# Patient Record
Sex: MALE | Race: WHITE | NOT HISPANIC OR LATINO | URBAN - METROPOLITAN AREA
[De-identification: names, ages, dates, MRNs, and addresses within clinical notes are randomized per-mention and may not be internally consistent; named-entity substitution may affect disease eponyms.]

---

## 2017-05-19 ENCOUNTER — OUTPATIENT (OUTPATIENT)
Dept: OUTPATIENT SERVICES | Facility: HOSPITAL | Age: 82
LOS: 1 days | End: 2017-05-19
Payer: MEDICARE

## 2017-05-19 VITALS
SYSTOLIC BLOOD PRESSURE: 141 MMHG | WEIGHT: 153 LBS | HEIGHT: 68 IN | TEMPERATURE: 99 F | HEART RATE: 74 BPM | RESPIRATION RATE: 16 BRPM | DIASTOLIC BLOOD PRESSURE: 63 MMHG

## 2017-05-19 DIAGNOSIS — Z01.818 ENCOUNTER FOR OTHER PREPROCEDURAL EXAMINATION: ICD-10-CM

## 2017-05-19 DIAGNOSIS — Z87.2 PERSONAL HISTORY OF DISEASES OF THE SKIN AND SUBCUTANEOUS TISSUE: Chronic | ICD-10-CM

## 2017-05-19 DIAGNOSIS — S83.231A COMPLEX TEAR OF MEDIAL MENISCUS, CURRENT INJURY, RIGHT KNEE, INITIAL ENCOUNTER: ICD-10-CM

## 2017-05-19 DIAGNOSIS — S83.271A COMPLEX TEAR OF LATERAL MENISCUS, CURRENT INJURY, RIGHT KNEE, INITIAL ENCOUNTER: ICD-10-CM

## 2017-05-19 LAB
ALBUMIN SERPL ELPH-MCNC: 4 G/DL — SIGNIFICANT CHANGE UP (ref 3.3–5)
ALP SERPL-CCNC: 118 U/L — SIGNIFICANT CHANGE UP (ref 40–120)
ALT FLD-CCNC: 26 U/L — SIGNIFICANT CHANGE UP (ref 12–78)
ANION GAP SERPL CALC-SCNC: 7 MMOL/L — SIGNIFICANT CHANGE UP (ref 5–17)
AST SERPL-CCNC: 20 U/L — SIGNIFICANT CHANGE UP (ref 15–37)
BILIRUB SERPL-MCNC: 0.8 MG/DL — SIGNIFICANT CHANGE UP (ref 0.2–1.2)
BUN SERPL-MCNC: 29 MG/DL — HIGH (ref 7–23)
CALCIUM SERPL-MCNC: 8.5 MG/DL — SIGNIFICANT CHANGE UP (ref 8.5–10.1)
CHLORIDE SERPL-SCNC: 104 MMOL/L — SIGNIFICANT CHANGE UP (ref 96–108)
CO2 SERPL-SCNC: 29 MMOL/L — SIGNIFICANT CHANGE UP (ref 22–31)
CREAT SERPL-MCNC: 1.6 MG/DL — HIGH (ref 0.5–1.3)
GLUCOSE SERPL-MCNC: 108 MG/DL — HIGH (ref 70–99)
HCT VFR BLD CALC: 44.2 % — SIGNIFICANT CHANGE UP (ref 39–50)
HGB BLD-MCNC: 14.1 G/DL — SIGNIFICANT CHANGE UP (ref 13–17)
MCHC RBC-ENTMCNC: 31.3 PG — SIGNIFICANT CHANGE UP (ref 27–34)
MCHC RBC-ENTMCNC: 32 GM/DL — SIGNIFICANT CHANGE UP (ref 32–36)
MCV RBC AUTO: 97.9 FL — SIGNIFICANT CHANGE UP (ref 80–100)
PLATELET # BLD AUTO: 230 K/UL — SIGNIFICANT CHANGE UP (ref 150–400)
POTASSIUM SERPL-MCNC: 4 MMOL/L — SIGNIFICANT CHANGE UP (ref 3.5–5.3)
POTASSIUM SERPL-SCNC: 4 MMOL/L — SIGNIFICANT CHANGE UP (ref 3.5–5.3)
PROT SERPL-MCNC: 8.1 G/DL — SIGNIFICANT CHANGE UP (ref 6–8.3)
RBC # BLD: 4.52 M/UL — SIGNIFICANT CHANGE UP (ref 4.2–5.8)
RBC # FLD: 12 % — SIGNIFICANT CHANGE UP (ref 10.3–14.5)
SODIUM SERPL-SCNC: 140 MMOL/L — SIGNIFICANT CHANGE UP (ref 135–145)
WBC # BLD: 9.8 K/UL — SIGNIFICANT CHANGE UP (ref 3.8–10.5)
WBC # FLD AUTO: 9.8 K/UL — SIGNIFICANT CHANGE UP (ref 3.8–10.5)

## 2017-05-19 PROCEDURE — 93005 ELECTROCARDIOGRAM TRACING: CPT

## 2017-05-19 PROCEDURE — 93010 ELECTROCARDIOGRAM REPORT: CPT | Mod: NC

## 2017-05-19 PROCEDURE — 85027 COMPLETE CBC AUTOMATED: CPT

## 2017-05-19 PROCEDURE — 80053 COMPREHEN METABOLIC PANEL: CPT

## 2017-05-19 PROCEDURE — G0463: CPT

## 2017-05-19 RX ORDER — FINASTERIDE 5 MG/1
1 TABLET, FILM COATED ORAL
Qty: 0 | Refills: 0 | COMMUNITY

## 2017-05-19 NOTE — H&P PST ADULT - NSANTHOSAYNRD_GEN_A_CORE
No. VIÁN screening performed.  STOP BANG Legend: 0-2 = LOW Risk; 3-4 = INTERMEDIATE Risk; 5-8 = HIGH Risk

## 2017-05-19 NOTE — H&P PST ADULT - ASSESSMENT
83 yo male with a meniscus tear of the right knee scheduled for a right knee arthroscopy - menisectomy - chondroplasty on  5/25 with Dr. Sr

## 2017-05-19 NOTE — H&P PST ADULT - PMH
Benign prostatic hyperplasia, presence of lower urinary tract symptoms unspecified, unspecified morphology    Cerebrovascular accident (CVA), unspecified mechanism  2015  Complex tear of lateral meniscus of right knee as current injury, initial encounter    Complex tear of medial meniscus of right knee as current injury, initial encounter    Essential hypertension    Hyperlipidemia, unspecified hyperlipidemia type    Skin cancer

## 2017-05-19 NOTE — H&P PST ADULT - HISTORY OF PRESENT ILLNESS
83 yo male with HTN, BPH s/p CVA (2015) , presents to PST scheduled for a right knee arthroscopy - menisectomy - chondroplasty on  5/25 with Dr. Sr. C/O right knee pain for the past 2-3 weeks, that has increased in severity. Denies recent and fall.  Rates his pain a 4/10. Denies use of pain meds.

## 2017-05-24 RX ORDER — SODIUM CHLORIDE 9 MG/ML
1000 INJECTION INTRAMUSCULAR; INTRAVENOUS; SUBCUTANEOUS
Qty: 0 | Refills: 0 | Status: DISCONTINUED | OUTPATIENT
Start: 2017-05-25 | End: 2017-06-09

## 2017-05-25 ENCOUNTER — TRANSCRIPTION ENCOUNTER (OUTPATIENT)
Age: 82
End: 2017-05-25

## 2017-05-25 ENCOUNTER — RESULT REVIEW (OUTPATIENT)
Age: 82
End: 2017-05-25

## 2017-05-25 ENCOUNTER — OUTPATIENT (OUTPATIENT)
Dept: OUTPATIENT SERVICES | Facility: HOSPITAL | Age: 82
LOS: 1 days | Discharge: HOME | End: 2017-05-25
Payer: MEDICARE

## 2017-05-25 VITALS
OXYGEN SATURATION: 97 % | RESPIRATION RATE: 12 BRPM | HEART RATE: 71 BPM | DIASTOLIC BLOOD PRESSURE: 68 MMHG | SYSTOLIC BLOOD PRESSURE: 128 MMHG

## 2017-05-25 VITALS
OXYGEN SATURATION: 95 % | DIASTOLIC BLOOD PRESSURE: 83 MMHG | HEIGHT: 68 IN | HEART RATE: 83 BPM | SYSTOLIC BLOOD PRESSURE: 174 MMHG | WEIGHT: 153 LBS | RESPIRATION RATE: 11 BRPM | TEMPERATURE: 98 F

## 2017-05-25 DIAGNOSIS — Z87.2 PERSONAL HISTORY OF DISEASES OF THE SKIN AND SUBCUTANEOUS TISSUE: Chronic | ICD-10-CM

## 2017-05-25 DIAGNOSIS — S83.231A COMPLEX TEAR OF MEDIAL MENISCUS, CURRENT INJURY, RIGHT KNEE, INITIAL ENCOUNTER: ICD-10-CM

## 2017-05-25 DIAGNOSIS — S83.271A COMPLEX TEAR OF LATERAL MENISCUS, CURRENT INJURY, RIGHT KNEE, INITIAL ENCOUNTER: ICD-10-CM

## 2017-05-25 DIAGNOSIS — Z01.818 ENCOUNTER FOR OTHER PREPROCEDURAL EXAMINATION: ICD-10-CM

## 2017-05-25 PROCEDURE — 88304 TISSUE EXAM BY PATHOLOGIST: CPT | Mod: 26

## 2017-05-25 PROCEDURE — 88304 TISSUE EXAM BY PATHOLOGIST: CPT

## 2017-05-25 PROCEDURE — 29880 ARTHRS KNE SRG MNISECTMY M&L: CPT | Mod: RT

## 2017-05-25 RX ORDER — CEFAZOLIN SODIUM 1 G
2000 VIAL (EA) INJECTION ONCE
Qty: 0 | Refills: 0 | Status: COMPLETED | OUTPATIENT
Start: 2017-05-25 | End: 2017-05-25

## 2017-05-25 RX ORDER — HYDROMORPHONE HYDROCHLORIDE 2 MG/ML
0.4 INJECTION INTRAMUSCULAR; INTRAVENOUS; SUBCUTANEOUS
Qty: 0 | Refills: 0 | Status: DISCONTINUED | OUTPATIENT
Start: 2017-05-25 | End: 2017-05-25

## 2017-05-25 RX ORDER — ONDANSETRON 8 MG/1
4 TABLET, FILM COATED ORAL ONCE
Qty: 0 | Refills: 0 | Status: DISCONTINUED | OUTPATIENT
Start: 2017-05-25 | End: 2017-05-25

## 2017-05-25 RX ORDER — SODIUM CHLORIDE 9 MG/ML
1000 INJECTION, SOLUTION INTRAVENOUS
Qty: 0 | Refills: 0 | Status: DISCONTINUED | OUTPATIENT
Start: 2017-05-25 | End: 2017-05-25

## 2017-05-25 RX ADMIN — SODIUM CHLORIDE 50 MILLILITER(S): 9 INJECTION INTRAMUSCULAR; INTRAVENOUS; SUBCUTANEOUS at 15:28

## 2017-05-25 RX ADMIN — SODIUM CHLORIDE 50 MILLILITER(S): 9 INJECTION INTRAMUSCULAR; INTRAVENOUS; SUBCUTANEOUS at 13:30

## 2017-05-25 NOTE — ASU DISCHARGE PLAN (ADULT/PEDIATRIC). - MEDICATION SUMMARY - MEDICATIONS TO TAKE
I will START or STAY ON the medications listed below when I get home from the hospital:    finasteride 5 mg oral tablet  -- 1 tab(s) by mouth once a day (at bedtime)  -- Indication: For per pmd    Percocet 5/325 325 mg-5 mg oral tablet  -- 1 tab(s) by mouth every 6 hours, As Needed -for moderate pain MDD:6  -- Caution federal law prohibits the transfer of this drug to any person other  than the person for whom it was prescribed.  May cause drowsiness.  Alcohol may intensify this effect.  Use care when operating dangerous machinery.  This prescription cannot be refilled.  This product contains acetaminophen.  Do not use  with any other product containing acetaminophen to prevent possible liver damage.  Using more of this medication than prescribed may cause serious breathing problems.    -- Indication: For prn pain    tamsulosin 0.4 mg oral capsule  -- 1 cap(s) by mouth once a day (at bedtime)  -- Indication: For per pmd    atorvastatin 40 mg oral tablet  -- 1 tab(s) by mouth once a day (at bedtime)  -- Indication: For per pmd    Norvasc 10 mg oral tablet  -- 1 tab(s) by mouth once a day  -- Indication: For per pmd    furosemide 20 mg oral tablet  -- 1 tab(s) by mouth once a day  -- Indication: For per pmd    docusate sodium 100 mg oral capsule  -- 1 cap(s) by mouth 3 times a day  -- Indication: For per pmd    Protonix 40 mg oral delayed release tablet  -- 1 tab(s) by mouth once a day  -- Indication: For per pmd

## 2017-05-25 NOTE — BRIEF OPERATIVE NOTE - PROCEDURE
Right knee arthroscopy with meniscectomy  05/25/2017  partial medial and lateral menisectomy, chondroplasty  Active  JESIKA

## 2017-05-25 NOTE — ASU DISCHARGE PLAN (ADULT/PEDIATRIC). - SPECIAL INSTRUCTIONS
follow up on 5/27/17. call office for appt. keep dressing on until office visit. keep dressing clean dry and intact

## 2017-05-29 DIAGNOSIS — M65.861 OTHER SYNOVITIS AND TENOSYNOVITIS, RIGHT LOWER LEG: ICD-10-CM

## 2017-05-29 DIAGNOSIS — M23.221 DERANGEMENT OF POSTERIOR HORN OF MEDIAL MENISCUS DUE TO OLD TEAR OR INJURY, RIGHT KNEE: ICD-10-CM

## 2017-05-29 DIAGNOSIS — Z86.73 PERSONAL HISTORY OF TRANSIENT ISCHEMIC ATTACK (TIA), AND CEREBRAL INFARCTION WITHOUT RESIDUAL DEFICITS: ICD-10-CM

## 2017-05-29 DIAGNOSIS — E78.00 PURE HYPERCHOLESTEROLEMIA, UNSPECIFIED: ICD-10-CM

## 2017-05-29 DIAGNOSIS — N18.9 CHRONIC KIDNEY DISEASE, UNSPECIFIED: ICD-10-CM

## 2017-05-29 DIAGNOSIS — M17.0 BILATERAL PRIMARY OSTEOARTHRITIS OF KNEE: ICD-10-CM

## 2017-05-29 DIAGNOSIS — N40.0 BENIGN PROSTATIC HYPERPLASIA WITHOUT LOWER URINARY TRACT SYMPTOMS: ICD-10-CM

## 2017-05-29 DIAGNOSIS — E78.5 HYPERLIPIDEMIA, UNSPECIFIED: ICD-10-CM

## 2017-05-29 DIAGNOSIS — I12.9 HYPERTENSIVE CHRONIC KIDNEY DISEASE WITH STAGE 1 THROUGH STAGE 4 CHRONIC KIDNEY DISEASE, OR UNSPECIFIED CHRONIC KIDNEY DISEASE: ICD-10-CM

## 2017-05-29 DIAGNOSIS — M23.251 DERANGEMENT OF POSTERIOR HORN OF LATERAL MENISCUS DUE TO OLD TEAR OR INJURY, RIGHT KNEE: ICD-10-CM

## 2017-05-30 LAB — SURGICAL PATHOLOGY FINAL REPORT - CH: SIGNIFICANT CHANGE UP

## 2018-07-16 PROBLEM — I63.9 CEREBRAL INFARCTION, UNSPECIFIED: Chronic | Status: ACTIVE | Noted: 2017-05-19

## 2020-08-06 ENCOUNTER — RECORD ABSTRACTING (OUTPATIENT)
Age: 85
End: 2020-08-06

## 2020-08-06 DIAGNOSIS — Z86.73 PERSONAL HISTORY OF TRANSIENT ISCHEMIC ATTACK (TIA), AND CEREBRAL INFARCTION W/OUT RESIDUAL DEFICITS: ICD-10-CM

## 2022-08-08 ENCOUNTER — NON-APPOINTMENT (OUTPATIENT)
Age: 87
End: 2022-08-08

## 2022-08-09 PROBLEM — S83.231A COMPLEX TEAR OF MEDIAL MENISCUS, CURRENT INJURY, RIGHT KNEE, INITIAL ENCOUNTER: Chronic | Status: ACTIVE | Noted: 2017-05-19

## 2022-08-09 PROBLEM — E78.5 HYPERLIPIDEMIA, UNSPECIFIED: Chronic | Status: ACTIVE | Noted: 2017-05-19

## 2022-08-09 PROBLEM — I10 ESSENTIAL (PRIMARY) HYPERTENSION: Chronic | Status: ACTIVE | Noted: 2017-05-19

## 2022-08-09 PROBLEM — S83.271A COMPLEX TEAR OF LATERAL MENISCUS, CURRENT INJURY, RIGHT KNEE, INITIAL ENCOUNTER: Chronic | Status: ACTIVE | Noted: 2017-05-19

## 2022-08-09 PROBLEM — N40.0 BENIGN PROSTATIC HYPERPLASIA WITHOUT LOWER URINARY TRACT SYMPTOMS: Chronic | Status: ACTIVE | Noted: 2017-05-19

## 2022-08-11 ENCOUNTER — INPATIENT (INPATIENT)
Facility: HOSPITAL | Age: 87
LOS: 17 days | Discharge: ANOTHER HOSPITAL | DRG: 871 | End: 2022-08-29
Attending: HOSPITALIST | Admitting: HOSPITALIST
Payer: MEDICARE

## 2022-08-11 ENCOUNTER — APPOINTMENT (OUTPATIENT)
Dept: INTERNAL MEDICINE | Facility: CLINIC | Age: 87
End: 2022-08-11

## 2022-08-11 VITALS
HEART RATE: 115 BPM | WEIGHT: 159.39 LBS | OXYGEN SATURATION: 93 % | RESPIRATION RATE: 21 BRPM | HEIGHT: 68 IN | TEMPERATURE: 98 F | DIASTOLIC BLOOD PRESSURE: 78 MMHG | SYSTOLIC BLOOD PRESSURE: 139 MMHG

## 2022-08-11 DIAGNOSIS — Z87.2 PERSONAL HISTORY OF DISEASES OF THE SKIN AND SUBCUTANEOUS TISSUE: Chronic | ICD-10-CM

## 2022-08-11 DIAGNOSIS — I25.10 ATHEROSCLEROTIC HEART DISEASE OF NATIVE CORONARY ARTERY WITHOUT ANGINA PECTORIS: ICD-10-CM

## 2022-08-11 DIAGNOSIS — J18.9 PNEUMONIA, UNSPECIFIED ORGANISM: ICD-10-CM

## 2022-08-11 LAB
ALBUMIN SERPL ELPH-MCNC: 2.8 G/DL — LOW (ref 3.3–5)
ALP SERPL-CCNC: 290 U/L — HIGH (ref 30–120)
ALT FLD-CCNC: 100 U/L DA — HIGH (ref 10–60)
ANION GAP SERPL CALC-SCNC: 11 MMOL/L — SIGNIFICANT CHANGE UP (ref 5–17)
AST SERPL-CCNC: 93 U/L — HIGH (ref 10–40)
BASOPHILS # BLD AUTO: 0 K/UL — SIGNIFICANT CHANGE UP (ref 0–0.2)
BASOPHILS NFR BLD AUTO: 0 % — SIGNIFICANT CHANGE UP (ref 0–2)
BILIRUB SERPL-MCNC: 1.3 MG/DL — HIGH (ref 0.2–1.2)
BUN SERPL-MCNC: 40 MG/DL — HIGH (ref 7–23)
CALCIUM SERPL-MCNC: 8.5 MG/DL — SIGNIFICANT CHANGE UP (ref 8.4–10.5)
CHLORIDE SERPL-SCNC: 101 MMOL/L — SIGNIFICANT CHANGE UP (ref 96–108)
CO2 SERPL-SCNC: 26 MMOL/L — SIGNIFICANT CHANGE UP (ref 22–31)
CREAT SERPL-MCNC: 1.88 MG/DL — HIGH (ref 0.5–1.3)
EGFR: 34 ML/MIN/1.73M2 — LOW
EOSINOPHIL # BLD AUTO: 0.37 K/UL — SIGNIFICANT CHANGE UP (ref 0–0.5)
EOSINOPHIL NFR BLD AUTO: 2 % — SIGNIFICANT CHANGE UP (ref 0–6)
FLUAV AG NPH QL: SIGNIFICANT CHANGE UP
FLUBV AG NPH QL: SIGNIFICANT CHANGE UP
GLUCOSE SERPL-MCNC: 189 MG/DL — HIGH (ref 70–99)
HCT VFR BLD CALC: 40.3 % — SIGNIFICANT CHANGE UP (ref 39–50)
HGB BLD-MCNC: 13.1 G/DL — SIGNIFICANT CHANGE UP (ref 13–17)
LACTATE SERPL-SCNC: 1.4 MMOL/L — SIGNIFICANT CHANGE UP (ref 0.7–2)
LYMPHOCYTES # BLD AUTO: 1.12 K/UL — SIGNIFICANT CHANGE UP (ref 1–3.3)
LYMPHOCYTES # BLD AUTO: 6 % — LOW (ref 13–44)
MANUAL SMEAR VERIFICATION: SIGNIFICANT CHANGE UP
MCHC RBC-ENTMCNC: 31.4 PG — SIGNIFICANT CHANGE UP (ref 27–34)
MCHC RBC-ENTMCNC: 32.5 GM/DL — SIGNIFICANT CHANGE UP (ref 32–36)
MCV RBC AUTO: 96.6 FL — SIGNIFICANT CHANGE UP (ref 80–100)
MONOCYTES # BLD AUTO: 2.06 K/UL — HIGH (ref 0–0.9)
MONOCYTES NFR BLD AUTO: 11 % — SIGNIFICANT CHANGE UP (ref 2–14)
NEUTROPHILS # BLD AUTO: 15.17 K/UL — HIGH (ref 1.8–7.4)
NEUTROPHILS NFR BLD AUTO: 80 % — HIGH (ref 43–77)
NEUTS BAND # BLD: 1 % — SIGNIFICANT CHANGE UP (ref 0–8)
NRBC # BLD: 0 — SIGNIFICANT CHANGE UP
PLAT MORPH BLD: NORMAL — SIGNIFICANT CHANGE UP
PLATELET # BLD AUTO: 357 K/UL — SIGNIFICANT CHANGE UP (ref 150–400)
POTASSIUM SERPL-MCNC: 4 MMOL/L — SIGNIFICANT CHANGE UP (ref 3.5–5.3)
POTASSIUM SERPL-SCNC: 4 MMOL/L — SIGNIFICANT CHANGE UP (ref 3.5–5.3)
PROT SERPL-MCNC: 8.2 G/DL — SIGNIFICANT CHANGE UP (ref 6–8.3)
RBC # BLD: 4.17 M/UL — LOW (ref 4.2–5.8)
RBC # FLD: 13.5 % — SIGNIFICANT CHANGE UP (ref 10.3–14.5)
RBC BLD AUTO: NORMAL — SIGNIFICANT CHANGE UP
RSV RNA NPH QL NAA+NON-PROBE: SIGNIFICANT CHANGE UP
SARS-COV-2 RNA SPEC QL NAA+PROBE: SIGNIFICANT CHANGE UP
SODIUM SERPL-SCNC: 138 MMOL/L — SIGNIFICANT CHANGE UP (ref 135–145)
WBC # BLD: 18.73 K/UL — HIGH (ref 3.8–10.5)
WBC # FLD AUTO: 18.73 K/UL — HIGH (ref 3.8–10.5)

## 2022-08-11 PROCEDURE — 99285 EMERGENCY DEPT VISIT HI MDM: CPT | Mod: CS

## 2022-08-11 PROCEDURE — 99223 1ST HOSP IP/OBS HIGH 75: CPT | Mod: AI

## 2022-08-11 PROCEDURE — 71045 X-RAY EXAM CHEST 1 VIEW: CPT | Mod: 26

## 2022-08-11 PROCEDURE — 93010 ELECTROCARDIOGRAM REPORT: CPT

## 2022-08-11 PROCEDURE — 74176 CT ABD & PELVIS W/O CONTRAST: CPT | Mod: 26

## 2022-08-11 PROCEDURE — 71250 CT THORAX DX C-: CPT | Mod: 26

## 2022-08-11 RX ORDER — AMLODIPINE BESYLATE 2.5 MG/1
10 TABLET ORAL DAILY
Refills: 0 | Status: DISCONTINUED | OUTPATIENT
Start: 2022-08-11 | End: 2022-08-15

## 2022-08-11 RX ORDER — HEPARIN SODIUM 5000 [USP'U]/ML
6000 INJECTION INTRAVENOUS; SUBCUTANEOUS ONCE
Refills: 0 | Status: COMPLETED | OUTPATIENT
Start: 2022-08-11 | End: 2022-08-12

## 2022-08-11 RX ORDER — HEPARIN SODIUM 5000 [USP'U]/ML
INJECTION INTRAVENOUS; SUBCUTANEOUS
Qty: 25000 | Refills: 0 | Status: DISCONTINUED | OUTPATIENT
Start: 2022-08-11 | End: 2022-08-16

## 2022-08-11 RX ORDER — AZITHROMYCIN 500 MG/1
500 TABLET, FILM COATED ORAL EVERY 24 HOURS
Refills: 0 | Status: DISCONTINUED | OUTPATIENT
Start: 2022-08-12 | End: 2022-08-15

## 2022-08-11 RX ORDER — HEPARIN SODIUM 5000 [USP'U]/ML
6000 INJECTION INTRAVENOUS; SUBCUTANEOUS EVERY 6 HOURS
Refills: 0 | Status: DISCONTINUED | OUTPATIENT
Start: 2022-08-11 | End: 2022-08-16

## 2022-08-11 RX ORDER — FINASTERIDE 5 MG/1
5 TABLET, FILM COATED ORAL DAILY
Refills: 0 | Status: DISCONTINUED | OUTPATIENT
Start: 2022-08-12 | End: 2022-08-29

## 2022-08-11 RX ORDER — TAMSULOSIN HYDROCHLORIDE 0.4 MG/1
0.4 CAPSULE ORAL AT BEDTIME
Refills: 0 | Status: DISCONTINUED | OUTPATIENT
Start: 2022-08-11 | End: 2022-08-29

## 2022-08-11 RX ORDER — SODIUM CHLORIDE 9 MG/ML
1000 INJECTION INTRAMUSCULAR; INTRAVENOUS; SUBCUTANEOUS ONCE
Refills: 0 | Status: COMPLETED | OUTPATIENT
Start: 2022-08-11 | End: 2022-08-11

## 2022-08-11 RX ORDER — SODIUM CHLORIDE 9 MG/ML
1000 INJECTION, SOLUTION INTRAVENOUS
Refills: 0 | Status: DISCONTINUED | OUTPATIENT
Start: 2022-08-11 | End: 2022-08-12

## 2022-08-11 RX ORDER — ACETAMINOPHEN 500 MG
650 TABLET ORAL EVERY 6 HOURS
Refills: 0 | Status: DISCONTINUED | OUTPATIENT
Start: 2022-08-11 | End: 2022-08-29

## 2022-08-11 RX ORDER — AZITHROMYCIN 500 MG/1
500 TABLET, FILM COATED ORAL ONCE
Refills: 0 | Status: COMPLETED | OUTPATIENT
Start: 2022-08-11 | End: 2022-08-11

## 2022-08-11 RX ORDER — ONDANSETRON 8 MG/1
4 TABLET, FILM COATED ORAL EVERY 8 HOURS
Refills: 0 | Status: DISCONTINUED | OUTPATIENT
Start: 2022-08-11 | End: 2022-08-29

## 2022-08-11 RX ORDER — PANTOPRAZOLE SODIUM 20 MG/1
40 TABLET, DELAYED RELEASE ORAL
Refills: 0 | Status: DISCONTINUED | OUTPATIENT
Start: 2022-08-11 | End: 2022-08-29

## 2022-08-11 RX ORDER — CEFTRIAXONE 500 MG/1
1000 INJECTION, POWDER, FOR SOLUTION INTRAMUSCULAR; INTRAVENOUS EVERY 24 HOURS
Refills: 0 | Status: DISCONTINUED | OUTPATIENT
Start: 2022-08-12 | End: 2022-08-15

## 2022-08-11 RX ORDER — LANOLIN ALCOHOL/MO/W.PET/CERES
3 CREAM (GRAM) TOPICAL AT BEDTIME
Refills: 0 | Status: DISCONTINUED | OUTPATIENT
Start: 2022-08-11 | End: 2022-08-17

## 2022-08-11 RX ORDER — HEPARIN SODIUM 5000 [USP'U]/ML
3000 INJECTION INTRAVENOUS; SUBCUTANEOUS EVERY 6 HOURS
Refills: 0 | Status: DISCONTINUED | OUTPATIENT
Start: 2022-08-11 | End: 2022-08-16

## 2022-08-11 RX ORDER — ATORVASTATIN CALCIUM 80 MG/1
40 TABLET, FILM COATED ORAL AT BEDTIME
Refills: 0 | Status: DISCONTINUED | OUTPATIENT
Start: 2022-08-11 | End: 2022-08-29

## 2022-08-11 RX ORDER — CEFTRIAXONE 500 MG/1
1000 INJECTION, POWDER, FOR SOLUTION INTRAMUSCULAR; INTRAVENOUS ONCE
Refills: 0 | Status: COMPLETED | OUTPATIENT
Start: 2022-08-11 | End: 2022-08-11

## 2022-08-11 RX ADMIN — CEFTRIAXONE 100 MILLIGRAM(S): 500 INJECTION, POWDER, FOR SOLUTION INTRAMUSCULAR; INTRAVENOUS at 21:00

## 2022-08-11 RX ADMIN — SODIUM CHLORIDE 1000 MILLILITER(S): 9 INJECTION INTRAMUSCULAR; INTRAVENOUS; SUBCUTANEOUS at 21:35

## 2022-08-11 NOTE — H&P ADULT - ASSESSMENT
87M with HTN, HLD, hx of CVA (no residual deficits), BPH, hx of skin CA who presents with cough and fevers.    Found to have multifocal pneumonia.  Also found have new onset afib.      Multifocal PNA   - admitted tot   - aside from PNA patient has blood tinged sputum, possible early cavitation, and fevers ->    87M with HTN, HLD, hx of CVA (no residual deficits), BPH, hx of skin CA who presents with cough and fevers.    Found to have multifocal pneumonia.  Also found have new onset afib.      Multifocal PNA   - admitted to medicine  - cont with ceftriaxone and azithromycin  - check strep PNA Ag  - check legionella Ag  - ID consult  - pulm consult  - f/u cultures    R/O TB   - doubt TB but patient has blood tinged sputum, possible early cavitation on CT, and fevers -> will have to r/o  - TB isolation  - check quantiferon and AFB smear  - ID will be consulted    New afib - currently in 110s  - [id number] score of at least 5 -> will anticoagulate with heparin drip for now (can be stopped if hemotpysis worsens)  - cardiology consult  - rate control if needed  - check TTE  - will eventually need to transition to DOAC    Elevated LFTs  - likely from current illness  - f/u final CT A/P read  - can consider GI consult if worsens    CINDI 2/2 prerenal azotemia - elev BUN/Cr >20  - likely 2/2 poor PO intake  - hydrate and repeat BMP in AM  - bladder scan, and if retaining, may need pittman  - hold diuretic  - avoid nephrotoxic meds    HTN/HLD  - cont with norvasc  - hold lasix  - cont with atorvastatin    BPH  - cont with tamsulosin and finasteride    Preventive measures  - will be on heparin drip for afib     87M with HTN, HLD, hx of CVA (no residual deficits), BPH, hx of skin CA who presents with cough and fevers.    Found to have multifocal pneumonia.  Also found have new onset afib.        Sepsis 2/2 multifocal PNA - meets sepsis criteria with leukocytosis + tachycardia + source of infection  - admitted to medicine  - cont with ceftriaxone and azithromycin  - check strep PNA Ag  - check legionella Ag  - ID consult  - pulm consult  - f/u cultures    R/O TB   - doubt TB but patient has blood tinged sputum, possible early cavitation on CT, and fevers -> will have to r/o  - TB isolation  - check quantiferon and AFB smear  - ID will be consulted    New afib - currently in 110s  - [id number] score of at least 5 -> will anticoagulate with heparin drip for now (can be stopped if hemotpysis worsens)  - cardiology consult  - rate control if needed  - check TTE  - will eventually need to transition to DOAC    Elevated LFTs  - likely from current illness  - f/u final CT A/P read  - can consider GI consult if worsens    CINDI 2/2 prerenal azotemia - elev BUN/Cr >20  - likely 2/2 poor PO intake  - hydrate and repeat BMP in AM  - bladder scan, and if retaining, may need pittman  - hold diuretic  - avoid nephrotoxic meds    HTN/HLD  - cont with norvasc  - hold lasix  - cont with atorvastatin    BPH  - cont with tamsulosin and finasteride    Preventive measures  - will be on heparin drip for afib

## 2022-08-11 NOTE — ED ADULT NURSE NOTE - CHPI ED NUR QUALITY2
alteration in breathing pattern/non-productive cough alteration in breathing pattern/productive cough/yellow mucous

## 2022-08-11 NOTE — ED PROVIDER NOTE - CLINICAL SUMMARY MEDICAL DECISION MAKING FREE TEXT BOX
86 y/o male with cough generalized weakness for five days. no fever, neg at home covid test. will rule out PNA, check labs, CXR, reassess

## 2022-08-11 NOTE — ED PROVIDER NOTE - NSICDXPASTMEDICALHX_GEN_ALL_CORE_FT
PAST MEDICAL HISTORY:  Benign prostatic hyperplasia, presence of lower urinary tract symptoms unspecified, unspecified morphology     Cerebrovascular accident (CVA), unspecified mechanism 2015    Complex tear of lateral meniscus of right knee as current injury, initial encounter     Complex tear of medial meniscus of right knee as current injury, initial encounter     Essential hypertension     Hyperlipidemia, unspecified hyperlipidemia type     Skin cancer

## 2022-08-11 NOTE — ED ADULT NURSE NOTE - INTERVENTIONS DEFINITIONS
Mina to call system/Call bell, personal items and telephone within reach/Instruct patient to call for assistance

## 2022-08-11 NOTE — H&P ADULT - NSICDXPASTMEDICALHX_GEN_ALL_CORE_FT
PAST MEDICAL HISTORY:  Benign prostatic hyperplasia, presence of lower urinary tract symptoms unspecified, unspecified morphology     Cerebrovascular accident (CVA), unspecified mechanism 2015    Complex tear of lateral meniscus of right knee as current injury, initial encounter     Complex tear of medial meniscus of right knee as current injury, initial encounter     Essential hypertension     Hyperlipidemia, unspecified hyperlipidemia type     Skin cancer s/p Mohs

## 2022-08-11 NOTE — ED ADULT NURSE NOTE - OBJECTIVE STATEMENT
Pt presents to the ED with reports of cough for approximately 1 week. Pt was seen by his PMD and started oral antibiotics. Pt then went to urgent care because the cough continued and was sent home to continue PO meds. Pt now states he has blood in his sputum, is unable to sleep at night, and feels pain in his left upper back. Pt with mild dyspnea walking from wheelchair to the stretcher.

## 2022-08-11 NOTE — H&P ADULT - CONVERSATION DETAILS
Spoke to family and patient regarding diagnosis and prognosis of multifocal PNA and treatment plans.  Also spoke to them regarding advance directives if any emergency should arise such as acute respiratory failure or cardiac arrest.  Both patient and daughter confirm that he is to remain FULL CODE.

## 2022-08-11 NOTE — ED PROVIDER NOTE - OBJECTIVE STATEMENT
88 y/o male HTN, HLD, CVA present to the ed c/o cough x 5-6 days. pt states he is constantly coughing with associated phlegm and blood clots. pt denies fever, sick contacts, sore throat. pt states he usually coughs when he is lying down. pt states he is on baby aspirin. pt states he is having trouble breathing. pt states he had a negative at home covid test. pt states he saw his pcp 3 days ago but did not get an XR done.

## 2022-08-11 NOTE — H&P ADULT - NSICDXFAMILYHX_GEN_ALL_CORE_FT
FAMILY HISTORY:  Father  Still living? Unknown  Family history of ischemic heart disease and other diseases of the circulatory system, Age at diagnosis: Age Unknown

## 2022-08-11 NOTE — H&P ADULT - NSHPREVIEWOFSYSTEMS_GEN_ALL_CORE
REVIEW OF SYSTEMS:  CONSTITUTIONAL:   +fever, +fatigue, no weight loss  EYES:    no eye pain or visual disturbances or discharge  ENMT:     no difficulty hearing or tinnitus or vertigo, no sinus or throat pain  NECK:    no pain or stiffness  RESPIRATORY:    +cough, +hemoptysis, +SOB/DONG  CARDIOVASCULAR:    no chest pain or palpitations or dizziness or leg swelling  GASTROINTESTINAL:    +nausea, no vomiting, no abdominal or epigastric pain, no diarrhea or constipation. no melena or hematochezia.  GENITOURINARY:    no dysuria or frequency or hematuria or incontinence  NEUROLOGICAL:    no headaches or memory loss or loss of strength or numbness or tremors  SKIN:    no itching or burning or rashes or lesions   LYMPH NODES:    no enlarged glands  ENDOCRINE:    no heat or cold intolerance, no hair loss, no polydipsia or polyuria  MUSCULOSKELETAL:    no joint pain or swelling, no muscle or back or extremity pain  PSYCHIATRIC:    no depression or anxiety or mood swings or difficulty sleeping  HEME/LYMPH:    no easy bruising or bleeding gums  ALLERGY AND IMMUNOLOGIC:    no hives or eczema

## 2022-08-11 NOTE — H&P ADULT - NSHPPHYSICALEXAM_GEN_ALL_CORE
PHYSICAL EXAM:  Vital Signs Last 24 Hrs  T(C): 36.9 (11 Aug 2022 19:44), Max: 36.9 (11 Aug 2022 19:44)  T(F): 98.5 (11 Aug 2022 19:44), Max: 98.5 (11 Aug 2022 19:44)  HR: 117 (11 Aug 2022 21:30) (115 - 118)  BP: 160/74 (11 Aug 2022 21:30) (139/78 - 160/74)  BP(mean): --  RR: 20 (11 Aug 2022 21:30) (20 - 22)  SpO2: 91% (11 Aug 2022 21:30) (91% - 93%)    Parameters below as of 11 Aug 2022 21:30  Patient On (Oxygen Delivery Method): room air        GENERAL:     well-appearing but coughing male in NAD  HEAD:     atraumatic, normocephalic  NECK:     supple  RESPIRATORY:     some dry crackles throughout, no gross wheezing or rales  CARDIOVASCULAR:     irregular irregular   GASTROINTESTINAL:     soft, nontender, nondistended, bowel sounds present  EXTREMITIES:     minimal BLE edema, no clubbing or cyanosis  MUSCULOSKELETAL:     no joint pain or swelling or deformities  NERVOUS SYSTEM:     motor strength intact with 5/5 B/L upper and lower extremities, no gross sensory deficits  SKIN:     no rashes or lesions  PSYCH:     appropriate, alert and orientated x3, good concentration

## 2022-08-11 NOTE — H&P ADULT - NSHPLABSRESULTS_GEN_ALL_CORE
LABS:                        13.1   18.73<H> )-----------( 357      ( 11 Aug 2022 20:30 )             40.3     138    |  101    |  40<H>  ----------------------------<  189<H>    11 Aug 2022 20:30  4.0     |  26     |  1.88<H>    Ca 8.5           11 Aug 2022 20:30    TPro  8.2    /  Alb  2.8<L>  /  TBili  1.3<H>  /  DBili  x      /  AST  93<H>  /  ALT  100<H>  /  AlkPhos  290<H>  11 Aug 2022 20:30    Troponin trend:    Lactate, Blood: 1.4 mmol/L (08-11-22 @ 21:07)    EKG:  afib with , RBBB, possible STD in V2-V6  Radiology:  < from: CT Abdomen and Pelvis No Cont (08.11.22 @ 21:36) >    ******PRELIMINARY REPORT******      ******PRELIMINARY REPORT******           INTERPRETATION:  Patchy groundglass and more dense airspace opacities in   both lungs, suspicious for multifocal pneumonia.  Several small lucencies   with areas of lung opacity, for example 3 series 3, image seventy-one and   the left upper lobe, may represent early cavitation versus areas of focal   bronchiectasis.    Small left pleural effusion.    No evidence of bowel obstruction, active inflammatory process or   intra-abdominal source for infection.    The gallbladder is contracted.  No evidence of cholelithiasis.  A direct   right inguinal hernia contains nonobstructed bowel loop.  There is   colonic diverticulosis without evidence for acute diverticulitis.        ******PRELIMINARY REPORT******      < end of copied text >

## 2022-08-11 NOTE — H&P ADULT - HISTORY OF PRESENT ILLNESS
87M with HTN, HLD, hx of CVA (no residual deficits), BPH, hx of skin CA who presents with cough and fevers.  Symptoms started about 5-6 days ago.  Started with left sided upper back pain.  Then started to have a cough associated with green phlegm and possible blood.  Associated with SOB and DONG.  No weight changes or night sweats.  Had a fever over the weekend (4-5 days ago) as high as 102F.  Some nausea but no vomiting with loss of appetite.  No chest pain.  No diarrhea.  No abdominal pain.  No recent travel or sick contacts.  Went to see his PMD 3 days ago and was given a z-kellie (last day tomorrow) and tesslon perles.  Reported had negative COVID tests at home.  Patient decided to come here for further evaluation.  In the ED, patient's triage vitals were /78    RR  21, 93% on RA (thought dropped down to 88%) and T 98.5F.  Labs showed WBC 18 with a left shift, CINDI with BUN/Cr 40/1.88, elevated LFTs, normal lactate, negative COVID, neg influenza, neg RSV     87M with HTN, HLD, hx of CVA (no residual deficits), BPH, hx of skin CA who presents with cough and fevers.  Symptoms started about 5-6 days ago.  Started with left sided upper back pain.  Then started to have a cough associated with green phlegm and possible blood.  Associated with SOB and DONG.  No weight changes or night sweats.  Had a fever over the weekend (4-5 days ago) as high as 102F.  Some nausea but no vomiting with loss of appetite.  No chest pain.  No diarrhea.  No abdominal pain.  No recent travel or sick contacts.  Went to see his PMD 3 days ago and was given a z-kellie (last day tomorrow) and tesslon perles.  Reported had negative COVID tests at home.  Patient decided to come here for further evaluation.  In the ED, patient's triage vitals were /78    RR  21, 93% on RA (thought dropped down to 88%) and T 98.5F.  Labs showed WBC 18 with a left shift, CINDI with BUN/Cr 40/1.88, elevated LFTs, normal lactate, negative COVID (last booster in 12/2021 with Pfizer vaccine), neg influenza, neg RSV.  CT chest showed "patchy groundglass and more dense opacities in both lungs, suspicious for multifocal PNA...several small lucencies with areas of lung opacity...may represent early cavitation versus areas of focal bronchiectasis."  Patient started on azithromycin and ceftriaxone empirically for multifocal PNA.  Also of note, patient was found to be in new afib on EKG.  Patient denies any afib history but said when he was younger he did feel some irregular heartbeats.

## 2022-08-11 NOTE — ED PROVIDER NOTE - PHYSICAL EXAMINATION
Gen: Alert, NAD  Head/eyes: NC/AT, PERRL  ENT: airway patent  Neck: supple  Pulm/lung: Bilateral clear BS, no wheeze, no respiratory distress  CV/heart: RRR  GI/Abd: soft, NT/ND, +BS, no guarding/rebound tenderness  Musculoskeletal: no edema/erythema/cyanosis  Skin: no rash  Neuro: AAOx3, grossly intact

## 2022-08-12 LAB
ALBUMIN SERPL ELPH-MCNC: 2.2 G/DL — LOW (ref 3.3–5)
ALP SERPL-CCNC: 251 U/L — HIGH (ref 30–120)
ALT FLD-CCNC: 82 U/L DA — HIGH (ref 10–60)
ANION GAP SERPL CALC-SCNC: 9 MMOL/L — SIGNIFICANT CHANGE UP (ref 5–17)
APTT BLD: 31.8 SEC — SIGNIFICANT CHANGE UP (ref 27.5–35.5)
APTT BLD: 51.2 SEC — HIGH (ref 27.5–35.5)
APTT BLD: 55 SEC — HIGH (ref 27.5–35.5)
APTT BLD: 97.7 SEC — HIGH (ref 27.5–35.5)
AST SERPL-CCNC: 71 U/L — HIGH (ref 10–40)
BASOPHILS # BLD AUTO: 0.1 K/UL — SIGNIFICANT CHANGE UP (ref 0–0.2)
BASOPHILS NFR BLD AUTO: 0.5 % — SIGNIFICANT CHANGE UP (ref 0–2)
BILIRUB SERPL-MCNC: 0.8 MG/DL — SIGNIFICANT CHANGE UP (ref 0.2–1.2)
BUN SERPL-MCNC: 32 MG/DL — HIGH (ref 7–23)
CALCIUM SERPL-MCNC: 7.7 MG/DL — LOW (ref 8.4–10.5)
CHLORIDE SERPL-SCNC: 105 MMOL/L — SIGNIFICANT CHANGE UP (ref 96–108)
CO2 SERPL-SCNC: 25 MMOL/L — SIGNIFICANT CHANGE UP (ref 22–31)
CREAT SERPL-MCNC: 1.5 MG/DL — HIGH (ref 0.5–1.3)
EGFR: 45 ML/MIN/1.73M2 — LOW
EOSINOPHIL # BLD AUTO: 0.36 K/UL — SIGNIFICANT CHANGE UP (ref 0–0.5)
EOSINOPHIL NFR BLD AUTO: 1.9 % — SIGNIFICANT CHANGE UP (ref 0–6)
GLUCOSE SERPL-MCNC: 113 MG/DL — HIGH (ref 70–99)
HCT VFR BLD CALC: 35.6 % — LOW (ref 39–50)
HGB BLD-MCNC: 11.8 G/DL — LOW (ref 13–17)
IMM GRANULOCYTES NFR BLD AUTO: 3.4 % — HIGH (ref 0–1.5)
INR BLD: 1.28 RATIO — HIGH (ref 0.88–1.16)
LYMPHOCYTES # BLD AUTO: 1.61 K/UL — SIGNIFICANT CHANGE UP (ref 1–3.3)
LYMPHOCYTES # BLD AUTO: 8.6 % — LOW (ref 13–44)
MAGNESIUM SERPL-MCNC: 1.8 MG/DL — SIGNIFICANT CHANGE UP (ref 1.6–2.6)
MCHC RBC-ENTMCNC: 31.6 PG — SIGNIFICANT CHANGE UP (ref 27–34)
MCHC RBC-ENTMCNC: 33.1 GM/DL — SIGNIFICANT CHANGE UP (ref 32–36)
MCV RBC AUTO: 95.2 FL — SIGNIFICANT CHANGE UP (ref 80–100)
MONOCYTES # BLD AUTO: 2.13 K/UL — HIGH (ref 0–0.9)
MONOCYTES NFR BLD AUTO: 11.4 % — SIGNIFICANT CHANGE UP (ref 2–14)
NEUTROPHILS # BLD AUTO: 13.81 K/UL — HIGH (ref 1.8–7.4)
NEUTROPHILS NFR BLD AUTO: 74.2 % — SIGNIFICANT CHANGE UP (ref 43–77)
NRBC # BLD: 0 /100 WBCS — SIGNIFICANT CHANGE UP (ref 0–0)
PHOSPHATE SERPL-MCNC: 4.3 MG/DL — SIGNIFICANT CHANGE UP (ref 2.5–4.5)
PLATELET # BLD AUTO: 305 K/UL — SIGNIFICANT CHANGE UP (ref 150–400)
POTASSIUM SERPL-MCNC: 4 MMOL/L — SIGNIFICANT CHANGE UP (ref 3.5–5.3)
POTASSIUM SERPL-SCNC: 4 MMOL/L — SIGNIFICANT CHANGE UP (ref 3.5–5.3)
PROT SERPL-MCNC: 6.8 G/DL — SIGNIFICANT CHANGE UP (ref 6–8.3)
PROTHROM AB SERPL-ACNC: 15.1 SEC — HIGH (ref 10.5–13.4)
RBC # BLD: 3.74 M/UL — LOW (ref 4.2–5.8)
RBC # FLD: 13.5 % — SIGNIFICANT CHANGE UP (ref 10.3–14.5)
S PNEUM AG UR QL: NEGATIVE — SIGNIFICANT CHANGE UP
SODIUM SERPL-SCNC: 139 MMOL/L — SIGNIFICANT CHANGE UP (ref 135–145)
WBC # BLD: 18.64 K/UL — HIGH (ref 3.8–10.5)
WBC # FLD AUTO: 18.64 K/UL — HIGH (ref 3.8–10.5)

## 2022-08-12 PROCEDURE — 99233 SBSQ HOSP IP/OBS HIGH 50: CPT

## 2022-08-12 RX ORDER — IPRATROPIUM/ALBUTEROL SULFATE 18-103MCG
3 AEROSOL WITH ADAPTER (GRAM) INHALATION EVERY 6 HOURS
Refills: 0 | Status: DISCONTINUED | OUTPATIENT
Start: 2022-08-12 | End: 2022-08-19

## 2022-08-12 RX ORDER — IPRATROPIUM/ALBUTEROL SULFATE 18-103MCG
3 AEROSOL WITH ADAPTER (GRAM) INHALATION ONCE
Refills: 0 | Status: COMPLETED | OUTPATIENT
Start: 2022-08-12 | End: 2022-08-12

## 2022-08-12 RX ORDER — METOPROLOL TARTRATE 50 MG
25 TABLET ORAL
Refills: 0 | Status: DISCONTINUED | OUTPATIENT
Start: 2022-08-12 | End: 2022-08-14

## 2022-08-12 RX ADMIN — HEPARIN SODIUM 1700 UNIT(S)/HR: 5000 INJECTION INTRAVENOUS; SUBCUTANEOUS at 22:12

## 2022-08-12 RX ADMIN — HEPARIN SODIUM 3000 UNIT(S): 5000 INJECTION INTRAVENOUS; SUBCUTANEOUS at 07:38

## 2022-08-12 RX ADMIN — PANTOPRAZOLE SODIUM 40 MILLIGRAM(S): 20 TABLET, DELAYED RELEASE ORAL at 06:42

## 2022-08-12 RX ADMIN — HEPARIN SODIUM 1700 UNIT(S)/HR: 5000 INJECTION INTRAVENOUS; SUBCUTANEOUS at 15:22

## 2022-08-12 RX ADMIN — HEPARIN SODIUM 1700 UNIT(S)/HR: 5000 INJECTION INTRAVENOUS; SUBCUTANEOUS at 19:30

## 2022-08-12 RX ADMIN — SODIUM CHLORIDE 100 MILLILITER(S): 9 INJECTION, SOLUTION INTRAVENOUS at 00:54

## 2022-08-12 RX ADMIN — FINASTERIDE 5 MILLIGRAM(S): 5 TABLET, FILM COATED ORAL at 12:37

## 2022-08-12 RX ADMIN — ATORVASTATIN CALCIUM 40 MILLIGRAM(S): 80 TABLET, FILM COATED ORAL at 20:57

## 2022-08-12 RX ADMIN — HEPARIN SODIUM 3000 UNIT(S): 5000 INJECTION INTRAVENOUS; SUBCUTANEOUS at 15:27

## 2022-08-12 RX ADMIN — HEPARIN SODIUM 1500 UNIT(S)/HR: 5000 INJECTION INTRAVENOUS; SUBCUTANEOUS at 07:30

## 2022-08-12 RX ADMIN — HEPARIN SODIUM 1500 UNIT(S)/HR: 5000 INJECTION INTRAVENOUS; SUBCUTANEOUS at 07:37

## 2022-08-12 RX ADMIN — TAMSULOSIN HYDROCHLORIDE 0.4 MILLIGRAM(S): 0.4 CAPSULE ORAL at 20:58

## 2022-08-12 RX ADMIN — Medication 25 MILLIGRAM(S): at 17:54

## 2022-08-12 RX ADMIN — CEFTRIAXONE 100 MILLIGRAM(S): 500 INJECTION, POWDER, FOR SOLUTION INTRAMUSCULAR; INTRAVENOUS at 20:58

## 2022-08-12 RX ADMIN — HEPARIN SODIUM 6000 UNIT(S): 5000 INJECTION INTRAVENOUS; SUBCUTANEOUS at 00:12

## 2022-08-12 RX ADMIN — HEPARIN SODIUM 1700 UNIT(S)/HR: 5000 INJECTION INTRAVENOUS; SUBCUTANEOUS at 15:31

## 2022-08-12 RX ADMIN — AMLODIPINE BESYLATE 10 MILLIGRAM(S): 2.5 TABLET ORAL at 06:42

## 2022-08-12 RX ADMIN — Medication 3 MILLILITER(S): at 19:21

## 2022-08-12 RX ADMIN — AZITHROMYCIN 255 MILLIGRAM(S): 500 TABLET, FILM COATED ORAL at 22:12

## 2022-08-12 RX ADMIN — Medication 25 MILLIGRAM(S): at 09:38

## 2022-08-12 RX ADMIN — HEPARIN SODIUM 1300 UNIT(S)/HR: 5000 INJECTION INTRAVENOUS; SUBCUTANEOUS at 05:20

## 2022-08-12 RX ADMIN — AZITHROMYCIN 255 MILLIGRAM(S): 500 TABLET, FILM COATED ORAL at 00:11

## 2022-08-12 RX ADMIN — HEPARIN SODIUM 1300 UNIT(S)/HR: 5000 INJECTION INTRAVENOUS; SUBCUTANEOUS at 00:42

## 2022-08-12 RX ADMIN — SODIUM CHLORIDE 100 MILLILITER(S): 9 INJECTION, SOLUTION INTRAVENOUS at 12:37

## 2022-08-12 NOTE — PROVIDER CONTACT NOTE (OTHER) - ASSESSMENT
Patient alert oriented,
Patient alert oriented , occasional SOB noted V/S in the flow sheet .
Patient alert oriented. V/S stable .

## 2022-08-12 NOTE — PROGRESS NOTE ADULT - SUBJECTIVE AND OBJECTIVE BOX
Patient is a 87y old  Male who presents with a chief complaint of cough -> PNA (12 Aug 2022 07:42)      INTERVAL HPI/OVERNIGHT EVENTS:    overnight events noted    MEDICATIONS  (STANDING):  amLODIPine   Tablet 10 milliGRAM(s) Oral daily  atorvastatin 40 milliGRAM(s) Oral at bedtime  azithromycin  IVPB 500 milliGRAM(s) IV Intermittent every 24 hours  cefTRIAXone   IVPB 1000 milliGRAM(s) IV Intermittent every 24 hours  finasteride 5 milliGRAM(s) Oral daily  heparin  Infusion.  Unit(s)/Hr (13 mL/Hr) IV Continuous <Continuous>  lactated ringers. 1000 milliLiter(s) (100 mL/Hr) IV Continuous <Continuous>  metoprolol tartrate 25 milliGRAM(s) Oral two times a day  pantoprazole    Tablet 40 milliGRAM(s) Oral before breakfast  tamsulosin 0.4 milliGRAM(s) Oral at bedtime    MEDICATIONS  (PRN):  acetaminophen     Tablet .. 650 milliGRAM(s) Oral every 6 hours PRN Temp greater or equal to 38C (100.4F), Mild Pain (1 - 3)  aluminum hydroxide/magnesium hydroxide/simethicone Suspension 30 milliLiter(s) Oral every 4 hours PRN Dyspepsia  heparin   Injectable 6000 Unit(s) IV Push every 6 hours PRN For aPTT less than 40  heparin   Injectable 3000 Unit(s) IV Push every 6 hours PRN For aPTT between 40 - 57  melatonin 3 milliGRAM(s) Oral at bedtime PRN Insomnia  ondansetron Injectable 4 milliGRAM(s) IV Push every 8 hours PRN Nausea and/or Vomiting      Allergies    No Known Allergies    Intolerances        REVIEW OF SYSTEMS:  as above    Vital Signs Last 24 Hrs  T(C): 36.8 (12 Aug 2022 10:02), Max: 37.4 (11 Aug 2022 23:32)  T(F): 98.3 (12 Aug 2022 10:02), Max: 99.3 (11 Aug 2022 23:32)  HR: 78 (12 Aug 2022 10:02) (78 - 118)  BP: 150/58 (12 Aug 2022 10:02) (139/78 - 160/74)  BP(mean): --  RR: 19 (12 Aug 2022 10:02) (18 - 22)  SpO2: 92% (12 Aug 2022 10:02) (91% - 96%)    Parameters below as of 12 Aug 2022 05:15  Patient On (Oxygen Delivery Method): nasal cannula        PHYSICAL EXAM:  GENERAL:     well-appearing but coughing male in NAD  HEAD:     atraumatic, normocephalic  NECK:     supple  RESPIRATORY:     some dry crackles throughout, no gross wheezing or rales  CARDIOVASCULAR:     irregular irregular   GASTROINTESTINAL:     soft, nontender, nondistended, bowel sounds present  EXTREMITIES:     minimal BLE edema, no clubbing or cyanosis  MUSCULOSKELETAL:     no joint pain or swelling or deformities  NERVOUS SYSTEM:     motor strength intact with 5/5 B/L upper and lower extremities, no gross sensory deficits  SKIN:     no rashes or lesions  PSYCH:     appropriate, alert and orientated x3, good     LABS:                        11.8   18.64 )-----------( 305      ( 12 Aug 2022 06:00 )             35.6     12 Aug 2022 06:00    139    |  105    |  32     ----------------------------<  113    4.0     |  25     |  1.50     Ca    7.7        12 Aug 2022 06:00  Phos  4.3       12 Aug 2022 06:00  Mg     1.8       12 Aug 2022 06:00    TPro  6.8    /  Alb  2.2    /  TBili  0.8    /  DBili  x      /  AST  71     /  ALT  82     /  AlkPhos  251    12 Aug 2022 06:00    PT/INR - ( 12 Aug 2022 01:43 )   PT: 15.1 sec;   INR: 1.28 ratio         PTT - ( 12 Aug 2022 06:00 )  PTT:55.0 sec    CAPILLARY BLOOD GLUCOSE          RADIOLOGY & ADDITIONAL TESTS:

## 2022-08-12 NOTE — PATIENT PROFILE ADULT - NSTRANSFERBELONGINGSDISPO_GEN_A_NUR
given to family
How Severe Is Your Acne?: moderate
Is This A New Presentation, Or A Follow-Up?: Acne
What Type Of Note Output Would You Prefer (Optional)?: Standard Output

## 2022-08-12 NOTE — PROGRESS NOTE ADULT - ASSESSMENT
87M with HTN, HLD, hx of CVA (no residual deficits), BPH, hx of skin CA who presents with cough and fevers.    Found to have multifocal pneumonia.  Also found have new onset afib.        Sepsis 2/2 multifocal PNA - meets sepsis criteria with leukocytosis + tachycardia + source of infection  - admitted to medicine  - cont with ceftriaxone and azithromycin  - check strep PNA Ag  - check legionella Ag  - ID consult, recs pending  - pulm consult, recs appreciated.    - f/u cultures    R/O TB   - doubt TB but patient has blood tinged sputum, possible early cavitation on CT, and fevers -> will have to r/o  - TB isolation  - check quantiferon and AFB smear  - ID will be consulted, recs pending  Sputum pending    New afib - currently in 110s  - [id number] score of at least 5 -> will anticoagulate with heparin drip for now (can be stopped if hemotpysis worsens)  - cardiology consult, recs appreciated  - rate control if needed  - check TTE  - will eventually need to transition to DOAC    Elevated LFTs  - likely from current illness  - f/u final CT A/P read  - can consider GI consult if worsens    CINID 2/2 prerenal azotemia - elev BUN/Cr >20  - likely 2/2 poor PO intake  - hydrate and repeat BMP in AM  - bladder scan, and if retaining, may need pittman  - hold diuretic  - avoid nephrotoxic meds    HTN/HLD  - cont with norvasc  - hold lasix  - cont with atorvastatin    BPH  - cont with tamsulosin and finasteride    Preventive measures  - will be on heparin drip for afib

## 2022-08-12 NOTE — PROVIDER CONTACT NOTE (OTHER) - BACKGROUND
Patient admitted  for pneumonia
Patient admitted for pneumonia
Admitted for pneumonia , now onset Afib

## 2022-08-13 LAB
ALBUMIN SERPL ELPH-MCNC: 2.2 G/DL — LOW (ref 3.3–5)
ALP SERPL-CCNC: 250 U/L — HIGH (ref 30–120)
ALT FLD-CCNC: 73 U/L DA — HIGH (ref 10–60)
ANION GAP SERPL CALC-SCNC: 9 MMOL/L — SIGNIFICANT CHANGE UP (ref 5–17)
APTT BLD: 112.9 SEC — HIGH (ref 27.5–35.5)
APTT BLD: 80 SEC — HIGH (ref 27.5–35.5)
APTT BLD: 86.5 SEC — HIGH (ref 27.5–35.5)
AST SERPL-CCNC: 53 U/L — HIGH (ref 10–40)
BASE EXCESS BLDA CALC-SCNC: 3.3 MMOL/L — HIGH (ref -2–3)
BILIRUB SERPL-MCNC: 0.9 MG/DL — SIGNIFICANT CHANGE UP (ref 0.2–1.2)
BUN SERPL-MCNC: 32 MG/DL — HIGH (ref 7–23)
CALCIUM SERPL-MCNC: 8 MG/DL — LOW (ref 8.4–10.5)
CHLORIDE SERPL-SCNC: 102 MMOL/L — SIGNIFICANT CHANGE UP (ref 96–108)
CO2 SERPL-SCNC: 27 MMOL/L — SIGNIFICANT CHANGE UP (ref 22–31)
CREAT SERPL-MCNC: 1.5 MG/DL — HIGH (ref 0.5–1.3)
CRP SERPL-MCNC: 275 MG/L — HIGH
EGFR: 45 ML/MIN/1.73M2 — LOW
GLUCOSE SERPL-MCNC: 116 MG/DL — HIGH (ref 70–99)
HCO3 BLDA-SCNC: 26 MMOL/L — SIGNIFICANT CHANGE UP (ref 21–28)
HCT VFR BLD CALC: 39.2 % — SIGNIFICANT CHANGE UP (ref 39–50)
HGB BLD-MCNC: 13 G/DL — SIGNIFICANT CHANGE UP (ref 13–17)
HOROWITZ INDEX BLDA+IHG-RTO: 100 — SIGNIFICANT CHANGE UP
LDH SERPL L TO P-CCNC: 311 U/L — HIGH (ref 50–242)
MCHC RBC-ENTMCNC: 32 PG — SIGNIFICANT CHANGE UP (ref 27–34)
MCHC RBC-ENTMCNC: 33.2 GM/DL — SIGNIFICANT CHANGE UP (ref 32–36)
MCV RBC AUTO: 96.6 FL — SIGNIFICANT CHANGE UP (ref 80–100)
NIGHT BLUE STAIN TISS: SIGNIFICANT CHANGE UP
NIGHT BLUE STAIN TISS: SIGNIFICANT CHANGE UP
NRBC # BLD: 0 /100 WBCS — SIGNIFICANT CHANGE UP (ref 0–0)
PCO2 BLDA: 34 MMHG — LOW (ref 35–48)
PH BLDA: 7.5 — HIGH (ref 7.35–7.45)
PLATELET # BLD AUTO: 345 K/UL — SIGNIFICANT CHANGE UP (ref 150–400)
PO2 BLDA: 66 MMHG — LOW (ref 83–108)
POTASSIUM SERPL-MCNC: 4.2 MMOL/L — SIGNIFICANT CHANGE UP (ref 3.5–5.3)
POTASSIUM SERPL-SCNC: 4.2 MMOL/L — SIGNIFICANT CHANGE UP (ref 3.5–5.3)
PROT SERPL-MCNC: 7.1 G/DL — SIGNIFICANT CHANGE UP (ref 6–8.3)
RBC # BLD: 4.06 M/UL — LOW (ref 4.2–5.8)
RBC # FLD: 13.8 % — SIGNIFICANT CHANGE UP (ref 10.3–14.5)
SAO2 % BLDA: 93.6 % — LOW (ref 94–98)
SODIUM SERPL-SCNC: 138 MMOL/L — SIGNIFICANT CHANGE UP (ref 135–145)
SPECIMEN SOURCE: SIGNIFICANT CHANGE UP
SPECIMEN SOURCE: SIGNIFICANT CHANGE UP
WBC # BLD: 24.85 K/UL — HIGH (ref 3.8–10.5)
WBC # FLD AUTO: 24.85 K/UL — HIGH (ref 3.8–10.5)

## 2022-08-13 PROCEDURE — 99233 SBSQ HOSP IP/OBS HIGH 50: CPT

## 2022-08-13 PROCEDURE — 99291 CRITICAL CARE FIRST HOUR: CPT

## 2022-08-13 RX ORDER — FUROSEMIDE 40 MG
20 TABLET ORAL ONCE
Refills: 0 | Status: COMPLETED | OUTPATIENT
Start: 2022-08-13 | End: 2022-08-13

## 2022-08-13 RX ORDER — DILTIAZEM HCL 120 MG
10 CAPSULE, EXT RELEASE 24 HR ORAL ONCE
Refills: 0 | Status: COMPLETED | OUTPATIENT
Start: 2022-08-13 | End: 2022-08-13

## 2022-08-13 RX ADMIN — ATORVASTATIN CALCIUM 40 MILLIGRAM(S): 80 TABLET, FILM COATED ORAL at 22:39

## 2022-08-13 RX ADMIN — HEPARIN SODIUM 1500 UNIT(S)/HR: 5000 INJECTION INTRAVENOUS; SUBCUTANEOUS at 04:55

## 2022-08-13 RX ADMIN — Medication 25 MILLIGRAM(S): at 17:43

## 2022-08-13 RX ADMIN — TAMSULOSIN HYDROCHLORIDE 0.4 MILLIGRAM(S): 0.4 CAPSULE ORAL at 22:40

## 2022-08-13 RX ADMIN — HEPARIN SODIUM 1500 UNIT(S)/HR: 5000 INJECTION INTRAVENOUS; SUBCUTANEOUS at 19:53

## 2022-08-13 RX ADMIN — FINASTERIDE 5 MILLIGRAM(S): 5 TABLET, FILM COATED ORAL at 14:48

## 2022-08-13 RX ADMIN — Medication 20 MILLIGRAM(S): at 03:49

## 2022-08-13 RX ADMIN — CEFTRIAXONE 100 MILLIGRAM(S): 500 INJECTION, POWDER, FOR SOLUTION INTRAMUSCULAR; INTRAVENOUS at 19:55

## 2022-08-13 RX ADMIN — AMLODIPINE BESYLATE 10 MILLIGRAM(S): 2.5 TABLET ORAL at 06:07

## 2022-08-13 RX ADMIN — PANTOPRAZOLE SODIUM 40 MILLIGRAM(S): 20 TABLET, DELAYED RELEASE ORAL at 06:07

## 2022-08-13 RX ADMIN — Medication 10 MILLIGRAM(S): at 16:36

## 2022-08-13 RX ADMIN — HEPARIN SODIUM 1500 UNIT(S)/HR: 5000 INJECTION INTRAVENOUS; SUBCUTANEOUS at 13:05

## 2022-08-13 RX ADMIN — AZITHROMYCIN 255 MILLIGRAM(S): 500 TABLET, FILM COATED ORAL at 22:38

## 2022-08-13 RX ADMIN — Medication 25 MILLIGRAM(S): at 06:07

## 2022-08-13 NOTE — PROGRESS NOTE ADULT - SUBJECTIVE AND OBJECTIVE BOX
Date/Time Patient Seen:  		  Referring MD:   Data Reviewed	       Patient is a 87y old  Male who presents with a chief complaint of cough -> PNA (12 Aug 2022 13:26)      Subjective/HPI     PAST MEDICAL & SURGICAL HISTORY:  No pertinent past medical history    Skin cancer  s/p Mohs    Essential hypertension    Cerebrovascular accident (CVA), unspecified mechanism  2015    Hyperlipidemia, unspecified hyperlipidemia type    Complex tear of lateral meniscus of right knee as current injury, initial encounter    Complex tear of medial meniscus of right knee as current injury, initial encounter    Benign prostatic hyperplasia, presence of lower urinary tract symptoms unspecified, unspecified morphology    H/O pilonidal cyst          Medication list         MEDICATIONS  (STANDING):  amLODIPine   Tablet 10 milliGRAM(s) Oral daily  atorvastatin 40 milliGRAM(s) Oral at bedtime  azithromycin  IVPB 500 milliGRAM(s) IV Intermittent every 24 hours  cefTRIAXone   IVPB 1000 milliGRAM(s) IV Intermittent every 24 hours  finasteride 5 milliGRAM(s) Oral daily  heparin  Infusion.  Unit(s)/Hr (13 mL/Hr) IV Continuous <Continuous>  metoprolol tartrate 25 milliGRAM(s) Oral two times a day  pantoprazole    Tablet 40 milliGRAM(s) Oral before breakfast  tamsulosin 0.4 milliGRAM(s) Oral at bedtime    MEDICATIONS  (PRN):  acetaminophen     Tablet .. 650 milliGRAM(s) Oral every 6 hours PRN Temp greater or equal to 38C (100.4F), Mild Pain (1 - 3)  albuterol/ipratropium for Nebulization 3 milliLiter(s) Nebulizer every 6 hours PRN Shortness of Breath and/or Wheezing  aluminum hydroxide/magnesium hydroxide/simethicone Suspension 30 milliLiter(s) Oral every 4 hours PRN Dyspepsia  heparin   Injectable 6000 Unit(s) IV Push every 6 hours PRN For aPTT less than 40  heparin   Injectable 3000 Unit(s) IV Push every 6 hours PRN For aPTT between 40 - 57  melatonin 3 milliGRAM(s) Oral at bedtime PRN Insomnia  ondansetron Injectable 4 milliGRAM(s) IV Push every 8 hours PRN Nausea and/or Vomiting         Vitals log        ICU Vital Signs Last 24 Hrs  T(C): 35.2 (13 Aug 2022 06:15), Max: 37.6 (13 Aug 2022 03:05)  T(F): 95.4 (13 Aug 2022 06:15), Max: 99.6 (13 Aug 2022 03:05)  HR: 115 (13 Aug 2022 06:15) (71 - 115)  BP: 122/77 (13 Aug 2022 06:15) (114/64 - 158/71)  BP(mean): --  ABP: --  ABP(mean): --  RR: 40 (13 Aug 2022 06:15) (16 - 40)  SpO2: 92% (13 Aug 2022 06:15) (87% - 92%)    O2 Parameters below as of 13 Aug 2022 06:15  Patient On (Oxygen Delivery Method): mask, nonrebreather  O2 Flow (L/min): 15               Input and Output:  I&O's Detail      Lab Data                        13.0   24.85 )-----------( 345      ( 13 Aug 2022 06:22 )             39.2     08-13    138  |  102  |  32<H>  ----------------------------<  116<H>  4.2   |  27  |  1.50<H>    Ca    8.0<L>      13 Aug 2022 06:22  Phos  4.3     08-12  Mg     1.8     08-12    TPro  7.1  /  Alb  2.2<L>  /  TBili  0.9  /  DBili  x   /  AST  53<H>  /  ALT  73<H>  /  AlkPhos  250<H>  08-13    ABG - ( 13 Aug 2022 06:50 )  pH, Arterial: 7.50  pH, Blood: x     /  pCO2: 34    /  pO2: 66    / HCO3: 26    / Base Excess: 3.3   /  SaO2: 93.6                    Review of Systems	      Objective     Physical Examination    heart s1s2  lung dec BS  tachypnea      Pertinent Lab findings & Imaging      Farrell:  NO   Adequate UO     I&O's Detail           Discussed with:     Cultures:	        Radiology

## 2022-08-13 NOTE — PROGRESS NOTE ADULT - SUBJECTIVE AND OBJECTIVE BOX
Patient is a 87y old  Male who presents with a chief complaint of cough -> PNA (13 Aug 2022 12:13)      BRIEF HOSPITAL COURSE: 87M with HTN, HLD, hx of CVA (no residual deficits), BPH, hx of skin CA who presents with cough and fevers.  Symptoms started about 5-6 days ago.  Started with left sided upper back pain.  Then started to have a cough associated with green phlegm and possible blood.  Associated with SOB and DONG.  No weight changes or night sweats.  Had a fever over the weekend (4-5 days ago) as high as 102F.  Some nausea but no vomiting with loss of appetite.  No chest pain.  No diarrhea.  No abdominal pain.  No recent travel or sick contacts.  Went to see his PMD 3 days ago and was given a z-kellie (last day tomorrow) and tesslon perles.  Reported had negative COVID tests at home.  Patient decided to come here for further evaluation.  In the ED, patient's triage vitals were /78    RR  21, 93% on RA (thought dropped down to 88%) and T 98.5F.  Labs showed WBC 18 with a left shift, CINDI with BUN/Cr 40/1.88, elevated LFTs, normal lactate, negative COVID (last booster in 12/2021 with Pfizer vaccine), neg influenza, neg RSV.  CT chest showed "patchy groundglass and more dense opacities in both lungs, suspicious for multifocal PNA...several small lucencies with areas of lung opacity...may represent early cavitation versus areas of focal bronchiectasis."  Patient started on azithromycin and ceftriaxone empirically for multifocal PNA.  Also of note, patient was found to be in new afib on EKG.  Patient denies any afib history but said when he was younger he did feel some irregular heartbeats    Events last 24 hours: ***    PAST MEDICAL & SURGICAL HISTORY:  Skin cancer  s/p Mohs      Essential hypertension      Cerebrovascular accident (CVA), unspecified mechanism  2015      Hyperlipidemia, unspecified hyperlipidemia type      Complex tear of lateral meniscus of right knee as current injury, initial encounter      Complex tear of medial meniscus of right knee as current injury, initial encounter      Benign prostatic hyperplasia, presence of lower urinary tract symptoms unspecified, unspecified morphology      H/O pilonidal cyst          Review of Systems:  CONSTITUTIONAL: No fever, weight loss, or fatigue  EYES: No eye pain, visual disturbances, or discharge  ENMT:  No difficulty hearing, tinnitus, vertigo; No sinus or throat pain  NECK: No pain or stiffness  BREASTS: No pain, masses, or nipple discharge  RESPIRATORY: No cough, wheezing, chills or hemoptysis; No shortness of breath  CARDIOVASCULAR: No chest pain, palpitations, dizziness, or leg swelling  GASTROINTESTINAL: No abdominal or epigastric pain. No nausea, vomiting, or hematemesis; No diarrhea or constipation. No melena or hematochezia.  GENITOURINARY: No dysuria, frequency, hematuria, or incontinence  NEUROLOGICAL: No headaches, memory loss, loss of strength, numbness, or tremors  SKIN: No itching, burning, rashes, or lesions   LYMPH NODES: No enlarged glands  ENDOCRINE: No heat or cold intolerance; No hair loss  MUSCULOSKELETAL: No joint pain or swelling; No muscle, back, or extremity pain  PSYCHIATRIC: No depression, anxiety, mood swings, or difficulty sleeping  HEME/LYMPH: No easy bruising, or bleeding gums  ALLERGY AND IMMUNOLOGIC: No hives or eczema    Medications:  azithromycin  IVPB 500 milliGRAM(s) IV Intermittent every 24 hours  cefTRIAXone   IVPB 1000 milliGRAM(s) IV Intermittent every 24 hours    amLODIPine   Tablet 10 milliGRAM(s) Oral daily  metoprolol tartrate 25 milliGRAM(s) Oral two times a day  tamsulosin 0.4 milliGRAM(s) Oral at bedtime    albuterol/ipratropium for Nebulization 3 milliLiter(s) Nebulizer every 6 hours PRN    acetaminophen     Tablet .. 650 milliGRAM(s) Oral every 6 hours PRN  melatonin 3 milliGRAM(s) Oral at bedtime PRN  ondansetron Injectable 4 milliGRAM(s) IV Push every 8 hours PRN      heparin   Injectable 6000 Unit(s) IV Push every 6 hours PRN  heparin   Injectable 3000 Unit(s) IV Push every 6 hours PRN  heparin  Infusion.  Unit(s)/Hr IV Continuous <Continuous>    aluminum hydroxide/magnesium hydroxide/simethicone Suspension 30 milliLiter(s) Oral every 4 hours PRN  pantoprazole    Tablet 40 milliGRAM(s) Oral before breakfast      atorvastatin 40 milliGRAM(s) Oral at bedtime  finasteride 5 milliGRAM(s) Oral daily                  ICU Vital Signs Last 24 Hrs  T(C): 37.4 (13 Aug 2022 18:30), Max: 37.6 (13 Aug 2022 03:05)  T(F): 99.3 (13 Aug 2022 18:30), Max: 99.6 (13 Aug 2022 03:05)  HR: 92 (13 Aug 2022 20:00) (82 - 140)  BP: 118/65 (13 Aug 2022 20:00) (97/56 - 142/78)  BP(mean): 78 (13 Aug 2022 20:00) (70 - 90)  ABP: --  ABP(mean): --  RR: 37 (13 Aug 2022 20:00) (18 - 40)  SpO2: 97% (13 Aug 2022 20:00) (90% - 98%)    O2 Parameters below as of 13 Aug 2022 19:25  Patient On (Oxygen Delivery Method): nasal cannula, high flow            ABG - ( 13 Aug 2022 06:50 )  pH, Arterial: 7.50  pH, Blood: x     /  pCO2: 34    /  pO2: 66    / HCO3: 26    / Base Excess: 3.3   /  SaO2: 93.6                I&O's Detail    13 Aug 2022 07:01  -  13 Aug 2022 22:47  --------------------------------------------------------  IN:  Total IN: 0 mL    OUT:    Voided (mL): 1500 mL  Total OUT: 1500 mL    Total NET: -1500 mL          LABS:                        13.0   24.85 )-----------( 345      ( 13 Aug 2022 06:22 )             39.2     08-13    138  |  102  |  32<H>  ----------------------------<  116<H>  4.2   |  27  |  1.50<H>    Ca    8.0<L>      13 Aug 2022 06:22  Phos  4.3     08-12  Mg     1.8     08-12    TPro  7.1  /  Alb  2.2<L>  /  TBili  0.9  /  DBili  x   /  AST  53<H>  /  ALT  73<H>  /  AlkPhos  250<H>  08-13          CAPILLARY BLOOD GLUCOSE        PT/INR - ( 12 Aug 2022 01:43 )   PT: 15.1 sec;   INR: 1.28 ratio         PTT - ( 13 Aug 2022 19:08 )  PTT:80.0 sec    CULTURES:  Culture Results:   No growth to date. (08-11 @ 21:07)  Culture Results:   No growth to date. (08-11 @ 21:07)      Physical Examination:    General:  lying in bed, ill appearing     HEENT: Pupils equal, reactive to light. Symmetric. No scleral icterus or injection.    PULM: Crackles to auscultation B/L. No wheezes, rales, or rhonchi appreciated No significant sputum production or increased respiratory effort.    NECK: Supple, no lymphadenopathy, trachea midline.    CVS: Irregular rate and rhythm, no murmurs appreciated, +s1/s2.    ABD: Soft, nondistended, nontender, normoactive bowel sounds.    EXT: BLLE 1+ edema, nontender.    SKIN: Warm and well perfused, no rashes noted.    NEURO: Alert, oriented, interactive, nonfocal.    RADIOLOGY: < from: CT Chest No Cont (08.11.22 @ 21:37) >  CC: [id number] EXAM:  CT CHEST                        ACC: [id number] EXAM:  CT ABDOMEN AND PELVIS                          PROCEDURE DATE:  08/11/2022          INTERPRETATION:  CLINICAL INFORMATION: Cough, fever. Sepsis.  Elevated LFT.    COMPARISON:None.    CONTRAST/COMPLICATIONS:  IV Contrast: NONE  Oral Contrast: NONE  Complications: None reported at time of study completion    PROCEDURE:  CT of the Chest, Abdomen and Pelvis was performed.  Sagittal and coronal reformats were performed.    FINDINGS:    CHEST:    LUNGS AND LARGE AIRWAYS: PLEURA: There are patchy bilateral groundglass   and airspace consolidations, which are most pronounced in the left upper   and lingular lobes, right upper and right middle lobes.  This is associated with bronchiectasis, particularly in the bilateral   upper lobes.  In addition, there are scattered irregular, poorly marginated nodular   opacities throughout the bilateral lower lung zones. Some of these   contain possible small areas of cavitation.  There is a small left-sided pleural effusion and trace right-sided   pleural effusion.    The central airways remain patent.    VESSELS: Atherosclerotic changes thoracic aorta and coronary artery   calcifications.    HEART: Heart size is normal. No pericardial effusion.    MEDIASTINUM AND DEO:  Pretracheal, precarinal and subcarinal mediastinal lymph nodes measuring   up to 1 cm short axis.    CHEST WALL AND LOWER NECK: Within normal limits.    ABDOMEN AND PELVIS:    Evaluation of the solid organ parenchyma is limited without intravenous   contrast.    LIVER: Within normal limits.  BILE DUCTS: Normal caliber.  GALLBLADDER: Contracted.  SPLEEN: Within normal limits.  PANCREAS: Within normal limits.  ADRENALS: Within normal limits.  KIDNEYS/URETERS:  Exophytic cyst medial upper pole left kidney.  No hydronephrosis.    BLADDER: Within normal limits.  REPRODUCTIVE ORGANS:  The prostate is not enlarged.    BOWEL:  Colonic diverticulosis, without CT evidence of diverticulitis.  No bowel obstruction.  The appendix is not well visualized on this exam.  PERITONEUM: No ascites.    VESSELS: Atherosclerotic changes.  RETROPERITONEUM/LYMPH NODES: No lymphadenopathy.    ABDOMINAL WALL:  Right inguinal hernia containing fat and nonobstructed small bowel.  Small bilateral inguinal lymph nodes.    BONES:  Degenerative changes spine.  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    IMPRESSION:    Bilateral groundglass glass and airspace consolidations, some associated   with bronchiectasis particularly in the upper lobes, findings likely   secondary to infection.  Possible small areas of cavitation.  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    No acute intra-abdominal pathology.    Other findings as discussed above.    This study was preliminary reported by the ED radiologist on 8/11/2022.    --- End of Report ---            KARLY LAGOS MD; Attending Radiologist  This document has been electronically signed. Aug 12 2022  9:13AM    < end of copied text >     Patient is a 87y old  Male who presents with a chief complaint of cough -> PNA (13 Aug 2022 12:13)      BRIEF HOSPITAL COURSE: 87M with HTN, HLD, hx of CVA (no residual deficits), BPH, hx of skin CA who presents with cough and fevers.  Symptoms started about 5-6 days ago.  Started with left sided upper back pain.  Then started to have a cough associated with green phlegm and possible blood.  Associated with SOB and DONG.  No weight changes or night sweats.  Had a fever over the weekend (4-5 days ago) as high as 102F.  Some nausea but no vomiting with loss of appetite.  No chest pain.  No diarrhea.  No abdominal pain.  No recent travel or sick contacts.  Went to see his PMD 3 days ago and was given a z-kellie (last day tomorrow) and tesslon perles.  Reported had negative COVID tests at home.  Patient decided to come here for further evaluation.  In the ED, patient's triage vitals were /78    RR  21, 93% on RA (thought dropped down to 88%) and T 98.5F.  Labs showed WBC 18 with a left shift, CINDI with BUN/Cr 40/1.88, elevated LFTs, normal lactate, negative COVID (last booster in 12/2021 with Pfizer vaccine), neg influenza, neg RSV.  CT chest showed "patchy groundglass and more dense opacities in both lungs, suspicious for multifocal PNA...several small lucencies with areas of lung opacity...may represent early cavitation versus areas of focal bronchiectasis."  Patient started on azithromycin and ceftriaxone empirically for multifocal PNA.  Also of note, patient was found to be in new afib on EKG.  Patient denies any afib history but said when he was younger he did feel some irregular heartbeats      PAST MEDICAL & SURGICAL HISTORY:  Skin cancer  s/p Mohs      Essential hypertension      Cerebrovascular accident (CVA), unspecified mechanism  2015      Hyperlipidemia, unspecified hyperlipidemia type      Complex tear of lateral meniscus of right knee as current injury, initial encounter      Complex tear of medial meniscus of right knee as current injury, initial encounter      Benign prostatic hyperplasia, presence of lower urinary tract symptoms unspecified, unspecified morphology      H/O pilonidal cyst          Review of Systems:  CONSTITUTIONAL: No fever, weight loss, or fatigue  EYES: No eye pain, visual disturbances, or discharge  ENMT:  No difficulty hearing, tinnitus, vertigo; No sinus or throat pain  NECK: No pain or stiffness  BREASTS: No pain, masses, or nipple discharge  RESPIRATORY: No cough, wheezing, chills or hemoptysis; No shortness of breath  CARDIOVASCULAR: No chest pain, palpitations, dizziness, or leg swelling  GASTROINTESTINAL: No abdominal or epigastric pain. No nausea, vomiting, or hematemesis; No diarrhea or constipation. No melena or hematochezia.  GENITOURINARY: No dysuria, frequency, hematuria, or incontinence  NEUROLOGICAL: No headaches, memory loss, loss of strength, numbness, or tremors  SKIN: No itching, burning, rashes, or lesions   LYMPH NODES: No enlarged glands  ENDOCRINE: No heat or cold intolerance; No hair loss  MUSCULOSKELETAL: No joint pain or swelling; No muscle, back, or extremity pain  PSYCHIATRIC: No depression, anxiety, mood swings, or difficulty sleeping  HEME/LYMPH: No easy bruising, or bleeding gums  ALLERGY AND IMMUNOLOGIC: No hives or eczema    Medications:  azithromycin  IVPB 500 milliGRAM(s) IV Intermittent every 24 hours  cefTRIAXone   IVPB 1000 milliGRAM(s) IV Intermittent every 24 hours    amLODIPine   Tablet 10 milliGRAM(s) Oral daily  metoprolol tartrate 25 milliGRAM(s) Oral two times a day  tamsulosin 0.4 milliGRAM(s) Oral at bedtime    albuterol/ipratropium for Nebulization 3 milliLiter(s) Nebulizer every 6 hours PRN    acetaminophen     Tablet .. 650 milliGRAM(s) Oral every 6 hours PRN  melatonin 3 milliGRAM(s) Oral at bedtime PRN  ondansetron Injectable 4 milliGRAM(s) IV Push every 8 hours PRN      heparin   Injectable 6000 Unit(s) IV Push every 6 hours PRN  heparin   Injectable 3000 Unit(s) IV Push every 6 hours PRN  heparin  Infusion.  Unit(s)/Hr IV Continuous <Continuous>    aluminum hydroxide/magnesium hydroxide/simethicone Suspension 30 milliLiter(s) Oral every 4 hours PRN  pantoprazole    Tablet 40 milliGRAM(s) Oral before breakfast      atorvastatin 40 milliGRAM(s) Oral at bedtime  finasteride 5 milliGRAM(s) Oral daily                  ICU Vital Signs Last 24 Hrs  T(C): 37.4 (13 Aug 2022 18:30), Max: 37.6 (13 Aug 2022 03:05)  T(F): 99.3 (13 Aug 2022 18:30), Max: 99.6 (13 Aug 2022 03:05)  HR: 92 (13 Aug 2022 20:00) (82 - 140)  BP: 118/65 (13 Aug 2022 20:00) (97/56 - 142/78)  BP(mean): 78 (13 Aug 2022 20:00) (70 - 90)  ABP: --  ABP(mean): --  RR: 37 (13 Aug 2022 20:00) (18 - 40)  SpO2: 97% (13 Aug 2022 20:00) (90% - 98%)    O2 Parameters below as of 13 Aug 2022 19:25  Patient On (Oxygen Delivery Method): nasal cannula, high flow            ABG - ( 13 Aug 2022 06:50 )  pH, Arterial: 7.50  pH, Blood: x     /  pCO2: 34    /  pO2: 66    / HCO3: 26    / Base Excess: 3.3   /  SaO2: 93.6                I&O's Detail    13 Aug 2022 07:01  -  13 Aug 2022 22:47  --------------------------------------------------------  IN:  Total IN: 0 mL    OUT:    Voided (mL): 1500 mL  Total OUT: 1500 mL    Total NET: -1500 mL          LABS:                        13.0   24.85 )-----------( 345      ( 13 Aug 2022 06:22 )             39.2     08-13    138  |  102  |  32<H>  ----------------------------<  116<H>  4.2   |  27  |  1.50<H>    Ca    8.0<L>      13 Aug 2022 06:22  Phos  4.3     08-12  Mg     1.8     08-12    TPro  7.1  /  Alb  2.2<L>  /  TBili  0.9  /  DBili  x   /  AST  53<H>  /  ALT  73<H>  /  AlkPhos  250<H>  08-13          CAPILLARY BLOOD GLUCOSE        PT/INR - ( 12 Aug 2022 01:43 )   PT: 15.1 sec;   INR: 1.28 ratio         PTT - ( 13 Aug 2022 19:08 )  PTT:80.0 sec    CULTURES:  Culture Results:   No growth to date. (08-11 @ 21:07)  Culture Results:   No growth to date. (08-11 @ 21:07)      Physical Examination:    General:  lying in bed, ill appearing     HEENT: Pupils equal, reactive to light. Symmetric. No scleral icterus or injection.    PULM: Crackles to auscultation B/L. No wheezes, rales, or rhonchi appreciated No significant sputum production or increased respiratory effort.    NECK: Supple, no lymphadenopathy, trachea midline.    CVS: Irregular rate and rhythm, no murmurs appreciated, +s1/s2.    ABD: Soft, nondistended, nontender, normoactive bowel sounds.    EXT: BLLE 1+ edema, nontender.    SKIN: Warm and well perfused, no rashes noted.    NEURO: Alert, oriented, interactive, nonfocal.    RADIOLOGY: < from: CT Chest No Cont (08.11.22 @ 21:37) >  CC: [id number] EXAM:  CT CHEST                        ACC: [id number] EXAM:  CT ABDOMEN AND PELVIS                          PROCEDURE DATE:  08/11/2022          INTERPRETATION:  CLINICAL INFORMATION: Cough, fever. Sepsis.  Elevated LFT.    COMPARISON:None.    CONTRAST/COMPLICATIONS:  IV Contrast: NONE  Oral Contrast: NONE  Complications: None reported at time of study completion    PROCEDURE:  CT of the Chest, Abdomen and Pelvis was performed.  Sagittal and coronal reformats were performed.    FINDINGS:    CHEST:    LUNGS AND LARGE AIRWAYS: PLEURA: There are patchy bilateral groundglass   and airspace consolidations, which are most pronounced in the left upper   and lingular lobes, right upper and right middle lobes.  This is associated with bronchiectasis, particularly in the bilateral   upper lobes.  In addition, there are scattered irregular, poorly marginated nodular   opacities throughout the bilateral lower lung zones. Some of these   contain possible small areas of cavitation.  There is a small left-sided pleural effusion and trace right-sided   pleural effusion.    The central airways remain patent.    VESSELS: Atherosclerotic changes thoracic aorta and coronary artery   calcifications.    HEART: Heart size is normal. No pericardial effusion.    MEDIASTINUM AND DEO:  Pretracheal, precarinal and subcarinal mediastinal lymph nodes measuring   up to 1 cm short axis.    CHEST WALL AND LOWER NECK: Within normal limits.    ABDOMEN AND PELVIS:    Evaluation of the solid organ parenchyma is limited without intravenous   contrast.    LIVER: Within normal limits.  BILE DUCTS: Normal caliber.  GALLBLADDER: Contracted.  SPLEEN: Within normal limits.  PANCREAS: Within normal limits.  ADRENALS: Within normal limits.  KIDNEYS/URETERS:  Exophytic cyst medial upper pole left kidney.  No hydronephrosis.    BLADDER: Within normal limits.  REPRODUCTIVE ORGANS:  The prostate is not enlarged.    BOWEL:  Colonic diverticulosis, without CT evidence of diverticulitis.  No bowel obstruction.  The appendix is not well visualized on this exam.  PERITONEUM: No ascites.    VESSELS: Atherosclerotic changes.  RETROPERITONEUM/LYMPH NODES: No lymphadenopathy.    ABDOMINAL WALL:  Right inguinal hernia containing fat and nonobstructed small bowel.  Small bilateral inguinal lymph nodes.    BONES:  Degenerative changes spine.  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    IMPRESSION:    Bilateral groundglass glass and airspace consolidations, some associated   with bronchiectasis particularly in the upper lobes, findings likely   secondary to infection.  Possible small areas of cavitation.  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    No acute intra-abdominal pathology.    Other findings as discussed above.    This study was preliminary reported by the ED radiologist on 8/11/2022.    --- End of Report ---            KARLY LAGOS MD; Attending Radiologist  This document has been electronically signed. Aug 12 2022  9:13AM    < end of copied text >

## 2022-08-13 NOTE — PROGRESS NOTE ADULT - ASSESSMENT
87M with HTN, HLD, hx of CVA (no residual deficits), BPH, hx of skin CA who presents with cough and fevers.    Found to have multifocal pneumonia.  Also found have new onset afib.        Sepsis 2/2 multifocal PNA - meets sepsis criteria with leukocytosis + tachycardia + source of infection  - admitted to medicine upgraded to [facility]PCU 2/2 acute hypoxia respiratory failure requiring HFNC  - cont with ceftriaxone and azithromycin  - check strep PNA Ag  - check legionella Ag  - ID consult, recs recs appreciated  - pulm consult, recs appreciated.    - f/u cultures    Acute hypoxia respiratory failure 2/2 likely PNA.   pulmonary recs appreciated  now on HFNC, continue to monitor   full code, intubate PRN    R/O TB   - doubt TB but patient has blood tinged sputum, possible early cavitation on CT, and fevers -> will have to r/o  - TB isolation  - check quantiferon and AFB smear  - ID will be consulted, recs appreciated  Sputum pending    New afib - currently in 110s  - [id number] score of at least 5 -> will anticoagulate with heparin drip for now (can be stopped if hemotpysis worsens)  - cardiology consult, recs appreciated  - rate control if needed  - check TTE  - will eventually need to transition to DOAC    Elevated LFTs  - likely from current illness  - f/u final CT A/P read  - can consider GI consult if worsens    CINDI 2/2 prerenal azotemia - elev BUN/Cr >20  - likely 2/2 poor PO intake  - hydrate and repeat BMP in AM  - bladder scan, and if retaining, may need pittman  - hold diuretic  - avoid nephrotoxic meds    HTN/HLD  - cont with norvasc  - hold lasix  - cont with atorvastatin    BPH  - cont with tamsulosin and finasteride    Preventive measures  - will be on heparin drip for afib

## 2022-08-13 NOTE — PROGRESS NOTE ADULT - ASSESSMENT
87M with HTN, HLD, hx of CVA (no residual deficits), BPH, hx of skin CA who presents with cough and fevers admitted for    1. AHRF  2. Multifocal PNA  3. Sepsis  4. NOAF  5. CINDI- Pre renal azotemia    Neuro:  - Avoid Neuro Deliriogenic / sedative medications    CV:  - Heparin gtt  - Continue current antihypertensive regimen  - Statin      Pulm:  - Actively titrating HFNC currently 40 lpm FIO2 100% to maintain SPO2 > 94%, pt high risk for decompensation requiring intubation  - r/o TB, AFB negative, QuantiFeron pending, TB ISO in SITU                  GI:  - Cont Protonix 40mg Daily,   - Diet as tolerated    Renal:  - Even to net negative fluid balance as tolerated by hemodynamics and renal fx.    - Cr 1.5, Continue to monitor Bun/Cr and UOP  - Replacing electrolytes as needed with Goal K> 4, PO> 3, Mg> 2               - Strict I&O's  - Avoid Nephro toxic medication  - Renally dose meds    Heme:  - Heparin gtt    ID:  - WBC 24.85,  remains afebrile with no antipyretics administered   - Continue ABX Ceftriaxone/azithromycin, f/u Cx  - Microbiology and Radiology reviewed   - trend CBC with diff, CMP  and fever curve    Endo:  - ISS for aggressive glycemic control to limit FS glucose to < 180mg/dl.    Critical Care Time (EXCLUSIVE of any non bundled procedures) :  35 minutes were spent assessing the patient's presenting problems of acute illness that pose a high probability of life threatening  deterioration or end organ damage / dysfunction.  Medical decision making includes initiation / continuation of plan or care review data/ lab work radiographic study, direct patient care bedside ,  discussions with  consultants regarding care,  evaluation and interpretation of vital signs, any necessary ventilator management,   NIV or BIPAP changes  or initiation,    discussions with multidisciplinary team,  am or pm rounds, discussions of goals of care with patient and family all non-inclusive of procedures.

## 2022-08-13 NOTE — PROGRESS NOTE ADULT - SUBJECTIVE AND OBJECTIVE BOX
Patient is a 87y Male with a known history of :    HPI:  87M with HTN, HLD, hx of CVA (no residual deficits), BPH, hx of skin CA who presents with cough and fevers.  Symptoms started about 5-6 days ago.  Started with left sided upper back pain.  Then started to have a cough associated with green phlegm and possible blood.  Associated with SOB and DONG.  No weight changes or night sweats.  Had a fever over the weekend (4-5 days ago) as high as 102F.  Some nausea but no vomiting with loss of appetite.  No chest pain.  No diarrhea.  No abdominal pain.  No recent travel or sick contacts.  Went to see his PMD 3 days ago and was given a z-kellie (last day tomorrow) and tesslon perles.  Reported had negative COVID tests at home.  Patient decided to come here for further evaluation.  In the ED, patient's triage vitals were /78    RR  21, 93% on RA (thought dropped down to 88%) and T 98.5F.  Labs showed WBC 18 with a left shift, CINDI with BUN/Cr 40/1.88, elevated LFTs, normal lactate, negative COVID (last booster in 12/2021 with Pfizer vaccine), neg influenza, neg RSV.  CT chest showed "patchy groundglass and more dense opacities in both lungs, suspicious for multifocal PNA...several small lucencies with areas of lung opacity...may represent early cavitation versus areas of focal bronchiectasis."  Patient started on azithromycin and ceftriaxone empirically for multifocal PNA.  Also of note, patient was found to be in new afib on EKG.  Patient denies any afib history but said when he was younger he did feel some irregular heartbeats.   (11 Aug 2022 22:21)      REVIEW OF SYSTEMS:    CONSTITUTIONAL: No fever, weight loss, or fatigue  EYES: No eye pain, visual disturbances, or discharge  ENMT:  No difficulty hearing, tinnitus, vertigo; No sinus or throat pain  NECK: No pain or stiffness  BREASTS: No pain, masses, or nipple discharge  RESPIRATORY: No cough, wheezing, chills or hemoptysis; No shortness of breath  CARDIOVASCULAR: No chest pain, palpitations, dizziness, or leg swelling  GASTROINTESTINAL: No abdominal or epigastric pain. No nausea, vomiting, or hematemesis; No diarrhea or constipation. No melena or hematochezia.  GENITOURINARY: No dysuria, frequency, hematuria, or incontinence  NEUROLOGICAL: No headaches, memory loss, loss of strength, numbness, or tremors  SKIN: No itching, burning, rashes, or lesions   LYMPH NODES: No enlarged glands  ENDOCRINE: No heat or cold intolerance; No hair loss  MUSCULOSKELETAL: No joint pain or swelling; No muscle, back, or extremity pain  PSYCHIATRIC: No depression, anxiety, mood swings, or difficulty sleeping  HEME/LYMPH: No easy bruising, or bleeding gums  ALLERGY AND IMMUNOLOGIC: No hives or eczema    MEDICATIONS  (STANDING):  amLODIPine   Tablet 10 milliGRAM(s) Oral daily  atorvastatin 40 milliGRAM(s) Oral at bedtime  azithromycin  IVPB 500 milliGRAM(s) IV Intermittent every 24 hours  cefTRIAXone   IVPB 1000 milliGRAM(s) IV Intermittent every 24 hours  finasteride 5 milliGRAM(s) Oral daily  heparin  Infusion.  Unit(s)/Hr (13 mL/Hr) IV Continuous <Continuous>  metoprolol tartrate 25 milliGRAM(s) Oral two times a day  pantoprazole    Tablet 40 milliGRAM(s) Oral before breakfast  tamsulosin 0.4 milliGRAM(s) Oral at bedtime    MEDICATIONS  (PRN):  acetaminophen     Tablet .. 650 milliGRAM(s) Oral every 6 hours PRN Temp greater or equal to 38C (100.4F), Mild Pain (1 - 3)  albuterol/ipratropium for Nebulization 3 milliLiter(s) Nebulizer every 6 hours PRN Shortness of Breath and/or Wheezing  aluminum hydroxide/magnesium hydroxide/simethicone Suspension 30 milliLiter(s) Oral every 4 hours PRN Dyspepsia  heparin   Injectable 6000 Unit(s) IV Push every 6 hours PRN For aPTT less than 40  heparin   Injectable 3000 Unit(s) IV Push every 6 hours PRN For aPTT between 40 - 57  melatonin 3 milliGRAM(s) Oral at bedtime PRN Insomnia  ondansetron Injectable 4 milliGRAM(s) IV Push every 8 hours PRN Nausea and/or Vomiting      ALLERGIES: No Known Allergies      FAMILY HISTORY:  Family history of ischemic heart disease and other diseases of the circulatory system (Father)        Social history:  Alochol:   Smoking:   Drug Use:   Marital Status:     PHYSICAL EXAMINATION:  -----------------------------  T(C): 37.4 (08-13-22 @ 07:30), Max: 37.6 (08-13-22 @ 03:05)  HR: 86 (08-13-22 @ 09:00) (71 - 115)  BP: 105/61 (08-13-22 @ 09:00) (105/61 - 158/71)  RR: 35 (08-13-22 @ 09:00) (16 - 40)  SpO2: 98% (08-13-22 @ 09:00) (87% - 98%)  Wt(kg): --      LABS:   --------  08-13    138  |  102  |  32<H>  ----------------------------<  116<H>  4.2   |  27  |  1.50<H>    Ca    8.0<L>      13 Aug 2022 06:22  Phos  4.3     08-12  Mg     1.8     08-12    TPro  7.1  /  Alb  2.2<L>  /  TBili  0.9  /  DBili  x   /  AST  53<H>  /  ALT  73<H>  /  AlkPhos  250<H>  08-13                         13.0   24.85 )-----------( 345      ( 13 Aug 2022 06:22 )             39.2     PT/INR - ( 12 Aug 2022 01:43 )   PT: 15.1 sec;   INR: 1.28 ratio         PTT - ( 13 Aug 2022 04:00 )  PTT:112.9 sec              Radiology:    < from: US Transthoracic Echocardiogram w/Doppler Complete (08.12.22 @ 08:08) >    ACC: [id number] EXAM:  US TTE W DOPPLER COMPLETE                          PROCEDURE DATE:  08/12/2022          INTERPRETATION:  Ordering Physician: LEONARDA WHITTINGTON [id number]    Indication: Atrial fibrillation    Technician: LJ    Study Quality: Good  A complete echocardiographic study was performed utilizing standard   protocol including spectral and color Doppler in all echocardiographic   windows.    Height: 172 cm  Weight: 72 kg  BSA: 1.85 m2  Blood Pressure: 151/67 mmHg    MEASUREMENTS  IVS: 0.9 cm  PWT: 0.9 cm  LA: 3.1 cm  AO: 3.3 cm  LVIDd: 5.3 cm  LVIDs: 3.9 cm    LVEF: 50-55%  RVSP: 50 mmHg  RA Pressure: 15 mmHg    FINDINGS  Left Ventricle: The left ventricle is normal in size, wall thickness. The   systolic function is low normal and varies beat to beat. Estimated EF is   50-55%.  Right Ventricle: The right ventricle is normal in size and function.  Left Atrium: The left atrium is dilated.  Right Atrium: The right atrium is dilated.  Mitral Valve: Mitral annular calcification. There is mild mitral valve   prolapse of the anterior leaflet. Mild mitral regurgitation.  Aortic Valve: The aortic valve is structurally normal. No aortic   regurgitation.  Tricuspid Valve: The tricuspid valve is structurally normal. Mild   tricuspid regurgitation. Estimated pulmonary artery systolic pressure is   50 mmHg.  Pulmonic Valve: The pulmonic valve is not well visualized. Trace pulmonic   regurgitation.  Diastolic Function: Normal diastolic function.  Pericardium/Pleura: No pericardial effusion visualized.  Aorta: The aortic root is normal in size.    IMPRESSION:  1. Low normal left ventricular systolic function.  2. Bi-atrial enlargement.  3. Mild pulmonary hypertension.    --- End of Report ---            EDU CARBALLO MD; Attending Cardiologist  This document has been electronically signed. Aug 12 2022  9:11AM    < end of copied text >

## 2022-08-13 NOTE — PROGRESS NOTE ADULT - ASSESSMENT
The patient is an 87 year old male with a history of HTN, HL, BPH, CVA who presents with cough and hemoptysis in the setting of PNA and r/o TB.    8/13/22  Seen at [facility]  Sitting up, awake and alert  WBC-24.85  BUN/Creat-32/1.50  Monitor-A-Fib about 110/minute    Plan:  - Telemetry with AF predominantly in the 80-90s, at times up to 120s  - Continue metoprolol tartrate 25 mg bid  - For now continue heparin drip given hemoptysis. When hemoptysis improves, transition to apixaban 2.5 mg bid (assuming Cr remains 1.5 or higher).  - Continue amlodipine 10 mg daily  - See above Echocardiogram report  - On IV antibiotics  - AFB to r/o TB  - Being followed by ID, Pulmonary

## 2022-08-13 NOTE — PROGRESS NOTE ADULT - ASSESSMENT
87M with HTN, HLD, hx of CVA (no residual deficits), BPH, hx of skin CA who presents with cough and fevers    resp distress  HF this am   RRT reviewed  transfer to SPCU  on emp ABX  ID - Cardio follow up  I and O  serial labs  monitor VS and Sat  may need NIPPV if resp distress cont despite HF NC  prognosis guarded  pt is on isolation for MTB  GOC discussion

## 2022-08-13 NOTE — CHART NOTE - NSCHARTNOTEFT_GEN_A_CORE
Patient is a 87y old  Male who presents with a chief complaint of cough -> PNA (12 Aug 2022 13:26)      Rapid response was called on [id number] patient for  tachypnea in the 40's, dropping SPO2.    Patient was seen and examined at the bedside by the rapid response team.    PAST MEDICAL & SURGICAL HISTORY:  Skin cancer  s/p Mohs      Essential hypertension      Cerebrovascular accident (CVA), unspecified mechanism  2015      Hyperlipidemia, unspecified hyperlipidemia type      Complex tear of lateral meniscus of right knee as current injury, initial encounter      Complex tear of medial meniscus of right knee as current injury, initial encounter      Benign prostatic hyperplasia, presence of lower urinary tract symptoms unspecified, unspecified morphology      H/O pilonidal cyst          MEDICATIONS  (STANDING):  amLODIPine   Tablet 10 milliGRAM(s) Oral daily  atorvastatin 40 milliGRAM(s) Oral at bedtime  azithromycin  IVPB 500 milliGRAM(s) IV Intermittent every 24 hours  cefTRIAXone   IVPB 1000 milliGRAM(s) IV Intermittent every 24 hours  finasteride 5 milliGRAM(s) Oral daily  heparin  Infusion.  Unit(s)/Hr (13 mL/Hr) IV Continuous <Continuous>  metoprolol tartrate 25 milliGRAM(s) Oral two times a day  pantoprazole    Tablet 40 milliGRAM(s) Oral before breakfast  tamsulosin 0.4 milliGRAM(s) Oral at bedtime    MEDICATIONS  (PRN):  acetaminophen     Tablet .. 650 milliGRAM(s) Oral every 6 hours PRN Temp greater or equal to 38C (100.4F), Mild Pain (1 - 3)  albuterol/ipratropium for Nebulization 3 milliLiter(s) Nebulizer every 6 hours PRN Shortness of Breath and/or Wheezing  aluminum hydroxide/magnesium hydroxide/simethicone Suspension 30 milliLiter(s) Oral every 4 hours PRN Dyspepsia  heparin   Injectable 6000 Unit(s) IV Push every 6 hours PRN For aPTT less than 40  heparin   Injectable 3000 Unit(s) IV Push every 6 hours PRN For aPTT between 40 - 57  melatonin 3 milliGRAM(s) Oral at bedtime PRN Insomnia  ondansetron Injectable 4 milliGRAM(s) IV Push every 8 hours PRN Nausea and/or Vomiting      Allergies    No Known Allergies    Intolerances        Vital Signs Last 24 Hrs  T(C): 35.2 (13 Aug 2022 06:15), Max: 37.6 (13 Aug 2022 03:05)  T(F): 95.4 (13 Aug 2022 06:15), Max: 99.6 (13 Aug 2022 03:05)  HR: 115 (13 Aug 2022 06:15) (71 - 115)  BP: 122/77 (13 Aug 2022 06:15) (114/64 - 158/71)  BP(mean): --  RR: 40 (13 Aug 2022 06:15) (16 - 40)  SpO2: 92% (13 Aug 2022 06:15) (87% - 92%)    Parameters below as of 13 Aug 2022 06:15  Patient On (Oxygen Delivery Method): mask, nonrebreather  O2 Flow (L/min): 15    PHYSICAL EXAM:  GENERAL: NAD, well-groomed, well-developed, tachypneic.  HEAD:  Atraumatic, Norm cephalic.  EYES: PERRLA, conjunctiva clear, pale.  ENMT: no nasal discharge, MMM.   NECK: Supple, No JVD.  NERVOUS SYSTEM:  Alert & oriented X3, neurologically intact grossly.  CHEST/LUNG: decreased air entry B/L, (+) scattered coarse rales, no rhonchi, or wheezing.  HEART: Normal S1 & S2, no murmurs, or extra sounds.  ABDOMEN: Soft, non-tender, non-distended; bowel sounds present, no palpable masses or organomegaly.  EXTREMITIES:  No clubbing, cyanosis, or edema.  VASCULAR: 2+ radial, brachial pulses B/L, no carotid bruits.  SKIN: No rashes or lesions.  PSYCH: normal affect & behavior.        LABS:                        13.0   24.85 )-----------( 345      ( 13 Aug 2022 06:22 )             39.2       Ca    7.7        12 Aug 2022 06:00      PT/INR - ( 12 Aug 2022 01:43 )   PT: 15.1 sec;   INR: 1.28 ratio         PTT - ( 13 Aug 2022 04:00 )  PTT:112.9 sec        Imaging Personally Reviewed:  [x ] YES  [ ] NO  Consultant(s) Notes Reviewed:  [x ] YES  [ ] NO  Care Discussed with Consultants/Other Providers [x ] YES  [ ] NO    Patient admitted with PNA, ruling out TB, on airborne precautions, is AAO X3, SPO2 currently in low 90's on NRB, RR 40/min, and hypothermic 95.4 F, get stat lactate, stat CBC & BMP, stat ABG, discussed with patient, he is OK with intubation if needed, start him on HF NC, stand by ET intubation, upgrade to SPCU, discussed with pulmonary on the case Dr. Almonte.  CC 30 min. Patient is a 87y old  Male who presents with a chief complaint of cough -> PNA (12 Aug 2022 13:26)      Rapid response was called on [id number] patient for  tachypnea in the 40's, dropping SPO2.    Patient was seen and examined at the bedside by the rapid response team.    PAST MEDICAL & SURGICAL HISTORY:  Skin cancer  s/p Mohs      Essential hypertension      Cerebrovascular accident (CVA), unspecified mechanism  2015      Hyperlipidemia, unspecified hyperlipidemia type      Complex tear of lateral meniscus of right knee as current injury, initial encounter      Complex tear of medial meniscus of right knee as current injury, initial encounter      Benign prostatic hyperplasia, presence of lower urinary tract symptoms unspecified, unspecified morphology      H/O pilonidal cyst          MEDICATIONS  (STANDING):  amLODIPine   Tablet 10 milliGRAM(s) Oral daily  atorvastatin 40 milliGRAM(s) Oral at bedtime  azithromycin  IVPB 500 milliGRAM(s) IV Intermittent every 24 hours  cefTRIAXone   IVPB 1000 milliGRAM(s) IV Intermittent every 24 hours  finasteride 5 milliGRAM(s) Oral daily  heparin  Infusion.  Unit(s)/Hr (13 mL/Hr) IV Continuous <Continuous>  metoprolol tartrate 25 milliGRAM(s) Oral two times a day  pantoprazole    Tablet 40 milliGRAM(s) Oral before breakfast  tamsulosin 0.4 milliGRAM(s) Oral at bedtime    MEDICATIONS  (PRN):  acetaminophen     Tablet .. 650 milliGRAM(s) Oral every 6 hours PRN Temp greater or equal to 38C (100.4F), Mild Pain (1 - 3)  albuterol/ipratropium for Nebulization 3 milliLiter(s) Nebulizer every 6 hours PRN Shortness of Breath and/or Wheezing  aluminum hydroxide/magnesium hydroxide/simethicone Suspension 30 milliLiter(s) Oral every 4 hours PRN Dyspepsia  heparin   Injectable 6000 Unit(s) IV Push every 6 hours PRN For aPTT less than 40  heparin   Injectable 3000 Unit(s) IV Push every 6 hours PRN For aPTT between 40 - 57  melatonin 3 milliGRAM(s) Oral at bedtime PRN Insomnia  ondansetron Injectable 4 milliGRAM(s) IV Push every 8 hours PRN Nausea and/or Vomiting      Allergies    No Known Allergies    Intolerances        Vital Signs Last 24 Hrs  T(C): 35.2 (13 Aug 2022 06:15), Max: 37.6 (13 Aug 2022 03:05)  T(F): 95.4 (13 Aug 2022 06:15), Max: 99.6 (13 Aug 2022 03:05)  HR: 115 (13 Aug 2022 06:15) (71 - 115)  BP: 122/77 (13 Aug 2022 06:15) (114/64 - 158/71)  BP(mean): --  RR: 40 (13 Aug 2022 06:15) (16 - 40)  SpO2: 92% (13 Aug 2022 06:15) (87% - 92%)    Parameters below as of 13 Aug 2022 06:15  Patient On (Oxygen Delivery Method): mask, nonrebreather  O2 Flow (L/min): 15    PHYSICAL EXAM:  GENERAL: NAD, well-groomed, well-developed, tachypneic.  HEAD:  Atraumatic, Norm cephalic.  EYES: PERRLA, conjunctiva clear, pale.  ENMT: no nasal discharge, MMM.   NECK: Supple, No JVD.  NERVOUS SYSTEM:  Alert & oriented X3, neurologically intact grossly.  CHEST/LUNG: decreased air entry B/L, (+) scattered coarse rales, no rhonchi, or wheezing.  HEART: Normal S1 & S2, no murmurs, or extra sounds.  ABDOMEN: Soft, non-tender, non-distended; bowel sounds present, no palpable masses or organomegaly.  EXTREMITIES:  No clubbing, cyanosis, or edema.  VASCULAR: 2+ radial, brachial pulses B/L, no carotid bruits.  SKIN: No rashes or lesions.  PSYCH: normal affect & behavior.        LABS:                        13.0   24.85 )-----------( 345      ( 13 Aug 2022 06:22 )             39.2       Ca    7.7        12 Aug 2022 06:00      PT/INR - ( 12 Aug 2022 01:43 )   PT: 15.1 sec;   INR: 1.28 ratio         PTT - ( 13 Aug 2022 04:00 )  PTT:112.9 sec        Imaging Personally Reviewed:  [x ] YES  [ ] NO  Consultant(s) Notes Reviewed:  [x ] YES  [ ] NO  Care Discussed with Consultants/Other Providers [x ] YES  [ ] NO    Patient admitted with sepsis 2ry to multifocal PNA, ruling out TB, on airborne precautions, new onset A. Fib on Heparin drip, CINDI, and abnormal LFTs, is AAO X3, SPO2 currently in low 90's on NRB, RR 40/min, and hypothermic 95.4 F, get stat lactate, stat CBC & BMP, stat ABG, discussed with patient, he is OK with intubation if needed, start him on HF NC, stand by ET intubation, upgrade to SPCU, discussed with pulmonary on the case Dr. Almonte.  CC 30 min. Patient is a 87y old  Male who presents with a chief complaint of cough -> PNA (12 Aug 2022 13:26)      Rapid response was called on [id number] patient for  tachypnea in the 40's, dropping SPO2.  Patient was seen and examined at the bedside by the rapid response team.  PAST MEDICAL & SURGICAL HISTORY:  Skin cancer  s/p Mohs  Essential hypertension  Cerebrovascular accident (CVA), unspecified mechanism  2015      Hyperlipidemia, unspecified hyperlipidemia type  Complex tear of lateral meniscus of right knee as current injury, initial encounter  Complex tear of medial meniscus of right knee as current injury, initial encounter  Benign prostatic hyperplasia, presence of lower urinary tract symptoms unspecified, unspecified morphology  H/O pilonidal cyst      MEDICATIONS  (STANDING):  amLODIPine   Tablet 10 milliGRAM(s) Oral daily  atorvastatin 40 milliGRAM(s) Oral at bedtime  azithromycin  IVPB 500 milliGRAM(s) IV Intermittent every 24 hours  cefTRIAXone   IVPB 1000 milliGRAM(s) IV Intermittent every 24 hours  finasteride 5 milliGRAM(s) Oral daily  heparin  Infusion.  Unit(s)/Hr (13 mL/Hr) IV Continuous <Continuous>  metoprolol tartrate 25 milliGRAM(s) Oral two times a day  pantoprazole    Tablet 40 milliGRAM(s) Oral before breakfast  tamsulosin 0.4 milliGRAM(s) Oral at bedtime    MEDICATIONS  (PRN):  acetaminophen     Tablet .. 650 milliGRAM(s) Oral every 6 hours PRN Temp greater or equal to 38C (100.4F), Mild Pain (1 - 3)  albuterol/ipratropium for Nebulization 3 milliLiter(s) Nebulizer every 6 hours PRN Shortness of Breath and/or Wheezing  aluminum hydroxide/magnesium hydroxide/simethicone Suspension 30 milliLiter(s) Oral every 4 hours PRN Dyspepsia  heparin   Injectable 6000 Unit(s) IV Push every 6 hours PRN For aPTT less than 40  heparin   Injectable 3000 Unit(s) IV Push every 6 hours PRN For aPTT between 40 - 57  melatonin 3 milliGRAM(s) Oral at bedtime PRN Insomnia  ondansetron Injectable 4 milliGRAM(s) IV Push every 8 hours PRN Nausea and/or Vomiting      Allergies  No Known Allergies      Vital Signs Last 24 Hrs  T(C): 35.2 (13 Aug 2022 06:15), Max: 37.6 (13 Aug 2022 03:05)  T(F): 95.4 (13 Aug 2022 06:15), Max: 99.6 (13 Aug 2022 03:05)  HR: 115 (13 Aug 2022 06:15) (71 - 115)  BP: 122/77 (13 Aug 2022 06:15) (114/64 - 158/71)  BP(mean): --  RR: 40 (13 Aug 2022 06:15) (16 - 40)  SpO2: 92% (13 Aug 2022 06:15) (87% - 92%)    Parameters below as of 13 Aug 2022 06:15  Patient On (Oxygen Delivery Method): mask, nonrebreather  O2 Flow (L/min): 15    PHYSICAL EXAM:  GENERAL: NAD, well-groomed, well-developed, tachypneic.  HEAD:  Atraumatic, Norm cephalic.  EYES: PERRLA, conjunctiva clear, pale.  ENMT: no nasal discharge, MMM.   NECK: Supple, No JVD.  NERVOUS SYSTEM:  Alert & oriented X3, neurologically intact grossly.  CHEST/LUNG: decreased air entry B/L, (+) scattered coarse rales, no rhonchi, or wheezing.  HEART: Normal S1 & S2, no murmurs, or extra sounds.  ABDOMEN: Soft, non-tender, non-distended; bowel sounds present, no palpable masses or organomegaly.  EXTREMITIES:  No clubbing, cyanosis, or edema.  VASCULAR: 2+ radial, brachial pulses B/L, no carotid bruits.  SKIN: No rashes or lesions.  PSYCH: normal affect & behavior.        LABS:                        13.0   24.85 )-----------( 345      ( 13 Aug 2022 06:22 )             39.2       Ca    7.7        12 Aug 2022 06:00      PT/INR - ( 12 Aug 2022 01:43 )   PT: 15.1 sec;   INR: 1.28 ratio    PTT - ( 13 Aug 2022 04:00 )  PTT:112.9 sec        Imaging Personally Reviewed:  [x ] YES  [ ] NO  Consultant(s) Notes Reviewed:  [x ] YES  [ ] NO  Care Discussed with Consultants/Other Providers [x ] YES  [ ] NO    Patient admitted with sepsis 2ry to multifocal PNA, ruling out TB, on airborne precautions, new onset A. Fib on Heparin drip, CINDI, and abnormal LFTs, is AAO X3, SPO2 currently in low 90's on NRB, RR 40/min, and hypothermic 95.4 F, get stat lactate, stat CBC & BMP, stat ABG, discussed with patient, he is OK with intubation if needed, start him on HF NC, stand by ET intubation, upgrade to SPCU, discussed with pulmonary on the case Dr. Almonte.  CC 30 min.

## 2022-08-14 LAB
ALBUMIN SERPL ELPH-MCNC: 1.7 G/DL — LOW (ref 3.3–5)
ALP SERPL-CCNC: 198 U/L — HIGH (ref 30–120)
ALT FLD-CCNC: 58 U/L DA — SIGNIFICANT CHANGE UP (ref 10–60)
ANION GAP SERPL CALC-SCNC: 7 MMOL/L — SIGNIFICANT CHANGE UP (ref 5–17)
APTT BLD: 79.3 SEC — HIGH (ref 27.5–35.5)
AST SERPL-CCNC: 51 U/L — HIGH (ref 10–40)
BILIRUB SERPL-MCNC: 0.6 MG/DL — SIGNIFICANT CHANGE UP (ref 0.2–1.2)
BUN SERPL-MCNC: 35 MG/DL — HIGH (ref 7–23)
CALCIUM SERPL-MCNC: 7 MG/DL — LOW (ref 8.4–10.5)
CHLORIDE SERPL-SCNC: 102 MMOL/L — SIGNIFICANT CHANGE UP (ref 96–108)
CO2 SERPL-SCNC: 29 MMOL/L — SIGNIFICANT CHANGE UP (ref 22–31)
CREAT SERPL-MCNC: 1.42 MG/DL — HIGH (ref 0.5–1.3)
EGFR: 48 ML/MIN/1.73M2 — LOW
GLUCOSE SERPL-MCNC: 109 MG/DL — HIGH (ref 70–99)
HCT VFR BLD CALC: 34.7 % — LOW (ref 39–50)
HGB BLD-MCNC: 11.4 G/DL — LOW (ref 13–17)
MAGNESIUM SERPL-MCNC: 1.9 MG/DL — SIGNIFICANT CHANGE UP (ref 1.6–2.6)
MCHC RBC-ENTMCNC: 31.3 PG — SIGNIFICANT CHANGE UP (ref 27–34)
MCHC RBC-ENTMCNC: 32.9 GM/DL — SIGNIFICANT CHANGE UP (ref 32–36)
MCV RBC AUTO: 95.3 FL — SIGNIFICANT CHANGE UP (ref 80–100)
NIGHT BLUE STAIN TISS: SIGNIFICANT CHANGE UP
NRBC # BLD: 0 /100 WBCS — SIGNIFICANT CHANGE UP (ref 0–0)
NT-PROBNP SERPL-SCNC: 7209 PG/ML — HIGH (ref 0–450)
PLATELET # BLD AUTO: 328 K/UL — SIGNIFICANT CHANGE UP (ref 150–400)
POTASSIUM SERPL-MCNC: 4 MMOL/L — SIGNIFICANT CHANGE UP (ref 3.5–5.3)
POTASSIUM SERPL-SCNC: 4 MMOL/L — SIGNIFICANT CHANGE UP (ref 3.5–5.3)
PROT SERPL-MCNC: 5.3 G/DL — LOW (ref 6–8.3)
RBC # BLD: 3.64 M/UL — LOW (ref 4.2–5.8)
RBC # FLD: 13.8 % — SIGNIFICANT CHANGE UP (ref 10.3–14.5)
SODIUM SERPL-SCNC: 138 MMOL/L — SIGNIFICANT CHANGE UP (ref 135–145)
SPECIMEN SOURCE: SIGNIFICANT CHANGE UP
WBC # BLD: 23.04 K/UL — HIGH (ref 3.8–10.5)
WBC # FLD AUTO: 23.04 K/UL — HIGH (ref 3.8–10.5)

## 2022-08-14 PROCEDURE — 99233 SBSQ HOSP IP/OBS HIGH 50: CPT

## 2022-08-14 PROCEDURE — 93010 ELECTROCARDIOGRAM REPORT: CPT

## 2022-08-14 RX ORDER — SENNA PLUS 8.6 MG/1
2 TABLET ORAL AT BEDTIME
Refills: 0 | Status: DISCONTINUED | OUTPATIENT
Start: 2022-08-14 | End: 2022-08-29

## 2022-08-14 RX ORDER — MAGNESIUM HYDROXIDE 400 MG/1
30 TABLET, CHEWABLE ORAL DAILY
Refills: 0 | Status: DISCONTINUED | OUTPATIENT
Start: 2022-08-14 | End: 2022-08-29

## 2022-08-14 RX ORDER — METOPROLOL TARTRATE 50 MG
5 TABLET ORAL ONCE
Refills: 0 | Status: COMPLETED | OUTPATIENT
Start: 2022-08-14 | End: 2022-08-14

## 2022-08-14 RX ORDER — POLYETHYLENE GLYCOL 3350 17 G/17G
17 POWDER, FOR SOLUTION ORAL DAILY
Refills: 0 | Status: DISCONTINUED | OUTPATIENT
Start: 2022-08-14 | End: 2022-08-29

## 2022-08-14 RX ORDER — METOPROLOL TARTRATE 50 MG
50 TABLET ORAL
Refills: 0 | Status: DISCONTINUED | OUTPATIENT
Start: 2022-08-14 | End: 2022-08-15

## 2022-08-14 RX ORDER — ACETAMINOPHEN 500 MG
1000 TABLET ORAL ONCE
Refills: 0 | Status: COMPLETED | OUTPATIENT
Start: 2022-08-14 | End: 2022-08-14

## 2022-08-14 RX ADMIN — FINASTERIDE 5 MILLIGRAM(S): 5 TABLET, FILM COATED ORAL at 13:12

## 2022-08-14 RX ADMIN — Medication 5 MILLIGRAM(S): at 20:07

## 2022-08-14 RX ADMIN — Medication 25 MILLIGRAM(S): at 05:15

## 2022-08-14 RX ADMIN — HEPARIN SODIUM 1500 UNIT(S)/HR: 5000 INJECTION INTRAVENOUS; SUBCUTANEOUS at 13:13

## 2022-08-14 RX ADMIN — SENNA PLUS 2 TABLET(S): 8.6 TABLET ORAL at 22:15

## 2022-08-14 RX ADMIN — Medication 1000 MILLIGRAM(S): at 20:47

## 2022-08-14 RX ADMIN — PANTOPRAZOLE SODIUM 40 MILLIGRAM(S): 20 TABLET, DELAYED RELEASE ORAL at 05:15

## 2022-08-14 RX ADMIN — HEPARIN SODIUM 1500 UNIT(S)/HR: 5000 INJECTION INTRAVENOUS; SUBCUTANEOUS at 08:31

## 2022-08-14 RX ADMIN — TAMSULOSIN HYDROCHLORIDE 0.4 MILLIGRAM(S): 0.4 CAPSULE ORAL at 22:16

## 2022-08-14 RX ADMIN — AZITHROMYCIN 255 MILLIGRAM(S): 500 TABLET, FILM COATED ORAL at 23:33

## 2022-08-14 RX ADMIN — Medication 400 MILLIGRAM(S): at 20:17

## 2022-08-14 RX ADMIN — ATORVASTATIN CALCIUM 40 MILLIGRAM(S): 80 TABLET, FILM COATED ORAL at 22:15

## 2022-08-14 RX ADMIN — CEFTRIAXONE 100 MILLIGRAM(S): 500 INJECTION, POWDER, FOR SOLUTION INTRAMUSCULAR; INTRAVENOUS at 19:37

## 2022-08-14 RX ADMIN — AMLODIPINE BESYLATE 10 MILLIGRAM(S): 2.5 TABLET ORAL at 05:15

## 2022-08-14 RX ADMIN — Medication 50 MILLIGRAM(S): at 17:26

## 2022-08-14 RX ADMIN — Medication 650 MILLIGRAM(S): at 20:04

## 2022-08-14 NOTE — PROGRESS NOTE ADULT - SUBJECTIVE AND OBJECTIVE BOX
Patient is a 87y old  Male who presents with a chief complaint of cough -> PNA (14 Aug 2022 19:25)      BRIEF HOSPITAL COURSE: 87M with HTN, HLD, hx of CVA (no residual deficits), BPH, hx of skin CA who presents with cough and fevers.  Symptoms started about 5-6 days ago.  Started with left sided upper back pain.  Then started to have a cough associated with green phlegm and possible blood.  Associated with SOB and DONG.  No weight changes or night sweats.  Had a fever over the weekend (4-5 days ago) as high as 102F.  Some nausea but no vomiting with loss of appetite.  No chest pain.  No diarrhea.  No abdominal pain.  No recent travel or sick contacts.  Went to see his PMD 3 days ago and was given a z-kellie (last day tomorrow) and tesslon perles.  Reported had negative COVID tests at home.  Patient decided to come here for further evaluation.  In the ED, patient's triage vitals were /78    RR  21, 93% on RA (thought dropped down to 88%) and T 98.5F.  Labs showed WBC 18 with a left shift, CINDI with BUN/Cr 40/1.88, elevated LFTs, normal lactate, negative COVID (last booster in 12/2021 with Pfizer vaccine), neg influenza, neg RSV.  CT chest showed "patchy groundglass and more dense opacities in both lungs, suspicious for multifocal PNA...several small lucencies with areas of lung opacity...may represent early cavitation versus areas of focal bronchiectasis."  Patient started on azithromycin and ceftriaxone empirically for multifocal PNA.  Also of note, patient was found to be in new afib on EKG.  Patient denies any afib history but said when he was younger he did feel some irregular heartbeats    Events last 24 hours: Pt went into TEENA 140's, 5 mg lopressor given, EKG showing no acute changes. HFNC down titrated to 30lpm 60% Fio2    PAST MEDICAL & SURGICAL HISTORY:  Skin cancer  s/p Mohs      Essential hypertension      Cerebrovascular accident (CVA), unspecified mechanism  2015      Hyperlipidemia, unspecified hyperlipidemia type      Complex tear of lateral meniscus of right knee as current injury, initial encounter      Complex tear of medial meniscus of right knee as current injury, initial encounter      Benign prostatic hyperplasia, presence of lower urinary tract symptoms unspecified, unspecified morphology      H/O pilonidal cyst          Review of Systems:  CONSTITUTIONAL: No fever, weight loss, or fatigue  EYES: No eye pain, visual disturbances, or discharge  ENMT:  No difficulty hearing, tinnitus, vertigo; No sinus or throat pain  NECK: No pain or stiffness  BREASTS: No pain, masses, or nipple discharge  RESPIRATORY: No cough, wheezing, chills or hemoptysis; No shortness of breath  CARDIOVASCULAR: No chest pain, palpitations, dizziness, or leg swelling  GASTROINTESTINAL: No abdominal or epigastric pain. No nausea, vomiting, or hematemesis; No diarrhea or constipation. No melena or hematochezia.  GENITOURINARY: No dysuria, frequency, hematuria, or incontinence  NEUROLOGICAL: No headaches, memory loss, loss of strength, numbness, or tremors  SKIN: No itching, burning, rashes, or lesions   LYMPH NODES: No enlarged glands  ENDOCRINE: No heat or cold intolerance; No hair loss  MUSCULOSKELETAL: No joint pain or swelling; No muscle, back, or extremity pain  PSYCHIATRIC: No depression, anxiety, mood swings, or difficulty sleeping  HEME/LYMPH: No easy bruising, or bleeding gums  ALLERGY AND IMMUNOLOGIC: No hives or eczema    Medications:  azithromycin  IVPB 500 milliGRAM(s) IV Intermittent every 24 hours  cefTRIAXone   IVPB 1000 milliGRAM(s) IV Intermittent every 24 hours    amLODIPine   Tablet 10 milliGRAM(s) Oral daily  metoprolol tartrate 50 milliGRAM(s) Oral two times a day  tamsulosin 0.4 milliGRAM(s) Oral at bedtime    albuterol/ipratropium for Nebulization 3 milliLiter(s) Nebulizer every 6 hours PRN    acetaminophen     Tablet .. 650 milliGRAM(s) Oral every 6 hours PRN  melatonin 3 milliGRAM(s) Oral at bedtime PRN  ondansetron Injectable 4 milliGRAM(s) IV Push every 8 hours PRN      heparin   Injectable 6000 Unit(s) IV Push every 6 hours PRN  heparin   Injectable 3000 Unit(s) IV Push every 6 hours PRN  heparin  Infusion.  Unit(s)/Hr IV Continuous <Continuous>    aluminum hydroxide/magnesium hydroxide/simethicone Suspension 30 milliLiter(s) Oral every 4 hours PRN  magnesium hydroxide Suspension 30 milliLiter(s) Oral daily PRN  pantoprazole    Tablet 40 milliGRAM(s) Oral before breakfast  polyethylene glycol 3350 17 Gram(s) Oral daily PRN  senna 2 Tablet(s) Oral at bedtime      atorvastatin 40 milliGRAM(s) Oral at bedtime  finasteride 5 milliGRAM(s) Oral daily                  ICU Vital Signs Last 24 Hrs  T(C): 36.7 (14 Aug 2022 19:57), Max: 37.2 (14 Aug 2022 00:00)  T(F): 98.1 (14 Aug 2022 19:57), Max: 99 (14 Aug 2022 00:00)  HR: 104 (14 Aug 2022 19:40) (75 - 109)  BP: 135/65 (14 Aug 2022 18:00) (107/66 - 146/71)  BP(mean): 82 (14 Aug 2022 18:00) (67 - 90)  ABP: --  ABP(mean): --  RR: 30 (14 Aug 2022 18:00) (24 - 42)  SpO2: 93% (14 Aug 2022 19:40) (92% - 99%)    O2 Parameters below as of 14 Aug 2022 19:40  Patient On (Oxygen Delivery Method): nasal cannula, high flow            ABG - ( 13 Aug 2022 06:50 )  pH, Arterial: 7.50  pH, Blood: x     /  pCO2: 34    /  pO2: 66    / HCO3: 26    / Base Excess: 3.3   /  SaO2: 93.6                I&O's Detail    13 Aug 2022 07:01  -  14 Aug 2022 07:00  --------------------------------------------------------  IN:    Heparin Infusion: 135 mL  Total IN: 135 mL    OUT:    Voided (mL): 1500 mL  Total OUT: 1500 mL    Total NET: -1365 mL      14 Aug 2022 07:01  -  14 Aug 2022 20:51  --------------------------------------------------------  IN:    Heparin Infusion: 195 mL    IV PiggyBack: 50 mL  Total IN: 245 mL    OUT:  Total OUT: 0 mL    Total NET: 245 mL          LABS:                        11.4   23.04 )-----------( 328      ( 14 Aug 2022 06:53 )             34.7     08-14    138  |  102  |  35<H>  ----------------------------<  109<H>  4.0   |  29  |  1.42<H>    Ca    7.0<L>      14 Aug 2022 06:53  Mg     1.9     08-14    TPro  5.3<L>  /  Alb  1.7<L>  /  TBili  0.6  /  DBili  x   /  AST  51<H>  /  ALT  58  /  AlkPhos  198<H>  08-14          CAPILLARY BLOOD GLUCOSE        PTT - ( 14 Aug 2022 06:53 )  PTT:79.3 sec    CULTURES:  Culture Results:   No growth to date. (08-11 @ 21:07)  Culture Results:   No growth to date. (08-11 @ 21:07)      Physical Examination:    General:  lying in bed, ill appearing     HEENT: Pupils equal, reactive to light. Symmetric. No scleral icterus or injection.    PULM: Crackles to auscultation B/L. No wheezes, rales, or rhonchi appreciated No significant sputum production or increased respiratory effort.    NECK: Supple, no lymphadenopathy, trachea midline.    CVS: Irregular rate and rhythm, no murmurs appreciated, +s1/s2.    ABD: Soft, nondistended, nontender, normoactive bowel sounds.    EXT: BLLE 1+ edema, nontender.    SKIN: Warm and well perfused, no rashes noted.    NEURO: Alert, oriented, interactive, nonfocal.      RADIOLOGY: < from: CT Chest No Cont (08.11.22 @ 21:37) >  ACC: [id number] EXAM:  CT CHEST                        ACC: [id number] EXAM:  CT ABDOMEN AND PELVIS                          PROCEDURE DATE:  08/11/2022          INTERPRETATION:  CLINICAL INFORMATION: Cough, fever. Sepsis.  Elevated LFT.    COMPARISON:None.    CONTRAST/COMPLICATIONS:  IV Contrast: NONE  Oral Contrast: NONE  Complications: None reported at time of study completion    PROCEDURE:  CT of the Chest, Abdomen and Pelvis was performed.  Sagittal and coronal reformats were performed.    FINDINGS:    CHEST:    LUNGS AND LARGE AIRWAYS: PLEURA: There are patchy bilateral groundglass   and airspace consolidations, which are most pronounced in the left upper   and lingular lobes, right upper and right middle lobes.  This is associated with bronchiectasis, particularly in the bilateral   upper lobes.  In addition, there are scattered irregular, poorly marginated nodular   opacities throughout the bilateral lower lung zones. Some of these   contain possible small areas of cavitation.  There is a small left-sided pleural effusion and trace right-sided   pleural effusion.    The central airways remain patent.    VESSELS: Atherosclerotic changes thoracic aorta and coronary artery   calcifications.    HEART: Heart size is normal. No pericardial effusion.    MEDIASTINUM AND DEO:  Pretracheal, precarinal and subcarinal mediastinal lymph nodes measuring   up to 1 cm short axis.    CHEST WALL AND LOWER NECK: Within normal limits.    ABDOMEN AND PELVIS:    Evaluation of the solid organ parenchyma is limited without intravenous   contrast.    LIVER: Within normal limits.  BILE DUCTS: Normal caliber.  GALLBLADDER: Contracted.  SPLEEN: Within normal limits.  PANCREAS: Within normal limits.  ADRENALS: Within normal limits.  KIDNEYS/URETERS:  Exophytic cyst medial upper pole left kidney.  No hydronephrosis.    BLADDER: Within normal limits.  REPRODUCTIVE ORGANS:  The prostate is not enlarged.    BOWEL:  Colonic diverticulosis, without CT evidence of diverticulitis.  No bowel obstruction.  The appendix is not well visualized on this exam.  PERITONEUM: No ascites.    VESSELS: Atherosclerotic changes.  RETROPERITONEUM/LYMPH NODES: No lymphadenopathy.    ABDOMINAL WALL:  Right inguinal hernia containing fat and nonobstructed small bowel.  Small bilateral inguinal lymph nodes.    BONES:  Degenerative changes spine.  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    IMPRESSION:    Bilateral groundglass glass and airspace consolidations, some associated   with bronchiectasis particularly in the upper lobes, findings likely   secondary to infection.  Possible small areas of cavitation.  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    No acute intra-abdominal pathology.    Other findings as discussed above.    This study was preliminary reported by the ED radiologist on 8/11/2022.    --- End of Report ---            KARLY LAGOS MD; Attending Radiologist    < end of copied text >

## 2022-08-14 NOTE — PROGRESS NOTE ADULT - ASSESSMENT
87M with HTN, HLD, hx of CVA (no residual deficits), BPH, hx of skin CA who presents with cough and fevers.    Found to have multifocal pneumonia.  Also found have new onset afib.        Sepsis 2/2 multifocal PNA - meets sepsis criteria with leukocytosis + tachycardia + source of infection  - admitted to medicine upgraded to SPCU 2/2 acute hypoxia respiratory failure requiring HFNC  - cont with ceftriaxone and azithromycin  - check strep PNA Ag  - check legionella Ag  - ID consult, recs recs appreciated  - pulm consult, recs appreciated.    - f/u cultures    Acute hypoxia respiratory failure 2/2 likely PNA.   pulmonary recs appreciated  now on HFNC, continue to monitor   full code, intubate PRN  continue HFNC per pulmonary    R/O TB   - doubt TB but patient has blood tinged sputum, possible early cavitation on CT, and fevers -> will have to r/o  - TB isolation  - check quantiferon and AFB smear  - ID will be consulted, recs appreciated  Sputum negative times 3    New afib - currently in 110s  - [id number] score of at least 5 -> will anticoagulate with heparin drip for now (can be stopped if hemotpysis worsens)  - cardiology consult, recs appreciated  - rate control if needed  - TTE shows 1. Low normal left ventricular systolic function.  2. Bi-atrial enlargement.  3. Mild pulmonary hypertension  - will eventually need to transition to DOAC    Elevated LFTs  - likely from current illness  - f/u final CT A/P read  - can consider GI consult if worsens    CINDI 2/2 prerenal azotemia - elev BUN/Cr >20  - likely 2/2 poor PO intake  - hydrate and repeat BMP in AM  - bladder scan, and if retaining, may need pittman  - hold diuretic  - avoid nephrotoxic meds    HTN/HLD  - cont with norvasc  - hold lasix  - cont with atorvastatin    BPH  - cont with tamsulosin and finasteride    Preventive measures  - will be on heparin drip for afib

## 2022-08-14 NOTE — PROGRESS NOTE ADULT - ASSESSMENT
87M with HTN, HLD, hx of CVA (no residual deficits), BPH, hx of skin CA who presents with cough and fevers.    Found to have multifocal pneumonia.  Also found have new onset afib.        Sepsis 2/2 Multifocal PNA  Hemoptysis   R/o TB  AHRF on NC  - pt p/w leukocytosis, SOB  - CT w/ small cavitations, b/l GGO and airspace consolidations  Plan:   - c/w ceftriaxone  - c/w azithromycin  - f/u cx  - f/u uPNA Ag  - trend temps/WBC  - extremely low suspicion for TB; will r/o w/ AFB smear x3  - can send quantiferon, but not helpful in differentiating between active/latent TB; can have false results during illness    Elevated LFTs  - likely 2/2 sepsis  - trend LFTs    CINDI 2/2 prerenal azotemia  - likely 2/2 poor PO intake/sepsis  - trend renal fxn  - avoid nephrotoxic agents  - renally dose medications    8/14- AFB smears negative x 3, can discontinue isolation  pt reports clinically feeling better though O2 requirements have increased  c/w ceftriaxone/ azithromycin for now  f/u urine legionella  trend wbc/ temps    Ebenezer Rivas M.D.  [facility], Division of Infectious Diseases  [phone]  After 5pm on weekdays and all day on weekends - please call [phone]

## 2022-08-14 NOTE — PROGRESS NOTE ADULT - SUBJECTIVE AND OBJECTIVE BOX
Patient is a 87y old  Male who presents with a chief complaint of cough -> PNA (14 Aug 2022 07:24)      INTERVAL HPI/OVERNIGHT EVENTS:    no overnight events    MEDICATIONS  (STANDING):  amLODIPine   Tablet 10 milliGRAM(s) Oral daily  atorvastatin 40 milliGRAM(s) Oral at bedtime  azithromycin  IVPB 500 milliGRAM(s) IV Intermittent every 24 hours  cefTRIAXone   IVPB 1000 milliGRAM(s) IV Intermittent every 24 hours  finasteride 5 milliGRAM(s) Oral daily  heparin  Infusion.  Unit(s)/Hr (13 mL/Hr) IV Continuous <Continuous>  metoprolol tartrate 50 milliGRAM(s) Oral two times a day  pantoprazole    Tablet 40 milliGRAM(s) Oral before breakfast  tamsulosin 0.4 milliGRAM(s) Oral at bedtime    MEDICATIONS  (PRN):  acetaminophen     Tablet .. 650 milliGRAM(s) Oral every 6 hours PRN Temp greater or equal to 38C (100.4F), Mild Pain (1 - 3)  albuterol/ipratropium for Nebulization 3 milliLiter(s) Nebulizer every 6 hours PRN Shortness of Breath and/or Wheezing  aluminum hydroxide/magnesium hydroxide/simethicone Suspension 30 milliLiter(s) Oral every 4 hours PRN Dyspepsia  heparin   Injectable 6000 Unit(s) IV Push every 6 hours PRN For aPTT less than 40  heparin   Injectable 3000 Unit(s) IV Push every 6 hours PRN For aPTT between 40 - 57  melatonin 3 milliGRAM(s) Oral at bedtime PRN Insomnia  ondansetron Injectable 4 milliGRAM(s) IV Push every 8 hours PRN Nausea and/or Vomiting      Allergies    No Known Allergies    Intolerances        REVIEW OF SYSTEMS:  as above    Vital Signs Last 24 Hrs  T(C): 36.2 (14 Aug 2022 13:48), Max: 37.4 (13 Aug 2022 18:30)  T(F): 97.1 (14 Aug 2022 13:48), Max: 99.3 (13 Aug 2022 18:30)  HR: 87 (14 Aug 2022 10:00) (75 - 140)  BP: 113/63 (14 Aug 2022 10:00) (108/52 - 122/81)  BP(mean): 80 (14 Aug 2022 10:00) (67 - 90)  RR: 37 (14 Aug 2022 10:00) (24 - 42)  SpO2: 97% (14 Aug 2022 10:00) (93% - 98%)    Parameters below as of 14 Aug 2022 14:54  Patient On (Oxygen Delivery Method): nasal cannula, high flow        PHYSICAL EXAM:  GENERAL:     NAD  HEAD:  on HFNC  NECK:     supple  RESPIRATORY:     some dry crackles throughout, no gross wheezing or rales  CARDIOVASCULAR:     irregular irregular   GASTROINTESTINAL:     soft, nontender, nondistended, bowel sounds present  EXTREMITIES:     minimal BLE edema, no clubbing or cyanosis  MUSCULOSKELETAL:     no joint pain or swelling or deformities  NERVOUS SYSTEM:     motor strength intact with 5/5 B/L upper and lower extremities, no gross sensory deficits  SKIN:     no rashes or lesions  PSYCH:     appropriate, alert and orientated x3, good    LABS:                        11.4   23.04 )-----------( 328      ( 14 Aug 2022 06:53 )             34.7     14 Aug 2022 06:53    138    |  102    |  35     ----------------------------<  109    4.0     |  29     |  1.42     Ca    7.0        14 Aug 2022 06:53  Mg     1.9       14 Aug 2022 06:53    TPro  5.3    /  Alb  1.7    /  TBili  0.6    /  DBili  x      /  AST  51     /  ALT  58     /  AlkPhos  198    14 Aug 2022 06:53    PTT - ( 14 Aug 2022 06:53 )  PTT:79.3 sec    CAPILLARY BLOOD GLUCOSE          RADIOLOGY & ADDITIONAL TESTS:

## 2022-08-14 NOTE — PROVIDER CONTACT NOTE (CHANGE IN STATUS NOTIFICATION) - ASSESSMENT
pt axox4, denied chest discomforts, noted tachypnic, pt is on high-flow nasal, 20liter/70%  /73, temp 98.1, heparin drip 1500 units/hr in progress  c/o headache

## 2022-08-14 NOTE — PROGRESS NOTE ADULT - SUBJECTIVE AND OBJECTIVE BOX
Chief Complaint:     Interval Events:    Review of Systems:  General: No fevers, chills, weight gain  Skin: No rashes, color changes  Cardiovascular: No chest pain, orthopnea  Respiratory: No shortness of breath, cough  Gastrointestinal: No nausea, abdominal pain  Genitourinary: No incontinence, pain with urination  Musculoskeletal: No pain, swelling, decreased range of motion  Neurological: No headache, weakness  Psychiatric: No depression, anxiety  Endocrine: No weight gain, increased thirst  All other systems are comprehensively negative.    Physical Exam:  Vitals:        Vital Signs Last 24 Hrs  T(C): 36.4 (14 Aug 2022 08:46), Max: 37.4 (13 Aug 2022 18:30)  T(F): 97.6 (14 Aug 2022 08:46), Max: 99.3 (13 Aug 2022 18:30)  HR: 84 (14 Aug 2022 06:23) (75 - 140)  BP: 122/81 (14 Aug 2022 06:00) (97/56 - 122/81)  BP(mean): 87 (14 Aug 2022 06:00) (67 - 90)  RR: 24 (14 Aug 2022 06:00) (20 - 42)  SpO2: 95% (14 Aug 2022 07:55) (93% - 98%)    Parameters below as of 14 Aug 2022 07:55  Patient On (Oxygen Delivery Method): nasal cannula, high flow      General: NAD  HEENT: MMM  Neck: No JVD, no carotid bruit  Lungs: CTAB  CV: RRR, nl S1/S2, no M/R/G  Abdomen: S/NT/ND, +BS  Extremities: No LE edema, no cyanosis  Neuro: AAOx3, non-focal  Skin: No rash    Labs:                        11.4   23.04 )-----------( 328      ( 14 Aug 2022 06:53 )             34.7     08-14    138  |  102  |  35<H>  ----------------------------<  109<H>  4.0   |  29  |  1.42<H>    Ca    7.0<L>      14 Aug 2022 06:53  Mg     1.9     08-14    TPro  5.3<L>  /  Alb  1.7<L>  /  TBili  0.6  /  DBili  x   /  AST  51<H>  /  ALT  58  /  AlkPhos  198<H>  08-14        PTT - ( 14 Aug 2022 06:53 )  PTT:79.3 sec    Telemetry:

## 2022-08-14 NOTE — PROGRESS NOTE ADULT - ASSESSMENT
The patient is an 87 year old male with a history of HTN, HL, BPH, CVA who presents with cough and hemoptysis in the setting of PNA and r/o TB.    Plan:  - Telemetry with AF predominantly in the 80-90s, at times up to 120s  - Increase metoprolol tartrate to 50 mg bid  - For now continue heparin drip given hemoptysis. When ok from a pulm perspective, transition to apixaban 2.5 mg bid.  - Continue amlodipine 10 mg daily  - Echo with low normal LV systolic function, mild pulm HTN  - IV antibiotics  - AFB smear to r/o TB

## 2022-08-14 NOTE — PROGRESS NOTE ADULT - SUBJECTIVE AND OBJECTIVE BOX
[facility], Division of Infectious Diseases  EROS Perez Y. Patel, S. Shah, G. Casimir  [phone]  ([phone] - weekdays after 5pm and weekends)    Name: DENNIS BRADFORD  Age/Gender: 87y Male  MRN: [id number]    Interval History:  Notes reviewed.   No concerning overnight events.  Afebrile.   daughter at bedside  pt states he has more energy over the last few days    Allergies: No Known Allergies      Objective:  Vitals:   T(F): 97.8 (08-14-22 @ 16:36), Max: 99 (08-14-22 @ 00:00)  HR: 97 (08-14-22 @ 18:00) (75 - 109)  BP: 135/65 (08-14-22 @ 18:00) (107/66 - 146/71)  RR: 30 (08-14-22 @ 18:00) (24 - 42)  SpO2: 93% (08-14-22 @ 18:22) (92% - 99%)  Physical Examination:  General: no acute distress  HEENT: anicteric, HFNC, R maxillary sinus tenderness  Cardio: S1, S2, normal rate  Resp: clear to auscultation, decrease breath sounds at lung bases  Abd: soft, ND, NT  Ext: trace edema  Skin: warm, dry    Laboratory Studies:  CBC:                       11.4   23.04 )-----------( 328      ( 14 Aug 2022 06:53 )             34.7     WBC Trend:  23.04 08-14-22 @ 06:53  24.85 08-13-22 @ 06:22  18.64 08-12-22 @ 06:00  18.73 08-11-22 @ 20:30    CMP: 08-14    138  |  102  |  35<H>  ----------------------------<  109<H>  4.0   |  29  |  1.42<H>    Ca    7.0<L>      14 Aug 2022 06:53  Mg     1.9     08-14    TPro  5.3<L>  /  Alb  1.7<L>  /  TBili  0.6  /  DBili  x   /  AST  51<H>  /  ALT  58  /  AlkPhos  198<H>  08-14      LIVER FUNCTIONS - ( 14 Aug 2022 06:53 )  Alb: 1.7 g/dL / Pro: 5.3 g/dL / ALK PHOS: 198 U/L / ALT: 58 U/L DA / AST: 51 U/L / GGT: x               Microbiology: reviewed     Culture - Acid Fast - Sputum w/Smear (collected 08-13-22 @ 12:12)  Source: .Sputum Sputum    Culture - Acid Fast - Sputum w/Smear (collected 08-12-22 @ 09:02)  Source: .Sputum Sputum    Culture - Acid Fast - Sputum w/Smear (collected 08-12-22 @ 01:04)  Source: .Sputum Sputum    Culture - Blood (collected 08-11-22 @ 21:07)  Source: .Blood Blood  Preliminary Report (08-13-22 @ 14:01):    No growth to date.    Culture - Blood (collected 08-11-22 @ 21:07)  Source: .Blood Blood  Preliminary Report (08-13-22 @ 14:01):    No growth to date.      Radiology: reviewed     Medications:  acetaminophen     Tablet .. 650 milliGRAM(s) Oral every 6 hours PRN  albuterol/ipratropium for Nebulization 3 milliLiter(s) Nebulizer every 6 hours PRN  aluminum hydroxide/magnesium hydroxide/simethicone Suspension 30 milliLiter(s) Oral every 4 hours PRN  amLODIPine   Tablet 10 milliGRAM(s) Oral daily  atorvastatin 40 milliGRAM(s) Oral at bedtime  azithromycin  IVPB 500 milliGRAM(s) IV Intermittent every 24 hours  cefTRIAXone   IVPB 1000 milliGRAM(s) IV Intermittent every 24 hours  finasteride 5 milliGRAM(s) Oral daily  heparin   Injectable 6000 Unit(s) IV Push every 6 hours PRN  heparin   Injectable 3000 Unit(s) IV Push every 6 hours PRN  heparin  Infusion.  Unit(s)/Hr IV Continuous <Continuous>  melatonin 3 milliGRAM(s) Oral at bedtime PRN  metoprolol tartrate 50 milliGRAM(s) Oral two times a day  ondansetron Injectable 4 milliGRAM(s) IV Push every 8 hours PRN  pantoprazole    Tablet 40 milliGRAM(s) Oral before breakfast  tamsulosin 0.4 milliGRAM(s) Oral at bedtime    Antimicrobials:  azithromycin  IVPB 500 milliGRAM(s) IV Intermittent every 24 hours  cefTRIAXone   IVPB 1000 milliGRAM(s) IV Intermittent every 24 hours

## 2022-08-14 NOTE — PROGRESS NOTE ADULT - ASSESSMENT
87M with HTN, HLD, hx of CVA (no residual deficits), BPH, hx of skin CA who presents with cough and fevers admitted for    1. AHRF  2. Multifocal PNA  3. Sepsis  4. NOAF  5. CINDI- Pre renal azotemia  6. AFIB RVR    Neuro:  - Avoid Neuro Deliriogenic / sedative medications  - Ofirmev given for headache    CV:  - Heparin gtt plan to transition to apixaban once cleared by pulm  - Continue current antihypertensive regimen, metoprolol increased to 50mg BID  - Statin  - Pt with episode of TEENA in the 140's, 5 mg lopressor IVP given, repeat EKG with no acute changes      Pulm:  - Actively titrating HFNC, down titrated to 30 lpm FIO2 60%, maintain SPO2 > 94%, pt remains high risk for decompensation requiring intubation  - Pt off isolation, 3x Cx negative, cleared by ID                  GI:  - Cont Protonix 40mg Daily,   - Diet as tolerated    Renal:  - Even to net negative fluid balance as tolerated by hemodynamics and renal fx.    - Cr 1.46, Continue to monitor Bun/Cr and UOP  - Replacing electrolytes as needed with Goal K> 4, PO> 3, Mg> 2               - Strict I&O's  - Avoid Nephro toxic medication  - Renally dose meds    Heme:  - Heparin gtt till transition to Apixaban once cleared by pulm    ID:  - WBC 23.04, continue ABX Ceftriaxone/azithromycin, f/u Cx  - Microbiology and Radiology reviewed   - trend CBC with diff, CMP  and fever curve    Endo:  - ISS for aggressive glycemic control to limit FS glucose to < 180mg/dl.    Critical Care Time (EXCLUSIVE of any non bundled procedures) :  38 minutes were spent assessing the patient's presenting problems of acute illness that pose a high probability of life threatening  deterioration or end organ damage / dysfunction.  Medical decision making includes initiation / continuation of plan or care review data/ lab work radiographic study, direct patient care bedside ,  discussions with  consultants regarding care,  evaluation and interpretation of vital signs, any necessary ventilator management,   NIV or BIPAP changes  or initiation,    discussions with multidisciplinary team,  am or pm rounds, discussions of goals of care with patient and family all non-inclusive of procedures.   87M with HTN, HLD, hx of CVA (no residual deficits), BPH, hx of skin CA who presents with cough and fevers admitted for    1. AHRF  2. Multifocal PNA  3. Sepsis  4. NOAF  5. CINDI- Pre renal azotemia  6. AFIB RVR    Neuro:  - Avoid Neuro Deliriogenic / sedative medications  - Ofirmev given for headache    CV:  - Heparin gtt plan to transition to apixaban once cleared by pulm  - Continue current antihypertensive regimen, metoprolol increased to 50mg BID  - Statin  - Pt with episode of TEENA in the 140's, 5 mg lopressor IVP given, repeat EKG with no acute changes      Pulm:  - Actively titrating HFNC, down titrated to 30 lpm FIO2 60%, maintain SPO2 > 94%, pt remains high risk for decompensation requiring intubation  - Pt off isolation, 3x Cx negative, cleared by ID                  GI:  - Cont Protonix 40mg Daily,   - Diet as tolerated  - Pt stating has not had BM in a few days, senna, Miralax and MOM ordered    Renal:  - Even to net negative fluid balance as tolerated by hemodynamics and renal fx.    - Cr 1.46, Continue to monitor Bun/Cr and UOP  - Replacing electrolytes as needed with Goal K> 4, PO> 3, Mg> 2               - Strict I&O's  - Avoid Nephro toxic medication  - Renally dose meds    Heme:  - Heparin gtt till transition to Apixaban once cleared by pulm    ID:  - WBC 23.04, continue ABX Ceftriaxone/azithromycin, f/u Cx  - Microbiology and Radiology reviewed   - trend CBC with diff, CMP  and fever curve    Endo:  - ISS for aggressive glycemic control to limit FS glucose to < 180mg/dl.    Critical Care Time (EXCLUSIVE of any non bundled procedures) :  38 minutes were spent assessing the patient's presenting problems of acute illness that pose a high probability of life threatening  deterioration or end organ damage / dysfunction.  Medical decision making includes initiation / continuation of plan or care review data/ lab work radiographic study, direct patient care bedside ,  discussions with  consultants regarding care,  evaluation and interpretation of vital signs, any necessary ventilator management,   NIV or BIPAP changes  or initiation,    discussions with multidisciplinary team,  am or pm rounds, discussions of goals of care with patient and family all non-inclusive of procedures.

## 2022-08-14 NOTE — PROGRESS NOTE ADULT - ASSESSMENT
87M with HTN, HLD, hx of CVA (no residual deficits), BPH, hx of skin CA who presents with cough and fevers    remains on HF  ID follow up -   doubt TB  vs noted  isolation precs in progress      RRT reviewed  on emp ABX  ID - Cardio follow up  I and O  serial labs  monitor VS and Sat  may need NIPPV if resp distress cont despite HF NC  prognosis guarded  pt is on isolation for MTB  GOC discussion

## 2022-08-14 NOTE — PROGRESS NOTE ADULT - SUBJECTIVE AND OBJECTIVE BOX
Date/Time Patient Seen:  		  Referring MD:   Data Reviewed	       Patient is a 87y old  Male who presents with a chief complaint of cough -> PNA (13 Aug 2022 22:46)      Subjective/HPI     PAST MEDICAL & SURGICAL HISTORY:  No pertinent past medical history    Skin cancer  s/p Mohs    Essential hypertension    Cerebrovascular accident (CVA), unspecified mechanism  2015    Hyperlipidemia, unspecified hyperlipidemia type    Complex tear of lateral meniscus of right knee as current injury, initial encounter    Complex tear of medial meniscus of right knee as current injury, initial encounter    Benign prostatic hyperplasia, presence of lower urinary tract symptoms unspecified, unspecified morphology    H/O pilonidal cyst          Medication list         MEDICATIONS  (STANDING):  amLODIPine   Tablet 10 milliGRAM(s) Oral daily  atorvastatin 40 milliGRAM(s) Oral at bedtime  azithromycin  IVPB 500 milliGRAM(s) IV Intermittent every 24 hours  cefTRIAXone   IVPB 1000 milliGRAM(s) IV Intermittent every 24 hours  finasteride 5 milliGRAM(s) Oral daily  heparin  Infusion.  Unit(s)/Hr (13 mL/Hr) IV Continuous <Continuous>  metoprolol tartrate 25 milliGRAM(s) Oral two times a day  pantoprazole    Tablet 40 milliGRAM(s) Oral before breakfast  tamsulosin 0.4 milliGRAM(s) Oral at bedtime    MEDICATIONS  (PRN):  acetaminophen     Tablet .. 650 milliGRAM(s) Oral every 6 hours PRN Temp greater or equal to 38C (100.4F), Mild Pain (1 - 3)  albuterol/ipratropium for Nebulization 3 milliLiter(s) Nebulizer every 6 hours PRN Shortness of Breath and/or Wheezing  aluminum hydroxide/magnesium hydroxide/simethicone Suspension 30 milliLiter(s) Oral every 4 hours PRN Dyspepsia  heparin   Injectable 6000 Unit(s) IV Push every 6 hours PRN For aPTT less than 40  heparin   Injectable 3000 Unit(s) IV Push every 6 hours PRN For aPTT between 40 - 57  melatonin 3 milliGRAM(s) Oral at bedtime PRN Insomnia  ondansetron Injectable 4 milliGRAM(s) IV Push every 8 hours PRN Nausea and/or Vomiting         Vitals log        ICU Vital Signs Last 24 Hrs  T(C): 37.2 (14 Aug 2022 00:00), Max: 37.4 (13 Aug 2022 07:30)  T(F): 99 (14 Aug 2022 00:00), Max: 99.3 (13 Aug 2022 07:30)  HR: 84 (14 Aug 2022 06:23) (75 - 140)  BP: 122/81 (14 Aug 2022 06:00) (97/56 - 122/81)  BP(mean): 87 (14 Aug 2022 06:00) (67 - 90)  ABP: --  ABP(mean): --  RR: 24 (14 Aug 2022 06:00) (20 - 42)  SpO2: 95% (14 Aug 2022 06:23) (93% - 98%)    O2 Parameters below as of 14 Aug 2022 06:23  Patient On (Oxygen Delivery Method): nasal cannula, high flow                 Input and Output:  I&O's Detail    13 Aug 2022 07:01  -  14 Aug 2022 07:00  --------------------------------------------------------  IN:    Heparin Infusion: 135 mL  Total IN: 135 mL    OUT:    Voided (mL): 1500 mL  Total OUT: 1500 mL    Total NET: -1365 mL          Lab Data                        11.4   23.04 )-----------( 328      ( 14 Aug 2022 06:53 )             34.7     08-13    138  |  102  |  32<H>  ----------------------------<  116<H>  4.2   |  27  |  1.50<H>    Ca    8.0<L>      13 Aug 2022 06:22    TPro  7.1  /  Alb  2.2<L>  /  TBili  0.9  /  DBili  x   /  AST  53<H>  /  ALT  73<H>  /  AlkPhos  250<H>  08-13    ABG - ( 13 Aug 2022 06:50 )  pH, Arterial: 7.50  pH, Blood: x     /  pCO2: 34    /  pO2: 66    / HCO3: 26    / Base Excess: 3.3   /  SaO2: 93.6                    Review of Systems	      Objective     Physical Examination    heart s1s2  lung dc BS  abd soft      Pertinent Lab findings & Imaging      Bud:  NO   Adequate UO     I&O's Detail    13 Aug 2022 07:01  -  14 Aug 2022 07:00  --------------------------------------------------------  IN:    Heparin Infusion: 135 mL  Total IN: 135 mL    OUT:    Voided (mL): 1500 mL  Total OUT: 1500 mL    Total NET: -1365 mL               Discussed with:     Cultures:	        Radiology

## 2022-08-15 LAB
ALBUMIN SERPL ELPH-MCNC: 1.9 G/DL — LOW (ref 3.3–5)
ALP SERPL-CCNC: 261 U/L — HIGH (ref 30–120)
ALT FLD-CCNC: 84 U/L DA — HIGH (ref 10–60)
ANION GAP SERPL CALC-SCNC: 8 MMOL/L — SIGNIFICANT CHANGE UP (ref 5–17)
APTT BLD: 92 SEC — HIGH (ref 27.5–35.5)
AST SERPL-CCNC: 86 U/L — HIGH (ref 10–40)
BILIRUB SERPL-MCNC: 0.7 MG/DL — SIGNIFICANT CHANGE UP (ref 0.2–1.2)
BUN SERPL-MCNC: 33 MG/DL — HIGH (ref 7–23)
CALCIUM SERPL-MCNC: 7.6 MG/DL — LOW (ref 8.4–10.5)
CEA SERPL-MCNC: 0.7 NG/ML — SIGNIFICANT CHANGE UP (ref 0–3.8)
CHLORIDE SERPL-SCNC: 102 MMOL/L — SIGNIFICANT CHANGE UP (ref 96–108)
CO2 SERPL-SCNC: 28 MMOL/L — SIGNIFICANT CHANGE UP (ref 22–31)
CREAT SERPL-MCNC: 1.37 MG/DL — HIGH (ref 0.5–1.3)
EGFR: 50 ML/MIN/1.73M2 — LOW
GLUCOSE SERPL-MCNC: 110 MG/DL — HIGH (ref 70–99)
HCT VFR BLD CALC: 36.9 % — LOW (ref 39–50)
HGB BLD-MCNC: 12.5 G/DL — LOW (ref 13–17)
LEGIONELLA AB SER-ACNC: 1.19 — HIGH (ref 0–0.9)
MCHC RBC-ENTMCNC: 32.2 PG — SIGNIFICANT CHANGE UP (ref 27–34)
MCHC RBC-ENTMCNC: 33.9 GM/DL — SIGNIFICANT CHANGE UP (ref 32–36)
MCV RBC AUTO: 95.1 FL — SIGNIFICANT CHANGE UP (ref 80–100)
NRBC # BLD: 0 /100 WBCS — SIGNIFICANT CHANGE UP (ref 0–0)
PLATELET # BLD AUTO: 350 K/UL — SIGNIFICANT CHANGE UP (ref 150–400)
POTASSIUM SERPL-MCNC: 3.8 MMOL/L — SIGNIFICANT CHANGE UP (ref 3.5–5.3)
POTASSIUM SERPL-SCNC: 3.8 MMOL/L — SIGNIFICANT CHANGE UP (ref 3.5–5.3)
PROT SERPL-MCNC: 6.1 G/DL — SIGNIFICANT CHANGE UP (ref 6–8.3)
RBC # BLD: 3.88 M/UL — LOW (ref 4.2–5.8)
RBC # FLD: 14 % — SIGNIFICANT CHANGE UP (ref 10.3–14.5)
SODIUM SERPL-SCNC: 138 MMOL/L — SIGNIFICANT CHANGE UP (ref 135–145)
WBC # BLD: 26.15 K/UL — HIGH (ref 3.8–10.5)
WBC # FLD AUTO: 26.15 K/UL — HIGH (ref 3.8–10.5)

## 2022-08-15 PROCEDURE — 71045 X-RAY EXAM CHEST 1 VIEW: CPT | Mod: 26

## 2022-08-15 PROCEDURE — 99291 CRITICAL CARE FIRST HOUR: CPT

## 2022-08-15 RX ORDER — METOPROLOL TARTRATE 50 MG
100 TABLET ORAL
Refills: 0 | Status: DISCONTINUED | OUTPATIENT
Start: 2022-08-15 | End: 2022-08-28

## 2022-08-15 RX ORDER — METOPROLOL TARTRATE 50 MG
5 TABLET ORAL ONCE
Refills: 0 | Status: COMPLETED | OUTPATIENT
Start: 2022-08-15 | End: 2022-08-15

## 2022-08-15 RX ORDER — PIPERACILLIN AND TAZOBACTAM 4; .5 G/20ML; G/20ML
3.38 INJECTION, POWDER, LYOPHILIZED, FOR SOLUTION INTRAVENOUS ONCE
Refills: 0 | Status: COMPLETED | OUTPATIENT
Start: 2022-08-15 | End: 2022-08-15

## 2022-08-15 RX ORDER — PIPERACILLIN AND TAZOBACTAM 4; .5 G/20ML; G/20ML
3.38 INJECTION, POWDER, LYOPHILIZED, FOR SOLUTION INTRAVENOUS EVERY 8 HOURS
Refills: 0 | Status: DISCONTINUED | OUTPATIENT
Start: 2022-08-15 | End: 2022-08-16

## 2022-08-15 RX ADMIN — AMLODIPINE BESYLATE 10 MILLIGRAM(S): 2.5 TABLET ORAL at 06:12

## 2022-08-15 RX ADMIN — FINASTERIDE 5 MILLIGRAM(S): 5 TABLET, FILM COATED ORAL at 12:17

## 2022-08-15 RX ADMIN — HEPARIN SODIUM 1500 UNIT(S)/HR: 5000 INJECTION INTRAVENOUS; SUBCUTANEOUS at 19:13

## 2022-08-15 RX ADMIN — ATORVASTATIN CALCIUM 40 MILLIGRAM(S): 80 TABLET, FILM COATED ORAL at 21:06

## 2022-08-15 RX ADMIN — HEPARIN SODIUM 1500 UNIT(S)/HR: 5000 INJECTION INTRAVENOUS; SUBCUTANEOUS at 07:15

## 2022-08-15 RX ADMIN — TAMSULOSIN HYDROCHLORIDE 0.4 MILLIGRAM(S): 0.4 CAPSULE ORAL at 21:06

## 2022-08-15 RX ADMIN — Medication 5 MILLIGRAM(S): at 22:51

## 2022-08-15 RX ADMIN — Medication 100 MILLIGRAM(S): at 12:17

## 2022-08-15 RX ADMIN — HEPARIN SODIUM 1500 UNIT(S)/HR: 5000 INJECTION INTRAVENOUS; SUBCUTANEOUS at 21:12

## 2022-08-15 RX ADMIN — HEPARIN SODIUM 1500 UNIT(S)/HR: 5000 INJECTION INTRAVENOUS; SUBCUTANEOUS at 05:02

## 2022-08-15 RX ADMIN — PANTOPRAZOLE SODIUM 40 MILLIGRAM(S): 20 TABLET, DELAYED RELEASE ORAL at 06:12

## 2022-08-15 RX ADMIN — PIPERACILLIN AND TAZOBACTAM 25 GRAM(S): 4; .5 INJECTION, POWDER, LYOPHILIZED, FOR SOLUTION INTRAVENOUS at 20:15

## 2022-08-15 RX ADMIN — SENNA PLUS 2 TABLET(S): 8.6 TABLET ORAL at 21:06

## 2022-08-15 RX ADMIN — PIPERACILLIN AND TAZOBACTAM 200 GRAM(S): 4; .5 INJECTION, POWDER, LYOPHILIZED, FOR SOLUTION INTRAVENOUS at 12:16

## 2022-08-15 RX ADMIN — Medication 50 MILLIGRAM(S): at 06:11

## 2022-08-15 RX ADMIN — Medication 3 MILLIGRAM(S): at 22:52

## 2022-08-15 NOTE — DIETITIAN INITIAL EVALUATION ADULT - ADD RECOMMEND
Recommended Ensure Enlive (350kcal, 20g pro) qd, fortified ice-cream Magic cup (290kcal, 9g pro) qd to optimize intake

## 2022-08-15 NOTE — PROGRESS NOTE ADULT - ASSESSMENT
The patient is an 87 year old male with a history of HTN, HL, BPH, CVA who presents with cough and hemoptysis in the setting of PNA and r/o TB.    Plan:  - Telemetry with AF predominantly in the 80-90s, at times up to 120s  - Increase metoprolol tartrate to 100 mg bid  - For now continue heparin drip given hemoptysis. When ok from a pulm perspective, transition to apixaban 2.5 mg bid.  - Discontinue amlodipine  - Echo with low normal LV systolic function, mild pulm HTN  - IV antibiotics  - AFB negative x3

## 2022-08-15 NOTE — DIETITIAN NUTRITION RISK NOTIFICATION - TREATMENT: THE FOLLOWING DIET HAS BEEN RECOMMENDED
Diet, DASH/TLC:   Sodium & Cholesterol Restricted  Supplement Feeding Modality:  Oral  Ensure Enlive Cans or Servings Per Day:  1       Frequency:  Daily (08-15-22 @ 14:20) [Pending Verification By Attending]  Diet, DASH/TLC:   Sodium & Cholesterol Restricted (08-11-22 @ 22:17) [Active]

## 2022-08-15 NOTE — PROGRESS NOTE ADULT - SUBJECTIVE AND OBJECTIVE BOX
Chief Complaint: Hemoptysis    Interval Events: No events overnight.    Review of Systems:  General: No fevers, chills, weight gain  Skin: No rashes, color changes  Cardiovascular: No chest pain, orthopnea  Respiratory: No shortness of breath, cough  Gastrointestinal: No nausea, abdominal pain  Genitourinary: No incontinence, pain with urination  Musculoskeletal: No pain, swelling, decreased range of motion  Neurological: No headache, weakness  Psychiatric: No depression, anxiety  Endocrine: No weight gain, increased thirst  All other systems are comprehensively negative.    Physical Exam:  Vitals:        Vital Signs Last 24 Hrs  T(C): 36.4 (14 Aug 2022 08:46), Max: 37.4 (13 Aug 2022 18:30)  T(F): 97.6 (14 Aug 2022 08:46), Max: 99.3 (13 Aug 2022 18:30)  HR: 84 (14 Aug 2022 06:23) (75 - 140)  BP: 122/81 (14 Aug 2022 06:00) (97/56 - 122/81)  BP(mean): 87 (14 Aug 2022 06:00) (67 - 90)  RR: 24 (14 Aug 2022 06:00) (20 - 42)  SpO2: 95% (14 Aug 2022 07:55) (93% - 98%)  Parameters below as of 14 Aug 2022 07:55  Patient On (Oxygen Delivery Method): nasal cannula, high flow  General: NAD  HEENT: MMM  Neck: No JVD, no carotid bruit  Lungs: CTAB  CV: RRR, nl S1/S2, no M/R/G  Abdomen: S/NT/ND, +BS  Extremities: No LE edema, no cyanosis  Neuro: AAOx3, non-focal  Skin: No rash    Labs:                        11.4   23.04 )-----------( 328      ( 14 Aug 2022 06:53 )             34.7     08-14    138  |  102  |  35<H>  ----------------------------<  109<H>  4.0   |  29  |  1.42<H>    Ca    7.0<L>      14 Aug 2022 06:53  Mg     1.9     08-14    TPro  5.3<L>  /  Alb  1.7<L>  /  TBili  0.6  /  DBili  x   /  AST  51<H>  /  ALT  58  /  AlkPhos  198<H>  08-14        PTT - ( 14 Aug 2022 06:53 )  PTT:79.3 sec    Telemetry: AF

## 2022-08-15 NOTE — PROGRESS NOTE ADULT - SUBJECTIVE AND OBJECTIVE BOX
· Reason for Admission	cough -> PNA  · Subjective and Objective:   Patient is a 87y old  Male who presents with a chief complaint of cough -> PNA (14 Aug 2022 19:25)      BRIEF HOSPITAL COURSE: 87M with HTN, HLD, hx of CVA (no residual deficits), BPH, hx of skin CA who presents with cough and fevers.  Symptoms started about 5-6 days ago.  Started with left sided upper back pain.  Then started to have a cough associated with green phlegm and possible blood.  Associated with SOB and DONG.  No weight changes or night sweats.  Had a fever over the weekend (4-5 days ago) as high as 102F.  Some nausea but no vomiting with loss of appetite.  No chest pain.  No diarrhea.  No abdominal pain.  No recent travel or sick contacts.  Went to see his PMD 3 days ago and was given a z-kellie (last day tomorrow) and tesslon perles.  Reported had negative COVID tests at home.  Patient decided to come here for further evaluation.  In the ED, patient's triage vitals were /78    RR  21, 93% on RA (thought dropped down to 88%) and T 98.5F.  Labs showed WBC 18 with a left shift, CINDI with BUN/Cr 40/1.88, elevated LFTs, normal lactate, negative COVID (last booster in 12/2021 with Pfizer vaccine), neg influenza, neg RSV.  CT chest showed "patchy groundglass and more dense opacities in both lungs, suspicious for multifocal PNA...several small lucencies with areas of lung opacity...may represent early cavitation versus areas of focal bronchiectasis."  Patient started on azithromycin and ceftriaxone empirically for multifocal PNA.  Also of note, patient was found to be in new afib on EKG.  Patient denies any afib history but said when he was younger he did feel some irregular heartbeats    Events last 24 hours: Patient better rate controlled. Able to transition off of high flow nasal cannula. Lopressor dose increased.     PAST MEDICAL & SURGICAL HISTORY:  Skin cancer  s/p Mohs      Essential hypertension      Cerebrovascular accident (CVA), unspecified mechanism  2015      Hyperlipidemia, unspecified hyperlipidemia type      Complex tear of lateral meniscus of right knee as current injury, initial encounter      Complex tear of medial meniscus of right knee as current injury, initial encounter      Benign prostatic hyperplasia, presence of lower urinary tract symptoms unspecified, unspecified morphology      H/O pilonidal cyst          Review of Systems:  CONSTITUTIONAL: No fever, weight loss, or fatigue  EYES: No eye pain, visual disturbances, or discharge  ENMT:  No difficulty hearing, tinnitus, vertigo; No sinus or throat pain  NECK: No pain or stiffness  BREASTS: No pain, masses, or nipple discharge  RESPIRATORY: No cough, wheezing, chills or hemoptysis; No shortness of breath  CARDIOVASCULAR: No chest pain, palpitations, dizziness, or leg swelling  GASTROINTESTINAL: No abdominal or epigastric pain. No nausea, vomiting, or hematemesis; No diarrhea or constipation. No melena or hematochezia.  GENITOURINARY: No dysuria, frequency, hematuria, or incontinence  NEUROLOGICAL: No headaches, memory loss, loss of strength, numbness, or tremors  SKIN: No itching, burning, rashes, or lesions   LYMPH NODES: No enlarged glands  ENDOCRINE: No heat or cold intolerance; No hair loss  MUSCULOSKELETAL: No joint pain or swelling; No muscle, back, or extremity pain  PSYCHIATRIC: No depression, anxiety, mood swings, or difficulty sleeping  HEME/LYMPH: No easy bruising, or bleeding gums  ALLERGY AND IMMUNOLOGIC: No hives or eczema    MEDICATIONS  (STANDING):  atorvastatin 40 milliGRAM(s) Oral at bedtime  finasteride 5 milliGRAM(s) Oral daily  heparin  Infusion.  Unit(s)/Hr (13 mL/Hr) IV Continuous <Continuous>  metoprolol tartrate 100 milliGRAM(s) Oral two times a day  pantoprazole    Tablet 40 milliGRAM(s) Oral before breakfast  piperacillin/tazobactam IVPB.. 3.375 Gram(s) IV Intermittent every 8 hours  senna 2 Tablet(s) Oral at bedtime  tamsulosin 0.4 milliGRAM(s) Oral at bedtime    MEDICATIONS  (PRN):  acetaminophen     Tablet .. 650 milliGRAM(s) Oral every 6 hours PRN Temp greater or equal to 38C (100.4F), Mild Pain (1 - 3)  albuterol/ipratropium for Nebulization 3 milliLiter(s) Nebulizer every 6 hours PRN Shortness of Breath and/or Wheezing  aluminum hydroxide/magnesium hydroxide/simethicone Suspension 30 milliLiter(s) Oral every 4 hours PRN Dyspepsia  heparin   Injectable 6000 Unit(s) IV Push every 6 hours PRN For aPTT less than 40  heparin   Injectable 3000 Unit(s) IV Push every 6 hours PRN For aPTT between 40 - 57  magnesium hydroxide Suspension 30 milliLiter(s) Oral daily PRN Constipation  melatonin 3 milliGRAM(s) Oral at bedtime PRN Insomnia  ondansetron Injectable 4 milliGRAM(s) IV Push every 8 hours PRN Nausea and/or Vomiting  polyethylene glycol 3350 17 Gram(s) Oral daily PRN Constipation    ICU Vital Signs Last 24 Hrs  T(C): 36.6 (15 Aug 2022 15:53), Max: 36.8 (15 Aug 2022 04:00)  T(F): 97.9 (15 Aug 2022 15:53), Max: 98.2 (15 Aug 2022 04:00)  HR: 73 (15 Aug 2022 18:00) (65 - 140)  BP: 148/71 (15 Aug 2022 18:00) (92/60 - 148/71)  BP(mean): 94 (15 Aug 2022 18:00) (71 - 95)  ABP: --  ABP(mean): --  RR: 36 (15 Aug 2022 18:00) (26 - 45)  SpO2: 87% (15 Aug 2022 18:00) (60% - 95%)    O2 Parameters below as of 15 Aug 2022 10:00  Patient On (Oxygen Delivery Method): nasal cannula, high flow          ABG - ( 13 Aug 2022 06:50 )  pH, Arterial: 7.50  pH, Blood: x     /  pCO2: 34    /  pO2: 66    / HCO3: 26    / Base Excess: 3.3   /  SaO2: 93.6              I&O's Detail    14 Aug 2022 07:01  -  15 Aug 2022 07:00  --------------------------------------------------------  IN:    Heparin Infusion: 360 mL    IV PiggyBack: 400 mL  Total IN: 760 mL    OUT:    Voided (mL): 650 mL  Total OUT: 650 mL    Total NET: 110 mL      15 Aug 2022 07:01  -  15 Aug 2022 19:40  --------------------------------------------------------  IN:    Heparin Infusion: 165 mL    Oral Fluid: 1080 mL  Total IN: 1245 mL    OUT:    Voided (mL): 500 mL  Total OUT: 500 mL    Total NET: 745 mL        LABS:                        12.5   26.15 )-----------( 350      ( 15 Aug 2022 06:00 )             36.9     08-15    138  |  102  |  33<H>  ----------------------------<  110<H>  3.8   |  28  |  1.37<H>    Ca    7.6<L>      15 Aug 2022 06:00  Mg     1.9     08-14    TPro  6.1  /  Alb  1.9<L>  /  TBili  0.7  /  DBili  x   /  AST  86<H>  /  ALT  84<H>  /  AlkPhos  261<H>  08-15      CAPILLARY BLOOD GLUCOSE  None    PTT - ( 15 Aug 2022 06:00 )  PTT:92.0 sec    CULTURES:  Culture Results:   No growth to date. (08-11 @ 21:07)  Culture Results:   No growth to date. (08-11 @ 21:07)      Physical Examination:    General:  lying in bed, ill appearing     HEENT: Pupils equal, reactive to light. Symmetric. No scleral icterus or injection.    PULM: Crackles to auscultation B/L. No wheezes, rales, or rhonchi appreciated No significant sputum production or increased respiratory effort.    NECK: Supple, no lymphadenopathy, trachea midline.    CVS: Irregular rate and rhythm, no murmurs appreciated, +s1/s2.    ABD: Soft, nondistended, nontender, normoactive bowel sounds.    EXT: BLLE 1+ edema, nontender.    SKIN: Warm and well perfused, no rashes noted.    NEURO: Alert, oriented, interactive, nonfocal.      RADIOLOGY: < from: CT Chest No Cont (08.11.22 @ 21:37) >  ACC: [id number] EXAM:  CT CHEST                        ACC: [id number] EXAM:  CT ABDOMEN AND PELVIS                          PROCEDURE DATE:  08/11/2022          INTERPRETATION:  CLINICAL INFORMATION: Cough, fever. Sepsis.  Elevated LFT.    COMPARISON:None.    CONTRAST/COMPLICATIONS:  IV Contrast: NONE  Oral Contrast: NONE  Complications: None reported at time of study completion    PROCEDURE:  CT of the Chest, Abdomen and Pelvis was performed.  Sagittal and coronal reformats were performed.    FINDINGS:    CHEST:    LUNGS AND LARGE AIRWAYS: PLEURA: There are patchy bilateral groundglass   and airspace consolidations, which are most pronounced in the left upper   and lingular lobes, right upper and right middle lobes.  This is associated with bronchiectasis, particularly in the bilateral   upper lobes.  In addition, there are scattered irregular, poorly marginated nodular   opacities throughout the bilateral lower lung zones. Some of these   contain possible small areas of cavitation.  There is a small left-sided pleural effusion and trace right-sided   pleural effusion.    The central airways remain patent.    VESSELS: Atherosclerotic changes thoracic aorta and coronary artery   calcifications.    HEART: Heart size is normal. No pericardial effusion.    MEDIASTINUM AND DEO:  Pretracheal, precarinal and subcarinal mediastinal lymph nodes measuring   up to 1 cm short axis.    CHEST WALL AND LOWER NECK: Within normal limits.    ABDOMEN AND PELVIS:    Evaluation of the solid organ parenchyma is limited without intravenous   contrast.    LIVER: Within normal limits.  BILE DUCTS: Normal caliber.  GALLBLADDER: Contracted.  SPLEEN: Within normal limits.  PANCREAS: Within normal limits.  ADRENALS: Within normal limits.  KIDNEYS/URETERS:  Exophytic cyst medial upper pole left kidney.  No hydronephrosis.    BLADDER: Within normal limits.  REPRODUCTIVE ORGANS:  The prostate is not enlarged.    BOWEL:  Colonic diverticulosis, without CT evidence of diverticulitis.  No bowel obstruction.  The appendix is not well visualized on this exam.  PERITONEUM: No ascites.    VESSELS: Atherosclerotic changes.  RETROPERITONEUM/LYMPH NODES: No lymphadenopathy.    ABDOMINAL WALL:  Right inguinal hernia containing fat and nonobstructed small bowel.  Small bilateral inguinal lymph nodes.    BONES:  Degenerative changes spine.  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    IMPRESSION:    Bilateral groundglass glass and airspace consolidations, some associated   with bronchiectasis particularly in the upper lobes, findings likely   secondary to infection.  Possible small areas of cavitation.  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    No acute intra-abdominal pathology.    Other findings as discussed above.    This study was preliminary reported by the ED radiologist on 8/11/2022.    --- End of Report ---            KARLY LAGOS MD; Attending Radiologist    < end of copied text >        Assessment and Plan:   · Assessment	  87M with HTN, HLD, hx of CVA (no residual deficits), BPH, hx of skin CA who presents with cough and fevers admitted for    1. Acute hypoxemic respiratory failure  2. Multifocal PNA  3. Sepsis  4. New onset atrial fibrillation with RVR  5. CINDI- Pre renal azotemia      Neuro:  - Avoid Neuro Deliriogenic / sedative medications  -Maintain sleep/wake cycle, limit overnight interruptions    CV:  - Heparin gtt plan to transition to apixaban once cleared by pulmonary.   - Continue current antihypertensive regimen, metoprolol increased to 50mg BID with good effect. MAP > 65, no vasopressors needed    Pulm:  - Able to transition from HFNC this day. maintain SPO2 > 94%, pt remains high risk for decompensation requiring intubation  - Pt off isolation, 3x Cx negative, cleared by ID                  GI:  - Cont Protonix 40mg Daily,   - Diet as tolerated, Aspiration precautions  - Pt stating has not had BM in a few days, senna, Miralax and MOM ordered    Renal:  - Even to net negative fluid balance as tolerated by hemodynamics and renal fx.    - Cr 1.46 --> 1.37 (Slight improvement), Continue to monitor Bun/Cr and UOP  - Replacing electrolytes as needed with Goal K> 4, PO> 3, Mg> 2               - Strict I&O's  - Avoid Nephro toxic medication    Heme:  - Heparin gtt till transition to Apixaban once cleared by pulm    ID:  -  continue ABX Ceftriaxone/azithromycin, f/u Cx, monitor leukocytosis  - Microbiology and Radiology reviewed   - trend CBC with diff, CMP  and fever curve    Endo:  - ISS for aggressive glycemic control to limit FS glucose to < 180mg/dl.    Critical Care Time (EXCLUSIVE of any non bundled procedures) :  35 minutes were spent assessing the patient's presenting problems of acute illness that pose a high probability of life threatening  deterioration or end organ damage / dysfunction.  Medical decision making includes initiation / continuation of plan or care review data/ lab work radiographic study, direct patient care bedside ,  discussions with  consultants regarding care,  evaluation and interpretation of vital signs, any necessary ventilator management,   NIV or BIPAP changes  or initiation,    discussions with multidisciplinary team,  am or pm rounds, discussions of goals of care with patient and family all non-inclusive of procedures.     · Reason for Admission	cough -> PNA  · Subjective and Objective:   Patient is a 87y old  Male who presents with a chief complaint of cough -> PNA (14 Aug 2022 19:25)      BRIEF HOSPITAL COURSE: 87M with HTN, HLD, hx of CVA (no residual deficits), BPH, hx of skin CA who presents with cough and fevers.  Symptoms started about 5-6 days ago.  Started with left sided upper back pain.  Then started to have a cough associated with green phlegm and possible blood.  Associated with SOB and DONG.  No weight changes or night sweats.  Had a fever over the weekend (4-5 days ago) as high as 102F.  Some nausea but no vomiting with loss of appetite.  No chest pain.  No diarrhea.  No abdominal pain.  No recent travel or sick contacts.  Went to see his PMD 3 days ago and was given a z-kellie (last day tomorrow) and tesslon perles.  Reported had negative COVID tests at home.  Patient decided to come here for further evaluation.  In the ED, patient's triage vitals were /78    RR  21, 93% on RA (thought dropped down to 88%) and T 98.5F.  Labs showed WBC 18 with a left shift, CINDI with BUN/Cr 40/1.88, elevated LFTs, normal lactate, negative COVID (last booster in 12/2021 with Pfizer vaccine), neg influenza, neg RSV.  CT chest showed "patchy groundglass and more dense opacities in both lungs, suspicious for multifocal PNA...several small lucencies with areas of lung opacity...may represent early cavitation versus areas of focal bronchiectasis."  Patient started on azithromycin and ceftriaxone empirically for multifocal PNA.  Also of note, patient was found to be in new afib on EKG.  Patient denies any afib history but said when he was younger he did feel some irregular heartbeats    Events last 24 hours: Patient better rate controlled. Unable to transition off of high flow nasal cannula. Lopressor dose increased.     PAST MEDICAL & SURGICAL HISTORY:  Skin cancer  s/p Mohs      Essential hypertension      Cerebrovascular accident (CVA), unspecified mechanism  2015      Hyperlipidemia, unspecified hyperlipidemia type      Complex tear of lateral meniscus of right knee as current injury, initial encounter      Complex tear of medial meniscus of right knee as current injury, initial encounter      Benign prostatic hyperplasia, presence of lower urinary tract symptoms unspecified, unspecified morphology      H/O pilonidal cyst          Review of Systems:  CONSTITUTIONAL: No fever, weight loss, or fatigue  EYES: No eye pain, visual disturbances, or discharge  ENMT:  No difficulty hearing, tinnitus, vertigo; No sinus or throat pain  NECK: No pain or stiffness  BREASTS: No pain, masses, or nipple discharge  RESPIRATORY: No cough, wheezing, chills or hemoptysis; No shortness of breath  CARDIOVASCULAR: No chest pain, palpitations, dizziness, or leg swelling  GASTROINTESTINAL: No abdominal or epigastric pain. No nausea, vomiting, or hematemesis; No diarrhea or constipation. No melena or hematochezia.  GENITOURINARY: No dysuria, frequency, hematuria, or incontinence  NEUROLOGICAL: No headaches, memory loss, loss of strength, numbness, or tremors  SKIN: No itching, burning, rashes, or lesions   LYMPH NODES: No enlarged glands  ENDOCRINE: No heat or cold intolerance; No hair loss  MUSCULOSKELETAL: No joint pain or swelling; No muscle, back, or extremity pain  PSYCHIATRIC: No depression, anxiety, mood swings, or difficulty sleeping  HEME/LYMPH: No easy bruising, or bleeding gums  ALLERGY AND IMMUNOLOGIC: No hives or eczema    MEDICATIONS  (STANDING):  atorvastatin 40 milliGRAM(s) Oral at bedtime  finasteride 5 milliGRAM(s) Oral daily  heparin  Infusion.  Unit(s)/Hr (13 mL/Hr) IV Continuous <Continuous>  metoprolol tartrate 100 milliGRAM(s) Oral two times a day  pantoprazole    Tablet 40 milliGRAM(s) Oral before breakfast  piperacillin/tazobactam IVPB.. 3.375 Gram(s) IV Intermittent every 8 hours  senna 2 Tablet(s) Oral at bedtime  tamsulosin 0.4 milliGRAM(s) Oral at bedtime    MEDICATIONS  (PRN):  acetaminophen     Tablet .. 650 milliGRAM(s) Oral every 6 hours PRN Temp greater or equal to 38C (100.4F), Mild Pain (1 - 3)  albuterol/ipratropium for Nebulization 3 milliLiter(s) Nebulizer every 6 hours PRN Shortness of Breath and/or Wheezing  aluminum hydroxide/magnesium hydroxide/simethicone Suspension 30 milliLiter(s) Oral every 4 hours PRN Dyspepsia  heparin   Injectable 6000 Unit(s) IV Push every 6 hours PRN For aPTT less than 40  heparin   Injectable 3000 Unit(s) IV Push every 6 hours PRN For aPTT between 40 - 57  magnesium hydroxide Suspension 30 milliLiter(s) Oral daily PRN Constipation  melatonin 3 milliGRAM(s) Oral at bedtime PRN Insomnia  ondansetron Injectable 4 milliGRAM(s) IV Push every 8 hours PRN Nausea and/or Vomiting  polyethylene glycol 3350 17 Gram(s) Oral daily PRN Constipation    ICU Vital Signs Last 24 Hrs  T(C): 36.6 (15 Aug 2022 15:53), Max: 36.8 (15 Aug 2022 04:00)  T(F): 97.9 (15 Aug 2022 15:53), Max: 98.2 (15 Aug 2022 04:00)  HR: 73 (15 Aug 2022 18:00) (65 - 140)  BP: 148/71 (15 Aug 2022 18:00) (92/60 - 148/71)  BP(mean): 94 (15 Aug 2022 18:00) (71 - 95)  ABP: --  ABP(mean): --  RR: 36 (15 Aug 2022 18:00) (26 - 45)  SpO2: 87% (15 Aug 2022 18:00) (60% - 95%)    O2 Parameters below as of 15 Aug 2022 10:00  Patient On (Oxygen Delivery Method): nasal cannula, high flow          ABG - ( 13 Aug 2022 06:50 )  pH, Arterial: 7.50  pH, Blood: x     /  pCO2: 34    /  pO2: 66    / HCO3: 26    / Base Excess: 3.3   /  SaO2: 93.6              I&O's Detail    14 Aug 2022 07:01  -  15 Aug 2022 07:00  --------------------------------------------------------  IN:    Heparin Infusion: 360 mL    IV PiggyBack: 400 mL  Total IN: 760 mL    OUT:    Voided (mL): 650 mL  Total OUT: 650 mL    Total NET: 110 mL      15 Aug 2022 07:01  -  15 Aug 2022 19:40  --------------------------------------------------------  IN:    Heparin Infusion: 165 mL    Oral Fluid: 1080 mL  Total IN: 1245 mL    OUT:    Voided (mL): 500 mL  Total OUT: 500 mL    Total NET: 745 mL        LABS:                        12.5   26.15 )-----------( 350      ( 15 Aug 2022 06:00 )             36.9     08-15    138  |  102  |  33<H>  ----------------------------<  110<H>  3.8   |  28  |  1.37<H>    Ca    7.6<L>      15 Aug 2022 06:00  Mg     1.9     08-14    TPro  6.1  /  Alb  1.9<L>  /  TBili  0.7  /  DBili  x   /  AST  86<H>  /  ALT  84<H>  /  AlkPhos  261<H>  08-15      CAPILLARY BLOOD GLUCOSE  None    PTT - ( 15 Aug 2022 06:00 )  PTT:92.0 sec    CULTURES:  Culture Results:   No growth to date. (08-11 @ 21:07)  Culture Results:   No growth to date. (08-11 @ 21:07)      Physical Examination:    General:  lying in bed, ill appearing     HEENT: Pupils equal, reactive to light. Symmetric. No scleral icterus or injection.    PULM: Crackles to auscultation B/L. No wheezes, rales, or rhonchi appreciated No significant sputum production or increased respiratory effort.    NECK: Supple, no lymphadenopathy, trachea midline.    CVS: Irregular rate and rhythm, no murmurs appreciated, +s1/s2.    ABD: Soft, nondistended, nontender, normoactive bowel sounds.    EXT: BLLE 1+ edema, nontender.    SKIN: Warm and well perfused, no rashes noted.    NEURO: Alert, oriented, interactive, nonfocal.      RADIOLOGY: < from: CT Chest No Cont (08.11.22 @ 21:37) >  ACC: [id number] EXAM:  CT CHEST                        ACC: [id number] EXAM:  CT ABDOMEN AND PELVIS                          PROCEDURE DATE:  08/11/2022          INTERPRETATION:  CLINICAL INFORMATION: Cough, fever. Sepsis.  Elevated LFT.    COMPARISON:None.    CONTRAST/COMPLICATIONS:  IV Contrast: NONE  Oral Contrast: NONE  Complications: None reported at time of study completion    PROCEDURE:  CT of the Chest, Abdomen and Pelvis was performed.  Sagittal and coronal reformats were performed.    FINDINGS:    CHEST:    LUNGS AND LARGE AIRWAYS: PLEURA: There are patchy bilateral groundglass   and airspace consolidations, which are most pronounced in the left upper   and lingular lobes, right upper and right middle lobes.  This is associated with bronchiectasis, particularly in the bilateral   upper lobes.  In addition, there are scattered irregular, poorly marginated nodular   opacities throughout the bilateral lower lung zones. Some of these   contain possible small areas of cavitation.  There is a small left-sided pleural effusion and trace right-sided   pleural effusion.    The central airways remain patent.    VESSELS: Atherosclerotic changes thoracic aorta and coronary artery   calcifications.    HEART: Heart size is normal. No pericardial effusion.    MEDIASTINUM AND DEO:  Pretracheal, precarinal and subcarinal mediastinal lymph nodes measuring   up to 1 cm short axis.    CHEST WALL AND LOWER NECK: Within normal limits.    ABDOMEN AND PELVIS:    Evaluation of the solid organ parenchyma is limited without intravenous   contrast.    LIVER: Within normal limits.  BILE DUCTS: Normal caliber.  GALLBLADDER: Contracted.  SPLEEN: Within normal limits.  PANCREAS: Within normal limits.  ADRENALS: Within normal limits.  KIDNEYS/URETERS:  Exophytic cyst medial upper pole left kidney.  No hydronephrosis.    BLADDER: Within normal limits.  REPRODUCTIVE ORGANS:  The prostate is not enlarged.    BOWEL:  Colonic diverticulosis, without CT evidence of diverticulitis.  No bowel obstruction.  The appendix is not well visualized on this exam.  PERITONEUM: No ascites.    VESSELS: Atherosclerotic changes.  RETROPERITONEUM/LYMPH NODES: No lymphadenopathy.    ABDOMINAL WALL:  Right inguinal hernia containing fat and nonobstructed small bowel.  Small bilateral inguinal lymph nodes.    BONES:  Degenerative changes spine.  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    IMPRESSION:    Bilateral groundglass glass and airspace consolidations, some associated   with bronchiectasis particularly in the upper lobes, findings likely   secondary to infection.  Possible small areas of cavitation.  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    No acute intra-abdominal pathology.    Other findings as discussed above.    This study was preliminary reported by the ED radiologist on 8/11/2022.    --- End of Report ---            KARLY LAGOS MD; Attending Radiologist    < end of copied text >        Assessment and Plan:   · Assessment	  87M with HTN, HLD, hx of CVA (no residual deficits), BPH, hx of skin CA who presents with cough and fevers admitted for    1. Acute hypoxemic respiratory failure  2. Multifocal PNA  3. Sepsis  4. New onset atrial fibrillation with RVR  5. CINDI- Pre renal azotemia      Neuro:  - Avoid Neuro Deliriogenic / sedative medications  -Maintain sleep/wake cycle, limit overnight interruptions    CV:  - Heparin gtt plan to transition to apixaban once cleared by pulmonary.   - Continue current antihypertensive regimen, metoprolol increased to 50mg BID with good effect. MAP > 65, no vasopressors needed    Pulm:  - Unable to transition from HFNC this day. Continues to desaturate to spo2 80s with attempted wean. Maintain SPO2 > 92%, pt remains high risk for decompensation requiring intubation  - Pt off isolation, 3x Cx negative, cleared by ID                  GI:  - Cont Protonix 40mg Daily,   - Diet as tolerated, Aspiration precautions  - Pt stating has not had BM in a few days, senna, Miralax and MOM ordered    Renal:  - Even to net negative fluid balance as tolerated by hemodynamics and renal fx.    - Cr 1.46 --> 1.37 (Slight improvement), Continue to monitor Bun/Cr and UOP  - Replacing electrolytes as needed with Goal K> 4, PO> 3, Mg> 2               - Strict I&O's  - Avoid Nephro toxic medication    Heme:  - Heparin gtt till transition to Apixaban once cleared by pulm    ID:  -  continue ABX Ceftriaxone/azithromycin, f/u Cx, monitor leukocytosis  - Microbiology and Radiology reviewed   - trend CBC with diff, CMP  and fever curve    Endo:  - ISS for aggressive glycemic control to limit FS glucose to < 180mg/dl.    Critical Care Time (EXCLUSIVE of any non bundled procedures) :  35 minutes were spent assessing the patient's presenting problems of acute illness that pose a high probability of life threatening  deterioration or end organ damage / dysfunction.  Medical decision making includes initiation / continuation of plan or care review data/ lab work radiographic study, direct patient care bedside ,  discussions with  consultants regarding care,  evaluation and interpretation of vital signs, any necessary ventilator management,   NIV or BIPAP changes  or initiation,    discussions with multidisciplinary team,  am or pm rounds, discussions of goals of care with patient and family all non-inclusive of procedures.

## 2022-08-15 NOTE — DIETITIAN INITIAL EVALUATION ADULT - ORAL INTAKE PTA/DIET HISTORY
Reported was having 3 meals a day, restricted sugar/salt in diet. No nutrition supplements reported. Some nausea but no vomiting with loss of appetite for few days pta.

## 2022-08-15 NOTE — PROGRESS NOTE ADULT - SUBJECTIVE AND OBJECTIVE BOX
Patient is a 87y old  Male who presents with a chief complaint of cough -> PNA (15 Aug 2022 06:30)      INTERVAL HPI/OVERNIGHT EVENTS:    MEDICATIONS  (STANDING):  atorvastatin 40 milliGRAM(s) Oral at bedtime  azithromycin  IVPB 500 milliGRAM(s) IV Intermittent every 24 hours  cefTRIAXone   IVPB 1000 milliGRAM(s) IV Intermittent every 24 hours  finasteride 5 milliGRAM(s) Oral daily  heparin  Infusion.  Unit(s)/Hr (13 mL/Hr) IV Continuous <Continuous>  metoprolol tartrate 100 milliGRAM(s) Oral two times a day  pantoprazole    Tablet 40 milliGRAM(s) Oral before breakfast  senna 2 Tablet(s) Oral at bedtime  tamsulosin 0.4 milliGRAM(s) Oral at bedtime    MEDICATIONS  (PRN):  acetaminophen     Tablet .. 650 milliGRAM(s) Oral every 6 hours PRN Temp greater or equal to 38C (100.4F), Mild Pain (1 - 3)  albuterol/ipratropium for Nebulization 3 milliLiter(s) Nebulizer every 6 hours PRN Shortness of Breath and/or Wheezing  aluminum hydroxide/magnesium hydroxide/simethicone Suspension 30 milliLiter(s) Oral every 4 hours PRN Dyspepsia  heparin   Injectable 6000 Unit(s) IV Push every 6 hours PRN For aPTT less than 40  heparin   Injectable 3000 Unit(s) IV Push every 6 hours PRN For aPTT between 40 - 57  magnesium hydroxide Suspension 30 milliLiter(s) Oral daily PRN Constipation  melatonin 3 milliGRAM(s) Oral at bedtime PRN Insomnia  ondansetron Injectable 4 milliGRAM(s) IV Push every 8 hours PRN Nausea and/or Vomiting  polyethylene glycol 3350 17 Gram(s) Oral daily PRN Constipation      Allergies    No Known Allergies    Intolerances        REVIEW OF SYSTEMS:  CONSTITUTIONAL: No fever, weight loss, or fatigue  EYES: No eye pain, visual disturbances, or discharge  ENMT:  No difficulty hearing, tinnitus, vertigo; No sinus or throat pain  NECK: No pain or stiffness  RESPIRATORY: No cough, wheezing, chills or hemoptysis; No shortness of breath  CARDIOVASCULAR: No chest pain, palpitations, lightheadedness, or leg swelling  GASTROINTESTINAL: No abdominal or epigastric pain. No nausea, vomiting, or hematemesis; No diarrhea or constipation. No melena or hematochezia.  GENITOURINARY: No dysuria, frequency, hematuria, or incontinence  NEUROLOGICAL: No headaches, memory loss, vertigo, loss of strength, numbness, or tremors  SKIN: No itching, burning, rashes, or lesions   LYMPH NODES: No enlarged glands  ENDOCRINE: No heat or cold intolerance; No hair loss; No polydipsia or polyuria  MUSCULOSKELETAL: No joint pain or swelling; No muscle, back, or extremity pain  PSYCHIATRIC: No depression, anxiety, or mood swings  HEME/LYMPH: No easy bruising, or bleeding gums  ALLERGY AND IMMUNOLOGIC: No hives or eczema    PHYSICAL EXAM:  GENERAL: NAD, well-groomed, well-developed  HEAD:  Atraumatic, Normocephalic  EYES: EOMI, PERRLA, conjunctiva and sclera clear  ENMT: Moist mucous membranes, Good dentition, No lesions  NECK: Supple, No JVD appreciated  NERVOUS SYSTEM:  Alert & Oriented X3, Good concentration; All 4 extremities mobile, no gross sensory deficits.   CHEST/LUNG: Clear to auscultation bilaterally; No rales, rhonchi, wheezing, or rubs appreciated  HEART: Regular rate and rhythm; No murmurs, rubs, or gallops  ABDOMEN: Soft, Nontender, Nondistended; Bowel sounds present  EXTREMITIES:  No clubbing, cyanosis, or edema appreciated  LYMPH: No lymphadenopathy noted  SKIN: No rashes or lesions appreciated    Vital Signs Last 24 Hrs  T(C): 36.8 (15 Aug 2022 04:00), Max: 36.8 (15 Aug 2022 04:00)  T(F): 98.2 (15 Aug 2022 04:00), Max: 98.2 (15 Aug 2022 04:00)  HR: 105 (15 Aug 2022 06:00) (72 - 140)  BP: 127/80 (15 Aug 2022 06:00) (92/60 - 146/71)  BP(mean): 95 (15 Aug 2022 06:00) (71 - 95)  RR: 37 (15 Aug 2022 06:00) (26 - 45)  SpO2: 60% (15 Aug 2022 07:43) (60% - 99%)    Parameters below as of 15 Aug 2022 07:43  Patient On (Oxygen Delivery Method): nasal cannula, high flow        LABS:                        12.5   26.15 )-----------( 350      ( 15 Aug 2022 06:00 )             36.9     15 Aug 2022 06:00    138    |  102    |  33     ----------------------------<  110    3.8     |  28     |  1.37     Ca    7.6        15 Aug 2022 06:00    TPro  6.1    /  Alb  1.9    /  TBili  0.7    /  DBili  x      /  AST  86     /  ALT  84     /  AlkPhos  261    15 Aug 2022 06:00    PTT - ( 15 Aug 2022 06:00 )  PTT:92.0 sec    CAPILLARY BLOOD GLUCOSE          RADIOLOGY & ADDITIONAL TESTS:    Imaging Personally Reviewed:  [ ] YES     Consultant(s) Notes Reviewed:      Care Discussed with Consultants/Other Providers:    Advanced Directives: [ ] DNR  [ ] No feeding tube  [ ] MOLST in chart  [ ] MOLST completed today  [ ] Unknown   Patient is a 87y old  Male who presents with a chief complaint of cough -> PNA (15 Aug 2022 06:30)      INTERVAL HPI/OVERNIGHT EVENTS:  Patient seen awake in bed. He reports increased cough today with blood-tinged sputum. Reports eating well, denies chest pain, fever or abdominal pain. No reported overnight events.    MEDICATIONS  (STANDING):  atorvastatin 40 milliGRAM(s) Oral at bedtime  azithromycin  IVPB 500 milliGRAM(s) IV Intermittent every 24 hours  cefTRIAXone   IVPB 1000 milliGRAM(s) IV Intermittent every 24 hours  finasteride 5 milliGRAM(s) Oral daily  heparin  Infusion.  Unit(s)/Hr (13 mL/Hr) IV Continuous <Continuous>  metoprolol tartrate 100 milliGRAM(s) Oral two times a day  pantoprazole    Tablet 40 milliGRAM(s) Oral before breakfast  senna 2 Tablet(s) Oral at bedtime  tamsulosin 0.4 milliGRAM(s) Oral at bedtime    MEDICATIONS  (PRN):  acetaminophen     Tablet .. 650 milliGRAM(s) Oral every 6 hours PRN Temp greater or equal to 38C (100.4F), Mild Pain (1 - 3)  albuterol/ipratropium for Nebulization 3 milliLiter(s) Nebulizer every 6 hours PRN Shortness of Breath and/or Wheezing  aluminum hydroxide/magnesium hydroxide/simethicone Suspension 30 milliLiter(s) Oral every 4 hours PRN Dyspepsia  heparin   Injectable 6000 Unit(s) IV Push every 6 hours PRN For aPTT less than 40  heparin   Injectable 3000 Unit(s) IV Push every 6 hours PRN For aPTT between 40 - 57  magnesium hydroxide Suspension 30 milliLiter(s) Oral daily PRN Constipation  melatonin 3 milliGRAM(s) Oral at bedtime PRN Insomnia  ondansetron Injectable 4 milliGRAM(s) IV Push every 8 hours PRN Nausea and/or Vomiting  polyethylene glycol 3350 17 Gram(s) Oral daily PRN Constipation      Allergies    No Known Allergies    Intolerances        REVIEW OF SYSTEMS:  CONSTITUTIONAL: No fever, weight loss, or fatigue  EYES: No eye pain, visual disturbances, or discharge  ENMT:  No difficulty hearing, tinnitus, vertigo; No sinus or throat pain  NECK: No pain or stiffness  RESPIRATORY: cough productive with blood-tinged sputum, no wheezing, chills or hemoptysis  CARDIOVASCULAR: No chest pain, palpitations, lightheadedness, or leg swelling  GASTROINTESTINAL: No abdominal or epigastric pain. No nausea, vomiting, or hematemesis; No diarrhea or constipation. No melena or hematochezia.  GENITOURINARY: No dysuria, frequency, hematuria, or incontinence  NEUROLOGICAL: No headaches, memory loss, vertigo, loss of strength, numbness, or tremors  SKIN: No itching, burning, rashes, or lesions   LYMPH NODES: No enlarged glands  ENDOCRINE: No heat or cold intolerance; No hair loss; No polydipsia or polyuria  MUSCULOSKELETAL: No joint pain or swelling; No muscle, back, or extremity pain      PHYSICAL EXAM:  GENERAL: NAD, well-groomed, well-developed  HEAD:  Atraumatic, Normocephalic  EYES: EOMI, PERRLA, conjunctiva and sclera clear  ENMT: Moist mucous membranes, hi-flow nasal cannula in pkace.  NECK: Supple, No JVD appreciated  NERVOUS SYSTEM:  Alert & Oriented X3, Good concentration; All 4 extremities mobile, no gross sensory deficits.   CHEST/LUNG: Diminished to auscultation bilaterally, with ronchi more on L than R fields; No wheezing, or rubs appreciated  HEART: Regular rate and rhythm; No murmurs, rubs, or gallops  ABDOMEN: Soft, Nontender, Nondistended  EXTREMITIES:  No clubbing, cyanosis, or edema appreciated  LYMPH: No lymphadenopathy noted  SKIN: No rashes or lesions appreciated    Vital Signs Last 24 Hrs  T(C): 36.8 (15 Aug 2022 04:00), Max: 36.8 (15 Aug 2022 04:00)  T(F): 98.2 (15 Aug 2022 04:00), Max: 98.2 (15 Aug 2022 04:00)  HR: 105 (15 Aug 2022 06:00) (72 - 140)  BP: 127/80 (15 Aug 2022 06:00) (92/60 - 146/71)  BP(mean): 95 (15 Aug 2022 06:00) (71 - 95)  RR: 37 (15 Aug 2022 06:00) (26 - 45)  SpO2: 60% (15 Aug 2022 07:43) (60% - 99%)    Parameters below as of 15 Aug 2022 07:43  Patient On (Oxygen Delivery Method): nasal cannula, high flow        LABS:                        12.5   26.15 )-----------( 350      ( 15 Aug 2022 06:00 )             36.9     15 Aug 2022 06:00    138    |  102    |  33     ----------------------------<  110    3.8     |  28     |  1.37     Ca    7.6        15 Aug 2022 06:00    TPro  6.1    /  Alb  1.9    /  TBili  0.7    /  DBili  x      /  AST  86     /  ALT  84     /  AlkPhos  261    15 Aug 2022 06:00    PTT - ( 15 Aug 2022 06:00 )  PTT:92.0 sec    CAPILLARY BLOOD GLUCOSE

## 2022-08-15 NOTE — PROGRESS NOTE ADULT - ASSESSMENT
87M with HTN, HLD, hx of CVA (no residual deficits), BPH, hx of skin CA who presents with cough and fevers.    Found to have multifocal pneumonia.  Also found have new onset afib.        Sepsis 2/2 multifocal PNA - meets sepsis criteria with leukocytosis + tachycardia + source of infection  - admitted to medicine upgraded to SPCU 2/2 acute hypoxia respiratory failure requiring HFNC  - cont with ceftriaxone and azithromycin  - check strep PNA Ag  - check legionella Ag  - ID consult, recs recs appreciated  - pulm consult, recs appreciated.    - f/u cultures    Acute hypoxia respiratory failure 2/2 likely PNA.   pulmonary recs appreciated  now on HFNC, continue to monitor   full code, intubate PRN  continue HFNC per pulmonary    R/O TB   - doubt TB but patient has blood tinged sputum, possible early cavitation on CT, and fevers -> will have to r/o  - TB isolation  - check quantiferon and AFB smear  - ID will be consulted, recs appreciated  Sputum negative times 3    New afib - currently in 110s  - [id number] score of at least 5 -> will anticoagulate with heparin drip for now (can be stopped if hemotpysis worsens)  - cardiology consult, recs appreciated  - rate control if needed  - TTE shows 1. Low normal left ventricular systolic function.  2. Bi-atrial enlargement.  3. Mild pulmonary hypertension  - will eventually need to transition to DOAC    Elevated LFTs  - likely from current illness  - f/u final CT A/P read  - can consider GI consult if worsens    CINDI 2/2 prerenal azotemia - elev BUN/Cr >20  - likely 2/2 poor PO intake  - hydrate and repeat BMP in AM  - bladder scan, and if retaining, may need pittman  - hold diuretic  - avoid nephrotoxic meds    HTN/HLD  - cont with norvasc  - hold lasix  - cont with atorvastatin    BPH  - cont with tamsulosin and finasteride    Preventive measures  - will be on heparin drip for afib     87M with HTN, HLD, hx of CVA (no residual deficits), BPH, hx of skin CA who presents with cough and fevers. Found to have multifocal pneumonia.  Also found have new onset afib.        Sepsis 2/2 multifocal PNA - meets sepsis criteria with leukocytosis + tachycardia + source of infection  - s/p ceftriaxone and azithromycin, switched to Zosyn as per ID  - strep PNA Ag, legionella Ag pending  - ID consult, reccs appreciated  - pulm consult, reccs appreciated.    - f/u cultures    Acute hypoxia respiratory failure 2/2 likely PNA.   pulmonary recs appreciated  now on HFNC, continue to monitor   full code, intubate PRN  continue HFNC per pulmonary    Cavitary lesion, R/O TB   - doubt TB but patient has blood tinged sputum, possible early cavitation on CT, and fevers. Low suspicion as per ID, discontined isolation  - check quantiferon. AFB smear negative x3    New afib   - [id number] score of at least 5 -> will anticoagulate with heparin drip for now (can be stopped if hemotpysis worsens)  - cardiology consult, recs appreciated  - rate control if needed  - TTE shows 1. Low normal left ventricular systolic function.  2. Bi-atrial enlargement.  3. Mild pulmonary hypertension  - will eventually need to transition to DOAC    Elevated LFTs  - likely from current illness  - f/u final CT A/P read  - can consider GI consult if worsens    CINDI 2/2 prerenal azotemia - elev BUN/Cr >20  - likely 2/2 poor PO intake  - hydrate and repeat BMP in AM  - bladder scan, and if retaining, may need pittman  - hold diuretic  - avoid nephrotoxic meds    HTN/HLD  - cont with norvasc  - hold lasix  - cont with atorvastatin    BPH  - cont with tamsulosin and finasteride    Preventive measures  - will be on heparin drip for afib

## 2022-08-15 NOTE — DIETITIAN INITIAL EVALUATION ADULT - PERTINENT LABORATORY DATA
08-15    138  |  102  |  33<H>  ----------------------------<  110<H>  3.8   |  28  |  1.37<H>    Ca    7.6<L>      15 Aug 2022 06:00  Mg     1.9     08-14    TPro  6.1  /  Alb  1.9<L>  /  TBili  0.7  /  DBili  x   /  AST  86<H>  /  ALT  84<H>  /  AlkPhos  261<H>  08-15  A1C with Estimated Average Glucose Result: 5.9 % (08-12-22 @ 06:00)

## 2022-08-15 NOTE — PROGRESS NOTE ADULT - ASSESSMENT
87M with HTN, HLD, hx of CVA (no residual deficits), BPH, hx of skin CA who presents with cough and fevers    afb neg x 3  vs noted  labs reviewed  HF NC - wean as tolerated  on ABX  ID follow up      RRT reviewed  on emp ABX  ID - Cardio follow up  I and O  serial labs  monitor VS and Sat  may need NIPPV if resp distress cont despite HF NC  prognosis guarded  GOC discussion

## 2022-08-15 NOTE — DIETITIAN INITIAL EVALUATION ADULT - OTHER INFO
Per H&P "87M with HTN, HLD, hx of CVA (no residual deficits), BPH, hx of skin CA who presents with cough and fevers.    Found to have multifocal pneumonia.  Also found have new onset afib."    Visited patient in room, presents with fair appetite/po intake, consuming 50-75% of meals, states knows have to force self to eat more. Denies n/v/d/c, last BM today, bowel regimen in place. No reported difficulty chewing or swallowing. NKFA. Reported # a week ago, current adm weight 159.3#, weight appears to be astable, will continue to monitor weight trends as able.     Pertinent medications/nutrition labs reviewed; noted on multiple antibiotics. elevated BUN, Cr. A1c 5.9% (8/12) for advanced age.    Educated on adequate protein/energy intake to prevent weight loss, prioritize protein at each meal. Educated on DASH diet, adequate fluid intake to prevent dehydration. Food preferences obtained, will honor as able to encourage intake. Recommended Ensure Enlive (350kcal, 20g pro) qd, fortified ice-cream Magic cup (290kcal, 9g pro) qd to optimize intake. Pt receptive, RD to continue to monitor nutrition status per protocol.

## 2022-08-15 NOTE — PROGRESS NOTE ADULT - ASSESSMENT
87M with HTN, HLD, hx of CVA (no residual deficits), BPH, hx of skin CA who presents with cough and fevers.    Found to have multifocal pneumonia.  Also found have new onset afib.        Sepsis 2/2 Multifocal PNA  Hemoptysis   AHRF on NC  - pt p/w leukocytosis, SOB  - CT w/ small cavitations, b/l GGO and airspace consolidations  - cx NGTD  - strep pneumo negative  Plan:   - escalate to zosyn  - f/u uPNA Ag--  - trend temps/WBC  - supportive care/supplemental O2 per primary team    R/o TB  - AFB negative x3  - off isolation  - can send quantiferon, but not helpful in differentiating between active/latent TB; can have false results during illness    Elevated LFTs  - likely 2/2 sepsis  - trend LFTs    CINDI 2/2 prerenal azotemia  - likely 2/2 poor PO intake/sepsis  - trend renal fxn  - avoid nephrotoxic agents  - renally dose medications    Infectious Diseases will continue to follow. Please call with any questions.   Adriana Rodriguez M.D.  [facility], Division of Infectious Diseases [phone]     87M with HTN, HLD, hx of CVA (no residual deficits), BPH, hx of skin CA who presents with cough and fevers.    Found to have multifocal pneumonia.  Also found have new onset afib.        Sepsis 2/2 Multifocal PNA  Hemoptysis   AHRF on HFNC  - pt p/w leukocytosis, SOB  - CT w/ small cavitations, b/l GGO and airspace consolidations  - cx NGTD  - strep pneumo negative  Plan:   - escalate to zosyn  - f/u uPNA Ag--  - trend temps/WBC  - supportive care/supplemental O2 per primary team    R/o TB  - AFB negative x3  - off isolation  - can send quantiferon, but not helpful in differentiating between active/latent TB; can have false results during illness    Elevated LFTs  - likely 2/2 sepsis  - trend LFTs    CINDI 2/2 prerenal azotemia  - likely 2/2 poor PO intake/sepsis  - trend renal fxn  - avoid nephrotoxic agents  - renally dose medications    Infectious Diseases will continue to follow. Please call with any questions.   Adriana Rodriguez M.D.  [facility], Division of Infectious Diseases [phone]

## 2022-08-15 NOTE — PROGRESS NOTE ADULT - SUBJECTIVE AND OBJECTIVE BOX
Date/Time Patient Seen:  		  Referring MD:   Data Reviewed	       Patient is a 87y old  Male who presents with a chief complaint of cough -> PNA (14 Aug 2022 20:50)      Subjective/HPI     PAST MEDICAL & SURGICAL HISTORY:  No pertinent past medical history    Skin cancer  s/p Mohs    Essential hypertension    Cerebrovascular accident (CVA), unspecified mechanism  2015    Hyperlipidemia, unspecified hyperlipidemia type    Complex tear of lateral meniscus of right knee as current injury, initial encounter    Complex tear of medial meniscus of right knee as current injury, initial encounter    Benign prostatic hyperplasia, presence of lower urinary tract symptoms unspecified, unspecified morphology    H/O pilonidal cyst          Medication list         MEDICATIONS  (STANDING):  amLODIPine   Tablet 10 milliGRAM(s) Oral daily  atorvastatin 40 milliGRAM(s) Oral at bedtime  azithromycin  IVPB 500 milliGRAM(s) IV Intermittent every 24 hours  cefTRIAXone   IVPB 1000 milliGRAM(s) IV Intermittent every 24 hours  finasteride 5 milliGRAM(s) Oral daily  heparin  Infusion.  Unit(s)/Hr (13 mL/Hr) IV Continuous <Continuous>  metoprolol tartrate 50 milliGRAM(s) Oral two times a day  pantoprazole    Tablet 40 milliGRAM(s) Oral before breakfast  senna 2 Tablet(s) Oral at bedtime  tamsulosin 0.4 milliGRAM(s) Oral at bedtime    MEDICATIONS  (PRN):  acetaminophen     Tablet .. 650 milliGRAM(s) Oral every 6 hours PRN Temp greater or equal to 38C (100.4F), Mild Pain (1 - 3)  albuterol/ipratropium for Nebulization 3 milliLiter(s) Nebulizer every 6 hours PRN Shortness of Breath and/or Wheezing  aluminum hydroxide/magnesium hydroxide/simethicone Suspension 30 milliLiter(s) Oral every 4 hours PRN Dyspepsia  heparin   Injectable 6000 Unit(s) IV Push every 6 hours PRN For aPTT less than 40  heparin   Injectable 3000 Unit(s) IV Push every 6 hours PRN For aPTT between 40 - 57  magnesium hydroxide Suspension 30 milliLiter(s) Oral daily PRN Constipation  melatonin 3 milliGRAM(s) Oral at bedtime PRN Insomnia  ondansetron Injectable 4 milliGRAM(s) IV Push every 8 hours PRN Nausea and/or Vomiting  polyethylene glycol 3350 17 Gram(s) Oral daily PRN Constipation         Vitals log        ICU Vital Signs Last 24 Hrs  T(C): 36.8 (15 Aug 2022 04:00), Max: 36.8 (15 Aug 2022 04:00)  T(F): 98.2 (15 Aug 2022 04:00), Max: 98.2 (15 Aug 2022 04:00)  HR: 102 (15 Aug 2022 05:45) (72 - 140)  BP: 115/67 (15 Aug 2022 04:00) (92/60 - 146/71)  BP(mean): 80 (15 Aug 2022 04:00) (71 - 95)  ABP: --  ABP(mean): --  RR: 37 (15 Aug 2022 04:00) (26 - 45)  SpO2: 92% (15 Aug 2022 05:45) (92% - 99%)    O2 Parameters below as of 15 Aug 2022 05:45  Patient On (Oxygen Delivery Method): nasal cannula, high flow                 Input and Output:  I&O's Detail    13 Aug 2022 07:01  -  14 Aug 2022 07:00  --------------------------------------------------------  IN:    Heparin Infusion: 135 mL  Total IN: 135 mL    OUT:    Voided (mL): 1500 mL  Total OUT: 1500 mL    Total NET: -1365 mL      14 Aug 2022 07:01  -  15 Aug 2022 06:30  --------------------------------------------------------  IN:    Heparin Infusion: 315 mL    IV PiggyBack: 400 mL  Total IN: 715 mL    OUT:    Voided (mL): 650 mL  Total OUT: 650 mL    Total NET: 65 mL          Lab Data                        12.5   26.15 )-----------( 350      ( 15 Aug 2022 06:00 )             36.9     08-14    138  |  102  |  35<H>  ----------------------------<  109<H>  4.0   |  29  |  1.42<H>    Ca    7.0<L>      14 Aug 2022 06:53  Mg     1.9     08-14    TPro  5.3<L>  /  Alb  1.7<L>  /  TBili  0.6  /  DBili  x   /  AST  51<H>  /  ALT  58  /  AlkPhos  198<H>  08-14    ABG - ( 13 Aug 2022 06:50 )  pH, Arterial: 7.50  pH, Blood: x     /  pCO2: 34    /  pO2: 66    / HCO3: 26    / Base Excess: 3.3   /  SaO2: 93.6                    Review of Systems	      Objective     Physical Examination  heart s1s2  lung dc BS  abd soft        Pertinent Lab findings & Imaging      Bud:  NO   Adequate UO     I&O's Detail    13 Aug 2022 07:01  -  14 Aug 2022 07:00  --------------------------------------------------------  IN:    Heparin Infusion: 135 mL  Total IN: 135 mL    OUT:    Voided (mL): 1500 mL  Total OUT: 1500 mL    Total NET: -1365 mL      14 Aug 2022 07:01  -  15 Aug 2022 06:30  --------------------------------------------------------  IN:    Heparin Infusion: 315 mL    IV PiggyBack: 400 mL  Total IN: 715 mL    OUT:    Voided (mL): 650 mL  Total OUT: 650 mL    Total NET: 65 mL               Discussed with:     Cultures:	        Radiology

## 2022-08-15 NOTE — DIETITIAN INITIAL EVALUATION ADULT - PERTINENT MEDS FT
MEDICATIONS  (STANDING):  atorvastatin 40 milliGRAM(s) Oral at bedtime  finasteride 5 milliGRAM(s) Oral daily  heparin  Infusion.  Unit(s)/Hr (13 mL/Hr) IV Continuous <Continuous>  metoprolol tartrate 100 milliGRAM(s) Oral two times a day  pantoprazole    Tablet 40 milliGRAM(s) Oral before breakfast  piperacillin/tazobactam IVPB.. 3.375 Gram(s) IV Intermittent every 8 hours  senna 2 Tablet(s) Oral at bedtime  tamsulosin 0.4 milliGRAM(s) Oral at bedtime    MEDICATIONS  (PRN):  acetaminophen     Tablet .. 650 milliGRAM(s) Oral every 6 hours PRN Temp greater or equal to 38C (100.4F), Mild Pain (1 - 3)  albuterol/ipratropium for Nebulization 3 milliLiter(s) Nebulizer every 6 hours PRN Shortness of Breath and/or Wheezing  aluminum hydroxide/magnesium hydroxide/simethicone Suspension 30 milliLiter(s) Oral every 4 hours PRN Dyspepsia  heparin   Injectable 6000 Unit(s) IV Push every 6 hours PRN For aPTT less than 40  heparin   Injectable 3000 Unit(s) IV Push every 6 hours PRN For aPTT between 40 - 57  magnesium hydroxide Suspension 30 milliLiter(s) Oral daily PRN Constipation  melatonin 3 milliGRAM(s) Oral at bedtime PRN Insomnia  ondansetron Injectable 4 milliGRAM(s) IV Push every 8 hours PRN Nausea and/or Vomiting  polyethylene glycol 3350 17 Gram(s) Oral daily PRN Constipation

## 2022-08-15 NOTE — PROGRESS NOTE ADULT - SUBJECTIVE AND OBJECTIVE BOX
[facility], Division of Infectious Diseases  EROS Perez Y. Patel, S. Shah, G. [facility]  [phone]    Name: DENNIS BRADFORD  Age: 87y  Gender: Male  MRN: [id number]    Interval History:  Patient seen and examined at bedside this morning  No acute overnight events. Afebrile  Currently on HFNC  Noted RRT and escalating O2 requirements  Feeling better since prior  Notes reviewed    Antibiotics:  azithromycin  IVPB 500 milliGRAM(s) IV Intermittent every 24 hours  cefTRIAXone   IVPB 1000 milliGRAM(s) IV Intermittent every 24 hours      Medications:  acetaminophen     Tablet .. 650 milliGRAM(s) Oral every 6 hours PRN  albuterol/ipratropium for Nebulization 3 milliLiter(s) Nebulizer every 6 hours PRN  aluminum hydroxide/magnesium hydroxide/simethicone Suspension 30 milliLiter(s) Oral every 4 hours PRN  atorvastatin 40 milliGRAM(s) Oral at bedtime  azithromycin  IVPB 500 milliGRAM(s) IV Intermittent every 24 hours  cefTRIAXone   IVPB 1000 milliGRAM(s) IV Intermittent every 24 hours  finasteride 5 milliGRAM(s) Oral daily  heparin   Injectable 6000 Unit(s) IV Push every 6 hours PRN  heparin   Injectable 3000 Unit(s) IV Push every 6 hours PRN  heparin  Infusion.  Unit(s)/Hr IV Continuous <Continuous>  magnesium hydroxide Suspension 30 milliLiter(s) Oral daily PRN  melatonin 3 milliGRAM(s) Oral at bedtime PRN  metoprolol tartrate 100 milliGRAM(s) Oral two times a day  ondansetron Injectable 4 milliGRAM(s) IV Push every 8 hours PRN  pantoprazole    Tablet 40 milliGRAM(s) Oral before breakfast  polyethylene glycol 3350 17 Gram(s) Oral daily PRN  senna 2 Tablet(s) Oral at bedtime  tamsulosin 0.4 milliGRAM(s) Oral at bedtime      Review of Systems:  A 10-point review of systems was obtained.   Review of systems otherwise negative except as previously noted.    Allergies: No Known Allergies    For details regarding the patient's past medical history, social history, family history, and other miscellaneous elements, please refer the initial infectious diseases consultation and/or the admitting history and physical examination for this admission.    Objective:  Vitals:   T(C): 36.8 (08-15-22 @ 04:00), Max: 36.8 (08-15-22 @ 04:00)  HR: 71 (08-15-22 @ 10:00) (71 - 140)  BP: 110/60 (08-15-22 @ 10:00) (92/60 - 146/71)  RR: 38 (08-15-22 @ 10:00) (26 - 45)  SpO2: 95% (08-15-22 @ 10:00) (60% - 99%)    Physical Examination:  General: no acute distress  HEENT: NC/AT, EOMI  Cardio: S1, S2 heard, RRR, no murmurs  Resp: breath sounds heard bilaterally, no rales, wheezes or rhonchi  Abd: soft, NT, ND  Ext: no edema or cyanosis  Skin: warm, dry, no visible rash      Laboratory Studies:  CBC:                       12.5   26.15 )-----------( 350      ( 15 Aug 2022 06:00 )             36.9     CMP: 08-15    138  |  102  |  33<H>  ----------------------------<  110<H>  3.8   |  28  |  1.37<H>    Ca    7.6<L>      15 Aug 2022 06:00  Mg     1.9     08-14    TPro  6.1  /  Alb  1.9<L>  /  TBili  0.7  /  DBili  x   /  AST  86<H>  /  ALT  84<H>  /  AlkPhos  261<H>  08-15    LIVER FUNCTIONS - ( 15 Aug 2022 06:00 )  Alb: 1.9 g/dL / Pro: 6.1 g/dL / ALK PHOS: 261 U/L / ALT: 84 U/L DA / AST: 86 U/L / GGT: x               Microbiology: reviewed    Culture - Acid Fast - Sputum w/Smear (collected 08-13-22 @ 12:12)  Source: .Sputum Sputum    Culture - Acid Fast - Sputum w/Smear (collected 08-12-22 @ 09:02)  Source: .Sputum Sputum    Culture - Acid Fast - Sputum w/Smear (collected 08-12-22 @ 01:04)  Source: .Sputum Sputum    Culture - Blood (collected 08-11-22 @ 21:07)  Source: .Blood Blood  Preliminary Report (08-13-22 @ 14:01):    No growth to date.    Culture - Blood (collected 08-11-22 @ 21:07)  Source: .Blood Blood  Preliminary Report (08-13-22 @ 14:01):    No growth to date.        Radiology: reviewed

## 2022-08-16 LAB
ANION GAP SERPL CALC-SCNC: 8 MMOL/L — SIGNIFICANT CHANGE UP (ref 5–17)
APTT BLD: 114.1 SEC — HIGH (ref 27.5–35.5)
BUN SERPL-MCNC: 36 MG/DL — HIGH (ref 7–23)
CALCIUM SERPL-MCNC: 7.7 MG/DL — LOW (ref 8.4–10.5)
CHLORIDE SERPL-SCNC: 102 MMOL/L — SIGNIFICANT CHANGE UP (ref 96–108)
CO2 SERPL-SCNC: 28 MMOL/L — SIGNIFICANT CHANGE UP (ref 22–31)
CREAT SERPL-MCNC: 1.46 MG/DL — HIGH (ref 0.5–1.3)
CRP SERPL-MCNC: 185 MG/L — HIGH
EGFR: 46 ML/MIN/1.73M2 — LOW
GLUCOSE SERPL-MCNC: 115 MG/DL — HIGH (ref 70–99)
HCT VFR BLD CALC: 35.3 % — LOW (ref 39–50)
HGB BLD-MCNC: 11.6 G/DL — LOW (ref 13–17)
LEGIONELLA AG UR QL: NEGATIVE — SIGNIFICANT CHANGE UP
MAGNESIUM SERPL-MCNC: 2 MG/DL — SIGNIFICANT CHANGE UP (ref 1.6–2.6)
MCHC RBC-ENTMCNC: 31 PG — SIGNIFICANT CHANGE UP (ref 27–34)
MCHC RBC-ENTMCNC: 32.9 GM/DL — SIGNIFICANT CHANGE UP (ref 32–36)
MCV RBC AUTO: 94.4 FL — SIGNIFICANT CHANGE UP (ref 80–100)
NRBC # BLD: 0 /100 WBCS — SIGNIFICANT CHANGE UP (ref 0–0)
PLATELET # BLD AUTO: 364 K/UL — SIGNIFICANT CHANGE UP (ref 150–400)
POTASSIUM SERPL-MCNC: 4 MMOL/L — SIGNIFICANT CHANGE UP (ref 3.5–5.3)
POTASSIUM SERPL-SCNC: 4 MMOL/L — SIGNIFICANT CHANGE UP (ref 3.5–5.3)
PROCALCITONIN SERPL-MCNC: 0.88 NG/ML — HIGH (ref 0.02–0.1)
RBC # BLD: 3.74 M/UL — LOW (ref 4.2–5.8)
RBC # FLD: 13.8 % — SIGNIFICANT CHANGE UP (ref 10.3–14.5)
SODIUM SERPL-SCNC: 138 MMOL/L — SIGNIFICANT CHANGE UP (ref 135–145)
WBC # BLD: 24.8 K/UL — HIGH (ref 3.8–10.5)
WBC # FLD AUTO: 24.8 K/UL — HIGH (ref 3.8–10.5)

## 2022-08-16 PROCEDURE — 99291 CRITICAL CARE FIRST HOUR: CPT

## 2022-08-16 RX ORDER — GUAIFENESIN/DEXTROMETHORPHAN 600MG-30MG
10 TABLET, EXTENDED RELEASE 12 HR ORAL EVERY 6 HOURS
Refills: 0 | Status: DISCONTINUED | OUTPATIENT
Start: 2022-08-16 | End: 2022-08-29

## 2022-08-16 RX ADMIN — TAMSULOSIN HYDROCHLORIDE 0.4 MILLIGRAM(S): 0.4 CAPSULE ORAL at 21:25

## 2022-08-16 RX ADMIN — Medication 10 MILLILITER(S): at 23:07

## 2022-08-16 RX ADMIN — PANTOPRAZOLE SODIUM 40 MILLIGRAM(S): 20 TABLET, DELAYED RELEASE ORAL at 06:11

## 2022-08-16 RX ADMIN — Medication 100 MILLIGRAM(S): at 06:12

## 2022-08-16 RX ADMIN — Medication 3 MILLIGRAM(S): at 21:25

## 2022-08-16 RX ADMIN — Medication 10 MILLILITER(S): at 17:03

## 2022-08-16 RX ADMIN — SENNA PLUS 2 TABLET(S): 8.6 TABLET ORAL at 21:25

## 2022-08-16 RX ADMIN — Medication 100 MILLIGRAM(S): at 17:04

## 2022-08-16 RX ADMIN — PIPERACILLIN AND TAZOBACTAM 25 GRAM(S): 4; .5 INJECTION, POWDER, LYOPHILIZED, FOR SOLUTION INTRAVENOUS at 04:23

## 2022-08-16 RX ADMIN — HEPARIN SODIUM 1500 UNIT(S)/HR: 5000 INJECTION INTRAVENOUS; SUBCUTANEOUS at 07:18

## 2022-08-16 RX ADMIN — ATORVASTATIN CALCIUM 40 MILLIGRAM(S): 80 TABLET, FILM COATED ORAL at 21:25

## 2022-08-16 RX ADMIN — FINASTERIDE 5 MILLIGRAM(S): 5 TABLET, FILM COATED ORAL at 11:22

## 2022-08-16 NOTE — PROGRESS NOTE ADULT - ASSESSMENT
The patient is an 87 year old male with a history of HTN, HL, BPH, CVA who presents with cough and hemoptysis in the setting of PNA and r/o TB.    Plan:  - The rhythm is now back to sinus rhythm with PACs and intermittent episodes of AF  - Continue metoprolol tartrate 100 mg bid  - The patient has ongoing hemoptysis. Will discontinue heparin drip as risks outweigh benefits. When hemoptysis resolves, will start apixaban.  - Discontinue amlodipine  - Echo with low normal LV systolic function, mild pulm HTN  - IV antibiotics  - AFB negative x3  - Pulm and ID follow-up

## 2022-08-16 NOTE — PROGRESS NOTE ADULT - ASSESSMENT
87M with HTN, HLD, hx of CVA (no residual deficits), BPH, hx of skin CA who presents with cough and fevers    afb neg x 3  vs noted  labs reviewed  HF NC - wean as tolerated  on ABX  ID follow up  differential dx in pt with hemoptysis and cavitation is Malignancy - pt will need repeat CT chest in 4 - 6 weeks and possibly a Bronchoscopy or other means of diagnostic testing - examination    RRT reviewed  on emp ABX  ID - Cardio follow up  I and O  serial labs  monitor VS and Sat  may need NIPPV if resp distress cont despite HF NC  prognosis guarded  GOC discussion

## 2022-08-16 NOTE — PROGRESS NOTE ADULT - CRITICAL CARE ATTENDING COMMENT
discussion with patient and family at bedside, pulmonary and nursing staff discussion with patient, pulmonary and nursing staff

## 2022-08-16 NOTE — PROGRESS NOTE ADULT - SUBJECTIVE AND OBJECTIVE BOX
Chief Complaint: Hemoptysis    Interval Events: No events overnight.    Review of Systems:  General: No fevers, chills, weight gain  Skin: No rashes, color changes  Cardiovascular: No chest pain, orthopnea  Respiratory: No shortness of breath, cough  Gastrointestinal: No nausea, abdominal pain  Genitourinary: No incontinence, pain with urination  Musculoskeletal: No pain, swelling, decreased range of motion  Neurological: No headache, weakness  Psychiatric: No depression, anxiety  Endocrine: No weight gain, increased thirst  All other systems are comprehensively negative.    Physical Exam:  Vital Signs Last 24 Hrs  T(C): 36.7 (16 Aug 2022 08:53), Max: 36.9 (16 Aug 2022 00:03)  T(F): 98.1 (16 Aug 2022 08:53), Max: 98.4 (16 Aug 2022 00:03)  HR: 110 (16 Aug 2022 06:05) (52 - 110)  BP: 116/72 (16 Aug 2022 06:00) (110/60 - 148/71)  BP(mean): 87 (16 Aug 2022 06:00) (73 - 94)  RR: 31 (16 Aug 2022 06:05) (26 - 47)  SpO2: 93% (16 Aug 2022 07:54) (87% - 96%)  Parameters below as of 16 Aug 2022 07:54  Patient On (Oxygen Delivery Method): nasal cannula, high flow  General: NAD  HEENT: MMM  Neck: No JVD, no carotid bruit  Lungs: CTAB  CV: RRR, nl S1/S2, no M/R/G  Abdomen: S/NT/ND, +BS  Extremities: No LE edema, no cyanosis  Neuro: AAOx3, non-focal  Skin: No rash    Labs:             08-16    138  |  102  |  36<H>  ----------------------------<  115<H>  4.0   |  28  |  1.46<H>    Ca    7.7<L>      16 Aug 2022 06:00  Mg     2.0     08-16    TPro  6.1  /  Alb  1.9<L>  /  TBili  0.7  /  DBili  x   /  AST  86<H>  /  ALT  84<H>  /  AlkPhos  261<H>  08-15                        11.6   24.80 )-----------( 364      ( 16 Aug 2022 06:00 )             35.3       Telemetry: AF, sinus rhythm with PACs

## 2022-08-16 NOTE — PROGRESS NOTE ADULT - SUBJECTIVE AND OBJECTIVE BOX
[facility], Division of Infectious Diseases  EROS Perez Y. Patel, S. Shah, G. [facility]  [phone]    Name: DENNIS BRADFORD  Age: 87y  Gender: Male  MRN: [id number]    Interval History:  Patient seen and examined at bedside this morning  No acute overnight events. Afebrile   No complaints  Breathing better, still having cough  Notes reviewed    Antibiotics:  levoFLOXacin IVPB 750 milliGRAM(s) IV Intermittent every 48 hours      Medications:  acetaminophen     Tablet .. 650 milliGRAM(s) Oral every 6 hours PRN  albuterol/ipratropium for Nebulization 3 milliLiter(s) Nebulizer every 6 hours PRN  aluminum hydroxide/magnesium hydroxide/simethicone Suspension 30 milliLiter(s) Oral every 4 hours PRN  atorvastatin 40 milliGRAM(s) Oral at bedtime  finasteride 5 milliGRAM(s) Oral daily  levoFLOXacin IVPB 750 milliGRAM(s) IV Intermittent every 48 hours  magnesium hydroxide Suspension 30 milliLiter(s) Oral daily PRN  melatonin 3 milliGRAM(s) Oral at bedtime PRN  metoprolol tartrate 100 milliGRAM(s) Oral two times a day  ondansetron Injectable 4 milliGRAM(s) IV Push every 8 hours PRN  pantoprazole    Tablet 40 milliGRAM(s) Oral before breakfast  polyethylene glycol 3350 17 Gram(s) Oral daily PRN  senna 2 Tablet(s) Oral at bedtime  tamsulosin 0.4 milliGRAM(s) Oral at bedtime      Review of Systems:  Review of systems otherwise negative except as previously noted.    Allergies: No Known Allergies    For details regarding the patient's past medical history, social history, family history, and other miscellaneous elements, please refer the initial infectious diseases consultation and/or the admitting history and physical examination for this admission.    Objective:  Vitals:   T(C): 36.7 (08-16-22 @ 12:34), Max: 36.9 (08-16-22 @ 00:03)  HR: 68 (08-16-22 @ 14:00) (52 - 110)  BP: 126/55 (08-16-22 @ 14:00) (109/60 - 148/71)  RR: 29 (08-16-22 @ 14:00) (26 - 47)  SpO2: 95% (08-16-22 @ 14:00) (87% - 96%)    Physical Examination:  General: no acute distress  HEENT: NC/AT, EOMI,  Cardio: S1, S2 heard, RRR, no murmurs  Resp: b/l rales on HFNC  Abd: soft, NT, ND  Ext: no edema or cyanosis  Skin: warm, dry, no visible rash      Laboratory Studies:  CBC:                       11.6   24.80 )-----------( 364      ( 16 Aug 2022 06:00 )             35.3     CMP: 08-16    138  |  102  |  36<H>  ----------------------------<  115<H>  4.0   |  28  |  1.46<H>    Ca    7.7<L>      16 Aug 2022 06:00  Mg     2.0     08-16    TPro  6.1  /  Alb  1.9<L>  /  TBili  0.7  /  DBili  x   /  AST  86<H>  /  ALT  84<H>  /  AlkPhos  261<H>  08-15    LIVER FUNCTIONS - ( 15 Aug 2022 06:00 )  Alb: 1.9 g/dL / Pro: 6.1 g/dL / ALK PHOS: 261 U/L / ALT: 84 U/L DA / AST: 86 U/L / GGT: x               Microbiology: reviewed    Culture - Acid Fast - Sputum w/Smear (collected 08-13-22 @ 12:12)  Source: .Sputum Sputum    Culture - Acid Fast - Sputum w/Smear (collected 08-12-22 @ 09:02)  Source: .Sputum Sputum    Culture - Acid Fast - Sputum w/Smear (collected 08-12-22 @ 01:04)  Source: .Sputum Sputum    Culture - Blood (collected 08-11-22 @ 21:07)  Source: .Blood Blood  Preliminary Report (08-13-22 @ 14:01):    No growth to date.    Culture - Blood (collected 08-11-22 @ 21:07)  Source: .Blood Blood  Preliminary Report (08-13-22 @ 14:01):    No growth to date.        Radiology: reviewed

## 2022-08-16 NOTE — PROGRESS NOTE ADULT - SUBJECTIVE AND OBJECTIVE BOX
Patient is a 87y old  Male who presents with a chief complaint of cough -> PNA (16 Aug 2022 06:52)      INTERVAL HPI/OVERNIGHT EVENTS:    MEDICATIONS  (STANDING):  atorvastatin 40 milliGRAM(s) Oral at bedtime  finasteride 5 milliGRAM(s) Oral daily  metoprolol tartrate 100 milliGRAM(s) Oral two times a day  pantoprazole    Tablet 40 milliGRAM(s) Oral before breakfast  piperacillin/tazobactam IVPB.. 3.375 Gram(s) IV Intermittent every 8 hours  senna 2 Tablet(s) Oral at bedtime  tamsulosin 0.4 milliGRAM(s) Oral at bedtime    MEDICATIONS  (PRN):  acetaminophen     Tablet .. 650 milliGRAM(s) Oral every 6 hours PRN Temp greater or equal to 38C (100.4F), Mild Pain (1 - 3)  albuterol/ipratropium for Nebulization 3 milliLiter(s) Nebulizer every 6 hours PRN Shortness of Breath and/or Wheezing  aluminum hydroxide/magnesium hydroxide/simethicone Suspension 30 milliLiter(s) Oral every 4 hours PRN Dyspepsia  magnesium hydroxide Suspension 30 milliLiter(s) Oral daily PRN Constipation  melatonin 3 milliGRAM(s) Oral at bedtime PRN Insomnia  ondansetron Injectable 4 milliGRAM(s) IV Push every 8 hours PRN Nausea and/or Vomiting  polyethylene glycol 3350 17 Gram(s) Oral daily PRN Constipation      Allergies    No Known Allergies    Intolerances        REVIEW OF SYSTEMS:  CONSTITUTIONAL: No fever, weight loss, or fatigue  EYES: No eye pain, visual disturbances, or discharge  ENMT:  No difficulty hearing, tinnitus, vertigo; No sinus or throat pain  NECK: No pain or stiffness  RESPIRATORY: No cough, wheezing, chills or hemoptysis; No shortness of breath  CARDIOVASCULAR: No chest pain, palpitations, lightheadedness, or leg swelling  GASTROINTESTINAL: No abdominal or epigastric pain. No nausea, vomiting, or hematemesis; No diarrhea or constipation. No melena or hematochezia.  GENITOURINARY: No dysuria, frequency, hematuria, or incontinence  NEUROLOGICAL: No headaches, memory loss, vertigo, loss of strength, numbness, or tremors  SKIN: No itching, burning, rashes, or lesions   LYMPH NODES: No enlarged glands  ENDOCRINE: No heat or cold intolerance; No hair loss; No polydipsia or polyuria  MUSCULOSKELETAL: No joint pain or swelling; No muscle, back, or extremity pain  PSYCHIATRIC: No depression, anxiety, or mood swings  HEME/LYMPH: No easy bruising, or bleeding gums  ALLERGY AND IMMUNOLOGIC: No hives or eczema    PHYSICAL EXAM:  GENERAL: NAD, well-groomed, well-developed  HEAD:  Atraumatic, Normocephalic  EYES: EOMI, PERRLA, conjunctiva and sclera clear  ENMT: Moist mucous membranes, Good dentition, No lesions  NECK: Supple, No JVD appreciated  NERVOUS SYSTEM:  Alert & Oriented X3, Good concentration; All 4 extremities mobile, no gross sensory deficits.   CHEST/LUNG: Clear to auscultation bilaterally; No rales, rhonchi, wheezing, or rubs appreciated  HEART: Regular rate and rhythm; No murmurs, rubs, or gallops  ABDOMEN: Soft, Nontender, Nondistended; Bowel sounds present  EXTREMITIES:  No clubbing, cyanosis, or edema appreciated  LYMPH: No lymphadenopathy noted  SKIN: No rashes or lesions appreciated    Vital Signs Last 24 Hrs  T(C): 36.7 (16 Aug 2022 08:53), Max: 36.9 (16 Aug 2022 00:03)  T(F): 98.1 (16 Aug 2022 08:53), Max: 98.4 (16 Aug 2022 00:03)  HR: 63 (16 Aug 2022 10:51) (52 - 110)  BP: 109/60 (16 Aug 2022 10:51) (109/60 - 148/71)  BP(mean): 76 (16 Aug 2022 10:51) (70 - 94)  RR: 34 (16 Aug 2022 10:51) (26 - 47)  SpO2: 93% (16 Aug 2022 10:51) (87% - 96%)    Parameters below as of 16 Aug 2022 10:51  Patient On (Oxygen Delivery Method): nasal cannula, high flow  O2 Flow (L/min): 25  O2 Concentration (%): 50    LABS:                        11.6   24.80 )-----------( 364      ( 16 Aug 2022 06:00 )             35.3     16 Aug 2022 06:00    138    |  102    |  36     ----------------------------<  115    4.0     |  28     |  1.46     Ca    7.7        16 Aug 2022 06:00  Mg     2.0       16 Aug 2022 06:00      PTT - ( 16 Aug 2022 07:56 )  PTT:114.1 sec    CAPILLARY BLOOD GLUCOSE          RADIOLOGY & ADDITIONAL TESTS:    Imaging Personally Reviewed:  [ ] YES     Consultant(s) Notes Reviewed:      Care Discussed with Consultants/Other Providers:    Advanced Directives: [ ] DNR  [ ] No feeding tube  [ ] MOLST in chart  [ ] MOLST completed today  [ ] Unknown   Patient is a 87y old  Male who presents with a chief complaint of cough -> PNA (16 Aug 2022 06:52)      INTERVAL HPI/OVERNIGHT EVENTS:  Patient seen awake and slaying in bed. He reports productive cough, tinged with blood in sputum but less than yesterday, reports some chest and abdominal pain that worsens with coughing, reports abdominal binder has helped. Denies fever, no n/v/d.      MEDICATIONS  (STANDING):  atorvastatin 40 milliGRAM(s) Oral at bedtime  finasteride 5 milliGRAM(s) Oral daily  metoprolol tartrate 100 milliGRAM(s) Oral two times a day  pantoprazole    Tablet 40 milliGRAM(s) Oral before breakfast  piperacillin/tazobactam IVPB.. 3.375 Gram(s) IV Intermittent every 8 hours  senna 2 Tablet(s) Oral at bedtime  tamsulosin 0.4 milliGRAM(s) Oral at bedtime    MEDICATIONS  (PRN):  acetaminophen     Tablet .. 650 milliGRAM(s) Oral every 6 hours PRN Temp greater or equal to 38C (100.4F), Mild Pain (1 - 3)  albuterol/ipratropium for Nebulization 3 milliLiter(s) Nebulizer every 6 hours PRN Shortness of Breath and/or Wheezing  aluminum hydroxide/magnesium hydroxide/simethicone Suspension 30 milliLiter(s) Oral every 4 hours PRN Dyspepsia  magnesium hydroxide Suspension 30 milliLiter(s) Oral daily PRN Constipation  melatonin 3 milliGRAM(s) Oral at bedtime PRN Insomnia  ondansetron Injectable 4 milliGRAM(s) IV Push every 8 hours PRN Nausea and/or Vomiting  polyethylene glycol 3350 17 Gram(s) Oral daily PRN Constipation      Allergies    No Known Allergies    Intolerances        REVIEW OF SYSTEMS:  CONSTITUTIONAL: No fever, weight loss, or fatigue  EYES: No eye pain, visual disturbances, or discharge  ENMT:  No difficulty hearing, tinnitus, vertigo; No sinus or throat pain  NECK: No pain or stiffness  RESPIRATORY: Productive cough, no wheezing, chills or hemoptysis; Shortness of breath  CARDIOVASCULAR: No chest pain, palpitations, lightheadedness, or leg swelling  GASTROINTESTINAL: No abdominal or epigastric pain. No nausea, vomiting, or hematemesis; No diarrhea or constipation. No melena or hematochezia.  GENITOURINARY: No dysuria, frequency, hematuria, or incontinence  NEUROLOGICAL: No headaches, memory loss, vertigo, loss of strength, numbness, or tremors  SKIN: No itching, burning, rashes, or lesions   MUSCULOSKELETAL: No joint pain or swelling; No muscle, back, or extremity pain      PHYSICAL EXAM:  GENERAL: NAD, well-groomed, well-developed  HEAD:  Atraumatic, Normocephalic  EYES: EOMI, PERRLA, conjunctiva and sclera clear  ENMT: Moist mucous membranes, Good dentition, No lesions  NECK: Supple, No JVD appreciated  NERVOUS SYSTEM:  Alert & Oriented X3, Good concentration; All 4 extremities mobile, no gross sensory deficits.   CHEST/LUNG: Diminished to auscultation worse on left than right; No rales, rhonchi, wheezing, or rubs appreciated  HEART: Regular rate and rhythm; No murmurs, rubs, or gallops  ABDOMEN: Soft, Nontender, Nondistended; Bowel sounds present  EXTREMITIES:  No clubbing, cyanosis, or edema appreciated  LYMPH: No lymphadenopathy noted  SKIN: No rashes or lesions appreciated    Vital Signs Last 24 Hrs  T(C): 36.7 (16 Aug 2022 08:53), Max: 36.9 (16 Aug 2022 00:03)  T(F): 98.1 (16 Aug 2022 08:53), Max: 98.4 (16 Aug 2022 00:03)  HR: 63 (16 Aug 2022 10:51) (52 - 110)  BP: 109/60 (16 Aug 2022 10:51) (109/60 - 148/71)  BP(mean): 76 (16 Aug 2022 10:51) (70 - 94)  RR: 34 (16 Aug 2022 10:51) (26 - 47)  SpO2: 93% (16 Aug 2022 10:51) (87% - 96%)    Parameters below as of 16 Aug 2022 10:51  Patient On (Oxygen Delivery Method): nasal cannula, high flow  O2 Flow (L/min): 25  O2 Concentration (%): 50    LABS:                        11.6   24.80 )-----------( 364      ( 16 Aug 2022 06:00 )             35.3     16 Aug 2022 06:00    138    |  102    |  36     ----------------------------<  115    4.0     |  28     |  1.46     Ca    7.7        16 Aug 2022 06:00  Mg     2.0       16 Aug 2022 06:00      PTT - ( 16 Aug 2022 07:56 )  PTT:114.1 sec    CAPILLARY BLOOD GLUCOSE

## 2022-08-16 NOTE — PROGRESS NOTE ADULT - ASSESSMENT
87M with HTN, HLD, hx of CVA (no residual deficits), BPH, hx of skin CA who presents with cough and fevers. Found to have multifocal pneumonia.  Also found have new onset afib.        Sepsis 2/2 multifocal PNA - meets sepsis criteria with leukocytosis + tachycardia + source of infection  - s/p ceftriaxone and azithromycin, switched to Zosyn as per ID  - strep PNA Ag, legionella Ag pending  - ID consult, reccs appreciated  - pulm consult, reccs appreciated.    - f/u cultures    Acute hypoxia respiratory failure 2/2 likely PNA.   pulmonary recs appreciated  now on HFNC, continue to monitor   full code, intubate PRN  continue HFNC per pulmonary    Cavitary lesion, R/O TB   - doubt TB but patient has blood tinged sputum, possible early cavitation on CT, and fevers. Low suspicion as per ID, discontined isolation  - check quantiferon. AFB smear negative x3    New afib   - [id number] score of at least 5 -> will anticoagulate with heparin drip for now (can be stopped if hemotpysis worsens)  - cardiology consult, recs appreciated  - rate control if needed  - TTE shows 1. Low normal left ventricular systolic function.  2. Bi-atrial enlargement.  3. Mild pulmonary hypertension  - will eventually need to transition to DOAC    Elevated LFTs  - likely from current illness  - f/u final CT A/P read  - can consider GI consult if worsens    CINDI 2/2 prerenal azotemia - elev BUN/Cr >20  - likely 2/2 poor PO intake  - hydrate and repeat BMP in AM  - bladder scan, and if retaining, may need pittman  - hold diuretic  - avoid nephrotoxic meds    HTN/HLD  - cont with norvasc  - hold lasix  - cont with atorvastatin    BPH  - cont with tamsulosin and finasteride    Preventive measures  - will be on heparin drip for afib     87M with HTN, HLD, hx of CVA (no residual deficits), BPH, hx of skin CA who presents with cough and fevers. Found to have multifocal pneumonia.  Also found have new onset afib.        Sepsis 2/2 multifocal PNA - meets sepsis criteria with leukocytosis + tachycardia + source of infection  - strep PNA Ag negative. Legionella antibody positive, legionella Ag pending  - s/p ceftriaxone and azithromycin, Zosyn. Switched to Levaquin, renally dosed, as per ID  - ID following, reccs appreciated  - pulm following, reccs appreciated.    - f/u cultures    Acute hypoxia respiratory failure 2/2 likely PNA.   pulmonary recs appreciated  Continue HFNC, continue to monitor   full code, intubate PRN  continue HFNC per pulmonary    Cavitary lesion, R/O TB   - doubt TB but patient has blood tinged sputum, possible early cavitation on CT, and fevers. Low suspicion as per ID, discontined isolation  - check quantiferon. AFB smear negative x3    New afib   - [id number] score of at least 5 -> will anticoagulate with heparin drip for now (can be stopped if hemotpysis worsens)  - cardiology consulted, recs appreciated  - rate control if needed  - TTE shows 1. Low normal left ventricular systolic function.  2. Bi-atrial enlargement.  3. Mild pulmonary hypertension  - will eventually need to transition to DOAC    Elevated LFTs  - likely from current illness  - f/u final CT A/P read, monitor labs  - can consider GI consult if worsens    CINDI 2/2 prerenal azotemia - elev BUN/Cr >20  - likely 2/2 poor PO intake  - hydrate and repeat BMP in AM  - bladder scan, and if retaining, may need pittman  - hold diuretic  - avoid nephrotoxic meds    HTN/HLD  - cont with norvasc  - hold lasix  - cont with atorvastatin    BPH  - cont with tamsulosin and finasteride    Preventive measures  - will be on heparin drip for afib

## 2022-08-16 NOTE — PROGRESS NOTE ADULT - ASSESSMENT
88 y/o male pmhx HTN, HLD, hx od CVA, BPH, presented to ER on 8/11 w/ cough and fevers.    Assessment:  1. Acute hypoxic respiratory failure  2. Multifocal pneumonia  3. Sepsis  4. CINDI  5. Afib w/ RVR     Plan:  Neuro: No active issues   CV: Afib w/ RVR, rate controlled w/ 50mg Lopressor BID. Keep K >4 and Mg >2. Holding AC w/ hemoptysis. Maintain MAP >65  Resp: On HFNC, 50% FiO2 and 30LPM, actively titrating FiO2 to maintain O2 sat >92%. Duonebs q6hr PRN  GI: PO diet as tolerated.  Renal:CINDI, Non oliguric. Trend BUN/Crt. Avoid  Nephrotoxins.   ID: Zosyn changed levaquin. Cultures w/ no growth to date  Heme: Holding AC w/ hemoptysis as per cardio. SCDs for DVT ppx.

## 2022-08-16 NOTE — PROGRESS NOTE ADULT - SUBJECTIVE AND OBJECTIVE BOX
Date/Time Patient Seen:  		  Referring MD:   Data Reviewed	       Patient is a 87y old  Male who presents with a chief complaint of cough -> PNA (15 Aug 2022 19:38)      Subjective/HPI     PAST MEDICAL & SURGICAL HISTORY:  No pertinent past medical history    Skin cancer  s/p Mohs    Essential hypertension    Cerebrovascular accident (CVA), unspecified mechanism  2015    Hyperlipidemia, unspecified hyperlipidemia type    Complex tear of lateral meniscus of right knee as current injury, initial encounter    Complex tear of medial meniscus of right knee as current injury, initial encounter    Benign prostatic hyperplasia, presence of lower urinary tract symptoms unspecified, unspecified morphology    H/O pilonidal cyst          Medication list         MEDICATIONS  (STANDING):  atorvastatin 40 milliGRAM(s) Oral at bedtime  finasteride 5 milliGRAM(s) Oral daily  heparin  Infusion.  Unit(s)/Hr (13 mL/Hr) IV Continuous <Continuous>  metoprolol tartrate 100 milliGRAM(s) Oral two times a day  pantoprazole    Tablet 40 milliGRAM(s) Oral before breakfast  piperacillin/tazobactam IVPB.. 3.375 Gram(s) IV Intermittent every 8 hours  senna 2 Tablet(s) Oral at bedtime  tamsulosin 0.4 milliGRAM(s) Oral at bedtime    MEDICATIONS  (PRN):  acetaminophen     Tablet .. 650 milliGRAM(s) Oral every 6 hours PRN Temp greater or equal to 38C (100.4F), Mild Pain (1 - 3)  albuterol/ipratropium for Nebulization 3 milliLiter(s) Nebulizer every 6 hours PRN Shortness of Breath and/or Wheezing  aluminum hydroxide/magnesium hydroxide/simethicone Suspension 30 milliLiter(s) Oral every 4 hours PRN Dyspepsia  heparin   Injectable 6000 Unit(s) IV Push every 6 hours PRN For aPTT less than 40  heparin   Injectable 3000 Unit(s) IV Push every 6 hours PRN For aPTT between 40 - 57  magnesium hydroxide Suspension 30 milliLiter(s) Oral daily PRN Constipation  melatonin 3 milliGRAM(s) Oral at bedtime PRN Insomnia  ondansetron Injectable 4 milliGRAM(s) IV Push every 8 hours PRN Nausea and/or Vomiting  polyethylene glycol 3350 17 Gram(s) Oral daily PRN Constipation         Vitals log        ICU Vital Signs Last 24 Hrs  T(C): 36.9 (16 Aug 2022 04:27), Max: 36.9 (16 Aug 2022 00:03)  T(F): 98.4 (16 Aug 2022 04:27), Max: 98.4 (16 Aug 2022 00:03)  HR: 110 (16 Aug 2022 06:05) (52 - 110)  BP: 116/72 (16 Aug 2022 06:00) (110/60 - 148/71)  BP(mean): 87 (16 Aug 2022 06:00) (73 - 94)  ABP: --  ABP(mean): --  RR: 31 (16 Aug 2022 06:05) (26 - 47)  SpO2: 94% (16 Aug 2022 06:05) (60% - 96%)    O2 Parameters below as of 16 Aug 2022 06:05  Patient On (Oxygen Delivery Method): nasal cannula, high flow  O2 Flow (L/min): 30  O2 Concentration (%): 55             Input and Output:  I&O's Detail    14 Aug 2022 07:01  -  15 Aug 2022 07:00  --------------------------------------------------------  IN:    Heparin Infusion: 360 mL    IV PiggyBack: 400 mL  Total IN: 760 mL    OUT:    Voided (mL): 650 mL  Total OUT: 650 mL    Total NET: 110 mL      15 Aug 2022 07:01  -  16 Aug 2022 06:54  --------------------------------------------------------  IN:    Heparin Infusion: 360 mL    IV PiggyBack: 200 mL    Oral Fluid: 1080 mL  Total IN: 1640 mL    OUT:    Voided (mL): 1100 mL  Total OUT: 1100 mL    Total NET: 540 mL          Lab Data                        12.5   26.15 )-----------( 350      ( 15 Aug 2022 06:00 )             36.9     08-15    138  |  102  |  33<H>  ----------------------------<  110<H>  3.8   |  28  |  1.37<H>    Ca    7.6<L>      15 Aug 2022 06:00    TPro  6.1  /  Alb  1.9<L>  /  TBili  0.7  /  DBili  x   /  AST  86<H>  /  ALT  84<H>  /  AlkPhos  261<H>  08-15            Review of Systems	      Objective     Physical Examination    heart s1s2  lung dc BS  abd soft      Pertinent Lab findings & Imaging      Bud:  NO   Adequate UO     I&O's Detail    14 Aug 2022 07:01  -  15 Aug 2022 07:00  --------------------------------------------------------  IN:    Heparin Infusion: 360 mL    IV PiggyBack: 400 mL  Total IN: 760 mL    OUT:    Voided (mL): 650 mL  Total OUT: 650 mL    Total NET: 110 mL      15 Aug 2022 07:01  -  16 Aug 2022 06:54  --------------------------------------------------------  IN:    Heparin Infusion: 360 mL    IV PiggyBack: 200 mL    Oral Fluid: 1080 mL  Total IN: 1640 mL    OUT:    Voided (mL): 1100 mL  Total OUT: 1100 mL    Total NET: 540 mL               Discussed with:     Cultures:	        Radiology

## 2022-08-16 NOTE — PROGRESS NOTE ADULT - SUBJECTIVE AND OBJECTIVE BOX
Patient is a 87y old  Male who presents with a chief complaint of cough -> PNA (16 Aug 2022 15:18)      BRIEF HOSPITAL COURSE:  88 y/o male pmhx HTN, HLD, hx od CVA, BPH, presented to ER on 8/11 w/ cough and fevers. Admitted w/ acute hypoxic respiratory failure 2/2 multifocal pneumonia, new onset afib w/ RVR and CINDI.     Events last 24 hours: Remains on HFNC 50% FiO2 and 30LPM. Now in NSR, heparin gtt was stopped due to hemoptysis. CINDI improving.      PAST MEDICAL & SURGICAL HISTORY:  Skin cancer  s/p Mohs      Essential hypertension      Cerebrovascular accident (CVA), unspecified mechanism  2015      Hyperlipidemia, unspecified hyperlipidemia type      Complex tear of lateral meniscus of right knee as current injury, initial encounter      Complex tear of medial meniscus of right knee as current injury, initial encounter      Benign prostatic hyperplasia, presence of lower urinary tract symptoms unspecified, unspecified morphology      H/O pilonidal cyst          Review of Systems:  CONSTITUTIONAL: No fever, chills, or fatigue  EYES: No eye pain, visual disturbances, or discharge  ENMT:  No difficulty hearing, tinnitus, vertigo; No sinus or throat pain  NECK: No pain or stiffness  RESPIRATORY: No cough, wheezing, chills or hemoptysis; No shortness of breath  CARDIOVASCULAR: No chest pain, palpitations, dizziness, or leg swelling  GASTROINTESTINAL: No abdominal or epigastric pain. No nausea, vomiting, or hematemesis; No diarrhea or constipation. No melena or hematochezia.  GENITOURINARY: No dysuria, frequency, hematuria, or incontinence  NEUROLOGICAL: No headaches, memory loss, loss of strength, numbness, or tremors  SKIN: No itching, burning, rashes, or lesions   MUSCULOSKELETAL: No joint pain or swelling; No muscle, back, or extremity pain  PSYCHIATRIC: No depression, anxiety, mood swings, or difficulty sleeping      Medications:  levoFLOXacin IVPB 750 milliGRAM(s) IV Intermittent every 48 hours    metoprolol tartrate 100 milliGRAM(s) Oral two times a day  tamsulosin 0.4 milliGRAM(s) Oral at bedtime    albuterol/ipratropium for Nebulization 3 milliLiter(s) Nebulizer every 6 hours PRN  guaifenesin/dextromethorphan Oral Liquid 10 milliLiter(s) Oral every 6 hours    acetaminophen     Tablet .. 650 milliGRAM(s) Oral every 6 hours PRN  melatonin 3 milliGRAM(s) Oral at bedtime PRN  ondansetron Injectable 4 milliGRAM(s) IV Push every 8 hours PRN        aluminum hydroxide/magnesium hydroxide/simethicone Suspension 30 milliLiter(s) Oral every 4 hours PRN  magnesium hydroxide Suspension 30 milliLiter(s) Oral daily PRN  pantoprazole    Tablet 40 milliGRAM(s) Oral before breakfast  polyethylene glycol 3350 17 Gram(s) Oral daily PRN  senna 2 Tablet(s) Oral at bedtime      atorvastatin 40 milliGRAM(s) Oral at bedtime  finasteride 5 milliGRAM(s) Oral daily                  ICU Vital Signs Last 24 Hrs  T(C): 36.7 (16 Aug 2022 16:06), Max: 36.9 (16 Aug 2022 00:03)  T(F): 98.1 (16 Aug 2022 16:06), Max: 98.4 (16 Aug 2022 00:03)  HR: 65 (16 Aug 2022 20:00) (52 - 110)  BP: 136/67 (16 Aug 2022 20:00) (109/60 - 156/86)  BP(mean): 87 (16 Aug 2022 20:00) (70 - 106)  ABP: --  ABP(mean): --  RR: 30 (16 Aug 2022 20:00) (28 - 47)  SpO2: 92% (16 Aug 2022 20:00) (91% - 96%)    O2 Parameters below as of 16 Aug 2022 20:00  Patient On (Oxygen Delivery Method): nasal cannula, high flow  O2 Flow (L/min): 25  O2 Concentration (%): 50            I&O's Detail    15 Aug 2022 07:01  -  16 Aug 2022 07:00  --------------------------------------------------------  IN:    Heparin Infusion: 360 mL    IV PiggyBack: 200 mL    Oral Fluid: 1080 mL  Total IN: 1640 mL    OUT:    Voided (mL): 1100 mL  Total OUT: 1100 mL    Total NET: 540 mL      16 Aug 2022 07:01  -  16 Aug 2022 20:14  --------------------------------------------------------  IN:    Heparin Infusion: 15 mL  Total IN: 15 mL    OUT:    Voided (mL): 500 mL  Total OUT: 500 mL    Total NET: -485 mL            LABS:                        11.6   24.80 )-----------( 364      ( 16 Aug 2022 06:00 )             35.3     08-16    138  |  102  |  36<H>  ----------------------------<  115<H>  4.0   |  28  |  1.46<H>    Ca    7.7<L>      16 Aug 2022 06:00  Mg     2.0     08-16    TPro  6.1  /  Alb  1.9<L>  /  TBili  0.7  /  DBili  x   /  AST  86<H>  /  ALT  84<H>  /  AlkPhos  261<H>  08-15          CAPILLARY BLOOD GLUCOSE        PTT - ( 16 Aug 2022 07:56 )  PTT:114.1 sec    CULTURES:  Culture Results:   No growth to date. (08-11-22 @ 21:07)  Culture Results:   No growth to date. (08-11-22 @ 21:07)      Physical Examination:    General: No acute distress. Ill appearing.     HEENT: Pupils equal, reactive to light.  Symmetric.    PULM: Coarse BS b.l    CVS: Regular rate and rhythm, no murmurs, rubs, or gallops    ABD: Soft, nondistended, nontender, normoactive bowel sounds, no masses    EXT: No edema, nontender    SKIN: Warm and well perfused, no rashes noted.    NEURO: A&Ox3, strength 5/5 all extremities, cranial nerves grossly intact, no focal deficits    RADIOLOGY: ***    CRITICAL CARE TIME SPENT: ***  Evaluating/treating patient, reviewing data/labs/imaging, discussing case with multidisciplinary team, discussing plan/goals of care with patient/family. Non-inclusive of procedure time.   Patient is a 87y old  Male who presents with a chief complaint of cough -> PNA (16 Aug 2022 15:18)      BRIEF HOSPITAL COURSE:  86 y/o male pmhx HTN, HLD, hx od CVA, BPH, presented to ER on 8/11 w/ cough and fevers. Admitted w/ acute hypoxic respiratory failure 2/2 multifocal pneumonia, new onset afib w/ RVR and CINDI.     Events last 24 hours: Remains on HFNC 50% FiO2 and 30LPM. Now in NSR, heparin gtt was stopped due to hemoptysis. CINDI improving.      PAST MEDICAL & SURGICAL HISTORY:  Skin cancer  s/p Mohs      Essential hypertension      Cerebrovascular accident (CVA), unspecified mechanism  2015      Hyperlipidemia, unspecified hyperlipidemia type      Complex tear of lateral meniscus of right knee as current injury, initial encounter      Complex tear of medial meniscus of right knee as current injury, initial encounter      Benign prostatic hyperplasia, presence of lower urinary tract symptoms unspecified, unspecified morphology      H/O pilonidal cyst          Review of Systems:  CONSTITUTIONAL: No fever, chills, or fatigue  EYES: No eye pain, visual disturbances, or discharge  ENMT:  No difficulty hearing, tinnitus, vertigo; No sinus or throat pain  NECK: No pain or stiffness  RESPIRATORY: No cough, wheezing, chills or hemoptysis; No shortness of breath  CARDIOVASCULAR: No chest pain, palpitations, dizziness, or leg swelling  GASTROINTESTINAL: No abdominal or epigastric pain. No nausea, vomiting, or hematemesis; No diarrhea or constipation. No melena or hematochezia.  GENITOURINARY: No dysuria, frequency, hematuria, or incontinence  NEUROLOGICAL: No headaches, memory loss, loss of strength, numbness, or tremors  SKIN: No itching, burning, rashes, or lesions   MUSCULOSKELETAL: No joint pain or swelling; No muscle, back, or extremity pain  PSYCHIATRIC: No depression, anxiety, mood swings, or difficulty sleeping      Medications:  levoFLOXacin IVPB 750 milliGRAM(s) IV Intermittent every 48 hours    metoprolol tartrate 100 milliGRAM(s) Oral two times a day  tamsulosin 0.4 milliGRAM(s) Oral at bedtime    albuterol/ipratropium for Nebulization 3 milliLiter(s) Nebulizer every 6 hours PRN  guaifenesin/dextromethorphan Oral Liquid 10 milliLiter(s) Oral every 6 hours    acetaminophen     Tablet .. 650 milliGRAM(s) Oral every 6 hours PRN  melatonin 3 milliGRAM(s) Oral at bedtime PRN  ondansetron Injectable 4 milliGRAM(s) IV Push every 8 hours PRN        aluminum hydroxide/magnesium hydroxide/simethicone Suspension 30 milliLiter(s) Oral every 4 hours PRN  magnesium hydroxide Suspension 30 milliLiter(s) Oral daily PRN  pantoprazole    Tablet 40 milliGRAM(s) Oral before breakfast  polyethylene glycol 3350 17 Gram(s) Oral daily PRN  senna 2 Tablet(s) Oral at bedtime      atorvastatin 40 milliGRAM(s) Oral at bedtime  finasteride 5 milliGRAM(s) Oral daily                  ICU Vital Signs Last 24 Hrs  T(C): 36.7 (16 Aug 2022 16:06), Max: 36.9 (16 Aug 2022 00:03)  T(F): 98.1 (16 Aug 2022 16:06), Max: 98.4 (16 Aug 2022 00:03)  HR: 65 (16 Aug 2022 20:00) (52 - 110)  BP: 136/67 (16 Aug 2022 20:00) (109/60 - 156/86)  BP(mean): 87 (16 Aug 2022 20:00) (70 - 106)  ABP: --  ABP(mean): --  RR: 30 (16 Aug 2022 20:00) (28 - 47)  SpO2: 92% (16 Aug 2022 20:00) (91% - 96%)    O2 Parameters below as of 16 Aug 2022 20:00  Patient On (Oxygen Delivery Method): nasal cannula, high flow  O2 Flow (L/min): 25  O2 Concentration (%): 50            I&O's Detail    15 Aug 2022 07:01  -  16 Aug 2022 07:00  --------------------------------------------------------  IN:    Heparin Infusion: 360 mL    IV PiggyBack: 200 mL    Oral Fluid: 1080 mL  Total IN: 1640 mL    OUT:    Voided (mL): 1100 mL  Total OUT: 1100 mL    Total NET: 540 mL      16 Aug 2022 07:01  -  16 Aug 2022 20:14  --------------------------------------------------------  IN:    Heparin Infusion: 15 mL  Total IN: 15 mL    OUT:    Voided (mL): 500 mL  Total OUT: 500 mL    Total NET: -485 mL            LABS:                        11.6   24.80 )-----------( 364      ( 16 Aug 2022 06:00 )             35.3     08-16    138  |  102  |  36<H>  ----------------------------<  115<H>  4.0   |  28  |  1.46<H>    Ca    7.7<L>      16 Aug 2022 06:00  Mg     2.0     08-16    TPro  6.1  /  Alb  1.9<L>  /  TBili  0.7  /  DBili  x   /  AST  86<H>  /  ALT  84<H>  /  AlkPhos  261<H>  08-15          CAPILLARY BLOOD GLUCOSE        PTT - ( 16 Aug 2022 07:56 )  PTT:114.1 sec    CULTURES:  Culture Results:   No growth to date. (08-11-22 @ 21:07)  Culture Results:   No growth to date. (08-11-22 @ 21:07)      Physical Examination:    General: No acute distress. Ill appearing.     HEENT: Pupils equal, reactive to light.  Symmetric.    PULM: Coarse BS b.l. No Wheeze/rales,     CVS: Regular rate and rhythm, no murmurs, rubs, or gallops    ABD: Soft, nondistended, nontender, normoactive bowel sounds, no masses    EXT: No edema, nontender    SKIN: Warm and well perfused, no rashes noted.    NEURO: A&Ox3, strength 5/5 all extremities , no focal deficits    RADIOLOGY: < from: CT Chest No Cont (08.11.22 @ 21:37) >  ACC: [id number] EXAM:  CT CHEST                        ACC: [id number] EXAM:  CT ABDOMEN AND PELVIS                          PROCEDURE DATE:  08/11/2022          INTERPRETATION:  CLINICAL INFORMATION: Cough, fever. Sepsis.  Elevated LFT.    COMPARISON:None.    CONTRAST/COMPLICATIONS:  IV Contrast: NONE  Oral Contrast: NONE  Complications: None reported at time of study completion    PROCEDURE:  CT of the Chest, Abdomen and Pelvis was performed.  Sagittal and coronal reformats were performed.    FINDINGS:    CHEST:    LUNGS AND LARGE AIRWAYS: PLEURA: There are patchy bilateral groundglass   and airspace consolidations, which are most pronounced in the left upper   and lingular lobes, right upper and right middle lobes.  This is associated with bronchiectasis, particularly in the bilateral   upper lobes.  In addition, there are scattered irregular, poorly marginated nodular   opacities throughout the bilateral lower lung zones. Some of these   contain possible small areas of cavitation.  There is a small left-sided pleural effusion and trace right-sided   pleural effusion.    The central airways remain patent.    VESSELS: Atherosclerotic changes thoracic aorta and coronary artery   calcifications.    HEART: Heart size is normal. No pericardial effusion.    MEDIASTINUM AND DEO:  Pretracheal, precarinal and subcarinal mediastinal lymph nodes measuring   up to 1 cm short axis.    CHEST WALL AND LOWER NECK: Within normal limits.    ABDOMEN AND PELVIS:    Evaluation of the solid organ parenchyma is limited without intravenous   contrast.    LIVER: Within normal limits.  BILE DUCTS: Normal caliber.  GALLBLADDER: Contracted.  SPLEEN: Within normal limits.  PANCREAS: Within normal limits.  ADRENALS: Within normal limits.  KIDNEYS/URETERS:  Exophytic cyst medial upper pole left kidney.  No hydronephrosis.    BLADDER: Within normal limits.  REPRODUCTIVE ORGANS:  The prostate is not enlarged.    BOWEL:  Colonic diverticulosis, without CT evidence of diverticulitis.  No bowel obstruction.  The appendix is not well visualized on this exam.  PERITONEUM: No ascites.    VESSELS: Atherosclerotic changes.  RETROPERITONEUM/LYMPH NODES: No lymphadenopathy.    ABDOMINAL WALL:  Right inguinal hernia containing fat and nonobstructed small bowel.  Small bilateral inguinal lymph nodes.    BONES:  Degenerative changes spine.  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    IMPRESSION:    Bilateral groundglass glass and airspace consolidations, some associated   with bronchiectasis particularly in the upper lobes, findings likely   secondary to infection.  Possible small areas of cavitation.  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    No acute intra-abdominal pathology.    Other findings as discussed above.    This study was preliminary reported by the ED radiologist on 8/11/2022.    --- End of Report ---    < end of copied text >

## 2022-08-16 NOTE — PROGRESS NOTE ADULT - ASSESSMENT
87M with HTN, HLD, hx of CVA (no residual deficits), BPH, hx of skin CA who presents with cough and fevers.    Found to have multifocal pneumonia.  Also found have new onset afib.        Sepsis 2/2 Multifocal PNA  Hemoptysis   AHRF on HFNC  - pt p/w leukocytosis, SOB  - CT w/ small cavitations, b/l GGO and airspace consolidations  - cx NGTD  - strep pneumo negative  - legionella Ab+, urine legionella still pending  Plan:   - agree w/ changing to levofloxacin  - trend temps/WBC  - supportive care/supplemental O2 per primary team    R/o TB  - AFB negative x3  - off isolation  - can send quantiferon, but not helpful in differentiating between active/latent TB; can have false results during illness    Elevated LFTs  - likely 2/2 sepsis  - trend LFTs    CINDI 2/2 prerenal azotemia  - likely 2/2 poor PO intake/sepsis  - trend renal fxn  - avoid nephrotoxic agents  - renally dose medications    D/w Dr. Ochoa    Infectious Diseases will continue to follow. Please call with any questions.   Adriana Rodriguez M.D.  [facility], Division of Infectious Diseases [phone]

## 2022-08-17 LAB
ALBUMIN SERPL ELPH-MCNC: 1.5 G/DL — LOW (ref 3.3–5)
ALP SERPL-CCNC: 230 U/L — HIGH (ref 30–120)
ALT FLD-CCNC: 58 U/L DA — SIGNIFICANT CHANGE UP (ref 10–60)
ANION GAP SERPL CALC-SCNC: 8 MMOL/L — SIGNIFICANT CHANGE UP (ref 5–17)
AST SERPL-CCNC: 54 U/L — HIGH (ref 10–40)
BILIRUB SERPL-MCNC: 0.7 MG/DL — SIGNIFICANT CHANGE UP (ref 0.2–1.2)
BUN SERPL-MCNC: 34 MG/DL — HIGH (ref 7–23)
CALCIUM SERPL-MCNC: 7.7 MG/DL — LOW (ref 8.4–10.5)
CHLORIDE SERPL-SCNC: 103 MMOL/L — SIGNIFICANT CHANGE UP (ref 96–108)
CO2 SERPL-SCNC: 26 MMOL/L — SIGNIFICANT CHANGE UP (ref 22–31)
CREAT SERPL-MCNC: 1.44 MG/DL — HIGH (ref 0.5–1.3)
CULTURE RESULTS: SIGNIFICANT CHANGE UP
CULTURE RESULTS: SIGNIFICANT CHANGE UP
EGFR: 47 ML/MIN/1.73M2 — LOW
GAMMA INTERFERON BACKGROUND BLD IA-ACNC: 0.02 IU/ML — SIGNIFICANT CHANGE UP
GLUCOSE SERPL-MCNC: 111 MG/DL — HIGH (ref 70–99)
HCT VFR BLD CALC: 33.2 % — LOW (ref 39–50)
HGB BLD-MCNC: 11 G/DL — LOW (ref 13–17)
M TB IFN-G BLD-IMP: ABNORMAL
M TB IFN-G CD4+ BCKGRND COR BLD-ACNC: 0 IU/ML — SIGNIFICANT CHANGE UP
M TB IFN-G CD4+CD8+ BCKGRND COR BLD-ACNC: 0 IU/ML — SIGNIFICANT CHANGE UP
MAGNESIUM SERPL-MCNC: 2 MG/DL — SIGNIFICANT CHANGE UP (ref 1.6–2.6)
MCHC RBC-ENTMCNC: 31.4 PG — SIGNIFICANT CHANGE UP (ref 27–34)
MCHC RBC-ENTMCNC: 33.1 GM/DL — SIGNIFICANT CHANGE UP (ref 32–36)
MCV RBC AUTO: 94.9 FL — SIGNIFICANT CHANGE UP (ref 80–100)
NRBC # BLD: 0 /100 WBCS — SIGNIFICANT CHANGE UP (ref 0–0)
PHOSPHATE SERPL-MCNC: 4.5 MG/DL — SIGNIFICANT CHANGE UP (ref 2.5–4.5)
PLATELET # BLD AUTO: 337 K/UL — SIGNIFICANT CHANGE UP (ref 150–400)
POTASSIUM SERPL-MCNC: 4.3 MMOL/L — SIGNIFICANT CHANGE UP (ref 3.5–5.3)
POTASSIUM SERPL-SCNC: 4.3 MMOL/L — SIGNIFICANT CHANGE UP (ref 3.5–5.3)
PROT SERPL-MCNC: 6.2 G/DL — SIGNIFICANT CHANGE UP (ref 6–8.3)
QUANT TB PLUS MITOGEN MINUS NIL: 0.01 IU/ML — SIGNIFICANT CHANGE UP
RBC # BLD: 3.5 M/UL — LOW (ref 4.2–5.8)
RBC # FLD: 13.7 % — SIGNIFICANT CHANGE UP (ref 10.3–14.5)
SODIUM SERPL-SCNC: 137 MMOL/L — SIGNIFICANT CHANGE UP (ref 135–145)
SPECIMEN SOURCE: SIGNIFICANT CHANGE UP
SPECIMEN SOURCE: SIGNIFICANT CHANGE UP
WBC # BLD: 22.14 K/UL — HIGH (ref 3.8–10.5)
WBC # FLD AUTO: 22.14 K/UL — HIGH (ref 3.8–10.5)

## 2022-08-17 PROCEDURE — 99291 CRITICAL CARE FIRST HOUR: CPT

## 2022-08-17 RX ORDER — LANOLIN ALCOHOL/MO/W.PET/CERES
5 CREAM (GRAM) TOPICAL AT BEDTIME
Refills: 0 | Status: DISCONTINUED | OUTPATIENT
Start: 2022-08-17 | End: 2022-08-29

## 2022-08-17 RX ORDER — ENOXAPARIN SODIUM 100 MG/ML
40 INJECTION SUBCUTANEOUS EVERY 24 HOURS
Refills: 0 | Status: DISCONTINUED | OUTPATIENT
Start: 2022-08-17 | End: 2022-08-29

## 2022-08-17 RX ADMIN — PANTOPRAZOLE SODIUM 40 MILLIGRAM(S): 20 TABLET, DELAYED RELEASE ORAL at 06:00

## 2022-08-17 RX ADMIN — Medication 650 MILLIGRAM(S): at 21:35

## 2022-08-17 RX ADMIN — Medication 650 MILLIGRAM(S): at 20:36

## 2022-08-17 RX ADMIN — Medication 10 MILLILITER(S): at 11:46

## 2022-08-17 RX ADMIN — Medication 10 MILLILITER(S): at 05:51

## 2022-08-17 RX ADMIN — TAMSULOSIN HYDROCHLORIDE 0.4 MILLIGRAM(S): 0.4 CAPSULE ORAL at 21:36

## 2022-08-17 RX ADMIN — Medication 10 MILLILITER(S): at 17:20

## 2022-08-17 RX ADMIN — Medication 5 MILLIGRAM(S): at 21:35

## 2022-08-17 RX ADMIN — Medication 100 MILLIGRAM(S): at 17:20

## 2022-08-17 RX ADMIN — FINASTERIDE 5 MILLIGRAM(S): 5 TABLET, FILM COATED ORAL at 11:46

## 2022-08-17 RX ADMIN — SENNA PLUS 2 TABLET(S): 8.6 TABLET ORAL at 21:36

## 2022-08-17 RX ADMIN — ATORVASTATIN CALCIUM 40 MILLIGRAM(S): 80 TABLET, FILM COATED ORAL at 21:36

## 2022-08-17 RX ADMIN — ENOXAPARIN SODIUM 40 MILLIGRAM(S): 100 INJECTION SUBCUTANEOUS at 17:20

## 2022-08-17 RX ADMIN — Medication 100 MILLIGRAM(S): at 05:51

## 2022-08-17 NOTE — PROGRESS NOTE ADULT - ASSESSMENT
87M with HTN, HLD, hx of CVA (no residual deficits), BPH, hx of skin CA who presents with cough and fevers    afb neg x 3  vs noted  labs reviewed  HF NC - wean as tolerated  differential dx in pt with hemoptysis and cavitation is Malignancy - pt will need repeat CT chest in 4 - 6 weeks and possibly a Bronchoscopy or other means of diagnostic testing - examination  urine legionella ag neg -   ID follow up reviewed  on Levaquin     RRT reviewed  on emp ABX  ID - Cardio follow up  I and O  serial labs  monitor VS and Sat  may need NIPPV if resp distress cont despite HF NC  prognosis guarded  GOC discussion

## 2022-08-17 NOTE — PROGRESS NOTE ADULT - ASSESSMENT
87M with HTN, HLD, hx of CVA (no residual deficits), BPH, hx of skin CA who presents with cough and fevers. Found to have multifocal pneumonia.  Also found have new onset afib.        Sepsis 2/2 multifocal PNA - meets sepsis criteria with leukocytosis + tachycardia + source of infection  - strep PNA Ag negative. Legionella antibody positive, legionella Ag pending  - s/p ceftriaxone and azithromycin, Zosyn. Switched to Levaquin, renally dosed, as per ID  - ID following, reccs appreciated  - pulm following, reccs appreciated.    - f/u cultures    Acute hypoxia respiratory failure 2/2 likely PNA.   pulmonary recs appreciated  Continue HFNC, continue to monitor   full code, intubate PRN  continue HFNC per pulmonary    Cavitary lesion, R/O TB   - doubt TB but patient has blood tinged sputum, possible early cavitation on CT, and fevers. Low suspicion as per ID, discontined isolation  - check quantiferon. AFB smear negative x3    New afib   - [id number] score of at least 5 -> will anticoagulate with heparin drip for now (can be stopped if hemotpysis worsens)  - cardiology consulted, recs appreciated  - rate control if needed  - TTE shows 1. Low normal left ventricular systolic function.  2. Bi-atrial enlargement.  3. Mild pulmonary hypertension  - will eventually need to transition to DOAC    Elevated LFTs  - likely from current illness  - f/u final CT A/P read, monitor labs  - can consider GI consult if worsens    CINDI 2/2 prerenal azotemia - elev BUN/Cr >20  - likely 2/2 poor PO intake  - hydrate and repeat BMP in AM  - bladder scan, and if retaining, may need pittman  - hold diuretic  - avoid nephrotoxic meds    HTN/HLD  - cont with norvasc  - hold lasix  - cont with atorvastatin    BPH  - cont with tamsulosin and finasteride    Preventive measures  - will be on heparin drip for afib     87M with HTN, HLD, hx of CVA (no residual deficits), BPH, hx of skin CA who presents with cough and fevers. Found to have multifocal pneumonia.  Also found have new onset afib.        Sepsis 2/2 multifocal PNA - meets sepsis criteria with leukocytosis + tachycardia + source of infection  - strep PNA Ag negative. Legionella antibody positive, legionella Ag negative  - s/p ceftriaxone and azithromycin, Zosyn. Switched to Levaquin, renally dosed, as per ID  - ID following, reccs appreciated  - pulm following, reccs appreciated.    - f/u sputum culture, pending    Acute hypoxia respiratory failure 2/2 likely PNA.   pulmonary reccs appreciated  Continue HFNC, continue to monitor   full code, intubate PRN  continue HFNC per pulmonary    Cavitary lesion  - doubt TB but patient has blood tinged sputum, possible early cavitation on CT, and fevers. Low suspicion for TB as per ID, discontinued isolation  - quantiferon equivocal. AFB smear negative x3    New afib   - [id number] score of at least 5 -> will anticoagulate with heparin drip for now (can be stopped if hemotpysis worsens)  - cardiology consulted, recs appreciated  - rate control if needed  - TTE shows Low normal left ventricular systolic function. Bi-atrial enlargement. Mild pulmonary hypertension  - will eventually need to transition to DOAC    Elevated LFTs  - likely from current illness  - f/u final CT A/P read, monitor labs  - can consider GI consult if worsens    CINDI 2/2 prerenal azotemia - elev BUN/Cr >20  - likely 2/2 poor PO intake  - hydrate and repeat BMP in AM  - bladder scan, and if retaining, may need pittman  - hold diuretic  - avoid nephrotoxic meds    HTN/HLD  - cont with norvasc  - hold lasix  - cont with atorvastatin    BPH  - cont with tamsulosin and finasteride    Preventive measures  - heparin transition to Lovenox, will need DOAC

## 2022-08-17 NOTE — PHYSICAL THERAPY INITIAL EVALUATION ADULT - ADDITIONAL COMMENTS
Pt lives alone, no steps in house. Was independent prior to Pt lives alone, no steps in house. Was independent prior to admission, driving, no assistive device

## 2022-08-17 NOTE — PROGRESS NOTE ADULT - ASSESSMENT
The patient is an 87 year old male with a history of HTN, HL, BPH, CVA who presents with cough and hemoptysis in the setting of PNA and r/o TB.    Plan:  - The rhythm is now back to sinus rhythm with PACs and intermittent episodes of AF  - Continue metoprolol tartrate 100 mg bid  - The patient has ongoing hemoptysis. Remain off heparin drip as risks outweigh benefits. When hemoptysis resolves, will start apixaban.  - Discontinue amlodipine  - Echo with low normal LV systolic function, mild pulm HTN  - IV antibiotics  - AFB negative x3  - Pulm and ID follow-up

## 2022-08-17 NOTE — PROGRESS NOTE ADULT - SUBJECTIVE AND OBJECTIVE BOX
Patient is a 87y old  Male who presents with a chief complaint of cough -> PNA (17 Aug 2022 09:58)      INTERVAL HPI/OVERNIGHT EVENTS:    MEDICATIONS  (STANDING):  atorvastatin 40 milliGRAM(s) Oral at bedtime  finasteride 5 milliGRAM(s) Oral daily  guaifenesin/dextromethorphan Oral Liquid 10 milliLiter(s) Oral every 6 hours  levoFLOXacin IVPB 750 milliGRAM(s) IV Intermittent every 48 hours  metoprolol tartrate 100 milliGRAM(s) Oral two times a day  pantoprazole    Tablet 40 milliGRAM(s) Oral before breakfast  senna 2 Tablet(s) Oral at bedtime  tamsulosin 0.4 milliGRAM(s) Oral at bedtime    MEDICATIONS  (PRN):  acetaminophen     Tablet .. 650 milliGRAM(s) Oral every 6 hours PRN Temp greater or equal to 38C (100.4F), Mild Pain (1 - 3)  albuterol/ipratropium for Nebulization 3 milliLiter(s) Nebulizer every 6 hours PRN Shortness of Breath and/or Wheezing  aluminum hydroxide/magnesium hydroxide/simethicone Suspension 30 milliLiter(s) Oral every 4 hours PRN Dyspepsia  magnesium hydroxide Suspension 30 milliLiter(s) Oral daily PRN Constipation  melatonin 3 milliGRAM(s) Oral at bedtime PRN Insomnia  ondansetron Injectable 4 milliGRAM(s) IV Push every 8 hours PRN Nausea and/or Vomiting  polyethylene glycol 3350 17 Gram(s) Oral daily PRN Constipation      Allergies    No Known Allergies    Intolerances        REVIEW OF SYSTEMS:  CONSTITUTIONAL: No fever, weight loss, or fatigue  EYES: No eye pain, visual disturbances, or discharge  ENMT:  No difficulty hearing, tinnitus, vertigo; No sinus or throat pain  NECK: No pain or stiffness  RESPIRATORY: No cough, wheezing, chills or hemoptysis; No shortness of breath  CARDIOVASCULAR: No chest pain, palpitations, lightheadedness, or leg swelling  GASTROINTESTINAL: No abdominal or epigastric pain. No nausea, vomiting, or hematemesis; No diarrhea or constipation. No melena or hematochezia.  GENITOURINARY: No dysuria, frequency, hematuria, or incontinence  NEUROLOGICAL: No headaches, memory loss, vertigo, loss of strength, numbness, or tremors  SKIN: No itching, burning, rashes, or lesions   LYMPH NODES: No enlarged glands  ENDOCRINE: No heat or cold intolerance; No hair loss; No polydipsia or polyuria  MUSCULOSKELETAL: No joint pain or swelling; No muscle, back, or extremity pain  PSYCHIATRIC: No depression, anxiety, or mood swings  HEME/LYMPH: No easy bruising, or bleeding gums  ALLERGY AND IMMUNOLOGIC: No hives or eczema    PHYSICAL EXAM:  GENERAL: NAD, well-groomed, well-developed  HEAD:  Atraumatic, Normocephalic  EYES: EOMI, PERRLA, conjunctiva and sclera clear  ENMT: Moist mucous membranes, Good dentition, No lesions  NECK: Supple, No JVD appreciated  NERVOUS SYSTEM:  Alert & Oriented X3, Good concentration; All 4 extremities mobile, no gross sensory deficits.   CHEST/LUNG: Clear to auscultation bilaterally; No rales, rhonchi, wheezing, or rubs appreciated  HEART: Regular rate and rhythm; No murmurs, rubs, or gallops  ABDOMEN: Soft, Nontender, Nondistended; Bowel sounds present  EXTREMITIES:  No clubbing, cyanosis, or edema appreciated  LYMPH: No lymphadenopathy noted  SKIN: No rashes or lesions appreciated    Vital Signs Last 24 Hrs  T(C): 36.3 (17 Aug 2022 08:41), Max: 36.8 (16 Aug 2022 21:14)  T(F): 97.3 (17 Aug 2022 08:41), Max: 98.2 (16 Aug 2022 21:14)  HR: 75 (17 Aug 2022 10:00) (65 - 92)  BP: 109/56 (17 Aug 2022 10:00) (109/56 - 156/86)  BP(mean): 73 (17 Aug 2022 10:00) (73 - 106)  RR: 27 (17 Aug 2022 10:00) (25 - 42)  SpO2: 95% (17 Aug 2022 10:00) (89% - 95%)    Parameters below as of 17 Aug 2022 10:00  Patient On (Oxygen Delivery Method): nasal cannula, high flow  O2 Flow (L/min): 30  O2 Concentration (%): 50    LABS:                        11.0   22.14 )-----------( 337      ( 17 Aug 2022 06:26 )             33.2     17 Aug 2022 06:26    137    |  103    |  34     ----------------------------<  111    4.3     |  26     |  1.44     Ca    7.7        17 Aug 2022 06:26  Phos  4.5       17 Aug 2022 06:26  Mg     2.0       17 Aug 2022 06:26    TPro  6.2    /  Alb  1.5    /  TBili  0.7    /  DBili  x      /  AST  54     /  ALT  58     /  AlkPhos  230    17 Aug 2022 06:26    PTT - ( 16 Aug 2022 07:56 )  PTT:114.1 sec    CAPILLARY BLOOD GLUCOSE          RADIOLOGY & ADDITIONAL TESTS:    Imaging Personally Reviewed:  [ ] YES     Consultant(s) Notes Reviewed:      Care Discussed with Consultants/Other Providers:    Advanced Directives: [ ] DNR  [ ] No feeding tube  [ ] MOLST in chart  [ ] MOLST completed today  [ ] Unknown   Patient is a 87y old  Male who presents with a chief complaint of cough -> PNA (17 Aug 2022 09:58)      INTERVAL HPI/OVERNIGHT EVENTS:  Patient seen awake in bed. He reports cough has improved, SOB still present, chest pain has improved and he removed the binder. No reported fever, no reported overnight events.     MEDICATIONS  (STANDING):  atorvastatin 40 milliGRAM(s) Oral at bedtime  finasteride 5 milliGRAM(s) Oral daily  guaifenesin/dextromethorphan Oral Liquid 10 milliLiter(s) Oral every 6 hours  levoFLOXacin IVPB 750 milliGRAM(s) IV Intermittent every 48 hours  metoprolol tartrate 100 milliGRAM(s) Oral two times a day  pantoprazole    Tablet 40 milliGRAM(s) Oral before breakfast  senna 2 Tablet(s) Oral at bedtime  tamsulosin 0.4 milliGRAM(s) Oral at bedtime    MEDICATIONS  (PRN):  acetaminophen     Tablet .. 650 milliGRAM(s) Oral every 6 hours PRN Temp greater or equal to 38C (100.4F), Mild Pain (1 - 3)  albuterol/ipratropium for Nebulization 3 milliLiter(s) Nebulizer every 6 hours PRN Shortness of Breath and/or Wheezing  aluminum hydroxide/magnesium hydroxide/simethicone Suspension 30 milliLiter(s) Oral every 4 hours PRN Dyspepsia  magnesium hydroxide Suspension 30 milliLiter(s) Oral daily PRN Constipation  melatonin 3 milliGRAM(s) Oral at bedtime PRN Insomnia  ondansetron Injectable 4 milliGRAM(s) IV Push every 8 hours PRN Nausea and/or Vomiting  polyethylene glycol 3350 17 Gram(s) Oral daily PRN Constipation      Allergies    No Known Allergies    Intolerances        REVIEW OF SYSTEMS:  CONSTITUTIONAL: No fever, weight loss, or fatigue  EYES: No eye pain, visual disturbances, or discharge  ENMT:  No difficulty hearing, tinnitus, vertigo; No sinus or throat pain  NECK: No pain or stiffness  RESPIRATORY: Productive cough, no wheezing, chills or hemoptysis; Shortness of breath  CARDIOVASCULAR: No chest pain, palpitations, lightheadedness, or leg swelling  GASTROINTESTINAL: No abdominal or epigastric pain. No nausea, vomiting, or hematemesis; No diarrhea or constipation. No melena or hematochezia.  GENITOURINARY: No dysuria, frequency, hematuria, or incontinence  NEUROLOGICAL: No headaches, memory loss, vertigo, loss of strength, numbness, or tremors  SKIN: No itching, burning, rashes, or lesions   MUSCULOSKELETAL: No joint pain or swelling; No muscle, back, or extremity pain      PHYSICAL EXAM:  GENERAL: NAD, well-groomed, well-developed  HEAD:  Atraumatic, Normocephalic  EYES: EOMI, PERRLA, conjunctiva and sclera clear  ENMT: Moist mucous membranes, Good dentition, No lesions  NECK: Supple, No JVD appreciated  NERVOUS SYSTEM:  Alert & Oriented X3, Good concentration; All 4 extremities mobile, no gross sensory deficits.   CHEST/LUNG: Diminished to auscultation worse on left than right; No rales, rhonchi, wheezing, or rubs appreciated  HEART: Regular rate and rhythm; No murmurs, rubs, or gallops  ABDOMEN: Soft, Nontender, Nondistended; Bowel sounds present  EXTREMITIES:  No clubbing, cyanosis, or edema appreciated  LYMPH: No lymphadenopathy noted  SKIN: No rashes or lesions appreciated      Vital Signs Last 24 Hrs  T(C): 36.3 (17 Aug 2022 08:41), Max: 36.8 (16 Aug 2022 21:14)  T(F): 97.3 (17 Aug 2022 08:41), Max: 98.2 (16 Aug 2022 21:14)  HR: 75 (17 Aug 2022 10:00) (65 - 92)  BP: 109/56 (17 Aug 2022 10:00) (109/56 - 156/86)  BP(mean): 73 (17 Aug 2022 10:00) (73 - 106)  RR: 27 (17 Aug 2022 10:00) (25 - 42)  SpO2: 95% (17 Aug 2022 10:00) (89% - 95%)    Parameters below as of 17 Aug 2022 10:00  Patient On (Oxygen Delivery Method): nasal cannula, high flow  O2 Flow (L/min): 30  O2 Concentration (%): 50    LABS:                        11.0   22.14 )-----------( 337      ( 17 Aug 2022 06:26 )             33.2     17 Aug 2022 06:26    137    |  103    |  34     ----------------------------<  111    4.3     |  26     |  1.44     Ca    7.7        17 Aug 2022 06:26  Phos  4.5       17 Aug 2022 06:26  Mg     2.0       17 Aug 2022 06:26    TPro  6.2    /  Alb  1.5    /  TBili  0.7    /  DBili  x      /  AST  54     /  ALT  58     /  AlkPhos  230    17 Aug 2022 06:26    PTT - ( 16 Aug 2022 07:56 )  PTT:114.1 sec    CAPILLARY BLOOD GLUCOSE

## 2022-08-17 NOTE — PHYSICAL THERAPY INITIAL EVALUATION ADULT - PERTINENT HX OF CURRENT PROBLEM, REHAB EVAL
pt is a 86 y/o male, admitted from home with SOB, DONG, cough, Started with left sided upper back pain. Negative for COVID, influenza, RSV. CT chest showed possible PNA.

## 2022-08-17 NOTE — PROGRESS NOTE ADULT - ASSESSMENT
86 y/o M w/ PMHx HTN, HLD, CVA in 2015 (no residual deficits), BPH, and skin CA admitted w/:    1. Acute hypoxic respiratory failure  2. Multifocal PNA  3. A fib w/ RVR, converted  4. CINDI  5. Transaminitis    PLAN:  - Mental status intact.  - Actively titrating HFNC settings to maintain SpO2 > 92%.  - CXR performed on 8/15 w/ worsening infiltrates.  - Converted to NSR. Continue lopressor 100mg BID.  - Maintain K > 4 and Mg > 2 for optimal arrythmia suppression.  - Renal function slowly improving. Nonoliguric.  - LFTs have improved.  - Legionella ab positive, but urine negative. Continue levaquin per ID.  - Heparin infusion discontinued in the setting of hemoptysis. Now on lovenox 40mg daily for DVT prophylaxis. Once it is ensured that hemoptysis has resolved, will start eliquis for full dose AC per cardiology.    Spoke w/ pt and family members who were present at bedside at the time of my evaluation. Updated/all questions answered.

## 2022-08-17 NOTE — PROGRESS NOTE ADULT - SUBJECTIVE AND OBJECTIVE BOX
Chief Complaint: Hemoptysis    Interval Events: No events overnight.    Review of Systems:  General: No fevers, chills, weight gain  Skin: No rashes, color changes  Cardiovascular: No chest pain, orthopnea  Respiratory: No shortness of breath, cough  Gastrointestinal: No nausea, abdominal pain  Genitourinary: No incontinence, pain with urination  Musculoskeletal: No pain, swelling, decreased range of motion  Neurological: No headache, weakness  Psychiatric: No depression, anxiety  Endocrine: No weight gain, increased thirst  All other systems are comprehensively negative.    Physical Exam:  Vital Signs Last 24 Hrs  T(C): 36.3 (17 Aug 2022 08:41), Max: 36.8 (16 Aug 2022 21:14)  T(F): 97.3 (17 Aug 2022 08:41), Max: 98.2 (16 Aug 2022 21:14)  HR: 75 (17 Aug 2022 10:00) (63 - 92)  BP: 109/56 (17 Aug 2022 10:00) (109/56 - 156/86)  BP(mean): 73 (17 Aug 2022 10:00) (73 - 106)  RR: 27 (17 Aug 2022 10:00) (25 - 42)  SpO2: 95% (17 Aug 2022 10:00) (89% - 95%)  Parameters below as of 17 Aug 2022 10:00  Patient On (Oxygen Delivery Method): nasal cannula, high flow  O2 Flow (L/min): 30  O2 Concentration (%): 50  General: NAD  HEENT: MMM  Neck: No JVD, no carotid bruit  Lungs: CTAB  CV: RRR, nl S1/S2, no M/R/G  Abdomen: S/NT/ND, +BS  Extremities: No LE edema, no cyanosis  Neuro: AAOx3, non-focal  Skin: No rash    Labs:             08-17    137  |  103  |  34<H>  ----------------------------<  111<H>  4.3   |  26  |  1.44<H>    Ca    7.7<L>      17 Aug 2022 06:26  Phos  4.5     08-17  Mg     2.0     08-17    TPro  6.2  /  Alb  1.5<L>  /  TBili  0.7  /  DBili  x   /  AST  54<H>  /  ALT  58  /  AlkPhos  230<H>  08-17                        11.0   22.14 )-----------( 337      ( 17 Aug 2022 06:26 )             33.2       Telemetry: AF, sinus rhythm with PACs

## 2022-08-17 NOTE — PROGRESS NOTE ADULT - SUBJECTIVE AND OBJECTIVE BOX
[facility], Division of Infectious Diseases  EROS Perez Y. Patel, S. Shah, G. [facility]  [phone]    Name: DENNIS BRADFORD  Age: 87y  Gender: Male  MRN: [id number]    Interval History:  Patient seen and examined at bedside this morning  No acute overnight events. Afebrile  On HFNC still  Cough slightly improved, less hemoptysis  Notes reviewed    Antibiotics:  levoFLOXacin IVPB 750 milliGRAM(s) IV Intermittent every 48 hours      Medications:  acetaminophen     Tablet .. 650 milliGRAM(s) Oral every 6 hours PRN  albuterol/ipratropium for Nebulization 3 milliLiter(s) Nebulizer every 6 hours PRN  aluminum hydroxide/magnesium hydroxide/simethicone Suspension 30 milliLiter(s) Oral every 4 hours PRN  atorvastatin 40 milliGRAM(s) Oral at bedtime  finasteride 5 milliGRAM(s) Oral daily  guaifenesin/dextromethorphan Oral Liquid 10 milliLiter(s) Oral every 6 hours  levoFLOXacin IVPB 750 milliGRAM(s) IV Intermittent every 48 hours  magnesium hydroxide Suspension 30 milliLiter(s) Oral daily PRN  melatonin 3 milliGRAM(s) Oral at bedtime PRN  metoprolol tartrate 100 milliGRAM(s) Oral two times a day  ondansetron Injectable 4 milliGRAM(s) IV Push every 8 hours PRN  pantoprazole    Tablet 40 milliGRAM(s) Oral before breakfast  polyethylene glycol 3350 17 Gram(s) Oral daily PRN  senna 2 Tablet(s) Oral at bedtime  tamsulosin 0.4 milliGRAM(s) Oral at bedtime      Review of Systems:  Review of systems otherwise negative except as previously noted.    Allergies: No Known Allergies    For details regarding the patient's past medical history, social history, family history, and other miscellaneous elements, please refer the initial infectious diseases consultation and/or the admitting history and physical examination for this admission.    Objective:  Vitals:   T(C): 36.3 (08-17-22 @ 08:41), Max: 36.8 (08-16-22 @ 21:14)  HR: 81 (08-17-22 @ 08:00) (63 - 92)  BP: 125/65 (08-17-22 @ 08:00) (109/60 - 156/86)  RR: 32 (08-17-22 @ 08:00) (25 - 42)  SpO2: 92% (08-17-22 @ 08:00) (89% - 95%)    Physical Examination:  General: no acute distress  HEENT: NC/AT, EOMI,  Cardio: S1, S2 heard, RRR, no murmurs  Resp: b/l rales on HFNC  Abd: soft, NT, ND  Ext: no edema or cyanosis  Skin: warm, dry, no visible rash      Laboratory Studies:  CBC:                       11.0   22.14 )-----------( 337      ( 17 Aug 2022 06:26 )             33.2     CMP: 08-17    137  |  103  |  34<H>  ----------------------------<  111<H>  4.3   |  26  |  1.44<H>    Ca    7.7<L>      17 Aug 2022 06:26  Phos  4.5     08-17  Mg     2.0     08-17    TPro  6.2  /  Alb  1.5<L>  /  TBili  0.7  /  DBili  x   /  AST  54<H>  /  ALT  58  /  AlkPhos  230<H>  08-17    LIVER FUNCTIONS - ( 17 Aug 2022 06:26 )  Alb: 1.5 g/dL / Pro: 6.2 g/dL / ALK PHOS: 230 U/L / ALT: 58 U/L DA / AST: 54 U/L / GGT: x               Microbiology: reviewed    Culture - Acid Fast - Sputum w/Smear (collected 08-13-22 @ 12:12)  Source: .Sputum Sputum    Culture - Acid Fast - Sputum w/Smear (collected 08-12-22 @ 09:02)  Source: .Sputum Sputum    Culture - Acid Fast - Sputum w/Smear (collected 08-12-22 @ 01:04)  Source: .Sputum Sputum    Culture - Blood (collected 08-11-22 @ 21:07)  Source: .Blood Blood  Preliminary Report (08-13-22 @ 14:01):    No growth to date.    Culture - Blood (collected 08-11-22 @ 21:07)  Source: .Blood Blood  Preliminary Report (08-13-22 @ 14:01):    No growth to date.        Radiology: reviewed

## 2022-08-17 NOTE — PROGRESS NOTE ADULT - ASSESSMENT
87M with HTN, HLD, hx of CVA (no residual deficits), BPH, hx of skin CA who presents with cough and fevers.    Found to have multifocal pneumonia.  Also found have new onset afib.        Sepsis 2/2 Multifocal PNA  Hemoptysis   AHRF on HFNC  - pt p/w leukocytosis, SOB  - CT w/ small cavitations, b/l GGO and airspace consolidations  - cx NGTD  - strep pneumo negative  - legionella Ab+, urine legionella negative  Plan:   Unclear if this is related to legionella; will c/w levofloxacin to see if improvement  - c/w levofloxacin  - trend temps/WBC  - supportive care/supplemental O2 per primary team    R/o TB  - AFB negative x3  - off isolation  - can send quantiferon, but not helpful in differentiating between active/latent TB; can have false results during illness    Elevated LFTs  - likely 2/2 sepsis  - trend LFTs    CINDI 2/2 prerenal azotemia  - likely 2/2 poor PO intake/sepsis  - trend renal fxn  - avoid nephrotoxic agents  - renally dose medications      Infectious Diseases will continue to follow. Please call with any questions.   Adriana Rodriguez M.D.  [facility], Division of Infectious Diseases [phone]

## 2022-08-17 NOTE — PROGRESS NOTE ADULT - SUBJECTIVE AND OBJECTIVE BOX
Patient is a 86 y/o male who presents with a chief complaint of pneumonia (17 Aug 2022 10:53)    BRIEF HOSPITAL COURSE: 86 y/o M w/ PMHx HTN, HLD, CVA in 2015 (no residual deficits), BPH, and skin CA who presented on 8/11 c/o cough and fever x 1 week. Pt admitted to [facility] w/ acute hypoxic respiratory failure 2/2 multifocal pneumonia. Hospital course complicated by new onset atrial fibrillation and CINDI.    Events last 24 hours: Remains on HFNC. LPM decreased to 30 as pt was complaining of irritation in nasal passages. FiO2 currently at 60%. Pt stated that when having a conversation, he becomes dyspneic.    PAST MEDICAL & SURGICAL HISTORY:  Skin cancer  s/p Mohs  Essential hypertension  Cerebrovascular accident (CVA), unspecified mechanism  2015  Hyperlipidemia, unspecified hyperlipidemia type  Complex tear of lateral meniscus of right knee as current injury, initial encounter  Complex tear of medial meniscus of right knee as current injury, initial encounter  Benign prostatic hyperplasia, presence of lower urinary tract symptoms unspecified, unspecified morphology  H/O pilonidal cyst    Review of Systems:  CONSTITUTIONAL: No fever, chills, or fatigue.  EYES: No eye pain, visual disturbances, or discharge.  ENMT:  No difficulty hearing, tinnitus, or vertigo. No sinus or throat pain.  NECK: No pain or stiffness.  RESPIRATORY: + shortness of breath, + cough. No wheezing.  CARDIOVASCULAR: No chest pain, palpitations, dizziness, or leg swelling.  GASTROINTESTINAL: No abdominal or epigastric pain. No nausea, vomiting, diarrhea, or constipation. No hematemesis, melena, or hematochezia.  GENITOURINARY: No dysuria, increased frequency, hematuria, or incontinence.  NEUROLOGICAL: No headaches, memory loss, loss of strength, numbness, or tremors.  SKIN: No itching, burning, rashes, or lesions.  MUSCULOSKELETAL: No joint pain or swelling. No muscle, back, or extremity pain.  PSYCHIATRIC: No depression, anxiety, mood swings, or difficulty sleeping.    Medications:  levoFLOXacin IVPB 750 milliGRAM(s) IV Intermittent every 48 hours  metoprolol tartrate 100 milliGRAM(s) Oral two times a day  tamsulosin 0.4 milliGRAM(s) Oral at bedtime  albuterol/ipratropium for Nebulization 3 milliLiter(s) Nebulizer every 6 hours PRN  guaifenesin/dextromethorphan Oral Liquid 10 milliLiter(s) Oral every 6 hours  acetaminophen     Tablet .. 650 milliGRAM(s) Oral every 6 hours PRN  melatonin 5 milliGRAM(s) Oral at bedtime  ondansetron Injectable 4 milliGRAM(s) IV Push every 8 hours PRN  enoxaparin Injectable 40 milliGRAM(s) SubCutaneous every 24 hours  aluminum hydroxide/magnesium hydroxide/simethicone Suspension 30 milliLiter(s) Oral every 4 hours PRN  magnesium hydroxide Suspension 30 milliLiter(s) Oral daily PRN  pantoprazole    Tablet 40 milliGRAM(s) Oral before breakfast  polyethylene glycol 3350 17 Gram(s) Oral daily PRN  senna 2 Tablet(s) Oral at bedtime  atorvastatin 40 milliGRAM(s) Oral at bedtime  finasteride 5 milliGRAM(s) Oral daily    ICU Vital Signs Last 24 Hrs  T(C): 37.1 (17 Aug 2022 15:58), Max: 37.1 (17 Aug 2022 15:58)  T(F): 98.7 (17 Aug 2022 15:58), Max: 98.7 (17 Aug 2022 15:58)  HR: 66 (17 Aug 2022 18:15) (63 - 92)  BP: 132/66 (17 Aug 2022 18:15) (109/56 - 143/90)  BP(mean): 85 (17 Aug 2022 18:15) (73 - 106)  ABP: --  ABP(mean): --  RR: 32 (17 Aug 2022 18:15) (22 - 42)  SpO2: 93% (17 Aug 2022 18:15) (89% - 95%)    O2 Parameters below as of 17 Aug 2022 18:15  Patient On (Oxygen Delivery Method): nasal cannula, high flow    I&O's Detail    16 Aug 2022 07:01  -  17 Aug 2022 07:00  --------------------------------------------------------  IN:    Heparin Infusion: 15 mL  Total IN: 15 mL    OUT:    Voided (mL): 1000 mL  Total OUT: 1000 mL    Total NET: -985 mL    LABS:                        11.0   22.14 )-----------( 337      ( 17 Aug 2022 06:26 )             33.2     08-17    137  |  103  |  34<H>  ----------------------------<  111<H>  4.3   |  26  |  1.44<H>    Ca    7.7<L>      17 Aug 2022 06:26  Phos  4.5     08-17  Mg     2.0     08-17    TPro  6.2  /  Alb  1.5<L>  /  TBili  0.7  /  DBili  x   /  AST  54<H>  /  ALT  58  /  AlkPhos  230<H>  08-17    CAPILLARY BLOOD GLUCOSE    PTT - ( 16 Aug 2022 07:56 )  PTT:114.1 sec    CULTURES:  Culture Results:   Culture is being performed. (08-13 @ 12:12)  Culture Results:   Culture is being performed. (08-12 @ 09:02)  Culture Results:   Culture is being performed. (08-12 @ 01:04)  Culture Results:   No Growth Final (08-11 @ 21:07)  Culture Results:   No Growth Final (08-11 @ 21:07)    Physical Examination:    General: In no acute distress.    HEENT: Pupils equal, reactive to light. Symmetric. No scleral icterus or injection.    PULM: Coarse breath sounds b/l.    NECK: Supple, no lymphadenopathy, trachea midline.    CVS: Regular rate and rhythm, no murmurs appreciated, +s1/s2.    ABD: Soft, nondistended, nontender, normoactive bowel sounds.    EXT: No edema, nontender.    SKIN: Warm and well perfused, no rashes noted.    NEURO: Alert, oriented, interactive, nonfocal.    RADIOLOGY:  < from: Xray Chest 1 View- PORTABLE-Routine (Xray Chest 1 View- PORTABLE-Routine .) (08.15.22 @ 18:33) >  ACC: [id number] EXAM:  XR CHEST PORTABLE ROUTINE 1V                          PROCEDURE DATE:  08/15/2022      INTERPRETATION:  Portable chest radiograph    CLINICAL INFORMATION: Worsening dyspnea.    TECHNIQUE:  Portable  AP chest radiograph.    COMPARISON: 11 2022 chest radiograph .    FINDINGS:  CATHETERS AND TUBES: None    PULMONARY: Bilateral  perihilar and upper zone increased diffuse airspace   disease with RIGHT upper lobe volume loss . The lung bases remain clear.  No pneumothorax.    HEART/VASCULAR: The heart is mildly enlarged in transverse diameter. A   LEFT upper zone airspace disease contiguous with the LEFT hilum.  BONES: Visualized osseous structures are intact.    IMPRESSION: Increased bilateral upper zone diffuse airspacedisease with   RIGHT upper lobe volume loss as described. Lung bases clear.    --- End of Report ---    ALLEN AMANDA MD; Attending Radiologist  This document has been electronically signed. Aug 17 2022  2:03PM    < end of copied text >

## 2022-08-18 LAB
ALBUMIN SERPL ELPH-MCNC: 1.6 G/DL — LOW (ref 3.3–5)
ALP SERPL-CCNC: 314 U/L — HIGH (ref 30–120)
ALT FLD-CCNC: 76 U/L DA — HIGH (ref 10–60)
ANION GAP SERPL CALC-SCNC: 4 MMOL/L — LOW (ref 5–17)
AST SERPL-CCNC: 73 U/L — HIGH (ref 10–40)
BILIRUB SERPL-MCNC: 0.7 MG/DL — SIGNIFICANT CHANGE UP (ref 0.2–1.2)
BUN SERPL-MCNC: 32 MG/DL — HIGH (ref 7–23)
CALCIUM SERPL-MCNC: 8 MG/DL — LOW (ref 8.4–10.5)
CHLORIDE SERPL-SCNC: 104 MMOL/L — SIGNIFICANT CHANGE UP (ref 96–108)
CO2 SERPL-SCNC: 29 MMOL/L — SIGNIFICANT CHANGE UP (ref 22–31)
CREAT SERPL-MCNC: 1.28 MG/DL — SIGNIFICANT CHANGE UP (ref 0.5–1.3)
EGFR: 54 ML/MIN/1.73M2 — LOW
GLUCOSE SERPL-MCNC: 117 MG/DL — HIGH (ref 70–99)
HCT VFR BLD CALC: 35.6 % — LOW (ref 39–50)
HGB BLD-MCNC: 11.5 G/DL — LOW (ref 13–17)
MAGNESIUM SERPL-MCNC: 2.3 MG/DL — SIGNIFICANT CHANGE UP (ref 1.6–2.6)
MCHC RBC-ENTMCNC: 30.7 PG — SIGNIFICANT CHANGE UP (ref 27–34)
MCHC RBC-ENTMCNC: 32.3 GM/DL — SIGNIFICANT CHANGE UP (ref 32–36)
MCV RBC AUTO: 95.2 FL — SIGNIFICANT CHANGE UP (ref 80–100)
NRBC # BLD: 0 /100 WBCS — SIGNIFICANT CHANGE UP (ref 0–0)
PHOSPHATE SERPL-MCNC: 4.2 MG/DL — SIGNIFICANT CHANGE UP (ref 2.5–4.5)
PLATELET # BLD AUTO: 380 K/UL — SIGNIFICANT CHANGE UP (ref 150–400)
POTASSIUM SERPL-MCNC: 4.4 MMOL/L — SIGNIFICANT CHANGE UP (ref 3.5–5.3)
POTASSIUM SERPL-SCNC: 4.4 MMOL/L — SIGNIFICANT CHANGE UP (ref 3.5–5.3)
PROT SERPL-MCNC: 6.8 G/DL — SIGNIFICANT CHANGE UP (ref 6–8.3)
RBC # BLD: 3.74 M/UL — LOW (ref 4.2–5.8)
RBC # FLD: 14.1 % — SIGNIFICANT CHANGE UP (ref 10.3–14.5)
SARS-COV-2 RNA SPEC QL NAA+PROBE: SIGNIFICANT CHANGE UP
SODIUM SERPL-SCNC: 137 MMOL/L — SIGNIFICANT CHANGE UP (ref 135–145)
WBC # BLD: 20.32 K/UL — HIGH (ref 3.8–10.5)
WBC # FLD AUTO: 20.32 K/UL — HIGH (ref 3.8–10.5)

## 2022-08-18 PROCEDURE — 99291 CRITICAL CARE FIRST HOUR: CPT

## 2022-08-18 RX ADMIN — PANTOPRAZOLE SODIUM 40 MILLIGRAM(S): 20 TABLET, DELAYED RELEASE ORAL at 06:27

## 2022-08-18 RX ADMIN — Medication 5 MILLIGRAM(S): at 21:32

## 2022-08-18 RX ADMIN — FINASTERIDE 5 MILLIGRAM(S): 5 TABLET, FILM COATED ORAL at 11:37

## 2022-08-18 RX ADMIN — Medication 10 MILLILITER(S): at 17:37

## 2022-08-18 RX ADMIN — Medication 100 MILLIGRAM(S): at 17:37

## 2022-08-18 RX ADMIN — Medication 10 MILLILITER(S): at 05:33

## 2022-08-18 RX ADMIN — ENOXAPARIN SODIUM 40 MILLIGRAM(S): 100 INJECTION SUBCUTANEOUS at 17:37

## 2022-08-18 RX ADMIN — Medication 10 MILLILITER(S): at 23:12

## 2022-08-18 RX ADMIN — Medication 10 MILLILITER(S): at 11:37

## 2022-08-18 RX ADMIN — SENNA PLUS 2 TABLET(S): 8.6 TABLET ORAL at 21:32

## 2022-08-18 RX ADMIN — ATORVASTATIN CALCIUM 40 MILLIGRAM(S): 80 TABLET, FILM COATED ORAL at 21:33

## 2022-08-18 RX ADMIN — TAMSULOSIN HYDROCHLORIDE 0.4 MILLIGRAM(S): 0.4 CAPSULE ORAL at 21:33

## 2022-08-18 RX ADMIN — Medication 100 MILLIGRAM(S): at 05:34

## 2022-08-18 RX ADMIN — Medication 650 MILLIGRAM(S): at 23:11

## 2022-08-18 NOTE — PROGRESS NOTE ADULT - ASSESSMENT
87M with HTN, HLD, hx of CVA (no residual deficits), BPH, hx of skin CA who presents with cough and fevers.    Found to have multifocal pneumonia.  Also found have new onset afib.        Sepsis 2/2 Multifocal PNA  Hemoptysis   AHRF on HFNC  - pt p/w leukocytosis, SOB  - CT w/ small cavitations, b/l GGO and airspace consolidations  - cx NGTD  - strep pneumo negative  - legionella Ab+, urine legionella negative  Plan:   Unclear if this is related to legionella; will c/w levofloxacin to see if improvement  - c/w levofloxacin  - trend temps/WBC  - supportive care/supplemental O2 per primary team    R/o TB  - AFB negative x3  - off isolation  - can send quantiferon, but not helpful in differentiating between active/latent TB; can have false results during illness    Elevated LFTs  - likely 2/2 sepsis  - trend LFTs    CINDI 2/2 prerenal azotemia  - likely 2/2 poor PO intake/sepsis  - trend renal fxn  - avoid nephrotoxic agents  - renally dose medications    Infectious Diseases will continue to follow. Please call with any questions.   Adriana Rodriguez M.D.  [facility], Division of Infectious Diseases [phone]     87M with HTN, HLD, hx of CVA (no residual deficits), BPH, hx of skin CA who presents with cough and fevers.    Found to have multifocal pneumonia.  Also found have new onset afib.        Sepsis 2/2 Multifocal PNA  Hemoptysis   AHRF on HFNC  - pt p/w leukocytosis, SOB  - CT w/ small cavitations, b/l GGO and airspace consolidations  - cx NGTD  - strep pneumo negative  - legionella Ab+, urine legionella negative  Plan:   Unclear if this is related to legionella; will c/w levofloxacin to see if improvement  - c/w levofloxacin  - trend temps/WBC  - supportive care/supplemental O2 per primary team    R/o TB  - AFB negative x3  - off isolation  - can send quantiferon, but not helpful in differentiating between active/latent TB; can have false results during illness    Elevated LFTs  - likely 2/2 sepsis  - trend LFTs    CINDI 2/2 prerenal azotemia  - likely 2/2 poor PO intake/sepsis  - trend renal fxn  - avoid nephrotoxic agents  - renally dose medications    Dr. Sheryl Pride covering tomorrow 8/19 through 8/21  I will resume service Monday 8/22  Infectious Diseases will continue to follow. Please call with any questions.   Adriana Rodriguez M.D.  [facility], Division of Infectious Diseases [phone]

## 2022-08-18 NOTE — PROGRESS NOTE ADULT - ASSESSMENT
87M with HTN, HLD, hx of CVA (no residual deficits), BPH, hx of skin CA who presents with cough and fevers. Found to have multifocal pneumonia.  Also found have new onset afib.        Sepsis 2/2 multifocal PNA - meets sepsis criteria with leukocytosis + tachycardia + source of infection  - strep PNA Ag negative. Legionella antibody positive, legionella Ag negative  - s/p ceftriaxone and azithromycin, Zosyn. Switched to Levaquin, renally dosed, as per ID  - ID following, reccs appreciated  - pulm following, reccs appreciated.    - f/u sputum culture, pending    Acute hypoxia respiratory failure 2/2 likely PNA.   pulmonary reccs appreciated  Continue HFNC, continue to monitor   full code, intubate PRN  continue HFNC per pulmonary    Cavitary lesion  - doubt TB but patient has blood tinged sputum, possible early cavitation on CT, and fevers. Low suspicion for TB as per ID, discontinued isolation  - quantiferon equivocal. AFB smear negative x3    New afib   - [id number] score of at least 5 -> will anticoagulate with heparin drip for now (can be stopped if hemotpysis worsens)  - cardiology consulted, recs appreciated  - rate control if needed  - TTE shows Low normal left ventricular systolic function. Bi-atrial enlargement. Mild pulmonary hypertension  - will eventually need to transition to DOAC    Elevated LFTs  - likely from current illness  - f/u final CT A/P read, monitor labs  - can consider GI consult if worsens    CINDI 2/2 prerenal azotemia - elev BUN/Cr >20  - likely 2/2 poor PO intake  - hydrate and repeat BMP in AM  - bladder scan, and if retaining, may need pittman  - hold diuretic  - avoid nephrotoxic meds    HTN/HLD  - cont with norvasc  - hold lasix  - cont with atorvastatin    BPH  - cont with tamsulosin and finasteride    Preventive measures  - heparin transition to Lovenox, will need DOAC      87M with HTN, HLD, hx of CVA (no residual deficits), BPH, hx of skin CA who presents with cough and fevers. Found to have multifocal pneumonia.  Also found have new onset afib.        Sepsis 2/2 multifocal PNA - meets sepsis criteria with leukocytosis + tachycardia + source of infection  - strep PNA Ag negative. Legionella antibody positive, legionella Ag negative  - s/p ceftriaxone and azithromycin, Zosyn. Continue Levaquin, renally dosed, as per ID  - ID following, reccs appreciated  - pulm following, reccs appreciated.    - f/u sputum culture    Acute hypoxia respiratory failure 2/2 likely PNA.   - pulmonary reccs appreciated  - Continue HFNC, continue to monitor and will trial wean  - full code, intubate PRN    Cavitary lesion  - doubt TB but patient has blood tinged sputum, possible early cavitation on CT, and fevers. Low suspicion for TB as per ID, discontinued isolation  - quantiferon equivocal. AFB smear negative x3    New afib   - [id number] score of at least 5 -> will anticoagulate with heparin drip for now (can be stopped if hemotpysis worsens)  - cardiology consulted, recs appreciated  - rate control if needed  - TTE shows Low normal left ventricular systolic function. Bi-atrial enlargement. Mild pulmonary hypertension  - on Lovenox, will eventually need to transition to DOAC    Elevated LFTs  - likely from current illness  - f/u final CT A/P read, monitor labs  - can consider GI consult if worsens    CINDI 2/2 prerenal azotemia - elev BUN/Cr >20  - likely 2/2 poor PO intake  - hydrate and repeat BMP in AM  - bladder scan, and if retaining, may need pittman  - hold diuretic  - avoid nephrotoxic meds    HTN/HLD  - cont with norvasc  - hold lasix  - cont with atorvastatin    BPH  - cont with tamsulosin and finasteride    Preventive measures  - Lovenox, will need DOAC

## 2022-08-18 NOTE — PROGRESS NOTE ADULT - SUBJECTIVE AND OBJECTIVE BOX
Patient is a 87y old  Male who presents with a chief complaint of cough -> PNA (18 Aug 2022 14:05)      BRIEF HOSPITAL COURSE: Patient is an 88 y/o M w/ PMHx HTN, HLD, CVA in 2015 (no residual deficits), BPH, and skin CA who presented on 8/11 c/o cough and fever x 1 week. Pt admitted to [facility] w/ acute hypoxic respiratory failure 2/2 multifocal pneumonia. Hospital course complicated by new onset atrial fibrillation and CINDI.    Events last 24 hours: Patient remains on HFNC, fio2 65% 30 L    PAST MEDICAL & SURGICAL HISTORY:  Skin cancer  s/p Mohs  Essential hypertension  Cerebrovascular accident (CVA), unspecified mechanism  2015  Hyperlipidemia, unspecified hyperlipidemia type  Complex tear of lateral meniscus of right knee as current injury, initial encounter  Complex tear of medial meniscus of right knee as current injury, initial encounter  Benign prostatic hyperplasia, presence of lower urinary tract symptoms unspecified, unspecified morphology  H/O pilonidal cyst    Review of Systems:  NEURO: no headaches, blurry vision, depression,   CV: no chest pain, palpitations, murmurs, orthopnea  Resp: shortness of breath when speaking, + cough, prior hemoptysis.   GI: no stomach pain,nausea, vomitting, flatulence, hematemesis, hematochezia  PV: no swelling of extremities, no hair loss, no coolness to extremities  Skin: no new lesions, rashes or open wounds.      Medications:  levoFLOXacin IVPB 750 milliGRAM(s) IV Intermittent every 48 hours  metoprolol tartrate 100 milliGRAM(s) Oral two times a day  tamsulosin 0.4 milliGRAM(s) Oral at bedtime  albuterol/ipratropium for Nebulization 3 milliLiter(s) Nebulizer every 6 hours PRN  guaifenesin/dextromethorphan Oral Liquid 10 milliLiter(s) Oral every 6 hours  acetaminophen     Tablet .. 650 milliGRAM(s) Oral every 6 hours PRN  melatonin 5 milliGRAM(s) Oral at bedtime  ondansetron Injectable 4 milliGRAM(s) IV Push every 8 hours PRN  enoxaparin Injectable 40 milliGRAM(s) SubCutaneous every 24 hours  aluminum hydroxide/magnesium hydroxide/simethicone Suspension 30 milliLiter(s) Oral every 4 hours PRN  magnesium hydroxide Suspension 30 milliLiter(s) Oral daily PRN  pantoprazole    Tablet 40 milliGRAM(s) Oral before breakfast  polyethylene glycol 3350 17 Gram(s) Oral daily PRN  senna 2 Tablet(s) Oral at bedtime  atorvastatin 40 milliGRAM(s) Oral at bedtime  finasteride 5 milliGRAM(s) Oral daily    ICU Vital Signs Last 24 Hrs  T(C): 36.6 (19 Aug 2022 00:19), Max: 36.9 (18 Aug 2022 20:26)  T(F): 97.9 (19 Aug 2022 00:19), Max: 98.5 (18 Aug 2022 20:26)  HR: 62 (18 Aug 2022 22:00) (55 - 79)  BP: 133/59 (18 Aug 2022 22:00) (103/49 - 140/60)  BP(mean): 80 (18 Aug 2022 22:00) (66 - 86)  ABP: --  ABP(mean): --  RR: 38 (18 Aug 2022 22:00) (23 - 38)  SpO2: 92% (18 Aug 2022 22:00) (91% - 97%)    O2 Parameters below as of 18 Aug 2022 22:00  Patient On (Oxygen Delivery Method): nasal cannula, high flow      I&O's Detail    17 Aug 2022 07:01  -  18 Aug 2022 07:00  --------------------------------------------------------  IN:    Oral Fluid: 240 mL  Total IN: 240 mL    OUT:    Voided (mL): 1200 mL  Total OUT: 1200 mL    Total NET: -960 mL    LABS:                        11.5   20.32 )-----------( 380      ( 18 Aug 2022 06:00 )             35.6     08-18    137  |  104  |  32<H>  ----------------------------<  117<H>  4.4   |  29  |  1.28    Ca    8.0<L>      18 Aug 2022 06:00  Phos  4.2     08-18  Mg     2.3     08-18    TPro  6.8  /  Alb  1.6<L>  /  TBili  0.7  /  DBili  x   /  AST  73<H>  /  ALT  76<H>  /  AlkPhos  314<H>  08-18    CAPILLARY BLOOD GLUCOSE    CULTURES:  Culture Results:   Culture is being performed. (08-13-22 @ 12:12)  Culture Results:   Culture is being performed. (08-12-22 @ 09:02)    Physical Examination:    NEURO: awake and alert  CV: (+) S1/S2, rrr, no mrg  RESP: Diminished breath sound to b/l lungs. On HFNC  GI: soft, non tender  Ext: No edema    RADIOLOGY: < from: CT Chest No Cont (08.11.22 @ 21:37) >  INTERPRETATION:  CLINICAL INFORMATION: Cough, fever. Sepsis.  Elevated LFT.    COMPARISON:None.    CONTRAST/COMPLICATIONS:  IV Contrast: NONE  Oral Contrast: NONE  Complications: None reported at time of study completion    PROCEDURE:  CT of the Chest, Abdomen and Pelvis was performed.  Sagittal and coronal reformats were performed.    FINDINGS:    CHEST:    LUNGS AND LARGE AIRWAYS: PLEURA: There are patchy bilateral groundglass   and airspace consolidations, which are most pronounced in the left upper   and lingular lobes, right upper and right middle lobes.  This is associated with bronchiectasis, particularly in the bilateral   upper lobes.  In addition, there are scattered irregular, poorly marginated nodular   opacities throughout the bilateral lower lung zones. Some of these   contain possible small areas of cavitation.  There is a small left-sided pleural effusion and trace right-sided   pleural effusion.    The central airways remain patent.    VESSELS: Atherosclerotic changes thoracic aorta and coronary artery   calcifications.    HEART: Heart size is normal. No pericardial effusion.    MEDIASTINUM AND DEO:  Pretracheal, precarinal and subcarinal mediastinal lymph nodes measuring   up to 1 cm short axis.    CHEST WALL AND LOWER NECK: Within normal limits.    ABDOMEN AND PELVIS:    Evaluation of the solid organ parenchyma is limited without intravenous   contrast.    LIVER: Within normal limits.  BILE DUCTS: Normal caliber.  GALLBLADDER: Contracted.  SPLEEN: Within normal limits.  PANCREAS: Within normal limits.  ADRENALS: Within normal limits.  KIDNEYS/URETERS:  Exophytic cyst medial upper pole left kidney.  No hydronephrosis.    BLADDER: Within normal limits.  REPRODUCTIVE ORGANS:  The prostate is not enlarged.    BOWEL:  Colonic diverticulosis, without CT evidence of diverticulitis.  No bowel obstruction.  The appendix is not well visualized on this exam.  PERITONEUM: No ascites.    VESSELS: Atherosclerotic changes.  RETROPERITONEUM/LYMPH NODES: No lymphadenopathy.    ABDOMINAL WALL:  Right inguinal hernia containing fat and nonobstructed small bowel.  Small bilateral inguinal lymph nodes.    BONES:  Degenerative changes spine.  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    IMPRESSION:    Bilateral groundglass glass and airspace consolidations, some associated   with bronchiectasis particularly in the upper lobes, findings likely   secondary to infection.  Possible small areas of cavitation.  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    No acute intra-abdominal pathology.    Other findings as discussed above.    This study was preliminary reported by the ED radiologist on 8/11/2022.    --- End of Report ---    TIME SPENT: 32 minutes  Evaluating/treating patient, reviewing data/labs/imaging, discussing case with multidisciplinary team, discussing plan/goals of care with patient/family. Non-inclusive of procedure time.

## 2022-08-18 NOTE — PROGRESS NOTE ADULT - SUBJECTIVE AND OBJECTIVE BOX
Date/Time Patient Seen:  		  Referring MD:   Data Reviewed	       Patient is a 87y old  Male who presents with a chief complaint of Pneumonia (17 Aug 2022 19:39)      Subjective/HPI     PAST MEDICAL & SURGICAL HISTORY:  No pertinent past medical history    Skin cancer  s/p Mohs    Essential hypertension    Cerebrovascular accident (CVA), unspecified mechanism  2015    Hyperlipidemia, unspecified hyperlipidemia type    Complex tear of lateral meniscus of right knee as current injury, initial encounter    Complex tear of medial meniscus of right knee as current injury, initial encounter    Benign prostatic hyperplasia, presence of lower urinary tract symptoms unspecified, unspecified morphology    H/O pilonidal cyst          Medication list         MEDICATIONS  (STANDING):  atorvastatin 40 milliGRAM(s) Oral at bedtime  enoxaparin Injectable 40 milliGRAM(s) SubCutaneous every 24 hours  finasteride 5 milliGRAM(s) Oral daily  guaifenesin/dextromethorphan Oral Liquid 10 milliLiter(s) Oral every 6 hours  levoFLOXacin IVPB 750 milliGRAM(s) IV Intermittent every 48 hours  melatonin 5 milliGRAM(s) Oral at bedtime  metoprolol tartrate 100 milliGRAM(s) Oral two times a day  pantoprazole    Tablet 40 milliGRAM(s) Oral before breakfast  senna 2 Tablet(s) Oral at bedtime  tamsulosin 0.4 milliGRAM(s) Oral at bedtime    MEDICATIONS  (PRN):  acetaminophen     Tablet .. 650 milliGRAM(s) Oral every 6 hours PRN Temp greater or equal to 38C (100.4F), Mild Pain (1 - 3)  albuterol/ipratropium for Nebulization 3 milliLiter(s) Nebulizer every 6 hours PRN Shortness of Breath and/or Wheezing  aluminum hydroxide/magnesium hydroxide/simethicone Suspension 30 milliLiter(s) Oral every 4 hours PRN Dyspepsia  magnesium hydroxide Suspension 30 milliLiter(s) Oral daily PRN Constipation  ondansetron Injectable 4 milliGRAM(s) IV Push every 8 hours PRN Nausea and/or Vomiting  polyethylene glycol 3350 17 Gram(s) Oral daily PRN Constipation         Vitals log        ICU Vital Signs Last 24 Hrs  T(C): 36.7 (18 Aug 2022 04:00), Max: 37.1 (17 Aug 2022 15:58)  T(F): 98 (18 Aug 2022 04:00), Max: 98.7 (17 Aug 2022 15:58)  HR: 57 (18 Aug 2022 06:40) (55 - 81)  BP: 117/62 (18 Aug 2022 06:30) (103/49 - 139/65)  BP(mean): 77 (18 Aug 2022 06:30) (66 - 88)  ABP: --  ABP(mean): --  RR: 35 (18 Aug 2022 06:40) (22 - 35)  SpO2: 94% (18 Aug 2022 06:40) (90% - 96%)    O2 Parameters below as of 18 Aug 2022 06:40  Patient On (Oxygen Delivery Method): nasal cannula, high flow  O2 Flow (L/min): 30  O2 Concentration (%): 65             Input and Output:  I&O's Detail    17 Aug 2022 07:01  -  18 Aug 2022 07:00  --------------------------------------------------------  IN:    Oral Fluid: 240 mL  Total IN: 240 mL    OUT:    Voided (mL): 1200 mL  Total OUT: 1200 mL    Total NET: -960 mL          Lab Data                        11.5   20.32 )-----------( 380      ( 18 Aug 2022 06:00 )             35.6     08-18    137  |  104  |  32<H>  ----------------------------<  117<H>  4.4   |  29  |  1.28    Ca    8.0<L>      18 Aug 2022 06:00  Phos  4.2     08-18  Mg     2.3     08-18    TPro  6.8  /  Alb  x   /  TBili  0.7  /  DBili  x   /  AST  73<H>  /  ALT  76<H>  /  AlkPhos  314<H>  08-18            Review of Systems	      Objective     Physical Examination    heart s1s2  lung dec BS  abd soft      Pertinent Lab findings & Imaging      Bud:  NO   Adequate UO     I&O's Detail    17 Aug 2022 07:01  -  18 Aug 2022 07:00  --------------------------------------------------------  IN:    Oral Fluid: 240 mL  Total IN: 240 mL    OUT:    Voided (mL): 1200 mL  Total OUT: 1200 mL    Total NET: -960 mL               Discussed with:     Cultures:	        Radiology

## 2022-08-18 NOTE — PROGRESS NOTE ADULT - ASSESSMENT
87M with HTN, HLD, hx of CVA (no residual deficits), BPH, hx of skin CA who presents with cough and fevers    afb neg x 3  vs noted  labs reviewed  HF NC - wean as tolerated  differential dx in pt with hemoptysis and cavitation is Malignancy - pt will need repeat CT chest in 4 - 6 weeks and possibly a Bronchoscopy or other means of diagnostic testing - examination  urine legionella ag neg - legionella ab positive -   ID follow up reviewed  on Levaquin     on emp ABX  ID - Cardio follow up  I and O  serial labs  monitor VS and Sat  may need NIPPV if resp distress cont despite HF NC  prognosis guarded  GOC discussion

## 2022-08-18 NOTE — PROGRESS NOTE ADULT - SUBJECTIVE AND OBJECTIVE BOX
Chief Complaint: Hemoptysis    Interval Events: No events overnight.    Review of Systems:  General: No fevers, chills, weight gain  Skin: No rashes, color changes  Cardiovascular: No chest pain, orthopnea  Respiratory: No shortness of breath, cough  Gastrointestinal: No nausea, abdominal pain  Genitourinary: No incontinence, pain with urination  Musculoskeletal: No pain, swelling, decreased range of motion  Neurological: No headache, weakness  Psychiatric: No depression, anxiety  Endocrine: No weight gain, increased thirst  All other systems are comprehensively negative.    Physical Exam:  Vital Signs Last 24 Hrs  T(C): 36.4 (18 Aug 2022 08:02), Max: 37.1 (17 Aug 2022 15:58)  T(F): 97.6 (18 Aug 2022 08:02), Max: 98.7 (17 Aug 2022 15:58)  HR: 60 (18 Aug 2022 08:00) (55 - 76)  BP: 136/65 (18 Aug 2022 08:00) (103/49 - 139/65)  BP(mean): 86 (18 Aug 2022 08:00) (66 - 88)  RR: 26 (18 Aug 2022 08:00) (22 - 35)  SpO2: 94% (18 Aug 2022 08:00) (90% - 96%)  Parameters below as of 18 Aug 2022 08:00  Patient On (Oxygen Delivery Method): nasal cannula, high flow  O2 Flow (L/min): 30  O2 Concentration (%): 65  General: NAD  HEENT: MMM  Neck: No JVD, no carotid bruit  Lungs: CTAB  CV: RRR, nl S1/S2, no M/R/G  Abdomen: S/NT/ND, +BS  Extremities: No LE edema, no cyanosis  Neuro: AAOx3, non-focal  Skin: No rash    Labs:             08-18    137  |  104  |  32<H>  ----------------------------<  117<H>  4.4   |  29  |  1.28    Ca    8.0<L>      18 Aug 2022 06:00  Phos  4.2     08-18  Mg     2.3     08-18    TPro  6.8  /  Alb  1.6<L>  /  TBili  0.7  /  DBili  x   /  AST  73<H>  /  ALT  76<H>  /  AlkPhos  314<H>  08-18                        11.5   20.32 )-----------( 380      ( 18 Aug 2022 06:00 )             35.6       Telemetry: AF, sinus rhythm with PACs

## 2022-08-18 NOTE — PROGRESS NOTE ADULT - SUBJECTIVE AND OBJECTIVE BOX
Patient is a 87y old  Male who presents with a chief complaint of cough -> PNA (18 Aug 2022 07:50)      INTERVAL HPI/OVERNIGHT EVENTS:    MEDICATIONS  (STANDING):  atorvastatin 40 milliGRAM(s) Oral at bedtime  enoxaparin Injectable 40 milliGRAM(s) SubCutaneous every 24 hours  finasteride 5 milliGRAM(s) Oral daily  guaifenesin/dextromethorphan Oral Liquid 10 milliLiter(s) Oral every 6 hours  levoFLOXacin IVPB 750 milliGRAM(s) IV Intermittent every 48 hours  melatonin 5 milliGRAM(s) Oral at bedtime  metoprolol tartrate 100 milliGRAM(s) Oral two times a day  pantoprazole    Tablet 40 milliGRAM(s) Oral before breakfast  senna 2 Tablet(s) Oral at bedtime  tamsulosin 0.4 milliGRAM(s) Oral at bedtime    MEDICATIONS  (PRN):  acetaminophen     Tablet .. 650 milliGRAM(s) Oral every 6 hours PRN Temp greater or equal to 38C (100.4F), Mild Pain (1 - 3)  albuterol/ipratropium for Nebulization 3 milliLiter(s) Nebulizer every 6 hours PRN Shortness of Breath and/or Wheezing  aluminum hydroxide/magnesium hydroxide/simethicone Suspension 30 milliLiter(s) Oral every 4 hours PRN Dyspepsia  magnesium hydroxide Suspension 30 milliLiter(s) Oral daily PRN Constipation  ondansetron Injectable 4 milliGRAM(s) IV Push every 8 hours PRN Nausea and/or Vomiting  polyethylene glycol 3350 17 Gram(s) Oral daily PRN Constipation      Allergies    No Known Allergies    Intolerances        REVIEW OF SYSTEMS:  CONSTITUTIONAL: No fever, weight loss, or fatigue  EYES: No eye pain, visual disturbances, or discharge  ENMT:  No difficulty hearing, tinnitus, vertigo; No sinus or throat pain  NECK: No pain or stiffness  RESPIRATORY: No cough, wheezing, chills or hemoptysis; No shortness of breath  CARDIOVASCULAR: No chest pain, palpitations, lightheadedness, or leg swelling  GASTROINTESTINAL: No abdominal or epigastric pain. No nausea, vomiting, or hematemesis; No diarrhea or constipation. No melena or hematochezia.  GENITOURINARY: No dysuria, frequency, hematuria, or incontinence  NEUROLOGICAL: No headaches, memory loss, vertigo, loss of strength, numbness, or tremors  SKIN: No itching, burning, rashes, or lesions   LYMPH NODES: No enlarged glands  ENDOCRINE: No heat or cold intolerance; No hair loss; No polydipsia or polyuria  MUSCULOSKELETAL: No joint pain or swelling; No muscle, back, or extremity pain  PSYCHIATRIC: No depression, anxiety, or mood swings  HEME/LYMPH: No easy bruising, or bleeding gums  ALLERGY AND IMMUNOLOGIC: No hives or eczema    PHYSICAL EXAM:  GENERAL: NAD, well-groomed, well-developed  HEAD:  Atraumatic, Normocephalic  EYES: EOMI, PERRLA, conjunctiva and sclera clear  ENMT: Moist mucous membranes, Good dentition, No lesions  NECK: Supple, No JVD appreciated  NERVOUS SYSTEM:  Alert & Oriented X3, Good concentration; All 4 extremities mobile, no gross sensory deficits.   CHEST/LUNG: Clear to auscultation bilaterally; No rales, rhonchi, wheezing, or rubs appreciated  HEART: Regular rate and rhythm; No murmurs, rubs, or gallops  ABDOMEN: Soft, Nontender, Nondistended; Bowel sounds present  EXTREMITIES:  No clubbing, cyanosis, or edema appreciated  LYMPH: No lymphadenopathy noted  SKIN: No rashes or lesions appreciated    Vital Signs Last 24 Hrs  T(C): 36.6 (18 Aug 2022 12:32), Max: 37.1 (17 Aug 2022 15:58)  T(F): 97.9 (18 Aug 2022 12:32), Max: 98.7 (17 Aug 2022 15:58)  HR: 66 (18 Aug 2022 12:00) (55 - 76)  BP: 112/53 (18 Aug 2022 10:00) (103/49 - 139/65)  BP(mean): 71 (18 Aug 2022 10:00) (66 - 88)  RR: 23 (18 Aug 2022 12:00) (23 - 35)  SpO2: 91% (18 Aug 2022 12:00) (90% - 97%)    Parameters below as of 18 Aug 2022 12:00  Patient On (Oxygen Delivery Method): nasal cannula, high flow        LABS:                        11.5   20.32 )-----------( 380      ( 18 Aug 2022 06:00 )             35.6     18 Aug 2022 06:00    137    |  104    |  32     ----------------------------<  117    4.4     |  29     |  1.28     Ca    8.0        18 Aug 2022 06:00  Phos  4.2       18 Aug 2022 06:00  Mg     2.3       18 Aug 2022 06:00    TPro  6.8    /  Alb  1.6    /  TBili  0.7    /  DBili  x      /  AST  73     /  ALT  76     /  AlkPhos  314    18 Aug 2022 06:00        CAPILLARY BLOOD GLUCOSE          RADIOLOGY & ADDITIONAL TESTS:    Imaging Personally Reviewed:  [ ] YES     Consultant(s) Notes Reviewed:      Care Discussed with Consultants/Other Providers:    Advanced Directives: [ ] DNR  [ ] No feeding tube  [ ] MOLST in chart  [ ] MOLST completed today  [ ] Unknown   Patient is a 87y old  Male who presents with a chief complaint of cough -> PNA (18 Aug 2022 07:50)      INTERVAL HPI/OVERNIGHT EVENTS:  Patient seen awake in bed. He reports cough has improved, and reports sitting in chair with PT today. Reports dyspnea changes based on position of bed. No reported overnight events.         MEDICATIONS  (STANDING):  atorvastatin 40 milliGRAM(s) Oral at bedtime  enoxaparin Injectable 40 milliGRAM(s) SubCutaneous every 24 hours  finasteride 5 milliGRAM(s) Oral daily  guaifenesin/dextromethorphan Oral Liquid 10 milliLiter(s) Oral every 6 hours  levoFLOXacin IVPB 750 milliGRAM(s) IV Intermittent every 48 hours  melatonin 5 milliGRAM(s) Oral at bedtime  metoprolol tartrate 100 milliGRAM(s) Oral two times a day  pantoprazole    Tablet 40 milliGRAM(s) Oral before breakfast  senna 2 Tablet(s) Oral at bedtime  tamsulosin 0.4 milliGRAM(s) Oral at bedtime    MEDICATIONS  (PRN):  acetaminophen     Tablet .. 650 milliGRAM(s) Oral every 6 hours PRN Temp greater or equal to 38C (100.4F), Mild Pain (1 - 3)  albuterol/ipratropium for Nebulization 3 milliLiter(s) Nebulizer every 6 hours PRN Shortness of Breath and/or Wheezing  aluminum hydroxide/magnesium hydroxide/simethicone Suspension 30 milliLiter(s) Oral every 4 hours PRN Dyspepsia  magnesium hydroxide Suspension 30 milliLiter(s) Oral daily PRN Constipation  ondansetron Injectable 4 milliGRAM(s) IV Push every 8 hours PRN Nausea and/or Vomiting  polyethylene glycol 3350 17 Gram(s) Oral daily PRN Constipation      Allergies    No Known Allergies    Intolerances        REVIEW OF SYSTEMS:  CONSTITUTIONAL: No fever, weight loss, or fatigue  EYES: No eye pain, visual disturbances, or discharge  ENMT:  No difficulty hearing, tinnitus, vertigo; No sinus or throat pain  NECK: No pain or stiffness  RESPIRATORY: Productive cough, no wheezing, chills or hemoptysis; Shortness of breath  CARDIOVASCULAR: No chest pain, palpitations, lightheadedness, or leg swelling  GASTROINTESTINAL: No abdominal or epigastric pain. No nausea, vomiting, or hematemesis; No diarrhea or constipation. No melena or hematochezia.  GENITOURINARY: No dysuria, frequency, hematuria, or incontinence  NEUROLOGICAL: No headaches, memory loss, vertigo, loss of strength, numbness, or tremors  SKIN: No itching, burning, rashes, or lesions   MUSCULOSKELETAL: No joint pain or swelling; No muscle, back, or extremity pain      PHYSICAL EXAM:  GENERAL: NAD, well-groomed, well-developed  HEAD:  Atraumatic, Normocephalic  EYES: EOMI, PERRLA, conjunctiva and sclera clear  ENMT: Moist mucous membranes, Good dentition, No lesions. On hi flow nasal cannula  NECK: Supple, No JVD appreciated  NERVOUS SYSTEM:  Alert & Oriented X3, Good concentration; All 4 extremities mobile, no gross sensory deficits.   CHEST/LUNG: Diminished to auscultation; No rales, rhonchi, wheezing, or rubs appreciated  HEART: Regular rate and rhythm; No murmurs, rubs, or gallops  ABDOMEN: Soft, Nontender, Nondistended; Bowel sounds present  EXTREMITIES:  No clubbing, cyanosis, or edema appreciated  LYMPH: No lymphadenopathy noted  SKIN: No rashes or lesions appreciated      Vital Signs Last 24 Hrs  T(C): 36.6 (18 Aug 2022 12:32), Max: 37.1 (17 Aug 2022 15:58)  T(F): 97.9 (18 Aug 2022 12:32), Max: 98.7 (17 Aug 2022 15:58)  HR: 66 (18 Aug 2022 12:00) (55 - 76)  BP: 112/53 (18 Aug 2022 10:00) (103/49 - 139/65)  BP(mean): 71 (18 Aug 2022 10:00) (66 - 88)  RR: 23 (18 Aug 2022 12:00) (23 - 35)  SpO2: 91% (18 Aug 2022 12:00) (90% - 97%)    Parameters below as of 18 Aug 2022 12:00  Patient On (Oxygen Delivery Method): nasal cannula, high flow        LABS:                        11.5   20.32 )-----------( 380      ( 18 Aug 2022 06:00 )             35.6     18 Aug 2022 06:00    137    |  104    |  32     ----------------------------<  117    4.4     |  29     |  1.28     Ca    8.0        18 Aug 2022 06:00  Phos  4.2       18 Aug 2022 06:00  Mg     2.3       18 Aug 2022 06:00    TPro  6.8    /  Alb  1.6    /  TBili  0.7    /  DBili  x      /  AST  73     /  ALT  76     /  AlkPhos  314    18 Aug 2022 06:00        CAPILLARY BLOOD GLUCOSE

## 2022-08-18 NOTE — PROGRESS NOTE ADULT - ASSESSMENT
The patient is an 87 year old male with a history of HTN, HL, BPH, CVA who presents with cough and hemoptysis in the setting of PNA and r/o TB.    Plan:  - The rhythm is now back to sinus rhythm with PACs and intermittent episodes of AF  - Continue metoprolol tartrate 100 mg bid  - The patient has ongoing hemoptysis. Remain off heparin drip as risks outweigh benefits. When hemoptysis resolves, will start apixaban.  - Discontinue amlodipine  - Echo with low normal LV systolic function, mild pulm HTN  - IV antibiotics  - AFB negative x3  - Pulm and ID follow-up  - Continues to have hemoptysis - may need bronchoscopy

## 2022-08-18 NOTE — PROGRESS NOTE ADULT - ASSESSMENT
Patient is an 86 y/o M w/ PMHx HTN, HLD, CVA in 2015 (no residual deficits), BPH, and skin CA who presented to the ED on 08/11/22 with fever, cough, SOB.    Problem:  - Acute hypoxic resp failure  - Multifocal pna  - CINDI  - Transaminitis  - New onset a-fib, now converted    Plan:  - Monitor mental status in setting of hypoxia, currently intact   - Actively titrating HFNC settings to maintain SpO2 > 92%, fio2 65%, 30 L  - Pulmonary following, will need repeat CT in 4-6 weeks and possible bronch  - Prior A-fib, now Converted to NSR. Cardiology following, Continue lopressor 100mg BID.   - will need AC, though d/t concerns of hemoptysis will hold at this time. Planned to start eliquis per cardio when able  - Renal function slowly improving. Trend electrolytes, Maintain K > 4 and Mg > 2.  - LFTs elevate, continue to monitor, can obtain RUQ US if worsening  - Legionella ab positive, but urine negative. Continue levaquin per ID.

## 2022-08-18 NOTE — PROGRESS NOTE ADULT - SUBJECTIVE AND OBJECTIVE BOX
[facility], Division of Infectious Diseases  EROS Perez Y. Patel, S. Shah, [facility]  [phone]    Name: DENNIS BRADFORD  Age: 87y  Gender: Male  MRN: [id number]    Interval History:  Patient seen and examined at bedside  No acute overnight events. Afebrile  On HFNC still  Cough slightly improved, more hemoptysis  Daughter at bedside  Notes reviewed    Antibiotics:  levoFLOXacin IVPB 750 milliGRAM(s) IV Intermittent every 48 hours      Medications:  acetaminophen     Tablet .. 650 milliGRAM(s) Oral every 6 hours PRN  albuterol/ipratropium for Nebulization 3 milliLiter(s) Nebulizer every 6 hours PRN  aluminum hydroxide/magnesium hydroxide/simethicone Suspension 30 milliLiter(s) Oral every 4 hours PRN  atorvastatin 40 milliGRAM(s) Oral at bedtime  finasteride 5 milliGRAM(s) Oral daily  guaifenesin/dextromethorphan Oral Liquid 10 milliLiter(s) Oral every 6 hours  levoFLOXacin IVPB 750 milliGRAM(s) IV Intermittent every 48 hours  magnesium hydroxide Suspension 30 milliLiter(s) Oral daily PRN  melatonin 3 milliGRAM(s) Oral at bedtime PRN  metoprolol tartrate 100 milliGRAM(s) Oral two times a day  ondansetron Injectable 4 milliGRAM(s) IV Push every 8 hours PRN  pantoprazole    Tablet 40 milliGRAM(s) Oral before breakfast  polyethylene glycol 3350 17 Gram(s) Oral daily PRN  senna 2 Tablet(s) Oral at bedtime  tamsulosin 0.4 milliGRAM(s) Oral at bedtime      Review of Systems:  Review of systems otherwise negative except as previously noted.    Allergies: No Known Allergies    For details regarding the patient's past medical history, social history, family history, and other miscellaneous elements, please refer the initial infectious diseases consultation and/or the admitting history and physical examination for this admission.    Objective:  Vital Signs Last 24 Hrs  T(C): 36.6 (18 Aug 2022 12:32), Max: 37.1 (17 Aug 2022 15:58)  T(F): 97.9 (18 Aug 2022 12:32), Max: 98.7 (17 Aug 2022 15:58)  HR: 66 (18 Aug 2022 12:00) (55 - 76)  BP: 112/53 (18 Aug 2022 10:00) (103/49 - 139/65)  BP(mean): 71 (18 Aug 2022 10:00) (66 - 88)  RR: 23 (18 Aug 2022 12:00) (23 - 35)  SpO2: 91% (18 Aug 2022 12:00) (90% - 97%)    Parameters below as of 18 Aug 2022 12:00  Patient On (Oxygen Delivery Method): nasal cannula, high flow    Physical Examination:  General: no acute distress  HEENT: NC/AT, EOMI,  Cardio: S1, S2 heard, RRR, no murmurs  Resp: b/l rales on HFNC  Abd: soft, NT, ND  Ext: no edema or cyanosis  Skin: warm, dry, no visible rash      Laboratory Studies:                          11.5   20.32 )-----------( 380      ( 18 Aug 2022 06:00 )             35.6   08-18    137  |  104  |  32<H>  ----------------------------<  117<H>  4.4   |  29  |  1.28    Ca    8.0<L>      18 Aug 2022 06:00  Phos  4.2     08-18  Mg     2.3     08-18    TPro  6.8  /  Alb  1.6<L>  /  TBili  0.7  /  DBili  x   /  AST  73<H>  /  ALT  76<H>  /  AlkPhos  314<H>  08-18      Microbiology: reviewed    Culture - Acid Fast - Sputum w/Smear (collected 08-13-22 @ 12:12)  Source: .Sputum Sputum    Culture - Acid Fast - Sputum w/Smear (collected 08-12-22 @ 09:02)  Source: .Sputum Sputum    Culture - Acid Fast - Sputum w/Smear (collected 08-12-22 @ 01:04)  Source: .Sputum Sputum    Culture - Blood (collected 08-11-22 @ 21:07)  Source: .Blood Blood  Preliminary Report (08-13-22 @ 14:01):    No growth to date.    Culture - Blood (collected 08-11-22 @ 21:07)  Source: .Blood Blood  Preliminary Report (08-13-22 @ 14:01):    No growth to date.        Radiology: reviewed

## 2022-08-19 LAB
BASE EXCESS BLDA CALC-SCNC: 1.8 MMOL/L — SIGNIFICANT CHANGE UP (ref -2–3)
CRP SERPL-MCNC: 122 MG/L — HIGH
GAS PNL BLDA: SIGNIFICANT CHANGE UP
HCO3 BLDA-SCNC: 25 MMOL/L — SIGNIFICANT CHANGE UP (ref 21–28)
HCT VFR BLD CALC: 35.1 % — LOW (ref 39–50)
HGB BLD-MCNC: 11.4 G/DL — LOW (ref 13–17)
HOROWITZ INDEX BLDA+IHG-RTO: 55 — SIGNIFICANT CHANGE UP
MCHC RBC-ENTMCNC: 31.1 PG — SIGNIFICANT CHANGE UP (ref 27–34)
MCHC RBC-ENTMCNC: 32.5 GM/DL — SIGNIFICANT CHANGE UP (ref 32–36)
MCV RBC AUTO: 95.6 FL — SIGNIFICANT CHANGE UP (ref 80–100)
NRBC # BLD: 0 /100 WBCS — SIGNIFICANT CHANGE UP (ref 0–0)
PCO2 BLDA: 34 MMHG — LOW (ref 35–48)
PH BLDA: 7.48 — HIGH (ref 7.35–7.45)
PLATELET # BLD AUTO: 442 K/UL — HIGH (ref 150–400)
PO2 BLDA: 70 MMHG — LOW (ref 83–108)
PROCALCITONIN SERPL-MCNC: 0.36 NG/ML — HIGH (ref 0.02–0.1)
RBC # BLD: 3.67 M/UL — LOW (ref 4.2–5.8)
RBC # FLD: 14.3 % — SIGNIFICANT CHANGE UP (ref 10.3–14.5)
SAO2 % BLDA: 95.3 % — SIGNIFICANT CHANGE UP (ref 94–98)
WBC # BLD: 18.29 K/UL — HIGH (ref 3.8–10.5)
WBC # FLD AUTO: 18.29 K/UL — HIGH (ref 3.8–10.5)

## 2022-08-19 PROCEDURE — 99291 CRITICAL CARE FIRST HOUR: CPT

## 2022-08-19 RX ORDER — IPRATROPIUM/ALBUTEROL SULFATE 18-103MCG
3 AEROSOL WITH ADAPTER (GRAM) INHALATION EVERY 6 HOURS
Refills: 0 | Status: DISCONTINUED | OUTPATIENT
Start: 2022-08-19 | End: 2022-08-23

## 2022-08-19 RX ORDER — CYCLOBENZAPRINE HYDROCHLORIDE 10 MG/1
5 TABLET, FILM COATED ORAL AT BEDTIME
Refills: 0 | Status: DISCONTINUED | OUTPATIENT
Start: 2022-08-19 | End: 2022-08-20

## 2022-08-19 RX ADMIN — Medication 650 MILLIGRAM(S): at 00:10

## 2022-08-19 RX ADMIN — SENNA PLUS 2 TABLET(S): 8.6 TABLET ORAL at 21:32

## 2022-08-19 RX ADMIN — Medication 3 MILLILITER(S): at 21:02

## 2022-08-19 RX ADMIN — PANTOPRAZOLE SODIUM 40 MILLIGRAM(S): 20 TABLET, DELAYED RELEASE ORAL at 06:16

## 2022-08-19 RX ADMIN — TAMSULOSIN HYDROCHLORIDE 0.4 MILLIGRAM(S): 0.4 CAPSULE ORAL at 21:32

## 2022-08-19 RX ADMIN — Medication 100 MILLIGRAM(S): at 06:17

## 2022-08-19 RX ADMIN — Medication 10 MILLILITER(S): at 06:16

## 2022-08-19 RX ADMIN — ENOXAPARIN SODIUM 40 MILLIGRAM(S): 100 INJECTION SUBCUTANEOUS at 17:03

## 2022-08-19 RX ADMIN — Medication 5 MILLIGRAM(S): at 21:32

## 2022-08-19 RX ADMIN — FINASTERIDE 5 MILLIGRAM(S): 5 TABLET, FILM COATED ORAL at 12:26

## 2022-08-19 RX ADMIN — Medication 10 MILLILITER(S): at 12:26

## 2022-08-19 RX ADMIN — ATORVASTATIN CALCIUM 40 MILLIGRAM(S): 80 TABLET, FILM COATED ORAL at 21:32

## 2022-08-19 RX ADMIN — Medication 100 MILLIGRAM(S): at 17:03

## 2022-08-19 RX ADMIN — CYCLOBENZAPRINE HYDROCHLORIDE 5 MILLIGRAM(S): 10 TABLET, FILM COATED ORAL at 21:32

## 2022-08-19 RX ADMIN — Medication 10 MILLILITER(S): at 17:03

## 2022-08-19 NOTE — PROGRESS NOTE ADULT - SUBJECTIVE AND OBJECTIVE BOX
[facility], Division of Infectious Diseases  EROS Perez Y. Patel, S. Shah, G. [facility]  [phone]  ([phone] - weekdays after 5pm and weekends)    Name: DENNIS BRADFORD  Age/Gender: 87y Male  MRN: [id number]    Interval History:  Patient seen this morning.   Notes reviewed.   No concerning overnight events.  Afebrile.   states he does not feel he is improving with his breathing even if the numbers look better    Allergies: No Known Allergies      Objective:  Vitals:   T(F): 97.8 (08-19-22 @ 15:39), Max: 98.5 (08-18-22 @ 20:26)  HR: 65 (08-19-22 @ 14:00) (55 - 75)  BP: 128/60 (08-19-22 @ 14:00) (106/52 - 136/61)  RR: 37 (08-19-22 @ 14:00) (25 - 38)  SpO2: 95% (08-19-22 @ 14:00) (92% - 98%)  Physical Examination:  General: no acute distress, frail appearing  HEENT: anicteric  Cardio: S1, S2, normal rate  Resp: decreased breath sounds  Abd: soft, ND, NT  Ext: no LE edema  Skin: warm, dry, no visible rash     Laboratory Studies:  CBC:                       11.4   18.29 )-----------( 442      ( 19 Aug 2022 06:36 )             35.1     WBC Trend:  18.29 08-19-22 @ 06:36  20.32 08-18-22 @ 06:00  22.14 08-17-22 @ 06:26  24.80 08-16-22 @ 06:00  26.15 08-15-22 @ 06:00  23.04 08-14-22 @ 06:53  24.85 08-13-22 @ 06:22    CMP: 08-18    137  |  104  |  32<H>  ----------------------------<  117<H>  4.4   |  29  |  1.28    Ca    8.0<L>      18 Aug 2022 06:00  Phos  4.2     08-18  Mg     2.3     08-18    TPro  6.8  /  Alb  1.6<L>  /  TBili  0.7  /  DBili  x   /  AST  73<H>  /  ALT  76<H>  /  AlkPhos  314<H>  08-18      LIVER FUNCTIONS - ( 18 Aug 2022 06:00 )  Alb: 1.6 g/dL / Pro: 6.8 g/dL / ALK PHOS: 314 U/L / ALT: 76 U/L DA / AST: 73 U/L / GGT: x               Microbiology: reviewed     Culture - Acid Fast - Sputum w/Smear (collected 08-13-22 @ 12:12)  Source: .Sputum Sputum  Preliminary Report (08-17-22 @ 15:04):    Culture is being performed.    Culture - Acid Fast - Sputum w/Smear (collected 08-12-22 @ 09:02)  Source: .Sputum Sputum  Preliminary Report (08-17-22 @ 15:04):    Culture is being performed.    Culture - Acid Fast - Sputum w/Smear (collected 08-12-22 @ 01:04)  Source: .Sputum Sputum  Preliminary Report (08-17-22 @ 15:04):    Culture is being performed.    Culture - Blood (collected 08-11-22 @ 21:07)  Source: .Blood Blood  Final Report (08-17-22 @ 14:00):    No Growth Final    Culture - Blood (collected 08-11-22 @ 21:07)  Source: .Blood Blood  Final Report (08-17-22 @ 14:00):    No Growth Final      Radiology: reviewed     Medications:  acetaminophen     Tablet .. 650 milliGRAM(s) Oral every 6 hours PRN  albuterol/ipratropium for Nebulization 3 milliLiter(s) Nebulizer every 6 hours PRN  aluminum hydroxide/magnesium hydroxide/simethicone Suspension 30 milliLiter(s) Oral every 4 hours PRN  atorvastatin 40 milliGRAM(s) Oral at bedtime  cyclobenzaprine 5 milliGRAM(s) Oral at bedtime  enoxaparin Injectable 40 milliGRAM(s) SubCutaneous every 24 hours  finasteride 5 milliGRAM(s) Oral daily  guaifenesin/dextromethorphan Oral Liquid 10 milliLiter(s) Oral every 6 hours  levoFLOXacin IVPB 750 milliGRAM(s) IV Intermittent every 48 hours  magnesium hydroxide Suspension 30 milliLiter(s) Oral daily PRN  melatonin 5 milliGRAM(s) Oral at bedtime  metoprolol tartrate 100 milliGRAM(s) Oral two times a day  ondansetron Injectable 4 milliGRAM(s) IV Push every 8 hours PRN  pantoprazole    Tablet 40 milliGRAM(s) Oral before breakfast  polyethylene glycol 3350 17 Gram(s) Oral daily PRN  senna 2 Tablet(s) Oral at bedtime  tamsulosin 0.4 milliGRAM(s) Oral at bedtime    Antimicrobials:  levoFLOXacin IVPB 750 milliGRAM(s) IV Intermittent every 48 hours

## 2022-08-19 NOTE — PROGRESS NOTE ADULT - ASSESSMENT
87M with HTN, HLD, hx of CVA (no residual deficits), BPH, hx of skin CA who presents with cough and fevers    afb neg x 3  vs noted  labs reviewed  HF NC - wean as tolerated  differential dx in pt with hemoptysis and cavitation is Malignancy - pt will need repeat CT chest in 4 - 6 weeks and possibly a Bronchoscopy or other means of diagnostic testing - examination  urine legionella ag neg - legionella ab positive -   ID follow up reviewed  on Levaquin   WILL CHECK ABG this am -     on emp ABX  ID - Cardio follow up  I and O  serial labs  monitor VS and Sat  may need NIPPV if resp distress cont despite HF NC  prognosis guarded  GOC discussion

## 2022-08-19 NOTE — PROGRESS NOTE ADULT - SUBJECTIVE AND OBJECTIVE BOX
Date/Time Patient Seen:  		  Referring MD:   Data Reviewed	       Patient is a 87y old  Male who presents with a chief complaint of cough -> PNA (18 Aug 2022 19:15)      Subjective/HPI     PAST MEDICAL & SURGICAL HISTORY:  No pertinent past medical history    Skin cancer  s/p Mohs    Essential hypertension    Cerebrovascular accident (CVA), unspecified mechanism  2015    Hyperlipidemia, unspecified hyperlipidemia type    Complex tear of lateral meniscus of right knee as current injury, initial encounter    Complex tear of medial meniscus of right knee as current injury, initial encounter    Benign prostatic hyperplasia, presence of lower urinary tract symptoms unspecified, unspecified morphology    H/O pilonidal cyst          Medication list         MEDICATIONS  (STANDING):  atorvastatin 40 milliGRAM(s) Oral at bedtime  enoxaparin Injectable 40 milliGRAM(s) SubCutaneous every 24 hours  finasteride 5 milliGRAM(s) Oral daily  guaifenesin/dextromethorphan Oral Liquid 10 milliLiter(s) Oral every 6 hours  levoFLOXacin IVPB 750 milliGRAM(s) IV Intermittent every 48 hours  melatonin 5 milliGRAM(s) Oral at bedtime  metoprolol tartrate 100 milliGRAM(s) Oral two times a day  pantoprazole    Tablet 40 milliGRAM(s) Oral before breakfast  senna 2 Tablet(s) Oral at bedtime  tamsulosin 0.4 milliGRAM(s) Oral at bedtime    MEDICATIONS  (PRN):  acetaminophen     Tablet .. 650 milliGRAM(s) Oral every 6 hours PRN Temp greater or equal to 38C (100.4F), Mild Pain (1 - 3)  albuterol/ipratropium for Nebulization 3 milliLiter(s) Nebulizer every 6 hours PRN Shortness of Breath and/or Wheezing  aluminum hydroxide/magnesium hydroxide/simethicone Suspension 30 milliLiter(s) Oral every 4 hours PRN Dyspepsia  magnesium hydroxide Suspension 30 milliLiter(s) Oral daily PRN Constipation  ondansetron Injectable 4 milliGRAM(s) IV Push every 8 hours PRN Nausea and/or Vomiting  polyethylene glycol 3350 17 Gram(s) Oral daily PRN Constipation         Vitals log        ICU Vital Signs Last 24 Hrs  T(C): 36.8 (19 Aug 2022 04:28), Max: 36.9 (18 Aug 2022 20:26)  T(F): 98.2 (19 Aug 2022 04:28), Max: 98.5 (18 Aug 2022 20:26)  HR: 75 (19 Aug 2022 06:20) (56 - 79)  BP: 125/107 (19 Aug 2022 06:20) (106/52 - 140/60)  BP(mean): 115 (19 Aug 2022 06:20) (70 - 115)  ABP: --  ABP(mean): --  RR: 25 (19 Aug 2022 06:20) (23 - 38)  SpO2: 93% (19 Aug 2022 06:20) (91% - 97%)    O2 Parameters below as of 19 Aug 2022 06:20  Patient On (Oxygen Delivery Method): nasal cannula, high flow  O2 Flow (L/min): 30  O2 Concentration (%): 55             Input and Output:  I&O's Detail    18 Aug 2022 07:01  -  19 Aug 2022 07:00  --------------------------------------------------------  IN:    Oral Fluid: 300 mL  Total IN: 300 mL    OUT:    Voided (mL): 800 mL  Total OUT: 800 mL    Total NET: -500 mL          Lab Data                        11.4   18.29 )-----------( 442      ( 19 Aug 2022 06:36 )             35.1     08-18    137  |  104  |  32<H>  ----------------------------<  117<H>  4.4   |  29  |  1.28    Ca    8.0<L>      18 Aug 2022 06:00  Phos  4.2     08-18  Mg     2.3     08-18    TPro  6.8  /  Alb  1.6<L>  /  TBili  0.7  /  DBili  x   /  AST  73<H>  /  ALT  76<H>  /  AlkPhos  314<H>  08-18            Review of Systems	      Objective     Physical Examination    heart s1s2  lung dc BS  abd soft      Pertinent Lab findings & Imaging      Bud:  NO   Adequate UO     I&O's Detail    18 Aug 2022 07:01  -  19 Aug 2022 07:00  --------------------------------------------------------  IN:    Oral Fluid: 300 mL  Total IN: 300 mL    OUT:    Voided (mL): 800 mL  Total OUT: 800 mL    Total NET: -500 mL               Discussed with:     Cultures:	        Radiology

## 2022-08-19 NOTE — PROGRESS NOTE ADULT - ASSESSMENT
87M with HTN, HLD, hx of CVA (no residual deficits), BPH, hx of skin CA who presents with cough and fevers. Found to have multifocal pneumonia.  Also found have new onset afib.        Sepsis 2/2 multifocal PNA - meets sepsis criteria with leukocytosis + tachycardia + source of infection  - strep PNA Ag negative. Legionella antibody positive, legionella Ag negative  - s/p ceftriaxone and azithromycin, Zosyn. Continue Levaquin, renally dosed, as per ID  - ID following, reccs appreciated  - pulm following, reccs appreciated.    - f/u sputum culture    Acute hypoxia respiratory failure 2/2 likely PNA.   - pulmonary reccs appreciated  - Continue HFNC, continue to monitor and will trial wean  - full code, intubate PRN    Cavitary lesion  - doubt TB but patient has blood tinged sputum, possible early cavitation on CT, and fevers. Low suspicion for TB as per ID, discontinued isolation  - quantiferon equivocal. AFB smear negative x3    New afib   - [id number] score of at least 5 -> will anticoagulate with heparin drip for now (can be stopped if hemotpysis worsens)  - cardiology consulted, recs appreciated  - rate control if needed  - TTE shows Low normal left ventricular systolic function. Bi-atrial enlargement. Mild pulmonary hypertension  - on Lovenox, will eventually need to transition to DOAC    Elevated LFTs  - likely from current illness  - f/u final CT A/P read, monitor labs  - can consider GI consult if worsens    CINDI 2/2 prerenal azotemia - elev BUN/Cr >20  - likely 2/2 poor PO intake  - hydrate and repeat BMP in AM  - bladder scan, and if retaining, may need pittman  - hold diuretic  - avoid nephrotoxic meds    HTN/HLD  - cont with norvasc  - hold lasix  - cont with atorvastatin    BPH  - cont with tamsulosin and finasteride    Preventive measures  - Lovenox, will need DOAC      87M with HTN, HLD, hx of CVA (no residual deficits), BPH, hx of skin CA who presents with cough and fevers. Found to have multifocal pneumonia.  Also found have new onset afib.        Sepsis 2/2 multifocal PNA - meets sepsis criteria on arrival with leukocytosis + tachycardia + source of infection  - strep PNA Ag negative. Legionella antibody positive, legionella Ag negative  - s/p ceftriaxone and azithromycin, Zosyn. Continue Levaquin, renally dosed, as per ID  - ID following, reccs appreciated  - pulm following, reccs appreciated  - f/u sputum culture    Acute hypoxia respiratory failure 2/2 likely PNA.   - pulmonary reccs appreciated  - Continue HFNC, continue to monitor and will trial wean  - full code, intubate PRN    Cavitary lesion  - doubt TB but patient has blood tinged sputum, possible early cavitation on CT, and fevers. Low suspicion for TB as per ID, discontinued isolation  - quantiferon equivocal. AFB smear negative x3    New afib   - [id number] score of at least 5 -> will anticoagulate with heparin drip for now (can be stopped if hemotpysis worsens)  - cardiology consulted, recs appreciated  - rate control if needed  - TTE shows Low normal left ventricular systolic function. Bi-atrial enlargement. Mild pulmonary hypertension  - on Lovenox, will eventually need to transition to DOAC    Elevated LFTs  - likely from current illness  - f/u final CT A/P read, monitor labs  - can consider GI consult if worsens    CINDI 2/2 prerenal azotemia - elev BUN/Cr >20  - likely 2/2 poor PO intake  - hydrate and repeat BMP in AM  - bladder scan, and if retaining, may need pittman  - hold diuretic  - avoid nephrotoxic meds    HTN/HLD  - cont with norvasc  - hold lasix  - cont with atorvastatin    BPH  - cont with tamsulosin and finasteride    Preventive measures  - Lovenox, will need DOAC

## 2022-08-19 NOTE — PROGRESS NOTE ADULT - SUBJECTIVE AND OBJECTIVE BOX
Patient is a 87y old  Male who presents with a chief complaint of cough -> PNA (18 Aug 2022 14:05)      BRIEF HOSPITAL COURSE: Patient is an 88 y/o M w/ PMHx HTN, HLD, CVA in 2015 (no residual deficits), BPH, and skin CA who presented on 8/11 c/o cough and fever x 1 week. Pt admitted to [facility] w/ acute hypoxic respiratory failure 2/2 multifocal pneumonia. Hospital course complicated by new onset atrial fibrillation and CINDI.    Events last 24 hours: Patient remains on HFNC, titrate settings down to fio2 55% 30 L    PAST MEDICAL & SURGICAL HISTORY:  Skin cancer  s/p Mohs  Essential hypertension  Cerebrovascular accident (CVA), unspecified mechanism  2015  Hyperlipidemia, unspecified hyperlipidemia type  Complex tear of lateral meniscus of right knee as current injury, initial encounter  Complex tear of medial meniscus of right knee as current injury, initial encounter  Benign prostatic hyperplasia, presence of lower urinary tract symptoms unspecified, unspecified morphology  H/O pilonidal cyst    Review of Systems:  NEURO: no headaches, blurry vision, depression,   CV: no chest pain, palpitations, murmurs, orthopnea  Resp: shortness of breath when speaking, + cough, hemoptysis slowing.   GI: no stomach pain, nausea, vomitting, flatulence, hematemesis, hematochezia  PV: no swelling of extremities, no hair loss, no coolness to extremities  Skin: no new lesions, rashes or open wounds.      Medications:  levoFLOXacin IVPB 750 milliGRAM(s) IV Intermittent every 48 hours  metoprolol tartrate 100 milliGRAM(s) Oral two times a day  tamsulosin 0.4 milliGRAM(s) Oral at bedtime  albuterol/ipratropium for Nebulization 3 milliLiter(s) Nebulizer every 6 hours PRN  guaifenesin/dextromethorphan Oral Liquid 10 milliLiter(s) Oral every 6 hours  acetaminophen     Tablet .. 650 milliGRAM(s) Oral every 6 hours PRN  melatonin 5 milliGRAM(s) Oral at bedtime  ondansetron Injectable 4 milliGRAM(s) IV Push every 8 hours PRN  enoxaparin Injectable 40 milliGRAM(s) SubCutaneous every 24 hours  aluminum hydroxide/magnesium hydroxide/simethicone Suspension 30 milliLiter(s) Oral every 4 hours PRN  magnesium hydroxide Suspension 30 milliLiter(s) Oral daily PRN  pantoprazole    Tablet 40 milliGRAM(s) Oral before breakfast  polyethylene glycol 3350 17 Gram(s) Oral daily PRN  senna 2 Tablet(s) Oral at bedtime  atorvastatin 40 milliGRAM(s) Oral at bedtime  finasteride 5 milliGRAM(s) Oral daily    ICU Vital Signs Last 24 Hrs  T(C): 36.6 (19 Aug 2022 00:19), Max: 36.9 (18 Aug 2022 20:26)  T(F): 97.9 (19 Aug 2022 00:19), Max: 98.5 (18 Aug 2022 20:26)  HR: 62 (18 Aug 2022 22:00) (55 - 79)  BP: 133/59 (18 Aug 2022 22:00) (103/49 - 140/60)  BP(mean): 80 (18 Aug 2022 22:00) (66 - 86)  ABP: --  ABP(mean): --  RR: 38 (18 Aug 2022 22:00) (23 - 38)  SpO2: 92% (18 Aug 2022 22:00) (91% - 97%)    O2 Parameters below as of 18 Aug 2022 22:00  Patient On (Oxygen Delivery Method): nasal cannula, high flow      I&O's Detail    17 Aug 2022 07:01  -  18 Aug 2022 07:00  --------------------------------------------------------  IN:    Oral Fluid: 240 mL  Total IN: 240 mL    OUT:    Voided (mL): 1200 mL  Total OUT: 1200 mL    Total NET: -960 mL    LABS:                        11.5   20.32 )-----------( 380      ( 18 Aug 2022 06:00 )             35.6     08-18    137  |  104  |  32<H>  ----------------------------<  117<H>  4.4   |  29  |  1.28    Ca    8.0<L>      18 Aug 2022 06:00  Phos  4.2     08-18  Mg     2.3     08-18    TPro  6.8  /  Alb  1.6<L>  /  TBili  0.7  /  DBili  x   /  AST  73<H>  /  ALT  76<H>  /  AlkPhos  314<H>  08-18    CAPILLARY BLOOD GLUCOSE    CULTURES:  Culture Results:   Culture is being performed. (08-13-22 @ 12:12)  Culture Results:   Culture is being performed. (08-12-22 @ 09:02)    Physical Examination:    NEURO: awake and alert  CV: (+) S1/S2, rrr, no mrg  RESP: Diminished breath sound to b/l lungs. Expiratory wheezes. On HFNC  GI: soft, non tender  Ext: No edema    RADIOLOGY: < from: CT Chest No Cont (08.11.22 @ 21:37) >  INTERPRETATION:  CLINICAL INFORMATION: Cough, fever. Sepsis.  Elevated LFT.    COMPARISON:None.    CONTRAST/COMPLICATIONS:  IV Contrast: NONE  Oral Contrast: NONE  Complications: None reported at time of study completion    PROCEDURE:  CT of the Chest, Abdomen and Pelvis was performed.  Sagittal and coronal reformats were performed.    FINDINGS:    CHEST:    LUNGS AND LARGE AIRWAYS: PLEURA: There are patchy bilateral groundglass   and airspace consolidations, which are most pronounced in the left upper   and lingular lobes, right upper and right middle lobes.  This is associated with bronchiectasis, particularly in the bilateral   upper lobes.  In addition, there are scattered irregular, poorly marginated nodular   opacities throughout the bilateral lower lung zones. Some of these   contain possible small areas of cavitation.  There is a small left-sided pleural effusion and trace right-sided   pleural effusion.    The central airways remain patent.    VESSELS: Atherosclerotic changes thoracic aorta and coronary artery   calcifications.    HEART: Heart size is normal. No pericardial effusion.    MEDIASTINUM AND DEO:  Pretracheal, precarinal and subcarinal mediastinal lymph nodes measuring   up to 1 cm short axis.    CHEST WALL AND LOWER NECK: Within normal limits.    ABDOMEN AND PELVIS:    Evaluation of the solid organ parenchyma is limited without intravenous   contrast.    LIVER: Within normal limits.  BILE DUCTS: Normal caliber.  GALLBLADDER: Contracted.  SPLEEN: Within normal limits.  PANCREAS: Within normal limits.  ADRENALS: Within normal limits.  KIDNEYS/URETERS:  Exophytic cyst medial upper pole left kidney.  No hydronephrosis.    BLADDER: Within normal limits.  REPRODUCTIVE ORGANS:  The prostate is not enlarged.    BOWEL:  Colonic diverticulosis, without CT evidence of diverticulitis.  No bowel obstruction.  The appendix is not well visualized on this exam.  PERITONEUM: No ascites.    VESSELS: Atherosclerotic changes.  RETROPERITONEUM/LYMPH NODES: No lymphadenopathy.    ABDOMINAL WALL:  Right inguinal hernia containing fat and nonobstructed small bowel.  Small bilateral inguinal lymph nodes.    BONES:  Degenerative changes spine.  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    IMPRESSION:    Bilateral groundglass glass and airspace consolidations, some associated   with bronchiectasis particularly in the upper lobes, findings likely   secondary to infection.  Possible small areas of cavitation.  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    No acute intra-abdominal pathology.    Other findings as discussed above.    This study was preliminary reported by the ED radiologist on 8/11/2022.    --- End of Report ---    TIME SPENT: 34 minutes  Evaluating/treating patient, reviewing data/labs/imaging, discussing case with multidisciplinary team, discussing plan/goals of care with patient/family. Non-inclusive of procedure time.

## 2022-08-19 NOTE — PROGRESS NOTE ADULT - CRITICAL CARE ATTENDING COMMENT
discussion with patient, pulmonary and nursing staff discussion with patient and daughter at bedside

## 2022-08-19 NOTE — PROGRESS NOTE ADULT - SUBJECTIVE AND OBJECTIVE BOX
Patient is a 87y old  Male who presents with a chief complaint of cough -> PNA (19 Aug 2022 07:04)      INTERVAL HPI/OVERNIGHT EVENTS:    MEDICATIONS  (STANDING):  atorvastatin 40 milliGRAM(s) Oral at bedtime  enoxaparin Injectable 40 milliGRAM(s) SubCutaneous every 24 hours  finasteride 5 milliGRAM(s) Oral daily  guaifenesin/dextromethorphan Oral Liquid 10 milliLiter(s) Oral every 6 hours  levoFLOXacin IVPB 750 milliGRAM(s) IV Intermittent every 48 hours  melatonin 5 milliGRAM(s) Oral at bedtime  metoprolol tartrate 100 milliGRAM(s) Oral two times a day  pantoprazole    Tablet 40 milliGRAM(s) Oral before breakfast  senna 2 Tablet(s) Oral at bedtime  tamsulosin 0.4 milliGRAM(s) Oral at bedtime    MEDICATIONS  (PRN):  acetaminophen     Tablet .. 650 milliGRAM(s) Oral every 6 hours PRN Temp greater or equal to 38C (100.4F), Mild Pain (1 - 3)  albuterol/ipratropium for Nebulization 3 milliLiter(s) Nebulizer every 6 hours PRN Shortness of Breath and/or Wheezing  aluminum hydroxide/magnesium hydroxide/simethicone Suspension 30 milliLiter(s) Oral every 4 hours PRN Dyspepsia  magnesium hydroxide Suspension 30 milliLiter(s) Oral daily PRN Constipation  ondansetron Injectable 4 milliGRAM(s) IV Push every 8 hours PRN Nausea and/or Vomiting  polyethylene glycol 3350 17 Gram(s) Oral daily PRN Constipation      Allergies    No Known Allergies    Intolerances        REVIEW OF SYSTEMS:  CONSTITUTIONAL: No fever, weight loss, or fatigue  EYES: No eye pain, visual disturbances, or discharge  ENMT:  No difficulty hearing, tinnitus, vertigo; No sinus or throat pain  NECK: No pain or stiffness  RESPIRATORY: No cough, wheezing, chills or hemoptysis; No shortness of breath  CARDIOVASCULAR: No chest pain, palpitations, lightheadedness, or leg swelling  GASTROINTESTINAL: No abdominal or epigastric pain. No nausea, vomiting, or hematemesis; No diarrhea or constipation. No melena or hematochezia.  GENITOURINARY: No dysuria, frequency, hematuria, or incontinence  NEUROLOGICAL: No headaches, memory loss, vertigo, loss of strength, numbness, or tremors  SKIN: No itching, burning, rashes, or lesions   LYMPH NODES: No enlarged glands  ENDOCRINE: No heat or cold intolerance; No hair loss; No polydipsia or polyuria  MUSCULOSKELETAL: No joint pain or swelling; No muscle, back, or extremity pain  PSYCHIATRIC: No depression, anxiety, or mood swings  HEME/LYMPH: No easy bruising, or bleeding gums  ALLERGY AND IMMUNOLOGIC: No hives or eczema    PHYSICAL EXAM:  GENERAL: NAD, well-groomed, well-developed  HEAD:  Atraumatic, Normocephalic  EYES: EOMI, PERRLA, conjunctiva and sclera clear  ENMT: Moist mucous membranes, Good dentition, No lesions  NECK: Supple, No JVD appreciated  NERVOUS SYSTEM:  Alert & Oriented X3, Good concentration; All 4 extremities mobile, no gross sensory deficits.   CHEST/LUNG: Clear to auscultation bilaterally; No rales, rhonchi, wheezing, or rubs appreciated  HEART: Regular rate and rhythm; No murmurs, rubs, or gallops  ABDOMEN: Soft, Nontender, Nondistended; Bowel sounds present  EXTREMITIES:  No clubbing, cyanosis, or edema appreciated  LYMPH: No lymphadenopathy noted  SKIN: No rashes or lesions appreciated    Vital Signs Last 24 Hrs  T(C): 36.7 (19 Aug 2022 07:22), Max: 36.9 (18 Aug 2022 20:26)  T(F): 98 (19 Aug 2022 07:22), Max: 98.5 (18 Aug 2022 20:26)  HR: 60 (19 Aug 2022 10:00) (55 - 79)  BP: 136/61 (19 Aug 2022 10:00) (106/52 - 140/60)  BP(mean): 82 (19 Aug 2022 10:00) (70 - 115)  RR: 28 (19 Aug 2022 10:00) (23 - 38)  SpO2: 94% (19 Aug 2022 10:00) (91% - 98%)    Parameters below as of 19 Aug 2022 10:00  Patient On (Oxygen Delivery Method): nasal cannula, high flow  O2 Flow (L/min): 30  O2 Concentration (%): 55    LABS:                        11.4   18.29 )-----------( 442      ( 19 Aug 2022 06:36 )             35.1       Ca    8.0        18 Aug 2022 06:00          CAPILLARY BLOOD GLUCOSE          RADIOLOGY & ADDITIONAL TESTS:    Imaging Personally Reviewed:  [ ] YES     Consultant(s) Notes Reviewed:      Care Discussed with Consultants/Other Providers:    Advanced Directives: [ ] DNR  [ ] No feeding tube  [ ] MOLST in chart  [ ] MOLST completed today  [ ] Unknown   Patient is a 87y old  Male who presents with a chief complaint of cough -> PNA (19 Aug 2022 07:04)      INTERVAL HPI/OVERNIGHT EVENTS:  Patient seen awake in bed. He reports cough has improved, and reports working with PT. No fever, no wheezes. No reported overnight events.     MEDICATIONS  (STANDING):  atorvastatin 40 milliGRAM(s) Oral at bedtime  enoxaparin Injectable 40 milliGRAM(s) SubCutaneous every 24 hours  finasteride 5 milliGRAM(s) Oral daily  guaifenesin/dextromethorphan Oral Liquid 10 milliLiter(s) Oral every 6 hours  levoFLOXacin IVPB 750 milliGRAM(s) IV Intermittent every 48 hours  melatonin 5 milliGRAM(s) Oral at bedtime  metoprolol tartrate 100 milliGRAM(s) Oral two times a day  pantoprazole    Tablet 40 milliGRAM(s) Oral before breakfast  senna 2 Tablet(s) Oral at bedtime  tamsulosin 0.4 milliGRAM(s) Oral at bedtime    MEDICATIONS  (PRN):  acetaminophen     Tablet .. 650 milliGRAM(s) Oral every 6 hours PRN Temp greater or equal to 38C (100.4F), Mild Pain (1 - 3)  albuterol/ipratropium for Nebulization 3 milliLiter(s) Nebulizer every 6 hours PRN Shortness of Breath and/or Wheezing  aluminum hydroxide/magnesium hydroxide/simethicone Suspension 30 milliLiter(s) Oral every 4 hours PRN Dyspepsia  magnesium hydroxide Suspension 30 milliLiter(s) Oral daily PRN Constipation  ondansetron Injectable 4 milliGRAM(s) IV Push every 8 hours PRN Nausea and/or Vomiting  polyethylene glycol 3350 17 Gram(s) Oral daily PRN Constipation      Allergies    No Known Allergies    Intolerances        REVIEW OF SYSTEMS:  CONSTITUTIONAL: No fever, weight loss, or fatigue  EYES: No eye pain, visual disturbances, or discharge  ENMT:  No difficulty hearing, tinnitus, vertigo; No sinus or throat pain  NECK: No pain or stiffness  RESPIRATORY: Productive cough, no wheezing, chills or hemoptysis; Shortness of breath  CARDIOVASCULAR: No chest pain, palpitations, lightheadedness, or leg swelling  GASTROINTESTINAL: No abdominal or epigastric pain. No nausea, vomiting, or hematemesis; No diarrhea or constipation. No melena or hematochezia.  GENITOURINARY: No dysuria, frequency, hematuria, or incontinence  NEUROLOGICAL: No headaches, memory loss, vertigo, loss of strength, numbness, or tremors  SKIN: No itching, burning, rashes, or lesions   MUSCULOSKELETAL: No joint pain or swelling; No muscle, back, or extremity pain      PHYSICAL EXAM:  GENERAL: NAD, well-groomed, well-developed  HEAD:  Atraumatic, Normocephalic  EYES: EOMI, PERRLA, conjunctiva and sclera clear  ENMT: Moist mucous membranes, Good dentition, No lesions. On hi flow nasal cannula  NECK: Supple, No JVD appreciated  NERVOUS SYSTEM:  Alert & Oriented X3, Good concentration; All 4 extremities mobile, no gross sensory deficits.   CHEST/LUNG: Diminished to auscultation; No rales, rhonchi, wheezing, or rubs appreciated  HEART: Regular rate and rhythm; No murmurs, rubs, or gallops  ABDOMEN: Soft, Nontender, Nondistended; Bowel sounds present  EXTREMITIES:  No clubbing, cyanosis, or edema appreciated  LYMPH: No lymphadenopathy noted  SKIN: No rashes or lesions appreciated      Vital Signs Last 24 Hrs  T(C): 36.7 (19 Aug 2022 07:22), Max: 36.9 (18 Aug 2022 20:26)  T(F): 98 (19 Aug 2022 07:22), Max: 98.5 (18 Aug 2022 20:26)  HR: 60 (19 Aug 2022 10:00) (55 - 79)  BP: 136/61 (19 Aug 2022 10:00) (106/52 - 140/60)  BP(mean): 82 (19 Aug 2022 10:00) (70 - 115)  RR: 28 (19 Aug 2022 10:00) (23 - 38)  SpO2: 94% (19 Aug 2022 10:00) (91% - 98%)    Parameters below as of 19 Aug 2022 10:00  Patient On (Oxygen Delivery Method): nasal cannula, high flow  O2 Flow (L/min): 30  O2 Concentration (%): 55    LABS:                        11.4   18.29 )-----------( 442      ( 19 Aug 2022 06:36 )             35.1       Ca    8.0        18 Aug 2022 06:00          CAPILLARY BLOOD GLUCOSE

## 2022-08-19 NOTE — PROGRESS NOTE ADULT - SUBJECTIVE AND OBJECTIVE BOX
Chief Complaint: Hemoptysis    Interval Events: No events overnight.    Review of Systems:  General: No fevers, chills, weight gain  Skin: No rashes, color changes  Cardiovascular: No chest pain, orthopnea  Respiratory: No shortness of breath, cough  Gastrointestinal: No nausea, abdominal pain  Genitourinary: No incontinence, pain with urination  Musculoskeletal: No pain, swelling, decreased range of motion  Neurological: No headache, weakness  Psychiatric: No depression, anxiety  Endocrine: No weight gain, increased thirst  All other systems are comprehensively negative.    Physical Exam:  Vital Signs Last 24 Hrs  T(C): 36.7 (19 Aug 2022 07:22), Max: 36.9 (18 Aug 2022 20:26)  T(F): 98 (19 Aug 2022 07:22), Max: 98.5 (18 Aug 2022 20:26)  HR: 75 (19 Aug 2022 06:20) (56 - 79)  BP: 125/107 (19 Aug 2022 06:20) (106/52 - 140/60)  BP(mean): 115 (19 Aug 2022 06:20) (70 - 115)  RR: 25 (19 Aug 2022 06:20) (23 - 38)  SpO2: 93% (19 Aug 2022 06:20) (91% - 97%)  Parameters below as of 19 Aug 2022 06:20  Patient On (Oxygen Delivery Method): nasal cannula, high flow  O2 Flow (L/min): 30  O2 Concentration (%): 55  General: NAD  HEENT: MMM  Neck: No JVD, no carotid bruit  Lungs: CTAB  CV: RRR, nl S1/S2, no M/R/G  Abdomen: S/NT/ND, +BS  Extremities: No LE edema, no cyanosis  Neuro: AAOx3, non-focal  Skin: No rash    Labs:             08-18    137  |  104  |  32<H>  ----------------------------<  117<H>  4.4   |  29  |  1.28    Ca    8.0<L>      18 Aug 2022 06:00  Phos  4.2     08-18  Mg     2.3     08-18    TPro  6.8  /  Alb  1.6<L>  /  TBili  0.7  /  DBili  x   /  AST  73<H>  /  ALT  76<H>  /  AlkPhos  314<H>  08-18                        11.4   18.29 )-----------( 442      ( 19 Aug 2022 06:36 )             35.1       Telemetry: AF, sinus rhythm with PACs

## 2022-08-20 LAB
ALBUMIN SERPL ELPH-MCNC: 1.7 G/DL — LOW (ref 3.3–5)
ALP SERPL-CCNC: 352 U/L — HIGH (ref 30–120)
ALT FLD-CCNC: 78 U/L DA — HIGH (ref 10–60)
ANION GAP SERPL CALC-SCNC: 5 MMOL/L — SIGNIFICANT CHANGE UP (ref 5–17)
AST SERPL-CCNC: 67 U/L — HIGH (ref 10–40)
BILIRUB SERPL-MCNC: 0.8 MG/DL — SIGNIFICANT CHANGE UP (ref 0.2–1.2)
BUN SERPL-MCNC: 26 MG/DL — HIGH (ref 7–23)
CALCIUM SERPL-MCNC: 7.9 MG/DL — LOW (ref 8.4–10.5)
CHLORIDE SERPL-SCNC: 105 MMOL/L — SIGNIFICANT CHANGE UP (ref 96–108)
CO2 SERPL-SCNC: 28 MMOL/L — SIGNIFICANT CHANGE UP (ref 22–31)
CREAT SERPL-MCNC: 1.2 MG/DL — SIGNIFICANT CHANGE UP (ref 0.5–1.3)
EGFR: 59 ML/MIN/1.73M2 — LOW
GLUCOSE SERPL-MCNC: 103 MG/DL — HIGH (ref 70–99)
HCT VFR BLD CALC: 37 % — LOW (ref 39–50)
HGB BLD-MCNC: 12.1 G/DL — LOW (ref 13–17)
MAGNESIUM SERPL-MCNC: 2 MG/DL — SIGNIFICANT CHANGE UP (ref 1.6–2.6)
MCHC RBC-ENTMCNC: 31.3 PG — SIGNIFICANT CHANGE UP (ref 27–34)
MCHC RBC-ENTMCNC: 32.7 GM/DL — SIGNIFICANT CHANGE UP (ref 32–36)
MCV RBC AUTO: 95.6 FL — SIGNIFICANT CHANGE UP (ref 80–100)
NRBC # BLD: 0 /100 WBCS — SIGNIFICANT CHANGE UP (ref 0–0)
PHOSPHATE SERPL-MCNC: 4.4 MG/DL — SIGNIFICANT CHANGE UP (ref 2.5–4.5)
PLATELET # BLD AUTO: 521 K/UL — HIGH (ref 150–400)
POTASSIUM SERPL-MCNC: 4.6 MMOL/L — SIGNIFICANT CHANGE UP (ref 3.5–5.3)
POTASSIUM SERPL-SCNC: 4.6 MMOL/L — SIGNIFICANT CHANGE UP (ref 3.5–5.3)
PROT SERPL-MCNC: 7.1 G/DL — SIGNIFICANT CHANGE UP (ref 6–8.3)
RBC # BLD: 3.87 M/UL — LOW (ref 4.2–5.8)
RBC # FLD: 14.1 % — SIGNIFICANT CHANGE UP (ref 10.3–14.5)
SODIUM SERPL-SCNC: 138 MMOL/L — SIGNIFICANT CHANGE UP (ref 135–145)
WBC # BLD: 18.72 K/UL — HIGH (ref 3.8–10.5)
WBC # FLD AUTO: 18.72 K/UL — HIGH (ref 3.8–10.5)

## 2022-08-20 PROCEDURE — 99233 SBSQ HOSP IP/OBS HIGH 50: CPT

## 2022-08-20 PROCEDURE — 71045 X-RAY EXAM CHEST 1 VIEW: CPT | Mod: 26

## 2022-08-20 RX ORDER — DIPHENHYDRAMINE HCL 50 MG
25 CAPSULE ORAL AT BEDTIME
Refills: 0 | Status: DISCONTINUED | OUTPATIENT
Start: 2022-08-20 | End: 2022-08-29

## 2022-08-20 RX ORDER — SODIUM CHLORIDE 0.65 %
1 AEROSOL, SPRAY (ML) NASAL
Refills: 0 | Status: DISCONTINUED | OUTPATIENT
Start: 2022-08-20 | End: 2022-08-29

## 2022-08-20 RX ORDER — SACCHAROMYCES BOULARDII 250 MG
250 POWDER IN PACKET (EA) ORAL
Refills: 0 | Status: DISCONTINUED | OUTPATIENT
Start: 2022-08-20 | End: 2022-08-29

## 2022-08-20 RX ORDER — FUROSEMIDE 40 MG
20 TABLET ORAL ONCE
Refills: 0 | Status: COMPLETED | OUTPATIENT
Start: 2022-08-20 | End: 2022-08-20

## 2022-08-20 RX ADMIN — TAMSULOSIN HYDROCHLORIDE 0.4 MILLIGRAM(S): 0.4 CAPSULE ORAL at 22:18

## 2022-08-20 RX ADMIN — Medication 3 MILLILITER(S): at 00:37

## 2022-08-20 RX ADMIN — Medication 10 MILLILITER(S): at 17:31

## 2022-08-20 RX ADMIN — ATORVASTATIN CALCIUM 40 MILLIGRAM(S): 80 TABLET, FILM COATED ORAL at 22:19

## 2022-08-20 RX ADMIN — ENOXAPARIN SODIUM 40 MILLIGRAM(S): 100 INJECTION SUBCUTANEOUS at 17:31

## 2022-08-20 RX ADMIN — Medication 100 MILLIGRAM(S): at 17:31

## 2022-08-20 RX ADMIN — Medication 3 MILLILITER(S): at 20:34

## 2022-08-20 RX ADMIN — Medication 650 MILLIGRAM(S): at 23:00

## 2022-08-20 RX ADMIN — Medication 3 MILLILITER(S): at 14:32

## 2022-08-20 RX ADMIN — Medication 10 MILLILITER(S): at 06:58

## 2022-08-20 RX ADMIN — SENNA PLUS 2 TABLET(S): 8.6 TABLET ORAL at 22:19

## 2022-08-20 RX ADMIN — Medication 20 MILLIGRAM(S): at 22:18

## 2022-08-20 RX ADMIN — FINASTERIDE 5 MILLIGRAM(S): 5 TABLET, FILM COATED ORAL at 11:30

## 2022-08-20 RX ADMIN — PANTOPRAZOLE SODIUM 40 MILLIGRAM(S): 20 TABLET, DELAYED RELEASE ORAL at 06:58

## 2022-08-20 RX ADMIN — Medication 3 MILLILITER(S): at 07:37

## 2022-08-20 RX ADMIN — Medication 650 MILLIGRAM(S): at 22:18

## 2022-08-20 RX ADMIN — Medication 100 MILLIGRAM(S): at 06:58

## 2022-08-20 RX ADMIN — Medication 5 MILLIGRAM(S): at 22:18

## 2022-08-20 RX ADMIN — Medication 10 MILLILITER(S): at 11:30

## 2022-08-20 RX ADMIN — Medication 10 MILLILITER(S): at 23:12

## 2022-08-20 RX ADMIN — Medication 250 MILLIGRAM(S): at 17:31

## 2022-08-20 NOTE — PROGRESS NOTE ADULT - SUBJECTIVE AND OBJECTIVE BOX
Patient is a 87y Male with a known history of :    HPI:  87M with HTN, HLD, hx of CVA (no residual deficits), BPH, hx of skin CA who presents with cough and fevers.  Symptoms started about 5-6 days ago.  Started with left sided upper back pain.  Then started to have a cough associated with green phlegm and possible blood.  Associated with SOB and DONG.  No weight changes or night sweats.  Had a fever over the weekend (4-5 days ago) as high as 102F.  Some nausea but no vomiting with loss of appetite.  No chest pain.  No diarrhea.  No abdominal pain.  No recent travel or sick contacts.  Went to see his PMD 3 days ago and was given a z-kellie (last day tomorrow) and tesslon perles.  Reported had negative COVID tests at home.  Patient decided to come here for further evaluation.  In the ED, patient's triage vitals were /78    RR  21, 93% on RA (thought dropped down to 88%) and T 98.5F.  Labs showed WBC 18 with a left shift, CINDI with BUN/Cr 40/1.88, elevated LFTs, normal lactate, negative COVID (last booster in 12/2021 with Pfizer vaccine), neg influenza, neg RSV.  CT chest showed "patchy groundglass and more dense opacities in both lungs, suspicious for multifocal PNA...several small lucencies with areas of lung opacity...may represent early cavitation versus areas of focal bronchiectasis."  Patient started on azithromycin and ceftriaxone empirically for multifocal PNA.  Also of note, patient was found to be in new afib on EKG.  Patient denies any afib history but said when he was younger he did feel some irregular heartbeats.   (11 Aug 2022 22:21)      REVIEW OF SYSTEMS:    CONSTITUTIONAL: No fever, weight loss, or fatigue  EYES: No eye pain, visual disturbances, or discharge  ENMT:  No difficulty hearing, tinnitus, vertigo; No sinus or throat pain  NECK: No pain or stiffness  BREASTS: No pain, masses, or nipple discharge  RESPIRATORY: No cough, wheezing, chills or hemoptysis; No shortness of breath  CARDIOVASCULAR: No chest pain, palpitations, dizziness, or leg swelling  GASTROINTESTINAL: No abdominal or epigastric pain. No nausea, vomiting, or hematemesis; No diarrhea or constipation. No melena or hematochezia.  GENITOURINARY: No dysuria, frequency, hematuria, or incontinence  NEUROLOGICAL: No headaches, memory loss, loss of strength, numbness, or tremors  SKIN: No itching, burning, rashes, or lesions   LYMPH NODES: No enlarged glands  ENDOCRINE: No heat or cold intolerance; No hair loss  MUSCULOSKELETAL: No joint pain or swelling; No muscle, back, or extremity pain  PSYCHIATRIC: No depression, anxiety, mood swings, or difficulty sleeping  HEME/LYMPH: No easy bruising, or bleeding gums  ALLERGY AND IMMUNOLOGIC: No hives or eczema    MEDICATIONS  (STANDING):  albuterol/ipratropium for Nebulization 3 milliLiter(s) Nebulizer every 6 hours  atorvastatin 40 milliGRAM(s) Oral at bedtime  cyclobenzaprine 5 milliGRAM(s) Oral at bedtime  enoxaparin Injectable 40 milliGRAM(s) SubCutaneous every 24 hours  finasteride 5 milliGRAM(s) Oral daily  guaifenesin/dextromethorphan Oral Liquid 10 milliLiter(s) Oral every 6 hours  levoFLOXacin IVPB 750 milliGRAM(s) IV Intermittent every 48 hours  melatonin 5 milliGRAM(s) Oral at bedtime  metoprolol tartrate 100 milliGRAM(s) Oral two times a day  pantoprazole    Tablet 40 milliGRAM(s) Oral before breakfast  saccharomyces boulardii 250 milliGRAM(s) Oral two times a day  senna 2 Tablet(s) Oral at bedtime  tamsulosin 0.4 milliGRAM(s) Oral at bedtime    MEDICATIONS  (PRN):  acetaminophen     Tablet .. 650 milliGRAM(s) Oral every 6 hours PRN Temp greater or equal to 38C (100.4F), Mild Pain (1 - 3)  aluminum hydroxide/magnesium hydroxide/simethicone Suspension 30 milliLiter(s) Oral every 4 hours PRN Dyspepsia  magnesium hydroxide Suspension 30 milliLiter(s) Oral daily PRN Constipation  ondansetron Injectable 4 milliGRAM(s) IV Push every 8 hours PRN Nausea and/or Vomiting  polyethylene glycol 3350 17 Gram(s) Oral daily PRN Constipation      ALLERGIES: No Known Allergies      FAMILY HISTORY:  Family history of ischemic heart disease and other diseases of the circulatory system (Father)        Social history:  Alochol:   Smoking:   Drug Use:   Marital Status:     PHYSICAL EXAMINATION:  -----------------------------  T(C): 36.6 (08-20-22 @ 08:28), Max: 36.9 (08-19-22 @ 21:07)  HR: 64 (08-20-22 @ 12:00) (61 - 84)  BP: 127/55 (08-20-22 @ 12:00) (119/61 - 140/79)  RR: 34 (08-20-22 @ 12:00) (19 - 40)  SpO2: 94% (08-20-22 @ 12:00) (91% - 96%)  Wt(kg): --    08-19 @ 07:01  -  08-20 @ 07:00  --------------------------------------------------------  IN:    Oral Fluid: 350 mL  Total IN: 350 mL    OUT:    Voided (mL): 900 mL  Total OUT: 900 mL    Total NET: -550 mL            Constitutional: well developed, normal appearance, well groomed, well nourished, no deformities and no acute distress.   Eyes: the conjunctiva exhibited no abnormalities and the eyelids demonstrated no xanthelasmas.   HEENT: normal oral mucosa, no oral pallor and no oral cyanosis.   Neck: normal jugular venous A waves present, normal jugular venous V waves present and no jugular venous mcdaniels A waves.   Pulmonary: no respiratory distress, normal respiratory rhythm and effort, no accessory muscle use and lungs were clear to auscultation bilaterally. Anteriorly   Cardiovascular: heart rate and rhythm were normal, normal S1 and S2 and no murmur, gallop, rub, heave or thrill are present.   Musculoskeletal: the gait could not be assessed.   Extremities: no clubbing of the fingernails, no localized cyanosis, no petechial hemorrhages and no ischemic changes.   Skin: normal skin color and pigmentation, no rash, no venous stasis, no skin lesions, no skin ulcer and no xanthoma was observed.   Psychiatric: oriented to person, place, and time, the affect was normal, the mood was normal and not feeling anxious.     LABS:   --------  08-20    138  |  105  |  26<H>  ----------------------------<  103<H>  4.6   |  28  |  1.20    Ca    7.9<L>      20 Aug 2022 06:19  Phos  4.4     08-20  Mg     2.0     08-20    TPro  7.1  /  Alb  1.7<L>  /  TBili  0.8  /  DBili  x   /  AST  67<H>  /  ALT  78<H>  /  AlkPhos  352<H>  08-20                         12.1   18.72 )-----------( 521      ( 20 Aug 2022 06:19 )             37.0                   Radiology:    < from: US Transthoracic Echocardiogram w/Doppler Complete (08.12.22 @ 08:08) >    ACC: [id number] EXAM:  US TTE W DOPPLER COMPLETE                          PROCEDURE DATE:  08/12/2022          INTERPRETATION:  Ordering Physician: LEONARDA WHITTINGTON [id number]    Indication: Atrial fibrillation    Technician: LJ    Study Quality: Good  A complete echocardiographic study was performed utilizing standard   protocol including spectral and color Doppler in all echocardiographic   windows.    Height: 172 cm  Weight: 72 kg  BSA: 1.85 m2  Blood Pressure: 151/67 mmHg    MEASUREMENTS  IVS: 0.9 cm  PWT: 0.9 cm  LA: 3.1 cm  AO: 3.3 cm  LVIDd: 5.3 cm  LVIDs: 3.9 cm    LVEF: 50-55%  RVSP: 50 mmHg  RA Pressure: 15 mmHg    FINDINGS  Left Ventricle: The left ventricle is normal in size, wall thickness. The   systolic function is low normal and varies beat to beat. Estimated EF is   50-55%.  Right Ventricle: The right ventricle is normal in size and function.  Left Atrium: The left atrium is dilated.  Right Atrium: The right atrium is dilated.  Mitral Valve: Mitral annular calcification. There is mild mitral valve   prolapse of the anterior leaflet. Mild mitral regurgitation.  Aortic Valve: The aortic valve is structurally normal. No aortic   regurgitation.  Tricuspid Valve: The tricuspid valve is structurally normal. Mild   tricuspid regurgitation. Estimated pulmonary artery systolic pressure is   50 mmHg.  Pulmonic Valve: The pulmonic valve is not well visualized. Trace pulmonic   regurgitation.  Diastolic Function: Normal diastolic function.  Pericardium/Pleura: No pericardial effusion visualized.  Aorta: The aortic root is normal in size.    IMPRESSION:  1. Low normal left ventricular systolic function.  2. Bi-atrial enlargement.  3. Mild pulmonary hypertension.    --- End of Report ---            EDU CARBALLO MD; Attending Cardiologist  This document has been electronically signed. Aug 12 2022  9:11AM    < end of copied text >

## 2022-08-20 NOTE — PROGRESS NOTE ADULT - SUBJECTIVE AND OBJECTIVE BOX
Date/Time Patient Seen:  		  Referring MD:   Data Reviewed	       Patient is a 87y old  Male who presents with a chief complaint of cough -> PNA (19 Aug 2022 20:29)      Subjective/HPI     PAST MEDICAL & SURGICAL HISTORY:  No pertinent past medical history    Skin cancer  s/p Mohs    Essential hypertension    Cerebrovascular accident (CVA), unspecified mechanism  2015    Hyperlipidemia, unspecified hyperlipidemia type    Complex tear of lateral meniscus of right knee as current injury, initial encounter    Complex tear of medial meniscus of right knee as current injury, initial encounter    Benign prostatic hyperplasia, presence of lower urinary tract symptoms unspecified, unspecified morphology    H/O pilonidal cyst          Medication list         MEDICATIONS  (STANDING):  albuterol/ipratropium for Nebulization 3 milliLiter(s) Nebulizer every 6 hours  atorvastatin 40 milliGRAM(s) Oral at bedtime  cyclobenzaprine 5 milliGRAM(s) Oral at bedtime  enoxaparin Injectable 40 milliGRAM(s) SubCutaneous every 24 hours  finasteride 5 milliGRAM(s) Oral daily  guaifenesin/dextromethorphan Oral Liquid 10 milliLiter(s) Oral every 6 hours  levoFLOXacin IVPB 750 milliGRAM(s) IV Intermittent every 48 hours  melatonin 5 milliGRAM(s) Oral at bedtime  metoprolol tartrate 100 milliGRAM(s) Oral two times a day  pantoprazole    Tablet 40 milliGRAM(s) Oral before breakfast  senna 2 Tablet(s) Oral at bedtime  tamsulosin 0.4 milliGRAM(s) Oral at bedtime    MEDICATIONS  (PRN):  acetaminophen     Tablet .. 650 milliGRAM(s) Oral every 6 hours PRN Temp greater or equal to 38C (100.4F), Mild Pain (1 - 3)  aluminum hydroxide/magnesium hydroxide/simethicone Suspension 30 milliLiter(s) Oral every 4 hours PRN Dyspepsia  magnesium hydroxide Suspension 30 milliLiter(s) Oral daily PRN Constipation  ondansetron Injectable 4 milliGRAM(s) IV Push every 8 hours PRN Nausea and/or Vomiting  polyethylene glycol 3350 17 Gram(s) Oral daily PRN Constipation         Vitals log        ICU Vital Signs Last 24 Hrs  T(C): 36.7 (20 Aug 2022 05:52), Max: 36.9 (19 Aug 2022 21:07)  T(F): 98.1 (20 Aug 2022 05:52), Max: 98.4 (19 Aug 2022 21:07)  HR: 71 (20 Aug 2022 06:15) (55 - 71)  BP: 125/54 (20 Aug 2022 04:00) (123/60 - 136/61)  BP(mean): 75 (20 Aug 2022 04:00) (71 - 85)  ABP: --  ABP(mean): --  RR: 34 (20 Aug 2022 04:00) (24 - 40)  SpO2: 92% (20 Aug 2022 06:15) (91% - 98%)    O2 Parameters below as of 20 Aug 2022 06:15  Patient On (Oxygen Delivery Method): nasal cannula, high flow                 Input and Output:  I&O's Detail    19 Aug 2022 07:01  -  20 Aug 2022 07:00  --------------------------------------------------------  IN:    Oral Fluid: 350 mL  Total IN: 350 mL    OUT:    Voided (mL): 900 mL  Total OUT: 900 mL    Total NET: -550 mL          Lab Data                        12.1   18.72 )-----------( 521      ( 20 Aug 2022 06:19 )             37.0     08-20    138  |  105  |  26<H>  ----------------------------<  103<H>  4.6   |  28  |  1.20    Ca    7.9<L>      20 Aug 2022 06:19  Phos  4.4     08-20  Mg     2.0     08-20    TPro  7.1  /  Alb  1.7<L>  /  TBili  0.8  /  DBili  x   /  AST  67<H>  /  ALT  78<H>  /  AlkPhos  352<H>  08-20    ABG - ( 19 Aug 2022 07:31 )  pH, Arterial: 7.48  pH, Blood: x     /  pCO2: 34    /  pO2: 70    / HCO3: 25    / Base Excess: 1.8   /  SaO2: 95.3                    Review of Systems	      Objective     Physical Examination    on o2 support  heart s1s2  lung dec BS  abd soft      Pertinent Lab findings & Imaging      Bud:  NO   Adequate UO     I&O's Detail    19 Aug 2022 07:01  -  20 Aug 2022 07:00  --------------------------------------------------------  IN:    Oral Fluid: 350 mL  Total IN: 350 mL    OUT:    Voided (mL): 900 mL  Total OUT: 900 mL    Total NET: -550 mL               Discussed with:     Cultures:	        Radiology

## 2022-08-20 NOTE — PROGRESS NOTE ADULT - SUBJECTIVE AND OBJECTIVE BOX
Patient is a 87y old  Male who presents with a chief complaint of cough -> PNA (20 Aug 2022 13:14)      INTERVAL HPI/OVERNIGHT EVENTS:    MEDICATIONS  (STANDING):  albuterol/ipratropium for Nebulization 3 milliLiter(s) Nebulizer every 6 hours  atorvastatin 40 milliGRAM(s) Oral at bedtime  cyclobenzaprine 5 milliGRAM(s) Oral at bedtime  enoxaparin Injectable 40 milliGRAM(s) SubCutaneous every 24 hours  finasteride 5 milliGRAM(s) Oral daily  guaifenesin/dextromethorphan Oral Liquid 10 milliLiter(s) Oral every 6 hours  levoFLOXacin IVPB 750 milliGRAM(s) IV Intermittent every 48 hours  melatonin 5 milliGRAM(s) Oral at bedtime  metoprolol tartrate 100 milliGRAM(s) Oral two times a day  pantoprazole    Tablet 40 milliGRAM(s) Oral before breakfast  saccharomyces boulardii 250 milliGRAM(s) Oral two times a day  senna 2 Tablet(s) Oral at bedtime  tamsulosin 0.4 milliGRAM(s) Oral at bedtime    MEDICATIONS  (PRN):  acetaminophen     Tablet .. 650 milliGRAM(s) Oral every 6 hours PRN Temp greater or equal to 38C (100.4F), Mild Pain (1 - 3)  aluminum hydroxide/magnesium hydroxide/simethicone Suspension 30 milliLiter(s) Oral every 4 hours PRN Dyspepsia  magnesium hydroxide Suspension 30 milliLiter(s) Oral daily PRN Constipation  ondansetron Injectable 4 milliGRAM(s) IV Push every 8 hours PRN Nausea and/or Vomiting  polyethylene glycol 3350 17 Gram(s) Oral daily PRN Constipation      Allergies    No Known Allergies    Intolerances        REVIEW OF SYSTEMS:  CONSTITUTIONAL: No fever or chills  HEENT:  No headache, no sore throat  RESPIRATORY: No cough, wheezing, or shortness of breath  CARDIOVASCULAR: No chest pain, palpitations  GASTROINTESTINAL: No abd pain, nausea, vomiting, or diarrhea  GENITOURINARY: No dysuria, frequency, or hematuria  NEUROLOGICAL: no focal weakness or dizziness  MUSCULOSKELETAL: no myalgias     Vital Signs Last 24 Hrs  T(C): 36.8 (20 Aug 2022 16:24), Max: 36.9 (19 Aug 2022 21:07)  T(F): 98.2 (20 Aug 2022 16:24), Max: 98.4 (19 Aug 2022 21:07)  HR: 75 (20 Aug 2022 18:00) (61 - 84)  BP: 131/60 (20 Aug 2022 18:00) (119/61 - 140/79)  BP(mean): 81 (20 Aug 2022 18:00) (71 - 99)  RR: 34 (20 Aug 2022 18:00) (19 - 40)  SpO2: 89% (20 Aug 2022 18:00) (89% - 96%)    Parameters below as of 20 Aug 2022 18:00  Patient On (Oxygen Delivery Method): nasal cannula, high flow    O2 Concentration (%): 50    PHYSICAL EXAM:  GENERAL: NAD  HEENT:  anicteric, moist mucous membranes  CHEST/LUNG:  CTA b/l, no rales, wheezes, or rhonchi  HEART:  RRR, S1, S2  ABDOMEN:  BS+, soft, nontender, nondistended  EXTREMITIES: no edema, cyanosis, or calf tenderness  NERVOUS SYSTEM: answers questions and follows commands appropriately    LABS:                        12.1   18.72 )-----------( 521      ( 20 Aug 2022 06:19 )             37.0     CBC Full  -  ( 20 Aug 2022 06:19 )  WBC Count : 18.72 K/uL  Hemoglobin : 12.1 g/dL  Hematocrit : 37.0 %  Platelet Count - Automated : 521 K/uL  Mean Cell Volume : 95.6 fl  Mean Cell Hemoglobin : 31.3 pg  Mean Cell Hemoglobin Concentration : 32.7 gm/dL  Auto Neutrophil # : x  Auto Lymphocyte # : x  Auto Monocyte # : x  Auto Eosinophil # : x  Auto Basophil # : x  Auto Neutrophil % : x  Auto Lymphocyte % : x  Auto Monocyte % : x  Auto Eosinophil % : x  Auto Basophil % : x    20 Aug 2022 06:19    138    |  105    |  26     ----------------------------<  103    4.6     |  28     |  1.20     Ca    7.9        20 Aug 2022 06:19  Phos  4.4       20 Aug 2022 06:19  Mg     2.0       20 Aug 2022 06:19    TPro  7.1    /  Alb  1.7    /  TBili  0.8    /  DBili  x      /  AST  67     /  ALT  78     /  AlkPhos  352    20 Aug 2022 06:19        CAPILLARY BLOOD GLUCOSE              RADIOLOGY & ADDITIONAL TESTS:    Personally reviewed.     Consultant(s) Notes Reviewed:  [x] YES  [ ] NO     Patient is a 87y old  Male who presents with a chief complaint of cough and SOB.       INTERVAL HPI/OVERNIGHT EVENTS: Pt states he feels "alright." Denies SOB at rest on the HFNC. Denies fever, chills, abd pain, palpitations, CP, n/v.     MEDICATIONS  (STANDING):  albuterol/ipratropium for Nebulization 3 milliLiter(s) Nebulizer every 6 hours  atorvastatin 40 milliGRAM(s) Oral at bedtime  cyclobenzaprine 5 milliGRAM(s) Oral at bedtime  enoxaparin Injectable 40 milliGRAM(s) SubCutaneous every 24 hours  finasteride 5 milliGRAM(s) Oral daily  guaifenesin/dextromethorphan Oral Liquid 10 milliLiter(s) Oral every 6 hours  levoFLOXacin IVPB 750 milliGRAM(s) IV Intermittent every 48 hours  melatonin 5 milliGRAM(s) Oral at bedtime  metoprolol tartrate 100 milliGRAM(s) Oral two times a day  pantoprazole    Tablet 40 milliGRAM(s) Oral before breakfast  saccharomyces boulardii 250 milliGRAM(s) Oral two times a day  senna 2 Tablet(s) Oral at bedtime  tamsulosin 0.4 milliGRAM(s) Oral at bedtime    MEDICATIONS  (PRN):  acetaminophen     Tablet .. 650 milliGRAM(s) Oral every 6 hours PRN Temp greater or equal to 38C (100.4F), Mild Pain (1 - 3)  aluminum hydroxide/magnesium hydroxide/simethicone Suspension 30 milliLiter(s) Oral every 4 hours PRN Dyspepsia  magnesium hydroxide Suspension 30 milliLiter(s) Oral daily PRN Constipation  ondansetron Injectable 4 milliGRAM(s) IV Push every 8 hours PRN Nausea and/or Vomiting  polyethylene glycol 3350 17 Gram(s) Oral daily PRN Constipation      Allergies    No Known Allergies    Intolerances        REVIEW OF SYSTEMS:  CONSTITUTIONAL: +generalized weakness/deconditioning ; No fever or chills  HEENT:  No headache, no sore throat  RESPIRATORY: +cough, denies shortness of breath at rest on HFNC  CARDIOVASCULAR: No chest pain, palpitations  GASTROINTESTINAL: No abd pain, nausea, vomiting, or diarrhea  GENITOURINARY: No dysuria, frequency, or hematuria  NEUROLOGICAL: no focal weakness or dizziness  MUSCULOSKELETAL: no myalgias     Vital Signs Last 24 Hrs  T(C): 36.8 (20 Aug 2022 16:24), Max: 36.9 (19 Aug 2022 21:07)  T(F): 98.2 (20 Aug 2022 16:24), Max: 98.4 (19 Aug 2022 21:07)  HR: 75 (20 Aug 2022 18:00) (61 - 84)  BP: 131/60 (20 Aug 2022 18:00) (119/61 - 140/79)  BP(mean): 81 (20 Aug 2022 18:00) (71 - 99)  RR: 34 (20 Aug 2022 18:00) (19 - 40)  SpO2: 89% (20 Aug 2022 18:00) (89% - 96%)    Parameters below as of 20 Aug 2022 18:00  Patient On (Oxygen Delivery Method): nasal cannula, high flow    O2 Concentration (%): 50    PHYSICAL EXAM:  GENERAL: NAD at rest on HFNC  HEENT:  anicteric, moist mucous membranes  CHEST/LUNG: decreased breath sounds   HEART:  irregular, S1, S2  ABDOMEN:  BS+, soft, nontender, nondistended  EXTREMITIES: no cyanosis or calf tenderness  NERVOUS SYSTEM: answers questions and follows commands appropriately    LABS:                        12.1   18.72 )-----------( 521      ( 20 Aug 2022 06:19 )             37.0     CBC Full  -  ( 20 Aug 2022 06:19 )  WBC Count : 18.72 K/uL  Hemoglobin : 12.1 g/dL  Hematocrit : 37.0 %  Platelet Count - Automated : 521 K/uL  Mean Cell Volume : 95.6 fl  Mean Cell Hemoglobin : 31.3 pg  Mean Cell Hemoglobin Concentration : 32.7 gm/dL  Auto Neutrophil # : x  Auto Lymphocyte # : x  Auto Monocyte # : x  Auto Eosinophil # : x  Auto Basophil # : x  Auto Neutrophil % : x  Auto Lymphocyte % : x  Auto Monocyte % : x  Auto Eosinophil % : x  Auto Basophil % : x    20 Aug 2022 06:19    138    |  105    |  26     ----------------------------<  103    4.6     |  28     |  1.20     Ca    7.9        20 Aug 2022 06:19  Phos  4.4       20 Aug 2022 06:19  Mg     2.0       20 Aug 2022 06:19    TPro  7.1    /  Alb  1.7    /  TBili  0.8    /  DBili  x      /  AST  67     /  ALT  78     /  AlkPhos  352    20 Aug 2022 06:19        CAPILLARY BLOOD GLUCOSE        RADIOLOGY & ADDITIONAL TESTS:    Personally reviewed.     Consultant(s) Notes Reviewed:  [x] YES  [ ] NO

## 2022-08-20 NOTE — PROGRESS NOTE ADULT - SUBJECTIVE AND OBJECTIVE BOX
Patient is a 87y old  Male who presents with a chief complaint of sepsis due to PNA (20 Aug 2022 18:38)      BRIEF HOSPITAL COURSE: 86 y/o male, PMHx HTN, HLD, CVA in 2015 (no residual deficits), BPH, and skin CA who presented on 8/11 c/o cough and fever x 1 week. Pt admitted to [facility] with acute hypoxic respiratory failure secondary to multifocal pneumonia, tested positive for Legionella on Levaquin. Hospital course complicated by new onset atrial fibrillation, transaminitis, and CINDI.      Events last 24 hours: Was weaned down to 50% HFNC today, however received on HFNC 100% / 30 LPM. Complains of swelling inside nose and pain from HFNC, however states he "couldn't breathe" on venti mask today. Feels fatigued, worked with PT today.         PAST MEDICAL & SURGICAL HISTORY:  Skin cancer  s/p Mohs      Essential hypertension      Cerebrovascular accident (CVA), unspecified mechanism  2015      Hyperlipidemia, unspecified hyperlipidemia type      Complex tear of lateral meniscus of right knee as current injury, initial encounter      Complex tear of medial meniscus of right knee as current injury, initial encounter      Benign prostatic hyperplasia, presence of lower urinary tract symptoms unspecified, unspecified morphology      H/O pilonidal cyst          SOCIAL HISTORY: Lives at home independently         Review of Systems:  CONSTITUTIONAL: +fatigue  EYES: No visual disturbances  ENMT:  +difficulty hearing, +nasal passage edema  NECK: No pain  RESPIRATORY: +shortness of breath  CARDIOVASCULAR: No chest pain, palpitations, or leg swelling  GASTROINTESTINAL: No abdominal pain. No nausea, vomiting, diarrhea, or constipation. No hematemesis, melena or hematochezia  GENITOURINARY: No dysuria, frequency, hematuria, or incontinence  NEUROLOGICAL: No headaches, loss of strength, numbness, or tremors  SKIN: No rashes  MUSCULOSKELETAL: No back or extremity pain. No calf pain  PSYCHIATRIC: No depression, anxiety, or difficulty sleeping        Medications:  levoFLOXacin IVPB 750 milliGRAM(s) IV Intermittent every 48 hours  metoprolol tartrate 100 milliGRAM(s) Oral two times a day  tamsulosin 0.4 milliGRAM(s) Oral at bedtime  albuterol/ipratropium for Nebulization 3 milliLiter(s) Nebulizer every 6 hours  guaifenesin/dextromethorphan Oral Liquid 10 milliLiter(s) Oral every 6 hours  acetaminophen     Tablet .. 650 milliGRAM(s) Oral every 6 hours PRN  diphenhydrAMINE 25 milliGRAM(s) Oral at bedtime PRN  melatonin 5 milliGRAM(s) Oral at bedtime  ondansetron Injectable 4 milliGRAM(s) IV Push every 8 hours PRN  enoxaparin Injectable 40 milliGRAM(s) SubCutaneous every 24 hours  aluminum hydroxide/magnesium hydroxide/simethicone Suspension 30 milliLiter(s) Oral every 4 hours PRN  magnesium hydroxide Suspension 30 milliLiter(s) Oral daily PRN  pantoprazole    Tablet 40 milliGRAM(s) Oral before breakfast  polyethylene glycol 3350 17 Gram(s) Oral daily PRN  senna 2 Tablet(s) Oral at bedtime  atorvastatin 40 milliGRAM(s) Oral at bedtime  finasteride 5 milliGRAM(s) Oral daily  sodium chloride 0.65% Nasal 1 Spray(s) Both Nostrils every 2 hours PRN  saccharomyces boulardii 250 milliGRAM(s) Oral two times a day        ICU Vital Signs Last 24 Hrs  T(C): 36.8 (20 Aug 2022 16:24), Max: 36.9 (19 Aug 2022 21:07)  T(F): 98.2 (20 Aug 2022 16:24), Max: 98.4 (19 Aug 2022 21:07)  HR: 75 (20 Aug 2022 18:00) (61 - 84)  BP: 131/60 (20 Aug 2022 18:00) (119/61 - 140/79)  BP(mean): 81 (20 Aug 2022 18:00) (71 - 99)  ABP: --  ABP(mean): --  RR: 34 (20 Aug 2022 18:00) (19 - 36)  SpO2: 89% (20 Aug 2022 18:00) (89% - 96%)    O2 Parameters below as of 20 Aug 2022 18:00  Patient On (Oxygen Delivery Method): nasal cannula, high flow    O2 Concentration (%): 50          ABG - ( 19 Aug 2022 07:31 )  pH, Arterial: 7.48  pH, Blood: x     /  pCO2: 34    /  pO2: 70    / HCO3: 25    / Base Excess: 1.8   /  SaO2: 95.3            I&O's Detail    19 Aug 2022 07:01  -  20 Aug 2022 07:00  --------------------------------------------------------  IN:    Oral Fluid: 350 mL  Total IN: 350 mL    OUT:    Voided (mL): 900 mL  Total OUT: 900 mL    Total NET: -550 mL      20 Aug 2022 07:01  -  20 Aug 2022 20:13  --------------------------------------------------------  IN:    Oral Fluid: 720 mL  Total IN: 720 mL    OUT:  Total OUT: 0 mL    Total NET: 720 mL            LABS:                        12.1   18.72 )-----------( 521      ( 20 Aug 2022 06:19 )             37.0     08-20    138  |  105  |  26<H>  ----------------------------<  103<H>  4.6   |  28  |  1.20    Ca    7.9<L>      20 Aug 2022 06:19  Phos  4.4     08-20  Mg     2.0     08-20    TPro  7.1  /  Alb  1.7<L>  /  TBili  0.8  /  DBili  x   /  AST  67<H>  /  ALT  78<H>  /  AlkPhos  352<H>  08-20          CAPILLARY BLOOD GLUCOSE            CULTURES:            Physical Examination:    General: No acute distress.      HEENT: Pupils equal, reactive to light.  Symmetric.    PULM: Tachypneic, no accessory muscle use. Bilateral ronchi with upper lobe predominance    CVS: Regular rate and rhythm, no murmurs, rubs, or gallops    ABD: Soft, nondistended, nontender, normoactive bowel sounds, no masses    EXT: No edema. L pretibial TTP (chronic, per daughter)    SKIN: Warm and well perfused, no rashes noted.    NEURO: Alert, oriented, interactive, nonfocal        RADIOLOGY: ***        INVASIVE LINES: X  INDWELLING JIMENEZ: X   VTE PROPHYLAXIS: Lovenox   CAM ICU: -  CODE STATUS: FULL. HCP - Daughter Chichi        CRITICAL CARE TIME SPENT: 32 minutes spent performing frequent bedside reassessments and augmenting plan of care to address problems of acute critical illness that pose high probability of life threatening deterioration and/or end organ damage/dysfunction and discussing goals of care, non-inclusive of time spent on procedures performed. Patient is a 87y old  Male who presents with a chief complaint of sepsis due to PNA (20 Aug 2022 18:38)      BRIEF HOSPITAL COURSE: 86 y/o male, PMHx HTN, HLD, CVA in 2015 (no residual deficits), BPH, and skin CA who presented on 8/11 c/o cough and fever x 1 week. Pt admitted to [facility] with acute hypoxic respiratory failure secondary to multifocal pneumonia, tested positive for Legionella on Levaquin. Hospital course complicated by new onset atrial fibrillation, transaminitis, and CINDI.      Events last 24 hours: Was weaned down to 50% HFNC today, however received on HFNC 100% / 30 LPM. Complains of swelling inside nose and pain from HFNC, however states he "couldn't breathe" on venti mask today. Feels fatigued, worked with PT today.         PAST MEDICAL & SURGICAL HISTORY:  Skin cancer  s/p Mohs      Essential hypertension      Cerebrovascular accident (CVA), unspecified mechanism  2015      Hyperlipidemia, unspecified hyperlipidemia type      Complex tear of lateral meniscus of right knee as current injury, initial encounter      Complex tear of medial meniscus of right knee as current injury, initial encounter      Benign prostatic hyperplasia, presence of lower urinary tract symptoms unspecified, unspecified morphology      H/O pilonidal cyst          SOCIAL HISTORY: Lives at home independently         Review of Systems:  CONSTITUTIONAL: +fatigue  EYES: No visual disturbances  ENMT:  +difficulty hearing, +nasal passage edema  NECK: No pain  RESPIRATORY: +shortness of breath  CARDIOVASCULAR: No chest pain, palpitations, or leg swelling  GASTROINTESTINAL: No abdominal pain. No nausea, vomiting, diarrhea, or constipation. No hematemesis, melena or hematochezia  GENITOURINARY: No dysuria, frequency, hematuria, or incontinence  NEUROLOGICAL: No headaches, loss of strength, numbness, or tremors  SKIN: No rashes  MUSCULOSKELETAL: No back or extremity pain. No calf pain  PSYCHIATRIC: No depression, anxiety, or difficulty sleeping        Medications:  levoFLOXacin IVPB 750 milliGRAM(s) IV Intermittent every 48 hours  metoprolol tartrate 100 milliGRAM(s) Oral two times a day  tamsulosin 0.4 milliGRAM(s) Oral at bedtime  albuterol/ipratropium for Nebulization 3 milliLiter(s) Nebulizer every 6 hours  guaifenesin/dextromethorphan Oral Liquid 10 milliLiter(s) Oral every 6 hours  acetaminophen     Tablet .. 650 milliGRAM(s) Oral every 6 hours PRN  diphenhydrAMINE 25 milliGRAM(s) Oral at bedtime PRN  melatonin 5 milliGRAM(s) Oral at bedtime  ondansetron Injectable 4 milliGRAM(s) IV Push every 8 hours PRN  enoxaparin Injectable 40 milliGRAM(s) SubCutaneous every 24 hours  aluminum hydroxide/magnesium hydroxide/simethicone Suspension 30 milliLiter(s) Oral every 4 hours PRN  magnesium hydroxide Suspension 30 milliLiter(s) Oral daily PRN  pantoprazole    Tablet 40 milliGRAM(s) Oral before breakfast  polyethylene glycol 3350 17 Gram(s) Oral daily PRN  senna 2 Tablet(s) Oral at bedtime  atorvastatin 40 milliGRAM(s) Oral at bedtime  finasteride 5 milliGRAM(s) Oral daily  sodium chloride 0.65% Nasal 1 Spray(s) Both Nostrils every 2 hours PRN  saccharomyces boulardii 250 milliGRAM(s) Oral two times a day        ICU Vital Signs Last 24 Hrs  T(C): 36.8 (20 Aug 2022 16:24), Max: 36.9 (19 Aug 2022 21:07)  T(F): 98.2 (20 Aug 2022 16:24), Max: 98.4 (19 Aug 2022 21:07)  HR: 75 (20 Aug 2022 18:00) (61 - 84)  BP: 131/60 (20 Aug 2022 18:00) (119/61 - 140/79)  BP(mean): 81 (20 Aug 2022 18:00) (71 - 99)  ABP: --  ABP(mean): --  RR: 34 (20 Aug 2022 18:00) (19 - 36)  SpO2: 89% (20 Aug 2022 18:00) (89% - 96%)    O2 Parameters below as of 20 Aug 2022 18:00  Patient On (Oxygen Delivery Method): nasal cannula, high flow    O2 Concentration (%): 50          ABG - ( 19 Aug 2022 07:31 )  pH, Arterial: 7.48  pH, Blood: x     /  pCO2: 34    /  pO2: 70    / HCO3: 25    / Base Excess: 1.8   /  SaO2: 95.3            I&O's Detail    19 Aug 2022 07:01  -  20 Aug 2022 07:00  --------------------------------------------------------  IN:    Oral Fluid: 350 mL  Total IN: 350 mL    OUT:    Voided (mL): 900 mL  Total OUT: 900 mL    Total NET: -550 mL      20 Aug 2022 07:01  -  20 Aug 2022 20:13  --------------------------------------------------------  IN:    Oral Fluid: 720 mL  Total IN: 720 mL    OUT:  Total OUT: 0 mL    Total NET: 720 mL            LABS:                        12.1   18.72 )-----------( 521      ( 20 Aug 2022 06:19 )             37.0     08-20    138  |  105  |  26<H>  ----------------------------<  103<H>  4.6   |  28  |  1.20    Ca    7.9<L>      20 Aug 2022 06:19  Phos  4.4     08-20  Mg     2.0     08-20    TPro  7.1  /  Alb  1.7<L>  /  TBili  0.8  /  DBili  x   /  AST  67<H>  /  ALT  78<H>  /  AlkPhos  352<H>  08-20          CAPILLARY BLOOD GLUCOSE            CULTURES:            Physical Examination:    General: No acute distress.      HEENT: Pupils equal, reactive to light.  Symmetric. Edema of nasal septum/columella, marked tenderness with manipulation     PULM: Tachypneic, no accessory muscle use. Bilateral ronchi with upper lobe predominance    CVS: Regular rate and rhythm, no murmurs, rubs, or gallops    ABD: Soft, nondistended, nontender, normoactive bowel sounds, no masses    EXT: No edema. L pretibial TTP (chronic, per daughter)    SKIN: Warm and well perfused, no rashes noted.    NEURO: Alert, oriented, interactive, nonfocal        RADIOLOGY: ***        INVASIVE LINES: X  INDWELLING JIMENEZ: X   VTE PROPHYLAXIS: Lovenox   CAM ICU: -  CODE STATUS: FULL. HCP - Daughter Chichi        CRITICAL CARE TIME SPENT: 32 minutes spent performing frequent bedside reassessments and augmenting plan of care to address problems of acute critical illness that pose high probability of life threatening deterioration and/or end organ damage/dysfunction and discussing goals of care, non-inclusive of time spent on procedures performed.

## 2022-08-20 NOTE — PROGRESS NOTE ADULT - ASSESSMENT
88yo M with PMH of HTN, HLD, hx of CVA (no residual deficits), BPH, hx of skin CA who presents with cough and fevers a/w sepsis due to multifocal pneumonia with progression to acute hypoxic respiratory failure now on HFNC and course further c/b new onset afib.        Sepsis 2/2 multifocal PNA - meets sepsis criteria on arrival with leukocytosis + tachycardia + source of infection  - strep PNA Ag negative. Legionella serum antibody positive, legionella urine Ag negative  - s/p ceftriaxone and azithromycin. Continue Levaquin for now per ID ([facility]), recs appreciated   - pulmonary ([facility]), recs appreciated  - AFB smear negative x3    Acute hypoxia respiratory failure 2/2 likely PNA.   - pulmonary ([facility]), recs appreciated  - Continue HFNC, continue to monitor and will trial wean - pt tolerated 50% venti-mask today for several hours  - full code, intubate if needed  - c/w Abx for PNA    Cavitary lesion  - doubt TB, but patient had blood tinged sputum, possible early cavitation on CT, and fevers. Low suspicion for TB as per ID, discontinued isolation  - quantiferon equivocal. AFB smear negative x3    New afib   - [id number] score of at least 5 -> was on heparin drip but had worsening hemoptysis and cardio felt risk > benefits so full A/C discontinued and pt just given ppx dose lovenox  - cardiology (Miya) consulted, recs appreciated  - rate control if needed  - TTE shows Low normal left ventricular systolic function. Bi-atrial enlargement. Mild pulmonary hypertension  - on Lovenox, will eventually need to transition to DOAC when feel confident hemoptysis has resolved     Transaminitis  - likely from current illness ; possibly from legionella if that is the culprit of the PNA  - CT A/P without significant pathology to cause the transaminitis; monitor labs  - can consider GI consult if does not improve with Abx therapy     CINDI 2/2 suspected prerenal azotemia    - likely 2/2 poor PO intake  - oral hydration encouraged, monitor BMP - likely now at baseline renal function  - hold diuretic  - avoid nephrotoxic meds    HTN/HLD  - cont with norvasc  - hold lasix  - cont with atorvastatin    BPH  - cont with tamsulosin and finasteride    VTE ppx  - Lovenox, will eventually transition to DOAC when it is clear hemoptysis has resolved      88yo M with PMH of HTN, HLD, hx of CVA (no residual deficits), BPH, hx of skin CA who presents with cough and fevers a/w sepsis due to multifocal pneumonia with progression to acute hypoxic respiratory failure now on HFNC and course further c/b new onset afib.        Sepsis 2/2 multifocal PNA   - met sepsis criteria on arrival with leukocytosis + tachycardia + source of infection  - strep PNA Ag negative. Legionella serum antibody positive, legionella urine Ag negative  - s/p ceftriaxone and azithromycin. Continue Levaquin for now per ID ([facility]), recs appreciated   - pulmonary ([facility]), recs appreciated  - AFB smear negative x3  - still with leukocytosis to 18 ; down from peak of 26K earlier in admission  - check sputum culture  - may need bronchoscopy     Acute hypoxia respiratory failure 2/2 likely PNA.   - pulmonary ([facility]), recs appreciated  - Continue HFNC, continue to monitor and will trial wean - pt tolerated 50% venti-mask today for several hours  - full code, intubate if needed  - c/w Abx for PNA    Cavitary lesion  - doubt TB, but patient had blood tinged sputum, possible early cavitation on CT, and fevers. Low suspicion for TB as per ID, discontinued isolation  - quantiferon equivocal. AFB smear negative x3    New afib   - [id number] score of at least 5 -> was on heparin drip but had worsening hemoptysis and cardio felt risk > benefits so full A/C discontinued and pt just given ppx dose lovenox  - cardiology (Miya) consulted, recs appreciated  - rate control if needed  - TTE shows Low normal left ventricular systolic function. Bi-atrial enlargement. Mild pulmonary hypertension  - on Lovenox, will eventually need to transition to DOAC when feel confident hemoptysis has resolved     Transaminitis  - likely from current illness ; possibly from legionella if that is the culprit of the PNA  - CT A/P without significant pathology to cause the transaminitis; monitor labs  - can consider GI consult if does not improve with Abx therapy     CINDI 2/2 suspected prerenal azotemia    - likely 2/2 poor PO intake  - oral hydration encouraged, monitor BMP - likely now at baseline renal function  - hold diuretic  - avoid nephrotoxic meds    HTN/HLD  - cont with norvasc  - hold lasix  - cont with atorvastatin    BPH  - cont with tamsulosin and finasteride    VTE ppx  - Lovenox, will eventually transition to DOAC when it is clear hemoptysis has resolved

## 2022-08-20 NOTE — PROGRESS NOTE ADULT - ASSESSMENT
88 y/o male, PMHx HTN, HLD, CVA in 2015 (no residual deficits), BPH, and skin CA who presented on 8/11 c/o cough and fever x 1 week. Pt admitted to [facility] with acute hypoxic respiratory failure secondary to multifocal pneumonia, tested positive for Legionella on Levaquin. Hospital course complicated by new onset atrial fibrillation, transaminitis, and CINDI. CT chest with possibly cavitary lesions, ruled out for TB with AFB neg x 3.    Persistent respiratory failure, HFNC dependent  Now with worsening oxygenation, SpO2 borderline on 100% HFNC   Unable to uptitrate flow due to nasal swelling, unclear if this is causing pseudo airway obstruction  Add saline nasal spray to keep moist  Obtain chest xray  HFNC cannula increased to large size to offload pressure on nasal septum, continue lubrication  Will attempt to wean down, titrating HFNC to goal SpO2 >90%  On Levofloxacin  for treatment of Legionella PNA. Procalcitonin is downtrending, afebrile.   PT, OOB to chair to optimize oxygenation  High risk for delirium, encourage sleep hygiene with melatonin add PRN benadryl, d/c flexaril and minimize overnight disturbances as able    Continue ICU level of care for treatment of acute hypoxic respiratory failure. Respiratory status remains tenuous, worsening oxygenation on maximum HFNC, full code.  Patient's daughter/HCP Chichi updated at bedside  88 y/o male, PMHx HTN, HLD, CVA in 2015 (no residual deficits), BPH, and skin CA who presented on 8/11 c/o cough and fever x 1 week. Pt admitted to [facility] with:    1. acute hypoxic respiratory failure  2. multifocal pneumonia  3. Legionnare's   4. paroxysmal atrial fibrillation with RVR  5. transaminitis  6. CINDI  7. CT chest with possibly cavitary lesions, ruled out for TB with AFB neg x 3.    Persistent respiratory failure, HFNC dependent  Now with worsening oxygenation, SpO2 borderline on 100% HFNC   Unable to uptitrate flow due to nasal swelling, unclear if this is causing pseudo airway obstruction  Add saline nasal spray to keep moist  Obtain chest xray  HFNC cannula increased to large size to offload pressure on nasal septum, continue lubrication  Will attempt to wean down, titrating HFNC to goal SpO2 >90%  On Levofloxacin  for treatment of Legionella PNA. Procalcitonin is downtrending, afebrile.   PT, OOB to chair to optimize oxygenation  High risk for delirium, encourage sleep hygiene with melatonin add PRN benadryl, d/c flexaril and minimize overnight disturbances as able    Continue ICU level of care for treatment of acute hypoxic respiratory failure. Respiratory status remains tenuous, worsening oxygenation on maximum HFNC, full code.  Patient's daughter/HCP Chichi updated at bedside  88 y/o male, PMHx HTN, HLD, CVA in 2015 (no residual deficits), BPH, and skin CA who presented on 8/11 c/o cough and fever x 1 week. Pt admitted to [facility] with:    1. acute hypoxic respiratory failure  2. severe sepsis due to multifocal pneumonia  3. Legionnare's disease  4. paroxysmal atrial fibrillation with RVR  5. transaminitis  6. CINDI  7. CT chest with possibly cavitary lesions, ruled out for TB with AFB neg x 3.    Persistent respiratory failure, HFNC dependent  Now with worsening oxygenation, SpO2 borderline on 100% HFNC   Unable to uptitrate flow due to nasal swelling, unclear if this is causing pseudo airway obstruction  Add saline nasal spray to keep moist  Obtain chest xray  HFNC cannula increased to large size to offload pressure on nasal septum, continue lubrication  Will attempt to wean down, titrating HFNC to goal SpO2 >90%  On Levofloxacin  for treatment of Legionella PNA. Procalcitonin is downtrending, afebrile.   PT, OOB to chair to optimize oxygenation  High risk for delirium, encourage sleep hygiene with melatonin add PRN benadryl, d/c flexaril and minimize overnight disturbances as able    Continue ICU level of care for treatment of acute hypoxic respiratory failure. Respiratory status remains tenuous, worsening oxygenation on maximum HFNC, full code.  Patient's daughter/HCP Chichi updated at bedside  86 y/o male, PMHx HTN, HLD, CVA in 2015 (no residual deficits), BPH, and skin CA who presented on 8/11 c/o cough and fever x 1 week. Pt admitted to [facility] with:    1. acute hypoxic respiratory failure  2. severe sepsis due to multifocal pneumonia  3. Legionnaire's disease  4. transaminitis  5. CINDI  6. paroxysmal atrial fibrillation with RVR, new onset  7. CT chest with possibly cavitary lesions, ruled out for TB with AFB neg x 3.    Persistent respiratory failure, HFNC dependent  Now with worsening oxygenation, SpO2 borderline on 100% HFNC   Unable to uptitrate flow due to nasal swelling, unclear if this is causing pseudo airway obstruction  Add saline nasal spray to keep moist  Obtain chest xray  HFNC cannula increased to large size to offload pressure on nasal septum, continue lubrication  Will attempt to wean down, titrating HFNC to goal SpO2 >90%  On Levofloxacin  for treatment of Legionella PNA. Procalcitonin is downtrending, afebrile.   PT, OOB to chair to optimize oxygenation  High risk for delirium, encourage sleep hygiene with melatonin add PRN benadryl, d/c flexaril and minimize overnight disturbances as able    Continue ICU level of care for treatment of acute hypoxic respiratory failure. Respiratory status remains tenuous, worsening oxygenation on maximum HFNC, full code.  Patient's daughter/HCP Chichi updated at bedside

## 2022-08-20 NOTE — PROGRESS NOTE ADULT - ASSESSMENT
87M with HTN, HLD, hx of CVA (no residual deficits), BPH, hx of skin CA who presents with cough and fevers    afb neg x 3  vs noted  labs reviewed  wean fio2 as tolerated  differential dx in pt with hemoptysis and cavitation is Malignancy - pt will need repeat CT chest in 4 - 6 weeks and possibly a Bronchoscopy or other means of diagnostic testing - examination  urine legionella ag neg - legionella ab positive -  - ID follow up reviewed - on Levaquin   ABG repeat noted -     on emp ABX  ID - Cardio follow up  I and O  serial labs  monitor VS and Sat  may need NIPPV if resp distress cont despite HF NC  prognosis guarded  GOC discussion

## 2022-08-20 NOTE — PROGRESS NOTE ADULT - ASSESSMENT
The patient is an 87 year old male with a history of HTN, HL, BPH, CVA who presents with cough and hemoptysis in the setting of PNA and r/o TB.    8/20/22  Seen at [facility]  Daughter at bedside  Sitting up, eating   No complaints offered  No recent hemoptysis    Plan:  - The rhythm is now back to sinus rhythm with PACs and intermittent episodes of AF  - Continue metoprolol tartrate 100 mg bid  - The patient has ongoing hemoptysis. Remain off heparin drip as risks outweigh benefits. When hemoptysis resolves, will start apixaban.  - Discontinue amlodipine  - Echo with low normal LV systolic function, mild pulm HTN-see above  - IV antibiotics-Levaquin  - AFB negative x3  - Pulm and ID follow-up  - If he continues to have hemoptysis - may need bronchoscopy

## 2022-08-21 LAB
ALBUMIN SERPL ELPH-MCNC: 1.7 G/DL — LOW (ref 3.3–5)
ALP SERPL-CCNC: 342 U/L — HIGH (ref 30–120)
ALT FLD-CCNC: 81 U/L DA — HIGH (ref 10–60)
ANION GAP SERPL CALC-SCNC: 6 MMOL/L — SIGNIFICANT CHANGE UP (ref 5–17)
AST SERPL-CCNC: 66 U/L — HIGH (ref 10–40)
BASOPHILS # BLD AUTO: 0.15 K/UL — SIGNIFICANT CHANGE UP (ref 0–0.2)
BASOPHILS NFR BLD AUTO: 0.8 % — SIGNIFICANT CHANGE UP (ref 0–2)
BILIRUB SERPL-MCNC: 0.8 MG/DL — SIGNIFICANT CHANGE UP (ref 0.2–1.2)
BUN SERPL-MCNC: 28 MG/DL — HIGH (ref 7–23)
CALCIUM SERPL-MCNC: 8.2 MG/DL — LOW (ref 8.4–10.5)
CHLORIDE SERPL-SCNC: 102 MMOL/L — SIGNIFICANT CHANGE UP (ref 96–108)
CO2 SERPL-SCNC: 28 MMOL/L — SIGNIFICANT CHANGE UP (ref 22–31)
CREAT SERPL-MCNC: 1.3 MG/DL — SIGNIFICANT CHANGE UP (ref 0.5–1.3)
EGFR: 53 ML/MIN/1.73M2 — LOW
EOSINOPHIL # BLD AUTO: 0.71 K/UL — HIGH (ref 0–0.5)
EOSINOPHIL NFR BLD AUTO: 4 % — SIGNIFICANT CHANGE UP (ref 0–6)
GLUCOSE SERPL-MCNC: 99 MG/DL — SIGNIFICANT CHANGE UP (ref 70–99)
GRAM STN FLD: SIGNIFICANT CHANGE UP
HCT VFR BLD CALC: 36.8 % — LOW (ref 39–50)
HGB BLD-MCNC: 11.8 G/DL — LOW (ref 13–17)
IMM GRANULOCYTES NFR BLD AUTO: 3.1 % — HIGH (ref 0–1.5)
LYMPHOCYTES # BLD AUTO: 12.2 % — LOW (ref 13–44)
LYMPHOCYTES # BLD AUTO: 2.15 K/UL — SIGNIFICANT CHANGE UP (ref 1–3.3)
MAGNESIUM SERPL-MCNC: 2 MG/DL — SIGNIFICANT CHANGE UP (ref 1.6–2.6)
MCHC RBC-ENTMCNC: 30.7 PG — SIGNIFICANT CHANGE UP (ref 27–34)
MCHC RBC-ENTMCNC: 32.1 GM/DL — SIGNIFICANT CHANGE UP (ref 32–36)
MCV RBC AUTO: 95.8 FL — SIGNIFICANT CHANGE UP (ref 80–100)
MONOCYTES # BLD AUTO: 2.1 K/UL — HIGH (ref 0–0.9)
MONOCYTES NFR BLD AUTO: 11.9 % — SIGNIFICANT CHANGE UP (ref 2–14)
NEUTROPHILS # BLD AUTO: 12.02 K/UL — HIGH (ref 1.8–7.4)
NEUTROPHILS NFR BLD AUTO: 68 % — SIGNIFICANT CHANGE UP (ref 43–77)
NRBC # BLD: 0 /100 WBCS — SIGNIFICANT CHANGE UP (ref 0–0)
PHOSPHATE SERPL-MCNC: 5.2 MG/DL — HIGH (ref 2.5–4.5)
PLATELET # BLD AUTO: 551 K/UL — HIGH (ref 150–400)
POTASSIUM SERPL-MCNC: 5.1 MMOL/L — SIGNIFICANT CHANGE UP (ref 3.5–5.3)
POTASSIUM SERPL-SCNC: 5.1 MMOL/L — SIGNIFICANT CHANGE UP (ref 3.5–5.3)
PROT SERPL-MCNC: 6.6 G/DL — SIGNIFICANT CHANGE UP (ref 6–8.3)
RBC # BLD: 3.84 M/UL — LOW (ref 4.2–5.8)
RBC # FLD: 14 % — SIGNIFICANT CHANGE UP (ref 10.3–14.5)
SODIUM SERPL-SCNC: 136 MMOL/L — SIGNIFICANT CHANGE UP (ref 135–145)
SPECIMEN SOURCE: SIGNIFICANT CHANGE UP
WBC # BLD: 17.68 K/UL — HIGH (ref 3.8–10.5)
WBC # FLD AUTO: 17.68 K/UL — HIGH (ref 3.8–10.5)

## 2022-08-21 PROCEDURE — 99233 SBSQ HOSP IP/OBS HIGH 50: CPT

## 2022-08-21 RX ADMIN — Medication 650 MILLIGRAM(S): at 23:00

## 2022-08-21 RX ADMIN — Medication 3 MILLILITER(S): at 20:40

## 2022-08-21 RX ADMIN — Medication 100 MILLIGRAM(S): at 06:32

## 2022-08-21 RX ADMIN — Medication 3 MILLILITER(S): at 07:29

## 2022-08-21 RX ADMIN — Medication 650 MILLIGRAM(S): at 22:15

## 2022-08-21 RX ADMIN — TAMSULOSIN HYDROCHLORIDE 0.4 MILLIGRAM(S): 0.4 CAPSULE ORAL at 22:16

## 2022-08-21 RX ADMIN — FINASTERIDE 5 MILLIGRAM(S): 5 TABLET, FILM COATED ORAL at 11:03

## 2022-08-21 RX ADMIN — Medication 10 MILLILITER(S): at 11:03

## 2022-08-21 RX ADMIN — Medication 10 MILLILITER(S): at 23:16

## 2022-08-21 RX ADMIN — Medication 10 MILLILITER(S): at 17:38

## 2022-08-21 RX ADMIN — Medication 1 SPRAY(S): at 06:33

## 2022-08-21 RX ADMIN — PANTOPRAZOLE SODIUM 40 MILLIGRAM(S): 20 TABLET, DELAYED RELEASE ORAL at 06:33

## 2022-08-21 RX ADMIN — Medication 250 MILLIGRAM(S): at 17:37

## 2022-08-21 RX ADMIN — SENNA PLUS 2 TABLET(S): 8.6 TABLET ORAL at 22:15

## 2022-08-21 RX ADMIN — ATORVASTATIN CALCIUM 40 MILLIGRAM(S): 80 TABLET, FILM COATED ORAL at 22:16

## 2022-08-21 RX ADMIN — Medication 10 MILLILITER(S): at 06:32

## 2022-08-21 RX ADMIN — Medication 5 MILLIGRAM(S): at 22:15

## 2022-08-21 RX ADMIN — ENOXAPARIN SODIUM 40 MILLIGRAM(S): 100 INJECTION SUBCUTANEOUS at 17:38

## 2022-08-21 RX ADMIN — Medication 250 MILLIGRAM(S): at 06:32

## 2022-08-21 RX ADMIN — Medication 100 MILLIGRAM(S): at 17:38

## 2022-08-21 RX ADMIN — Medication 3 MILLILITER(S): at 14:26

## 2022-08-21 NOTE — PROGRESS NOTE ADULT - ASSESSMENT
87M with HTN, HLD, hx of CVA (no residual deficits), BPH, hx of skin CA who presents with cough and fevers.    Found to have multifocal pneumonia.  Also found have new onset afib.        Sepsis 2/2 Multifocal PNA  Hemoptysis   AHRF on HFNC  - pt p/w leukocytosis, SOB  - legionella Ab+, urine legionella negative  Plan:   Unclear if this is related to legionella; will c/w levofloxacin to see if improvement  check sputum legionella pcr  pt with minimal improvement   - c/w levofloxacin  - trend temps/WBC  - supportive care/supplemental O2 per primary team  consider repeat chest imaging and consider brochoscopy      TB ruled out by 3 afb     Elevated LFTs  - likely 2/2 sepsis  - trend LFTs    CINDI 2/2 prerenal azotemia  - likely 2/2 poor PO intake/sepsis  - trend renal fxn  - avoid nephrotoxic agents  - renally dose medications

## 2022-08-21 NOTE — PROGRESS NOTE ADULT - ASSESSMENT
86yo M with PMH of HTN, HLD, hx of CVA (no residual deficits), BPH, hx of skin CA who presents with cough and fevers a/w sepsis due to multifocal pneumonia with progression to acute hypoxic respiratory failure now on HFNC and course further c/b new onset afib.        Sepsis 2/2 multifocal PNA   - met sepsis criteria on arrival with leukocytosis + tachycardia + source of infection  - strep PNA Ag negative. Legionella serum antibody positive, legionella urine Ag negative - ID ordered a legionella serum PCR to perhaps get more clarity if pt had legionella PNA  - s/p ceftriaxone and azithromycin. Continue Levaquin for now per ID ([facility]), recs appreciated   - pulmonary ([facility]), recs appreciated  - AFB smear negative x3  - still with leukocytosis to 17 ; down from peak of 26K earlier in admission ; O2 requirements remain high with HFNC at 50-60% FiO2  - check sputum culture  - may need bronchoscopy   - check Fungitell / HIV     Acute hypoxia respiratory failure 2/2 likely PNA.   - pulmonary ([facility]), recs appreciated  - Continue HFNC, continue to monitor and will trial wean   - full code, intubate if needed  - c/w Abx for PNA    Cavitary lesion  - doubt TB, but patient had blood tinged sputum, possible early cavitation on CT, and fevers. Low suspicion for TB as per ID, discontinued isolation  - quantiferon equivocal. AFB smear negative x3  - may need bronchoscopy    New afib   - [id number] score of at least 5 -> was on heparin drip but had worsening hemoptysis and cardio felt risk > benefits so full A/C discontinued and pt just given ppx dose lovenox  - cardiology (Miya) consulted, recs appreciated  - rate control if needed  - TTE shows Low normal left ventricular systolic function. Bi-atrial enlargement. Mild pulmonary hypertension  - on Lovenox, will eventually need to transition to DOAC when feel confident hemoptysis has resolved     Transaminitis  - likely from current illness ; possibly from legionella if that is the culprit of the PNA  - CT A/P without significant pathology to cause the transaminitis; monitor labs  - can consider GI consult if does not improve with Abx therapy     CINDI 2/2 suspected prerenal azotemia    - likely 2/2 poor PO intake  - oral hydration encouraged, monitor BMP - likely now at baseline renal function  - hold diuretic  - avoid nephrotoxic meds    HTN/HLD  - cont with norvasc  - hold lasix  - cont with atorvastatin    BPH  - cont with tamsulosin and finasteride    VTE ppx  - Lovenox, will eventually transition to DOAC when it is clear hemoptysis has resolved

## 2022-08-21 NOTE — PROGRESS NOTE ADULT - SUBJECTIVE AND OBJECTIVE BOX
[facility], Division of Infectious Diseases  EROS Perez Y. Patel, S. Shah, G. Rob  [phone]  after hours and weekends [phone]    Name: DENNIS BRADFORD  Age: 87y  Gender: Male  MRN: [id number]    Interval History--  Notes reviewed  up in chair       Allergies    No Known Allergies    Intolerances        Medications--  Antibiotics:  levoFLOXacin IVPB 750 milliGRAM(s) IV Intermittent every 48 hours    Immunologic:    Other:  acetaminophen     Tablet .. PRN  albuterol/ipratropium for Nebulization  aluminum hydroxide/magnesium hydroxide/simethicone Suspension PRN  atorvastatin  diphenhydrAMINE PRN  enoxaparin Injectable  finasteride  guaifenesin/dextromethorphan Oral Liquid  magnesium hydroxide Suspension PRN  melatonin  metoprolol tartrate  ondansetron Injectable PRN  pantoprazole    Tablet  polyethylene glycol 3350 PRN  saccharomyces boulardii  senna  sodium chloride 0.65% Nasal PRN  tamsulosin      Review of Systems--  A 10-point review of systems was obtained.     limited on facemask    Review of systems otherwise negative except as previously noted.    Physical Examination--  Vital Signs: T(F): 97.8 (08-21-22 @ 12:01), Max: 99.1 (08-21-22 @ 00:06)  HR: 69 (08-21-22 @ 14:37)  BP: 119/56 (08-21-22 @ 14:00)  RR: 26 (08-21-22 @ 14:00)  SpO2: 94% (08-21-22 @ 14:37)  Wt(kg): --  General: mild resp distress.  HEENT: AT/NC. high flow   Nodes: None palpable.  Lungs: Clear bilaterally without rales, wheezing or rhonchi  Heart: Regular rate and rhythm  Abdomen: Bowel sounds present and normoactive. Soft. Nondistended. Nontender.   Extremities: No cyanosis or clubbing. No edema.   Skin: Warm. Dry. Good turgor. No rash. No vasculitic stigmata.  Psychiatric: Appropriate affect and mood for situation.       Laboratory Studies--  CBC                        11.8   17.68 )-----------( 551      ( 21 Aug 2022 06:32 )             36.8       Chemistries  08-21    136  |  102  |  28<H>  ----------------------------<  99  5.1   |  28  |  1.30    Ca    8.2<L>      21 Aug 2022 06:32  Phos  5.2     08-21  Mg     2.0     08-21    TPro  6.6  /  Alb  1.7<L>  /  TBili  0.8  /  DBili  x   /  AST  66<H>  /  ALT  81<H>  /  AlkPhos  342<H>  08-21      Culture Data

## 2022-08-21 NOTE — PROGRESS NOTE ADULT - SUBJECTIVE AND OBJECTIVE BOX
Patient is a 87y Male with a known history of :    HPI:  87M with HTN, HLD, hx of CVA (no residual deficits), BPH, hx of skin CA who presents with cough and fevers.  Symptoms started about 5-6 days ago.  Started with left sided upper back pain.  Then started to have a cough associated with green phlegm and possible blood.  Associated with SOB and DONG.  No weight changes or night sweats.  Had a fever over the weekend (4-5 days ago) as high as 102F.  Some nausea but no vomiting with loss of appetite.  No chest pain.  No diarrhea.  No abdominal pain.  No recent travel or sick contacts.  Went to see his PMD 3 days ago and was given a z-kellie (last day tomorrow) and tesslon perles.  Reported had negative COVID tests at home.  Patient decided to come here for further evaluation.  In the ED, patient's triage vitals were /78    RR  21, 93% on RA (thought dropped down to 88%) and T 98.5F.  Labs showed WBC 18 with a left shift, CINDI with BUN/Cr 40/1.88, elevated LFTs, normal lactate, negative COVID (last booster in 12/2021 with Pfizer vaccine), neg influenza, neg RSV.  CT chest showed "patchy groundglass and more dense opacities in both lungs, suspicious for multifocal PNA...several small lucencies with areas of lung opacity...may represent early cavitation versus areas of focal bronchiectasis."  Patient started on azithromycin and ceftriaxone empirically for multifocal PNA.  Also of note, patient was found to be in new afib on EKG.  Patient denies any afib history but said when he was younger he did feel some irregular heartbeats.   (11 Aug 2022 22:21)      REVIEW OF SYSTEMS:    CONSTITUTIONAL: No fever, weight loss, or fatigue  EYES: No eye pain, visual disturbances, or discharge  ENMT:  No difficulty hearing, tinnitus, vertigo; No sinus or throat pain  NECK: No pain or stiffness  BREASTS: No pain, masses, or nipple discharge  RESPIRATORY: No cough, wheezing, chills or hemoptysis; No shortness of breath  CARDIOVASCULAR: No chest pain, palpitations, dizziness, or leg swelling  GASTROINTESTINAL: No abdominal or epigastric pain. No nausea, vomiting, or hematemesis; No diarrhea or constipation. No melena or hematochezia.  GENITOURINARY: No dysuria, frequency, hematuria, or incontinence  NEUROLOGICAL: No headaches, memory loss, loss of strength, numbness, or tremors  SKIN: No itching, burning, rashes, or lesions   LYMPH NODES: No enlarged glands  ENDOCRINE: No heat or cold intolerance; No hair loss  MUSCULOSKELETAL: No joint pain or swelling; No muscle, back, or extremity pain  PSYCHIATRIC: No depression, anxiety, mood swings, or difficulty sleeping  HEME/LYMPH: No easy bruising, or bleeding gums  ALLERGY AND IMMUNOLOGIC: No hives or eczema    MEDICATIONS  (STANDING):  albuterol/ipratropium for Nebulization 3 milliLiter(s) Nebulizer every 6 hours  atorvastatin 40 milliGRAM(s) Oral at bedtime  enoxaparin Injectable 40 milliGRAM(s) SubCutaneous every 24 hours  finasteride 5 milliGRAM(s) Oral daily  guaifenesin/dextromethorphan Oral Liquid 10 milliLiter(s) Oral every 6 hours  levoFLOXacin IVPB 750 milliGRAM(s) IV Intermittent every 48 hours  melatonin 5 milliGRAM(s) Oral at bedtime  metoprolol tartrate 100 milliGRAM(s) Oral two times a day  pantoprazole    Tablet 40 milliGRAM(s) Oral before breakfast  saccharomyces boulardii 250 milliGRAM(s) Oral two times a day  senna 2 Tablet(s) Oral at bedtime  tamsulosin 0.4 milliGRAM(s) Oral at bedtime    MEDICATIONS  (PRN):  acetaminophen     Tablet .. 650 milliGRAM(s) Oral every 6 hours PRN Temp greater or equal to 38C (100.4F), Mild Pain (1 - 3)  aluminum hydroxide/magnesium hydroxide/simethicone Suspension 30 milliLiter(s) Oral every 4 hours PRN Dyspepsia  diphenhydrAMINE 25 milliGRAM(s) Oral at bedtime PRN Insomnia  magnesium hydroxide Suspension 30 milliLiter(s) Oral daily PRN Constipation  ondansetron Injectable 4 milliGRAM(s) IV Push every 8 hours PRN Nausea and/or Vomiting  polyethylene glycol 3350 17 Gram(s) Oral daily PRN Constipation  sodium chloride 0.65% Nasal 1 Spray(s) Both Nostrils every 2 hours PRN Nasal Congestion      ALLERGIES: No Known Allergies      FAMILY HISTORY:  Family history of ischemic heart disease and other diseases of the circulatory system (Father)        Social history:  Alochol:   Smoking:   Drug Use:   Marital Status:     PHYSICAL EXAMINATION:  -----------------------------  T(C): 36.4 (08-21-22 @ 08:09), Max: 37.3 (08-21-22 @ 00:06)  HR: 58 (08-21-22 @ 08:00) (57 - 75)  BP: 127/66 (08-21-22 @ 08:00) (101/50 - 138/61)  RR: 28 (08-21-22 @ 08:00) (16 - 36)  SpO2: 95% (08-21-22 @ 08:00) (89% - 98%)  Wt(kg): --    08-20 @ 07:01  -  08-21 @ 07:00  --------------------------------------------------------  IN:    Oral Fluid: 720 mL  Total IN: 720 mL    OUT:    Voided (mL): 700 mL  Total OUT: 700 mL    Total NET: 20 mL            Constitutional: well developed, normal appearance, well groomed, well nourished, no deformities and no acute distress.   Eyes: the conjunctiva exhibited no abnormalities and the eyelids demonstrated no xanthelasmas.   HEENT: normal oral mucosa, no oral pallor and no oral cyanosis.   Neck: normal jugular venous A waves present, normal jugular venous V waves present and no jugular venous mcdaniels A waves.   Pulmonary: no respiratory distress, normal respiratory rhythm and effort, no accessory muscle use and lungs were clear to auscultation bilaterally. Anteriorly  Cardiovascular: heart rate and rhythm were normal, normal S1 and S2 and no murmur, gallop, rub, heave or thrill are present.   Musculoskeletal: the gait could not be assessed.   Extremities: no clubbing of the fingernails, no localized cyanosis, no petechial hemorrhages and no ischemic changes.   Skin: normal skin color and pigmentation, no rash, no venous stasis, no skin lesions, no skin ulcer and no xanthoma was observed.   Psychiatric: oriented to person, place, and time, the affect was normal, the mood was normal and not feeling anxious.     LABS:   --------  08-21    136  |  102  |  28<H>  ----------------------------<  99  5.1   |  28  |  1.30    Ca    8.2<L>      21 Aug 2022 06:32  Phos  5.2     08-21  Mg     2.0     08-21    TPro  6.6  /  Alb  1.7<L>  /  TBili  0.8  /  DBili  x   /  AST  66<H>  /  ALT  81<H>  /  AlkPhos  342<H>  08-21                         11.8   17.68 )-----------( 551      ( 21 Aug 2022 06:32 )             36.8                   Radiology:

## 2022-08-21 NOTE — PROGRESS NOTE ADULT - SUBJECTIVE AND OBJECTIVE BOX
Date/Time Patient Seen:  		  Referring MD:   Data Reviewed	       Patient is a 87y old  Male who presents with a chief complaint of cough -> PNA (20 Aug 2022 20:12)      Subjective/HPI     PAST MEDICAL & SURGICAL HISTORY:  No pertinent past medical history    Skin cancer  s/p Mohs    Essential hypertension    Cerebrovascular accident (CVA), unspecified mechanism  2015    Hyperlipidemia, unspecified hyperlipidemia type    Complex tear of lateral meniscus of right knee as current injury, initial encounter    Complex tear of medial meniscus of right knee as current injury, initial encounter    Benign prostatic hyperplasia, presence of lower urinary tract symptoms unspecified, unspecified morphology    H/O pilonidal cyst          Medication list         MEDICATIONS  (STANDING):  albuterol/ipratropium for Nebulization 3 milliLiter(s) Nebulizer every 6 hours  atorvastatin 40 milliGRAM(s) Oral at bedtime  enoxaparin Injectable 40 milliGRAM(s) SubCutaneous every 24 hours  finasteride 5 milliGRAM(s) Oral daily  guaifenesin/dextromethorphan Oral Liquid 10 milliLiter(s) Oral every 6 hours  levoFLOXacin IVPB 750 milliGRAM(s) IV Intermittent every 48 hours  melatonin 5 milliGRAM(s) Oral at bedtime  metoprolol tartrate 100 milliGRAM(s) Oral two times a day  pantoprazole    Tablet 40 milliGRAM(s) Oral before breakfast  saccharomyces boulardii 250 milliGRAM(s) Oral two times a day  senna 2 Tablet(s) Oral at bedtime  tamsulosin 0.4 milliGRAM(s) Oral at bedtime    MEDICATIONS  (PRN):  acetaminophen     Tablet .. 650 milliGRAM(s) Oral every 6 hours PRN Temp greater or equal to 38C (100.4F), Mild Pain (1 - 3)  aluminum hydroxide/magnesium hydroxide/simethicone Suspension 30 milliLiter(s) Oral every 4 hours PRN Dyspepsia  diphenhydrAMINE 25 milliGRAM(s) Oral at bedtime PRN Insomnia  magnesium hydroxide Suspension 30 milliLiter(s) Oral daily PRN Constipation  ondansetron Injectable 4 milliGRAM(s) IV Push every 8 hours PRN Nausea and/or Vomiting  polyethylene glycol 3350 17 Gram(s) Oral daily PRN Constipation  sodium chloride 0.65% Nasal 1 Spray(s) Both Nostrils every 2 hours PRN Nasal Congestion         Vitals log        ICU Vital Signs Last 24 Hrs  T(C): 36.7 (21 Aug 2022 05:18), Max: 37.3 (21 Aug 2022 00:06)  T(F): 98 (21 Aug 2022 05:18), Max: 99.1 (21 Aug 2022 00:06)  HR: 60 (21 Aug 2022 07:00) (59 - 75)  BP: 115/53 (21 Aug 2022 07:00) (101/50 - 138/61)  BP(mean): 71 (21 Aug 2022 07:00) (65 - 91)  ABP: --  ABP(mean): --  RR: 24 (21 Aug 2022 07:00) (16 - 36)  SpO2: 97% (21 Aug 2022 07:00) (89% - 98%)    O2 Parameters below as of 21 Aug 2022 07:00  Patient On (Oxygen Delivery Method): nasal cannula,high flow                 Input and Output:  I&O's Detail    20 Aug 2022 07:01  -  21 Aug 2022 07:00  --------------------------------------------------------  IN:    Oral Fluid: 720 mL  Total IN: 720 mL    OUT:    Voided (mL): 700 mL  Total OUT: 700 mL    Total NET: 20 mL          Lab Data                        11.8   17.68 )-----------( 551      ( 21 Aug 2022 06:32 )             36.8     08-21    136  |  102  |  28<H>  ----------------------------<  99  5.1   |  28  |  1.30    Ca    8.2<L>      21 Aug 2022 06:32  Phos  5.2     08-21  Mg     2.0     08-21    TPro  6.6  /  Alb  1.7<L>  /  TBili  0.8  /  DBili  x   /  AST  66<H>  /  ALT  81<H>  /  AlkPhos  342<H>  08-21    ABG - ( 19 Aug 2022 07:31 )  pH, Arterial: 7.48  pH, Blood: x     /  pCO2: 34    /  pO2: 70    / HCO3: 25    / Base Excess: 1.8   /  SaO2: 95.3                    Review of Systems	      Objective     Physical Examination    heart s1s2  lung dec BS  abd soft      Pertinent Lab findings & Imaging      Bud:  NO   Adequate UO     I&O's Detail    20 Aug 2022 07:01  -  21 Aug 2022 07:00  --------------------------------------------------------  IN:    Oral Fluid: 720 mL  Total IN: 720 mL    OUT:    Voided (mL): 700 mL  Total OUT: 700 mL    Total NET: 20 mL               Discussed with:     Cultures:	        Radiology

## 2022-08-21 NOTE — PROGRESS NOTE ADULT - SUBJECTIVE AND OBJECTIVE BOX
Patient is a 87y old  Male who presents with a chief complaint of cough and SOB.       INTERVAL HPI/OVERNIGHT EVENTS: Pt states he feels "ok." Denies SOB at rest on the HFNC. Denies fever, chills, abd pain, palpitations, CP, n/v.     MEDICATIONS  (STANDING):  albuterol/ipratropium for Nebulization 3 milliLiter(s) Nebulizer every 6 hours  atorvastatin 40 milliGRAM(s) Oral at bedtime  enoxaparin Injectable 40 milliGRAM(s) SubCutaneous every 24 hours  finasteride 5 milliGRAM(s) Oral daily  guaifenesin/dextromethorphan Oral Liquid 10 milliLiter(s) Oral every 6 hours  levoFLOXacin IVPB 750 milliGRAM(s) IV Intermittent every 48 hours  melatonin 5 milliGRAM(s) Oral at bedtime  metoprolol tartrate 100 milliGRAM(s) Oral two times a day  pantoprazole    Tablet 40 milliGRAM(s) Oral before breakfast  saccharomyces boulardii 250 milliGRAM(s) Oral two times a day  senna 2 Tablet(s) Oral at bedtime  tamsulosin 0.4 milliGRAM(s) Oral at bedtime    MEDICATIONS  (PRN):  acetaminophen     Tablet .. 650 milliGRAM(s) Oral every 6 hours PRN Temp greater or equal to 38C (100.4F), Mild Pain (1 - 3)  aluminum hydroxide/magnesium hydroxide/simethicone Suspension 30 milliLiter(s) Oral every 4 hours PRN Dyspepsia  diphenhydrAMINE 25 milliGRAM(s) Oral at bedtime PRN Insomnia  magnesium hydroxide Suspension 30 milliLiter(s) Oral daily PRN Constipation  ondansetron Injectable 4 milliGRAM(s) IV Push every 8 hours PRN Nausea and/or Vomiting  polyethylene glycol 3350 17 Gram(s) Oral daily PRN Constipation  sodium chloride 0.65% Nasal 1 Spray(s) Both Nostrils every 2 hours PRN Nasal Congestion        Allergies    No Known Allergies    Intolerances        REVIEW OF SYSTEMS:  CONSTITUTIONAL: +generalized weakness/deconditioning ; No fever or chills  HEENT:  No headache, no sore throat  RESPIRATORY: +cough, denies shortness of breath at rest on HFNC  CARDIOVASCULAR: No chest pain, palpitations  GASTROINTESTINAL: No abd pain, nausea, vomiting, or diarrhea  GENITOURINARY: No dysuria, frequency, or hematuria  NEUROLOGICAL: no focal weakness or dizziness  MUSCULOSKELETAL: no myalgias     Vital Signs  T(F): 97.8 (08-21-22 @ 12:01), Max: 99.1 (08-21-22 @ 00:06)  HR: 69 (08-21-22 @ 14:37)  BP: 119/56 (08-21-22 @ 14:00)  RR: 26 (08-21-22 @ 14:00)  SpO2: 94% (08-21-22 @ 14:37)    O2 Concentration (%): 50    PHYSICAL EXAM:  GENERAL: NAD at rest on HFNC  HEENT:  anicteric, moist mucous membranes  CHEST/LUNG: decreased breath sounds   HEART:  irregular, S1, S2  ABDOMEN:  BS+, soft, nontender, nondistended  EXTREMITIES: no cyanosis or calf tenderness  NERVOUS SYSTEM: answers questions and follows commands appropriately    LABS:                                   11.8   17.68 )-----------( 551      ( 21 Aug 2022 06:32 )             36.8     CBC Full  -  ( 21 Aug 2022 06:32 )  WBC Count : 17.68 K/uL  Hemoglobin : 11.8 g/dL  Hematocrit : 36.8 %  Platelet Count - Automated : 551 K/uL  Mean Cell Volume : 95.8 fl  Mean Cell Hemoglobin : 30.7 pg  Mean Cell Hemoglobin Concentration : 32.1 gm/dL  Auto Neutrophil # : 12.02 K/uL  Auto Lymphocyte # : 2.15 K/uL  Auto Monocyte # : 2.10 K/uL  Auto Eosinophil # : 0.71 K/uL  Auto Basophil # : 0.15 K/uL  Auto Neutrophil % : 68.0 %  Auto Lymphocyte % : 12.2 %  Auto Monocyte % : 11.9 %  Auto Eosinophil % : 4.0 %  Auto Basophil % : 0.8 %    08-21    136  |  102  |  28<H>  ----------------------------<  99  5.1   |  28  |  1.30    Ca    8.2<L>      21 Aug 2022 06:32  Phos  5.2     08-21  Mg     2.0     08-21    TPro  6.6  /  Alb  1.7<L>  /  TBili  0.8  /  DBili  x   /  AST  66<H>  /  ALT  81<H>  /  AlkPhos  342<H>  08-21          CAPILLARY BLOOD GLUCOSE        RADIOLOGY & ADDITIONAL TESTS:    Personally reviewed.     Consultant(s) Notes Reviewed:  [x] YES  [ ] NO     Patient is a 87y old  Male who presents with a chief complaint of cough and SOB.       INTERVAL HPI/OVERNIGHT EVENTS: Pt states he feels "ok." Denies SOB at rest on the HFNC. Denies fever, chills, abd pain, palpitations, CP, n/v.     MEDICATIONS  (STANDING):  albuterol/ipratropium for Nebulization 3 milliLiter(s) Nebulizer every 6 hours  atorvastatin 40 milliGRAM(s) Oral at bedtime  enoxaparin Injectable 40 milliGRAM(s) SubCutaneous every 24 hours  finasteride 5 milliGRAM(s) Oral daily  guaifenesin/dextromethorphan Oral Liquid 10 milliLiter(s) Oral every 6 hours  levoFLOXacin IVPB 750 milliGRAM(s) IV Intermittent every 48 hours  melatonin 5 milliGRAM(s) Oral at bedtime  metoprolol tartrate 100 milliGRAM(s) Oral two times a day  pantoprazole    Tablet 40 milliGRAM(s) Oral before breakfast  saccharomyces boulardii 250 milliGRAM(s) Oral two times a day  senna 2 Tablet(s) Oral at bedtime  tamsulosin 0.4 milliGRAM(s) Oral at bedtime     MEDICATIONS  (PRN):  acetaminophen     Tablet .. 650 milliGRAM(s) Oral every 6 hours PRN Temp greater or equal to 38C (100.4F), Mild Pain (1 - 3)  aluminum hydroxide/magnesium hydroxide/simethicone Suspension 30 milliLiter(s) Oral every 4 hours PRN Dyspepsia  diphenhydrAMINE 25 milliGRAM(s) Oral at bedtime PRN Insomnia  magnesium hydroxide Suspension 30 milliLiter(s) Oral daily PRN Constipation  ondansetron Injectable 4 milliGRAM(s) IV Push every 8 hours PRN Nausea and/or Vomiting  polyethylene glycol 3350 17 Gram(s) Oral daily PRN Constipation  sodium chloride 0.65% Nasal 1 Spray(s) Both Nostrils every 2 hours PRN Nasal Congestion         Allergies    No Known Allergies    Intolerances        REVIEW OF SYSTEMS:  CONSTITUTIONAL: +generalized weakness/deconditioning ; No fever or chills  HEENT:  No headache, no sore throat  RESPIRATORY: +cough, denies shortness of breath at rest on HFNC  CARDIOVASCULAR: No chest pain, palpitations  GASTROINTESTINAL: No abd pain, nausea, vomiting, or diarrhea  GENITOURINARY: No dysuria, frequency, or hematuria  NEUROLOGICAL: no focal weakness or dizziness  MUSCULOSKELETAL: no myalgias     Vital Signs  T(F): 97.8 (08-21-22 @ 12:01), Max: 99.1 (08-21-22 @ 00:06)  HR: 69 (08-21-22 @ 14:37)  BP: 119/56 (08-21-22 @ 14:00)  RR: 26 (08-21-22 @ 14:00)  SpO2: 94% (08-21-22 @ 14:37)    O2 Concentration (%): 50    PHYSICAL EXAM:  GENERAL: NAD at rest on HFNC  HEENT:  anicteric, moist mucous membranes  CHEST/LUNG: decreased breath sounds   HEART:  irregular, S1, S2  ABDOMEN:  BS+, soft, nontender, nondistended  EXTREMITIES: no cyanosis or calf tenderness  NERVOUS SYSTEM: answers questions and follows commands appropriately    LABS:                                   11.8   17.68 )-----------( 551      ( 21 Aug 2022 06:32 )             36.8     CBC Full  -  ( 21 Aug 2022 06:32 )  WBC Count : 17.68 K/uL  Hemoglobin : 11.8 g/dL  Hematocrit : 36.8 %  Platelet Count - Automated : 551 K/uL  Mean Cell Volume : 95.8 fl  Mean Cell Hemoglobin : 30.7 pg  Mean Cell Hemoglobin Concentration : 32.1 gm/dL  Auto Neutrophil # : 12.02 K/uL  Auto Lymphocyte # : 2.15 K/uL  Auto Monocyte # : 2.10 K/uL  Auto Eosinophil # : 0.71 K/uL  Auto Basophil # : 0.15 K/uL  Auto Neutrophil % : 68.0 %  Auto Lymphocyte % : 12.2 %  Auto Monocyte % : 11.9 %  Auto Eosinophil % : 4.0 %  Auto Basophil % : 0.8 %    08-21    136  |  102  |  28<H>  ----------------------------<  99  5.1   |  28  |  1.30    Ca    8.2<L>      21 Aug 2022 06:32  Phos  5.2     08-21  Mg     2.0     08-21    TPro  6.6  /  Alb  1.7<L>  /  TBili  0.8  /  DBili  x   /  AST  66<H>  /  ALT  81<H>  /  AlkPhos  342<H>  08-21          CAPILLARY BLOOD GLUCOSE        RADIOLOGY & ADDITIONAL TESTS:    Personally reviewed.     Consultant(s) Notes Reviewed:  [x] YES  [ ] NO

## 2022-08-21 NOTE — PROGRESS NOTE ADULT - ASSESSMENT
8 y/o male, PMHx HTN, HLD, CVA in 2015 (no residual deficits), BPH, and skin CA who presented on 8/11 c/o cough and fever x 1 week. Pt admitted to [facility] with:    1. acute hypoxic respiratory failure  2. severe sepsis due to multifocal pneumonia  3. Legionnaire's disease  4. transaminitis  5. CINDI  6. paroxysmal atrial fibrillation with RVR, new onset  7. CT chest with possibly cavitary lesions, ruled out for TB with AFB neg x 3.    Plan  Neuro: melatonin as needed for sleep hygiene   Cardio: keep MAP above 65, cont lopressor   Pulm: Had to increase HFNC to 60% from 50% for persistent desaturation with movement , cont saline spray   GI: diet as tolerated, cont PPI  Renal: strict i/o, renally dose med, flomax, proscar  ID: on levaquin for possible Legionnaire's disease, ID guiding abx   Heme: sq lovenox + SCDs for dvt ppx  Endo: no issues       Dispo: Remains in ICU

## 2022-08-21 NOTE — PROGRESS NOTE ADULT - SUBJECTIVE AND OBJECTIVE BOX
Patient is a 87y old  Male who presents with a chief complaint of cough -> PNA (21 Aug 2022 14:59)      BRIEF HOSPITAL COURSE:   88 y/o male, PMHx HTN, HLD, CVA in 2015 (no residual deficits), BPH, and skin CA who presented on 8/11 c/o cough and fever x 1 week. Pt admitted to [facility] with acute hypoxic respiratory failure secondary to multifocal pneumonia, tested positive for Legionella on Levaquin. Hospital course complicated by new onset atrial fibrillation, transaminitis, and CINDI.      Events last 24 hours:   Had to increase HFNC to 60% from 50% for persistent desaturation with movement     PAST MEDICAL & SURGICAL HISTORY:  Skin cancer  s/p Mohs      Essential hypertension      Cerebrovascular accident (CVA), unspecified mechanism  2015      Hyperlipidemia, unspecified hyperlipidemia type      Complex tear of lateral meniscus of right knee as current injury, initial encounter      Complex tear of medial meniscus of right knee as current injury, initial encounter      Benign prostatic hyperplasia, presence of lower urinary tract symptoms unspecified, unspecified morphology      H/O pilonidal cyst        Allergies    No Known Allergies    Intolerances      FAMILY HISTORY:  Family history of ischemic heart disease and other diseases of the circulatory system (Father)    SOCIAL HISTORY: Lives at home independently       Review of Systems:  CONSTITUTIONAL: No fever, chills, + fatigue  EYES: No eye pain, visual disturbances, or discharge  ENMT:  No difficulty hearing, tinnitus, vertigo; No sinus or throat pain  NECK: No pain or stiffness  RESPIRATORY: No cough, wheezing, chills or hemoptysis; + shortness of breath  CARDIOVASCULAR: No chest pain, palpitations, dizziness, or leg swelling  GASTROINTESTINAL: No abdominal or epigastric pain. No nausea, vomiting, or hematemesis; No diarrhea or constipation. No melena or hematochezia.  GENITOURINARY: No dysuria, frequency, hematuria, or incontinence  NEUROLOGICAL: No headaches, memory loss, loss of strength, numbness, or tremors  SKIN: No itching, burning, rashes, or lesions   MUSCULOSKELETAL: No joint pain or swelling; No muscle, back, or extremity pain  PSYCHIATRIC: No depression, anxiety, mood swings, or difficulty sleeping      Medications:  levoFLOXacin IVPB 750 milliGRAM(s) IV Intermittent every 48 hours    metoprolol tartrate 100 milliGRAM(s) Oral two times a day  tamsulosin 0.4 milliGRAM(s) Oral at bedtime    albuterol/ipratropium for Nebulization 3 milliLiter(s) Nebulizer every 6 hours  guaifenesin/dextromethorphan Oral Liquid 10 milliLiter(s) Oral every 6 hours    acetaminophen     Tablet .. 650 milliGRAM(s) Oral every 6 hours PRN  diphenhydrAMINE 25 milliGRAM(s) Oral at bedtime PRN  melatonin 5 milliGRAM(s) Oral at bedtime  ondansetron Injectable 4 milliGRAM(s) IV Push every 8 hours PRN      enoxaparin Injectable 40 milliGRAM(s) SubCutaneous every 24 hours    aluminum hydroxide/magnesium hydroxide/simethicone Suspension 30 milliLiter(s) Oral every 4 hours PRN  magnesium hydroxide Suspension 30 milliLiter(s) Oral daily PRN  pantoprazole    Tablet 40 milliGRAM(s) Oral before breakfast  polyethylene glycol 3350 17 Gram(s) Oral daily PRN  senna 2 Tablet(s) Oral at bedtime      atorvastatin 40 milliGRAM(s) Oral at bedtime  finasteride 5 milliGRAM(s) Oral daily        sodium chloride 0.65% Nasal 1 Spray(s) Both Nostrils every 2 hours PRN    saccharomyces boulardii 250 milliGRAM(s) Oral two times a day          ICU Vital Signs Last 24 Hrs  T(C): 36.8 (21 Aug 2022 15:25), Max: 37.3 (21 Aug 2022 00:06)  T(F): 98.2 (21 Aug 2022 15:25), Max: 99.1 (21 Aug 2022 00:06)  HR: 119 (21 Aug 2022 18:00) (57 - 119)  BP: 119/67 (21 Aug 2022 18:00) (101/50 - 137/69)  BP(mean): 84 (21 Aug 2022 18:00) (65 - 96)  ABP: --  ABP(mean): --  RR: 33 (21 Aug 2022 18:00) (16 - 33)  SpO2: 92% (21 Aug 2022 18:00) (91% - 98%)    O2 Parameters below as of 21 Aug 2022 18:00  Patient On (Oxygen Delivery Method): nasal cannula, high flow          Vital Signs Last 24 Hrs  T(C): 36.8 (21 Aug 2022 15:25), Max: 37.3 (21 Aug 2022 00:06)  T(F): 98.2 (21 Aug 2022 15:25), Max: 99.1 (21 Aug 2022 00:06)  HR: 119 (21 Aug 2022 18:00) (57 - 119)  BP: 119/67 (21 Aug 2022 18:00) (101/50 - 137/69)  BP(mean): 84 (21 Aug 2022 18:00) (65 - 96)  RR: 33 (21 Aug 2022 18:00) (16 - 33)  SpO2: 92% (21 Aug 2022 18:00) (91% - 98%)    Parameters below as of 21 Aug 2022 18:00  Patient On (Oxygen Delivery Method): nasal cannula, high flow            I&O's Detail    20 Aug 2022 07:01  -  21 Aug 2022 07:00  --------------------------------------------------------  IN:    Oral Fluid: 720 mL  Total IN: 720 mL    OUT:    Voided (mL): 700 mL  Total OUT: 700 mL    Total NET: 20 mL      21 Aug 2022 07:01  -  21 Aug 2022 19:35  --------------------------------------------------------  IN:  Total IN: 0 mL    OUT:    Voided (mL): 600 mL  Total OUT: 600 mL    Total NET: -600 mL            LABS:                        11.8   17.68 )-----------( 551      ( 21 Aug 2022 06:32 )             36.8     08-21    136  |  102  |  28<H>  ----------------------------<  99  5.1   |  28  |  1.30    Ca    8.2<L>      21 Aug 2022 06:32  Phos  5.2     08-21  Mg     2.0     08-21    TPro  6.6  /  Alb  1.7<L>  /  TBili  0.8  /  DBili  x   /  AST  66<H>  /  ALT  81<H>  /  AlkPhos  342<H>  08-21          CAPILLARY BLOOD GLUCOSE            CULTURES:      Physical Examination:    General: elderly male in bed, NAD, cachetic     HEENT: Pupils equal, reactive to light.  Symmetric.    PULM: b/l rhonchi     CVS: +s1/s2    ABD: Soft     EXT: No edema    SKIN: Warm     Neuro: awake alert    RADIOLOGY:   < from: Xray Chest 1 View- PORTABLE-Routine (Xray Chest 1 View- PORTABLE-Routine .) (08.15.22 @ 18:33) >    CLINICAL INFORMATION: Worsening dyspnea.    TECHNIQUE:  Portable  AP chest radiograph.    COMPARISON: 11 2022 chest radiograph .    FINDINGS:  CATHETERS AND TUBES: None    PULMONARY: Bilateral  perihilar and upper zone increased diffuse airspace   disease with RIGHT upper lobe volume loss . The lung bases remain clear.  No pneumothorax.    HEART/VASCULAR: The heart is mildly enlarged in transverse diameter. A   LEFT upper zone airspace disease contiguous with the LEFT hilum.  BONES: Visualized osseous structures are intact.    IMPRESSION: Increased bilateral upper zone diffuse airspacedisease with   RIGHT upper lobe volume loss as described. Lung bases clear.    < end of copied text >

## 2022-08-21 NOTE — PROGRESS NOTE ADULT - ASSESSMENT
The patient is an 87 year old male with a history of HTN, HL, BPH, CVA who presents with cough and hemoptysis in the setting of PNA and r/o TB.    8/21/22  Seen at [facility] ICU  Sitting up in chair  No complaints offered  No recent hemoptysis    Plan:  - The rhythm is now back to sinus rhythm with PACs and intermittent episodes of AF  - Continue metoprolol tartrate 100 mg bid  - The patient has ongoing hemoptysis. Remain off heparin drip as risks outweigh benefits. When hemoptysis resolves, will start apixaban.  - Discontinue amlodipine  - Echo with low normal LV systolic function, mild pulm HTN  - IV antibiotics-Levaquin  - AFB negative x3  - Pulm and ID follow-up  - If he continues to have hemoptysis - may need bronchoscopy

## 2022-08-22 LAB
ALBUMIN SERPL ELPH-MCNC: 1.8 G/DL — LOW (ref 3.3–5)
ALP SERPL-CCNC: 297 U/L — HIGH (ref 30–120)
ALT FLD-CCNC: 69 U/L DA — HIGH (ref 10–60)
ANION GAP SERPL CALC-SCNC: 4 MMOL/L — LOW (ref 5–17)
AST SERPL-CCNC: 54 U/L — HIGH (ref 10–40)
BASOPHILS # BLD AUTO: 0.14 K/UL — SIGNIFICANT CHANGE UP (ref 0–0.2)
BASOPHILS NFR BLD AUTO: 0.9 % — SIGNIFICANT CHANGE UP (ref 0–2)
BILIRUB SERPL-MCNC: 0.7 MG/DL — SIGNIFICANT CHANGE UP (ref 0.2–1.2)
BUN SERPL-MCNC: 29 MG/DL — HIGH (ref 7–23)
CALCIUM SERPL-MCNC: 8.1 MG/DL — LOW (ref 8.4–10.5)
CHLORIDE SERPL-SCNC: 103 MMOL/L — SIGNIFICANT CHANGE UP (ref 96–108)
CO2 SERPL-SCNC: 29 MMOL/L — SIGNIFICANT CHANGE UP (ref 22–31)
CREAT SERPL-MCNC: 1.41 MG/DL — HIGH (ref 0.5–1.3)
EGFR: 48 ML/MIN/1.73M2 — LOW
EOSINOPHIL # BLD AUTO: 0.51 K/UL — HIGH (ref 0–0.5)
EOSINOPHIL NFR BLD AUTO: 3.4 % — SIGNIFICANT CHANGE UP (ref 0–6)
GLUCOSE SERPL-MCNC: 99 MG/DL — SIGNIFICANT CHANGE UP (ref 70–99)
HCT VFR BLD CALC: 35.6 % — LOW (ref 39–50)
HGB BLD-MCNC: 11.5 G/DL — LOW (ref 13–17)
HIV 1+2 AB+HIV1 P24 AG SERPL QL IA: SIGNIFICANT CHANGE UP
IMM GRANULOCYTES NFR BLD AUTO: 2.9 % — HIGH (ref 0–1.5)
LYMPHOCYTES # BLD AUTO: 15.3 % — SIGNIFICANT CHANGE UP (ref 13–44)
LYMPHOCYTES # BLD AUTO: 2.29 K/UL — SIGNIFICANT CHANGE UP (ref 1–3.3)
MCHC RBC-ENTMCNC: 31 PG — SIGNIFICANT CHANGE UP (ref 27–34)
MCHC RBC-ENTMCNC: 32.3 GM/DL — SIGNIFICANT CHANGE UP (ref 32–36)
MCV RBC AUTO: 96 FL — SIGNIFICANT CHANGE UP (ref 80–100)
MONOCYTES # BLD AUTO: 1.85 K/UL — HIGH (ref 0–0.9)
MONOCYTES NFR BLD AUTO: 12.4 % — SIGNIFICANT CHANGE UP (ref 2–14)
NEUTROPHILS # BLD AUTO: 9.75 K/UL — HIGH (ref 1.8–7.4)
NEUTROPHILS NFR BLD AUTO: 65.1 % — SIGNIFICANT CHANGE UP (ref 43–77)
NRBC # BLD: 0 /100 WBCS — SIGNIFICANT CHANGE UP (ref 0–0)
NT-PROBNP SERPL-SCNC: 2615 PG/ML — HIGH (ref 0–450)
PLATELET # BLD AUTO: 583 K/UL — HIGH (ref 150–400)
POTASSIUM SERPL-MCNC: 4.6 MMOL/L — SIGNIFICANT CHANGE UP (ref 3.5–5.3)
POTASSIUM SERPL-SCNC: 4.6 MMOL/L — SIGNIFICANT CHANGE UP (ref 3.5–5.3)
PROT SERPL-MCNC: 7.1 G/DL — SIGNIFICANT CHANGE UP (ref 6–8.3)
RBC # BLD: 3.71 M/UL — LOW (ref 4.2–5.8)
RBC # FLD: 13.9 % — SIGNIFICANT CHANGE UP (ref 10.3–14.5)
SODIUM SERPL-SCNC: 136 MMOL/L — SIGNIFICANT CHANGE UP (ref 135–145)
WBC # BLD: 14.97 K/UL — HIGH (ref 3.8–10.5)
WBC # FLD AUTO: 14.97 K/UL — HIGH (ref 3.8–10.5)

## 2022-08-22 PROCEDURE — 71045 X-RAY EXAM CHEST 1 VIEW: CPT | Mod: 26

## 2022-08-22 PROCEDURE — 99233 SBSQ HOSP IP/OBS HIGH 50: CPT

## 2022-08-22 RX ADMIN — Medication 5 MILLIGRAM(S): at 21:07

## 2022-08-22 RX ADMIN — ATORVASTATIN CALCIUM 40 MILLIGRAM(S): 80 TABLET, FILM COATED ORAL at 21:07

## 2022-08-22 RX ADMIN — Medication 100 MILLIGRAM(S): at 17:38

## 2022-08-22 RX ADMIN — FINASTERIDE 5 MILLIGRAM(S): 5 TABLET, FILM COATED ORAL at 11:11

## 2022-08-22 RX ADMIN — Medication 10 MILLILITER(S): at 17:38

## 2022-08-22 RX ADMIN — Medication 10 MILLILITER(S): at 06:51

## 2022-08-22 RX ADMIN — TAMSULOSIN HYDROCHLORIDE 0.4 MILLIGRAM(S): 0.4 CAPSULE ORAL at 21:07

## 2022-08-22 RX ADMIN — ENOXAPARIN SODIUM 40 MILLIGRAM(S): 100 INJECTION SUBCUTANEOUS at 17:38

## 2022-08-22 RX ADMIN — Medication 3 MILLILITER(S): at 14:00

## 2022-08-22 RX ADMIN — Medication 10 MILLILITER(S): at 11:11

## 2022-08-22 RX ADMIN — SENNA PLUS 2 TABLET(S): 8.6 TABLET ORAL at 21:07

## 2022-08-22 RX ADMIN — Medication 250 MILLIGRAM(S): at 06:51

## 2022-08-22 RX ADMIN — Medication 250 MILLIGRAM(S): at 17:38

## 2022-08-22 RX ADMIN — Medication 3 MILLILITER(S): at 21:05

## 2022-08-22 RX ADMIN — Medication 3 MILLILITER(S): at 06:39

## 2022-08-22 RX ADMIN — Medication 650 MILLIGRAM(S): at 22:00

## 2022-08-22 RX ADMIN — Medication 650 MILLIGRAM(S): at 21:07

## 2022-08-22 RX ADMIN — PANTOPRAZOLE SODIUM 40 MILLIGRAM(S): 20 TABLET, DELAYED RELEASE ORAL at 06:51

## 2022-08-22 RX ADMIN — Medication 100 MILLIGRAM(S): at 06:51

## 2022-08-22 NOTE — PROGRESS NOTE ADULT - SUBJECTIVE AND OBJECTIVE BOX
Patient is a 87y old  Male who presents with a chief complaint of cough and SOB.       INTERVAL HPI/OVERNIGHT EVENTS: Pt states he feels "alright." Denies SOB at rest on the HFNC. Denies fever, chills, abd pain, palpitations, CP, n/v.     MEDICATIONS  (STANDING):  albuterol/ipratropium for Nebulization 3 milliLiter(s) Nebulizer every 6 hours  atorvastatin 40 milliGRAM(s) Oral at bedtime  enoxaparin Injectable 40 milliGRAM(s) SubCutaneous every 24 hours  finasteride 5 milliGRAM(s) Oral daily  guaifenesin/dextromethorphan Oral Liquid 10 milliLiter(s) Oral every 6 hours  levoFLOXacin IVPB 750 milliGRAM(s) IV Intermittent every 48 hours  melatonin 5 milliGRAM(s) Oral at bedtime  metoprolol tartrate 100 milliGRAM(s) Oral two times a day  pantoprazole    Tablet 40 milliGRAM(s) Oral before breakfast  saccharomyces boulardii 250 milliGRAM(s) Oral two times a day  senna 2 Tablet(s) Oral at bedtime  tamsulosin 0.4 milliGRAM(s) Oral at bedtime    MEDICATIONS  (PRN):  acetaminophen     Tablet .. 650 milliGRAM(s) Oral every 6 hours PRN Temp greater or equal to 38C (100.4F), Mild Pain (1 - 3)  aluminum hydroxide/magnesium hydroxide/simethicone Suspension 30 milliLiter(s) Oral every 4 hours PRN Dyspepsia  diphenhydrAMINE 25 milliGRAM(s) Oral at bedtime PRN Insomnia  magnesium hydroxide Suspension 30 milliLiter(s) Oral daily PRN Constipation  ondansetron Injectable 4 milliGRAM(s) IV Push every 8 hours PRN Nausea and/or Vomiting  polyethylene glycol 3350 17 Gram(s) Oral daily PRN Constipation  sodium chloride 0.65% Nasal 1 Spray(s) Both Nostrils every 2 hours PRN Nasal Congestion           Allergies    No Known Allergies    Intolerances        REVIEW OF SYSTEMS:  CONSTITUTIONAL: No fever or chills  HEENT:  No headache, no sore throat  RESPIRATORY: +cough, denies shortness of breath at rest on HFNC  CARDIOVASCULAR: No chest pain, palpitations  GASTROINTESTINAL: No abd pain, nausea, vomiting, or diarrhea  GENITOURINARY: No dysuria, frequency, or hematuria  NEUROLOGICAL: no focal weakness or dizziness  MUSCULOSKELETAL: no myalgias     Vital Signs  T(C): 36.7 (22 Aug 2022 16:12), Max: 36.9 (22 Aug 2022 00:00)  T(F): 98.1 (22 Aug 2022 16:12), Max: 98.5 (22 Aug 2022 00:00)  HR: 74 (22 Aug 2022 18:00) (59 - 84)  BP: 124/57 (22 Aug 2022 18:00) (103/51 - 146/68)  BP(mean): 77 (22 Aug 2022 18:00) (68 - 96)  RR: 25 (22 Aug 2022 18:00) (14 - 32)  SpO2: 98% (22 Aug 2022 18:00) (80% - 100%)    Parameters below as of 22 Aug 2022 18:00  Patient On (Oxygen Delivery Method): nasal cannula, high flow    PHYSICAL EXAM:  GENERAL: NAD at rest on HFNC  HEENT:  anicteric, moist mucous membranes  CHEST/LUNG: decreased breath sounds   HEART:  irregular, S1, S2  ABDOMEN:  BS+, soft, nontender, nondistended  EXTREMITIES: no cyanosis or calf tenderness  NERVOUS SYSTEM: answers questions and follows commands appropriately    LABS:                                              11.5   14.97 )-----------( 583      ( 22 Aug 2022 06:35 )             35.6     CBC Full  -  ( 22 Aug 2022 06:35 )  WBC Count : 14.97 K/uL  Hemoglobin : 11.5 g/dL  Hematocrit : 35.6 %  Platelet Count - Automated : 583 K/uL  Mean Cell Volume : 96.0 fl  Mean Cell Hemoglobin : 31.0 pg  Mean Cell Hemoglobin Concentration : 32.3 gm/dL  Auto Neutrophil # : 9.75 K/uL  Auto Lymphocyte # : 2.29 K/uL  Auto Monocyte # : 1.85 K/uL  Auto Eosinophil # : 0.51 K/uL  Auto Basophil # : 0.14 K/uL  Auto Neutrophil % : 65.1 %  Auto Lymphocyte % : 15.3 %  Auto Monocyte % : 12.4 %  Auto Eosinophil % : 3.4 %  Auto Basophil % : 0.9 %    08-22    136  |  103  |  29<H>  ----------------------------<  99  4.6   |  29  |  1.41<H>    Ca    8.1<L>      22 Aug 2022 06:35  Phos  5.2     08-21  Mg     2.0     08-21    TPro  7.1  /  Alb  1.8<L>  /  TBili  0.7  /  DBili  x   /  AST  54<H>  /  ALT  69<H>  /  AlkPhos  297<H>  08-22            CAPILLARY BLOOD GLUCOSE        RADIOLOGY & ADDITIONAL TESTS:    Personally reviewed.     Consultant(s) Notes Reviewed:  [x] YES  [ ] NO     Patient is a 87y old  Male who presents with a chief complaint of cough and SOB.        INTERVAL HPI/OVERNIGHT EVENTS: Pt states he feels "alright." Denies SOB at rest on the HFNC. Denies fever, chills, abd pain, palpitations, CP, n/v.     MEDICATIONS  (STANDING):  albuterol/ipratropium for Nebulization 3 milliLiter(s) Nebulizer every 6 hours  atorvastatin 40 milliGRAM(s) Oral at bedtime  enoxaparin Injectable 40 milliGRAM(s) SubCutaneous every 24 hours  finasteride 5 milliGRAM(s) Oral daily  guaifenesin/dextromethorphan Oral Liquid 10 milliLiter(s) Oral every 6 hours  levoFLOXacin IVPB 750 milliGRAM(s) IV Intermittent every 48 hours  melatonin 5 milliGRAM(s) Oral at bedtime  metoprolol tartrate 100 milliGRAM(s) Oral two times a day  pantoprazole    Tablet 40 milliGRAM(s) Oral before breakfast  saccharomyces boulardii 250 milliGRAM(s) Oral two times a day  senna 2 Tablet(s) Oral at bedtime  tamsulosin 0.4 milliGRAM(s) Oral at bedtime    MEDICATIONS  (PRN):  acetaminophen     Tablet .. 650 milliGRAM(s) Oral every 6 hours PRN Temp greater or equal to 38C (100.4F), Mild Pain (1 - 3)  aluminum hydroxide/magnesium hydroxide/simethicone Suspension 30 milliLiter(s) Oral every 4 hours PRN Dyspepsia  diphenhydrAMINE 25 milliGRAM(s) Oral at bedtime PRN Insomnia  magnesium hydroxide Suspension 30 milliLiter(s) Oral daily PRN Constipation  ondansetron Injectable 4 milliGRAM(s) IV Push every 8 hours PRN Nausea and/or Vomiting  polyethylene glycol 3350 17 Gram(s) Oral daily PRN Constipation  sodium chloride 0.65% Nasal 1 Spray(s) Both Nostrils every 2 hours PRN Nasal Congestion           Allergies    No Known Allergies    Intolerances        REVIEW OF SYSTEMS:  CONSTITUTIONAL: No fever or chills  HEENT:  No headache, no sore throat  RESPIRATORY: +cough, denies shortness of breath at rest on HFNC  CARDIOVASCULAR: No chest pain, palpitations  GASTROINTESTINAL: No abd pain, nausea, vomiting, or diarrhea  GENITOURINARY: No dysuria, frequency, or hematuria  NEUROLOGICAL: no focal weakness or dizziness  MUSCULOSKELETAL: no myalgias     Vital Signs  T(C): 36.7 (22 Aug 2022 16:12), Max: 36.9 (22 Aug 2022 00:00)  T(F): 98.1 (22 Aug 2022 16:12), Max: 98.5 (22 Aug 2022 00:00)  HR: 74 (22 Aug 2022 18:00) (59 - 84)  BP: 124/57 (22 Aug 2022 18:00) (103/51 - 146/68)  BP(mean): 77 (22 Aug 2022 18:00) (68 - 96)  RR: 25 (22 Aug 2022 18:00) (14 - 32)  SpO2: 98% (22 Aug 2022 18:00) (80% - 100%)    Parameters below as of 22 Aug 2022 18:00  Patient On (Oxygen Delivery Method): nasal cannula, high flow    PHYSICAL EXAM:  GENERAL: NAD at rest on HFNC  HEENT:  anicteric, moist mucous membranes  CHEST/LUNG: decreased breath sounds   HEART:  irregular, S1, S2  ABDOMEN:  BS+, soft, nontender, nondistended  EXTREMITIES: no cyanosis or calf tenderness  NERVOUS SYSTEM: answers questions and follows commands appropriately    LABS:                                              11.5   14.97 )-----------( 583      ( 22 Aug 2022 06:35 )             35.6     CBC Full  -  ( 22 Aug 2022 06:35 )  WBC Count : 14.97 K/uL  Hemoglobin : 11.5 g/dL  Hematocrit : 35.6 %  Platelet Count - Automated : 583 K/uL  Mean Cell Volume : 96.0 fl  Mean Cell Hemoglobin : 31.0 pg  Mean Cell Hemoglobin Concentration : 32.3 gm/dL  Auto Neutrophil # : 9.75 K/uL  Auto Lymphocyte # : 2.29 K/uL  Auto Monocyte # : 1.85 K/uL  Auto Eosinophil # : 0.51 K/uL  Auto Basophil # : 0.14 K/uL  Auto Neutrophil % : 65.1 %  Auto Lymphocyte % : 15.3 %  Auto Monocyte % : 12.4 %  Auto Eosinophil % : 3.4 %  Auto Basophil % : 0.9 %    08-22    136  |  103  |  29<H>  ----------------------------<  99  4.6   |  29  |  1.41<H>    Ca    8.1<L>      22 Aug 2022 06:35  Phos  5.2     08-21  Mg     2.0     08-21    TPro  7.1  /  Alb  1.8<L>  /  TBili  0.7  /  DBili  x   /  AST  54<H>  /  ALT  69<H>  /  AlkPhos  297<H>  08-22            CAPILLARY BLOOD GLUCOSE        RADIOLOGY & ADDITIONAL TESTS:    Personally reviewed.     Consultant(s) Notes Reviewed:  [x] YES  [ ] NO

## 2022-08-22 NOTE — PROGRESS NOTE ADULT - TIME BILLING
Note written by attending, see above.  Time spent: 37min. More than 50% of the visit was spent counseling the patient and pt's daughter on medical condition - sepsis due to multifocal PNA, possible legionella infection, levaquin, acute hypoxic resp failure, HFNC.
Note written by attending, see above.  Time spent: 40min. More than 50% of the visit was spent counseling the patient and pt's daughter on medical condition - sepsis due to multifocal PNA, possible legionella infection, levaquin, acute hypoxic resp failure, HFNC.
Note written by attending, see above.  Time spent: 40min. More than 50% of the visit was spent counseling the patient and pt's daughter on medical condition - sepsis due to multifocal PNA, possible legionella infection, levaquin, acute hypoxic resp failure, HFNC.

## 2022-08-22 NOTE — PROGRESS NOTE ADULT - SUBJECTIVE AND OBJECTIVE BOX
Patient is a 87y old  Male who presents with a chief complaint of cough -> PNA (22 Aug 2022 11:22)      BRIEF HOSPITAL COURSE:   88 y/o male, PMHx HTN, HLD, CVA in 2015 (no residual deficits), BPH, and skin CA who presented on 8/11 c/o cough and fever x 1 week. Pt admitted to [facility] with acute hypoxic respiratory failure secondary to multifocal pneumonia, tested positive for Legionella on Levaquin. Hospital course complicated by new onset atrial fibrillation, transaminitis, and CINDI.      Events last 24 hours:   Remains on HFNC  Can attempt to wean to NC in morning      PAST MEDICAL & SURGICAL HISTORY:  Skin cancer  s/p Mohs      Essential hypertension      SOCIAL HISTORY: Lives at home independently   Cerebrovascular accident (CVA), unspecified mechanism  2015      Hyperlipidemia, unspecified hyperlipidemia type      Complex tear of lateral meniscus of right knee as current injury, initial encounter      Complex tear of medial meniscus of right knee as current injury, initial encounter      Benign prostatic hyperplasia, presence of lower urinary tract symptoms unspecified, unspecified morphology      H/O pilonidal cyst        Allergies    No Known Allergies    Intolerances      FAMILY HISTORY:  Family history of ischemic heart disease and other diseases of the circulatory system (Father)        Review of Systems:  CONSTITUTIONAL: No fever, chills, + fatigue  EYES: No eye pain, visual disturbances, or discharge  ENMT:  No difficulty hearing, tinnitus, vertigo; No sinus or throat pain  NECK: No pain or stiffness  RESPIRATORY: No cough, wheezing, chills or hemoptysis; + shortness of breath  CARDIOVASCULAR: No chest pain, palpitations, dizziness, or leg swelling  GASTROINTESTINAL: No abdominal or epigastric pain. No nausea, vomiting, or hematemesis; No diarrhea or constipation. No melena or hematochezia.  GENITOURINARY: No dysuria, frequency, hematuria, or incontinence  NEUROLOGICAL: No headaches, memory loss, loss of strength, numbness, or tremors  SKIN: No itching, burning, rashes, or lesions   MUSCULOSKELETAL: No joint pain or swelling; No muscle, back, or extremity pain  PSYCHIATRIC: No depression, anxiety, mood swings, or difficulty sleeping        Medications:  levoFLOXacin IVPB 750 milliGRAM(s) IV Intermittent every 48 hours    metoprolol tartrate 100 milliGRAM(s) Oral two times a day  tamsulosin 0.4 milliGRAM(s) Oral at bedtime    albuterol/ipratropium for Nebulization 3 milliLiter(s) Nebulizer every 6 hours  guaifenesin/dextromethorphan Oral Liquid 10 milliLiter(s) Oral every 6 hours    acetaminophen     Tablet .. 650 milliGRAM(s) Oral every 6 hours PRN  diphenhydrAMINE 25 milliGRAM(s) Oral at bedtime PRN  melatonin 5 milliGRAM(s) Oral at bedtime  ondansetron Injectable 4 milliGRAM(s) IV Push every 8 hours PRN      enoxaparin Injectable 40 milliGRAM(s) SubCutaneous every 24 hours    aluminum hydroxide/magnesium hydroxide/simethicone Suspension 30 milliLiter(s) Oral every 4 hours PRN  magnesium hydroxide Suspension 30 milliLiter(s) Oral daily PRN  pantoprazole    Tablet 40 milliGRAM(s) Oral before breakfast  polyethylene glycol 3350 17 Gram(s) Oral daily PRN  senna 2 Tablet(s) Oral at bedtime      atorvastatin 40 milliGRAM(s) Oral at bedtime  finasteride 5 milliGRAM(s) Oral daily        sodium chloride 0.65% Nasal 1 Spray(s) Both Nostrils every 2 hours PRN    saccharomyces boulardii 250 milliGRAM(s) Oral two times a day          ICU Vital Signs Last 24 Hrs  T(C): 36.7 (22 Aug 2022 16:12), Max: 36.9 (22 Aug 2022 00:00)  T(F): 98.1 (22 Aug 2022 16:12), Max: 98.5 (22 Aug 2022 00:00)  HR: 74 (22 Aug 2022 18:00) (59 - 84)  BP: 124/57 (22 Aug 2022 18:00) (103/51 - 146/68)  BP(mean): 77 (22 Aug 2022 18:00) (68 - 96)  ABP: --  ABP(mean): --  RR: 25 (22 Aug 2022 18:00) (14 - 32)  SpO2: 98% (22 Aug 2022 18:00) (80% - 100%)    O2 Parameters below as of 22 Aug 2022 19:24  Patient On (Oxygen Delivery Method): nasal cannula, high flow          Vital Signs Last 24 Hrs  T(C): 36.7 (22 Aug 2022 16:12), Max: 36.9 (22 Aug 2022 00:00)  T(F): 98.1 (22 Aug 2022 16:12), Max: 98.5 (22 Aug 2022 00:00)  HR: 74 (22 Aug 2022 18:00) (59 - 84)  BP: 124/57 (22 Aug 2022 18:00) (103/51 - 146/68)  BP(mean): 77 (22 Aug 2022 18:00) (68 - 96)  RR: 25 (22 Aug 2022 18:00) (14 - 32)  SpO2: 98% (22 Aug 2022 18:00) (80% - 100%)    Parameters below as of 22 Aug 2022 19:24  Patient On (Oxygen Delivery Method): nasal cannula, high flow            I&O's Detail    21 Aug 2022 07:01  -  22 Aug 2022 07:00  --------------------------------------------------------  IN:  Total IN: 0 mL    OUT:    Voided (mL): 1150 mL  Total OUT: 1150 mL    Total NET: -1150 mL            LABS:                        11.5   14.97 )-----------( 583      ( 22 Aug 2022 06:35 )             35.6     08-22    136  |  103  |  29<H>  ----------------------------<  99  4.6   |  29  |  1.41<H>    Ca    8.1<L>      22 Aug 2022 06:35  Phos  5.2     08-21  Mg     2.0     08-21    TPro  7.1  /  Alb  1.8<L>  /  TBili  0.7  /  DBili  x   /  AST  54<H>  /  ALT  69<H>  /  AlkPhos  297<H>  08-22          CAPILLARY BLOOD GLUCOSE            CULTURES:  Culture Results:   Normal Respiratory Dora present (08-20 @ 18:58)      Physical Examination:    General: elderly male in bed, NAD, cachetic     HEENT: Pupils equal, reactive to light.  Symmetric.    PULM: b/l rhonchi     CVS: +s1/s2    ABD: Soft     EXT: No edema    SKIN: Warm     Neuro: awake alert    RADIOLOGY:   < from: Xray Chest 1 View- PORTABLE-Routine (Xray Chest 1 View- PORTABLE-Routine .) (08.15.22 @ 18:33) >    CLINICAL INFORMATION: Worsening dyspnea.    TECHNIQUE:  Portable  AP chest radiograph.    COMPARISON: 11 2022 chest radiograph .    FINDINGS:  CATHETERS AND TUBES: None    PULMONARY: Bilateral  perihilar and upper zone increased diffuse airspace   disease with RIGHT upper lobe volume loss . The lung bases remain clear.  No pneumothorax.    HEART/VASCULAR: The heart is mildly enlarged in transverse diameter. A   LEFT upper zone airspace disease contiguous with the LEFT hilum.  BONES: Visualized osseous structures are intact.    IMPRESSION: Increased bilateral upper zone diffuse airspacedisease with   RIGHT upper lobe volume loss as described. Lung bases clear.

## 2022-08-22 NOTE — PROGRESS NOTE ADULT - ASSESSMENT
87M with HTN, HLD, hx of CVA (no residual deficits), BPH, hx of skin CA who presents with cough and fevers.    Found to have multifocal pneumonia.  Also found have new onset afib.        Sepsis 2/2 Multifocal PNA  Hemoptysis   AHRF on HFNC  - pt p/w leukocytosis, SOB  - legionella Ab+, urine legionella negative  Plan:   Unclear if this is related to legionella; will c/w levofloxacin to see if improvement  check sputum legionella pcr  pt with minimal improvement   - c/w levofloxacin  - trend temps/WBC  - supportive care/supplemental O2 per primary team  consider repeat chest imaging and consider brochoscopy    Elevated LFTs  - likely 2/2 sepsis  - trend LFTs    CINDI 2/2 prerenal azotemia  - likely 2/2 poor PO intake/sepsis  - trend renal fxn  - avoid nephrotoxic agents  - renally dose medications    Infectious Diseases will continue to follow. Please call with any questions.   Adriana Rodriguez M.D.  [facility], Division of Infectious Diseases [phone]

## 2022-08-22 NOTE — PROGRESS NOTE ADULT - ASSESSMENT
88yo M with PMH of HTN, HLD, hx of CVA (no residual deficits), BPH, hx of skin CA who presents with cough and fevers a/w sepsis due to multifocal pneumonia with progression to acute hypoxic respiratory failure now on HFNC and course further c/b new onset afib.        Sepsis 2/2 multifocal PNA   - met sepsis criteria on arrival with leukocytosis + tachycardia + source of infection  - strep PNA Ag negative. Legionella serum antibody positive, legionella urine Ag negative - ID ordered a legionella serum PCR to get more clarity if pt had legionella PNA  - s/p ceftriaxone and azithromycin. Continue Levaquin for now per ID ([facility]), recs appreciated   - pulmonary ([facility]), recs appreciated  - AFB smear negative x3  - still with leukocytosis but downtrending to 15 ; down from peak of 26K earlier in admission ; O2 requirements are starting to improve also, with HFNC down to 40% FiO2 from 60% yesterday   - f/up sputum culture  - may need bronchoscopy   - f/up Fungitell (sent to look for alternative etiology like PCP pneumonia as pt had been on high FiO2 for 10 days on Abx) / HIV negative    Acute hypoxia respiratory failure 2/2 likely PNA.   - pulmonary ([facility]), recs appreciated  - Continue HFNC, continue to monitor and will trial wean   - O2 requirements are starting to improve also, with HFNC down to 40% FiO2 from 60% yesterday   - full code, intubate if needed  - c/w Abx for PNA    Cavitary lesion  - doubt TB, but patient had blood tinged sputum, possible early cavitation on CT, and fevers. Low suspicion for TB as per ID, discontinued isolation  - quantiferon equivocal. AFB smear negative x3  - may need bronchoscopy    New afib   - [id number] score of at least 5 -> was on heparin drip but had worsening hemoptysis and cardio felt risk > benefits so full A/C discontinued and pt just given ppx dose lovenox  - cardiology (Miya) consulted, recs appreciated  - rate control if needed  - TTE shows Low normal left ventricular systolic function. Bi-atrial enlargement. Mild pulmonary hypertension  - on Lovenox, will eventually need to transition to DOAC when feel confident hemoptysis has resolved     Transaminitis  - likely from current illness ; possibly from legionella if that is the culprit of the PNA  - CT A/P without significant pathology to cause the transaminitis; monitor labs  - LFTs are improving  - can consider GI consult if does not improve with Abx therapy     CINDI 2/2 suspected prerenal azotemia    - likely 2/2 poor PO intake  - oral hydration encouraged, monitor BMP - likely now at baseline renal function  - hold diuretic  - avoid nephrotoxic meds    HTN/HLD  - cont with norvasc  - hold lasix  - cont with atorvastatin    BPH  - cont with tamsulosin and finasteride    VTE ppx  - c/w Lovenox, will eventually transition to DOAC when it is clear hemoptysis has resolved

## 2022-08-22 NOTE — PROGRESS NOTE ADULT - SUBJECTIVE AND OBJECTIVE BOX
Patient is a 87y Male with a known history of :    HPI:  87M with HTN, HLD, hx of CVA (no residual deficits), BPH, hx of skin CA who presents with cough and fevers.  Symptoms started about 5-6 days ago.  Started with left sided upper back pain.  Then started to have a cough associated with green phlegm and possible blood.  Associated with SOB and DONG.  No weight changes or night sweats.  Had a fever over the weekend (4-5 days ago) as high as 102F.  Some nausea but no vomiting with loss of appetite.  No chest pain.  No diarrhea.  No abdominal pain.  No recent travel or sick contacts.  Went to see his PMD 3 days ago and was given a z-kellie (last day tomorrow) and tesslon perles.  Reported had negative COVID tests at home.  Patient decided to come here for further evaluation.  In the ED, patient's triage vitals were /78    RR  21, 93% on RA (thought dropped down to 88%) and T 98.5F.  Labs showed WBC 18 with a left shift, CINDI with BUN/Cr 40/1.88, elevated LFTs, normal lactate, negative COVID (last booster in 12/2021 with Pfizer vaccine), neg influenza, neg RSV.  CT chest showed "patchy groundglass and more dense opacities in both lungs, suspicious for multifocal PNA...several small lucencies with areas of lung opacity...may represent early cavitation versus areas of focal bronchiectasis."  Patient started on azithromycin and ceftriaxone empirically for multifocal PNA.  Also of note, patient was found to be in new afib on EKG.  Patient denies any afib history but said when he was younger he did feel some irregular heartbeats.   (11 Aug 2022 22:21)      REVIEW OF SYSTEMS:    CONSTITUTIONAL: No fever, weight loss, or fatigue  EYES: No eye pain, visual disturbances, or discharge  ENMT:  No difficulty hearing, tinnitus, vertigo; No sinus or throat pain  NECK: No pain or stiffness  BREASTS: No pain, masses, or nipple discharge  RESPIRATORY: No cough, wheezing, chills or hemoptysis; No shortness of breath  CARDIOVASCULAR: No chest pain, palpitations, dizziness, or leg swelling  GASTROINTESTINAL: No abdominal or epigastric pain. No nausea, vomiting, or hematemesis; No diarrhea or constipation. No melena or hematochezia.  GENITOURINARY: No dysuria, frequency, hematuria, or incontinence  NEUROLOGICAL: No headaches, memory loss, loss of strength, numbness, or tremors  SKIN: No itching, burning, rashes, or lesions   LYMPH NODES: No enlarged glands  ENDOCRINE: No heat or cold intolerance; No hair loss  MUSCULOSKELETAL: No joint pain or swelling; No muscle, back, or extremity pain  PSYCHIATRIC: No depression, anxiety, mood swings, or difficulty sleeping  HEME/LYMPH: No easy bruising, or bleeding gums  ALLERGY AND IMMUNOLOGIC: No hives or eczema    MEDICATIONS  (STANDING):  albuterol/ipratropium for Nebulization 3 milliLiter(s) Nebulizer every 6 hours  atorvastatin 40 milliGRAM(s) Oral at bedtime  enoxaparin Injectable 40 milliGRAM(s) SubCutaneous every 24 hours  finasteride 5 milliGRAM(s) Oral daily  guaifenesin/dextromethorphan Oral Liquid 10 milliLiter(s) Oral every 6 hours  levoFLOXacin IVPB 750 milliGRAM(s) IV Intermittent every 48 hours  melatonin 5 milliGRAM(s) Oral at bedtime  metoprolol tartrate 100 milliGRAM(s) Oral two times a day  pantoprazole    Tablet 40 milliGRAM(s) Oral before breakfast  saccharomyces boulardii 250 milliGRAM(s) Oral two times a day  senna 2 Tablet(s) Oral at bedtime  tamsulosin 0.4 milliGRAM(s) Oral at bedtime    MEDICATIONS  (PRN):  acetaminophen     Tablet .. 650 milliGRAM(s) Oral every 6 hours PRN Temp greater or equal to 38C (100.4F), Mild Pain (1 - 3)  aluminum hydroxide/magnesium hydroxide/simethicone Suspension 30 milliLiter(s) Oral every 4 hours PRN Dyspepsia  diphenhydrAMINE 25 milliGRAM(s) Oral at bedtime PRN Insomnia  magnesium hydroxide Suspension 30 milliLiter(s) Oral daily PRN Constipation  ondansetron Injectable 4 milliGRAM(s) IV Push every 8 hours PRN Nausea and/or Vomiting  polyethylene glycol 3350 17 Gram(s) Oral daily PRN Constipation  sodium chloride 0.65% Nasal 1 Spray(s) Both Nostrils every 2 hours PRN Nasal Congestion      ALLERGIES: No Known Allergies      FAMILY HISTORY:  Family history of ischemic heart disease and other diseases of the circulatory system (Father)        Social history:  Alochol:   Smoking:   Drug Use:   Marital Status:     PHYSICAL EXAMINATION:  -----------------------------  T(C): 36.8 (08-22-22 @ 08:24), Max: 36.9 (08-22-22 @ 00:00)  HR: 84 (08-22-22 @ 08:00) (61 - 119)  BP: 146/68 (08-22-22 @ 08:00) (103/51 - 146/68)  RR: 20 (08-22-22 @ 08:00) (20 - 33)  SpO2: 99% (08-22-22 @ 08:00) (90% - 99%)  Wt(kg): --    08-21 @ 07:01  -  08-22 @ 07:00  --------------------------------------------------------  IN:  Total IN: 0 mL    OUT:    Voided (mL): 1150 mL  Total OUT: 1150 mL    Total NET: -1150 mL            Constitutional: well developed, normal appearance, well groomed, well nourished, no deformities and no acute distress.   Eyes: the conjunctiva exhibited no abnormalities and the eyelids demonstrated no xanthelasmas.   HEENT: normal oral mucosa, no oral pallor and no oral cyanosis.   Neck: normal jugular venous A waves present, normal jugular venous V waves present and no jugular venous mcdaniels A waves.   Pulmonary: no respiratory distress, normal respiratory rhythm and effort, no accessory muscle use and lungs were clear to auscultation bilaterally. Anteriorly  Cardiovascular: heart rate and rhythm were normal, normal S1 and S2 and no murmur, gallop, rub, heave or thrill are present.    Musculoskeletal: the gait could not be assessed.   Extremities: no clubbing of the fingernails, no localized cyanosis, no petechial hemorrhages and no ischemic changes.   Skin: normal skin color and pigmentation, no rash, no venous stasis, no skin lesions, no skin ulcer and no xanthoma was observed.   Psychiatric: oriented to person, place, and time, the affect was normal, the mood was normal and not feeling anxious.     LABS:   --------  08-22    136  |  103  |  29<H>  ----------------------------<  99  4.6   |  29  |  1.41<H>    Ca    8.1<L>      22 Aug 2022 06:35  Phos  5.2     08-21  Mg     2.0     08-21    TPro  7.1  /  Alb  1.8<L>  /  TBili  0.7  /  DBili  x   /  AST  54<H>  /  ALT  69<H>  /  AlkPhos  297<H>  08-22                         11.5   14.97 )-----------( 583      ( 22 Aug 2022 06:35 )             35.6       08-22 @ 07:57 BNP: 2615 pg/mL          08-20 @ 18:58    Organism --   Gram Stain Blood -- Gram Stain   Numerous polymorphonuclear leukocytes per low power field  Rare Squamous epithelial cells per low power field  Rare Yeast like cells per oil power field  Few Gram Variable Rods per oil power field  Specimen Source .Sputum Sputum  Culture-Blood --        Radiology:

## 2022-08-22 NOTE — PROGRESS NOTE ADULT - SUBJECTIVE AND OBJECTIVE BOX
[facility], Division of Infectious Diseases  EROS Perez Y. Patel, S. Shah, [facility]. [facility] [place]  [phone]    Name: DENNIS BRADFORD  Age: 87y  Gender: Male  MRN: [id number]    Interval History:  Patient seen and examined at bedside this morning  No acute overnight events. Afebrile  Remains on HFNC  Notes reviewed    Antibiotics:  levoFLOXacin IVPB 750 milliGRAM(s) IV Intermittent every 48 hours      Medications:  acetaminophen     Tablet .. 650 milliGRAM(s) Oral every 6 hours PRN  albuterol/ipratropium for Nebulization 3 milliLiter(s) Nebulizer every 6 hours  aluminum hydroxide/magnesium hydroxide/simethicone Suspension 30 milliLiter(s) Oral every 4 hours PRN  atorvastatin 40 milliGRAM(s) Oral at bedtime  diphenhydrAMINE 25 milliGRAM(s) Oral at bedtime PRN  enoxaparin Injectable 40 milliGRAM(s) SubCutaneous every 24 hours  finasteride 5 milliGRAM(s) Oral daily  guaifenesin/dextromethorphan Oral Liquid 10 milliLiter(s) Oral every 6 hours  levoFLOXacin IVPB 750 milliGRAM(s) IV Intermittent every 48 hours  magnesium hydroxide Suspension 30 milliLiter(s) Oral daily PRN  melatonin 5 milliGRAM(s) Oral at bedtime  metoprolol tartrate 100 milliGRAM(s) Oral two times a day  ondansetron Injectable 4 milliGRAM(s) IV Push every 8 hours PRN  pantoprazole    Tablet 40 milliGRAM(s) Oral before breakfast  polyethylene glycol 3350 17 Gram(s) Oral daily PRN  saccharomyces boulardii 250 milliGRAM(s) Oral two times a day  senna 2 Tablet(s) Oral at bedtime  sodium chloride 0.65% Nasal 1 Spray(s) Both Nostrils every 2 hours PRN  tamsulosin 0.4 milliGRAM(s) Oral at bedtime      Review of Systems:  Review of systems otherwise negative except as previously noted.    Allergies: No Known Allergies    For details regarding the patient's past medical history, social history, family history, and other miscellaneous elements, please refer the initial infectious diseases consultation and/or the admitting history and physical examination for this admission.    Objective:  Vitals:   T(C): 36.8 (08-22-22 @ 08:24), Max: 36.9 (08-22-22 @ 00:00)  HR: 62 (08-22-22 @ 11:00) (59 - 119)  BP: 108/51 (08-22-22 @ 11:00) (103/51 - 146/68)  RR: 24 (08-22-22 @ 11:00) (20 - 33)  SpO2: 99% (08-22-22 @ 11:00) (80% - 99%)    Physical Examination:  General: no acute distress  HEENT: NC/AT, EOMI  Cardio: S1, S2 heard, RRR, no murmurs  Resp: b/l rales on HFNC  Abd: soft, NT, ND  Ext: no edema or cyanosis  Skin: warm, dry, no visible rash      Laboratory Studies:  CBC:                       11.5   14.97 )-----------( 583      ( 22 Aug 2022 06:35 )             35.6     CMP: 08-22    136  |  103  |  29<H>  ----------------------------<  99  4.6   |  29  |  1.41<H>    Ca    8.1<L>      22 Aug 2022 06:35  Phos  5.2     08-21  Mg     2.0     08-21    TPro  7.1  /  Alb  1.8<L>  /  TBili  0.7  /  DBili  x   /  AST  54<H>  /  ALT  69<H>  /  AlkPhos  297<H>  08-22    LIVER FUNCTIONS - ( 22 Aug 2022 06:35 )  Alb: 1.8 g/dL / Pro: 7.1 g/dL / ALK PHOS: 297 U/L / ALT: 69 U/L DA / AST: 54 U/L / GGT: x               Microbiology: reviewed    Culture - Sputum (collected 08-20-22 @ 18:58)  Source: .Sputum Sputum  Gram Stain (08-21-22 @ 23:49):    Numerous polymorphonuclear leukocytes per low power field    Rare Squamous epithelial cells per low power field    Rare Yeast like cells per oil power field    Few Gram Variable Rods per oil power field    Culture - Acid Fast - Sputum w/Smear (collected 08-13-22 @ 12:12)  Source: .Sputum Sputum  Preliminary Report (08-17-22 @ 15:04):    Culture is being performed.    Culture - Acid Fast - Sputum w/Smear (collected 08-12-22 @ 09:02)  Source: .Sputum Sputum  Preliminary Report (08-20-22 @ 15:03):    No growth at 1 week.    Culture - Acid Fast - Sputum w/Smear (collected 08-12-22 @ 01:04)  Source: .Sputum Sputum  Preliminary Report (08-20-22 @ 15:03):    No growth at 1 week.    Culture - Blood (collected 08-11-22 @ 21:07)  Source: .Blood Blood  Final Report (08-17-22 @ 14:00):    No Growth Final    Culture - Blood (collected 08-11-22 @ 21:07)  Source: .Blood Blood  Final Report (08-17-22 @ 14:00):    No Growth Final        Radiology: reviewed

## 2022-08-22 NOTE — PROGRESS NOTE ADULT - ASSESSMENT
The patient is an 87 year old male with a history of HTN, HL, BPH, CVA who presents with cough and hemoptysis in the setting of PNA and r/o TB.    8/22/22  Seen at [facility] ICU  Sitting up, sleeping comfortably  Easily arousable  No complaints offered  No recent hemoptysis    Plan:  - The rhythm is now back to sinus rhythm with PACs and intermittent episodes of AF  - Continue metoprolol tartrate 100 mg bid  - The patient has ongoing hemoptysis. Remain off heparin drip as risks outweigh benefits. When hemoptysis resolves, will start apixaban.  - Discontinue amlodipine  - Echo with low normal LV systolic function, mild pulm HTN  - IV antibiotics-Levaquin  - AFB negative x3  - Pulm and ID follow-up  - If he continues to have hemoptysis - may need bronchoscopy

## 2022-08-22 NOTE — PROGRESS NOTE ADULT - ASSESSMENT
8 y/o male, PMHx HTN, HLD, CVA in 2015 (no residual deficits), BPH, and skin CA who presented on 8/11 c/o cough and fever x 1 week. Pt admitted to [facility] with:    1. acute hypoxic respiratory failure  2. severe sepsis due to multifocal pneumonia  3. Legionnaire's disease  4. transaminitis  5. CINDI  6. paroxysmal atrial fibrillation with RVR, new onset  7. CT chest with possibly cavitary lesions, ruled out for TB with AFB neg x 3.    Plan  Neuro: melatonin as needed for sleep hygiene   Cardio: keep MAP above 65, cont lopressor   Pulm Decrease fi02 on HFNC to sp02 greater than 92%, likely to NC in morning , cont saline spray , encourage OOB to chair   GI: diet as tolerated, cont PPI  Renal: strict i/o, renally dose med, flomax, proscar  ID: on levaquin for possible Legionnaire's disease, ID guiding abx   Heme: sq lovenox + SCDs for dvt ppx  Endo: no issues       Dispo: Remains in ICU

## 2022-08-22 NOTE — PROGRESS NOTE ADULT - ASSESSMENT
87M with HTN, HLD, hx of CVA (no residual deficits), BPH, hx of skin CA who presents with cough and fevers    afb neg x 3  vs noted  labs reviewed  wean fio2 as tolerated - remains on HF -   differential dx in pt with hemoptysis and cavitation is Malignancy - pt will need repeat CT chest in 4 - 6 weeks and possibly a Bronchoscopy or other means of diagnostic testing - examination  urine legionella ag neg - legionella ab positive -  - ID follow up reviewed - on Levaquin   ABG repeat noted -   will repeat CXR this am     on emp ABX  ID - Cardio follow up  I and O  serial labs  monitor VS and Sat  may need NIPPV if resp distress cont despite HF NC  prognosis guarded  GOC discussion

## 2022-08-22 NOTE — PROGRESS NOTE ADULT - SUBJECTIVE AND OBJECTIVE BOX
Date/Time Patient Seen:  		  Referring MD:   Data Reviewed	       Patient is a 87y old  Male who presents with a chief complaint of cough -> PNA (21 Aug 2022 19:34)      Subjective/HPI     PAST MEDICAL & SURGICAL HISTORY:  No pertinent past medical history    Skin cancer  s/p Mohs    Essential hypertension    Cerebrovascular accident (CVA), unspecified mechanism  2015    Hyperlipidemia, unspecified hyperlipidemia type    Complex tear of lateral meniscus of right knee as current injury, initial encounter    Complex tear of medial meniscus of right knee as current injury, initial encounter    Benign prostatic hyperplasia, presence of lower urinary tract symptoms unspecified, unspecified morphology    H/O pilonidal cyst          Medication list         MEDICATIONS  (STANDING):  albuterol/ipratropium for Nebulization 3 milliLiter(s) Nebulizer every 6 hours  atorvastatin 40 milliGRAM(s) Oral at bedtime  enoxaparin Injectable 40 milliGRAM(s) SubCutaneous every 24 hours  finasteride 5 milliGRAM(s) Oral daily  guaifenesin/dextromethorphan Oral Liquid 10 milliLiter(s) Oral every 6 hours  levoFLOXacin IVPB 750 milliGRAM(s) IV Intermittent every 48 hours  melatonin 5 milliGRAM(s) Oral at bedtime  metoprolol tartrate 100 milliGRAM(s) Oral two times a day  pantoprazole    Tablet 40 milliGRAM(s) Oral before breakfast  saccharomyces boulardii 250 milliGRAM(s) Oral two times a day  senna 2 Tablet(s) Oral at bedtime  tamsulosin 0.4 milliGRAM(s) Oral at bedtime    MEDICATIONS  (PRN):  acetaminophen     Tablet .. 650 milliGRAM(s) Oral every 6 hours PRN Temp greater or equal to 38C (100.4F), Mild Pain (1 - 3)  aluminum hydroxide/magnesium hydroxide/simethicone Suspension 30 milliLiter(s) Oral every 4 hours PRN Dyspepsia  diphenhydrAMINE 25 milliGRAM(s) Oral at bedtime PRN Insomnia  magnesium hydroxide Suspension 30 milliLiter(s) Oral daily PRN Constipation  ondansetron Injectable 4 milliGRAM(s) IV Push every 8 hours PRN Nausea and/or Vomiting  polyethylene glycol 3350 17 Gram(s) Oral daily PRN Constipation  sodium chloride 0.65% Nasal 1 Spray(s) Both Nostrils every 2 hours PRN Nasal Congestion         Vitals log        ICU Vital Signs Last 24 Hrs  T(C): 36.9 (22 Aug 2022 00:00), Max: 36.9 (22 Aug 2022 00:00)  T(F): 98.5 (22 Aug 2022 00:00), Max: 98.5 (22 Aug 2022 00:00)  HR: 62 (22 Aug 2022 06:41) (57 - 119)  BP: 117/57 (22 Aug 2022 06:00) (103/51 - 127/66)  BP(mean): 75 (22 Aug 2022 06:00) (68 - 96)  ABP: --  ABP(mean): --  RR: 22 (22 Aug 2022 06:00) (20 - 33)  SpO2: 99% (22 Aug 2022 06:41) (90% - 99%)    O2 Parameters below as of 22 Aug 2022 06:41  Patient On (Oxygen Delivery Method): nasal cannula, high flow,50%                 Input and Output:  I&O's Detail    21 Aug 2022 07:01  -  22 Aug 2022 07:00  --------------------------------------------------------  IN:  Total IN: 0 mL    OUT:    Voided (mL): 600 mL  Total OUT: 600 mL    Total NET: -600 mL          Lab Data                        11.5   14.97 )-----------( 583      ( 22 Aug 2022 06:35 )             35.6     08-21    136  |  102  |  28<H>  ----------------------------<  99  5.1   |  28  |  1.30    Ca    8.2<L>      21 Aug 2022 06:32  Phos  5.2     08-21  Mg     2.0     08-21    TPro  6.6  /  Alb  1.7<L>  /  TBili  0.8  /  DBili  x   /  AST  66<H>  /  ALT  81<H>  /  AlkPhos  342<H>  08-21            Review of Systems	      Objective     Physical Examination    heart s1s2  lung dec BS  abd soft  head nc      Pertinent Lab findings & Imaging      Bud:  NO   Adequate UO     I&O's Detail    21 Aug 2022 07:01  -  22 Aug 2022 07:00  --------------------------------------------------------  IN:  Total IN: 0 mL    OUT:    Voided (mL): 600 mL  Total OUT: 600 mL    Total NET: -600 mL               Discussed with:     Cultures:	        Radiology

## 2022-08-22 NOTE — PROGRESS NOTE ADULT - TIME-BASED BILLING (NON-CRITICAL CARE)
Time-based billing (NON-critical care)
no

## 2022-08-23 LAB
ALBUMIN SERPL ELPH-MCNC: 1.9 G/DL — LOW (ref 3.3–5)
ALP SERPL-CCNC: 268 U/L — HIGH (ref 30–120)
ALT FLD-CCNC: 59 U/L DA — SIGNIFICANT CHANGE UP (ref 10–60)
ANION GAP SERPL CALC-SCNC: 4 MMOL/L — LOW (ref 5–17)
ANION GAP SERPL CALC-SCNC: 5 MMOL/L — SIGNIFICANT CHANGE UP (ref 5–17)
AST SERPL-CCNC: 41 U/L — HIGH (ref 10–40)
BASOPHILS # BLD AUTO: 0.15 K/UL — SIGNIFICANT CHANGE UP (ref 0–0.2)
BASOPHILS NFR BLD AUTO: 1.1 % — SIGNIFICANT CHANGE UP (ref 0–2)
BILIRUB SERPL-MCNC: 0.5 MG/DL — SIGNIFICANT CHANGE UP (ref 0.2–1.2)
BUN SERPL-MCNC: 28 MG/DL — HIGH (ref 7–23)
BUN SERPL-MCNC: 29 MG/DL — HIGH (ref 7–23)
CALCIUM SERPL-MCNC: 8.3 MG/DL — LOW (ref 8.4–10.5)
CALCIUM SERPL-MCNC: 8.6 MG/DL — SIGNIFICANT CHANGE UP (ref 8.4–10.5)
CHLORIDE SERPL-SCNC: 103 MMOL/L — SIGNIFICANT CHANGE UP (ref 96–108)
CHLORIDE SERPL-SCNC: 104 MMOL/L — SIGNIFICANT CHANGE UP (ref 96–108)
CO2 SERPL-SCNC: 28 MMOL/L — SIGNIFICANT CHANGE UP (ref 22–31)
CO2 SERPL-SCNC: 29 MMOL/L — SIGNIFICANT CHANGE UP (ref 22–31)
CREAT SERPL-MCNC: 1.47 MG/DL — HIGH (ref 0.5–1.3)
CREAT SERPL-MCNC: 1.58 MG/DL — HIGH (ref 0.5–1.3)
CULTURE RESULTS: SIGNIFICANT CHANGE UP
EGFR: 42 ML/MIN/1.73M2 — LOW
EGFR: 46 ML/MIN/1.73M2 — LOW
EOSINOPHIL # BLD AUTO: 0.61 K/UL — HIGH (ref 0–0.5)
EOSINOPHIL NFR BLD AUTO: 4.5 % — SIGNIFICANT CHANGE UP (ref 0–6)
GLUCOSE SERPL-MCNC: 100 MG/DL — HIGH (ref 70–99)
GLUCOSE SERPL-MCNC: 138 MG/DL — HIGH (ref 70–99)
HCT VFR BLD CALC: 35.3 % — LOW (ref 39–50)
HGB BLD-MCNC: 11.3 G/DL — LOW (ref 13–17)
IMM GRANULOCYTES NFR BLD AUTO: 2.6 % — HIGH (ref 0–1.5)
LYMPHOCYTES # BLD AUTO: 16.1 % — SIGNIFICANT CHANGE UP (ref 13–44)
LYMPHOCYTES # BLD AUTO: 2.2 K/UL — SIGNIFICANT CHANGE UP (ref 1–3.3)
MCHC RBC-ENTMCNC: 31.3 PG — SIGNIFICANT CHANGE UP (ref 27–34)
MCHC RBC-ENTMCNC: 32 GM/DL — SIGNIFICANT CHANGE UP (ref 32–36)
MCV RBC AUTO: 97.8 FL — SIGNIFICANT CHANGE UP (ref 80–100)
MONOCYTES # BLD AUTO: 1.64 K/UL — HIGH (ref 0–0.9)
MONOCYTES NFR BLD AUTO: 12 % — SIGNIFICANT CHANGE UP (ref 2–14)
NEUTROPHILS # BLD AUTO: 8.71 K/UL — HIGH (ref 1.8–7.4)
NEUTROPHILS NFR BLD AUTO: 63.7 % — SIGNIFICANT CHANGE UP (ref 43–77)
NRBC # BLD: 0 /100 WBCS — SIGNIFICANT CHANGE UP (ref 0–0)
PHOSPHATE SERPL-MCNC: 4.9 MG/DL — HIGH (ref 2.5–4.5)
PLATELET # BLD AUTO: 601 K/UL — HIGH (ref 150–400)
POTASSIUM SERPL-MCNC: 4.7 MMOL/L — SIGNIFICANT CHANGE UP (ref 3.5–5.3)
POTASSIUM SERPL-MCNC: 6 MMOL/L — HIGH (ref 3.5–5.3)
POTASSIUM SERPL-SCNC: 4.7 MMOL/L — SIGNIFICANT CHANGE UP (ref 3.5–5.3)
POTASSIUM SERPL-SCNC: 6 MMOL/L — HIGH (ref 3.5–5.3)
PROT SERPL-MCNC: 7.3 G/DL — SIGNIFICANT CHANGE UP (ref 6–8.3)
RBC # BLD: 3.61 M/UL — LOW (ref 4.2–5.8)
RBC # FLD: 13.9 % — SIGNIFICANT CHANGE UP (ref 10.3–14.5)
SODIUM SERPL-SCNC: 135 MMOL/L — SIGNIFICANT CHANGE UP (ref 135–145)
SODIUM SERPL-SCNC: 138 MMOL/L — SIGNIFICANT CHANGE UP (ref 135–145)
SPECIMEN SOURCE: SIGNIFICANT CHANGE UP
WBC # BLD: 13.67 K/UL — HIGH (ref 3.8–10.5)
WBC # FLD AUTO: 13.67 K/UL — HIGH (ref 3.8–10.5)

## 2022-08-23 PROCEDURE — 99233 SBSQ HOSP IP/OBS HIGH 50: CPT

## 2022-08-23 RX ORDER — BUDESONIDE AND FORMOTEROL FUMARATE DIHYDRATE 160; 4.5 UG/1; UG/1
2 AEROSOL RESPIRATORY (INHALATION)
Refills: 0 | Status: DISCONTINUED | OUTPATIENT
Start: 2022-08-23 | End: 2022-08-29

## 2022-08-23 RX ORDER — ALBUTEROL 90 UG/1
2.5 AEROSOL, METERED ORAL EVERY 8 HOURS
Refills: 0 | Status: DISCONTINUED | OUTPATIENT
Start: 2022-08-23 | End: 2022-08-27

## 2022-08-23 RX ADMIN — Medication 10 MILLILITER(S): at 06:22

## 2022-08-23 RX ADMIN — Medication 10 MILLILITER(S): at 17:39

## 2022-08-23 RX ADMIN — Medication 650 MILLIGRAM(S): at 21:40

## 2022-08-23 RX ADMIN — FINASTERIDE 5 MILLIGRAM(S): 5 TABLET, FILM COATED ORAL at 11:41

## 2022-08-23 RX ADMIN — Medication 650 MILLIGRAM(S): at 22:30

## 2022-08-23 RX ADMIN — ALBUTEROL 2.5 MILLIGRAM(S): 90 AEROSOL, METERED ORAL at 15:05

## 2022-08-23 RX ADMIN — Medication 250 MILLIGRAM(S): at 06:22

## 2022-08-23 RX ADMIN — PANTOPRAZOLE SODIUM 40 MILLIGRAM(S): 20 TABLET, DELAYED RELEASE ORAL at 06:22

## 2022-08-23 RX ADMIN — Medication 5 MILLIGRAM(S): at 21:40

## 2022-08-23 RX ADMIN — SENNA PLUS 2 TABLET(S): 8.6 TABLET ORAL at 21:40

## 2022-08-23 RX ADMIN — ALBUTEROL 2.5 MILLIGRAM(S): 90 AEROSOL, METERED ORAL at 20:37

## 2022-08-23 RX ADMIN — ENOXAPARIN SODIUM 40 MILLIGRAM(S): 100 INJECTION SUBCUTANEOUS at 17:41

## 2022-08-23 RX ADMIN — ATORVASTATIN CALCIUM 40 MILLIGRAM(S): 80 TABLET, FILM COATED ORAL at 21:40

## 2022-08-23 RX ADMIN — ALBUTEROL 2.5 MILLIGRAM(S): 90 AEROSOL, METERED ORAL at 07:49

## 2022-08-23 RX ADMIN — BUDESONIDE AND FORMOTEROL FUMARATE DIHYDRATE 2 PUFF(S): 160; 4.5 AEROSOL RESPIRATORY (INHALATION) at 11:24

## 2022-08-23 RX ADMIN — Medication 100 MILLIGRAM(S): at 06:22

## 2022-08-23 RX ADMIN — BUDESONIDE AND FORMOTEROL FUMARATE DIHYDRATE 2 PUFF(S): 160; 4.5 AEROSOL RESPIRATORY (INHALATION) at 20:39

## 2022-08-23 RX ADMIN — Medication 250 MILLIGRAM(S): at 17:41

## 2022-08-23 RX ADMIN — Medication 100 MILLIGRAM(S): at 17:41

## 2022-08-23 RX ADMIN — Medication 3 MILLILITER(S): at 01:28

## 2022-08-23 RX ADMIN — TAMSULOSIN HYDROCHLORIDE 0.4 MILLIGRAM(S): 0.4 CAPSULE ORAL at 21:40

## 2022-08-23 RX ADMIN — Medication 10 MILLILITER(S): at 11:42

## 2022-08-23 RX ADMIN — Medication 600 MILLIGRAM(S): at 17:41

## 2022-08-23 NOTE — PROGRESS NOTE ADULT - ASSESSMENT
88yo M with PMH of HTN, HLD, hx of CVA (no residual deficits), BPH, hx of skin CA who presents with cough and fevers a/w sepsis due to multifocal pneumonia with progression to acute hypoxic respiratory failure now on HFNC and course further c/b new onset afib.        Sepsis 2/2 multifocal PNA   - met sepsis criteria on arrival with leukocytosis + tachycardia + source of infection  - strep PNA Ag negative. Legionella serum antibody positive, legionella urine Ag negative - ID ordered a legionella serum PCR to get more clarity if pt had legionella PNA  - s/p ceftriaxone and azithromycin. Continue Levaquin for now per ID ([facility]), recs appreciated   - pulmonary ([facility]), recs appreciated  - AFB smear negative x3  - still with leukocytosis but downtrending to 15 ; down from peak of 26K earlier in admission ; O2 requirements are starting to improve also, with HFNC down to 40% FiO2 now on 3 liters  PLAN:  - f/up sputum culture  - may need bronchoscopy   - f/up Fungitell (sent to look for alternative etiology like PCP pneumonia as pt had been on high FiO2 for 10 days on Abx) / HIV negative  NOTES 8/23 ; WRITER PERSONALLY REMOVED 02: was 100 percent on 3 liters    Acute hypoxia respiratory failure 2/2 likely PNA.   - pulmonary ([facility]), recs appreciated  - Continue HFNC, continue to monitor and will trial wean   - O2 requirements are starting to improve also, with HFNC down to 40% FiO2 from 60% yesterday   - full code, intubate if needed  - c/w Abx for PNA    Cavitary lesion  - doubt TB, but patient had blood tinged sputum, possible early cavitation on CT, and fevers. Low suspicion for TB as per ID, discontinued isolation  - quantiferon equivocal. AFB smear negative x3  - may need bronchoscopy    New afib   - [id number] score of at least 5 -> was on heparin drip but had worsening hemoptysis and cardio felt risk > benefits so full A/C discontinued and pt just given ppx dose lovenox  - cardiology ([facility]) consulted, recs appreciated  - rate control if needed  - TTE shows Low normal left ventricular systolic function. Bi-atrial enlargement. Mild pulmonary hypertension  - on Lovenox, will eventually need to transition to DOAC when feel confident hemoptysis has resolved     Transaminitis  - likely from current illness ; possibly from legionella if that is the culprit of the PNA  - CT A/P without significant pathology to cause the transaminitis; monitor labs  - LFTs are improving  - can consider GI consult if does not improve with Abx therapy     CINDI 2/2 suspected prerenal azotemia    - likely 2/2 poor PO intake  - oral hydration encouraged, monitor BMP - likely now at baseline renal function  - hold diuretic  - avoid nephrotoxic meds    HTN/HLD  - cont with norvasc  - hold lasix  - cont with atorvastatin    BPH  - cont with tamsulosin and finasteride    VTE ppx  - c/w Lovenox, will eventually transition to DOAC when it is clear hemoptysis has resolved

## 2022-08-23 NOTE — PROGRESS NOTE ADULT - SUBJECTIVE AND OBJECTIVE BOX
Patient seen and examined  sitting in bed  overnight events noted  patient tapered to 3 liters of 02 in the am  patient reports breathing is ok  reports bleeding from nose due to oxygen  vitals stable  labs: white count downtrending  cr at 1.47 with K of 6  LFTS downtrending    Review of Systems:  General:denies fever chills, headache, weakness  HEENT: denies blurry vision,diffculty swallowing, difficulty hearing, tinnitus  Cardiovascular: denies chest pain  ,palpitations  Pulmonary:denies shortness of breath, cough, wheezing, hemoptysis  Gastrointestinal: denies abdominal pain, constipation, diarrhea,nausea , vomiting, hematochezia  : denies hematuria, dysuria, or incontinence  Neurological: denies weakness, numbness , tingling, dizziness, tremors  MSK: denies muscle pain, difficulty ambulating, swelling, back pain  skin: denies skin rash, itching, burning, or  skin lesions  Psychiatrical: denies mood disturbances, anxierty, feeling depressed, depression , or difficulty sleeping    Objective:  Vitals  T(C): 36.6 (08-23-22 @ 08:34), Max: 36.8 (08-22-22 @ 12:35)  HR: 63 (08-23-22 @ 07:51) (59 - 74)  BP: 119/63 (08-23-22 @ 06:00) (98/52 - 126/59)  RR: 21 (08-23-22 @ 06:00) (14 - 31)  SpO2: 95% (08-23-22 @ 07:51) (80% - 100%)    Physical Exam:  General: comfortable, no acute distress, well nourished  HEENT: Atraumatic, no LAD, trachea midline, PERRLA  Cardiovascular: normal s1s2, no murmurs, gallops or fricition rubs  Pulmonary: clear to ausculation Bilaterally, no wheezing , rhonchi  Gastrointestinal: soft non tender non distended, no masses felt, no organomegally  Muscloskeletal: no lower extremity edema, intact bilateral lower extremity pulses  Neurological: CN II-12 intact. No focal weakness  Psychiatrical: normal mood, cooperative  SKIN: no rash, lesions or ulcers    Labs:                          11.3   13.67 )-----------( 601      ( 23 Aug 2022 06:00 )             35.3     08-23    138  |  104  |  29<H>  ----------------------------<  100<H>  6.0<H>   |  29  |  1.47<H>    Ca    8.6      23 Aug 2022 06:00  Phos  4.9     08-23    TPro  7.3  /  Alb  1.9<L>  /  TBili  0.5  /  DBili  x   /  AST  41<H>  /  ALT  59  /  AlkPhos  268<H>  08-23    LIVER FUNCTIONS - ( 23 Aug 2022 06:00 )  Alb: 1.9 g/dL / Pro: 7.3 g/dL / ALK PHOS: 268 U/L / ALT: 59 U/L DA / AST: 41 U/L / GGT: x                 Active Medications  MEDICATIONS  (STANDING):  ALBUTerol    0.083% 2.5 milliGRAM(s) Nebulizer every 8 hours  atorvastatin 40 milliGRAM(s) Oral at bedtime  budesonide  80 MICROgram(s)/formoterol 4.5 MICROgram(s) Inhaler 2 Puff(s) Inhalation two times a day  enoxaparin Injectable 40 milliGRAM(s) SubCutaneous every 24 hours  finasteride 5 milliGRAM(s) Oral daily  guaiFENesin  milliGRAM(s) Oral every 12 hours  guaifenesin/dextromethorphan Oral Liquid 10 milliLiter(s) Oral every 6 hours  levoFLOXacin IVPB 750 milliGRAM(s) IV Intermittent every 48 hours  melatonin 5 milliGRAM(s) Oral at bedtime  metoprolol tartrate 100 milliGRAM(s) Oral two times a day  pantoprazole    Tablet 40 milliGRAM(s) Oral before breakfast  saccharomyces boulardii 250 milliGRAM(s) Oral two times a day  senna 2 Tablet(s) Oral at bedtime  tamsulosin 0.4 milliGRAM(s) Oral at bedtime    MEDICATIONS  (PRN):  acetaminophen     Tablet .. 650 milliGRAM(s) Oral every 6 hours PRN Temp greater or equal to 38C (100.4F), Mild Pain (1 - 3)  aluminum hydroxide/magnesium hydroxide/simethicone Suspension 30 milliLiter(s) Oral every 4 hours PRN Dyspepsia  diphenhydrAMINE 25 milliGRAM(s) Oral at bedtime PRN Insomnia  magnesium hydroxide Suspension 30 milliLiter(s) Oral daily PRN Constipation  ondansetron Injectable 4 milliGRAM(s) IV Push every 8 hours PRN Nausea and/or Vomiting  polyethylene glycol 3350 17 Gram(s) Oral daily PRN Constipation  sodium chloride 0.65% Nasal 1 Spray(s) Both Nostrils every 2 hours PRN Nasal Congestion

## 2022-08-23 NOTE — PROGRESS NOTE ADULT - SUBJECTIVE AND OBJECTIVE BOX
Chief Complaint: Hemoptysis    Interval Events: No events overnight.    Review of Systems:  General: No fevers, chills, weight gain  Skin: No rashes, color changes  Cardiovascular: No chest pain, orthopnea  Respiratory: No shortness of breath, cough  Gastrointestinal: No nausea, abdominal pain  Genitourinary: No incontinence, pain with urination  Musculoskeletal: No pain, swelling, decreased range of motion  Neurological: No headache, weakness  Psychiatric: No depression, anxiety  Endocrine: No weight gain, increased thirst  All other systems are comprehensively negative.    Physical Exam:  Vital Signs Last 24 Hrs  T(C): 36.6 (23 Aug 2022 08:34), Max: 36.8 (22 Aug 2022 12:35)  T(F): 97.8 (23 Aug 2022 08:34), Max: 98.3 (23 Aug 2022 06:01)  HR: 63 (23 Aug 2022 11:00) (59 - 74)  BP: 106/54 (23 Aug 2022 10:00) (98/52 - 129/62)  BP(mean): 71 (23 Aug 2022 10:00) (66 - 83)  RR: 26 (23 Aug 2022 11:00) (14 - 27)  SpO2: 96% (23 Aug 2022 11:00) (88% - 100%)  Parameters below as of 23 Aug 2022 11:00  Patient On (Oxygen Delivery Method): nasal cannula  O2 Flow (L/min): 2  General: NAD  HEENT: MMM  Neck: No JVD, no carotid bruit  Lungs: CTAB  CV: RRR, nl S1/S2, no M/R/G  Abdomen: S/NT/ND, +BS  Extremities: No LE edema, no cyanosis  Neuro: AAOx3, non-focal  Skin: No rash    Labs:             08-23    135  |  103  |  28<H>  ----------------------------<  138<H>  4.7   |  28  |  1.58<H>    Ca    8.3<L>      23 Aug 2022 09:06  Phos  4.9     08-23    TPro  7.3  /  Alb  1.9<L>  /  TBili  0.5  /  DBili  x   /  AST  41<H>  /  ALT  59  /  AlkPhos  268<H>  08-23                        11.3   13.67 )-----------( 601      ( 23 Aug 2022 06:00 )             35.3       Telemetry: Sinus rhythm with PACs

## 2022-08-23 NOTE — PROGRESS NOTE ADULT - ASSESSMENT
86 yo m pmhx htn, hld, cva, bph, skin ca admitted to [facility] with hypoxic resp fx in setting of legionella pna with course complicated by new onset afib, amando and transaminitis.    NEURO: no active issues  CV: afib on lopressor   RESP: nc for spo2 >92%, chest pt, pulm suctioning, inh bronchodilators   RENAL: amando, avoid nephrotoxic meds, renally dose meds, trend urine output, bun/cr and electrolytes, replace as needed  GI: dash/tlc diet  ENDO: no active issues  ID: levaquin for legionella   HEME: lovenox for vte ppx   DISPO: full code

## 2022-08-23 NOTE — PROGRESS NOTE ADULT - SUBJECTIVE AND OBJECTIVE BOX
[facility], Division of Infectious Diseases  EROS Perez Y. Patel, S. Shah, G. [facility]  [phone]    Name: DENNIS BRADFORD  Age: 87y  Gender: Male  MRN: [id number]    Interval History:  Patient seen and examined at bedside  On NC  Feeling better  Still w/ cough  No acute overnight events.   Notes reviewed    Antibiotics:  levoFLOXacin IVPB 750 milliGRAM(s) IV Intermittent every 48 hours      Medications:  acetaminophen     Tablet .. 650 milliGRAM(s) Oral every 6 hours PRN  ALBUTerol    0.083% 2.5 milliGRAM(s) Nebulizer every 8 hours  aluminum hydroxide/magnesium hydroxide/simethicone Suspension 30 milliLiter(s) Oral every 4 hours PRN  atorvastatin 40 milliGRAM(s) Oral at bedtime  budesonide  80 MICROgram(s)/formoterol 4.5 MICROgram(s) Inhaler 2 Puff(s) Inhalation two times a day  diphenhydrAMINE 25 milliGRAM(s) Oral at bedtime PRN  enoxaparin Injectable 40 milliGRAM(s) SubCutaneous every 24 hours  finasteride 5 milliGRAM(s) Oral daily  guaiFENesin  milliGRAM(s) Oral every 12 hours  guaifenesin/dextromethorphan Oral Liquid 10 milliLiter(s) Oral every 6 hours  levoFLOXacin IVPB 750 milliGRAM(s) IV Intermittent every 48 hours  magnesium hydroxide Suspension 30 milliLiter(s) Oral daily PRN  melatonin 5 milliGRAM(s) Oral at bedtime  metoprolol tartrate 100 milliGRAM(s) Oral two times a day  ondansetron Injectable 4 milliGRAM(s) IV Push every 8 hours PRN  pantoprazole    Tablet 40 milliGRAM(s) Oral before breakfast  polyethylene glycol 3350 17 Gram(s) Oral daily PRN  saccharomyces boulardii 250 milliGRAM(s) Oral two times a day  senna 2 Tablet(s) Oral at bedtime  sodium chloride 0.65% Nasal 1 Spray(s) Both Nostrils every 2 hours PRN  tamsulosin 0.4 milliGRAM(s) Oral at bedtime      Review of Systems:  Review of systems otherwise negative except as previously noted.    Allergies: No Known Allergies    For details regarding the patient's past medical history, social history, family history, and other miscellaneous elements, please refer the initial infectious diseases consultation and/or the admitting history and physical examination for this admission.    Objective:  Vitals:   T(C): 36.4 (08-23-22 @ 12:05), Max: 36.8 (08-23-22 @ 06:01)  HR: 63 (08-23-22 @ 11:00) (59 - 74)  BP: 106/54 (08-23-22 @ 10:00) (98/52 - 129/62)  RR: 26 (08-23-22 @ 11:00) (14 - 26)  SpO2: 96% (08-23-22 @ 11:00) (88% - 100%)    Physical Examination:  General: no acute distress  HEENT: NC/AT, EOMI  Cardio: S1, S2 heard, RRR, no murmurs  Resp: decreased b/l breath sounds  Abd: soft, NT, ND  Ext: no edema or cyanosis  Skin: warm, dry, no visible rash      Laboratory Studies:  CBC:                       11.3   13.67 )-----------( 601      ( 23 Aug 2022 06:00 )             35.3     CMP: 08-23    135  |  103  |  28<H>  ----------------------------<  138<H>  4.7   |  28  |  1.58<H>    Ca    8.3<L>      23 Aug 2022 09:06  Phos  4.9     08-23    TPro  7.3  /  Alb  1.9<L>  /  TBili  0.5  /  DBili  x   /  AST  41<H>  /  ALT  59  /  AlkPhos  268<H>  08-23    LIVER FUNCTIONS - ( 23 Aug 2022 06:00 )  Alb: 1.9 g/dL / Pro: 7.3 g/dL / ALK PHOS: 268 U/L / ALT: 59 U/L DA / AST: 41 U/L / GGT: x               Microbiology: reviewed    Culture - Sputum (collected 08-20-22 @ 18:58)  Source: .Sputum Sputum  Gram Stain (08-21-22 @ 23:49):    Numerous polymorphonuclear leukocytes per low power field    Rare Squamous epithelial cells per low power field    Rare Yeast like cells per oil power field    Few Gram Variable Rods per oil power field  Preliminary Report (08-22-22 @ 14:26):    Normal Respiratory Dora present    Culture - Acid Fast - Sputum w/Smear (collected 08-13-22 @ 12:12)  Source: .Sputum Sputum  Preliminary Report (08-17-22 @ 15:04):    Culture is being performed.    Culture - Acid Fast - Sputum w/Smear (collected 08-12-22 @ 09:02)  Source: .Sputum Sputum  Preliminary Report (08-20-22 @ 15:03):    No growth at 1 week.    Culture - Acid Fast - Sputum w/Smear (collected 08-12-22 @ 01:04)  Source: .Sputum Sputum  Preliminary Report (08-20-22 @ 15:03):    No growth at 1 week.    Culture - Blood (collected 08-11-22 @ 21:07)  Source: .Blood Blood  Final Report (08-17-22 @ 14:00):    No Growth Final    Culture - Blood (collected 08-11-22 @ 21:07)  Source: .Blood Blood  Final Report (08-17-22 @ 14:00):    No Growth Final        Radiology: reviewed

## 2022-08-23 NOTE — PROGRESS NOTE ADULT - SUBJECTIVE AND OBJECTIVE BOX
Patient is a 87y old  Male who presents with a chief complaint of cough -> PNA (23 Aug 2022 13:31)      BRIEF HOSPITAL COURSE:   86 yo m pmhx htn, hld, cva, bph, skin ca admitted to [facility] with hypoxic resp fx in setting of legionella pna with course complicated by new onset afib, amando and transaminitis.    Events last 24 hours:   weaned to NC today, tolerating      PAST MEDICAL & SURGICAL HISTORY:  Skin cancer  s/p Mohs      Essential hypertension      Cerebrovascular accident (CVA), unspecified mechanism  2015      Hyperlipidemia, unspecified hyperlipidemia type      Complex tear of lateral meniscus of right knee as current injury, initial encounter      Complex tear of medial meniscus of right knee as current injury, initial encounter      Benign prostatic hyperplasia, presence of lower urinary tract symptoms unspecified, unspecified morphology      H/O pilonidal cyst        Allergies    No Known Allergies    Intolerances      FAMILY HISTORY:  Family history of ischemic heart disease and other diseases of the circulatory system (Father)        Social History:     Review of Systems:  CONSTITUTIONAL: No fever, chills, or fatigue  EYES: No eye pain, visual disturbances, or discharge  ENMT:  No difficulty hearing, tinnitus, vertigo; No sinus or throat pain  NECK: No pain or stiffness  RESPIRATORY: No cough, wheezing, chills or hemoptysis; No shortness of breath  CARDIOVASCULAR: No chest pain, palpitations, dizziness, or leg swelling  GASTROINTESTINAL: No abdominal or epigastric pain. No nausea, vomiting, or hematemesis; No diarrhea or constipation. No melena or hematochezia.  GENITOURINARY: No dysuria, frequency, hematuria, or incontinence  NEUROLOGICAL: No headaches, memory loss, loss of strength, numbness, or tremors  SKIN: No itching, burning, rashes, or lesions   MUSCULOSKELETAL: No joint pain or swelling; No muscle, back, or extremity pain  PSYCHIATRIC: No depression, anxiety, mood swings, or difficulty sleeping      Vitals During Exam:   HR:   BP:   RR:  sPO2:     Physical Examination:    General: No acute distress.      HEENT: Pupils equal, reactive to light.  Symmetric.    PULM: Clear to auscultation bilaterally, no significant sputum production    CVS: Regular rate and rhythm, no murmurs, rubs, or gallops    ABD: Soft, nondistended, nontender, normoactive bowel sounds, no masses    EXT: No edema, nontender    SKIN: Warm and well perfused, no rashes noted.    NEURO: Alert, oriented, interactive, nonfocal      Medications:  levoFLOXacin IVPB 750 milliGRAM(s) IV Intermittent every 48 hours    metoprolol tartrate 100 milliGRAM(s) Oral two times a day  tamsulosin 0.4 milliGRAM(s) Oral at bedtime    ALBUTerol    0.083% 2.5 milliGRAM(s) Nebulizer every 8 hours  budesonide  80 MICROgram(s)/formoterol 4.5 MICROgram(s) Inhaler 2 Puff(s) Inhalation two times a day  guaiFENesin  milliGRAM(s) Oral every 12 hours  guaifenesin/dextromethorphan Oral Liquid 10 milliLiter(s) Oral every 6 hours    acetaminophen     Tablet .. 650 milliGRAM(s) Oral every 6 hours PRN  diphenhydrAMINE 25 milliGRAM(s) Oral at bedtime PRN  melatonin 5 milliGRAM(s) Oral at bedtime  ondansetron Injectable 4 milliGRAM(s) IV Push every 8 hours PRN      enoxaparin Injectable 40 milliGRAM(s) SubCutaneous every 24 hours    aluminum hydroxide/magnesium hydroxide/simethicone Suspension 30 milliLiter(s) Oral every 4 hours PRN  magnesium hydroxide Suspension 30 milliLiter(s) Oral daily PRN  pantoprazole    Tablet 40 milliGRAM(s) Oral before breakfast  polyethylene glycol 3350 17 Gram(s) Oral daily PRN  senna 2 Tablet(s) Oral at bedtime      atorvastatin 40 milliGRAM(s) Oral at bedtime  finasteride 5 milliGRAM(s) Oral daily        sodium chloride 0.65% Nasal 1 Spray(s) Both Nostrils every 2 hours PRN    saccharomyces boulardii 250 milliGRAM(s) Oral two times a day          ICU Vital Signs Last 24 Hrs  T(C): 36.4 (23 Aug 2022 15:54), Max: 36.8 (23 Aug 2022 06:01)  T(F): 97.6 (23 Aug 2022 15:54), Max: 98.3 (23 Aug 2022 06:01)  HR: 67 (23 Aug 2022 20:39) (59 - 80)  BP: 119/62 (23 Aug 2022 18:00) (98/52 - 129/62)  BP(mean): 79 (23 Aug 2022 18:00) (66 - 83)  ABP: --  ABP(mean): --  RR: 26 (23 Aug 2022 18:00) (18 - 28)  SpO2: 96% (23 Aug 2022 20:39) (85% - 99%)    O2 Parameters below as of 23 Aug 2022 18:00  Patient On (Oxygen Delivery Method): nasal cannula  O2 Flow (L/min): 2        Vital Signs Last 24 Hrs  T(C): 36.4 (23 Aug 2022 15:54), Max: 36.8 (23 Aug 2022 06:01)  T(F): 97.6 (23 Aug 2022 15:54), Max: 98.3 (23 Aug 2022 06:01)  HR: 67 (23 Aug 2022 20:39) (59 - 80)  BP: 119/62 (23 Aug 2022 18:00) (98/52 - 129/62)  BP(mean): 79 (23 Aug 2022 18:00) (66 - 83)  RR: 26 (23 Aug 2022 18:00) (18 - 28)  SpO2: 96% (23 Aug 2022 20:39) (85% - 99%)    Parameters below as of 23 Aug 2022 18:00  Patient On (Oxygen Delivery Method): nasal cannula  O2 Flow (L/min): 2          I&O's Detail    22 Aug 2022 07:01  -  23 Aug 2022 07:00  --------------------------------------------------------  IN:  Total IN: 0 mL    OUT:    Voided (mL): 800 mL  Total OUT: 800 mL    Total NET: -800 mL      23 Aug 2022 07:01  -  23 Aug 2022 22:39  --------------------------------------------------------  IN:    Oral Fluid: 720 mL  Total IN: 720 mL    OUT:    Voided (mL): 400 mL  Total OUT: 400 mL    Total NET: 320 mL            LABS:                        11.3   13.67 )-----------( 601      ( 23 Aug 2022 06:00 )             35.3     08-23    135  |  103  |  28<H>  ----------------------------<  138<H>  4.7   |  28  |  1.58<H>    Ca    8.3<L>      23 Aug 2022 09:06  Phos  4.9     08-23    TPro  7.3  /  Alb  1.9<L>  /  TBili  0.5  /  DBili  x   /  AST  41<H>  /  ALT  59  /  AlkPhos  268<H>  08-23          CAPILLARY BLOOD GLUCOSE            CULTURES:  Culture Results:   Normal Respiratory Dora present (08-20 @ 18:58)        RADIOLOGY: ***      SUPPLEMENTAL O2:   LINES:  ALCIRA:  BARBARA:   PPx:   CONTACT: Patient is a 87y old  Male who presents with a chief complaint of cough -> PNA (23 Aug 2022 13:31)      BRIEF HOSPITAL COURSE:   86 yo m pmhx htn, hld, cva, bph, skin ca admitted to [facility] with hypoxic resp fx in setting of legionella pna with course complicated by new onset afib, amando and transaminitis.    Events last 24 hours:   weaned to NC today, tolerating      PAST MEDICAL & SURGICAL HISTORY:  Skin cancer  s/p Mohs      Essential hypertension      Cerebrovascular accident (CVA), unspecified mechanism  2015      Hyperlipidemia, unspecified hyperlipidemia type      Complex tear of lateral meniscus of right knee as current injury, initial encounter      Complex tear of medial meniscus of right knee as current injury, initial encounter      Benign prostatic hyperplasia, presence of lower urinary tract symptoms unspecified, unspecified morphology      H/O pilonidal cyst        Allergies    No Known Allergies    Intolerances      FAMILY HISTORY:  Family history of ischemic heart disease and other diseases of the circulatory system (Father)        Social History:       Review of Systems:  no active complaints      Physical Examination:    General: elderly male, lying in bed, nad      HEENT: nc/at, nc in place    PULM: Coarse b/l    CVS: irregular rate/rhythm    ABD: Soft, nondistended, nontender, normoactive bowel sounds, no masses    EXT: No edema    SKIN: Warm     NEURO: Alert, interactive      Medications:  levoFLOXacin IVPB 750 milliGRAM(s) IV Intermittent every 48 hours    metoprolol tartrate 100 milliGRAM(s) Oral two times a day  tamsulosin 0.4 milliGRAM(s) Oral at bedtime    ALBUTerol    0.083% 2.5 milliGRAM(s) Nebulizer every 8 hours  budesonide  80 MICROgram(s)/formoterol 4.5 MICROgram(s) Inhaler 2 Puff(s) Inhalation two times a day  guaiFENesin  milliGRAM(s) Oral every 12 hours  guaifenesin/dextromethorphan Oral Liquid 10 milliLiter(s) Oral every 6 hours    acetaminophen     Tablet .. 650 milliGRAM(s) Oral every 6 hours PRN  diphenhydrAMINE 25 milliGRAM(s) Oral at bedtime PRN  melatonin 5 milliGRAM(s) Oral at bedtime  ondansetron Injectable 4 milliGRAM(s) IV Push every 8 hours PRN      enoxaparin Injectable 40 milliGRAM(s) SubCutaneous every 24 hours    aluminum hydroxide/magnesium hydroxide/simethicone Suspension 30 milliLiter(s) Oral every 4 hours PRN  magnesium hydroxide Suspension 30 milliLiter(s) Oral daily PRN  pantoprazole    Tablet 40 milliGRAM(s) Oral before breakfast  polyethylene glycol 3350 17 Gram(s) Oral daily PRN  senna 2 Tablet(s) Oral at bedtime      atorvastatin 40 milliGRAM(s) Oral at bedtime  finasteride 5 milliGRAM(s) Oral daily        sodium chloride 0.65% Nasal 1 Spray(s) Both Nostrils every 2 hours PRN    saccharomyces boulardii 250 milliGRAM(s) Oral two times a day          ICU Vital Signs Last 24 Hrs  T(C): 36.4 (23 Aug 2022 15:54), Max: 36.8 (23 Aug 2022 06:01)  T(F): 97.6 (23 Aug 2022 15:54), Max: 98.3 (23 Aug 2022 06:01)  HR: 67 (23 Aug 2022 20:39) (59 - 80)  BP: 119/62 (23 Aug 2022 18:00) (98/52 - 129/62)  BP(mean): 79 (23 Aug 2022 18:00) (66 - 83)  ABP: --  ABP(mean): --  RR: 26 (23 Aug 2022 18:00) (18 - 28)  SpO2: 96% (23 Aug 2022 20:39) (85% - 99%)    O2 Parameters below as of 23 Aug 2022 18:00  Patient On (Oxygen Delivery Method): nasal cannula  O2 Flow (L/min): 2        Vital Signs Last 24 Hrs  T(C): 36.4 (23 Aug 2022 15:54), Max: 36.8 (23 Aug 2022 06:01)  T(F): 97.6 (23 Aug 2022 15:54), Max: 98.3 (23 Aug 2022 06:01)  HR: 67 (23 Aug 2022 20:39) (59 - 80)  BP: 119/62 (23 Aug 2022 18:00) (98/52 - 129/62)  BP(mean): 79 (23 Aug 2022 18:00) (66 - 83)  RR: 26 (23 Aug 2022 18:00) (18 - 28)  SpO2: 96% (23 Aug 2022 20:39) (85% - 99%)    Parameters below as of 23 Aug 2022 18:00  Patient On (Oxygen Delivery Method): nasal cannula  O2 Flow (L/min): 2          I&O's Detail    22 Aug 2022 07:01  -  23 Aug 2022 07:00  --------------------------------------------------------  IN:  Total IN: 0 mL    OUT:    Voided (mL): 800 mL  Total OUT: 800 mL    Total NET: -800 mL      23 Aug 2022 07:01  -  23 Aug 2022 22:39  --------------------------------------------------------  IN:    Oral Fluid: 720 mL  Total IN: 720 mL    OUT:    Voided (mL): 400 mL  Total OUT: 400 mL    Total NET: 320 mL            LABS:                        11.3   13.67 )-----------( 601      ( 23 Aug 2022 06:00 )             35.3     08-23    135  |  103  |  28<H>  ----------------------------<  138<H>  4.7   |  28  |  1.58<H>    Ca    8.3<L>      23 Aug 2022 09:06  Phos  4.9     08-23    TPro  7.3  /  Alb  1.9<L>  /  TBili  0.5  /  DBili  x   /  AST  41<H>  /  ALT  59  /  AlkPhos  268<H>  08-23          CAPILLARY BLOOD GLUCOSE            CULTURES:  Culture Results:   Normal Respiratory Dora present (08-20 @ 18:58)        RADIOLOGY: ***      SUPPLEMENTAL O2:   LINES:  IVF:  BARBARA:   BELKYSx:   CONTACT:

## 2022-08-23 NOTE — CHART NOTE - NSCHARTNOTEFT_GEN_A_CORE
Assessment: Per H&P "87M with HTN, HLD, hx of CVA (no residual deficits), BPH, hx of skin CA who presents with cough and fevers.    Found to have multifocal pneumonia.  Also found have new onset afib."      Pt due for follow-up. Spoke with pt in room, wean off high flow to NC today. Presents with good appetite, intake increased since last visit (consuming >75% of foods). No new food preferences at this time. Denies n/v/d/c, last BM , bowel regimen in place. No reported difficulty chewing or swallowing, but tired, continue with chopped meats, pt receptive. NKFA. Pertinent medications/nutrition labs reviewed; noted elevated BUN, Cr, A1c 5.9% (8-12). Continue with Ensure Enlive 8oz (350kcal, 20g pro) to QD, double egg at breakfast, yogurt and milk to optimize intake. Continue to encourage po intake as able. RD to continue to monitor nutrition status per protocol.     Factors impacting intake: [ ] none [ ] nausea  [ ] vomiting [ ] diarrhea [ ] constipation  [ ]chewing problems [ ] swallowing issues  [ x] other: fatigue    Diet Prescription: Diet, DASH/TLC:   Sodium & Cholesterol Restricted  Supplement Feeding Modality:  Oral  Ensure Enlive Cans or Servings Per Day:  1       Frequency:  Daily (08-15- @ 14:20)    Intake: good x3 days    Current Weight in k.6 (8-14); 73.2 (8-19); 68.2 (8-22)   9.7#/6% Weight loss 2 weeks is clinically significant, noted wt fluctuates in house, received diuretic, IVF inhouse, continues on antibiotic, wt loss partially likely due to fluid shift    Pertinent Medications: MEDICATIONS  (STANDING):  ALBUTerol    0.083% 2.5 milliGRAM(s) Nebulizer every 8 hours  atorvastatin 40 milliGRAM(s) Oral at bedtime  budesonide  80 MICROgram(s)/formoterol 4.5 MICROgram(s) Inhaler 2 Puff(s) Inhalation two times a day  enoxaparin Injectable 40 milliGRAM(s) SubCutaneous every 24 hours  finasteride 5 milliGRAM(s) Oral daily  guaiFENesin  milliGRAM(s) Oral every 12 hours  guaifenesin/dextromethorphan Oral Liquid 10 milliLiter(s) Oral every 6 hours  levoFLOXacin IVPB 750 milliGRAM(s) IV Intermittent every 48 hours  melatonin 5 milliGRAM(s) Oral at bedtime  metoprolol tartrate 100 milliGRAM(s) Oral two times a day  pantoprazole    Tablet 40 milliGRAM(s) Oral before breakfast  saccharomyces boulardii 250 milliGRAM(s) Oral two times a day  senna 2 Tablet(s) Oral at bedtime  tamsulosin 0.4 milliGRAM(s) Oral at bedtime    MEDICATIONS  (PRN):  acetaminophen     Tablet .. 650 milliGRAM(s) Oral every 6 hours PRN Temp greater or equal to 38C (100.4F), Mild Pain (1 - 3)  aluminum hydroxide/magnesium hydroxide/simethicone Suspension 30 milliLiter(s) Oral every 4 hours PRN Dyspepsia  diphenhydrAMINE 25 milliGRAM(s) Oral at bedtime PRN Insomnia  magnesium hydroxide Suspension 30 milliLiter(s) Oral daily PRN Constipation  ondansetron Injectable 4 milliGRAM(s) IV Push every 8 hours PRN Nausea and/or Vomiting  polyethylene glycol 3350 17 Gram(s) Oral daily PRN Constipation  sodium chloride 0.65% Nasal 1 Spray(s) Both Nostrils every 2 hours PRN Nasal Congestion    Pertinent Labs: 08 Na135 mmol/L Glu 138 mg/dL<H> K+ 4.7 mmol/L Cr  1.58 mg/dL<H> BUN 28 mg/dL<H> 08-23 Phos 4.9 mg/dL<H> 08-23 Alb 1.9 g/dL<L>     CAPILLARY BLOOD GLUCOSE    Skin: No edema or pressure injury documented per flowsheets.     Estimated Needs:   [ x] no change since previous assessment  [ ] recalculated:     Previous Nutrition Diagnosis:   [ ] Inadequate Energy Intake [ ]Inadequate Oral Intake [ ] Excessive Energy Intake   [ ] Underweight [ ] Increased Nutrient Needs [ ] Overweight/Obesity   [ ] Altered GI Function [ ] Unintended Weight Loss [ ] Food & Nutrition Related Knowledge Deficit [ x] Malnutrition     Nutrition Diagnosis is [x ] ongoing  [ ] resolved [ ] not applicable     New Nutrition Diagnosis: [ x] not applicable       Interventions: Recommend Ensure Enlive 8oz (350kcal, 20g pro) to QD, double egg at breakfast, yogurt and milk to optimize intake. Continue to encourage po intake as able.   Recommend  [ ] Change Diet To:  [x ] Nutrition Supplement  [ x] Nutrition Support: provide chopped meat due to fatigue eating  [ ] Other:     Monitoring and Evaluation:   [X ] PO intake [ x ] Tolerance to diet prescription [ x ] weights [ x ] labs[ x ] follow up per protocol  [ ] other:

## 2022-08-23 NOTE — PROGRESS NOTE ADULT - ASSESSMENT
The patient is an 87 year old male with a history of HTN, HL, BPH, CVA who presents with cough and hemoptysis in the setting of PNA and r/o TB.    Plan:  - The rhythm is now back to sinus rhythm with PACs and intermittent episodes of AF  - Continue metoprolol tartrate 100 mg bid  - The patient has ongoing hemoptysis. Remain off heparin drip as risks outweigh benefits. When hemoptysis resolves, will consider starting apixaban.  - Discontinue amlodipine  - Echo with low normal LV systolic function, mild pulm HTN  - IV antibiotics  - AFB negative x3  - Pulm and ID follow-up  - May need bronchoscopy

## 2022-08-23 NOTE — PROGRESS NOTE ADULT - ASSESSMENT
87M with HTN, HLD, hx of CVA (no residual deficits), BPH, hx of skin CA who presents with cough and fevers.    Found to have multifocal pneumonia.  Also found have new onset afib.        Sepsis 2/2 Multifocal PNA  Hemoptysis   AHRF on NC  - pt p/w leukocytosis, SOB  - legionella Ab+, urine legionella negative, sputum legionella pending  Plan:   - c/w levofloxacin  - trend temps/WBC  - supportive care/supplemental O2 per primary team  --off HFNC, on NC 8/23    Elevated LFTs  - likely 2/2 sepsis  - trend LFTs    CINDI 2/2 prerenal azotemia  - likely 2/2 poor PO intake/sepsis  - trend renal fxn  - avoid nephrotoxic agents  - renally dose medications    Infectious Diseases will continue to follow. Please call with any questions.   Adriana Rodriguez M.D.  [facility], Division of Infectious Diseases [phone]

## 2022-08-23 NOTE — PROGRESS NOTE ADULT - ASSESSMENT
87M with HTN, HLD, hx of CVA (no residual deficits), BPH, hx of skin CA who presents with cough and fevers    afb neg x 3  vs noted  labs reviewed  wean fio2 as tolerated - remains on HF -   differential dx in pt with hemoptysis and cavitation is Malignancy - pt will need repeat CT chest in 4 - 6 weeks and possibly a Bronchoscopy or other means of diagnostic testing - examination  urine legionella ag neg - legionella ab positive -  - ID follow up reviewed - on Levaquin   cxr repeat noted  labs reviewed  fio2 titration in progress    on emp ABX  ID - Cardio follow up  I and O  serial labs  monitor VS and Sat  may need NIPPV if resp distress cont despite HF NC  prognosis guarded  GOC discussion

## 2022-08-23 NOTE — PROGRESS NOTE ADULT - SUBJECTIVE AND OBJECTIVE BOX
Date/Time Patient Seen:  		  Referring MD:   Data Reviewed	       Patient is a 87y old  Male who presents with a chief complaint of cough -> PNA (22 Aug 2022 19:40)      Subjective/HPI     PAST MEDICAL & SURGICAL HISTORY:  No pertinent past medical history    Skin cancer  s/p Mohs    Essential hypertension    Cerebrovascular accident (CVA), unspecified mechanism  2015    Hyperlipidemia, unspecified hyperlipidemia type    Complex tear of lateral meniscus of right knee as current injury, initial encounter    Complex tear of medial meniscus of right knee as current injury, initial encounter    Benign prostatic hyperplasia, presence of lower urinary tract symptoms unspecified, unspecified morphology    H/O pilonidal cyst          Medication list         MEDICATIONS  (STANDING):  albuterol/ipratropium for Nebulization 3 milliLiter(s) Nebulizer every 6 hours  atorvastatin 40 milliGRAM(s) Oral at bedtime  enoxaparin Injectable 40 milliGRAM(s) SubCutaneous every 24 hours  finasteride 5 milliGRAM(s) Oral daily  guaifenesin/dextromethorphan Oral Liquid 10 milliLiter(s) Oral every 6 hours  levoFLOXacin IVPB 750 milliGRAM(s) IV Intermittent every 48 hours  melatonin 5 milliGRAM(s) Oral at bedtime  metoprolol tartrate 100 milliGRAM(s) Oral two times a day  pantoprazole    Tablet 40 milliGRAM(s) Oral before breakfast  saccharomyces boulardii 250 milliGRAM(s) Oral two times a day  senna 2 Tablet(s) Oral at bedtime  tamsulosin 0.4 milliGRAM(s) Oral at bedtime    MEDICATIONS  (PRN):  acetaminophen     Tablet .. 650 milliGRAM(s) Oral every 6 hours PRN Temp greater or equal to 38C (100.4F), Mild Pain (1 - 3)  aluminum hydroxide/magnesium hydroxide/simethicone Suspension 30 milliLiter(s) Oral every 4 hours PRN Dyspepsia  diphenhydrAMINE 25 milliGRAM(s) Oral at bedtime PRN Insomnia  magnesium hydroxide Suspension 30 milliLiter(s) Oral daily PRN Constipation  ondansetron Injectable 4 milliGRAM(s) IV Push every 8 hours PRN Nausea and/or Vomiting  polyethylene glycol 3350 17 Gram(s) Oral daily PRN Constipation  sodium chloride 0.65% Nasal 1 Spray(s) Both Nostrils every 2 hours PRN Nasal Congestion         Vitals log        ICU Vital Signs Last 24 Hrs  T(C): 36.8 (23 Aug 2022 06:01), Max: 36.8 (22 Aug 2022 08:24)  T(F): 98.3 (23 Aug 2022 06:01), Max: 98.3 (22 Aug 2022 08:24)  HR: 66 (23 Aug 2022 06:00) (59 - 84)  BP: 119/63 (23 Aug 2022 06:00) (98/52 - 146/68)  BP(mean): 77 (23 Aug 2022 06:00) (66 - 96)  ABP: --  ABP(mean): --  RR: 21 (23 Aug 2022 06:00) (14 - 31)  SpO2: 96% (23 Aug 2022 06:00) (80% - 100%)    O2 Parameters below as of 23 Aug 2022 06:00  Patient On (Oxygen Delivery Method): nasal cannula, high flow  O2 Flow (L/min): 31  O2 Concentration (%): 50             Input and Output:  I&O's Detail    22 Aug 2022 07:01  -  23 Aug 2022 07:00  --------------------------------------------------------  IN:  Total IN: 0 mL    OUT:    Voided (mL): 800 mL  Total OUT: 800 mL    Total NET: -800 mL          Lab Data                        11.3   13.67 )-----------( 601      ( 23 Aug 2022 06:00 )             35.3     08-23    138  |  104  |  29<H>  ----------------------------<  100<H>  6.0<H>   |  29  |  1.47<H>    Ca    8.6      23 Aug 2022 06:00    TPro  7.3  /  Alb  1.9<L>  /  TBili  0.5  /  DBili  x   /  AST  41<H>  /  ALT  59  /  AlkPhos  268<H>  08-23            Review of Systems	      Objective     Physical Examination    heart s1s2  lung dec BS  abd soft      Pertinent Lab findings & Imaging      Bud:  NO   Adequate UO     I&O's Detail    22 Aug 2022 07:01  -  23 Aug 2022 07:00  --------------------------------------------------------  IN:  Total IN: 0 mL    OUT:    Voided (mL): 800 mL  Total OUT: 800 mL    Total NET: -800 mL               Discussed with:     Cultures:	        Radiology

## 2022-08-24 LAB
ANION GAP SERPL CALC-SCNC: 6 MMOL/L — SIGNIFICANT CHANGE UP (ref 5–17)
BUN SERPL-MCNC: 34 MG/DL — HIGH (ref 7–23)
CALCIUM SERPL-MCNC: 8.4 MG/DL — SIGNIFICANT CHANGE UP (ref 8.4–10.5)
CHLORIDE SERPL-SCNC: 103 MMOL/L — SIGNIFICANT CHANGE UP (ref 96–108)
CO2 SERPL-SCNC: 26 MMOL/L — SIGNIFICANT CHANGE UP (ref 22–31)
CREAT SERPL-MCNC: 1.53 MG/DL — HIGH (ref 0.5–1.3)
EGFR: 44 ML/MIN/1.73M2 — LOW
FUNGITELL: <31 PG/ML — SIGNIFICANT CHANGE UP
GLUCOSE SERPL-MCNC: 116 MG/DL — HIGH (ref 70–99)
HCT VFR BLD CALC: 36.2 % — LOW (ref 39–50)
HGB BLD-MCNC: 11.3 G/DL — LOW (ref 13–17)
MCHC RBC-ENTMCNC: 30.3 PG — SIGNIFICANT CHANGE UP (ref 27–34)
MCHC RBC-ENTMCNC: 31.2 GM/DL — LOW (ref 32–36)
MCV RBC AUTO: 97.1 FL — SIGNIFICANT CHANGE UP (ref 80–100)
NRBC # BLD: 0 /100 WBCS — SIGNIFICANT CHANGE UP (ref 0–0)
PLATELET # BLD AUTO: 651 K/UL — HIGH (ref 150–400)
POTASSIUM SERPL-MCNC: 4.6 MMOL/L — SIGNIFICANT CHANGE UP (ref 3.5–5.3)
POTASSIUM SERPL-SCNC: 4.6 MMOL/L — SIGNIFICANT CHANGE UP (ref 3.5–5.3)
RBC # BLD: 3.73 M/UL — LOW (ref 4.2–5.8)
RBC # FLD: 14 % — SIGNIFICANT CHANGE UP (ref 10.3–14.5)
SARS-COV-2 RNA SPEC QL NAA+PROBE: SIGNIFICANT CHANGE UP
SODIUM SERPL-SCNC: 135 MMOL/L — SIGNIFICANT CHANGE UP (ref 135–145)
WBC # BLD: 12.2 K/UL — HIGH (ref 3.8–10.5)
WBC # FLD AUTO: 12.2 K/UL — HIGH (ref 3.8–10.5)

## 2022-08-24 PROCEDURE — 99233 SBSQ HOSP IP/OBS HIGH 50: CPT

## 2022-08-24 RX ADMIN — Medication 100 MILLIGRAM(S): at 06:47

## 2022-08-24 RX ADMIN — ALBUTEROL 2.5 MILLIGRAM(S): 90 AEROSOL, METERED ORAL at 15:05

## 2022-08-24 RX ADMIN — Medication 10 MILLILITER(S): at 06:46

## 2022-08-24 RX ADMIN — TAMSULOSIN HYDROCHLORIDE 0.4 MILLIGRAM(S): 0.4 CAPSULE ORAL at 22:12

## 2022-08-24 RX ADMIN — SENNA PLUS 2 TABLET(S): 8.6 TABLET ORAL at 22:11

## 2022-08-24 RX ADMIN — ALBUTEROL 2.5 MILLIGRAM(S): 90 AEROSOL, METERED ORAL at 07:46

## 2022-08-24 RX ADMIN — ATORVASTATIN CALCIUM 40 MILLIGRAM(S): 80 TABLET, FILM COATED ORAL at 22:12

## 2022-08-24 RX ADMIN — Medication 10 MILLILITER(S): at 00:03

## 2022-08-24 RX ADMIN — PANTOPRAZOLE SODIUM 40 MILLIGRAM(S): 20 TABLET, DELAYED RELEASE ORAL at 06:46

## 2022-08-24 RX ADMIN — Medication 100 MILLIGRAM(S): at 17:39

## 2022-08-24 RX ADMIN — BUDESONIDE AND FORMOTEROL FUMARATE DIHYDRATE 2 PUFF(S): 160; 4.5 AEROSOL RESPIRATORY (INHALATION) at 06:47

## 2022-08-24 RX ADMIN — Medication 600 MILLIGRAM(S): at 06:46

## 2022-08-24 RX ADMIN — ALBUTEROL 2.5 MILLIGRAM(S): 90 AEROSOL, METERED ORAL at 20:28

## 2022-08-24 RX ADMIN — Medication 600 MILLIGRAM(S): at 17:39

## 2022-08-24 RX ADMIN — BUDESONIDE AND FORMOTEROL FUMARATE DIHYDRATE 2 PUFF(S): 160; 4.5 AEROSOL RESPIRATORY (INHALATION) at 22:11

## 2022-08-24 RX ADMIN — Medication 10 MILLILITER(S): at 12:05

## 2022-08-24 RX ADMIN — Medication 250 MILLIGRAM(S): at 17:39

## 2022-08-24 RX ADMIN — Medication 5 MILLIGRAM(S): at 22:12

## 2022-08-24 RX ADMIN — Medication 10 MILLILITER(S): at 17:38

## 2022-08-24 RX ADMIN — FINASTERIDE 5 MILLIGRAM(S): 5 TABLET, FILM COATED ORAL at 12:05

## 2022-08-24 RX ADMIN — Medication 250 MILLIGRAM(S): at 06:46

## 2022-08-24 RX ADMIN — ENOXAPARIN SODIUM 40 MILLIGRAM(S): 100 INJECTION SUBCUTANEOUS at 17:38

## 2022-08-24 NOTE — PROGRESS NOTE ADULT - SUBJECTIVE AND OBJECTIVE BOX
Patient is a 87y old  Male who presents with a chief complaint of cough -> PNA (24 Aug 2022 12:03)      INTERVAL HPI/OVERNIGHT EVENTS:    Seen at bedside, doing well on NC O2, however desats when working with PT.      SPoke with pulmonary, recommending bronchoscopy, will speak to daughter if she agreeable to transfer to [place] for bronchoscopy or to have it done as outpatient.     MEDICATIONS  (STANDING):  ALBUTerol    0.083% 2.5 milliGRAM(s) Nebulizer every 8 hours  atorvastatin 40 milliGRAM(s) Oral at bedtime  budesonide  80 MICROgram(s)/formoterol 4.5 MICROgram(s) Inhaler 2 Puff(s) Inhalation two times a day  enoxaparin Injectable 40 milliGRAM(s) SubCutaneous every 24 hours  finasteride 5 milliGRAM(s) Oral daily  guaiFENesin  milliGRAM(s) Oral every 12 hours  guaifenesin/dextromethorphan Oral Liquid 10 milliLiter(s) Oral every 6 hours  levoFLOXacin IVPB 750 milliGRAM(s) IV Intermittent every 48 hours  melatonin 5 milliGRAM(s) Oral at bedtime  metoprolol tartrate 100 milliGRAM(s) Oral two times a day  pantoprazole    Tablet 40 milliGRAM(s) Oral before breakfast  saccharomyces boulardii 250 milliGRAM(s) Oral two times a day  senna 2 Tablet(s) Oral at bedtime  tamsulosin 0.4 milliGRAM(s) Oral at bedtime    MEDICATIONS  (PRN):  acetaminophen     Tablet .. 650 milliGRAM(s) Oral every 6 hours PRN Temp greater or equal to 38C (100.4F), Mild Pain (1 - 3)  aluminum hydroxide/magnesium hydroxide/simethicone Suspension 30 milliLiter(s) Oral every 4 hours PRN Dyspepsia  diphenhydrAMINE 25 milliGRAM(s) Oral at bedtime PRN Insomnia  magnesium hydroxide Suspension 30 milliLiter(s) Oral daily PRN Constipation  ondansetron Injectable 4 milliGRAM(s) IV Push every 8 hours PRN Nausea and/or Vomiting  polyethylene glycol 3350 17 Gram(s) Oral daily PRN Constipation  sodium chloride 0.65% Nasal 1 Spray(s) Both Nostrils every 2 hours PRN Nasal Congestion      Allergies    No Known Allergies    Intolerances        REVIEW OF SYSTEMS:  as above    Vital Signs Last 24 Hrs  T(C): 36.6 (24 Aug 2022 08:30), Max: 36.6 (24 Aug 2022 08:30)  T(F): 97.8 (24 Aug 2022 08:30), Max: 97.8 (24 Aug 2022 08:30)  HR: 60 (24 Aug 2022 10:00) (59 - 80)  BP: 99/52 (24 Aug 2022 10:00) (93/48 - 130/58)  BP(mean): 66 (24 Aug 2022 10:00) (63 - 80)  RR: 26 (24 Aug 2022 10:00) (17 - 28)  SpO2: 77% (24 Aug 2022 12:00) (77% - 100%)    Parameters below as of 24 Aug 2022 12:00  Patient On (Oxygen Delivery Method): nasal cannula  O2 Flow (L/min): 2      PHYSICAL EXAM:  GENERAL: NAD at rest on HFNC  HEENT:  anicteric, moist mucous membranes  CHEST/LUNG: decreased breath sounds   HEART:  irregular, S1, S2  ABDOMEN:  BS+, soft, nontender, nondistended  EXTREMITIES: no cyanosis or calf tenderness  NERVOUS SYSTEM: answers questions and follows commands appropriately    LABS:                        11.3   12.20 )-----------( 651      ( 24 Aug 2022 06:00 )             36.2       Ca    8.3        23 Aug 2022 09:06          CAPILLARY BLOOD GLUCOSE          RADIOLOGY & ADDITIONAL TESTS:

## 2022-08-24 NOTE — PROGRESS NOTE ADULT - ASSESSMENT
87M with HTN, HLD, hx of CVA (no residual deficits), BPH, hx of skin CA who presents with cough and fevers    afb neg x 3  vs noted  labs reviewed  differential dx in pt with hemoptysis and cavitation is Malignancy - pt will need repeat CT chest in 4 - 6 weeks and possibly a Bronchoscopy or other means of diagnostic testing - examination  urine legionella ag neg - legionella ab positive -  - ID follow up reviewed - on Levaquin   cxr repeat noted  labs reviewed  fio2 titration in progress - overall better -     on emp ABX  ID - Cardio follow up  I and O  serial labs  monitor VS and Sat  prognosis guarded  GOC discussion

## 2022-08-24 NOTE — PROGRESS NOTE ADULT - SUBJECTIVE AND OBJECTIVE BOX
[facility], Division of Infectious Diseases  EROS Perez Y. Patel, S. Shah, G. [facility]  [phone]    Name: DENNIS BRADFORD  Age: 87y  Gender: Male  MRN: [id number]    Interval History:  Patient seen this morning  No acute overnight events. Afebrile  No complaints  Working w/ PT  Still on NC, desatted while working w/ PT  Notes reviewed    Antibiotics:  levoFLOXacin IVPB 750 milliGRAM(s) IV Intermittent every 48 hours      Medications:  acetaminophen     Tablet .. 650 milliGRAM(s) Oral every 6 hours PRN  ALBUTerol    0.083% 2.5 milliGRAM(s) Nebulizer every 8 hours  aluminum hydroxide/magnesium hydroxide/simethicone Suspension 30 milliLiter(s) Oral every 4 hours PRN  atorvastatin 40 milliGRAM(s) Oral at bedtime  budesonide  80 MICROgram(s)/formoterol 4.5 MICROgram(s) Inhaler 2 Puff(s) Inhalation two times a day  diphenhydrAMINE 25 milliGRAM(s) Oral at bedtime PRN  enoxaparin Injectable 40 milliGRAM(s) SubCutaneous every 24 hours  finasteride 5 milliGRAM(s) Oral daily  guaiFENesin  milliGRAM(s) Oral every 12 hours  guaifenesin/dextromethorphan Oral Liquid 10 milliLiter(s) Oral every 6 hours  levoFLOXacin IVPB 750 milliGRAM(s) IV Intermittent every 48 hours  magnesium hydroxide Suspension 30 milliLiter(s) Oral daily PRN  melatonin 5 milliGRAM(s) Oral at bedtime  metoprolol tartrate 100 milliGRAM(s) Oral two times a day  ondansetron Injectable 4 milliGRAM(s) IV Push every 8 hours PRN  pantoprazole    Tablet 40 milliGRAM(s) Oral before breakfast  polyethylene glycol 3350 17 Gram(s) Oral daily PRN  saccharomyces boulardii 250 milliGRAM(s) Oral two times a day  senna 2 Tablet(s) Oral at bedtime  sodium chloride 0.65% Nasal 1 Spray(s) Both Nostrils every 2 hours PRN  tamsulosin 0.4 milliGRAM(s) Oral at bedtime      Review of Systems:  Review of systems otherwise negative except as previously noted.    Allergies: No Known Allergies    For details regarding the patient's past medical history, social history, family history, and other miscellaneous elements, please refer the initial infectious diseases consultation and/or the admitting history and physical examination for this admission.    Objective:  Vitals:   T(C): 36.6 (08-24-22 @ 08:30), Max: 36.6 (08-24-22 @ 08:30)  HR: 60 (08-24-22 @ 10:00) (59 - 80)  BP: 99/52 (08-24-22 @ 10:00) (93/48 - 130/58)  RR: 26 (08-24-22 @ 10:00) (17 - 28)  SpO2: 77% (08-24-22 @ 12:00) (77% - 100%)    Physical Examination:  General: no acute distress  HEENT: NC/AT  Cardio: RRR,  Resp: iWOB while working w/ PT  Abd: soft  Neuro: no obvious focal deficits  Ext: no edema or cyanosis      Laboratory Studies:  CBC:                       11.3   12.20 )-----------( 651      ( 24 Aug 2022 06:00 )             36.2     CMP: 08-23    135  |  103  |  28<H>  ----------------------------<  138<H>  4.7   |  28  |  1.58<H>    Ca    8.3<L>      23 Aug 2022 09:06  Phos  4.9     08-23    TPro  7.3  /  Alb  1.9<L>  /  TBili  0.5  /  DBili  x   /  AST  41<H>  /  ALT  59  /  AlkPhos  268<H>  08-23    LIVER FUNCTIONS - ( 23 Aug 2022 06:00 )  Alb: 1.9 g/dL / Pro: 7.3 g/dL / ALK PHOS: 268 U/L / ALT: 59 U/L DA / AST: 41 U/L / GGT: x               Microbiology: reviewed    Culture - Sputum (collected 08-20-22 @ 18:58)  Source: .Sputum Sputum  Gram Stain (08-21-22 @ 23:49):    Numerous polymorphonuclear leukocytes per low power field    Rare Squamous epithelial cells per low power field    Rare Yeast like cells per oil power field    Few Gram Variable Rods per oil power field  Final Report (08-23-22 @ 15:37):    Normal Respiratory Dora present    Culture - Acid Fast - Sputum w/Smear (collected 08-13-22 @ 12:12)  Source: .Sputum Sputum  Preliminary Report (08-17-22 @ 15:04):    Culture is being performed.    Culture - Acid Fast - Sputum w/Smear (collected 08-12-22 @ 09:02)  Source: .Sputum Sputum  Preliminary Report (08-20-22 @ 15:03):    No growth at 1 week.    Culture - Acid Fast - Sputum w/Smear (collected 08-12-22 @ 01:04)  Source: .Sputum Sputum  Preliminary Report (08-20-22 @ 15:03):    No growth at 1 week.    Culture - Blood (collected 08-11-22 @ 21:07)  Source: .Blood Blood  Final Report (08-17-22 @ 14:00):    No Growth Final    Culture - Blood (collected 08-11-22 @ 21:07)  Source: .Blood Blood  Final Report (08-17-22 @ 14:00):    No Growth Final        Radiology: reviewed

## 2022-08-24 NOTE — PROGRESS NOTE ADULT - ASSESSMENT
86yo M with PMH of HTN, HLD, hx of CVA (no residual deficits), BPH, hx of skin CA who presents with cough and fevers a/w sepsis due to multifocal pneumonia with progression to acute hypoxic respiratory failure now on HFNC and course further c/b new onset afib.        Sepsis 2/2 multifocal PNA   - met sepsis criteria on arrival with leukocytosis + tachycardia + source of infection  - strep PNA Ag negative. Legionella serum antibody positive, legionella urine Ag negative - ID ordered a legionella serum PCR to get more clarity if pt had legionella PNA  - s/p ceftriaxone and azithromycin. Continue Levaquin for now per ID ([facility]), recs appreciated   - pulmonary ([facility]), recs appreciated  - AFB smear negative x3  - still with leukocytosis but downtrending to 15 ; down from peak of 26K earlier in admission ; O2 requirements are starting to improve also, with HFNC down to 40% FiO2 now on 3 liters  PLAN:  - f/up sputum culture  - may need bronchoscopy   - f/up Fungitell (sent to look for alternative etiology like PCP pneumonia as pt had been on high FiO2 for 10 days on Abx) / HIV negative  NOTES 8/23 ; WRITER PERSONALLY REMOVED 02: was 100 percent on 3 liters  SPoke with pulmonary, recommending bronchoscopy, will speak to daughter if she agreeable to transfer to [place] for bronchoscopy or to have it done as outpatient.     Acute hypoxia respiratory failure 2/2 likely PNA.   - pulmonary ([facility]), recs appreciated  - Continue HFNC, continue to monitor and will trial wean   - O2 requirements are starting to improve also, with HFNC down to 40% FiO2 from 60% yesterday   - full code, intubate if needed  - c/w Abx for PNA  SPoke with pulmonary, recommending bronchoscopy, will speak to daughter if she agreeable to transfer to [place] for bronchoscopy or to have it done as outpatient.     Cavitary lesion  - doubt TB, but patient had blood tinged sputum, possible early cavitation on CT, and fevers. Low suspicion for TB as per ID, discontinued isolation  - quantiferon equivocal. AFB smear negative x3  - may need bronchoscopy    New afib   - [id number] score of at least 5 -> was on heparin drip but had worsening hemoptysis and cardio felt risk > benefits so full A/C discontinued and pt just given ppx dose lovenox  - cardiology (Miya) consulted, recs appreciated  - rate control if needed  - TTE shows Low normal left ventricular systolic function. Bi-atrial enlargement. Mild pulmonary hypertension  - on Lovenox, will eventually need to transition to DOAC when feel confident hemoptysis has resolved     Transaminitis  - likely from current illness ; possibly from legionella if that is the culprit of the PNA  - CT A/P without significant pathology to cause the transaminitis; monitor labs  - LFTs are improving  - can consider GI consult if does not improve with Abx therapy     CINDI 2/2 suspected prerenal azotemia    - likely 2/2 poor PO intake  - oral hydration encouraged, monitor BMP - likely now at baseline renal function  - hold diuretic  - avoid nephrotoxic meds    HTN/HLD  - cont with norvasc  - hold lasix  - cont with atorvastatin    BPH  - cont with tamsulosin and finasteride    VTE ppx  - c/w Lovenox, will eventually transition to DOAC when it is clear hemoptysis has resolved

## 2022-08-24 NOTE — PROGRESS NOTE ADULT - SUBJECTIVE AND OBJECTIVE BOX
Chief Complaint: Hemoptysis    Interval Events: No events overnight.    Review of Systems:  General: No fevers, chills, weight gain  Skin: No rashes, color changes  Cardiovascular: No chest pain, orthopnea  Respiratory: No shortness of breath, cough  Gastrointestinal: No nausea, abdominal pain  Genitourinary: No incontinence, pain with urination  Musculoskeletal: No pain, swelling, decreased range of motion  Neurological: No headache, weakness  Psychiatric: No depression, anxiety  Endocrine: No weight gain, increased thirst  All other systems are comprehensively negative.    Physical Exam:  Vital Signs Last 24 Hrs  T(C): 36.4 (24 Aug 2022 04:01), Max: 36.5 (24 Aug 2022 00:02)  T(F): 97.6 (24 Aug 2022 04:01), Max: 97.7 (24 Aug 2022 00:02)  HR: 62 (24 Aug 2022 08:00) (59 - 80)  BP: 130/58 (24 Aug 2022 08:00) (93/48 - 130/58)  BP(mean): 79 (24 Aug 2022 08:00) (63 - 80)  RR: 20 (24 Aug 2022 08:00) (17 - 28)  SpO2: 97% (24 Aug 2022 08:00) (85% - 100%)  Parameters below as of 24 Aug 2022 08:00  Patient On (Oxygen Delivery Method): nasal cannula  O2 Flow (L/min): 2  General: NAD  HEENT: MMM  Neck: No JVD, no carotid bruit  Lungs: CTAB  CV: RRR, nl S1/S2, no M/R/G  Abdomen: S/NT/ND, +BS  Extremities: No LE edema, no cyanosis  Neuro: AAOx3, non-focal  Skin: No rash    Labs:             08-23    135  |  103  |  28<H>  ----------------------------<  138<H>  4.7   |  28  |  1.58<H>    Ca    8.3<L>      23 Aug 2022 09:06  Phos  4.9     08-23    TPro  7.3  /  Alb  1.9<L>  /  TBili  0.5  /  DBili  x   /  AST  41<H>  /  ALT  59  /  AlkPhos  268<H>  08-23                        11.3   12.20 )-----------( 651      ( 24 Aug 2022 06:00 )             36.2       Telemetry: Sinus rhythm with PACs

## 2022-08-24 NOTE — PROGRESS NOTE ADULT - SUBJECTIVE AND OBJECTIVE BOX
Patient is a 87y old  Male who presents with a chief complaint of cough -> PNA (18 Aug 2022 14:05)      BRIEF HOSPITAL COURSE: Patient is an 86 y/o M w/ PMHx HTN, HLD, CVA in 2015 (no residual deficits), BPH, and skin CA who presented on 8/11 c/o cough and fever x 1 week. Pt admitted to [facility] w/ acute hypoxic respiratory failure 2/2 multifocal pneumonia. Hospital course complicated by new onset atrial fibrillation and CINDI.    Events last 24 hours: Patient remains on NC, states feeling much better. Pulm assessed for bronch, will wait 6 weeks and re-evaluate     PAST MEDICAL & SURGICAL HISTORY:  Skin cancer  s/p Mohs  Essential hypertension  Cerebrovascular accident (CVA), unspecified mechanism  2015  Hyperlipidemia, unspecified hyperlipidemia type  Complex tear of lateral meniscus of right knee as current injury, initial encounter  Complex tear of medial meniscus of right knee as current injury, initial encounter  Benign prostatic hyperplasia, presence of lower urinary tract symptoms unspecified, unspecified morphology  H/O pilonidal cyst    Review of Systems:  NEURO: no headaches, blurry vision, depression,   CV: no chest pain, palpitations, murmurs, orthopnea  Resp: Improved SOB, minor dyspnea on exertion  GI: no stomach pain, nausea, vomitting, flatulence, hematemesis, hematochezia  PV: no swelling of extremities, no hair loss, no coolness to extremities  Skin: no new lesions, rashes or open wounds.      Medications:  levoFLOXacin IVPB 750 milliGRAM(s) IV Intermittent every 48 hours  metoprolol tartrate 100 milliGRAM(s) Oral two times a day  tamsulosin 0.4 milliGRAM(s) Oral at bedtime  albuterol/ipratropium for Nebulization 3 milliLiter(s) Nebulizer every 6 hours PRN  guaifenesin/dextromethorphan Oral Liquid 10 milliLiter(s) Oral every 6 hours  acetaminophen     Tablet .. 650 milliGRAM(s) Oral every 6 hours PRN  melatonin 5 milliGRAM(s) Oral at bedtime  ondansetron Injectable 4 milliGRAM(s) IV Push every 8 hours PRN  enoxaparin Injectable 40 milliGRAM(s) SubCutaneous every 24 hours  aluminum hydroxide/magnesium hydroxide/simethicone Suspension 30 milliLiter(s) Oral every 4 hours PRN  magnesium hydroxide Suspension 30 milliLiter(s) Oral daily PRN  pantoprazole    Tablet 40 milliGRAM(s) Oral before breakfast  polyethylene glycol 3350 17 Gram(s) Oral daily PRN  senna 2 Tablet(s) Oral at bedtime  atorvastatin 40 milliGRAM(s) Oral at bedtime  finasteride 5 milliGRAM(s) Oral daily    ICU Vital Signs Last 24 Hrs  T(C): 36.6 (19 Aug 2022 00:19), Max: 36.9 (18 Aug 2022 20:26)  T(F): 97.9 (19 Aug 2022 00:19), Max: 98.5 (18 Aug 2022 20:26)  HR: 62 (18 Aug 2022 22:00) (55 - 79)  BP: 133/59 (18 Aug 2022 22:00) (103/49 - 140/60)  BP(mean): 80 (18 Aug 2022 22:00) (66 - 86)  ABP: --  ABP(mean): --  RR: 38 (18 Aug 2022 22:00) (23 - 38)  SpO2: 92% (18 Aug 2022 22:00) (91% - 97%)    O2 Parameters below as of 18 Aug 2022 22:00  Patient On (Oxygen Delivery Method): nasal cannula, high flow      I&O's Detail    17 Aug 2022 07:01  -  18 Aug 2022 07:00  --------------------------------------------------------  IN:    Oral Fluid: 240 mL  Total IN: 240 mL    OUT:    Voided (mL): 1200 mL  Total OUT: 1200 mL    Total NET: -960 mL    LABS:                        11.5   20.32 )-----------( 380      ( 18 Aug 2022 06:00 )             35.6     08-18    137  |  104  |  32<H>  ----------------------------<  117<H>  4.4   |  29  |  1.28    Ca    8.0<L>      18 Aug 2022 06:00  Phos  4.2     08-18  Mg     2.3     08-18    TPro  6.8  /  Alb  1.6<L>  /  TBili  0.7  /  DBili  x   /  AST  73<H>  /  ALT  76<H>  /  AlkPhos  314<H>  08-18    CAPILLARY BLOOD GLUCOSE    CULTURES:  Culture Results:   Culture is being performed. (08-13-22 @ 12:12)  Culture Results:   Culture is being performed. (08-12-22 @ 09:02)    Physical Examination:    NEURO: awake and alert  CV: (+) S1/S2, rrr, no mrg  RESP: Diminished breath sounds in b/l lower bases.   GI: soft, non tender  Ext: No edema    RADIOLOGY: < from: CT Chest No Cont (08.11.22 @ 21:37) >  INTERPRETATION:  CLINICAL INFORMATION: Cough, fever. Sepsis.  Elevated LFT.    COMPARISON:None.    CONTRAST/COMPLICATIONS:  IV Contrast: NONE  Oral Contrast: NONE  Complications: None reported at time of study completion    PROCEDURE:  CT of the Chest, Abdomen and Pelvis was performed.  Sagittal and coronal reformats were performed.    FINDINGS:    CHEST:    LUNGS AND LARGE AIRWAYS: PLEURA: There are patchy bilateral groundglass   and airspace consolidations, which are most pronounced in the left upper   and lingular lobes, right upper and right middle lobes.  This is associated with bronchiectasis, particularly in the bilateral   upper lobes.  In addition, there are scattered irregular, poorly marginated nodular   opacities throughout the bilateral lower lung zones. Some of these   contain possible small areas of cavitation.  There is a small left-sided pleural effusion and trace right-sided   pleural effusion.    The central airways remain patent.    VESSELS: Atherosclerotic changes thoracic aorta and coronary artery   calcifications.    HEART: Heart size is normal. No pericardial effusion.    MEDIASTINUM AND DEO:  Pretracheal, precarinal and subcarinal mediastinal lymph nodes measuring   up to 1 cm short axis.    CHEST WALL AND LOWER NECK: Within normal limits.    ABDOMEN AND PELVIS:    Evaluation of the solid organ parenchyma is limited without intravenous   contrast.    LIVER: Within normal limits.  BILE DUCTS: Normal caliber.  GALLBLADDER: Contracted.  SPLEEN: Within normal limits.  PANCREAS: Within normal limits.  ADRENALS: Within normal limits.  KIDNEYS/URETERS:  Exophytic cyst medial upper pole left kidney.  No hydronephrosis.    BLADDER: Within normal limits.  REPRODUCTIVE ORGANS:  The prostate is not enlarged.    BOWEL:  Colonic diverticulosis, without CT evidence of diverticulitis.  No bowel obstruction.  The appendix is not well visualized on this exam.  PERITONEUM: No ascites.    VESSELS: Atherosclerotic changes.  RETROPERITONEUM/LYMPH NODES: No lymphadenopathy.    ABDOMINAL WALL:  Right inguinal hernia containing fat and nonobstructed small bowel.  Small bilateral inguinal lymph nodes.    BONES:  Degenerative changes spine.  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    IMPRESSION:    Bilateral groundglass glass and airspace consolidations, some associated   with bronchiectasis particularly in the upper lobes, findings likely   secondary to infection.  Possible small areas of cavitation.  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    No acute intra-abdominal pathology.    Other findings as discussed above.    This study was preliminary reported by the ED radiologist on 8/11/2022.    --- End of Report ---    TIME SPENT: 31 minutes  Evaluating/treating patient, reviewing data/labs/imaging, discussing case with multidisciplinary team, discussing plan/goals of care with patient/family. Non-inclusive of procedure time.

## 2022-08-24 NOTE — PROGRESS NOTE ADULT - ASSESSMENT
87M with HTN, HLD, hx of CVA (no residual deficits), BPH, hx of skin CA who presents with cough and fevers.    Found to have multifocal pneumonia.  Also found have new onset afib.        Sepsis 2/2 Multifocal PNA  Hemoptysis   AHRF on NC  - pt p/w leukocytosis, SOB  - legionella Ab+, urine legionella negative, sputum legionella negative  Plan:   - c/w levofloxacin  - trend temps/WBC  - supportive care/supplemental O2 per primary team  --off HFNC, on NC since 8/23    Elevated LFTs  - likely 2/2 sepsis  - trend LFTs    CINDI 2/2 prerenal azotemia  - likely 2/2 poor PO intake/sepsis  - trend renal fxn  - avoid nephrotoxic agents  - renally dose medications    Infectious Diseases will continue to follow. Please call with any questions.   Adriana Rodriguez M.D.  [facility], Division of Infectious Diseases [phone]       87M with HTN, HLD, hx of CVA (no residual deficits), BPH, hx of skin CA who presents with cough and fevers.    Found to have multifocal pneumonia.  Also found have new onset afib.        Sepsis 2/2 Multifocal PNA  Hemoptysis   AHRF on NC  - pt p/w leukocytosis, SOB  - legionella Ab+, urine legionella negative, sputum legionella negative  Plan:   - c/w levofloxacin x14 day course until 8/29  - trend temps/WBC  - supportive care/supplemental O2 per primary team  --off HFNC, on NC since 8/23    Elevated LFTs  - likely 2/2 sepsis  - trend LFTs    CINDI 2/2 prerenal azotemia  - likely 2/2 poor PO intake/sepsis  - trend renal fxn  - avoid nephrotoxic agents  - renally dose medications    Infectious Diseases will continue to follow. Please call with any questions.   Adriana Rodriguez M.D.  [facility], Division of Infectious Diseases [phone]

## 2022-08-24 NOTE — PROGRESS NOTE ADULT - SUBJECTIVE AND OBJECTIVE BOX
Date/Time Patient Seen:  		  Referring MD:   Data Reviewed	       Patient is a 87y old  Male who presents with a chief complaint of cough -> PNA (23 Aug 2022 22:39)      Subjective/HPI     PAST MEDICAL & SURGICAL HISTORY:  No pertinent past medical history    Skin cancer  s/p Mohs    Essential hypertension    Cerebrovascular accident (CVA), unspecified mechanism  2015    Hyperlipidemia, unspecified hyperlipidemia type    Complex tear of lateral meniscus of right knee as current injury, initial encounter    Complex tear of medial meniscus of right knee as current injury, initial encounter    Benign prostatic hyperplasia, presence of lower urinary tract symptoms unspecified, unspecified morphology    H/O pilonidal cyst          Medication list         MEDICATIONS  (STANDING):  ALBUTerol    0.083% 2.5 milliGRAM(s) Nebulizer every 8 hours  atorvastatin 40 milliGRAM(s) Oral at bedtime  budesonide  80 MICROgram(s)/formoterol 4.5 MICROgram(s) Inhaler 2 Puff(s) Inhalation two times a day  enoxaparin Injectable 40 milliGRAM(s) SubCutaneous every 24 hours  finasteride 5 milliGRAM(s) Oral daily  guaiFENesin  milliGRAM(s) Oral every 12 hours  guaifenesin/dextromethorphan Oral Liquid 10 milliLiter(s) Oral every 6 hours  levoFLOXacin IVPB 750 milliGRAM(s) IV Intermittent every 48 hours  melatonin 5 milliGRAM(s) Oral at bedtime  metoprolol tartrate 100 milliGRAM(s) Oral two times a day  pantoprazole    Tablet 40 milliGRAM(s) Oral before breakfast  saccharomyces boulardii 250 milliGRAM(s) Oral two times a day  senna 2 Tablet(s) Oral at bedtime  tamsulosin 0.4 milliGRAM(s) Oral at bedtime    MEDICATIONS  (PRN):  acetaminophen     Tablet .. 650 milliGRAM(s) Oral every 6 hours PRN Temp greater or equal to 38C (100.4F), Mild Pain (1 - 3)  aluminum hydroxide/magnesium hydroxide/simethicone Suspension 30 milliLiter(s) Oral every 4 hours PRN Dyspepsia  diphenhydrAMINE 25 milliGRAM(s) Oral at bedtime PRN Insomnia  magnesium hydroxide Suspension 30 milliLiter(s) Oral daily PRN Constipation  ondansetron Injectable 4 milliGRAM(s) IV Push every 8 hours PRN Nausea and/or Vomiting  polyethylene glycol 3350 17 Gram(s) Oral daily PRN Constipation  sodium chloride 0.65% Nasal 1 Spray(s) Both Nostrils every 2 hours PRN Nasal Congestion         Vitals log        ICU Vital Signs Last 24 Hrs  T(C): 36.4 (24 Aug 2022 04:01), Max: 36.6 (23 Aug 2022 08:34)  T(F): 97.6 (24 Aug 2022 04:01), Max: 97.8 (23 Aug 2022 08:34)  HR: 62 (24 Aug 2022 06:00) (59 - 80)  BP: 119/57 (24 Aug 2022 06:00) (93/48 - 129/62)  BP(mean): 76 (24 Aug 2022 06:00) (63 - 83)  ABP: --  ABP(mean): --  RR: 19 (24 Aug 2022 06:00) (17 - 28)  SpO2: 100% (24 Aug 2022 06:00) (85% - 100%)    O2 Parameters below as of 24 Aug 2022 06:00  Patient On (Oxygen Delivery Method): nasal cannula  O2 Flow (L/min): 2               Input and Output:  I&O's Detail    23 Aug 2022 07:01  -  24 Aug 2022 07:00  --------------------------------------------------------  IN:    Oral Fluid: 720 mL  Total IN: 720 mL    OUT:    Voided (mL): 700 mL  Total OUT: 700 mL    Total NET: 20 mL          Lab Data                        11.3   12.20 )-----------( 651      ( 24 Aug 2022 06:00 )             36.2     08-23    135  |  103  |  28<H>  ----------------------------<  138<H>  4.7   |  28  |  1.58<H>    Ca    8.3<L>      23 Aug 2022 09:06  Phos  4.9     08-23    TPro  7.3  /  Alb  1.9<L>  /  TBili  0.5  /  DBili  x   /  AST  41<H>  /  ALT  59  /  AlkPhos  268<H>  08-23            Review of Systems	      Objective     Physical Examination    heart s1s2  lung dc BS  abd soft      Pertinent Lab findings & Imaging      Bud:  NO   Adequate UO     I&O's Detail    23 Aug 2022 07:01  -  24 Aug 2022 07:00  --------------------------------------------------------  IN:    Oral Fluid: 720 mL  Total IN: 720 mL    OUT:    Voided (mL): 700 mL  Total OUT: 700 mL    Total NET: 20 mL               Discussed with:     Cultures:	        Radiology

## 2022-08-24 NOTE — PROGRESS NOTE ADULT - ASSESSMENT
The patient is an 87 year old male with a history of HTN, HL, BPH, CVA who presents with cough and hemoptysis in the setting of PNA and r/o TB.    Plan:  - The rhythm is now back to sinus rhythm with PACs and intermittent episodes of AF  - Continue metoprolol tartrate 100 mg bid  - Hemoptysis has improved - if no plans for any procedures, can start apixaban 2.5 mg bid  - Discontinue amlodipine  - Echo with low normal LV systolic function, mild pulm HTN  - IV antibiotics  - AFB negative x3  - Pulm and ID follow-up  - May need bronchoscopy

## 2022-08-24 NOTE — PROGRESS NOTE ADULT - ASSESSMENT
Patient is an 88 y/o M w/ PMHx HTN, HLD, CVA in 2015 (no residual deficits), BPH, and skin CA who presented to the ED on 08/11/22 with fever, cough, SOB.    Problem:  - Acute hypoxic resp failure  - Legionella pna  - CINDI  - Transaminitis  - New onset a-fib, now converted    Plan:  - Monitor mental status in setting of hypoxia, currently intact   - On NC, continue to titrate o2 to maintain spo2 >90%  - Pulmonary following, will need repeat CT in 4-6 weeks and possible bronch, per pulm note with f/u with outpatient pulmonologist   - C/w chest PT an bronchodilators  - Prior A-fib, now Converted to NSR, though still periods of episodic afib and PAC's. Cardiology following, Continue lopressor  - Planned to start Eliquis per cardio, delayed d/t prior hemoptysis, re-evaluate in am  - Renal function slowly improving. Trend electrolytes, Maintain K > 4 and Mg > 2.  - Prior hyperkalemia, though now improve, trend  - LFTs elevate, continue to monitor, can obtain RUQ US if worsening  - Legionella ab positive, but urine negative. Continue levaquin per ID.  - Per chart review, discharge process started for rehab

## 2022-08-24 NOTE — CHART NOTE - NSCHARTNOTEFT_GEN_A_CORE
spoke with Daughter  updated on status - prognosis - diagnosis - follow up and [facility] referral  pt will follow up with Pulm Medicine - [place] - Dr Walton

## 2022-08-25 ENCOUNTER — TRANSCRIPTION ENCOUNTER (OUTPATIENT)
Age: 87
End: 2022-08-25

## 2022-08-25 LAB
ALBUMIN SERPL ELPH-MCNC: 2.2 G/DL — LOW (ref 3.3–5)
ALP SERPL-CCNC: 236 U/L — HIGH (ref 30–120)
ALT FLD-CCNC: 62 U/L DA — HIGH (ref 10–60)
ANION GAP SERPL CALC-SCNC: 3 MMOL/L — LOW (ref 5–17)
AST SERPL-CCNC: 47 U/L — HIGH (ref 10–40)
BILIRUB SERPL-MCNC: 0.4 MG/DL — SIGNIFICANT CHANGE UP (ref 0.2–1.2)
BUN SERPL-MCNC: 30 MG/DL — HIGH (ref 7–23)
CALCIUM SERPL-MCNC: 8.3 MG/DL — LOW (ref 8.4–10.5)
CHLORIDE SERPL-SCNC: 105 MMOL/L — SIGNIFICANT CHANGE UP (ref 96–108)
CO2 SERPL-SCNC: 29 MMOL/L — SIGNIFICANT CHANGE UP (ref 22–31)
CREAT SERPL-MCNC: 1.46 MG/DL — HIGH (ref 0.5–1.3)
EGFR: 46 ML/MIN/1.73M2 — LOW
GLUCOSE SERPL-MCNC: 98 MG/DL — SIGNIFICANT CHANGE UP (ref 70–99)
HCT VFR BLD CALC: 35.6 % — LOW (ref 39–50)
HGB BLD-MCNC: 11.2 G/DL — LOW (ref 13–17)
MCHC RBC-ENTMCNC: 30.7 PG — SIGNIFICANT CHANGE UP (ref 27–34)
MCHC RBC-ENTMCNC: 31.5 GM/DL — LOW (ref 32–36)
MCV RBC AUTO: 97.5 FL — SIGNIFICANT CHANGE UP (ref 80–100)
NRBC # BLD: 0 /100 WBCS — SIGNIFICANT CHANGE UP (ref 0–0)
PLATELET # BLD AUTO: 683 K/UL — HIGH (ref 150–400)
POTASSIUM SERPL-MCNC: 4.9 MMOL/L — SIGNIFICANT CHANGE UP (ref 3.5–5.3)
POTASSIUM SERPL-SCNC: 4.9 MMOL/L — SIGNIFICANT CHANGE UP (ref 3.5–5.3)
PROT SERPL-MCNC: 7.6 G/DL — SIGNIFICANT CHANGE UP (ref 6–8.3)
RBC # BLD: 3.65 M/UL — LOW (ref 4.2–5.8)
RBC # FLD: 13.9 % — SIGNIFICANT CHANGE UP (ref 10.3–14.5)
SODIUM SERPL-SCNC: 137 MMOL/L — SIGNIFICANT CHANGE UP (ref 135–145)
WBC # BLD: 12.49 K/UL — HIGH (ref 3.8–10.5)
WBC # FLD AUTO: 12.49 K/UL — HIGH (ref 3.8–10.5)

## 2022-08-25 PROCEDURE — 99233 SBSQ HOSP IP/OBS HIGH 50: CPT

## 2022-08-25 RX ORDER — POLYETHYLENE GLYCOL 3350 17 G/17G
17 POWDER, FOR SOLUTION ORAL
Qty: 0 | Refills: 0 | DISCHARGE
Start: 2022-08-25

## 2022-08-25 RX ORDER — BUDESONIDE AND FORMOTEROL FUMARATE DIHYDRATE 160; 4.5 UG/1; UG/1
1 AEROSOL RESPIRATORY (INHALATION)
Qty: 0 | Refills: 0 | DISCHARGE
Start: 2022-08-25

## 2022-08-25 RX ORDER — CIPROFLOXACIN LACTATE 400MG/40ML
1 VIAL (ML) INTRAVENOUS
Qty: 0 | Refills: 0 | DISCHARGE
Start: 2022-08-25

## 2022-08-25 RX ORDER — ALBUTEROL 90 UG/1
1 AEROSOL, METERED ORAL
Qty: 0 | Refills: 0 | DISCHARGE
Start: 2022-08-25

## 2022-08-25 RX ORDER — AMLODIPINE BESYLATE 2.5 MG/1
1 TABLET ORAL
Qty: 0 | Refills: 0 | DISCHARGE

## 2022-08-25 RX ORDER — METOPROLOL TARTRATE 50 MG
1 TABLET ORAL
Qty: 0 | Refills: 0 | DISCHARGE
Start: 2022-08-25

## 2022-08-25 RX ORDER — SACCHAROMYCES BOULARDII 250 MG
1 POWDER IN PACKET (EA) ORAL
Qty: 0 | Refills: 0 | DISCHARGE
Start: 2022-08-25

## 2022-08-25 RX ORDER — FUROSEMIDE 40 MG
1 TABLET ORAL
Qty: 0 | Refills: 0 | DISCHARGE

## 2022-08-25 RX ADMIN — Medication 10 MILLILITER(S): at 06:41

## 2022-08-25 RX ADMIN — ENOXAPARIN SODIUM 40 MILLIGRAM(S): 100 INJECTION SUBCUTANEOUS at 17:36

## 2022-08-25 RX ADMIN — Medication 250 MILLIGRAM(S): at 17:36

## 2022-08-25 RX ADMIN — BUDESONIDE AND FORMOTEROL FUMARATE DIHYDRATE 2 PUFF(S): 160; 4.5 AEROSOL RESPIRATORY (INHALATION) at 21:26

## 2022-08-25 RX ADMIN — ALBUTEROL 2.5 MILLIGRAM(S): 90 AEROSOL, METERED ORAL at 15:16

## 2022-08-25 RX ADMIN — FINASTERIDE 5 MILLIGRAM(S): 5 TABLET, FILM COATED ORAL at 11:37

## 2022-08-25 RX ADMIN — PANTOPRAZOLE SODIUM 40 MILLIGRAM(S): 20 TABLET, DELAYED RELEASE ORAL at 06:40

## 2022-08-25 RX ADMIN — ALBUTEROL 2.5 MILLIGRAM(S): 90 AEROSOL, METERED ORAL at 07:01

## 2022-08-25 RX ADMIN — Medication 10 MILLILITER(S): at 17:36

## 2022-08-25 RX ADMIN — Medication 600 MILLIGRAM(S): at 17:36

## 2022-08-25 RX ADMIN — Medication 100 MILLIGRAM(S): at 17:36

## 2022-08-25 RX ADMIN — Medication 100 MILLIGRAM(S): at 06:40

## 2022-08-25 RX ADMIN — Medication 600 MILLIGRAM(S): at 06:39

## 2022-08-25 RX ADMIN — ATORVASTATIN CALCIUM 40 MILLIGRAM(S): 80 TABLET, FILM COATED ORAL at 21:27

## 2022-08-25 RX ADMIN — BUDESONIDE AND FORMOTEROL FUMARATE DIHYDRATE 2 PUFF(S): 160; 4.5 AEROSOL RESPIRATORY (INHALATION) at 06:41

## 2022-08-25 RX ADMIN — SENNA PLUS 2 TABLET(S): 8.6 TABLET ORAL at 21:26

## 2022-08-25 RX ADMIN — TAMSULOSIN HYDROCHLORIDE 0.4 MILLIGRAM(S): 0.4 CAPSULE ORAL at 21:26

## 2022-08-25 RX ADMIN — Medication 5 MILLIGRAM(S): at 21:27

## 2022-08-25 RX ADMIN — Medication 250 MILLIGRAM(S): at 06:41

## 2022-08-25 RX ADMIN — ALBUTEROL 2.5 MILLIGRAM(S): 90 AEROSOL, METERED ORAL at 21:29

## 2022-08-25 RX ADMIN — Medication 10 MILLILITER(S): at 11:38

## 2022-08-25 NOTE — DISCHARGE NOTE PROVIDER - NSDCCPCAREPLAN_GEN_ALL_CORE_FT
PRINCIPAL DISCHARGE DIAGNOSIS  Diagnosis: Pneumonia  Assessment and Plan of Treatment: You were on  IV antibiotics, continue oral antibiotics till 8/29      SECONDARY DISCHARGE DIAGNOSES  Diagnosis: Acute respiratory failure with hypoxia  Assessment and Plan of Treatment: currently now on O2, please follow up with Dr. Walton for outpatient workup including a bronchoscopy     PRINCIPAL DISCHARGE DIAGNOSIS  Diagnosis: Pneumonia  Assessment and Plan of Treatment: You were on  IV antibiotics, completed the course      SECONDARY DISCHARGE DIAGNOSES  Diagnosis: Acute respiratory failure with hypoxia  Assessment and Plan of Treatment: currently now on O2, you will be transferred to [place] for further workup

## 2022-08-25 NOTE — PROGRESS NOTE ADULT - ASSESSMENT
87M with HTN, HLD, hx of CVA (no residual deficits), BPH, hx of skin CA who presents with cough and fevers    afb neg x 3  vs noted  labs reviewed  differential dx in pt with hemoptysis and cavitation is Malignancy - pt will need repeat CT chest in 4 - 6 weeks and possibly a Bronchoscopy or other means of diagnostic testing - examination  Follow up with Pulm MD - Dr Walton in [facility]   Spoke with Daughter  urine legionella ag neg - legionella ab positive -  - ID follow up reviewed - on Levaquin   cxr repeat noted  labs reviewed  fio2 titration in progress - overall better -     on emp ABX  ID - Cardio follow up  I and O  serial labs  monitor VS and Sat  prognosis guarded  GOC discussion

## 2022-08-25 NOTE — PROGRESS NOTE ADULT - SUBJECTIVE AND OBJECTIVE BOX
[facility], Division of Infectious Diseases  EROS Perez Y. Patel, S. Shah, [facility]  [phone]    Name: DENNIS BRADFORD  Age: 87y  Gender: Male  MRN: [id number]    Interval History:  Patient seen and examined at bedside this morning  No acute overnight events. Afebrile  No complaints  Feels winded after working w/ PT     Notes reviewed. AFB growth in broth    Antibiotics:  levoFLOXacin IVPB 750 milliGRAM(s) IV Intermittent every 48 hours      Medications:  acetaminophen     Tablet .. 650 milliGRAM(s) Oral every 6 hours PRN  ALBUTerol    0.083% 2.5 milliGRAM(s) Nebulizer every 8 hours  aluminum hydroxide/magnesium hydroxide/simethicone Suspension 30 milliLiter(s) Oral every 4 hours PRN  atorvastatin 40 milliGRAM(s) Oral at bedtime  budesonide  80 MICROgram(s)/formoterol 4.5 MICROgram(s) Inhaler 2 Puff(s) Inhalation two times a day  diphenhydrAMINE 25 milliGRAM(s) Oral at bedtime PRN  enoxaparin Injectable 40 milliGRAM(s) SubCutaneous every 24 hours  finasteride 5 milliGRAM(s) Oral daily  guaiFENesin  milliGRAM(s) Oral every 12 hours  guaifenesin/dextromethorphan Oral Liquid 10 milliLiter(s) Oral every 6 hours  levoFLOXacin IVPB 750 milliGRAM(s) IV Intermittent every 48 hours  magnesium hydroxide Suspension 30 milliLiter(s) Oral daily PRN  melatonin 5 milliGRAM(s) Oral at bedtime  metoprolol tartrate 100 milliGRAM(s) Oral two times a day  ondansetron Injectable 4 milliGRAM(s) IV Push every 8 hours PRN  pantoprazole    Tablet 40 milliGRAM(s) Oral before breakfast  polyethylene glycol 3350 17 Gram(s) Oral daily PRN  saccharomyces boulardii 250 milliGRAM(s) Oral two times a day  senna 2 Tablet(s) Oral at bedtime  sodium chloride 0.65% Nasal 1 Spray(s) Both Nostrils every 2 hours PRN  tamsulosin 0.4 milliGRAM(s) Oral at bedtime      Review of Systems:  Review of systems otherwise negative except as previously noted.    Allergies: No Known Allergies    For details regarding the patient's past medical history, social history, family history, and other miscellaneous elements, please refer the initial infectious diseases consultation and/or the admitting history and physical examination for this admission.    Objective:  Vitals:   T(C): 36.7 (08-25-22 @ 12:29), Max: 36.8 (08-25-22 @ 08:30)  HR: 62 (08-25-22 @ 10:00) (58 - 122)  BP: 106/54 (08-25-22 @ 10:00) (106/54 - 123/55)  RR: 26 (08-25-22 @ 10:00) (18 - 26)  SpO2: 88% (08-25-22 @ 10:46) (88% - 100%)    Physical Examination:  General: no acute distress  HEENT: NC/AT, EOMI  Cardio: S1, S2 heard, RRR, no murmurs  Resp: decreased b/l breath sounds  Abd: soft, NT, N  Ext: no edema or cyanosis  Skin: warm, dry, no visible rash      Laboratory Studies:  CBC:                       11.2   12.49 )-----------( 683      ( 25 Aug 2022 06:00 )             35.6     CMP: 08-25    137  |  105  |  30<H>  ----------------------------<  98  4.9   |  29  |  1.46<H>    Ca    8.3<L>      25 Aug 2022 06:00    TPro  7.6  /  Alb  2.2<L>  /  TBili  0.4  /  DBili  x   /  AST  47<H>  /  ALT  62<H>  /  AlkPhos  236<H>  08-25    LIVER FUNCTIONS - ( 25 Aug 2022 06:00 )  Alb: 2.2 g/dL / Pro: 7.6 g/dL / ALK PHOS: 236 U/L / ALT: 62 U/L DA / AST: 47 U/L / GGT: x               Microbiology: reviewed    Culture - Sputum (collected 08-20-22 @ 18:58)  Source: .Sputum Sputum  Gram Stain (08-21-22 @ 23:49):    Numerous polymorphonuclear leukocytes per low power field    Rare Squamous epithelial cells per low power field    Rare Yeast like cells per oil power field    Few Gram Variable Rods per oil power field  Final Report (08-23-22 @ 15:37):    Normal Respiratory Dora present    Culture - Acid Fast - Sputum w/Smear (collected 08-13-22 @ 12:12)  Source: .Sputum Sputum  Preliminary Report (08-24-22 @ 15:03):    No growth at 1 week.    Culture - Acid Fast - Sputum w/Smear (collected 08-12-22 @ 09:02)  Source: .Sputum Sputum  Preliminary Report (08-20-22 @ 15:03):    No growth at 1 week.    Culture - Acid Fast - Sputum w/Smear (collected 08-12-22 @ 01:04)  Source: .Sputum Sputum  Preliminary Report (08-25-22 @ 11:53):    Growth of acid fast bacilli detected in broth, identification to follow.    Culture - Blood (collected 08-11-22 @ 21:07)  Source: .Blood Blood  Final Report (08-17-22 @ 14:00):    No Growth Final    Culture - Blood (collected 08-11-22 @ 21:07)  Source: .Blood Blood  Final Report (08-17-22 @ 14:00):    No Growth Final        Radiology: reviewed

## 2022-08-25 NOTE — PROVIDER CONTACT NOTE (OTHER) - ACTION/TREATMENT ORDERED:
Dr. North aware. No orders
increase o2 to 6 L NC and Discontinue IVF .
Patient on metoprolol and received the dose

## 2022-08-25 NOTE — PROGRESS NOTE ADULT - ASSESSMENT
The patient is an 87 year old male with a history of HTN, HL, BPH, CVA who presents with cough and hemoptysis in the setting of PNA and r/o TB.    Plan:  - The rhythm is now back to sinus rhythm with PACs and intermittent episodes of AF  - Continue metoprolol tartrate 100 mg bid  - Discontinue amlodipine  - Echo with low normal LV systolic function, mild pulm HTN  - IV antibiotics  - AFB negative x3  - Pulm and ID follow-up  - If no plans for any procedures, would start apixaban 2.5 mg bid and observe for recurrence of re-bleed prior to discharge

## 2022-08-25 NOTE — DISCHARGE NOTE PROVIDER - CARE PROVIDER_API CALL
Deedee Walton)  Critical Care Medicine; Internal Medicine; Pulmonary Disease  [address], [address]  [place] [address]  Phone: [phone]  Fax: [phone]  Follow Up Time:

## 2022-08-25 NOTE — PROGRESS NOTE ADULT - ASSESSMENT
86yo M with PMH of HTN, HLD, hx of CVA (no residual deficits), BPH, hx of skin CA who presents with cough and fevers a/w sepsis due to multifocal pneumonia with progression to acute hypoxic respiratory failure now on HFNC and course further c/b new onset afib.        Sepsis 2/2 multifocal PNA   - met sepsis criteria on arrival with leukocytosis + tachycardia + source of infection  - strep PNA Ag negative. Legionella serum antibody positive, legionella urine Ag negative - ID ordered a legionella serum PCR to get more clarity if pt had legionella PNA  - s/p ceftriaxone and azithromycin. Continue Levaquin for now per ID ([facility]), recs appreciated   - pulmonary ([facility]), recs appreciated  - AFB smear negative x3  - still with leukocytosis but downtrending to 15 ; down from peak of 26K earlier in admission ; O2 requirements are starting to improve also, with HFNC down to 40% FiO2 now on 3 liters  PLAN:  - f/up sputum culture  - may need bronchoscopy   - f/up Fungitell (sent to look for alternative etiology like PCP pneumonia as pt had been on high FiO2 for 10 days on Abx) / HIV negative  NOTES 8/23 ; WRITER PERSONALLY REMOVED 02: was 100 percent on 3 liters  SPoke with pulmonary, recommending bronchoscopy, will speak to daughter if she agreeable to transfer to [place] for bronchoscopy or to have it done as outpatient.   Pulmonary recommending bronchoscopy, pulmonary spoke with daughter who did not want bronchoscopy to be done as inpatient, and will do it as outpatient  once optimized, pt was [facility].    Pt to f/u with Dr. Anton weiss as outpatient.     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   - pulmonary ([facility]), recs appreciated  - Continue HFNC, continue to monitor and will trial wean   - O2 requirements are starting to improve also, with HFNC down to 40% FiO2 from 60% yesterday   - full code, intubate if needed  - c/w Abx for PNA  as above    Cavitary lesion  - doubt TB, but patient had blood tinged sputum, possible early cavitation on CT, and fevers. Low suspicion for TB as per ID, discontinued isolation  - quantiferon equivocal. AFB smear negative x3  - may need bronchoscopy    New afib   - [id number] score of at least 5 -> was on heparin drip but had worsening hemoptysis and cardio felt risk > benefits so full A/C discontinued and pt just given ppx dose lovenox  - cardiology (Miya) consulted, recs appreciated  - rate control if needed  - TTE shows Low normal left ventricular systolic function. Bi-atrial enlargement. Mild pulmonary hypertension  - on Lovenox, will eventually need to transition to DOAC when feel confident hemoptysis has resolved     Transaminitis  - likely from current illness ; possibly from legionella if that is the culprit of the PNA  - CT A/P without significant pathology to cause the transaminitis; monitor labs  - LFTs are improving  - can consider GI consult if does not improve with Abx therapy     CINDI 2/2 suspected prerenal azotemia    - likely 2/2 poor PO intake  - oral hydration encouraged, monitor BMP - likely now at baseline renal function  - hold diuretic  - avoid nephrotoxic meds    HTN/HLD  - cont with norvasc  - hold lasix  - cont with atorvastatin    BPH  - cont with tamsulosin and finasteride    VTE ppx  - c/w Lovenox, will eventually transition to DOAC when it is clear hemoptysis has resolved

## 2022-08-25 NOTE — DISCHARGE NOTE PROVIDER - NSDCMRMEDTOKEN_GEN_ALL_CORE_FT
albuterol 2.5 mg/3 mL (0.083%) inhalation solution: 1 application inhaled every 6 hours, As Needed  atorvastatin 40 mg oral tablet: 1 tab(s) orally once a day (at bedtime)  budesonide-formoterol 80 mcg-4.5 mcg/inh inhalation aerosol: 1 puff(s) inhaled 2 times a day  finasteride 5 mg oral tablet: 1 tab(s) orally once a day (at bedtime)  levoFLOXacin 750 mg oral tablet: 1 tab(s) orally once a day till 8/29/22  metoprolol tartrate 100 mg oral tablet: 1 tab(s) orally 2 times a day  polyethylene glycol 3350 oral powder for reconstitution: 17 gram(s) orally once a day, As needed, Constipation  Protonix 40 mg oral delayed release tablet: 1 tab(s) orally once a day  saccharomyces boulardii lyo 250 mg oral capsule: 1 cap(s) orally 2 times a day  tamsulosin 0.4 mg oral capsule: 1 cap(s) orally once a day (at bedtime)   albuterol 2.5 mg/3 mL (0.083%) inhalation solution: 1 application inhaled every 6 hours, As Needed  albuterol 90 mcg/inh inhalation aerosol: 2 puff(s) inhaled every 8 hours  atorvastatin 40 mg oral tablet: 1 tab(s) orally once a day (at bedtime)  budesonide-formoterol 80 mcg-4.5 mcg/inh inhalation aerosol: 1 puff(s) inhaled 2 times a day  finasteride 5 mg oral tablet: 1 tab(s) orally once a day (at bedtime)  metoprolol tartrate 100 mg oral tablet: 1 tab(s) orally 2 times a day  polyethylene glycol 3350 oral powder for reconstitution: 17 gram(s) orally once a day, As needed, Constipation  Protonix 40 mg oral delayed release tablet: 1 tab(s) orally once a day  saccharomyces boulardii lyo 250 mg oral capsule: 1 cap(s) orally 2 times a day  tamsulosin 0.4 mg oral capsule: 1 cap(s) orally once a day (at bedtime)   albuterol 2.5 mg/3 mL (0.083%) inhalation solution: 1 application inhaled every 6 hours, As Needed  albuterol 90 mcg/inh inhalation aerosol: 2 puff(s) inhaled every 8 hours  atorvastatin 40 mg oral tablet: 1 tab(s) orally once a day (at bedtime)  budesonide-formoterol 80 mcg-4.5 mcg/inh inhalation aerosol: 1 puff(s) inhaled 2 times a day  finasteride 5 mg oral tablet: 1 tab(s) orally once a day (at bedtime)  metoprolol tartrate 50 mg oral tablet: 1 tab(s) orally 2 times a day  polyethylene glycol 3350 oral powder for reconstitution: 17 gram(s) orally once a day, As needed, Constipation  Protonix 40 mg oral delayed release tablet: 1 tab(s) orally once a day  saccharomyces boulardii lyo 250 mg oral capsule: 1 cap(s) orally 2 times a day  tamsulosin 0.4 mg oral capsule: 1 cap(s) orally once a day (at bedtime)

## 2022-08-25 NOTE — PROGRESS NOTE ADULT - SUBJECTIVE AND OBJECTIVE BOX
Date/Time Patient Seen:  		  Referring MD:   Data Reviewed	       Patient is a 87y old  Male who presents with a chief complaint of cough -> PNA (25 Aug 2022 08:03)      Subjective/HPI     PAST MEDICAL & SURGICAL HISTORY:  No pertinent past medical history    Skin cancer  s/p Mohs    Essential hypertension    Cerebrovascular accident (CVA), unspecified mechanism  2015    Hyperlipidemia, unspecified hyperlipidemia type    Complex tear of lateral meniscus of right knee as current injury, initial encounter    Complex tear of medial meniscus of right knee as current injury, initial encounter    Benign prostatic hyperplasia, presence of lower urinary tract symptoms unspecified, unspecified morphology    H/O pilonidal cyst          Medication list         MEDICATIONS  (STANDING):  ALBUTerol    0.083% 2.5 milliGRAM(s) Nebulizer every 8 hours  atorvastatin 40 milliGRAM(s) Oral at bedtime  budesonide  80 MICROgram(s)/formoterol 4.5 MICROgram(s) Inhaler 2 Puff(s) Inhalation two times a day  enoxaparin Injectable 40 milliGRAM(s) SubCutaneous every 24 hours  finasteride 5 milliGRAM(s) Oral daily  guaiFENesin  milliGRAM(s) Oral every 12 hours  guaifenesin/dextromethorphan Oral Liquid 10 milliLiter(s) Oral every 6 hours  levoFLOXacin IVPB 750 milliGRAM(s) IV Intermittent every 48 hours  melatonin 5 milliGRAM(s) Oral at bedtime  metoprolol tartrate 100 milliGRAM(s) Oral two times a day  pantoprazole    Tablet 40 milliGRAM(s) Oral before breakfast  saccharomyces boulardii 250 milliGRAM(s) Oral two times a day  senna 2 Tablet(s) Oral at bedtime  tamsulosin 0.4 milliGRAM(s) Oral at bedtime    MEDICATIONS  (PRN):  acetaminophen     Tablet .. 650 milliGRAM(s) Oral every 6 hours PRN Temp greater or equal to 38C (100.4F), Mild Pain (1 - 3)  aluminum hydroxide/magnesium hydroxide/simethicone Suspension 30 milliLiter(s) Oral every 4 hours PRN Dyspepsia  diphenhydrAMINE 25 milliGRAM(s) Oral at bedtime PRN Insomnia  magnesium hydroxide Suspension 30 milliLiter(s) Oral daily PRN Constipation  ondansetron Injectable 4 milliGRAM(s) IV Push every 8 hours PRN Nausea and/or Vomiting  polyethylene glycol 3350 17 Gram(s) Oral daily PRN Constipation  sodium chloride 0.65% Nasal 1 Spray(s) Both Nostrils every 2 hours PRN Nasal Congestion         Vitals log        ICU Vital Signs Last 24 Hrs  T(C): 36.7 (25 Aug 2022 04:00), Max: 36.7 (24 Aug 2022 12:40)  T(F): 98 (25 Aug 2022 04:00), Max: 98 (24 Aug 2022 12:40)  HR: 64 (25 Aug 2022 08:00) (58 - 122)  BP: 106/56 (25 Aug 2022 08:00) (106/56 - 123/55)  BP(mean): 73 (25 Aug 2022 08:00) (69 - 106)  ABP: --  ABP(mean): --  RR: 19 (25 Aug 2022 08:00) (18 - 25)  SpO2: 98% (25 Aug 2022 08:00) (77% - 100%)    O2 Parameters below as of 25 Aug 2022 08:00  Patient On (Oxygen Delivery Method): nasal cannula  O2 Flow (L/min): 1               Input and Output:  I&O's Detail    24 Aug 2022 07:01  -  25 Aug 2022 07:00  --------------------------------------------------------  IN:    Oral Fluid: 1240 mL  Total IN: 1240 mL    OUT:    Voided (mL): 500 mL  Total OUT: 500 mL    Total NET: 740 mL          Lab Data                        11.2   12.49 )-----------( 683      ( 25 Aug 2022 06:00 )             35.6     08-25    137  |  105  |  30<H>  ----------------------------<  98  4.9   |  29  |  1.46<H>    Ca    8.3<L>      25 Aug 2022 06:00    TPro  7.6  /  Alb  2.2<L>  /  TBili  0.4  /  DBili  x   /  AST  47<H>  /  ALT  62<H>  /  AlkPhos  236<H>  08-25            Review of Systems	      Objective     Physical Examination    heart s1s2  lung dec BS  abd soft      Pertinent Lab findings & Imaging      Bud:  NO   Adequate UO     I&O's Detail    24 Aug 2022 07:01  -  25 Aug 2022 07:00  --------------------------------------------------------  IN:    Oral Fluid: 1240 mL  Total IN: 1240 mL    OUT:    Voided (mL): 500 mL  Total OUT: 500 mL    Total NET: 740 mL               Discussed with:     Cultures:	        Radiology

## 2022-08-25 NOTE — DISCHARGE NOTE PROVIDER - INSTRUCTIONS
Diet, DASH/TLC:   Sodium & Cholesterol Restricted  Supplement Feeding Modality:  Oral  Ensure Enlive Cans or Servings Per Day:  1       Frequency:  Two Times a day (08-19-22 @ 15:22) [Pending Verification By Attending]  Diet, DASH/TLC:   Sodium & Cholesterol Restricted  Supplement Feeding Modality:  Oral  Ensure Enlive Cans or Servings Per Day:  1       Frequency:  Daily (08-15-22 @ 14:20) [Active]

## 2022-08-25 NOTE — PROVIDER CONTACT NOTE (OTHER) - SITUATION
[facility]- Sputum Culture- Acid Fast bottle- growth acid fast bacilli  preliminary result
O2 sat 86 -87% on 6 L NC
Patient 's O2 sat 85- 87% on   4 L NC . Occasional SOB noted
Patent had 6 beats of v tach

## 2022-08-25 NOTE — DISCHARGE NOTE PROVIDER - HOSPITAL COURSE
HPI:  87M with HTN, HLD, hx of CVA (no residual deficits), BPH, hx of skin CA who presents with cough and fevers.  Symptoms started about 5-6 days ago.  Started with left sided upper back pain.  Then started to have a cough associated with green phlegm and possible blood.  Associated with SOB and DONG.  No weight changes or night sweats.  Had a fever over the weekend (4-5 days ago) as high as 102F.  Some nausea but no vomiting with loss of appetite.  No chest pain.  No diarrhea.  No abdominal pain.  No recent travel or sick contacts.  Went to see his PMD 3 days ago and was given a z-kellie (last day tomorrow) and tesslon perles.  Reported had negative COVID tests at home.  Patient decided to come here for further evaluation.  In the ED, patient's triage vitals were /78    RR  21, 93% on RA (thought dropped down to 88%) and T 98.5F.  Labs showed WBC 18 with a left shift, CINDI with BUN/Cr 40/1.88, elevated LFTs, normal lactate, negative COVID (last booster in 12/2021 with Pfizer vaccine), neg influenza, neg RSV.  CT chest showed "patchy groundglass and more dense opacities in both lungs, suspicious for multifocal PNA...several small lucencies with areas of lung opacity...may represent early cavitation versus areas of focal bronchiectasis."  Patient started on azithromycin and ceftriaxone empirically for multifocal PNA.  Also of note, patient was found to be in new afib on EKG.  Patient denies any afib history but said when he was younger he did feel some irregular heartbeats.   (11 Aug 2022 22:21)      86yo M with PMH of HTN, HLD, hx of CVA (no residual deficits), BPH, hx of skin CA who presents with cough and fevers a/w sepsis due to multifocal pneumonia with progression to acute hypoxic respiratory failure now on HFNC and course further c/b new onset afib.        Sepsis 2/2 multifocal PNA   - met sepsis criteria on arrival with leukocytosis + tachycardia + source of infection  - strep PNA Ag negative. Legionella serum antibody positive, legionella urine Ag negative - ID ordered a legionella serum PCR to get more clarity if pt had legionella PNA  - s/p ceftriaxone and azithromycin. Continue Levaquin for now per ID ([facility]), recs appreciated   - pulmonary ([facility]), recs appreciated  - AFB smear negative x3  - still with leukocytosis but downtrending to 15 ; down from peak of 26K earlier in admission ; O2 requirements are starting to improve also, with HFNC down to 40% FiO2 now on 3 liters  PLAN:  - f/up sputum culture  - may need bronchoscopy   - f/up Fungitell (sent to look for alternative etiology like PCP pneumonia as pt had been on high FiO2 for 10 days on Abx) / HIV negative  Pulmonary recommending bronchoscopy, pulmonary spoke with daughter who did not want bronchoscopy to be done as inpatient, and will do it as outpatient  once optimized, pt was [facility] to [facility].    Pt to f/u with Dr. Walton pulmonary as outpatient.     Acute hypoxia respiratory failure 2/2 likely PNA.   - pulmonary ([facility]), recs appreciated  - Continue HFNC, continue to monitor and will trial wean   - O2 requirements are starting to improve also, with HFNC down to 40% FiO2 from 60% yesterday   - full code, intubate if needed  - c/w Abx for PNA  as above plan    Cavitary lesion  - doubt TB, but patient had blood tinged sputum, possible early cavitation on CT, and fevers. Low suspicion for TB as per ID, discontinued isolation  - quantiferon equivocal. AFB smear negative x3  - may need bronchoscopy    New afib   - [id number] score of at least 5 -> was on heparin drip but had worsening hemoptysis and cardio felt risk > benefits so full A/C discontinued and pt just given ppx dose lovenox  - cardiology (Miya) consulted, recs appreciated  - rate control if needed  - TTE shows Low normal left ventricular systolic function. Bi-atrial enlargement. Mild pulmonary hypertension  - on Lovenox, will eventually need to transition to DOAC when feel confident hemoptysis has resolved     Transaminitis  - likely from current illness ; possibly from legionella if that is the culprit of the PNA  - CT A/P without significant pathology to cause the transaminitis; monitor labs  - LFTs stable      CINDI 2/2 suspected prerenal azotemia    - likely 2/2 poor PO intake  - oral hydration encouraged, monitor BMP - likely now at baseline renal function  - held diuretic  - avoided nephrotoxic meds  stable at this point.     HTN/HLD  - cont with norvasc  - hold lasix  - cont with atorvastatin    BPH  - cont with tamsulosin and finasteride   HPI:  87M with HTN, HLD, hx of CVA (no residual deficits), BPH, hx of skin CA who presents with cough and fevers.  Symptoms started about 5-6 days ago.  Started with left sided upper back pain.  Then started to have a cough associated with green phlegm and possible blood.  Associated with SOB and DONG.  No weight changes or night sweats.  Had a fever over the weekend (4-5 days ago) as high as 102F.  Some nausea but no vomiting with loss of appetite.  No chest pain.  No diarrhea.  No abdominal pain.  No recent travel or sick contacts.  Went to see his PMD 3 days ago and was given a z-kellie (last day tomorrow) and tesslon perles.  Reported had negative COVID tests at home.  Patient decided to come here for further evaluation.  In the ED, patient's triage vitals were /78    RR  21, 93% on RA (thought dropped down to 88%) and T 98.5F.  Labs showed WBC 18 with a left shift, CINDI with BUN/Cr 40/1.88, elevated LFTs, normal lactate, negative COVID (last booster in 12/2021 with Pfizer vaccine), neg influenza, neg RSV.  CT chest showed "patchy groundglass and more dense opacities in both lungs, suspicious for multifocal PNA...several small lucencies with areas of lung opacity...may represent early cavitation versus areas of focal bronchiectasis."  Patient started on azithromycin and ceftriaxone empirically for multifocal PNA.  Also of note, patient was found to be in new afib on EKG.  Patient denies any afib history but said when he was younger he did feel some irregular heartbeats.   (11 Aug 2022 22:21)      86yo M with PMH of HTN, HLD, hx of CVA (no residual deficits), BPH, hx of skin CA who presents with cough and fevers a/w sepsis due to multifocal pneumonia with progression to acute hypoxic respiratory failure now on HFNC and course further c/b new onset afib.        Sepsis 2/2 multifocal PNA   - met sepsis criteria on arrival with leukocytosis + tachycardia + source of infection  - strep PNA Ag negative. Legionella serum antibody positive, legionella urine Ag negative - ID   - s/p ceftriaxone and azithromycin. completed Levaquin    - pulmonary ([facility]), recs appreciated  -Questionable acid fast bacilli in broth, TB still needed to be ruled out.  Plan for bronchoscopy at [place], daughter in agreement.     Acute hypoxia respiratory failure 2/2 likely PNA.   - pulmonary ([facility]), recs appreciated  - was on HFNC, continue to monitor and will trial wean   - O2 requirements now on NC 2 to 3 L   - full code, intubate if needed  - c/w Abx for PNA completed antibiotic regimen course.   as above plan    Cavitary lesion  - doubt TB, but patient had blood tinged sputum, possible early cavitation on CT, and fevers. Low suspicion for TB as per ID, discontinued isolation  - quantiferon equivocal. AFB smear negative x3, questionable acid fast bacilli in broth, TB still needs to be ruled out.  Continue isolation  - bronchoscopy planned in [place]     New afib   - [id number] score of at least 5 -> was on heparin drip but had worsening hemoptysis and cardio felt risk > benefits so full A/C discontinued and pt just given ppx dose lovenox  - cardiology (Miya) consulted, recs appreciated  - rate control if needed  - TTE shows Low normal left ventricular systolic function. Bi-atrial enlargement. Mild pulmonary hypertension  - on Lovenox, will eventually need to transition to DOAC when feel confident hemoptysis has resolved     Transaminitis  - likely from current illness ; possibly from legionella if that is the culprit of the PNA  - CT A/P without significant pathology to cause the transaminitis; monitor labs  - LFTs stable      CINDI 2/2 suspected prerenal azotemia    - likely 2/2 poor PO intake  - oral hydration encouraged, monitor BMP - likely now at baseline renal function  - held diuretic  - avoided nephrotoxic meds  stable at this point.     HTN/HLD  - cont with norvasc  - hold lasix  - cont with atorvastatin    BPH  - cont with tamsulosin and finasteride

## 2022-08-25 NOTE — DISCHARGE NOTE PROVIDER - ATTENDING DISCHARGE PHYSICAL EXAMINATION:
Vital Signs Last 24 Hrs  T(C): 36.7 (25 Aug 2022 04:00), Max: 36.7 (24 Aug 2022 12:40)  T(F): 98 (25 Aug 2022 04:00), Max: 98 (24 Aug 2022 12:40)  HR: 65 (25 Aug 2022 07:01) (58 - 122)  BP: 123/55 (25 Aug 2022 06:00) (99/52 - 123/55)  BP(mean): 76 (25 Aug 2022 06:00) (66 - 106)  RR: 22 (25 Aug 2022 06:50) (18 - 26)  SpO2: 97% (25 Aug 2022 07:01) (77% - 100%)    Parameters below as of 25 Aug 2022 07:01  Patient On (Oxygen Delivery Method): nasal cannula,1    GENERAL: NAD at rest on NC  HEENT:  anicteric, moist mucous membranes  CHEST/LUNG: decreased breath sounds   HEART:  irregular, S1, S2  ABDOMEN:  BS+, soft, nontender, nondistended  EXTREMITIES: no cyanosis or calf tenderness  NERVOUS SYSTEM: answers questions and follows commands appropriately   Vital Signs Last 24 Hrs  T(C): 36.6 (27 Aug 2022 12:00), Max: 36.7 (27 Aug 2022 08:00)  T(F): 97.8 (27 Aug 2022 12:00), Max: 98 (27 Aug 2022 08:00)  HR: 58 (27 Aug 2022 12:00) (55 - 75)  BP: 108/58 (27 Aug 2022 12:00) (105/53 - 138/64)  BP(mean): 73 (27 Aug 2022 12:00) (68 - 88)  RR: 24 (27 Aug 2022 12:00) (17 - 26)  SpO2: 97% (27 Aug 2022 12:00) (92% - 99%)    Parameters below as of 27 Aug 2022 12:00  Patient On (Oxygen Delivery Method): nasal cannula  O2 Flow (L/min): 1    GENERAL: NAD at rest on NC  HEENT:  anicteric, moist mucous membranes  CHEST/LUNG: decreased breath sounds   HEART:  irregular, S1, S2  ABDOMEN:  BS+, soft, nontender, nondistended  EXTREMITIES: no cyanosis or calf tenderness  NERVOUS SYSTEM: answers questions and follows commands appropriately   Vital Signs Last 24 Hrs  T(C): 36.7 (28 Aug 2022 12:05), Max: 37.1 (27 Aug 2022 16:00)  T(F): 98 (28 Aug 2022 12:05), Max: 98.7 (27 Aug 2022 16:00)  HR: 52 (28 Aug 2022 08:00) (51 - 65)  BP: 134/65 (28 Aug 2022 08:00) (95/48 - 159/116)  BP(mean): 86 (28 Aug 2022 08:00) (63 - 131)  RR: 20 (28 Aug 2022 08:00) (15 - 26)  SpO2: 97% (28 Aug 2022 08:00) (94% - 99%)    Parameters below as of 28 Aug 2022 08:00  Patient On (Oxygen Delivery Method): nasal cannula  O2 Flow (L/min): 1    GENERAL: NAD at rest on NC  HEENT:  anicteric, moist mucous membranes  CHEST/LUNG: decreased breath sounds   HEART:  irregular, S1, S2  ABDOMEN:  BS+, soft, nontender, nondistended  EXTREMITIES: no cyanosis or calf tenderness  NERVOUS SYSTEM: answers questions and follows commands appropriately   Vital Signs Last 24 Hrs  T(C): 36 (29 Aug 2022 08:00), Max: 36.8 (29 Aug 2022 06:00)  T(F): 96.8 (29 Aug 2022 08:00), Max: 98.3 (29 Aug 2022 06:00)  HR: 58 (29 Aug 2022 10:00) (53 - 66)  BP: 113/57 (29 Aug 2022 10:00) (111/59 - 140/55)  BP(mean): 75 (29 Aug 2022 10:00) (68 - 81)  RR: 17 (29 Aug 2022 10:00) (15 - 21)  SpO2: 93% (29 Aug 2022 10:00) (92% - 99%)    Parameters below as of 29 Aug 2022 10:00  Patient On (Oxygen Delivery Method): nasal cannula        GENERAL: NAD at rest on NC  HEENT:  anicteric, moist mucous membranes  CHEST/LUNG: decreased breath sounds   HEART:  irregular, S1, S2  ABDOMEN:  BS+, soft, nontender, nondistended  EXTREMITIES: no cyanosis or calf tenderness  NERVOUS SYSTEM: answers questions and follows commands appropriately

## 2022-08-25 NOTE — PROGRESS NOTE ADULT - SUBJECTIVE AND OBJECTIVE BOX
Patient is a 87y old  Male who presents with a chief complaint of cough -> PNA (25 Aug 2022 13:25)      INTERVAL HPI/OVERNIGHT EVENTS:    acid fast bacilli seen in broth  TB not ruled out yet  ID looking further into it, recommending to isolate for now.       MEDICATIONS  (STANDING):  ALBUTerol    0.083% 2.5 milliGRAM(s) Nebulizer every 8 hours  atorvastatin 40 milliGRAM(s) Oral at bedtime  budesonide  80 MICROgram(s)/formoterol 4.5 MICROgram(s) Inhaler 2 Puff(s) Inhalation two times a day  enoxaparin Injectable 40 milliGRAM(s) SubCutaneous every 24 hours  finasteride 5 milliGRAM(s) Oral daily  guaiFENesin  milliGRAM(s) Oral every 12 hours  guaifenesin/dextromethorphan Oral Liquid 10 milliLiter(s) Oral every 6 hours  levoFLOXacin IVPB 750 milliGRAM(s) IV Intermittent every 48 hours  melatonin 5 milliGRAM(s) Oral at bedtime  metoprolol tartrate 100 milliGRAM(s) Oral two times a day  pantoprazole    Tablet 40 milliGRAM(s) Oral before breakfast  saccharomyces boulardii 250 milliGRAM(s) Oral two times a day  senna 2 Tablet(s) Oral at bedtime  tamsulosin 0.4 milliGRAM(s) Oral at bedtime    MEDICATIONS  (PRN):  acetaminophen     Tablet .. 650 milliGRAM(s) Oral every 6 hours PRN Temp greater or equal to 38C (100.4F), Mild Pain (1 - 3)  aluminum hydroxide/magnesium hydroxide/simethicone Suspension 30 milliLiter(s) Oral every 4 hours PRN Dyspepsia  diphenhydrAMINE 25 milliGRAM(s) Oral at bedtime PRN Insomnia  magnesium hydroxide Suspension 30 milliLiter(s) Oral daily PRN Constipation  ondansetron Injectable 4 milliGRAM(s) IV Push every 8 hours PRN Nausea and/or Vomiting  polyethylene glycol 3350 17 Gram(s) Oral daily PRN Constipation  sodium chloride 0.65% Nasal 1 Spray(s) Both Nostrils every 2 hours PRN Nasal Congestion      Allergies    No Known Allergies    Intolerances        REVIEW OF SYSTEMS:  no complaints      PHYSICAL EXAM:  Vital Signs Last 24 Hrs  T(C): 36.7 (25 Aug 2022 04:00), Max: 36.7 (24 Aug 2022 12:40)  T(F): 98 (25 Aug 2022 04:00), Max: 98 (24 Aug 2022 12:40)  HR: 65 (25 Aug 2022 07:01) (58 - 122)  BP: 123/55 (25 Aug 2022 06:00) (99/52 - 123/55)  BP(mean): 76 (25 Aug 2022 06:00) (66 - 106)  RR: 22 (25 Aug 2022 06:50) (18 - 26)  SpO2: 97% (25 Aug 2022 07:01) (77% - 100%)    Parameters below as of 25 Aug 2022 07:01  Patient On (Oxygen Delivery Method): nasal cannula,1    GENERAL: NAD at rest on NC  HEENT:  anicteric, moist mucous membranes  CHEST/LUNG: decreased breath sounds   HEART:  irregular, S1, S2  ABDOMEN:  BS+, soft, nontender, nondistended  EXTREMITIES: no cyanosis or calf tenderness  NERVOUS SYSTEM: answers questions and follows commands appropriately    LABS:                        11.2   12.49 )-----------( 683      ( 25 Aug 2022 06:00 )             35.6     25 Aug 2022 06:00    137    |  105    |  30     ----------------------------<  98     4.9     |  29     |  1.46     Ca    8.3        25 Aug 2022 06:00    TPro  7.6    /  Alb  2.2    /  TBili  0.4    /  DBili  x      /  AST  47     /  ALT  62     /  AlkPhos  236    25 Aug 2022 06:00        CAPILLARY BLOOD GLUCOSE          RADIOLOGY & ADDITIONAL TESTS:

## 2022-08-25 NOTE — PROGRESS NOTE ADULT - SUBJECTIVE AND OBJECTIVE BOX
Chief Complaint: Hemoptysis    Interval Events: No events overnight.    Review of Systems:  General: No fevers, chills, weight gain  Skin: No rashes, color changes  Cardiovascular: No chest pain, orthopnea  Respiratory: No shortness of breath, cough  Gastrointestinal: No nausea, abdominal pain  Genitourinary: No incontinence, pain with urination  Musculoskeletal: No pain, swelling, decreased range of motion  Neurological: No headache, weakness  Psychiatric: No depression, anxiety  Endocrine: No weight gain, increased thirst  All other systems are comprehensively negative.    Physical Exam:  Vital Signs Last 24 Hrs  T(C): 36.7 (25 Aug 2022 04:00), Max: 36.7 (24 Aug 2022 12:40)  T(F): 98 (25 Aug 2022 04:00), Max: 98 (24 Aug 2022 12:40)  HR: 65 (25 Aug 2022 07:01) (58 - 122)  BP: 123/55 (25 Aug 2022 06:00) (99/52 - 123/55)  BP(mean): 76 (25 Aug 2022 06:00) (66 - 106)  RR: 22 (25 Aug 2022 06:50) (18 - 26)  SpO2: 97% (25 Aug 2022 07:01) (77% - 100%)  Parameters below as of 25 Aug 2022 07:01  Patient On (Oxygen Delivery Method): nasal cannula,1  General: NAD  HEENT: MMM  Neck: No JVD, no carotid bruit  Lungs: CTAB  CV: RRR, nl S1/S2, no M/R/G  Abdomen: S/NT/ND, +BS  Extremities: No LE edema, no cyanosis  Neuro: AAOx3, non-focal  Skin: No rash    Labs:             08-25    137  |  105  |  30<H>  ----------------------------<  98  4.9   |  29  |  1.46<H>    Ca    8.3<L>      25 Aug 2022 06:00    TPro  7.6  /  Alb  2.2<L>  /  TBili  0.4  /  DBili  x   /  AST  47<H>  /  ALT  62<H>  /  AlkPhos  236<H>  08-25                        11.2   12.49 )-----------( 683      ( 25 Aug 2022 06:00 )             35.6       Telemetry: Sinus rhythm with PACs

## 2022-08-26 DIAGNOSIS — R26.81 UNSTEADINESS ON FEET: ICD-10-CM

## 2022-08-26 DIAGNOSIS — R53.81 OTHER MALAISE: ICD-10-CM

## 2022-08-26 DIAGNOSIS — Z74.09 OTHER REDUCED MOBILITY: ICD-10-CM

## 2022-08-26 DIAGNOSIS — M62.81 MUSCLE WEAKNESS (GENERALIZED): ICD-10-CM

## 2022-08-26 LAB
HCT VFR BLD CALC: 33.8 % — LOW (ref 39–50)
HGB BLD-MCNC: 10.8 G/DL — LOW (ref 13–17)
MCHC RBC-ENTMCNC: 31.4 PG — SIGNIFICANT CHANGE UP (ref 27–34)
MCHC RBC-ENTMCNC: 32 GM/DL — SIGNIFICANT CHANGE UP (ref 32–36)
MCV RBC AUTO: 98.3 FL — SIGNIFICANT CHANGE UP (ref 80–100)
NRBC # BLD: 0 /100 WBCS — SIGNIFICANT CHANGE UP (ref 0–0)
PLATELET # BLD AUTO: 625 K/UL — HIGH (ref 150–400)
PTH RELATED PROT SERPL-MCNC: <2 PMOL/L — SIGNIFICANT CHANGE UP
RBC # BLD: 3.44 M/UL — LOW (ref 4.2–5.8)
RBC # FLD: 14 % — SIGNIFICANT CHANGE UP (ref 10.3–14.5)
WBC # BLD: 11.13 K/UL — HIGH (ref 3.8–10.5)
WBC # FLD AUTO: 11.13 K/UL — HIGH (ref 3.8–10.5)

## 2022-08-26 PROCEDURE — 99233 SBSQ HOSP IP/OBS HIGH 50: CPT

## 2022-08-26 RX ADMIN — ALBUTEROL 2.5 MILLIGRAM(S): 90 AEROSOL, METERED ORAL at 20:59

## 2022-08-26 RX ADMIN — Medication 10 MILLILITER(S): at 12:02

## 2022-08-26 RX ADMIN — Medication 10 MILLILITER(S): at 18:58

## 2022-08-26 RX ADMIN — FINASTERIDE 5 MILLIGRAM(S): 5 TABLET, FILM COATED ORAL at 12:02

## 2022-08-26 RX ADMIN — Medication 250 MILLIGRAM(S): at 18:58

## 2022-08-26 RX ADMIN — PANTOPRAZOLE SODIUM 40 MILLIGRAM(S): 20 TABLET, DELAYED RELEASE ORAL at 06:11

## 2022-08-26 RX ADMIN — Medication 100 MILLIGRAM(S): at 06:11

## 2022-08-26 RX ADMIN — Medication 100 MILLIGRAM(S): at 18:58

## 2022-08-26 RX ADMIN — SENNA PLUS 2 TABLET(S): 8.6 TABLET ORAL at 21:47

## 2022-08-26 RX ADMIN — Medication 10 MILLILITER(S): at 06:11

## 2022-08-26 RX ADMIN — BUDESONIDE AND FORMOTEROL FUMARATE DIHYDRATE 2 PUFF(S): 160; 4.5 AEROSOL RESPIRATORY (INHALATION) at 20:21

## 2022-08-26 RX ADMIN — ENOXAPARIN SODIUM 40 MILLIGRAM(S): 100 INJECTION SUBCUTANEOUS at 18:58

## 2022-08-26 RX ADMIN — ATORVASTATIN CALCIUM 40 MILLIGRAM(S): 80 TABLET, FILM COATED ORAL at 21:47

## 2022-08-26 RX ADMIN — ALBUTEROL 2.5 MILLIGRAM(S): 90 AEROSOL, METERED ORAL at 06:57

## 2022-08-26 RX ADMIN — TAMSULOSIN HYDROCHLORIDE 0.4 MILLIGRAM(S): 0.4 CAPSULE ORAL at 21:47

## 2022-08-26 RX ADMIN — ALBUTEROL 2.5 MILLIGRAM(S): 90 AEROSOL, METERED ORAL at 14:06

## 2022-08-26 RX ADMIN — Medication 5 MILLIGRAM(S): at 21:46

## 2022-08-26 RX ADMIN — Medication 250 MILLIGRAM(S): at 06:11

## 2022-08-26 RX ADMIN — BUDESONIDE AND FORMOTEROL FUMARATE DIHYDRATE 2 PUFF(S): 160; 4.5 AEROSOL RESPIRATORY (INHALATION) at 07:00

## 2022-08-26 RX ADMIN — Medication 600 MILLIGRAM(S): at 06:12

## 2022-08-26 NOTE — PROGRESS NOTE ADULT - SUBJECTIVE AND OBJECTIVE BOX
Chief Complaint: Hemoptysis    Interval Events: No events overnight.    Review of Systems:  General: No fevers, chills, weight gain  Skin: No rashes, color changes  Cardiovascular: No chest pain, orthopnea  Respiratory: No shortness of breath, cough  Gastrointestinal: No nausea, abdominal pain  Genitourinary: No incontinence, pain with urination  Musculoskeletal: No pain, swelling, decreased range of motion  Neurological: No headache, weakness  Psychiatric: No depression, anxiety  Endocrine: No weight gain, increased thirst  All other systems are comprehensively negative.    Physical Exam:  Vital Signs Last 24 Hrs  T(C): 36.2 (26 Aug 2022 05:30), Max: 36.7 (25 Aug 2022 12:29)  T(F): 97.1 (26 Aug 2022 05:30), Max: 98.1 (25 Aug 2022 12:29)  HR: 56 (26 Aug 2022 08:00) (56 - 66)  BP: 124/57 (26 Aug 2022 08:00) (100/57 - 141/79)  BP(mean): 76 (26 Aug 2022 08:00) (69 - 92)  RR: 21 (26 Aug 2022 08:00) (20 - 26)  SpO2: 100% (26 Aug 2022 08:00) (88% - 100%)  Parameters below as of 26 Aug 2022 08:00  Patient On (Oxygen Delivery Method): nasal cannula  O2 Flow (L/min): 1  General: NAD  HEENT: MMM  Neck: No JVD, no carotid bruit  Lungs: CTAB  CV: RRR, nl S1/S2, no M/R/G  Abdomen: S/NT/ND, +BS  Extremities: No LE edema, no cyanosis  Neuro: AAOx3, non-focal  Skin: No rash    Labs:             08-25    137  |  105  |  30<H>  ----------------------------<  98  4.9   |  29  |  1.46<H>    Ca    8.3<L>      25 Aug 2022 06:00    TPro  7.6  /  Alb  2.2<L>  /  TBili  0.4  /  DBili  x   /  AST  47<H>  /  ALT  62<H>  /  AlkPhos  236<H>  08-25                        10.8   11.13 )-----------( 625      ( 26 Aug 2022 06:00 )             33.8       Telemetry: Sinus rhythm with PACs

## 2022-08-26 NOTE — PROGRESS NOTE ADULT - SUBJECTIVE AND OBJECTIVE BOX
Date/Time Patient Seen:  		  Referring MD:   Data Reviewed	       Patient is a 87y old  Male who presents with a chief complaint of cough -> PNA (25 Aug 2022 16:03)      Subjective/HPI     PAST MEDICAL & SURGICAL HISTORY:  No pertinent past medical history    Skin cancer  s/p Mohs    Essential hypertension    Cerebrovascular accident (CVA), unspecified mechanism  2015    Hyperlipidemia, unspecified hyperlipidemia type    Complex tear of lateral meniscus of right knee as current injury, initial encounter    Complex tear of medial meniscus of right knee as current injury, initial encounter    Benign prostatic hyperplasia, presence of lower urinary tract symptoms unspecified, unspecified morphology    H/O pilonidal cyst          Medication list         MEDICATIONS  (STANDING):  ALBUTerol    0.083% 2.5 milliGRAM(s) Nebulizer every 8 hours  atorvastatin 40 milliGRAM(s) Oral at bedtime  budesonide  80 MICROgram(s)/formoterol 4.5 MICROgram(s) Inhaler 2 Puff(s) Inhalation two times a day  enoxaparin Injectable 40 milliGRAM(s) SubCutaneous every 24 hours  finasteride 5 milliGRAM(s) Oral daily  guaifenesin/dextromethorphan Oral Liquid 10 milliLiter(s) Oral every 6 hours  levoFLOXacin IVPB 750 milliGRAM(s) IV Intermittent every 48 hours  melatonin 5 milliGRAM(s) Oral at bedtime  metoprolol tartrate 100 milliGRAM(s) Oral two times a day  pantoprazole    Tablet 40 milliGRAM(s) Oral before breakfast  saccharomyces boulardii 250 milliGRAM(s) Oral two times a day  senna 2 Tablet(s) Oral at bedtime  tamsulosin 0.4 milliGRAM(s) Oral at bedtime    MEDICATIONS  (PRN):  acetaminophen     Tablet .. 650 milliGRAM(s) Oral every 6 hours PRN Temp greater or equal to 38C (100.4F), Mild Pain (1 - 3)  aluminum hydroxide/magnesium hydroxide/simethicone Suspension 30 milliLiter(s) Oral every 4 hours PRN Dyspepsia  diphenhydrAMINE 25 milliGRAM(s) Oral at bedtime PRN Insomnia  magnesium hydroxide Suspension 30 milliLiter(s) Oral daily PRN Constipation  ondansetron Injectable 4 milliGRAM(s) IV Push every 8 hours PRN Nausea and/or Vomiting  polyethylene glycol 3350 17 Gram(s) Oral daily PRN Constipation  sodium chloride 0.65% Nasal 1 Spray(s) Both Nostrils every 2 hours PRN Nasal Congestion         Vitals log        ICU Vital Signs Last 24 Hrs  T(C): 36.2 (26 Aug 2022 05:30), Max: 36.8 (25 Aug 2022 08:30)  T(F): 97.1 (26 Aug 2022 05:30), Max: 98.3 (25 Aug 2022 08:30)  HR: 66 (26 Aug 2022 06:00) (56 - 66)  BP: 141/79 (26 Aug 2022 06:00) (100/57 - 141/79)  BP(mean): 92 (26 Aug 2022 06:00) (69 - 92)  ABP: --  ABP(mean): --  RR: 20 (26 Aug 2022 06:00) (19 - 26)  SpO2: 97% (26 Aug 2022 06:00) (88% - 99%)    O2 Parameters below as of 26 Aug 2022 06:00  Patient On (Oxygen Delivery Method): nasal cannula  O2 Flow (L/min): 1               Input and Output:  I&O's Detail    24 Aug 2022 07:01  -  25 Aug 2022 07:00  --------------------------------------------------------  IN:    Oral Fluid: 1240 mL  Total IN: 1240 mL    OUT:    Voided (mL): 500 mL  Total OUT: 500 mL    Total NET: 740 mL      25 Aug 2022 07:01  -  26 Aug 2022 06:50  --------------------------------------------------------  IN:    Oral Fluid: 240 mL  Total IN: 240 mL    OUT:    Voided (mL): 300 mL  Total OUT: 300 mL    Total NET: -60 mL          Lab Data                        10.8   11.13 )-----------( 625      ( 26 Aug 2022 06:00 )             33.8     08-25    137  |  105  |  30<H>  ----------------------------<  98  4.9   |  29  |  1.46<H>    Ca    8.3<L>      25 Aug 2022 06:00    TPro  7.6  /  Alb  2.2<L>  /  TBili  0.4  /  DBili  x   /  AST  47<H>  /  ALT  62<H>  /  AlkPhos  236<H>  08-25            Review of Systems	      Objective     Physical Examination    heart s1s2  lung dc BS  abd soft      Pertinent Lab findings & Imaging      Bud:  NO   Adequate UO     I&O's Detail    24 Aug 2022 07:01  -  25 Aug 2022 07:00  --------------------------------------------------------  IN:    Oral Fluid: 1240 mL  Total IN: 1240 mL    OUT:    Voided (mL): 500 mL  Total OUT: 500 mL    Total NET: 740 mL      25 Aug 2022 07:01  -  26 Aug 2022 06:50  --------------------------------------------------------  IN:    Oral Fluid: 240 mL  Total IN: 240 mL    OUT:    Voided (mL): 300 mL  Total OUT: 300 mL    Total NET: -60 mL               Discussed with:     Cultures:	        Radiology

## 2022-08-26 NOTE — PROGRESS NOTE ADULT - SUBJECTIVE AND OBJECTIVE BOX
[facility], Division of Infectious Diseases  EROS Perez Y. Patel, S. Shah, G. [facility]  [phone]    Name: DENNIS BRADFORD  Age: 87y  Gender: Male  MRN: [id number]    Interval History:  Patient seen and examined at bedside this morning  No acute overnight events.   Notes reviewed    Antibiotics:      Medications:  acetaminophen     Tablet .. 650 milliGRAM(s) Oral every 6 hours PRN  ALBUTerol    0.083% 2.5 milliGRAM(s) Nebulizer every 8 hours  aluminum hydroxide/magnesium hydroxide/simethicone Suspension 30 milliLiter(s) Oral every 4 hours PRN  atorvastatin 40 milliGRAM(s) Oral at bedtime  budesonide  80 MICROgram(s)/formoterol 4.5 MICROgram(s) Inhaler 2 Puff(s) Inhalation two times a day  diphenhydrAMINE 25 milliGRAM(s) Oral at bedtime PRN  enoxaparin Injectable 40 milliGRAM(s) SubCutaneous every 24 hours  finasteride 5 milliGRAM(s) Oral daily  guaifenesin/dextromethorphan Oral Liquid 10 milliLiter(s) Oral every 6 hours  magnesium hydroxide Suspension 30 milliLiter(s) Oral daily PRN  melatonin 5 milliGRAM(s) Oral at bedtime  metoprolol tartrate 100 milliGRAM(s) Oral two times a day  ondansetron Injectable 4 milliGRAM(s) IV Push every 8 hours PRN  pantoprazole    Tablet 40 milliGRAM(s) Oral before breakfast  polyethylene glycol 3350 17 Gram(s) Oral daily PRN  saccharomyces boulardii 250 milliGRAM(s) Oral two times a day  senna 2 Tablet(s) Oral at bedtime  sodium chloride 0.65% Nasal 1 Spray(s) Both Nostrils every 2 hours PRN  tamsulosin 0.4 milliGRAM(s) Oral at bedtime      Review of Systems:  A 10-point review of systems was obtained.     Pertinent positives and negatives--  Constitutional: No fevers. No Chills. No Rigors.   Cardiovascular: No chest pain. No palpitations.  Respiratory: No shortness of breath. No cough.  Gastrointestinal: No nausea or vomiting. No diarrhea or constipation.   Psychiatric: Pleasant. Appropriate affect.    Review of systems otherwise negative except as previously noted.    Allergies: No Known Allergies    For details regarding the patient's past medical history, social history, family history, and other miscellaneous elements, please refer the initial infectious diseases consultation and/or the admitting history and physical examination for this admission.    Objective:  Vitals:   T(C): 36 (08-26-22 @ 08:00), Max: 36.7 (08-25-22 @ 12:29)  HR: 56 (08-26-22 @ 08:00) (56 - 66)  BP: 124/57 (08-26-22 @ 08:00) (107/66 - 141/79)  RR: 21 (08-26-22 @ 08:00) (20 - 22)  SpO2: 88% (08-26-22 @ 11:10) (88% - 100%)    Physical Examination:  General: no acute distress  HEENT: NC/AT, EOMI, anicteric, no oral lesions  Neck: supple, no palpable LAD  Cardio: S1, S2 heard, RRR, no murmurs  Resp: breath sounds heard bilaterally, no rales, wheezes or rhonchi  Abd: soft, NT, ND, + bowel sounds  Neuro: no obvious focal deficits  Ext: no edema or cyanosis  Skin: warm, dry, no visible rash      Laboratory Studies:  CBC:                       10.8   11.13 )-----------( 625      ( 26 Aug 2022 06:00 )             33.8     CMP: 08-25    137  |  105  |  30<H>  ----------------------------<  98  4.9   |  29  |  1.46<H>    Ca    8.3<L>      25 Aug 2022 06:00    TPro  7.6  /  Alb  2.2<L>  /  TBili  0.4  /  DBili  x   /  AST  47<H>  /  ALT  62<H>  /  AlkPhos  236<H>  08-25    LIVER FUNCTIONS - ( 25 Aug 2022 06:00 )  Alb: 2.2 g/dL / Pro: 7.6 g/dL / ALK PHOS: 236 U/L / ALT: 62 U/L DA / AST: 47 U/L / GGT: x               Microbiology: reviewed    Culture - Sputum (collected 08-20-22 @ 18:58)  Source: .Sputum Sputum  Gram Stain (08-21-22 @ 23:49):    Numerous polymorphonuclear leukocytes per low power field    Rare Squamous epithelial cells per low power field    Rare Yeast like cells per oil power field    Few Gram Variable Rods per oil power field  Final Report (08-23-22 @ 15:37):    Normal Respiratory Dora present    Culture - Acid Fast - Sputum w/Smear (collected 08-13-22 @ 12:12)  Source: .Sputum Sputum  Preliminary Report (08-24-22 @ 15:03):    No growth at 1 week.        Radiology: reviewed       [facility], Division of Infectious Diseases  EROS Perez Y. Patel, S. Shah, G. [facility]  [phone]    Name: DENNIS BRADFORD  Age: 87y  Gender: Male  MRN: [id number]    Interval History:  Patient seen and examined at bedside this morning  No acute overnight events. Afebrile  on NC  Notes reviewed    Antibiotics:      Medications:  acetaminophen     Tablet .. 650 milliGRAM(s) Oral every 6 hours PRN  ALBUTerol    0.083% 2.5 milliGRAM(s) Nebulizer every 8 hours  aluminum hydroxide/magnesium hydroxide/simethicone Suspension 30 milliLiter(s) Oral every 4 hours PRN  atorvastatin 40 milliGRAM(s) Oral at bedtime  budesonide  80 MICROgram(s)/formoterol 4.5 MICROgram(s) Inhaler 2 Puff(s) Inhalation two times a day  diphenhydrAMINE 25 milliGRAM(s) Oral at bedtime PRN  enoxaparin Injectable 40 milliGRAM(s) SubCutaneous every 24 hours  finasteride 5 milliGRAM(s) Oral daily  guaifenesin/dextromethorphan Oral Liquid 10 milliLiter(s) Oral every 6 hours  magnesium hydroxide Suspension 30 milliLiter(s) Oral daily PRN  melatonin 5 milliGRAM(s) Oral at bedtime  metoprolol tartrate 100 milliGRAM(s) Oral two times a day  ondansetron Injectable 4 milliGRAM(s) IV Push every 8 hours PRN  pantoprazole    Tablet 40 milliGRAM(s) Oral before breakfast  polyethylene glycol 3350 17 Gram(s) Oral daily PRN  saccharomyces boulardii 250 milliGRAM(s) Oral two times a day  senna 2 Tablet(s) Oral at bedtime  sodium chloride 0.65% Nasal 1 Spray(s) Both Nostrils every 2 hours PRN  tamsulosin 0.4 milliGRAM(s) Oral at bedtime      Review of Systems:    Review of systems otherwise negative except as previously noted.    Allergies: No Known Allergies    For details regarding the patient's past medical history, social history, family history, and other miscellaneous elements, please refer the initial infectious diseases consultation and/or the admitting history and physical examination for this admission.    Objective:  Vitals:   T(C): 36 (08-26-22 @ 08:00), Max: 36.7 (08-25-22 @ 12:29)  HR: 56 (08-26-22 @ 08:00) (56 - 66)  BP: 124/57 (08-26-22 @ 08:00) (107/66 - 141/79)  RR: 21 (08-26-22 @ 08:00) (20 - 22)  SpO2: 88% (08-26-22 @ 11:10) (88% - 100%)    Physical Examination:  General: no acute distress  HEENT: NC/AT, EOMI  Cardio: S1, S2 heard, RRR, no murmurs  Resp: decreased b/l breath sounds  Abd: soft, NT, ND  Ext: no edema or cyanosis  Skin: warm, dry, no visible rash      Laboratory Studies:  CBC:                       10.8   11.13 )-----------( 625      ( 26 Aug 2022 06:00 )             33.8     CMP: 08-25    137  |  105  |  30<H>  ----------------------------<  98  4.9   |  29  |  1.46<H>    Ca    8.3<L>      25 Aug 2022 06:00    TPro  7.6  /  Alb  2.2<L>  /  TBili  0.4  /  DBili  x   /  AST  47<H>  /  ALT  62<H>  /  AlkPhos  236<H>  08-25    LIVER FUNCTIONS - ( 25 Aug 2022 06:00 )  Alb: 2.2 g/dL / Pro: 7.6 g/dL / ALK PHOS: 236 U/L / ALT: 62 U/L DA / AST: 47 U/L / GGT: x               Microbiology: reviewed    Culture - Sputum (collected 08-20-22 @ 18:58)  Source: .Sputum Sputum  Gram Stain (08-21-22 @ 23:49):    Numerous polymorphonuclear leukocytes per low power field    Rare Squamous epithelial cells per low power field    Rare Yeast like cells per oil power field    Few Gram Variable Rods per oil power field  Final Report (08-23-22 @ 15:37):    Normal Respiratory Dora present    Culture - Acid Fast - Sputum w/Smear (collected 08-13-22 @ 12:12)  Source: .Sputum Sputum  Preliminary Report (08-24-22 @ 15:03):    No growth at 1 week.        Radiology: reviewed

## 2022-08-26 NOTE — PROGRESS NOTE ADULT - SUBJECTIVE AND OBJECTIVE BOX
Patient is a 87y old  Male who presents with a chief complaint of cough -> PNA (26 Aug 2022 06:50)      INTERVAL HPI/OVERNIGHT EVENTS:    no overnight events  feeling well, no complaints    MEDICATIONS  (STANDING):  ALBUTerol    0.083% 2.5 milliGRAM(s) Nebulizer every 8 hours  atorvastatin 40 milliGRAM(s) Oral at bedtime  budesonide  80 MICROgram(s)/formoterol 4.5 MICROgram(s) Inhaler 2 Puff(s) Inhalation two times a day  enoxaparin Injectable 40 milliGRAM(s) SubCutaneous every 24 hours  finasteride 5 milliGRAM(s) Oral daily  guaifenesin/dextromethorphan Oral Liquid 10 milliLiter(s) Oral every 6 hours  melatonin 5 milliGRAM(s) Oral at bedtime  metoprolol tartrate 100 milliGRAM(s) Oral two times a day  pantoprazole    Tablet 40 milliGRAM(s) Oral before breakfast  saccharomyces boulardii 250 milliGRAM(s) Oral two times a day  senna 2 Tablet(s) Oral at bedtime  tamsulosin 0.4 milliGRAM(s) Oral at bedtime    MEDICATIONS  (PRN):  acetaminophen     Tablet .. 650 milliGRAM(s) Oral every 6 hours PRN Temp greater or equal to 38C (100.4F), Mild Pain (1 - 3)  aluminum hydroxide/magnesium hydroxide/simethicone Suspension 30 milliLiter(s) Oral every 4 hours PRN Dyspepsia  diphenhydrAMINE 25 milliGRAM(s) Oral at bedtime PRN Insomnia  magnesium hydroxide Suspension 30 milliLiter(s) Oral daily PRN Constipation  ondansetron Injectable 4 milliGRAM(s) IV Push every 8 hours PRN Nausea and/or Vomiting  polyethylene glycol 3350 17 Gram(s) Oral daily PRN Constipation  sodium chloride 0.65% Nasal 1 Spray(s) Both Nostrils every 2 hours PRN Nasal Congestion      Allergies    No Known Allergies    Intolerances        REVIEW OF SYSTEMS:  as above    Vital Signs Last 24 Hrs  T(C): 36 (26 Aug 2022 08:00), Max: 36.7 (25 Aug 2022 12:29)  T(F): 96.8 (26 Aug 2022 08:00), Max: 98.1 (25 Aug 2022 12:29)  HR: 56 (26 Aug 2022 08:00) (56 - 66)  BP: 124/57 (26 Aug 2022 08:00) (100/57 - 141/79)  BP(mean): 76 (26 Aug 2022 08:00) (69 - 92)  RR: 21 (26 Aug 2022 08:00) (20 - 23)  SpO2: 100% (26 Aug 2022 08:00) (88% - 100%)    Parameters below as of 26 Aug 2022 08:00  Patient On (Oxygen Delivery Method): nasal cannula  O2 Flow (L/min): 1      PHYSICAL EXAM:  GENERAL: NAD at rest on NC  HEENT:  anicteric, moist mucous membranes  CHEST/LUNG: decreased breath sounds   HEART:  irregular, S1, S2  ABDOMEN:  BS+, soft, nontender, nondistended  EXTREMITIES: no cyanosis or calf tenderness  NERVOUS SYSTEM: answers questions and follows commands appropriately    LABS:                        10.8   11.13 )-----------( 625      ( 26 Aug 2022 06:00 )             33.8       Ca    8.3        25 Aug 2022 06:00          CAPILLARY BLOOD GLUCOSE          RADIOLOGY & ADDITIONAL TESTS:

## 2022-08-26 NOTE — CONSULT NOTE ADULT - ASSESSMENT
The patient is an 87 year old male with a history of HTN, HL, BPH, CVA who presents with cough and hemoptysis in the setting of PNA and r/o TB.    Plan:  - Telemetry with AF predominantly in the 80-90s, at times up to 120s  - Start metoprolol tartrate 25 mg bid  - For now continue heparin drip given hemoptysis. When hemoptysis improves, transition to apixaban 2.5 mg bid (assuming Cr remains 1.5 or higher).  - Continue amlodipine 10 mg daily  - Check echo  - IV antibiotics  - AFB to r/o TB
87M with HTN, HLD, hx of CVA (no residual deficits), BPH, hx of skin CA who presents with cough and fevers.    Found to have multifocal pneumonia.  Also found have new onset afib.        Sepsis 2/2 Multifocal PNA  Hemoptysis   R/o TB  AHRF on NC  - pt p/w leukocytosis, SOB  - CT w/ small cavitations, b/l GGO and airspace consolidations  Plan:   - c/w ceftriaxone  - c/w azithromycin  - f/u cx  - f/u uPNA Ag  - trend temps/WBC  - extremely low suspicion for TB; will r/o w/ AFB smear x3  - can send quantiferon, but not helpful in differentiating between active/latent TB; can have false results during illness    Elevated LFTs  - likely 2/2 sepsis  - trend LFTs    CINDI 2/2 prerenal azotemia  - likely 2/2 poor PO intake/sepsis  - trend renal fxn  - avoid nephrotoxic agents  - renally dose medications    Dr. Rivas covering weekend service    Infectious Diseases will continue to follow. Please call with any questions.   Adriana Rodriguez M.D.  [facility], Division of Infectious Diseases [phone]  
87M with multifocal pna, with weakness, ambulatory dysfunction, gait instability, impaired mobility and ADL's.    Patient functional status and baseline status reviewed, therapy notes reviewed, trend followed in progression.  Patient is a good candidate for acute inpatient rehabilitation. Presents with good rehab potential, a high level premorbid functional status as he was independent, and is now with functional deficits which I anticipate will require 1-2 weeks of acute inpatient therapy, he will stand to benefit from and is willing to participate in 3 hours of PT/OT per day, he has good family support and following rehabilitation I anticipate a safe discharge home, likely even without assistive device requirement.  Will continue to follow patients progression while inpatient.  Incentive spirometry is recommended for patient to perform while at bedside.

## 2022-08-26 NOTE — PROGRESS NOTE ADULT - ASSESSMENT
87M with HTN, HLD, hx of CVA (no residual deficits), BPH, hx of skin CA who presents with cough and fevers.    Found to have multifocal pneumonia.  Also found have new onset afib.        R/o TB  - pt s/p AFB x3 smears--all AFB smear negative  - today pt w/ AFB growth in broth--pending ID  - c/w   - pending micro lab to run possible mTB PCR--multiple  - tx plan pending above    Sepsis 2/2 Multifocal PNA  Hemoptysis   AHRF on NC  - pt p/w leukocytosis, SOB  - legionella Ab+, urine legionella negative, sputum legionella negative  Plan:   - c/w levofloxacin x14 day course until 8/29  - trend temps/WBC  - supportive care/supplemental O2 per primary team  --off HFNC, on NC since 8/23    Elevated LFTs  - likely 2/2 sepsis  - trend LFTs    CINDI 2/2 prerenal azotemia  - likely 2/2 poor PO intake/sepsis  - trend renal fxn  - avoid nephrotoxic agents  - renally dose medications    Infectious Diseases will continue to follow. Please call with any questions.   Adriana Rodriguez M.D.  [facility], Division of Infectious Diseases [phone]       87M with HTN, HLD, hx of CVA (no residual deficits), BPH, hx of skin CA who presents with cough and fevers.    Found to have multifocal pneumonia.  Also found have new onset afib.        R/o TB  - pt s/p AFB x3 smears--all AFB smear negative  - today pt w/ AFB growth in broth--pending ID  - c/w   - pending micro lab to run possible mTB PCR--they are unable to tell me what day it will be run  - will likely start RIPE therapy pending additional workup    Sepsis 2/2 Multifocal PNA  Hemoptysis   AHRF on NC  - pt p/w leukocytosis, SOB  - legionella Ab+, urine legionella negative, sputum legionella negative  Plan:   - c/w levofloxacin x14 day course until 8/29  - trend temps/WBC  - supportive care/supplemental O2 per primary team  --off HFNC, on NC since 8/23    Elevated LFTs  - likely 2/2 sepsis  - trend LFTs    CINDI 2/2 prerenal azotemia  - likely 2/2 poor PO intake/sepsis  - trend renal fxn  - avoid nephrotoxic agents  - renally dose medications    Infectious Diseases will continue to follow. Please call with any questions.   Adriana Rodriguez M.D.  [facility], Division of Infectious Diseases [phone]       87M with HTN, HLD, hx of CVA (no residual deficits), BPH, hx of skin CA who presents with cough and fevers.    Found to have multifocal pneumonia.  Also found have new onset afib.        R/o TB  - pt s/p AFB x3 smears--all AFB smear negative  - pt w/ AFB growth in broth--pending ID  - pending micro lab to run possible mTB PCR--they are unable to tell me what day it will be run  - d/w Dr. Clayton--bronch early next week  - hold off additional therapy pending bronch    Sepsis 2/2 Multifocal PNA  Hemoptysis   AHRF on NC  - pt p/w leukocytosis, SOB  - legionella Ab+, urine legionella negative, sputum legionella negative  Plan:   - s/p levofloxacin  - trend temps/WBC  - supportive care/supplemental O2 per primary team  --off HFNC, on NC since 8/23    Elevated LFTs  - likely 2/2 sepsis  - trend LFTs    CINDI 2/2 prerenal azotemia  - likely 2/2 poor PO intake/sepsis  - trend renal fxn  - avoid nephrotoxic agents  - renally dose medications    Infectious Diseases will continue to follow. Please call with any questions.   Adriana Rodriguez M.D.  [facility], Division of Infectious Diseases [phone]

## 2022-08-26 NOTE — CONSULT NOTE ADULT - SUBJECTIVE AND OBJECTIVE BOX
Date/Time Patient Seen:  		  Referring MD:   Data Reviewed	       Patient is a 87y old  Male who presents with a chief complaint of cough -> PNA (11 Aug 2022 22:21)      Subjective/HPI     PAST MEDICAL & SURGICAL HISTORY:  No pertinent past medical history    Skin cancer  s/p Mohs    Essential hypertension    Cerebrovascular accident (CVA), unspecified mechanism  2015    Hyperlipidemia, unspecified hyperlipidemia type    Complex tear of lateral meniscus of right knee as current injury, initial encounter    Complex tear of medial meniscus of right knee as current injury, initial encounter    Benign prostatic hyperplasia, presence of lower urinary tract symptoms unspecified, unspecified morphology    H/O pilonidal cyst          Medication list         MEDICATIONS  (STANDING):  amLODIPine   Tablet 10 milliGRAM(s) Oral daily  atorvastatin 40 milliGRAM(s) Oral at bedtime  azithromycin  IVPB 500 milliGRAM(s) IV Intermittent every 24 hours  cefTRIAXone   IVPB 1000 milliGRAM(s) IV Intermittent every 24 hours  finasteride 5 milliGRAM(s) Oral daily  heparin  Infusion.  Unit(s)/Hr (13 mL/Hr) IV Continuous <Continuous>  lactated ringers. 1000 milliLiter(s) (100 mL/Hr) IV Continuous <Continuous>  pantoprazole    Tablet 40 milliGRAM(s) Oral before breakfast  tamsulosin 0.4 milliGRAM(s) Oral at bedtime    MEDICATIONS  (PRN):  acetaminophen     Tablet .. 650 milliGRAM(s) Oral every 6 hours PRN Temp greater or equal to 38C (100.4F), Mild Pain (1 - 3)  aluminum hydroxide/magnesium hydroxide/simethicone Suspension 30 milliLiter(s) Oral every 4 hours PRN Dyspepsia  heparin   Injectable 6000 Unit(s) IV Push every 6 hours PRN For aPTT less than 40  heparin   Injectable 3000 Unit(s) IV Push every 6 hours PRN For aPTT between 40 - 57  melatonin 3 milliGRAM(s) Oral at bedtime PRN Insomnia  ondansetron Injectable 4 milliGRAM(s) IV Push every 8 hours PRN Nausea and/or Vomiting         Vitals log        ICU Vital Signs Last 24 Hrs  T(C): 36.8 (12 Aug 2022 05:15), Max: 37.4 (11 Aug 2022 23:32)  T(F): 98.2 (12 Aug 2022 05:15), Max: 99.3 (11 Aug 2022 23:32)  HR: 110 (12 Aug 2022 05:15) (106 - 118)  BP: 149/84 (12 Aug 2022 05:15) (139/78 - 160/74)  BP(mean): --  ABP: --  ABP(mean): --  RR: 18 (12 Aug 2022 05:15) (18 - 22)  SpO2: 93% (12 Aug 2022 05:15) (91% - 96%)    O2 Parameters below as of 12 Aug 2022 05:15  Patient On (Oxygen Delivery Method): nasal cannula                 Input and Output:  I&O's Detail    11 Aug 2022 07:01  -  12 Aug 2022 07:00  --------------------------------------------------------  IN:    Heparin Infusion: 26 mL    Lactated Ringers: 200 mL  Total IN: 226 mL    OUT:    Voided (mL): 250 mL  Total OUT: 250 mL    Total NET: -24 mL          Lab Data                        11.8   18.64 )-----------( 305      ( 12 Aug 2022 06:00 )             35.6     08-12    139  |  105  |  32<H>  ----------------------------<  113<H>  4.0   |  25  |  1.50<H>    Ca    7.7<L>      12 Aug 2022 06:00  Phos  4.3     08-12  Mg     1.8     08-12    TPro  6.8  /  Alb  2.2<L>  /  TBili  0.8  /  DBili  x   /  AST  71<H>  /  ALT  82<H>  /  AlkPhos  251<H>  08-12            Review of Systems	      Objective     Physical Examination        Pertinent Lab findings & Imaging      Bud:  NO   Adequate UO     I&O's Detail    11 Aug 2022 07:01  -  12 Aug 2022 07:00  --------------------------------------------------------  IN:    Heparin Infusion: 26 mL    Lactated Ringers: 200 mL  Total IN: 226 mL    OUT:    Voided (mL): 250 mL  Total OUT: 250 mL    Total NET: -24 mL               Discussed with:     Cultures:	        Radiology                            
History of Present Illness: The patient is an 87 year old male with a history of HTN, HL, BPH, CVA who presents with cough and hemoptysis. He noted cough with associated coughing up clots. There was some shortness of breath and back pain. No chest pain. He notes rare palpitations for a few seconds. No dizziness.    Past Medical/Surgical History:  HTN, HL, BPH, CVA    Medications:  Home Medications:  atorvastatin 40 mg oral tablet: 1 tab(s) orally once a day (at bedtime) (11 Aug 2022 19:53)  finasteride 5 mg oral tablet: 1 tab(s) orally once a day (at bedtime) (11 Aug 2022 19:53)  furosemide 20 mg oral tablet: 1 tab(s) orally once a day (11 Aug 2022 19:53)  Norvasc 10 mg oral tablet: 1 tab(s) orally once a day (11 Aug 2022 19:53)  Protonix 40 mg oral delayed release tablet: 1 tab(s) orally once a day (11 Aug 2022 19:53)  tamsulosin 0.4 mg oral capsule: 1 cap(s) orally once a day (at bedtime) (11 Aug 2022 19:53)      Family History: Non-contributory family history of premature cardiovascular atherosclerotic disease    Social History: No tobacco, alcohol or drug use    Review of Systems:  General: No fevers, chills, weight gain  Skin: No rashes, color changes  Cardiovascular: No chest pain, orthopnea  Respiratory: +shortness of breath, cough  Gastrointestinal: No nausea, abdominal pain  Genitourinary: No incontinence, pain with urination  Musculoskeletal: No pain, swelling, decreased range of motion  Neurological: No headache, weakness  Psychiatric: No depression, anxiety  Endocrine: No weight gain, increased thirst  All other systems are comprehensively negative.    Physical Exam:  Vitals:        Vital Signs Last 24 Hrs  T(C): 36.8 (12 Aug 2022 05:15), Max: 37.4 (11 Aug 2022 23:32)  T(F): 98.2 (12 Aug 2022 05:15), Max: 99.3 (11 Aug 2022 23:32)  HR: 110 (12 Aug 2022 05:15) (106 - 118)  BP: 149/84 (12 Aug 2022 05:15) (139/78 - 160/74)  BP(mean): --  RR: 18 (12 Aug 2022 05:15) (18 - 22)  SpO2: 93% (12 Aug 2022 05:15) (91% - 96%)  Parameters below as of 12 Aug 2022 05:15  Patient On (Oxygen Delivery Method): nasal cannula  General: NAD  HEENT: MMM  Neck: No JVD, no carotid bruit  Lungs: CTAB  CV: RRR, nl S1/S2, no M/R/G  Abdomen: S/NT/ND, +BS  Extremities: No LE edema, no cyanosis  Neuro: AAOx3, non-focal  Skin: No rash    Labs:                        11.8   18.64 )-----------( 305      ( 12 Aug 2022 06:00 )             35.6     08-12    139  |  105  |  32<H>  ----------------------------<  113<H>  4.0   |  25  |  1.50<H>    Ca    7.7<L>      12 Aug 2022 06:00  Phos  4.3     08-12  Mg     1.8     08-12    TPro  6.8  /  Alb  2.2<L>  /  TBili  0.8  /  DBili  x   /  AST  71<H>  /  ALT  82<H>  /  AlkPhos  251<H>  08-12        PT/INR - ( 12 Aug 2022 01:43 )   PT: 15.1 sec;   INR: 1.28 ratio         PTT - ( 12 Aug 2022 06:00 )  PTT:55.0 sec    ECG: AF, RBBB    Telemetry: AF    
[facility], Division of Infectious Diseases  EROS Perez, AUSTIN Rodriguez, JOSE Bronson [facility]  [phone]    DENNIS BRADFORD  87y, Male  [id number]    HPI--  HPI:  87M with HTN, HLD, hx of CVA (no residual deficits), BPH, hx of skin CA who presents with cough and fevers.  Symptoms started about 5-6 days ago.  Started with left sided upper back pain.  Then started to have a cough associated with green phlegm and possible blood.  Associated with SOB and DONG.  No weight changes or night sweats.  Had a fever over the weekend (4-5 days ago) as high as 102F.  Some nausea but no vomiting with loss of appetite.  No chest pain.  No diarrhea.  No abdominal pain.  No recent travel or sick contacts.  Went to see his PMD 3 days ago and was given a z-kellie (last day tomorrow) and tesslon perles.  Reported had negative COVID tests at home.  Patient decided to come here for further evaluation.  In the ED, patient's triage vitals were /78    RR  21, 93% on RA (thought dropped down to 88%) and T 98.5F.  Labs showed WBC 18 with a left shift, CINDI with BUN/Cr 40/1.88, elevated LFTs, normal lactate, negative COVID (last booster in 12/2021 with Pfizer vaccine), neg influenza, neg RSV.  CT chest showed "patchy groundglass and more dense opacities in both lungs, suspicious for multifocal PNA...several small lucencies with areas of lung opacity...may represent early cavitation versus areas of focal bronchiectasis."  Patient started on azithromycin and ceftriaxone empirically for multifocal PNA.  Also of note, patient was found to be in new afib on EKG.  Patient denies any afib history but said when he was younger he did feel some irregular heartbeats.   (11 Aug 2022 22:21)    Pt seen and examined at [facility]        Active Medications--  acetaminophen     Tablet .. 650 milliGRAM(s) Oral every 6 hours PRN  aluminum hydroxide/magnesium hydroxide/simethicone Suspension 30 milliLiter(s) Oral every 4 hours PRN  amLODIPine   Tablet 10 milliGRAM(s) Oral daily  atorvastatin 40 milliGRAM(s) Oral at bedtime  azithromycin  IVPB 500 milliGRAM(s) IV Intermittent every 24 hours  cefTRIAXone   IVPB 1000 milliGRAM(s) IV Intermittent every 24 hours  finasteride 5 milliGRAM(s) Oral daily  heparin   Injectable 6000 Unit(s) IV Push every 6 hours PRN  heparin   Injectable 3000 Unit(s) IV Push every 6 hours PRN  heparin  Infusion.  Unit(s)/Hr IV Continuous <Continuous>  lactated ringers. 1000 milliLiter(s) IV Continuous <Continuous>  melatonin 3 milliGRAM(s) Oral at bedtime PRN  metoprolol tartrate 25 milliGRAM(s) Oral two times a day  ondansetron Injectable 4 milliGRAM(s) IV Push every 8 hours PRN  pantoprazole    Tablet 40 milliGRAM(s) Oral before breakfast  tamsulosin 0.4 milliGRAM(s) Oral at bedtime    Antimicrobials:   azithromycin  IVPB 500 milliGRAM(s) IV Intermittent every 24 hours  cefTRIAXone   IVPB 1000 milliGRAM(s) IV Intermittent every 24 hours    Immunologic:     ROS:  CONSTITUTIONAL: No fevers or chills. No weakness or headache. No weight changes.  EYES/ENT: No visual or hearing changes. No sore throat or throat pain .  NECK: No pain or stiffness  RESPIRATORY: No cough, wheezing, or hemoptysis. No shortness of breath  CARDIOVASCULAR: No chest pain or palpitations  GASTROINTESTINAL: No abdominal pain. No nausea or vomiting. No diarrhea or constipation.  GENITOURINARY: No dysuria, frequency or hematuria  NEUROLOGICAL: No numbness or weakness  SKIN: No itching or rashes  PSYCHIATRIC: Pleasant. Appropriate affect    Allergies: No Known Allergies    PMH -- No pertinent past medical history    Skin cancer    Essential hypertension    Cerebrovascular accident (CVA), unspecified mechanism    Hyperlipidemia, unspecified hyperlipidemia type    Complex tear of lateral meniscus of right knee as current injury, initial encounter    Complex tear of medial meniscus of right knee as current injury, initial encounter    Benign prostatic hyperplasia, presence of lower urinary tract symptoms unspecified, unspecified morphology      PSH -- H/O pilonidal cyst      FH -- No pertinent family history in first degree relatives    No pertinent family history in first degree relatives    Family history of ischemic heart disease and other diseases of the circulatory system (Father)      Social History --  EtOH: denies   Tobacco: denies   Drug Use: denies     Travel/Environmental/Occupational History:    Physical Exam--  Vital Signs Last 24 Hrs  T(F): 98.3 (12 Aug 2022 10:02), Max: 99.3 (11 Aug 2022 23:32)  HR: 78 (12 Aug 2022 10:02) (78 - 118)  BP: 150/58 (12 Aug 2022 10:02) (127/61 - 160/74)  RR: 19 (12 Aug 2022 10:02) (18 - 22)  SpO2: 92% (12 Aug 2022 10:02) (91% - 96%)  General: nontoxic-appearing, no acute distress  HEENT: NC/AT, EOMI, anicteric  Lungs: decreased b/l breath sounds  Heart: Regular rate and rhythm. No murmur, rub or gallop.  Abdomen: Soft. Nondistended. Nontender.  Extremities: No cyanosis or clubbing. No edema.   Skin: Warm. Dry. Good turgor. No rash. No vasculitic stigmata.      Laboratory & Imaging Data--  CBC:                       11.8   18.64 )-----------( 305      ( 12 Aug 2022 06:00 )             35.6     CMP: 08-12    139  |  105  |  32<H>  ----------------------------<  113<H>  4.0   |  25  |  1.50<H>    Ca    7.7<L>      12 Aug 2022 06:00  Phos  4.3     08-12  Mg     1.8     08-12    TPro  6.8  /  Alb  2.2<L>  /  TBili  0.8  /  DBili  x   /  AST  71<H>  /  ALT  82<H>  /  AlkPhos  251<H>  08-12    LIVER FUNCTIONS - ( 12 Aug 2022 06:00 )  Alb: 2.2 g/dL / Pro: 6.8 g/dL / ALK PHOS: 251 U/L / ALT: 82 U/L DA / AST: 71 U/L / GGT: x               Microbiology: reviewed      Radiology: reviewed    < from: CT Abdomen and Pelvis No Cont (08.11.22 @ 21:36) >    ACC: [id number] EXAM:  CT CHEST                        ACC: [id number] EXAM:  CT ABDOMEN AND PELVIS                          PROCEDURE DATE:  08/11/2022          INTERPRETATION:  CLINICAL INFORMATION: Cough, fever. Sepsis.  Elevated LFT.    COMPARISON:None.    CONTRAST/COMPLICATIONS:  IV Contrast: NONE  Oral Contrast: NONE  Complications: None reported at time of study completion    PROCEDURE:  CT of the Chest, Abdomen and Pelvis was performed.  Sagittal and coronal reformats were performed.    FINDINGS:    CHEST:    LUNGS AND LARGE AIRWAYS: PLEURA: There are patchy bilateral groundglass   and airspace consolidations, which are most pronounced in the left upper   and lingular lobes, right upper and right middle lobes.  This is associated with bronchiectasis, particularly in the bilateral   upper lobes.  In addition, there are scattered irregular, poorly marginated nodular   opacities throughout the bilateral lower lung zones. Some of these   contain possible small areas of cavitation.  There is a small left-sided pleural effusion and trace right-sided   pleural effusion.    The central airways remain patent.    VESSELS: Atherosclerotic changes thoracic aorta and coronary artery   calcifications.    HEART: Heart size is normal. No pericardial effusion.    MEDIASTINUM AND DEO:  Pretracheal, precarinal and subcarinal mediastinal lymph nodes measuring   up to 1 cm short axis.    CHEST WALL AND LOWER NECK: Within normal limits.    ABDOMEN AND PELVIS:    Evaluation of the solid organ parenchyma is limited without intravenous   contrast.    LIVER: Within normal limits.  BILE DUCTS: Normal caliber.  GALLBLADDER: Contracted.  SPLEEN: Within normal limits.  PANCREAS: Within normal limits.  ADRENALS: Within normal limits.  KIDNEYS/URETERS:  Exophytic cyst medial upper pole left kidney.  No hydronephrosis.    BLADDER: Within normal limits.  REPRODUCTIVE ORGANS:  The prostate is not enlarged.    BOWEL:  Colonic diverticulosis, without CT evidence of diverticulitis.  No bowel obstruction.  The appendix is not well visualized on this exam.  PERITONEUM: No ascites.    VESSELS: Atherosclerotic changes.  RETROPERITONEUM/LYMPH NODES: No lymphadenopathy.    ABDOMINAL WALL:  Right inguinal hernia containing fat and nonobstructed small bowel.  Small bilateral inguinal lymph nodes.    BONES:  Degenerative changes spine.  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    IMPRESSION:    Bilateral groundglass glass and airspace consolidations, some associated   with bronchiectasis particularly in the upper lobes, findings likely   secondary to infection.  Possible small areas of cavitation.  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    No acute intra-abdominal pathology.    Other findings as discussed above.    This study was preliminary reported by the ED radiologist on 8/11/2022.    --- End of Report ---            KARLY LAGOS MD; Attending Radiologist  This document has been electronically signed. Aug 12 2022  9:13AM    < end of copied text >  < from: US Transthoracic Echocardiogram w/Doppler Complete (08.12.22 @ 08:08) >    ACC: [id number] EXAM:  US TTE W DOPPLER COMPLETE                          PROCEDURE DATE:  08/12/2022          INTERPRETATION:  Ordering Physician: LEONARDA WHITTINGTON [id number]    Indication: Atrial fibrillation    Technician: LJ    Study Quality: Good  A complete echocardiographic study was performed utilizing standard   protocol including spectral and color Doppler in all echocardiographic   windows.    Height: 172 cm  Weight: 72 kg  BSA: 1.85 m2  Blood Pressure: 151/67 mmHg    MEASUREMENTS  IVS: 0.9 cm  PWT: 0.9 cm  LA: 3.1 cm  AO: 3.3 cm  LVIDd: 5.3 cm  LVIDs: 3.9 cm    LVEF: 50-55%  RVSP: 50 mmHg  RA Pressure: 15 mmHg    FINDINGS  Left Ventricle: The left ventricle is normal in size, wall thickness. The   systolic function is low normal and varies beat to beat. Estimated EF is   50-55%.  Right Ventricle: The right ventricle is normal in size and function.  Left Atrium: The left atrium is dilated.  Right Atrium: The right atrium is dilated.  Mitral Valve: Mitral annular calcification. There is mild mitral valve   prolapse of the anterior leaflet. Mild mitral regurgitation.  Aortic Valve: The aortic valve is structurally normal. No aortic   regurgitation.  Tricuspid Valve: The tricuspid valve is structurally normal. Mild   tricuspid regurgitation. Estimated pulmonary artery systolic pressure is   50 mmHg.  Pulmonic Valve: The pulmonic valve is not well visualized. Trace pulmonic   regurgitation.  Diastolic Function: Normal diastolic function.  Pericardium/Pleura: No pericardial effusion visualized.  Aorta: The aortic root is normal in size.    IMPRESSION:  1. Low normal left ventricular systolic function.  2. Bi-atrial enlargement.  3. Mild pulmonary hypertension.    --- End of Report ---            EDU CARBALLO MD; Attending Cardiologist  This document has been electronically signed. Aug 12 2022  9:11AM    < end of copied text >  
Physical Medicine and Rehabilitation Initial Evaluation    Patients acute care records reviewed and are summarized as follows:     Patient is a 87y Male with past medical history including HTN, HLD, CVA with no residual gross deficits, BPH, skin Ca who presented to acute care with cough and fever. Found on workup to have multifocal PNA with sepsis criteria met. PAtient admitted for treatment with IV antibiotics. Patient also with hemoptysis, so patient was and is ongoing in isolation being ruled out for TB/MAC? Patient has been inpatient since 8/11/2022, and given comorbidities, lengthy hospitalization, and relative lack of activity, patient is weak, deconditioned and off of his functional baseline. His HTN is stable on oral antihypertensive medication, HLD stable on statin medication.    Medical studies/laboratory studies reviewed, including:                        10.8   11.13 )-----------( 625      ( 26 Aug 2022 06:00 )             33.8       08-25    137  |  105  |  30<H>  ----------------------------<  98  4.9   |  29  |  1.46<H>    Ca    8.3<L>      25 Aug 2022 06:00    TPro  7.6  /  Alb  2.2<L>  /  TBili  0.4  /  DBili  x   /  AST  47<H>  /  ALT  62<H>  /  AlkPhos  236<H>  08-25      The patient was seen and examined at bedside. He has no complaints of pain, constipation or insomnia. Continues to participate in therapy but remains weak, deconditioned and off of his baseline. His therapy notes are reviewed. He is at baseline independent with ambulation, trasfers, ADL's and lives alone with no steps within the home. PAtient was driving and did not require AD. Patient now most recently marching in place for 30 seconds before limited by fatgue, ambulating 5 feet with RW with min A, bed mobility with min A, STS transfer with min A.     ROS:  Constitutional: Denies fevers or chills  Eyes: Denies blurry vision or double vision  Ears/Nose/Mouth/Throat: Denies any pain on swallowing or dry mouth or runny nose  CV: Denies chest pain or palpitations  Respiratory: Complains of cough and dyspnea on exertion  GI: Denies nausea, vomiting, abdominal pain  : Denies urinary frequency or dysuria  MSK: Denies any myalgia or arthralgia, complains fo weakness  Neuro: Denies any headache or dizziness  Psychiatric: Denies depression or anxiety    PM, Social, Family Hx: as above in HPI, Family history reviewed and found to be non pertinent to patients current disposition, denies history of alcohol, illicit drug use, tobacco use. 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    Medications:   acetaminophen     Tablet .. 650 milliGRAM(s) Oral every 6 hours PRN  ALBUTerol    0.083% 2.5 milliGRAM(s) Nebulizer every 8 hours  aluminum hydroxide/magnesium hydroxide/simethicone Suspension 30 milliLiter(s) Oral every 4 hours PRN  atorvastatin 40 milliGRAM(s) Oral at bedtime  budesonide  80 MICROgram(s)/formoterol 4.5 MICROgram(s) Inhaler 2 Puff(s) Inhalation two times a day  diphenhydrAMINE 25 milliGRAM(s) Oral at bedtime PRN  enoxaparin Injectable 40 milliGRAM(s) SubCutaneous every 24 hours  finasteride 5 milliGRAM(s) Oral daily  guaifenesin/dextromethorphan Oral Liquid 10 milliLiter(s) Oral every 6 hours  magnesium hydroxide Suspension 30 milliLiter(s) Oral daily PRN  melatonin 5 milliGRAM(s) Oral at bedtime  metoprolol tartrate 100 milliGRAM(s) Oral two times a day  ondansetron Injectable 4 milliGRAM(s) IV Push every 8 hours PRN  pantoprazole    Tablet 40 milliGRAM(s) Oral before breakfast  polyethylene glycol 3350 17 Gram(s) Oral daily PRN  saccharomyces boulardii 250 milliGRAM(s) Oral two times a day  senna 2 Tablet(s) Oral at bedtime  sodium chloride 0.65% Nasal 1 Spray(s) Both Nostrils every 2 hours PRN  tamsulosin 0.4 milliGRAM(s) Oral at bedtime      Physical Exam:   Vitals: T(C): 35.6 (08-26-22 @ 16:00), Max: 36.2 (08-26-22 @ 00:00)  HR: 62 (08-26-22 @ 14:08) (56 - 66)  BP: 124/57 (08-26-22 @ 08:00) (107/66 - 141/79)  RR: 21 (08-26-22 @ 08:00) (20 - 22)  SpO2: 99% (08-26-22 @ 14:08) (88% - 100%)    Constitutional: Gen: In no acute distress, cooperative with exam and questioning   Eyes: PERRL, no erythema of conjunctivae  Ears/Nose/Mouth/Throat: Mucous membranes moist, no thrush, no rhinorrhea  CV: Regular rate and rhythm, S1 S2, bilateral lower extremity pitting peripheral edema, pedal pulses intact  Resp: Good respiratory effort, Good air movement all lung fields, coarse breath sounds diffusely, positive rhonchi bilaterally scattered.  GI: Nontender, normoactive bowel sounds  Neuro: Cranial Nerves II-XII intact, sensation intact to light touch in peripheral upper and lower extremities  MSK: No cyanosis or clubbing of nails, strength 3 to 4-/5 in bilateral upper and lower extremities, ROM full in all extremities passively  Neck: No midline tenderness to palpation, supple  Skin: No rashes on limbs, normal temperature on palpation  Psychiatric: Awake alert fully oriented

## 2022-08-26 NOTE — PROGRESS NOTE ADULT - ASSESSMENT
87M with HTN, HLD, hx of CVA (no residual deficits), BPH, hx of skin CA who presents with cough and fevers    afb neg x 3 - now with AFB in broth - growth - ID follow up reviewed - back on Isolation for TB rule out    fio2 titration in progress - overall better -     on emp ABX  ID - Cardio follow up  I and O  serial labs  monitor VS and Sat  prognosis guarded  GOC discussion

## 2022-08-26 NOTE — PROGRESS NOTE ADULT - ASSESSMENT
86yo M with PMH of HTN, HLD, hx of CVA (no residual deficits), BPH, hx of skin CA who presents with cough and fevers a/w sepsis due to multifocal pneumonia with progression to acute hypoxic respiratory failure now on HFNC and course further c/b new onset afib.        Sepsis 2/2 multifocal PNA   - met sepsis criteria on arrival with leukocytosis + tachycardia + source of infection  - strep PNA Ag negative. Legionella serum antibody positive, legionella urine Ag negative - ID ordered a legionella serum PCR to get more clarity if pt had legionella PNA  - s/p ceftriaxone and azithromycin. Continue Levaquin for now per ID ([facility]), recs appreciated   - pulmonary ([facility]), recs appreciated  - AFB smear negative x3  - still with leukocytosis but downtrending to 15 ; down from peak of 26K earlier in admission ; O2 requirements are starting to improve also, with HFNC down to 40% FiO2 now on 3 liters  PLAN:  - f/up sputum culture  - may need bronchoscopy   - f/up Fungitell (sent to look for alternative etiology like PCP pneumonia as pt had been on high FiO2 for 10 days on Abx) / HIV negative  NOTES 8/23 ; WRITER PERSONALLY REMOVED 02: was 100 percent on 3 liters  SPoke with pulmonary, recommending bronchoscopy, will speak to daughter if she agreeable to transfer to [place] for bronchoscopy or to have it done as outpatient.   Pulmonary recommending bronchoscopy, pulmonary spoke with daughter who did not want bronchoscopy to be done as inpatient, and will do it as outpatient  once optimized, pt was dc'ed to [facility].    Pt to f/u with Dr. Anton weiss as outpatient.     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   - pulmonary ([facility]), recs appreciated  - Continue HFNC, continue to monitor and will trial wean   - O2 requirements are starting to improve also, with HFNC down to 40% FiO2 from 60% yesterday   - full code, intubate if needed  - c/w Abx for PNA  as above    Cavitary lesion  - doubt TB, but patient had blood tinged sputum, possible early cavitation on CT, and fevers. Low suspicion for TB as per ID, discontinued isolation  - quantiferon equivocal. AFB smear negative x3  - may need bronchoscopy    New afib   - [id number] score of at least 5 -> was on heparin drip but had worsening hemoptysis and cardio felt risk > benefits so full A/C discontinued and pt just given ppx dose lovenox  - cardiology (Miya) consulted, recs appreciated  - rate control if needed  - TTE shows Low normal left ventricular systolic function. Bi-atrial enlargement. Mild pulmonary hypertension  - on Lovenox, will eventually need to transition to DOAC when feel confident hemoptysis has resolved     Transaminitis  - likely from current illness ; possibly from legionella if that is the culprit of the PNA  - CT A/P without significant pathology to cause the transaminitis; monitor labs  - LFTs are improving  - can consider GI consult if does not improve with Abx therapy     CINDI 2/2 suspected prerenal azotemia    - likely 2/2 poor PO intake  - oral hydration encouraged, monitor BMP - likely now at baseline renal function  - hold diuretic  - avoid nephrotoxic meds    HTN/HLD  - cont with norvasc  - hold lasix  - cont with atorvastatin    BPH  - cont with tamsulosin and finasteride    VTE ppx  - c/w Lovenox, will eventually transition to DOAC when it is clear hemoptysis has resolved

## 2022-08-26 NOTE — PROGRESS NOTE ADULT - ASSESSMENT
The patient is an 87 year old male with a history of HTN, HL, BPH, CVA who presents with cough and hemoptysis in the setting of PNA and r/o TB.    Plan:  - The rhythm is now back to sinus rhythm with PACs and intermittent episodes of AF  - Continue metoprolol tartrate 100 mg bid  - Discontinue amlodipine  - Echo with low normal LV systolic function, mild pulm HTN  - IV antibiotics  - AFB negative x3  - Sputum culture now positive for AFB  - Pulm and ID follow-up  - May need bronchoscopy  - If no plans for any procedures, would start apixaban 2.5 mg bid and observe for recurrence of re-bleed prior to discharge

## 2022-08-27 LAB
HCT VFR BLD CALC: 33.2 % — LOW (ref 39–50)
HGB BLD-MCNC: 10.5 G/DL — LOW (ref 13–17)
MCHC RBC-ENTMCNC: 31.1 PG — SIGNIFICANT CHANGE UP (ref 27–34)
MCHC RBC-ENTMCNC: 31.6 GM/DL — LOW (ref 32–36)
MCV RBC AUTO: 98.2 FL — SIGNIFICANT CHANGE UP (ref 80–100)
NRBC # BLD: 0 /100 WBCS — SIGNIFICANT CHANGE UP (ref 0–0)
PLATELET # BLD AUTO: 605 K/UL — HIGH (ref 150–400)
RBC # BLD: 3.38 M/UL — LOW (ref 4.2–5.8)
RBC # FLD: 13.9 % — SIGNIFICANT CHANGE UP (ref 10.3–14.5)
WBC # BLD: 11.29 K/UL — HIGH (ref 3.8–10.5)
WBC # FLD AUTO: 11.29 K/UL — HIGH (ref 3.8–10.5)

## 2022-08-27 PROCEDURE — 99233 SBSQ HOSP IP/OBS HIGH 50: CPT

## 2022-08-27 RX ORDER — ALBUTEROL 90 UG/1
2 AEROSOL, METERED ORAL
Qty: 0 | Refills: 0 | DISCHARGE
Start: 2022-08-27

## 2022-08-27 RX ORDER — ALBUTEROL 90 UG/1
2 AEROSOL, METERED ORAL EVERY 8 HOURS
Refills: 0 | Status: DISCONTINUED | OUTPATIENT
Start: 2022-08-27 | End: 2022-08-29

## 2022-08-27 RX ADMIN — Medication 250 MILLIGRAM(S): at 06:23

## 2022-08-27 RX ADMIN — ATORVASTATIN CALCIUM 40 MILLIGRAM(S): 80 TABLET, FILM COATED ORAL at 21:13

## 2022-08-27 RX ADMIN — PANTOPRAZOLE SODIUM 40 MILLIGRAM(S): 20 TABLET, DELAYED RELEASE ORAL at 06:22

## 2022-08-27 RX ADMIN — BUDESONIDE AND FORMOTEROL FUMARATE DIHYDRATE 2 PUFF(S): 160; 4.5 AEROSOL RESPIRATORY (INHALATION) at 19:00

## 2022-08-27 RX ADMIN — Medication 1 SPRAY(S): at 06:23

## 2022-08-27 RX ADMIN — Medication 10 MILLILITER(S): at 12:00

## 2022-08-27 RX ADMIN — Medication 5 MILLIGRAM(S): at 21:14

## 2022-08-27 RX ADMIN — BUDESONIDE AND FORMOTEROL FUMARATE DIHYDRATE 2 PUFF(S): 160; 4.5 AEROSOL RESPIRATORY (INHALATION) at 06:46

## 2022-08-27 RX ADMIN — Medication 10 MILLILITER(S): at 06:22

## 2022-08-27 RX ADMIN — FINASTERIDE 5 MILLIGRAM(S): 5 TABLET, FILM COATED ORAL at 12:00

## 2022-08-27 RX ADMIN — Medication 250 MILLIGRAM(S): at 17:18

## 2022-08-27 RX ADMIN — Medication 100 MILLIGRAM(S): at 06:22

## 2022-08-27 RX ADMIN — Medication 10 MILLILITER(S): at 17:18

## 2022-08-27 RX ADMIN — Medication 100 MILLIGRAM(S): at 17:18

## 2022-08-27 RX ADMIN — SENNA PLUS 2 TABLET(S): 8.6 TABLET ORAL at 21:14

## 2022-08-27 RX ADMIN — TAMSULOSIN HYDROCHLORIDE 0.4 MILLIGRAM(S): 0.4 CAPSULE ORAL at 21:14

## 2022-08-27 RX ADMIN — ENOXAPARIN SODIUM 40 MILLIGRAM(S): 100 INJECTION SUBCUTANEOUS at 17:17

## 2022-08-27 NOTE — PROGRESS NOTE ADULT - SUBJECTIVE AND OBJECTIVE BOX
Patient is a 87y old  Male who presents with a chief complaint of cough -> PNA (26 Aug 2022 16:42)      INTERVAL HPI/OVERNIGHT EVENTS:    no overnight events, feeling well  plan for scope monday, likely transfer to [place] monday for it.   pt aware of plan.     MEDICATIONS  (STANDING):  ALBUTerol    90 MICROgram(s) HFA Inhaler 2 Puff(s) Inhalation every 8 hours  atorvastatin 40 milliGRAM(s) Oral at bedtime  budesonide  80 MICROgram(s)/formoterol 4.5 MICROgram(s) Inhaler 2 Puff(s) Inhalation two times a day  enoxaparin Injectable 40 milliGRAM(s) SubCutaneous every 24 hours  finasteride 5 milliGRAM(s) Oral daily  guaifenesin/dextromethorphan Oral Liquid 10 milliLiter(s) Oral every 6 hours  melatonin 5 milliGRAM(s) Oral at bedtime  metoprolol tartrate 100 milliGRAM(s) Oral two times a day  pantoprazole    Tablet 40 milliGRAM(s) Oral before breakfast  saccharomyces boulardii 250 milliGRAM(s) Oral two times a day  senna 2 Tablet(s) Oral at bedtime  tamsulosin 0.4 milliGRAM(s) Oral at bedtime    MEDICATIONS  (PRN):  acetaminophen     Tablet .. 650 milliGRAM(s) Oral every 6 hours PRN Temp greater or equal to 38C (100.4F), Mild Pain (1 - 3)  aluminum hydroxide/magnesium hydroxide/simethicone Suspension 30 milliLiter(s) Oral every 4 hours PRN Dyspepsia  diphenhydrAMINE 25 milliGRAM(s) Oral at bedtime PRN Insomnia  magnesium hydroxide Suspension 30 milliLiter(s) Oral daily PRN Constipation  ondansetron Injectable 4 milliGRAM(s) IV Push every 8 hours PRN Nausea and/or Vomiting  polyethylene glycol 3350 17 Gram(s) Oral daily PRN Constipation  sodium chloride 0.65% Nasal 1 Spray(s) Both Nostrils every 2 hours PRN Nasal Congestion      Allergies    No Known Allergies    Intolerances        REVIEW OF SYSTEMS:  as above        Vital Signs Last 24 Hrs  T(C): 36.1 (27 Aug 2022 00:00), Max: 36.1 (27 Aug 2022 00:00)  T(F): 97 (27 Aug 2022 00:00), Max: 97 (27 Aug 2022 00:00)  HR: 62 (27 Aug 2022 06:00) (55 - 75)  BP: 130/70 (27 Aug 2022 06:00) (105/53 - 131/54)  BP(mean): 88 (27 Aug 2022 06:00) (68 - 88)  RR: 17 (27 Aug 2022 06:00) (17 - 24)  SpO2: 97% (27 Aug 2022 06:00) (88% - 100%)    Parameters below as of 27 Aug 2022 06:00  Patient On (Oxygen Delivery Method): nasal cannula  O2 Flow (L/min): 1      PHYSICAL EXAM:  GENERAL: NAD at rest on NC  HEENT:  anicteric, moist mucous membranes  CHEST/LUNG: decreased breath sounds   HEART:  irregular, S1, S2  ABDOMEN:  BS+, soft, nontender, nondistended  EXTREMITIES: no cyanosis or calf tenderness  NERVOUS SYSTEM: answers questions and follows commands appropriately    LABS:                        10.5   11.29 )-----------( 605      ( 27 Aug 2022 06:47 )             33.2               CAPILLARY BLOOD GLUCOSE          RADIOLOGY & ADDITIONAL TESTS:

## 2022-08-27 NOTE — PROGRESS NOTE ADULT - SUBJECTIVE AND OBJECTIVE BOX
Date/Time Patient Seen:  		  Referring MD:   Data Reviewed	       Patient is a 87y old  Male who presents with a chief complaint of cough -> PNA (27 Aug 2022 08:38)      Subjective/HPI     PAST MEDICAL & SURGICAL HISTORY:  No pertinent past medical history    Skin cancer  s/p Mohs    Essential hypertension    Cerebrovascular accident (CVA), unspecified mechanism  2015    Hyperlipidemia, unspecified hyperlipidemia type    Complex tear of lateral meniscus of right knee as current injury, initial encounter    Complex tear of medial meniscus of right knee as current injury, initial encounter    Benign prostatic hyperplasia, presence of lower urinary tract symptoms unspecified, unspecified morphology    H/O pilonidal cyst          Medication list         MEDICATIONS  (STANDING):  ALBUTerol    90 MICROgram(s) HFA Inhaler 2 Puff(s) Inhalation every 8 hours  atorvastatin 40 milliGRAM(s) Oral at bedtime  budesonide  80 MICROgram(s)/formoterol 4.5 MICROgram(s) Inhaler 2 Puff(s) Inhalation two times a day  enoxaparin Injectable 40 milliGRAM(s) SubCutaneous every 24 hours  finasteride 5 milliGRAM(s) Oral daily  guaifenesin/dextromethorphan Oral Liquid 10 milliLiter(s) Oral every 6 hours  melatonin 5 milliGRAM(s) Oral at bedtime  metoprolol tartrate 100 milliGRAM(s) Oral two times a day  pantoprazole    Tablet 40 milliGRAM(s) Oral before breakfast  saccharomyces boulardii 250 milliGRAM(s) Oral two times a day  senna 2 Tablet(s) Oral at bedtime  tamsulosin 0.4 milliGRAM(s) Oral at bedtime    MEDICATIONS  (PRN):  acetaminophen     Tablet .. 650 milliGRAM(s) Oral every 6 hours PRN Temp greater or equal to 38C (100.4F), Mild Pain (1 - 3)  aluminum hydroxide/magnesium hydroxide/simethicone Suspension 30 milliLiter(s) Oral every 4 hours PRN Dyspepsia  diphenhydrAMINE 25 milliGRAM(s) Oral at bedtime PRN Insomnia  magnesium hydroxide Suspension 30 milliLiter(s) Oral daily PRN Constipation  ondansetron Injectable 4 milliGRAM(s) IV Push every 8 hours PRN Nausea and/or Vomiting  polyethylene glycol 3350 17 Gram(s) Oral daily PRN Constipation  sodium chloride 0.65% Nasal 1 Spray(s) Both Nostrils every 2 hours PRN Nasal Congestion         Vitals log        ICU Vital Signs Last 24 Hrs  T(C): 36.1 (27 Aug 2022 00:00), Max: 36.1 (27 Aug 2022 00:00)  T(F): 97 (27 Aug 2022 00:00), Max: 97 (27 Aug 2022 00:00)  HR: 62 (27 Aug 2022 06:00) (55 - 75)  BP: 130/70 (27 Aug 2022 06:00) (105/53 - 131/54)  BP(mean): 88 (27 Aug 2022 06:00) (68 - 88)  ABP: --  ABP(mean): --  RR: 17 (27 Aug 2022 06:00) (17 - 24)  SpO2: 97% (27 Aug 2022 06:00) (88% - 100%)    O2 Parameters below as of 27 Aug 2022 06:00  Patient On (Oxygen Delivery Method): nasal cannula  O2 Flow (L/min): 1               Input and Output:  I&O's Detail      Lab Data                        10.5   11.29 )-----------( 605      ( 27 Aug 2022 06:47 )             33.2                   Review of Systems	      Objective     Physical Examination  heart s1s2  lung dec BS  abd soft  head nc        Pertinent Lab findings & Imaging      Bud:  NO   Adequate UO     I&O's Detail           Discussed with:     Cultures:	        Radiology

## 2022-08-27 NOTE — PROGRESS NOTE ADULT - SUBJECTIVE AND OBJECTIVE BOX
[facility], Division of Infectious Diseases  EROS Perez Y. Patel, S. Shah, [facility]  [phone]    Name: DENNIS BRADFORD  Age: 87y  Gender: Male  MRN: [id number]    Interval History:  Patient seen and examined at bedside  No acute overnight events. Afebrile  Son at bedside  Feeling ok  Notes reviewed    Antibiotics:      Medications:  acetaminophen     Tablet .. 650 milliGRAM(s) Oral every 6 hours PRN  ALBUTerol    90 MICROgram(s) HFA Inhaler 2 Puff(s) Inhalation every 8 hours  aluminum hydroxide/magnesium hydroxide/simethicone Suspension 30 milliLiter(s) Oral every 4 hours PRN  atorvastatin 40 milliGRAM(s) Oral at bedtime  budesonide  80 MICROgram(s)/formoterol 4.5 MICROgram(s) Inhaler 2 Puff(s) Inhalation two times a day  diphenhydrAMINE 25 milliGRAM(s) Oral at bedtime PRN  enoxaparin Injectable 40 milliGRAM(s) SubCutaneous every 24 hours  finasteride 5 milliGRAM(s) Oral daily  guaifenesin/dextromethorphan Oral Liquid 10 milliLiter(s) Oral every 6 hours  magnesium hydroxide Suspension 30 milliLiter(s) Oral daily PRN  melatonin 5 milliGRAM(s) Oral at bedtime  metoprolol tartrate 100 milliGRAM(s) Oral two times a day  ondansetron Injectable 4 milliGRAM(s) IV Push every 8 hours PRN  pantoprazole    Tablet 40 milliGRAM(s) Oral before breakfast  polyethylene glycol 3350 17 Gram(s) Oral daily PRN  saccharomyces boulardii 250 milliGRAM(s) Oral two times a day  senna 2 Tablet(s) Oral at bedtime  sodium chloride 0.65% Nasal 1 Spray(s) Both Nostrils every 2 hours PRN  tamsulosin 0.4 milliGRAM(s) Oral at bedtime      Review of Systems:  A 10-point review of systems was obtained.     Pertinent positives and negatives--  Constitutional: No fevers. No Chills. No Rigors.   Cardiovascular: No chest pain. No palpitations.  Respiratory: No shortness of breath. No cough.  Gastrointestinal: No nausea or vomiting. No diarrhea or constipation.   Psychiatric: Pleasant. Appropriate affect.    Review of systems otherwise negative except as previously noted.    Allergies: No Known Allergies    For details regarding the patient's past medical history, social history, family history, and other miscellaneous elements, please refer the initial infectious diseases consultation and/or the admitting history and physical examination for this admission.    Objective:  Vitals:   T(C): 36.6 (08-27-22 @ 12:00), Max: 36.7 (08-27-22 @ 08:00)  HR: 58 (08-27-22 @ 12:00) (55 - 75)  BP: 108/58 (08-27-22 @ 12:00) (105/53 - 138/64)  RR: 24 (08-27-22 @ 12:00) (17 - 26)  SpO2: 97% (08-27-22 @ 12:00) (92% - 99%)    Physical Examination:  General: no acute distress  HEENT: NC/AT, EOMI  Cardio: S1, S2 heard, RRR, no murmurs  Resp: decreased b/l breath sounds  Abd: soft, NT, ND  Ext: no edema or cyanosis  Skin: warm, dry, no visible rash      Laboratory Studies:  CBC:                       10.5   11.29 )-----------( 605      ( 27 Aug 2022 06:47 )             33.2     CMP:             Microbiology: reviewed    Culture - Sputum (collected 08-20-22 @ 18:58)  Source: .Sputum Sputum  Gram Stain (08-21-22 @ 23:49):    Numerous polymorphonuclear leukocytes per low power field    Rare Squamous epithelial cells per low power field    Rare Yeast like cells per oil power field    Few Gram Variable Rods per oil power field  Final Report (08-23-22 @ 15:37):    Normal Respiratory Dora present        Radiology: reviewed

## 2022-08-27 NOTE — PROGRESS NOTE ADULT - ASSESSMENT
87M with HTN, HLD, hx of CVA (no residual deficits), BPH, hx of skin CA who presents with cough and fevers    afb neg x 3 - now with AFB in broth - growth - ID follow up reviewed - back on Isolation for TB rule out    plan for poss transfer to [place] and Bronchoscopic exam - eval for TB - Malignancy - Infection NOS    s/p Levaquin     ID - Cardio follow up  I and O  serial labs  monitor VS and Sat  prognosis guarded  GOC discussion

## 2022-08-27 NOTE — PROGRESS NOTE ADULT - SUBJECTIVE AND OBJECTIVE BOX
Chief Complaint: Hemoptysis    Interval Events: No events overnight.    Review of Systems:  General: No fevers, chills, weight gain  Skin: No rashes, color changes  Cardiovascular: No chest pain, orthopnea  Respiratory: No shortness of breath, cough  Gastrointestinal: No nausea, abdominal pain  Genitourinary: No incontinence, pain with urination  Musculoskeletal: No pain, swelling, decreased range of motion  Neurological: No headache, weakness  Psychiatric: No depression, anxiety  Endocrine: No weight gain, increased thirst  All other systems are comprehensively negative.    Physical Exam:  Vital Signs Last 24 Hrs  T(C): 36.7 (27 Aug 2022 08:00), Max: 36.7 (27 Aug 2022 08:00)  T(F): 98 (27 Aug 2022 08:00), Max: 98 (27 Aug 2022 08:00)  HR: 56 (27 Aug 2022 08:00) (55 - 75)  BP: 138/64 (27 Aug 2022 08:00) (105/53 - 138/64)  BP(mean): 86 (27 Aug 2022 08:00) (68 - 88)  RR: 26 (27 Aug 2022 08:00) (17 - 26)  SpO2: 93% (27 Aug 2022 08:00) (88% - 100%)  Parameters below as of 27 Aug 2022 08:00  Patient On (Oxygen Delivery Method): nasal cannula  O2 Flow (L/min): 1  General: NAD  HEENT: MMM  Neck: No JVD, no carotid bruit  Lungs: CTAB  CV: RRR, nl S1/S2, no M/R/G  Abdomen: S/NT/ND, +BS  Extremities: No LE edema, no cyanosis  Neuro: AAOx3, non-focal  Skin: No rash    Labs:                                   10.5   11.29 )-----------( 605      ( 27 Aug 2022 06:47 )             33.2     Telemetry: Sinus rhythm with PACs

## 2022-08-27 NOTE — PROGRESS NOTE ADULT - ASSESSMENT
86yo M with PMH of HTN, HLD, hx of CVA (no residual deficits), BPH, hx of skin CA who presents with cough and fevers a/w sepsis due to multifocal pneumonia with progression to acute hypoxic respiratory failure now on HFNC and course further c/b new onset afib.        Sepsis 2/2 multifocal PNA   - met sepsis criteria on arrival with leukocytosis + tachycardia + source of infection  - strep PNA Ag negative. Legionella serum antibody positive, legionella urine Ag negative - ID ordered a legionella serum PCR to get more clarity if pt had legionella PNA  - s/p ceftriaxone and azithromycin. Continue Levaquin for now per ID ([facility]), recs appreciated   - pulmonary ([facility]), recs appreciated  - AFB smear negative x3  - still with leukocytosis but downtrending to 15 ; down from peak of 26K earlier in admission ; O2 requirements are starting to improve also, with HFNC down to 40% FiO2 now on 3 liters  PLAN:  - f/up sputum culture  - may need bronchoscopy   - f/up Fungitell (sent to look for alternative etiology like PCP pneumonia as pt had been on high FiO2 for 10 days on Abx) / HIV negative  NOTES 8/23 ; WRITER PERSONALLY REMOVED 02: was 100 percent on 3 liters  SPoke with pulmonary, recommending bronchoscopy, will speak to daughter if she agreeable to transfer to [place] for bronchoscopy or to have it done as outpatient.   Pulmonary recommending bronchoscopy, pulmonary spoke with daughter who did not want bronchoscopy to be done as inpatient, and will do it as outpatient  once optimized, pt was dc'ed to [facility].    Pt to f/u with Dr. Anton weiss as outpatient.   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   - pulmonary ([facility]), recs appreciated  - Continue HFNC, continue to monitor and will trial wean   - O2 requirements are starting to improve also, with HFNC down to 40% FiO2 from 60% yesterday   - full code, intubate if needed  - c/w Abx for PNA  as above    Cavitary lesion  - doubt TB, but patient had blood tinged sputum, possible early cavitation on CT, and fevers. Low suspicion for TB as per ID, discontinued isolation  - quantiferon equivocal. AFB smear negative x3  - plan for bronch on monday    New afib   - [id number] score of at least 5 -> was on heparin drip but had worsening hemoptysis and cardio felt risk > benefits so full A/C discontinued and pt just given ppx dose lovenox  - cardiology ([facility]) consulted, recs appreciated  - rate control if needed  - TTE shows Low normal left ventricular systolic function. Bi-atrial enlargement. Mild pulmonary hypertension  - on Lovenox, will eventually need to transition to DOAC when feel confident hemoptysis has resolved     Transaminitis  - likely from current illness ; possibly from legionella if that is the culprit of the PNA  - CT A/P without significant pathology to cause the transaminitis; monitor labs  - LFTs are improving  - can consider GI consult if does not improve with Abx therapy     CINDI 2/2 suspected prerenal azotemia    - likely 2/2 poor PO intake  - oral hydration encouraged, monitor BMP - likely now at baseline renal function  - hold diuretic  - avoid nephrotoxic meds    HTN/HLD  - cont with norvasc  - hold lasix  - cont with atorvastatin    BPH  - cont with tamsulosin and finasteride    VTE ppx  - c/w Lovenox, will eventually transition to DOAC when it is clear hemoptysis has resolved

## 2022-08-27 NOTE — CHART NOTE - NSCHARTNOTEFT_GEN_A_CORE
spoke with daughter  updated on plan for transfer to [place] and eventual bronchoscopy  she is in agreement

## 2022-08-27 NOTE — PROGRESS NOTE ADULT - ASSESSMENT
The patient is an 87 year old male with a history of HTN, HL, BPH, CVA who presents with cough and hemoptysis in the setting of PNA and r/o TB.    Plan:  - The rhythm is now back to sinus rhythm with PACs and intermittent episodes of AF  - Continue metoprolol tartrate 100 mg bid  - Discontinue amlodipine  - Echo with low normal LV systolic function, mild pulm HTN  - IV antibiotics  - AFB negative x3  - Sputum culture now positive for AFB  - Pulm and ID follow-up  - Start apixaban 2.5 mg bid after bronchoscopy and observe for recurrence of re-bleed prior to discharge  - Plan for bronchoscopy. Optimized to proceed from a cardiac standpoint.

## 2022-08-27 NOTE — PROGRESS NOTE ADULT - ASSESSMENT
87M with HTN, HLD, hx of CVA (no residual deficits), BPH, hx of skin CA who presents with cough and fevers.    Found to have multifocal pneumonia.  Also found have new onset afib.        R/o TB  - pt s/p AFB x3 smears--all AFB smear negative  - pt w/ AFB growth in broth--pending ID  - pending micro lab to run possible mTB PCR--they are unable to tell me what day it will be run  - d/w Dr. Clayton--bronch early next week  - hold off additional therapy pending bronch    Sepsis 2/2 Multifocal PNA  Hemoptysis   AHRF on NC  - pt p/w leukocytosis, SOB  - legionella Ab+, urine legionella negative, sputum legionella negative  Plan:   - s/p levofloxacin  - trend temps/WBC  - supportive care/supplemental O2 per primary team  --off HFNC, on NC since 8/23    Elevated LFTs  - likely 2/2 sepsis  - trend LFTs    CINDI 2/2 prerenal azotemia  - likely 2/2 poor PO intake/sepsis  - trend renal fxn  - avoid nephrotoxic agents  - renally dose medications    Infectious Diseases will continue to follow. Please call with any questions.   Adriana Rodriguez M.D.  [facility], Division of Infectious Diseases [phone]

## 2022-08-28 LAB
ANION GAP SERPL CALC-SCNC: 8 MMOL/L — SIGNIFICANT CHANGE UP (ref 5–17)
BUN SERPL-MCNC: 30 MG/DL — HIGH (ref 7–23)
CALCIUM SERPL-MCNC: 8.4 MG/DL — SIGNIFICANT CHANGE UP (ref 8.4–10.5)
CHLORIDE SERPL-SCNC: 104 MMOL/L — SIGNIFICANT CHANGE UP (ref 96–108)
CO2 SERPL-SCNC: 25 MMOL/L — SIGNIFICANT CHANGE UP (ref 22–31)
CREAT SERPL-MCNC: 1.07 MG/DL — SIGNIFICANT CHANGE UP (ref 0.5–1.3)
EGFR: 67 ML/MIN/1.73M2 — SIGNIFICANT CHANGE UP
GLUCOSE SERPL-MCNC: 83 MG/DL — SIGNIFICANT CHANGE UP (ref 70–99)
HCT VFR BLD CALC: 33.3 % — LOW (ref 39–50)
HGB BLD-MCNC: 10.6 G/DL — LOW (ref 13–17)
MAGNESIUM SERPL-MCNC: 1.9 MG/DL — SIGNIFICANT CHANGE UP (ref 1.6–2.6)
MCHC RBC-ENTMCNC: 30.5 PG — SIGNIFICANT CHANGE UP (ref 27–34)
MCHC RBC-ENTMCNC: 31.8 GM/DL — LOW (ref 32–36)
MCV RBC AUTO: 96 FL — SIGNIFICANT CHANGE UP (ref 80–100)
NRBC # BLD: 0 /100 WBCS — SIGNIFICANT CHANGE UP (ref 0–0)
PHOSPHATE SERPL-MCNC: 4.4 MG/DL — SIGNIFICANT CHANGE UP (ref 2.5–4.5)
PLATELET # BLD AUTO: 594 K/UL — HIGH (ref 150–400)
POTASSIUM SERPL-MCNC: 4.7 MMOL/L — SIGNIFICANT CHANGE UP (ref 3.5–5.3)
POTASSIUM SERPL-SCNC: 4.7 MMOL/L — SIGNIFICANT CHANGE UP (ref 3.5–5.3)
RBC # BLD: 3.47 M/UL — LOW (ref 4.2–5.8)
RBC # FLD: 13.9 % — SIGNIFICANT CHANGE UP (ref 10.3–14.5)
SODIUM SERPL-SCNC: 137 MMOL/L — SIGNIFICANT CHANGE UP (ref 135–145)
WBC # BLD: 12.29 K/UL — HIGH (ref 3.8–10.5)
WBC # FLD AUTO: 12.29 K/UL — HIGH (ref 3.8–10.5)

## 2022-08-28 PROCEDURE — 99233 SBSQ HOSP IP/OBS HIGH 50: CPT

## 2022-08-28 RX ORDER — METOPROLOL TARTRATE 50 MG
50 TABLET ORAL
Refills: 0 | Status: DISCONTINUED | OUTPATIENT
Start: 2022-08-28 | End: 2022-08-29

## 2022-08-28 RX ORDER — METOPROLOL TARTRATE 50 MG
1 TABLET ORAL
Qty: 0 | Refills: 0 | DISCHARGE
Start: 2022-08-28

## 2022-08-28 RX ADMIN — ENOXAPARIN SODIUM 40 MILLIGRAM(S): 100 INJECTION SUBCUTANEOUS at 17:23

## 2022-08-28 RX ADMIN — Medication 5 MILLIGRAM(S): at 21:40

## 2022-08-28 RX ADMIN — BUDESONIDE AND FORMOTEROL FUMARATE DIHYDRATE 2 PUFF(S): 160; 4.5 AEROSOL RESPIRATORY (INHALATION) at 06:44

## 2022-08-28 RX ADMIN — Medication 10 MILLILITER(S): at 06:43

## 2022-08-28 RX ADMIN — TAMSULOSIN HYDROCHLORIDE 0.4 MILLIGRAM(S): 0.4 CAPSULE ORAL at 21:40

## 2022-08-28 RX ADMIN — Medication 650 MILLIGRAM(S): at 02:07

## 2022-08-28 RX ADMIN — Medication 650 MILLIGRAM(S): at 01:28

## 2022-08-28 RX ADMIN — Medication 10 MILLILITER(S): at 11:22

## 2022-08-28 RX ADMIN — FINASTERIDE 5 MILLIGRAM(S): 5 TABLET, FILM COATED ORAL at 11:22

## 2022-08-28 RX ADMIN — Medication 10 MILLILITER(S): at 17:23

## 2022-08-28 RX ADMIN — Medication 100 MILLIGRAM(S): at 06:43

## 2022-08-28 RX ADMIN — SENNA PLUS 2 TABLET(S): 8.6 TABLET ORAL at 21:40

## 2022-08-28 RX ADMIN — ATORVASTATIN CALCIUM 40 MILLIGRAM(S): 80 TABLET, FILM COATED ORAL at 21:40

## 2022-08-28 RX ADMIN — PANTOPRAZOLE SODIUM 40 MILLIGRAM(S): 20 TABLET, DELAYED RELEASE ORAL at 06:43

## 2022-08-28 RX ADMIN — Medication 250 MILLIGRAM(S): at 06:43

## 2022-08-28 RX ADMIN — Medication 50 MILLIGRAM(S): at 17:25

## 2022-08-28 RX ADMIN — Medication 250 MILLIGRAM(S): at 17:23

## 2022-08-28 RX ADMIN — BUDESONIDE AND FORMOTEROL FUMARATE DIHYDRATE 2 PUFF(S): 160; 4.5 AEROSOL RESPIRATORY (INHALATION) at 21:43

## 2022-08-28 NOTE — PROGRESS NOTE ADULT - SUBJECTIVE AND OBJECTIVE BOX
Patient is a 87y old  Male who presents with a chief complaint of cough -> PNA (29 Aug 2022 06:47)      INTERVAL HPI/OVERNIGHT EVENTS:    no overnight events    MEDICATIONS  (STANDING):  ALBUTerol    90 MICROgram(s) HFA Inhaler 2 Puff(s) Inhalation every 8 hours  atorvastatin 40 milliGRAM(s) Oral at bedtime  budesonide  80 MICROgram(s)/formoterol 4.5 MICROgram(s) Inhaler 2 Puff(s) Inhalation two times a day  enoxaparin Injectable 40 milliGRAM(s) SubCutaneous every 24 hours  finasteride 5 milliGRAM(s) Oral daily  guaifenesin/dextromethorphan Oral Liquid 10 milliLiter(s) Oral every 6 hours  melatonin 5 milliGRAM(s) Oral at bedtime  metoprolol tartrate 50 milliGRAM(s) Oral two times a day  pantoprazole    Tablet 40 milliGRAM(s) Oral before breakfast  saccharomyces boulardii 250 milliGRAM(s) Oral two times a day  senna 2 Tablet(s) Oral at bedtime  tamsulosin 0.4 milliGRAM(s) Oral at bedtime    MEDICATIONS  (PRN):  acetaminophen     Tablet .. 650 milliGRAM(s) Oral every 6 hours PRN Temp greater or equal to 38C (100.4F), Mild Pain (1 - 3)  aluminum hydroxide/magnesium hydroxide/simethicone Suspension 30 milliLiter(s) Oral every 4 hours PRN Dyspepsia  diphenhydrAMINE 25 milliGRAM(s) Oral at bedtime PRN Insomnia  magnesium hydroxide Suspension 30 milliLiter(s) Oral daily PRN Constipation  ondansetron Injectable 4 milliGRAM(s) IV Push every 8 hours PRN Nausea and/or Vomiting  polyethylene glycol 3350 17 Gram(s) Oral daily PRN Constipation  sodium chloride 0.65% Nasal 1 Spray(s) Both Nostrils every 2 hours PRN Nasal Congestion      Allergies    No Known Allergies    Intolerances        REVIEW OF SYSTEMS:  ROS negative    Vital Signs Last 24 Hrs  T(C): 36 (29 Aug 2022 08:00), Max: 36.8 (29 Aug 2022 06:00)  T(F): 96.8 (29 Aug 2022 08:00), Max: 98.3 (29 Aug 2022 06:00)  HR: 57 (29 Aug 2022 08:00) (53 - 66)  BP: 124/66 (29 Aug 2022 08:00) (111/59 - 140/55)  BP(mean): 81 (29 Aug 2022 08:00) (68 - 81)  RR: 15 (29 Aug 2022 08:00) (15 - 23)  SpO2: 98% (29 Aug 2022 08:00) (92% - 100%)    Parameters below as of 29 Aug 2022 08:00  Patient On (Oxygen Delivery Method): nasal cannula  O2 Flow (L/min): 1      PHYSICAL EXAM:  GENERAL: NAD at rest on NC  HEENT:  anicteric, moist mucous membranes  CHEST/LUNG: decreased breath sounds   HEART:  irregular, S1, S2  ABDOMEN:  BS+, soft, nontender, nondistended  EXTREMITIES: no cyanosis or calf tenderness  NERVOUS SYSTEM: answers questions and follows commands appropriately    LABS:      Ca    8.4        28 Aug 2022 06:26          CAPILLARY BLOOD GLUCOSE          RADIOLOGY & ADDITIONAL TESTS:

## 2022-08-28 NOTE — PROGRESS NOTE ADULT - ASSESSMENT
86yo M with PMH of HTN, HLD, hx of CVA (no residual deficits), BPH, hx of skin CA who presents with cough and fevers a/w sepsis due to multifocal pneumonia with progression to acute hypoxic respiratory failure now on HFNC and course further c/b new onset afib.        Sepsis 2/2 multifocal PNA   - met sepsis criteria on arrival with leukocytosis + tachycardia + source of infection  - strep PNA Ag negative. Legionella serum antibody positive, legionella urine Ag negative - ID ordered a legionella serum PCR to get more clarity if pt had legionella PNA  - s/p ceftriaxone and azithromycin. Continue Levaquin for now per ID ([facility]), recs appreciated   - pulmonary ([facility]), recs appreciated  - AFB smear negative x3  - still with leukocytosis but downtrending to 15 ; down from peak of 26K earlier in admission ; O2 requirements are starting to improve also, with HFNC down to 40% FiO2 now on 3 liters  PLAN:  - f/up sputum culture  - may need bronchoscopy   - f/up Fungitell (sent to look for alternative etiology like PCP pneumonia as pt had been on high FiO2 for 10 days on Abx) / HIV negative  once optimized, pt was dc'ed to [facility].    Pt to f/u with Dr. Walton pulmonary as outpatient.   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   - pulmonary ([facility]), recs appreciated  - Continue HFNC, continue to monitor and will trial wean   - O2 requirements are starting to improve also, with HFNC down to 40% FiO2 from 60% yesterday   - full code, intubate if needed  as above    Cavitary lesion  - doubt TB, but patient had blood tinged sputum, possible early cavitation on CT, and fevers. Low suspicion for TB as per ID, discontinued isolation  - quantiferon equivocal. AFB smear negative x3  - plan for bronch on monday    New afib   - [id number] score of at least 5 -> was on heparin drip but had worsening hemoptysis and cardio felt risk > benefits so full A/C discontinued and pt just given ppx dose lovenox  - cardiology (Miya) consulted, recs appreciated  - rate control if needed  - TTE shows Low normal left ventricular systolic function. Bi-atrial enlargement. Mild pulmonary hypertension  - on Lovenox, will eventually need to transition to DOAC when feel confident hemoptysis has resolved     Transaminitis  - likely from current illness ; possibly from legionella if that is the culprit of the PNA  - CT A/P without significant pathology to cause the transaminitis; monitor labs  - LFTs are improving  - can consider GI consult if does not improve with Abx therapy     CINDI 2/2 suspected prerenal azotemia    - likely 2/2 poor PO intake  - oral hydration encouraged, monitor BMP - likely now at baseline renal function  - hold diuretic  - avoid nephrotoxic meds    HTN/HLD  - cont with norvasc  - hold lasix  - cont with atorvastatin    BPH  - cont with tamsulosin and finasteride    VTE ppx  - c/w Lovenox, will eventually transition to DOAC when it is clear hemoptysis has resolved

## 2022-08-28 NOTE — PROGRESS NOTE ADULT - ASSESSMENT
87M with HTN, HLD, hx of CVA (no residual deficits), BPH, hx of skin CA who presents with cough and fevers    afb neg x 3 - now with AFB in broth - growth - ID follow up reviewed - back on Isolation for TB rule out    plan for poss transfer to [place] and Bronchoscopic exam - eval for TB - Malignancy - Infection NOS  spoke with DTR - in agreement    s/p Levaquin     ID - Cardio follow up  I and O  serial labs  monitor VS and Sat  prognosis guarded  GOC discussion

## 2022-08-28 NOTE — PROGRESS NOTE ADULT - SUBJECTIVE AND OBJECTIVE BOX
Chief Complaint: Hemoptysis    Interval Events: No events overnight.    Review of Systems:  General: No fevers, chills, weight gain  Skin: No rashes, color changes  Cardiovascular: No chest pain, orthopnea  Respiratory: No shortness of breath, cough  Gastrointestinal: No nausea, abdominal pain  Genitourinary: No incontinence, pain with urination  Musculoskeletal: No pain, swelling, decreased range of motion  Neurological: No headache, weakness  Psychiatric: No depression, anxiety  Endocrine: No weight gain, increased thirst  All other systems are comprehensively negative.    Physical Exam:  Vital Signs Last 24 Hrs  T(C): 36.6 (28 Aug 2022 08:11), Max: 37.1 (27 Aug 2022 16:00)  T(F): 97.9 (28 Aug 2022 08:11), Max: 98.7 (27 Aug 2022 16:00)  HR: 52 (28 Aug 2022 08:00) (51 - 65)  BP: 134/65 (28 Aug 2022 08:00) (95/48 - 159/116)  BP(mean): 86 (28 Aug 2022 08:00) (63 - 131)  RR: 20 (28 Aug 2022 08:00) (15 - 26)  SpO2: 97% (28 Aug 2022 08:00) (94% - 99%)  Parameters below as of 28 Aug 2022 08:00  Patient On (Oxygen Delivery Method): nasal cannula  O2 Flow (L/min): 1  General: NAD  HEENT: MMM  Neck: No JVD, no carotid bruit  Lungs: CTAB  CV: RRR, nl S1/S2, no M/R/G  Abdomen: S/NT/ND, +BS  Extremities: No LE edema, no cyanosis  Neuro: AAOx3, non-focal  Skin: No rash    Labs:    08-28    137  |  104  |  30<H>  ----------------------------<  83  4.7   |  25  |  1.07    Ca    8.4      28 Aug 2022 06:26  Phos  4.4     08-28  Mg     1.9     08-28                          10.6   12.29 )-----------( 594      ( 28 Aug 2022 06:26 )             33.3       Telemetry: Sinus rhythm with PACs

## 2022-08-28 NOTE — CHART NOTE - NSCHARTNOTESELECT_GEN_ALL_CORE
Nutrition Services
Pulm Med Add/Event Note
Rapid Response
pulm med add/Event Note

## 2022-08-28 NOTE — CHART NOTE - NSCHARTNOTEFT_GEN_A_CORE
Nutrition Follow Up Note  Patient seen for: Nutrition follow up     Chart reviewed, events noted: As per chart "The pt is a 87M with HTN, HLD, hx of CVA (no residual deficits), BPH, hx of skin CA who presents with cough and fevers.    Found to have multifocal pneumonia.  Also found have new onset afib."    8/28  Pt seen at bedside for nutrition follow up, at time of visit pt OOB sitting in chair. Pt reports good appetite and PO intake, consuming >75% of meals provided in-house. Pt states he enjoys drinking oral nutritional supplement, will continue with ensure Enlive 1x/day to maintain PO intake and nutrition status. Pt denies difficulty chewing/ swallowing of foods at this time and no acute GI distress reported, per pt last BM today, currently on bowel regimen (senna and Miralax). Per documents plan for possible transfer to [place] for Bronchoscopic exam. RD to continue to monitor nutrition status per protocol.     Source: [] Patient       [x] EMR        [] RN        [] Family at bedside       [] Other:    -If unable to interview patient: [] Trach/Vent/BiPAP  [] Disoriented/confused/inappropriate to interview    Diet Order:   Diet, DASH/TLC:   Sodium & Cholesterol Restricted  Supplement Feeding Modality:  Oral  Ensure Enlive Cans or Servings Per Day:  1       Frequency:  Daily (08-15-22)    - Is current order appropriate/adequate? [x] Yes  []  No:     - PO intake :   [x] >75%  Adequate    [] 50-75%  Fair       [] <50%  Poor    Weights:   Daily Weight in lbs:145.7 lbs (8/27), 146 lbs (8/27), 146.8 lbs (8/26), 145.7 lbs (8/25) and 186.7 lbs (8/24).     noted wt fluctuates in house, received diuretic, IVF inhouse, continues on antibiotic, wt loss partially likely due to fluid shift      Nutritionally Pertinent MEDICATIONS  (STANDING):  atorvastatin  finasteride  metoprolol tartrate  pantoprazole    Tablet  senna  tamsulosin    Pertinent Labs: 08-28 @ 06:26: Na 137, BUN 30<H>, Cr 1.07, BG 83, K+ 4.7, Phos 4.4, Mg 1.9    A1C with Estimated Average Glucose Result: 5.9 % (08-12-22 @ 06:00)    Skin per nursing documentation: free of pressure injuries   Edema: No edema noted, per nursing flow sheets     Estimated Needs:   [x] no change since previous assessment  [] recalculated:     Previous Nutrition Diagnosis:   Nutrition Diagnosis is: [x] ongoing  [] resolved [] not applicable     New Nutrition Diagnosis: [x] Not applicable    Nutrition Care Plan:  [x] In Progress  [] Achieved  [] Not applicable    Nutrition Interventions:     Education Provided:       [] Yes:  [x] No:        Recommendations:         [x] Continue current diet order     [x] RD to remains available     Monitoring and Evaluation:   Continue to monitor nutritional intake, tolerance to diet prescription, weights, labs, skin integrity    RD remains available upon request and will follow up per protocol

## 2022-08-28 NOTE — PROGRESS NOTE ADULT - SUBJECTIVE AND OBJECTIVE BOX
Date/Time Patient Seen:  		  Referring MD:   Data Reviewed	       Patient is a 87y old  Male who presents with a chief complaint of cough -> PNA (27 Aug 2022 16:21)      Subjective/HPI     PAST MEDICAL & SURGICAL HISTORY:  No pertinent past medical history    Skin cancer  s/p Mohs    Essential hypertension    Cerebrovascular accident (CVA), unspecified mechanism  2015    Hyperlipidemia, unspecified hyperlipidemia type    Complex tear of lateral meniscus of right knee as current injury, initial encounter    Complex tear of medial meniscus of right knee as current injury, initial encounter    Benign prostatic hyperplasia, presence of lower urinary tract symptoms unspecified, unspecified morphology    H/O pilonidal cyst          Medication list         MEDICATIONS  (STANDING):  ALBUTerol    90 MICROgram(s) HFA Inhaler 2 Puff(s) Inhalation every 8 hours  atorvastatin 40 milliGRAM(s) Oral at bedtime  budesonide  80 MICROgram(s)/formoterol 4.5 MICROgram(s) Inhaler 2 Puff(s) Inhalation two times a day  enoxaparin Injectable 40 milliGRAM(s) SubCutaneous every 24 hours  finasteride 5 milliGRAM(s) Oral daily  guaifenesin/dextromethorphan Oral Liquid 10 milliLiter(s) Oral every 6 hours  melatonin 5 milliGRAM(s) Oral at bedtime  metoprolol tartrate 100 milliGRAM(s) Oral two times a day  pantoprazole    Tablet 40 milliGRAM(s) Oral before breakfast  saccharomyces boulardii 250 milliGRAM(s) Oral two times a day  senna 2 Tablet(s) Oral at bedtime  tamsulosin 0.4 milliGRAM(s) Oral at bedtime    MEDICATIONS  (PRN):  acetaminophen     Tablet .. 650 milliGRAM(s) Oral every 6 hours PRN Temp greater or equal to 38C (100.4F), Mild Pain (1 - 3)  aluminum hydroxide/magnesium hydroxide/simethicone Suspension 30 milliLiter(s) Oral every 4 hours PRN Dyspepsia  diphenhydrAMINE 25 milliGRAM(s) Oral at bedtime PRN Insomnia  magnesium hydroxide Suspension 30 milliLiter(s) Oral daily PRN Constipation  ondansetron Injectable 4 milliGRAM(s) IV Push every 8 hours PRN Nausea and/or Vomiting  polyethylene glycol 3350 17 Gram(s) Oral daily PRN Constipation  sodium chloride 0.65% Nasal 1 Spray(s) Both Nostrils every 2 hours PRN Nasal Congestion         Vitals log        ICU Vital Signs Last 24 Hrs  T(C): 36.4 (27 Aug 2022 22:00), Max: 37.1 (27 Aug 2022 16:00)  T(F): 97.5 (27 Aug 2022 22:00), Max: 98.7 (27 Aug 2022 16:00)  HR: 52 (28 Aug 2022 04:00) (52 - 65)  BP: 95/48 (28 Aug 2022 04:00) (95/48 - 159/116)  BP(mean): 63 (28 Aug 2022 04:00) (63 - 131)  ABP: --  ABP(mean): --  RR: 20 (28 Aug 2022 04:00) (18 - 26)  SpO2: 95% (28 Aug 2022 04:00) (93% - 99%)    O2 Parameters below as of 28 Aug 2022 04:00  Patient On (Oxygen Delivery Method): nasal cannula  O2 Flow (L/min): 1               Input and Output:  I&O's Detail    27 Aug 2022 07:01  -  28 Aug 2022 06:21  --------------------------------------------------------  IN:  Total IN: 0 mL    OUT:    Voided (mL): 850 mL  Total OUT: 850 mL    Total NET: -850 mL          Lab Data                        10.5   11.29 )-----------( 605      ( 27 Aug 2022 06:47 )             33.2                   Review of Systems	      Objective     Physical Examination    heart s1s2  lng dec BS  abd soft      Pertinent Lab findings & Imaging      Bud:  NO   Adequate UO     I&O's Detail    27 Aug 2022 07:01  -  28 Aug 2022 06:21  --------------------------------------------------------  IN:  Total IN: 0 mL    OUT:    Voided (mL): 850 mL  Total OUT: 850 mL    Total NET: -850 mL               Discussed with:     Cultures:	        Radiology

## 2022-08-28 NOTE — PROGRESS NOTE ADULT - NUTRITIONAL ASSESSMENT
This patient has been assessed with a concern for Malnutrition and has been determined to have a diagnosis/diagnoses of Moderate protein-calorie malnutrition.    This patient is being managed with:   Diet DASH/TLC-  Sodium & Cholesterol Restricted  Supplement Feeding Modality:  Oral  Ensure Enlive Cans or Servings Per Day:  1       Frequency:  Daily  Entered: Aug 15 2022  2:20PM    Diet DASH/TLC-  Sodium & Cholesterol Restricted  Entered: Aug 11 2022 10:14PM    The following pending diet order is being considered for treatment of Moderate protein-calorie malnutrition:null
This patient has been assessed with a concern for Malnutrition and has been determined to have a diagnosis/diagnoses of Moderate protein-calorie malnutrition.    This patient is being managed with:   Diet DASH/TLC-  Sodium & Cholesterol Restricted  Supplement Feeding Modality:  Oral  Ensure Enlive Cans or Servings Per Day:  1       Frequency:  Two Times a day  Entered: Aug 19 2022  3:22PM    Diet DASH/TLC-  Sodium & Cholesterol Restricted  Supplement Feeding Modality:  Oral  Ensure Enlive Cans or Servings Per Day:  1       Frequency:  Daily  Entered: Aug 15 2022  2:20PM    The following pending diet order is being considered for treatment of Moderate protein-calorie malnutrition:null
This patient has been assessed with a concern for Malnutrition and has been determined to have a diagnosis/diagnoses of Moderate protein-calorie malnutrition.    This patient is being managed with:   Diet DASH/TLC-  Sodium & Cholesterol Restricted  Supplement Feeding Modality:  Oral  Ensure Enlive Cans or Servings Per Day:  1       Frequency:  Daily  Entered: Aug 15 2022  2:20PM    Diet DASH/TLC-  Sodium & Cholesterol Restricted  Entered: Aug 11 2022 10:14PM    The following pending diet order is being considered for treatment of Moderate protein-calorie malnutrition:null
This patient has been assessed with a concern for Malnutrition and has been determined to have a diagnosis/diagnoses of Moderate protein-calorie malnutrition.    This patient is being managed with:   Diet DASH/TLC-  Sodium & Cholesterol Restricted  Supplement Feeding Modality:  Oral  Ensure Enlive Cans or Servings Per Day:  1       Frequency:  Daily  Entered: Aug 15 2022  2:20PM    
This patient has been assessed with a concern for Malnutrition and has been determined to have a diagnosis/diagnoses of Moderate protein-calorie malnutrition.    This patient is being managed with:   Diet DASH/TLC-  Sodium & Cholesterol Restricted  Supplement Feeding Modality:  Oral  Ensure Enlive Cans or Servings Per Day:  1       Frequency:  Daily  Entered: Aug 15 2022  2:20PM    Diet DASH/TLC-  Sodium & Cholesterol Restricted  Entered: Aug 11 2022 10:14PM    The following pending diet order is being considered for treatment of Moderate protein-calorie malnutrition:null
This patient has been assessed with a concern for Malnutrition and has been determined to have a diagnosis/diagnoses of Moderate protein-calorie malnutrition.    This patient is being managed with:   Diet DASH/TLC-  Sodium & Cholesterol Restricted  Supplement Feeding Modality:  Oral  Ensure Enlive Cans or Servings Per Day:  1       Frequency:  Daily  Entered: Aug 15 2022  2:20PM    Diet DASH/TLC-  Sodium & Cholesterol Restricted  Entered: Aug 11 2022 10:14PM    The following pending diet order is being considered for treatment of Moderate protein-calorie malnutrition:null
This patient has been assessed with a concern for Malnutrition and has been determined to have a diagnosis/diagnoses of Moderate protein-calorie malnutrition.    This patient is being managed with:   Diet DASH/TLC-  Sodium & Cholesterol Restricted  Supplement Feeding Modality:  Oral  Ensure Enlive Cans or Servings Per Day:  1       Frequency:  Two Times a day  Entered: Aug 19 2022  3:22PM    Diet DASH/TLC-  Sodium & Cholesterol Restricted  Supplement Feeding Modality:  Oral  Ensure Enlive Cans or Servings Per Day:  1       Frequency:  Daily  Entered: Aug 15 2022  2:20PM    The following pending diet order is being considered for treatment of Moderate protein-calorie malnutrition:null
This patient has been assessed with a concern for Malnutrition and has been determined to have a diagnosis/diagnoses of Moderate protein-calorie malnutrition.    This patient is being managed with:   Diet DASH/TLC-  Sodium & Cholesterol Restricted  Supplement Feeding Modality:  Oral  Ensure Enlive Cans or Servings Per Day:  1       Frequency:  Daily  Entered: Aug 15 2022  2:20PM    Diet DASH/TLC-  Sodium & Cholesterol Restricted  Entered: Aug 11 2022 10:14PM    The following pending diet order is being considered for treatment of Moderate protein-calorie malnutrition:null
This patient has been assessed with a concern for Malnutrition and has been determined to have a diagnosis/diagnoses of Moderate protein-calorie malnutrition.    This patient is being managed with:   Diet DASH/TLC-  Sodium & Cholesterol Restricted  Supplement Feeding Modality:  Oral  Ensure Enlive Cans or Servings Per Day:  1       Frequency:  Two Times a day  Entered: Aug 19 2022  3:22PM    Diet DASH/TLC-  Sodium & Cholesterol Restricted  Supplement Feeding Modality:  Oral  Ensure Enlive Cans or Servings Per Day:  1       Frequency:  Daily  Entered: Aug 15 2022  2:20PM    The following pending diet order is being considered for treatment of Moderate protein-calorie malnutrition:null
This patient has been assessed with a concern for Malnutrition and has been determined to have a diagnosis/diagnoses of Moderate protein-calorie malnutrition.    This patient is being managed with:   Diet DASH/TLC-  Sodium & Cholesterol Restricted  Supplement Feeding Modality:  Oral  Ensure Enlive Cans or Servings Per Day:  1       Frequency:  Daily  Entered: Aug 15 2022  2:20PM    
This patient has been assessed with a concern for Malnutrition and has been determined to have a diagnosis/diagnoses of Moderate protein-calorie malnutrition.    This patient is being managed with:   Diet DASH/TLC-  Sodium & Cholesterol Restricted  Supplement Feeding Modality:  Oral  Ensure Enlive Cans or Servings Per Day:  1       Frequency:  Two Times a day  Entered: Aug 19 2022  3:22PM    Diet DASH/TLC-  Sodium & Cholesterol Restricted  Supplement Feeding Modality:  Oral  Ensure Enlive Cans or Servings Per Day:  1       Frequency:  Daily  Entered: Aug 15 2022  2:20PM    The following pending diet order is being considered for treatment of Moderate protein-calorie malnutrition:null
This patient has been assessed with a concern for Malnutrition and has been determined to have a diagnosis/diagnoses of Moderate protein-calorie malnutrition.    This patient is being managed with:   Diet DASH/TLC-  Sodium & Cholesterol Restricted  Supplement Feeding Modality:  Oral  Ensure Enlive Cans or Servings Per Day:  1       Frequency:  Daily  Entered: Aug 15 2022  2:20PM    Diet DASH/TLC-  Sodium & Cholesterol Restricted  Entered: Aug 11 2022 10:14PM    The following pending diet order is being considered for treatment of Moderate protein-calorie malnutrition:null
This patient has been assessed with a concern for Malnutrition and has been determined to have a diagnosis/diagnoses of Moderate protein-calorie malnutrition.    This patient is being managed with:   Diet DASH/TLC-  Sodium & Cholesterol Restricted  Supplement Feeding Modality:  Oral  Ensure Enlive Cans or Servings Per Day:  1       Frequency:  Two Times a day  Entered: Aug 19 2022  3:22PM    Diet DASH/TLC-  Sodium & Cholesterol Restricted  Supplement Feeding Modality:  Oral  Ensure Enlive Cans or Servings Per Day:  1       Frequency:  Daily  Entered: Aug 15 2022  2:20PM    The following pending diet order is being considered for treatment of Moderate protein-calorie malnutrition:null

## 2022-08-29 ENCOUNTER — INPATIENT (INPATIENT)
Facility: HOSPITAL | Age: 87
LOS: 14 days | Discharge: NURSING, REHAB OR LONG-TERM CARE | DRG: 871 | End: 2022-09-13
Attending: INTERNAL MEDICINE | Admitting: INTERNAL MEDICINE
Payer: MEDICARE

## 2022-08-29 ENCOUNTER — TRANSCRIPTION ENCOUNTER (OUTPATIENT)
Age: 87
End: 2022-08-29

## 2022-08-29 VITALS
HEART RATE: 70 BPM | TEMPERATURE: 98 F | DIASTOLIC BLOOD PRESSURE: 71 MMHG | SYSTOLIC BLOOD PRESSURE: 140 MMHG | OXYGEN SATURATION: 99 % | RESPIRATION RATE: 18 BRPM

## 2022-08-29 VITALS
RESPIRATION RATE: 18 BRPM | TEMPERATURE: 98 F | SYSTOLIC BLOOD PRESSURE: 150 MMHG | DIASTOLIC BLOOD PRESSURE: 69 MMHG | HEART RATE: 62 BPM | OXYGEN SATURATION: 95 %

## 2022-08-29 DIAGNOSIS — J18.9 PNEUMONIA, UNSPECIFIED ORGANISM: ICD-10-CM

## 2022-08-29 DIAGNOSIS — Z87.2 PERSONAL HISTORY OF DISEASES OF THE SKIN AND SUBCUTANEOUS TISSUE: Chronic | ICD-10-CM

## 2022-08-29 PROCEDURE — 99223 1ST HOSP IP/OBS HIGH 75: CPT

## 2022-08-29 PROCEDURE — 87449 NOS EACH ORGANISM AG IA: CPT

## 2022-08-29 PROCEDURE — 86480 TB TEST CELL IMMUN MEASURE: CPT

## 2022-08-29 PROCEDURE — 83605 ASSAY OF LACTIC ACID: CPT

## 2022-08-29 PROCEDURE — 85610 PROTHROMBIN TIME: CPT

## 2022-08-29 PROCEDURE — 99285 EMERGENCY DEPT VISIT HI MDM: CPT

## 2022-08-29 PROCEDURE — 87637 SARSCOV2&INF A&B&RSV AMP PRB: CPT

## 2022-08-29 PROCEDURE — 97530 THERAPEUTIC ACTIVITIES: CPT

## 2022-08-29 PROCEDURE — 87070 CULTURE OTHR SPECIMN AEROBIC: CPT

## 2022-08-29 PROCEDURE — 83735 ASSAY OF MAGNESIUM: CPT

## 2022-08-29 PROCEDURE — 86140 C-REACTIVE PROTEIN: CPT

## 2022-08-29 PROCEDURE — 87015 SPECIMEN INFECT AGNT CONCNTJ: CPT

## 2022-08-29 PROCEDURE — 87635 SARS-COV-2 COVID-19 AMP PRB: CPT

## 2022-08-29 PROCEDURE — 84100 ASSAY OF PHOSPHORUS: CPT

## 2022-08-29 PROCEDURE — 84145 PROCALCITONIN (PCT): CPT

## 2022-08-29 PROCEDURE — 86713 LEGIONELLA ANTIBODY: CPT

## 2022-08-29 PROCEDURE — 87389 HIV-1 AG W/HIV-1&-2 AB AG IA: CPT

## 2022-08-29 PROCEDURE — 71250 CT THORAX DX C-: CPT | Mod: MA

## 2022-08-29 PROCEDURE — 87040 BLOOD CULTURE FOR BACTERIA: CPT

## 2022-08-29 PROCEDURE — 97110 THERAPEUTIC EXERCISES: CPT

## 2022-08-29 PROCEDURE — 99233 SBSQ HOSP IP/OBS HIGH 50: CPT

## 2022-08-29 PROCEDURE — 87206 SMEAR FLUORESCENT/ACID STAI: CPT

## 2022-08-29 PROCEDURE — 71045 X-RAY EXAM CHEST 1 VIEW: CPT

## 2022-08-29 PROCEDURE — 85025 COMPLETE CBC W/AUTO DIFF WBC: CPT

## 2022-08-29 PROCEDURE — 83880 ASSAY OF NATRIURETIC PEPTIDE: CPT

## 2022-08-29 PROCEDURE — 97161 PT EVAL LOW COMPLEX 20 MIN: CPT

## 2022-08-29 PROCEDURE — 36600 WITHDRAWAL OF ARTERIAL BLOOD: CPT

## 2022-08-29 PROCEDURE — 80048 BASIC METABOLIC PNL TOTAL CA: CPT

## 2022-08-29 PROCEDURE — 83519 RIA NONANTIBODY: CPT

## 2022-08-29 PROCEDURE — 36415 COLL VENOUS BLD VENIPUNCTURE: CPT

## 2022-08-29 PROCEDURE — 80053 COMPREHEN METABOLIC PANEL: CPT

## 2022-08-29 PROCEDURE — 82803 BLOOD GASES ANY COMBINATION: CPT

## 2022-08-29 PROCEDURE — 85027 COMPLETE CBC AUTOMATED: CPT

## 2022-08-29 PROCEDURE — 87899 AGENT NOS ASSAY W/OPTIC: CPT

## 2022-08-29 PROCEDURE — 94760 N-INVAS EAR/PLS OXIMETRY 1: CPT

## 2022-08-29 PROCEDURE — 74176 CT ABD & PELVIS W/O CONTRAST: CPT | Mod: MA

## 2022-08-29 PROCEDURE — 82378 CARCINOEMBRYONIC ANTIGEN: CPT

## 2022-08-29 PROCEDURE — 96374 THER/PROPH/DIAG INJ IV PUSH: CPT

## 2022-08-29 PROCEDURE — 94640 AIRWAY INHALATION TREATMENT: CPT

## 2022-08-29 PROCEDURE — 97116 GAIT TRAINING THERAPY: CPT

## 2022-08-29 PROCEDURE — 83615 LACTATE (LD) (LDH) ENZYME: CPT

## 2022-08-29 PROCEDURE — 93005 ELECTROCARDIOGRAM TRACING: CPT

## 2022-08-29 PROCEDURE — 93306 TTE W/DOPPLER COMPLETE: CPT

## 2022-08-29 PROCEDURE — 87116 MYCOBACTERIA CULTURE: CPT

## 2022-08-29 PROCEDURE — 85730 THROMBOPLASTIN TIME PARTIAL: CPT

## 2022-08-29 PROCEDURE — 83036 HEMOGLOBIN GLYCOSYLATED A1C: CPT

## 2022-08-29 RX ORDER — PANTOPRAZOLE SODIUM 20 MG/1
40 TABLET, DELAYED RELEASE ORAL
Refills: 0 | Status: DISCONTINUED | OUTPATIENT
Start: 2022-08-29 | End: 2022-09-13

## 2022-08-29 RX ORDER — ALBUTEROL 90 UG/1
2.5 AEROSOL, METERED ORAL EVERY 6 HOURS
Refills: 0 | Status: DISCONTINUED | OUTPATIENT
Start: 2022-08-29 | End: 2022-09-13

## 2022-08-29 RX ORDER — ALBUTEROL 90 UG/1
1 AEROSOL, METERED ORAL EVERY 6 HOURS
Refills: 0 | Status: DISCONTINUED | OUTPATIENT
Start: 2022-08-29 | End: 2022-09-13

## 2022-08-29 RX ORDER — FINASTERIDE 5 MG/1
5 TABLET, FILM COATED ORAL DAILY
Refills: 0 | Status: DISCONTINUED | OUTPATIENT
Start: 2022-08-29 | End: 2022-09-13

## 2022-08-29 RX ORDER — TAMSULOSIN HYDROCHLORIDE 0.4 MG/1
0.4 CAPSULE ORAL AT BEDTIME
Refills: 0 | Status: DISCONTINUED | OUTPATIENT
Start: 2022-08-29 | End: 2022-09-13

## 2022-08-29 RX ORDER — SACCHAROMYCES BOULARDII 250 MG
250 POWDER IN PACKET (EA) ORAL
Refills: 0 | Status: DISCONTINUED | OUTPATIENT
Start: 2022-08-29 | End: 2022-09-13

## 2022-08-29 RX ORDER — METOPROLOL TARTRATE 50 MG
50 TABLET ORAL
Refills: 0 | Status: DISCONTINUED | OUTPATIENT
Start: 2022-08-29 | End: 2022-09-02

## 2022-08-29 RX ORDER — SODIUM CHLORIDE 9 MG/ML
1000 INJECTION INTRAMUSCULAR; INTRAVENOUS; SUBCUTANEOUS
Refills: 0 | Status: DISCONTINUED | OUTPATIENT
Start: 2022-08-29 | End: 2022-09-06

## 2022-08-29 RX ORDER — BUDESONIDE AND FORMOTEROL FUMARATE DIHYDRATE 160; 4.5 UG/1; UG/1
2 AEROSOL RESPIRATORY (INHALATION)
Refills: 0 | Status: DISCONTINUED | OUTPATIENT
Start: 2022-08-29 | End: 2022-09-13

## 2022-08-29 RX ORDER — POLYETHYLENE GLYCOL 3350 17 G/17G
17 POWDER, FOR SOLUTION ORAL DAILY
Refills: 0 | Status: DISCONTINUED | OUTPATIENT
Start: 2022-08-29 | End: 2022-09-03

## 2022-08-29 RX ORDER — ACETAMINOPHEN 500 MG
650 TABLET ORAL EVERY 6 HOURS
Refills: 0 | Status: DISCONTINUED | OUTPATIENT
Start: 2022-08-29 | End: 2022-08-31

## 2022-08-29 RX ORDER — LANOLIN ALCOHOL/MO/W.PET/CERES
3 CREAM (GRAM) TOPICAL AT BEDTIME
Refills: 0 | Status: DISCONTINUED | OUTPATIENT
Start: 2022-08-29 | End: 2022-09-13

## 2022-08-29 RX ORDER — ATORVASTATIN CALCIUM 80 MG/1
40 TABLET, FILM COATED ORAL AT BEDTIME
Refills: 0 | Status: DISCONTINUED | OUTPATIENT
Start: 2022-08-29 | End: 2022-09-13

## 2022-08-29 RX ORDER — ENOXAPARIN SODIUM 100 MG/ML
40 INJECTION SUBCUTANEOUS EVERY 24 HOURS
Refills: 0 | Status: DISCONTINUED | OUTPATIENT
Start: 2022-08-29 | End: 2022-08-31

## 2022-08-29 RX ADMIN — Medication 650 MILLIGRAM(S): at 03:19

## 2022-08-29 RX ADMIN — PANTOPRAZOLE SODIUM 40 MILLIGRAM(S): 20 TABLET, DELAYED RELEASE ORAL at 06:25

## 2022-08-29 RX ADMIN — BUDESONIDE AND FORMOTEROL FUMARATE DIHYDRATE 2 PUFF(S): 160; 4.5 AEROSOL RESPIRATORY (INHALATION) at 06:45

## 2022-08-29 RX ADMIN — FINASTERIDE 5 MILLIGRAM(S): 5 TABLET, FILM COATED ORAL at 12:21

## 2022-08-29 RX ADMIN — Medication 50 MILLIGRAM(S): at 06:25

## 2022-08-29 RX ADMIN — ENOXAPARIN SODIUM 40 MILLIGRAM(S): 100 INJECTION SUBCUTANEOUS at 18:34

## 2022-08-29 RX ADMIN — Medication 50 MILLIGRAM(S): at 18:37

## 2022-08-29 RX ADMIN — BUDESONIDE AND FORMOTEROL FUMARATE DIHYDRATE 2 PUFF(S): 160; 4.5 AEROSOL RESPIRATORY (INHALATION) at 18:35

## 2022-08-29 RX ADMIN — Medication 10 MILLILITER(S): at 12:21

## 2022-08-29 RX ADMIN — Medication 10 MILLILITER(S): at 06:25

## 2022-08-29 RX ADMIN — Medication 250 MILLIGRAM(S): at 06:26

## 2022-08-29 RX ADMIN — Medication 10 MILLILITER(S): at 18:34

## 2022-08-29 RX ADMIN — Medication 250 MILLIGRAM(S): at 18:34

## 2022-08-29 RX ADMIN — Medication 650 MILLIGRAM(S): at 02:28

## 2022-08-29 NOTE — PROGRESS NOTE ADULT - SUBJECTIVE AND OBJECTIVE BOX
Date/Time Patient Seen:  		  Referring MD:   Data Reviewed	       Patient is a 87y old  Male who presents with a chief complaint of cough -> PNA (28 Aug 2022 06:21)      Subjective/HPI     PAST MEDICAL & SURGICAL HISTORY:  No pertinent past medical history    Skin cancer  s/p Mohs    Essential hypertension    Cerebrovascular accident (CVA), unspecified mechanism  2015    Hyperlipidemia, unspecified hyperlipidemia type    Complex tear of lateral meniscus of right knee as current injury, initial encounter    Complex tear of medial meniscus of right knee as current injury, initial encounter    Benign prostatic hyperplasia, presence of lower urinary tract symptoms unspecified, unspecified morphology    H/O pilonidal cyst          Medication list         MEDICATIONS  (STANDING):  ALBUTerol    90 MICROgram(s) HFA Inhaler 2 Puff(s) Inhalation every 8 hours  atorvastatin 40 milliGRAM(s) Oral at bedtime  budesonide  80 MICROgram(s)/formoterol 4.5 MICROgram(s) Inhaler 2 Puff(s) Inhalation two times a day  enoxaparin Injectable 40 milliGRAM(s) SubCutaneous every 24 hours  finasteride 5 milliGRAM(s) Oral daily  guaifenesin/dextromethorphan Oral Liquid 10 milliLiter(s) Oral every 6 hours  melatonin 5 milliGRAM(s) Oral at bedtime  metoprolol tartrate 50 milliGRAM(s) Oral two times a day  pantoprazole    Tablet 40 milliGRAM(s) Oral before breakfast  saccharomyces boulardii 250 milliGRAM(s) Oral two times a day  senna 2 Tablet(s) Oral at bedtime  tamsulosin 0.4 milliGRAM(s) Oral at bedtime    MEDICATIONS  (PRN):  acetaminophen     Tablet .. 650 milliGRAM(s) Oral every 6 hours PRN Temp greater or equal to 38C (100.4F), Mild Pain (1 - 3)  aluminum hydroxide/magnesium hydroxide/simethicone Suspension 30 milliLiter(s) Oral every 4 hours PRN Dyspepsia  diphenhydrAMINE 25 milliGRAM(s) Oral at bedtime PRN Insomnia  magnesium hydroxide Suspension 30 milliLiter(s) Oral daily PRN Constipation  ondansetron Injectable 4 milliGRAM(s) IV Push every 8 hours PRN Nausea and/or Vomiting  polyethylene glycol 3350 17 Gram(s) Oral daily PRN Constipation  sodium chloride 0.65% Nasal 1 Spray(s) Both Nostrils every 2 hours PRN Nasal Congestion         Vitals log        ICU Vital Signs Last 24 Hrs  T(C): 36.8 (29 Aug 2022 06:00), Max: 36.8 (29 Aug 2022 06:00)  T(F): 98.3 (29 Aug 2022 06:00), Max: 98.3 (29 Aug 2022 06:00)  HR: 63 (29 Aug 2022 06:00) (52 - 66)  BP: 124/57 (29 Aug 2022 06:00) (111/59 - 140/55)  BP(mean): 79 (29 Aug 2022 06:00) (68 - 86)  ABP: --  ABP(mean): --  RR: 19 (29 Aug 2022 06:00) (16 - 23)  SpO2: 92% (29 Aug 2022 06:00) (92% - 100%)    O2 Parameters below as of 29 Aug 2022 06:00  Patient On (Oxygen Delivery Method): nasal cannula  O2 Flow (L/min): 1               Input and Output:  I&O's Detail    27 Aug 2022 07:01  -  28 Aug 2022 07:00  --------------------------------------------------------  IN:  Total IN: 0 mL    OUT:    Voided (mL): 1050 mL  Total OUT: 1050 mL    Total NET: -1050 mL      28 Aug 2022 07:01  -  29 Aug 2022 06:48  --------------------------------------------------------  IN:    Oral Fluid: 720 mL  Total IN: 720 mL    OUT:  Total OUT: 0 mL    Total NET: 720 mL          Lab Data                        10.6   12.29 )-----------( 594      ( 28 Aug 2022 06:26 )             33.3     08-28    137  |  104  |  30<H>  ----------------------------<  83  4.7   |  25  |  1.07    Ca    8.4      28 Aug 2022 06:26  Phos  4.4     08-28  Mg     1.9     08-28              Review of Systems	      Objective     Physical Examination    heart s1s2  lung dec BS  abd soft      Pertinent Lab findings & Imaging      Bud:  NO   Adequate UO     I&O's Detail    27 Aug 2022 07:01  -  28 Aug 2022 07:00  --------------------------------------------------------  IN:  Total IN: 0 mL    OUT:    Voided (mL): 1050 mL  Total OUT: 1050 mL    Total NET: -1050 mL      28 Aug 2022 07:01  -  29 Aug 2022 06:48  --------------------------------------------------------  IN:    Oral Fluid: 720 mL  Total IN: 720 mL    OUT:  Total OUT: 0 mL    Total NET: 720 mL               Discussed with:     Cultures:	        Radiology

## 2022-08-29 NOTE — PROGRESS NOTE ADULT - SUBJECTIVE AND OBJECTIVE BOX
[facility], Division of Infectious Diseases  EROS Perez Y. Patel, S. Shah, G. [facility]  [phone]    Name: DENNIS BRADFORD  Age: 87y  Gender: Male  MRN: [id number]    Interval History:  Patient seen and examined at bedside this morning  No acute overnight events.   Notes reviewed    Antibiotics:      Medications:  acetaminophen     Tablet .. 650 milliGRAM(s) Oral every 6 hours PRN  ALBUTerol    90 MICROgram(s) HFA Inhaler 2 Puff(s) Inhalation every 8 hours  aluminum hydroxide/magnesium hydroxide/simethicone Suspension 30 milliLiter(s) Oral every 4 hours PRN  atorvastatin 40 milliGRAM(s) Oral at bedtime  budesonide  80 MICROgram(s)/formoterol 4.5 MICROgram(s) Inhaler 2 Puff(s) Inhalation two times a day  diphenhydrAMINE 25 milliGRAM(s) Oral at bedtime PRN  enoxaparin Injectable 40 milliGRAM(s) SubCutaneous every 24 hours  finasteride 5 milliGRAM(s) Oral daily  guaifenesin/dextromethorphan Oral Liquid 10 milliLiter(s) Oral every 6 hours  magnesium hydroxide Suspension 30 milliLiter(s) Oral daily PRN  melatonin 5 milliGRAM(s) Oral at bedtime  metoprolol tartrate 50 milliGRAM(s) Oral two times a day  ondansetron Injectable 4 milliGRAM(s) IV Push every 8 hours PRN  pantoprazole    Tablet 40 milliGRAM(s) Oral before breakfast  polyethylene glycol 3350 17 Gram(s) Oral daily PRN  saccharomyces boulardii 250 milliGRAM(s) Oral two times a day  senna 2 Tablet(s) Oral at bedtime  sodium chloride 0.65% Nasal 1 Spray(s) Both Nostrils every 2 hours PRN  tamsulosin 0.4 milliGRAM(s) Oral at bedtime      Review of Systems:  A 10-point review of systems was obtained.     Pertinent positives and negatives--  Constitutional: No fevers. No Chills. No Rigors.   Cardiovascular: No chest pain. No palpitations.  Respiratory: No shortness of breath. No cough.  Gastrointestinal: No nausea or vomiting. No diarrhea or constipation.   Psychiatric: Pleasant. Appropriate affect.    Review of systems otherwise negative except as previously noted.    Allergies: No Known Allergies    For details regarding the patient's past medical history, social history, family history, and other miscellaneous elements, please refer the initial infectious diseases consultation and/or the admitting history and physical examination for this admission.    Objective:  Vitals:   T(C): 36 (08-29-22 @ 08:00), Max: 36.8 (08-29-22 @ 06:00)  HR: 58 (08-29-22 @ 10:00) (53 - 66)  BP: 113/57 (08-29-22 @ 10:00) (111/59 - 140/55)  RR: 17 (08-29-22 @ 10:00) (15 - 23)  SpO2: 93% (08-29-22 @ 10:00) (92% - 100%)    Physical Examination:  General: no acute distress  HEENT: NC/AT, EOMI, anicteric, no oral lesions  Neck: supple, no palpable LAD  Cardio: S1, S2 heard, RRR, no murmurs  Resp: breath sounds heard bilaterally, no rales, wheezes or rhonchi  Abd: soft, NT, ND, + bowel sounds  Neuro: no obvious focal deficits  Ext: no edema or cyanosis  Skin: warm, dry, no visible rash      Laboratory Studies:  CBC:                       10.6   12.29 )-----------( 594      ( 28 Aug 2022 06:26 )             33.3     CMP: 08-28    137  |  104  |  30<H>  ----------------------------<  83  4.7   |  25  |  1.07    Ca    8.4      28 Aug 2022 06:26  Phos  4.4     08-28  Mg     1.9     08-28            Microbiology: reviewed    Culture - Sputum (collected 08-20-22 @ 18:58)  Source: .Sputum Sputum  Gram Stain (08-21-22 @ 23:49):    Numerous polymorphonuclear leukocytes per low power field    Rare Squamous epithelial cells per low power field    Rare Yeast like cells per oil power field    Few Gram Variable Rods per oil power field  Final Report (08-23-22 @ 15:37):    Normal Respiratory Dora present        Radiology: reviewed       [facility], Division of Infectious Diseases  EROS Perez Y. Patel, S. Shah, G. [facility]  [phone]    Name: DENNIS BRADFORD  Age: 87y  Gender: Male  MRN: [id number]    Interval History:  Patient seen and examined at bedside this morning  No acute overnight events. Afebrile  on NC  Feeling tired  Notes reviewed    Antibiotics:      Medications:  acetaminophen     Tablet .. 650 milliGRAM(s) Oral every 6 hours PRN  ALBUTerol    90 MICROgram(s) HFA Inhaler 2 Puff(s) Inhalation every 8 hours  aluminum hydroxide/magnesium hydroxide/simethicone Suspension 30 milliLiter(s) Oral every 4 hours PRN  atorvastatin 40 milliGRAM(s) Oral at bedtime  budesonide  80 MICROgram(s)/formoterol 4.5 MICROgram(s) Inhaler 2 Puff(s) Inhalation two times a day  diphenhydrAMINE 25 milliGRAM(s) Oral at bedtime PRN  enoxaparin Injectable 40 milliGRAM(s) SubCutaneous every 24 hours  finasteride 5 milliGRAM(s) Oral daily  guaifenesin/dextromethorphan Oral Liquid 10 milliLiter(s) Oral every 6 hours  magnesium hydroxide Suspension 30 milliLiter(s) Oral daily PRN  melatonin 5 milliGRAM(s) Oral at bedtime  metoprolol tartrate 50 milliGRAM(s) Oral two times a day  ondansetron Injectable 4 milliGRAM(s) IV Push every 8 hours PRN  pantoprazole    Tablet 40 milliGRAM(s) Oral before breakfast  polyethylene glycol 3350 17 Gram(s) Oral daily PRN  saccharomyces boulardii 250 milliGRAM(s) Oral two times a day  senna 2 Tablet(s) Oral at bedtime  sodium chloride 0.65% Nasal 1 Spray(s) Both Nostrils every 2 hours PRN  tamsulosin 0.4 milliGRAM(s) Oral at bedtime      Review of Systems:  A 10-point review of systems was obtained.     Review of systems otherwise negative except as previously noted.    Allergies: No Known Allergies    For details regarding the patient's past medical history, social history, family history, and other miscellaneous elements, please refer the initial infectious diseases consultation and/or the admitting history and physical examination for this admission.    Objective:  Vitals:   T(C): 36 (08-29-22 @ 08:00), Max: 36.8 (08-29-22 @ 06:00)  HR: 58 (08-29-22 @ 10:00) (53 - 66)  BP: 113/57 (08-29-22 @ 10:00) (111/59 - 140/55)  RR: 17 (08-29-22 @ 10:00) (15 - 23)  SpO2: 93% (08-29-22 @ 10:00) (92% - 100%)    Physical Examination:  General: no acute distress  HEENT: NC/AT, EOMI  Cardio: S1, S2 heard, RRR, no murmurs  Resp: breath sounds heard bilaterally, no rales, wheezes or rhonchi  Abd: soft, NT, ND,  Ext: no edema or cyanosis  Skin: warm, dry, no visible rash      Laboratory Studies:  CBC:                       10.6   12.29 )-----------( 594      ( 28 Aug 2022 06:26 )             33.3     CMP: 08-28    137  |  104  |  30<H>  ----------------------------<  83  4.7   |  25  |  1.07    Ca    8.4      28 Aug 2022 06:26  Phos  4.4     08-28  Mg     1.9     08-28            Microbiology: reviewed    Culture - Sputum (collected 08-20-22 @ 18:58)  Source: .Sputum Sputum  Gram Stain (08-21-22 @ 23:49):    Numerous polymorphonuclear leukocytes per low power field    Rare Squamous epithelial cells per low power field    Rare Yeast like cells per oil power field    Few Gram Variable Rods per oil power field  Final Report (08-23-22 @ 15:37):    Normal Respiratory Dora present        Radiology: reviewed

## 2022-08-29 NOTE — DISCHARGE NOTE NURSING/CASE MANAGEMENT/SOCIAL WORK - PATIENT PORTAL LINK FT
You can access the FollowMyHealth Patient Portal offered by [facility] by registering at the following website: [email or web address]. By joining [facility]’s FollowMyHealth portal, you will also be able to view your health information using other applications (apps) compatible with our system.

## 2022-08-29 NOTE — H&P ADULT - NSHPPHYSICALEXAM_GEN_ALL_CORE
T(C): 36.8 (08-29-22 @ 22:25), Max: 36.8 (08-29-22 @ 06:00)  HR: 62 (08-29-22 @ 22:25) (53 - 70)  BP: 150/69 (08-29-22 @ 22:25) (112/50 - 150/69)  RR: 18 (08-29-22 @ 22:25) (15 - 20)  SpO2: 95% (08-29-22 @ 22:25) (92% - 100%)    GENERAL: patient appears well, no acute distress, appropriate, pleasant  EYES: sclera clear, no exudates  ENMT: oropharynx clear without erythema, no exudates, moist mucous membranes  NECK: supple, soft, no thyromegaly noted  LUNGS: decreased breath sounds bilaterally,  symmetric breath sounds, no wheezing or rhonchi appreciated  HEART: soft S1/S2, irregular rate and rhythm, no murmurs noted, no lower extremity edema  GASTROINTESTINAL: abdomen is soft, nontender, nondistended, normoactive bowel sounds, no palpable masses  INTEGUMENT: good skin turgor, no lesions noted  MUSCULOSKELETAL: no clubbing or cyanosis, no obvious deformity  NEUROLOGIC: awake, alert, oriented x3, good muscle tone in 4 extremities, no obvious sensory deficits  PSYCHIATRIC: mood is good, affect is congruent, linear and logical thought process  HEME/LYMPH: no palpable supraclavicular nodules, no obvious ecchymosis or petechiae

## 2022-08-29 NOTE — DISCHARGE NOTE NURSING/CASE MANAGEMENT/SOCIAL WORK - NSDCVIVACCINE_GEN_ALL_CORE_FT
influenza, injectable, quadrivalent, preservative free; 16-Nov-2015 12:30; Sangeetha Aceves (RN); [employer]; [id number]; IntraMuscular; Deltoid Left.; 0.5 milliLiter(s); VIS (VIS Published: 07-Aug-2015, VIS Presented: 16-Nov-2015);

## 2022-08-29 NOTE — H&P ADULT - HISTORY OF PRESENT ILLNESS
87M with HTN, HLD, hx of CVA (no residual deficits), BPH, hx of skin CA who presents with cough and fevers.  Symptoms started about 5-6 days prior to admission.  Started with left sided upper back pain.  Then started to have a cough associated with green phlegm and possible blood.  Associated with SOB and DONG.  No weight changes or night sweats.  Had a feveras high as 102F.  Some nausea but no vomiting with loss of appetite.  No chest pain.  No diarrhea.  No abdominal pain.  No recent travel or sick contacts.  Went to see his PMD 3 days prior to [place] admission and was given a z-kellie and tesslon perles.  Reported had negative COVID tests at home.   In the [place] ED, patient's triage vitals were /78    RR  21, 93% on RA (thought dropped down to 88%) and T 98.5F.  Labs showed WBC 18 with a left shift, CINDI with BUN/Cr 40/1.88, elevated LFTs, normal lactate, negative COVID (last booster in 12/2021 with Pfizer vaccine), neg influenza, neg RSV.  CT chest showed "patchy groundglass and more dense opacities in both lungs, suspicious for multifocal PNA...several small lucencies with areas of lung opacity...may represent early cavitation versus areas of focal bronchiectasis."  Patient started on azithromycin and ceftriaxone empirically for multifocal PNA.  Also of note, patient was found to be in new afib on EKG.  Patient denies any afib history but said when he was younger he did feel some irregular heartbeats.  Patient admitted to [facility] with sepsis due to multifocal pneumonia with progression to acute hypoxic respiratory failure now on HFNC and course further c/b new onset afib. Patient was seen by ID, pulm and cardiology. He is now transferred to [facility] for bronchoscopy. Patient denies states he is feeling well at this time.

## 2022-08-29 NOTE — PROGRESS NOTE ADULT - ASSESSMENT
87M with HTN, HLD, hx of CVA (no residual deficits), BPH, hx of skin CA who presents with cough and fevers    afb neg x 3 - now with AFB in broth - growth - ID follow up reviewed - back on Isolation for TB rule out    plan for transfer to [place] and Bronchoscopic exam - eval for TB - Malignancy - Infection NOS  plan for Bronch on Tuesday  spoke with DTR - in agreement    s/p Levaquin     ID - Cardio follow up  I and O  serial labs  monitor VS and Sat  prognosis guarded  GOC discussion

## 2022-08-29 NOTE — PROGRESS NOTE ADULT - SUBJECTIVE AND OBJECTIVE BOX
Chief Complaint: Hemoptysis    Interval Events: No events overnight.    Review of Systems:  General: No fevers, chills, weight gain  Skin: No rashes, color changes  Cardiovascular: No chest pain, orthopnea  Respiratory: No shortness of breath, cough  Gastrointestinal: No nausea, abdominal pain  Genitourinary: No incontinence, pain with urination  Musculoskeletal: No pain, swelling, decreased range of motion  Neurological: No headache, weakness  Psychiatric: No depression, anxiety  Endocrine: No weight gain, increased thirst  All other systems are comprehensively negative.    Physical Exam:  Vital Signs Last 24 Hrs  T(C): 36 (29 Aug 2022 08:00), Max: 36.8 (29 Aug 2022 06:00)  T(F): 96.8 (29 Aug 2022 08:00), Max: 98.3 (29 Aug 2022 06:00)  HR: 57 (29 Aug 2022 08:00) (53 - 66)  BP: 124/66 (29 Aug 2022 08:00) (111/59 - 140/55)  BP(mean): 81 (29 Aug 2022 08:00) (68 - 81)  RR: 15 (29 Aug 2022 08:00) (15 - 23)  SpO2: 98% (29 Aug 2022 08:00) (92% - 100%)  Parameters below as of 29 Aug 2022 08:00  Patient On (Oxygen Delivery Method): nasal cannula  O2 Flow (L/min): 1  General: NAD  HEENT: MMM  Neck: No JVD, no carotid bruit  Lungs: CTAB  CV: RRR, nl S1/S2, no M/R/G  Abdomen: S/NT/ND, +BS  Extremities: No LE edema, no cyanosis  Neuro: AAOx3, non-focal  Skin: No rash    Labs:    08-28    137  |  104  |  30<H>  ----------------------------<  83  4.7   |  25  |  1.07    Ca    8.4      28 Aug 2022 06:26  Phos  4.4     08-28  Mg     1.9     08-28                          10.6   12.29 )-----------( 594      ( 28 Aug 2022 06:26 )             33.3       Telemetry: Sinus rhythm with PACs

## 2022-08-29 NOTE — PATIENT PROFILE ADULT - FALL HARM RISK - HARM RISK INTERVENTIONS
Assistance with ambulation/Assistance OOB with selected safe patient handling equipment/Communicate Risk of Fall with Harm to all staff/Monitor for mental status changes/Monitor gait and stability/Reinforce activity limits and safety measures with patient and family/Tailored Fall Risk Interventions/Visual Cue: Yellow wristband and red socks/Bed in lowest position, wheels locked, appropriate side rails in place/Call bell, personal items and telephone in reach/Instruct patient to call for assistance before getting out of bed or chair/Non-slip footwear when patient is out of bed/[place] to call system/Physically safe environment - no spills, clutter or unnecessary equipment/Purposeful Proactive Rounding/Room/bathroom lighting operational, light cord in reach

## 2022-08-29 NOTE — DISCHARGE NOTE NURSING/CASE MANAGEMENT/SOCIAL WORK - NSDCPEFALRISK_GEN_ALL_CORE
For information on Fall & Injury Prevention, visit: [email or web address] OR  [email or web address] OR  https://www.cdc.gov/steadi/patient.html

## 2022-08-29 NOTE — H&P ADULT - ASSESSMENT
86yo M with PMH of HTN, HLD, hx of CVA (no residual deficits), BPH, hx of skin CA who presents with cough and fevers a/w sepsis due to multifocal pneumonia with progression to acute hypoxic respiratory failure now on HFNC and course further c/b new onset afib.  Transferred from [place] for bronchoscopy.     Sepsis 2/2 multifocal PNA   - met sepsis criteria on arrival to [place] with leukocytosis + tachycardia + source of infection  - strep PNA Ag negative. Legionella serum antibody positive, legionella urine Ag negative - ID ordered a legionella serum PCR to get more clarity if pt had legionella PNA  - s/p ceftriaxone and azithromycin. Completed Levaquin   - pulmonary (Gage), recs appreciated  - AFB smear negative x3  - still with leukocytosis but downtrending to 12.29 ; down from peak of 26K earlier in admission ; O2 requirements are starting to improve also,  now on 3 liters  PLAN:  - f/up sputum culture  - plan for bronchoscopy in AM.  - f/up Fungitell (sent to look for alternative etiology like PCP pneumonia as pt had been on high FiO2 for 10 days on Abx) / HIV negative  once optimized, pt was dc'ed to [facility].    Pt to f/u with Dr. Walton pulmonary as outpatient.   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   - pulmonary (Gage), recs appreciated  - Continue HFNC, continue to monitor and will trial wean   - O2 requirements are starting to improve also, with HFNC down to 40% FiO2 from 60% yesterday   - full code, intubate if needed  as above    Cavitary lesion  - doubt TB, but patient had blood tinged sputum, possible early cavitation on CT, and fevers. Low suspicion for TB as per ID, discontinued isolation  - quantiferon equivocal. AFB smear negative x3  - plan for bronch on monday    New afib   - [id number] score of at least 5 -> was on heparin drip but had worsening hemoptysis and cardio felt risk > benefits so full A/C discontinued and pt just given ppx dose lovenox  - cardiology (Miya) consulted, recs appreciated  - rate control if needed  - TTE shows Low normal left ventricular systolic function. Bi-atrial enlargement. Mild pulmonary hypertension  - on Lovenox, will eventually need to transition to DOAC when feel confident hemoptysis has resolved     Transaminitis  - likely from current illness ; possibly from legionella if that is the culprit of the PNA  - CT A/P without significant pathology to cause the transaminitis; monitor labs  - LFTs are improving  - can consider GI consult if does not improve with Abx therapy     CINDI 2/2 suspected prerenal azotemia    - likely 2/2 poor PO intake  - oral hydration encouraged, monitor BMP - likely now at baseline renal function  - hold diuretic  - avoid nephrotoxic meds    HTN/HLD  - cont with norvasc  - hold lasix  - cont with atorvastatin    BPH  - cont with tamsulosin and finasteride    VTE ppx  - c/w Lovenox, will eventually transition to DOAC when it is clear hemoptysis has resolved  88yo M with PMH of HTN, HLD, hx of CVA (no residual deficits), BPH, hx of skin CA who presents with cough and fevers a/w sepsis due to multifocal pneumonia with progression to acute hypoxic respiratory failure now on HFNC and course further c/b new onset afib.  Transferred from [place] for bronchoscopy.     Sepsis 2/2 multifocal PNA   r/o TB  - met sepsis criteria on arrival to [place] with leukocytosis + tachycardia + source of infection  - strep PNA Ag negative. Legionella serum antibody positive, legionella urine Ag negative - ID ordered a legionella serum PCR to get more clarity if pt had legionella PNA  - s/p ceftriaxone and azithromycin. Completed Levaquin   - pulmonary (Gage), recs appreciated  - still with leukocytosis but downtrending to 12.29 ; down from peak of 26K earlier in admission ; O2 requirements are starting to improve also,  now on 3 liters  - pt s/p AFB x3 smears--all AFB smear negative  - pt w/ AFB growth in broth--pending ID  - plan for bronchoscopy in AM.      Acute hypoxia respiratory failure 2/2 likely PNA.   - pulmonary (Gage), recs appreciated  - oxygen supplementation as needed, maintain sp02 >90   - was previously on HFNC now weaned to NC02   - full code, intubate if needed  as above    Cavitary lesion  - quantiferon equivocal. AFB smear negative x3  - pt w/ AFB growth in broth--pending ID  - plan for bronchoscopy in AM.  - Dr. Almonte following     New afib   - [id number] score of at least 5 -> was on heparin drip but had worsening hemoptysis and cardio felt risk > benefits so full A/C discontinued and pt just given ppx dose lovenox  - cardiology (Miya) consulted, recs appreciated  - continue metoprolol   - TTE shows Low normal left ventricular systolic function. Bi-atrial enlargement. Mild pulmonary hypertension  - on Lovenox, will eventually need to transition to DOAC when feel confident hemoptysis has resolved     Transaminitis  - likely from current illness   - CT A/P without significant pathology to cause the transaminitis; monitor labs  - LFTs are improving    CINDI 2/2 suspected prerenal azotemia    improving   - likely 2/2 poor PO intake  - oral hydration encouraged, monitor BMP - likely now at baseline renal function  - hold diuretic, can restart if needed and cr stable   - avoid nephrotoxic meds    HTN/HLD  - hold amlodipine per cardio recs to allow for rate control medications   - cont with atorvastatin    BPH  - cont with tamsulosin and finasteride    VTE ppx  - c/w Lovenox, will eventually transition to DOAC after bronch

## 2022-08-29 NOTE — H&P ADULT - NSHPREVIEWOFSYSTEMS_GEN_ALL_CORE
CONSTITUTIONAL: denies fever, chills, fatigue, weakness  HEENT: denies blurred vision, sore throat  SKIN: denies new lesions, rash  CARDIOVASCULAR: denies chest pain, chest pressure, palpitations  RESPIRATORY: improving shortness of breath, denies sputum production  GASTROINTESTINAL: denies nausea, vomiting, diarrhea, abdominal pain  GENITOURINARY: denies dysuria, discharge  NEUROLOGICAL: denies numbness, headache, focal weakness  MUSCULOSKELETAL: admits low back pain 2/2 hospital bed   HEMATOLOGIC: denies gross bleeding, bruising  LYMPHATICS: denies enlarged lymph nodes, extremity swelling  PSYCHIATRIC: denies recent changes in anxiety, depression  ENDOCRINOLOGIC: denies sweating, cold or heat intolerance

## 2022-08-29 NOTE — PROGRESS NOTE ADULT - ASSESSMENT
87M with HTN, HLD, hx of CVA (no residual deficits), BPH, hx of skin CA who presents with cough and fevers.    Found to have multifocal pneumonia.  Also found have new onset afib.        R/o TB  - pt s/p AFB x3 smears--all AFB smear negative  - pt w/ AFB growth in broth--pending ID  - pending micro lab to run possible mTB PCR--they are unable to tell me what day it will be run  - d/w Dr. Clayton--bronch tomorrow  - will hold off additional therapy pending bronch    Sepsis 2/2 Multifocal PNA  Hemoptysis   AHRF on NC  - pt p/w leukocytosis, SOB  - legionella Ab+, urine legionella negative, sputum legionella negative  Plan:   - s/p levofloxacin  - trend temps/WBC  - supportive care/supplemental O2 per primary team  - off HFNC, on NC since 8/23    Elevated LFTs  - likely 2/2 sepsis  - trend LFTs    CINDI 2/2 prerenal azotemia  - likely 2/2 poor PO intake/sepsis  - trend renal fxn  - avoid nephrotoxic agents  - renally dose medications    Infectious Diseases will continue to follow. Please call with any questions.   Adriana Rodriguez M.D.  [facility], Division of Infectious Diseases [phone]

## 2022-08-29 NOTE — PROGRESS NOTE ADULT - PROVIDER SPECIALTY LIST ADULT
Cardiology
Critical Care
Critical Care
Hospitalist
Hospitalist
Infectious Disease
Cardiology
Critical Care
Hospitalist
Infectious Disease
Internal Medicine
Pulmonology
Cardiology
Critical Care
Critical Care
Hospitalist
Infectious Disease
Pulmonology
Critical Care
Hospitalist
Infectious Disease
Infectious Disease
Pulmonology
Critical Care
Hospitalist

## 2022-08-30 ENCOUNTER — TRANSCRIPTION ENCOUNTER (OUTPATIENT)
Age: 87
End: 2022-08-30

## 2022-08-30 ENCOUNTER — RESULT REVIEW (OUTPATIENT)
Age: 87
End: 2022-08-30

## 2022-08-30 DIAGNOSIS — E78.5 HYPERLIPIDEMIA, UNSPECIFIED: ICD-10-CM

## 2022-08-30 DIAGNOSIS — Z87.438 PERSONAL HISTORY OF OTHER DISEASES OF MALE GENITAL ORGANS: ICD-10-CM

## 2022-08-30 DIAGNOSIS — Z29.9 ENCOUNTER FOR PROPHYLACTIC MEASURES, UNSPECIFIED: ICD-10-CM

## 2022-08-30 DIAGNOSIS — Z91.89 OTHER SPECIFIED PERSONAL RISK FACTORS, NOT ELSEWHERE CLASSIFIED: ICD-10-CM

## 2022-08-30 DIAGNOSIS — M54.9 DORSALGIA, UNSPECIFIED: ICD-10-CM

## 2022-08-30 DIAGNOSIS — R76.11 NONSPECIFIC REACTION TO TUBERCULIN SKIN TEST WITHOUT ACTIVE TUBERCULOSIS: ICD-10-CM

## 2022-08-30 DIAGNOSIS — I48.91 UNSPECIFIED ATRIAL FIBRILLATION: ICD-10-CM

## 2022-08-30 DIAGNOSIS — I10 ESSENTIAL (PRIMARY) HYPERTENSION: ICD-10-CM

## 2022-08-30 LAB
ALBUMIN SERPL ELPH-MCNC: 2.3 G/DL — LOW (ref 3.3–5)
ALP SERPL-CCNC: 192 U/L — HIGH (ref 40–120)
ALT FLD-CCNC: 46 U/L — SIGNIFICANT CHANGE UP (ref 12–78)
ANION GAP SERPL CALC-SCNC: 6 MMOL/L — SIGNIFICANT CHANGE UP (ref 5–17)
AST SERPL-CCNC: 33 U/L — SIGNIFICANT CHANGE UP (ref 15–37)
BASOPHILS # BLD AUTO: 0.16 K/UL — SIGNIFICANT CHANGE UP (ref 0–0.2)
BASOPHILS NFR BLD AUTO: 1.5 % — SIGNIFICANT CHANGE UP (ref 0–2)
BILIRUB SERPL-MCNC: 0.2 MG/DL — SIGNIFICANT CHANGE UP (ref 0.2–1.2)
BUN SERPL-MCNC: 24 MG/DL — HIGH (ref 7–23)
CALCIUM SERPL-MCNC: 8.5 MG/DL — SIGNIFICANT CHANGE UP (ref 8.5–10.1)
CHLORIDE SERPL-SCNC: 110 MMOL/L — HIGH (ref 96–108)
CO2 SERPL-SCNC: 25 MMOL/L — SIGNIFICANT CHANGE UP (ref 22–31)
CREAT SERPL-MCNC: 0.96 MG/DL — SIGNIFICANT CHANGE UP (ref 0.5–1.3)
EGFR: 76 ML/MIN/1.73M2 — SIGNIFICANT CHANGE UP
EOSINOPHIL # BLD AUTO: 0.28 K/UL — SIGNIFICANT CHANGE UP (ref 0–0.5)
EOSINOPHIL NFR BLD AUTO: 2.6 % — SIGNIFICANT CHANGE UP (ref 0–6)
GLUCOSE SERPL-MCNC: 85 MG/DL — SIGNIFICANT CHANGE UP (ref 70–99)
HCT VFR BLD CALC: 34.9 % — LOW (ref 39–50)
HGB BLD-MCNC: 11 G/DL — LOW (ref 13–17)
IMM GRANULOCYTES NFR BLD AUTO: 1.3 % — SIGNIFICANT CHANGE UP (ref 0–1.5)
LYMPHOCYTES # BLD AUTO: 2.2 K/UL — SIGNIFICANT CHANGE UP (ref 1–3.3)
LYMPHOCYTES # BLD AUTO: 20.1 % — SIGNIFICANT CHANGE UP (ref 13–44)
MCHC RBC-ENTMCNC: 30.9 PG — SIGNIFICANT CHANGE UP (ref 27–34)
MCHC RBC-ENTMCNC: 31.5 GM/DL — LOW (ref 32–36)
MCV RBC AUTO: 98 FL — SIGNIFICANT CHANGE UP (ref 80–100)
MONOCYTES # BLD AUTO: 1.13 K/UL — HIGH (ref 0–0.9)
MONOCYTES NFR BLD AUTO: 10.3 % — SIGNIFICANT CHANGE UP (ref 2–14)
NEUTROPHILS # BLD AUTO: 7.01 K/UL — SIGNIFICANT CHANGE UP (ref 1.8–7.4)
NEUTROPHILS NFR BLD AUTO: 64.2 % — SIGNIFICANT CHANGE UP (ref 43–77)
NRBC # BLD: 0 /100 WBCS — SIGNIFICANT CHANGE UP (ref 0–0)
PLATELET # BLD AUTO: 582 K/UL — HIGH (ref 150–400)
POTASSIUM SERPL-MCNC: 3.9 MMOL/L — SIGNIFICANT CHANGE UP (ref 3.5–5.3)
POTASSIUM SERPL-SCNC: 3.9 MMOL/L — SIGNIFICANT CHANGE UP (ref 3.5–5.3)
PROT SERPL-MCNC: 7.4 G/DL — SIGNIFICANT CHANGE UP (ref 6–8.3)
RBC # BLD: 3.56 M/UL — LOW (ref 4.2–5.8)
RBC # FLD: 14 % — SIGNIFICANT CHANGE UP (ref 10.3–14.5)
SARS-COV-2 RNA SPEC QL NAA+PROBE: SIGNIFICANT CHANGE UP
SODIUM SERPL-SCNC: 141 MMOL/L — SIGNIFICANT CHANGE UP (ref 135–145)
WBC # BLD: 10.92 K/UL — HIGH (ref 3.8–10.5)
WBC # FLD AUTO: 10.92 K/UL — HIGH (ref 3.8–10.5)

## 2022-08-30 PROCEDURE — 88312 SPECIAL STAINS GROUP 1: CPT | Mod: 26

## 2022-08-30 PROCEDURE — 99233 SBSQ HOSP IP/OBS HIGH 50: CPT | Mod: GC

## 2022-08-30 PROCEDURE — 88305 TISSUE EXAM BY PATHOLOGIST: CPT | Mod: 26

## 2022-08-30 PROCEDURE — 88108 CYTOPATH CONCENTRATE TECH: CPT | Mod: 26

## 2022-08-30 RX ORDER — NALOXONE HYDROCHLORIDE 4 MG/.1ML
0.4 SPRAY NASAL ONCE
Refills: 0 | Status: DISCONTINUED | OUTPATIENT
Start: 2022-08-30 | End: 2022-09-13

## 2022-08-30 RX ORDER — AMLODIPINE BESYLATE 2.5 MG/1
5 TABLET ORAL DAILY
Refills: 0 | Status: DISCONTINUED | OUTPATIENT
Start: 2022-08-30 | End: 2022-09-13

## 2022-08-30 RX ORDER — SENNA PLUS 8.6 MG/1
2 TABLET ORAL AT BEDTIME
Refills: 0 | Status: DISCONTINUED | OUTPATIENT
Start: 2022-08-30 | End: 2022-09-13

## 2022-08-30 RX ORDER — OXYCODONE HYDROCHLORIDE 5 MG/1
2.5 TABLET ORAL EVERY 4 HOURS
Refills: 0 | Status: DISCONTINUED | OUTPATIENT
Start: 2022-08-30 | End: 2022-09-06

## 2022-08-30 RX ORDER — OXYCODONE HYDROCHLORIDE 5 MG/1
5 TABLET ORAL EVERY 4 HOURS
Refills: 0 | Status: DISCONTINUED | OUTPATIENT
Start: 2022-08-30 | End: 2022-08-30

## 2022-08-30 RX ADMIN — BUDESONIDE AND FORMOTEROL FUMARATE DIHYDRATE 2 PUFF(S): 160; 4.5 AEROSOL RESPIRATORY (INHALATION) at 21:11

## 2022-08-30 RX ADMIN — AMLODIPINE BESYLATE 5 MILLIGRAM(S): 2.5 TABLET ORAL at 10:47

## 2022-08-30 RX ADMIN — ATORVASTATIN CALCIUM 40 MILLIGRAM(S): 80 TABLET, FILM COATED ORAL at 21:07

## 2022-08-30 RX ADMIN — Medication 50 MILLIGRAM(S): at 05:45

## 2022-08-30 RX ADMIN — Medication 250 MILLIGRAM(S): at 18:41

## 2022-08-30 RX ADMIN — SODIUM CHLORIDE 60 MILLILITER(S): 9 INJECTION INTRAMUSCULAR; INTRAVENOUS; SUBCUTANEOUS at 00:05

## 2022-08-30 RX ADMIN — TAMSULOSIN HYDROCHLORIDE 0.4 MILLIGRAM(S): 0.4 CAPSULE ORAL at 21:07

## 2022-08-30 RX ADMIN — Medication 650 MILLIGRAM(S): at 00:00

## 2022-08-30 RX ADMIN — Medication 50 MILLIGRAM(S): at 18:41

## 2022-08-30 RX ADMIN — FINASTERIDE 5 MILLIGRAM(S): 5 TABLET, FILM COATED ORAL at 11:48

## 2022-08-30 RX ADMIN — Medication 650 MILLIGRAM(S): at 23:43

## 2022-08-30 RX ADMIN — ALBUTEROL 2.5 MILLIGRAM(S): 90 AEROSOL, METERED ORAL at 08:07

## 2022-08-30 RX ADMIN — ALBUTEROL 2.5 MILLIGRAM(S): 90 AEROSOL, METERED ORAL at 19:28

## 2022-08-30 RX ADMIN — TAMSULOSIN HYDROCHLORIDE 0.4 MILLIGRAM(S): 0.4 CAPSULE ORAL at 00:03

## 2022-08-30 RX ADMIN — Medication 650 MILLIGRAM(S): at 05:49

## 2022-08-30 RX ADMIN — ALBUTEROL 2.5 MILLIGRAM(S): 90 AEROSOL, METERED ORAL at 01:47

## 2022-08-30 RX ADMIN — ALBUTEROL 2.5 MILLIGRAM(S): 90 AEROSOL, METERED ORAL at 13:58

## 2022-08-30 RX ADMIN — ATORVASTATIN CALCIUM 40 MILLIGRAM(S): 80 TABLET, FILM COATED ORAL at 00:02

## 2022-08-30 RX ADMIN — Medication 3 MILLIGRAM(S): at 00:02

## 2022-08-30 RX ADMIN — Medication 650 MILLIGRAM(S): at 00:02

## 2022-08-30 RX ADMIN — SENNA PLUS 2 TABLET(S): 8.6 TABLET ORAL at 21:07

## 2022-08-30 RX ADMIN — Medication 650 MILLIGRAM(S): at 23:04

## 2022-08-30 RX ADMIN — OXYCODONE HYDROCHLORIDE 2.5 MILLIGRAM(S): 5 TABLET ORAL at 23:48

## 2022-08-30 RX ADMIN — Medication 650 MILLIGRAM(S): at 00:32

## 2022-08-30 RX ADMIN — ENOXAPARIN SODIUM 40 MILLIGRAM(S): 100 INJECTION SUBCUTANEOUS at 21:07

## 2022-08-30 NOTE — PROGRESS NOTE ADULT - SUBJECTIVE AND OBJECTIVE BOX
Patient is a 87y old  Male who presents with suspected TB, here forbronchoscopy (30 Aug 2022 11:19)    OVERNIGHT EVENTS/SUBJECTIVE: Patient seen and examined at bedside. No overnight events occurred (besides transfer). Patient complains of slight dyspnea with a mild nonproductive cough. 9/10 back pain that he attributes to lying in a stretch. Denies fevers, chills, headache, lightheadedness, chest pain, abdominal pain, n/v/d/c.    MEDICATIONS  (STANDING):  ALBUTerol    0.083% 2.5 milliGRAM(s) Nebulizer every 6 hours  ALBUTerol    90 MICROgram(s) HFA Inhaler 1 Puff(s) Inhalation every 6 hours  amLODIPine   Tablet 5 milliGRAM(s) Oral daily  atorvastatin 40 milliGRAM(s) Oral at bedtime  budesonide  80 MICROgram(s)/formoterol 4.5 MICROgram(s) Inhaler 2 Puff(s) Inhalation two times a day  enoxaparin Injectable 40 milliGRAM(s) SubCutaneous every 24 hours  finasteride 5 milliGRAM(s) Oral daily  metoprolol tartrate 50 milliGRAM(s) Oral two times a day  pantoprazole    Tablet 40 milliGRAM(s) Oral before breakfast  saccharomyces boulardii 250 milliGRAM(s) Oral two times a day  sodium chloride 0.9%. 1000 milliLiter(s) (60 mL/Hr) IV Continuous <Continuous>  tamsulosin 0.4 milliGRAM(s) Oral at bedtime    MEDICATIONS  (PRN):  acetaminophen     Tablet .. 650 milliGRAM(s) Oral every 6 hours PRN Temp greater or equal to 38C (100.4F), Mild Pain (1 - 3)  melatonin 3 milliGRAM(s) Oral at bedtime PRN Insomnia  polyethylene glycol 3350 17 Gram(s) Oral daily PRN Constipation      Allergies    No Known Allergies    Intolerances    REVIEW OF SYSTEMS:  CONSTITUTIONAL: No fever/chills or appetite changes.  HENMT: No HA, lightheadedness/dizziness  RESPIRATORY: +Slight dyspnea and non productive cough. No wheezing, hemoptysis;   CARDIOVASCULAR: No chest pain, palpitations.  GASTROINTESTINAL: No abdominal or epigastric pain. No nausea or vomiting; No diarrhea or constipation.  GENITOURINARY: No dysuria, changes in frequency, hematuria, or incontinence  SKIN: No itching, rashes  MUSCULOSKELETAL: +9/10 midline back pain. No muscle, or extremity pain    PHYSICAL EXAM:   Vital Signs:  Vital Signs Last 24 Hrs  T(C): 36.4 (30 Aug 2022 12:55), Max: 36.9 (30 Aug 2022 04:33)  T(F): 97.5 (30 Aug 2022 12:55), Max: 98.4 (30 Aug 2022 04:33)  HR: 63 (30 Aug 2022 12:55) (62 - 84)  BP: 142/72 (30 Aug 2022 12:55) (125/63 - 160/71)  BP(mean): 83 (29 Aug 2022 15:26) (83 - 83)  RR: 18 (30 Aug 2022 12:55) (16 - 18)  SpO2: 94% (30 Aug 2022 12:55) (94% - 99%)    Parameters below as of 30 Aug 2022 04:33  Patient On (Oxygen Delivery Method): nasal cannula      Daily     Daily Weight in k.4 (30 Aug 2022 04:33)    GENERAL:  Not in acute distress, lying in bed comfortably  HEENT:  NC/AT,    CHEST:  CTAB  HEART:  Regular rate and rhythm, no murmurs, rubs, gallops  ABDOMEN:  Soft, non-tender, non-distended,  EXTEREMITIES:  no cyanosis, no LE edema  SKIN:  No rash  NEURO:  Alert, A&Ox3      LABS:                        11.0   10.92 )-----------( 582      ( 30 Aug 2022 05:56 )             34.9     0830    141  |  110<H>  |  24<H>  ----------------------------<  85  3.9   |  25  |  0.96    Ca    8.5      30 Aug 2022 05:56    TPro  7.4  /  Alb  2.3<L>  /  TBili  0.2  /  DBili  x   /  AST  33  /  ALT  46  /  AlkPhos  192<H>  08          RADIOLOGY & ADDITIONAL TESTS:

## 2022-08-30 NOTE — PROGRESS NOTE ADULT - PROBLEM SELECTOR PLAN 1
-[place] workup indeterminant for tb, AFB growth in broth positive, 3x AFB smears negative. Quant gold indeterminant, here from bronchoscopy scheduled for 8/30.  -8/22 CXR: Persistent advanced irregular infiltrates involving the left mid and upper lung fields and the right upper lobe.  -Without fevers since admission  -Persistent slight leukocytosis 11k, (11-13 k the past week)  -Pulmonology consult: Bronchoscopy, recs appreciated

## 2022-08-30 NOTE — PROCEDURE NOTE - NSTIMEOUT_GEN_A_CORE
Patient's first and last name, , procedure, and correct site confirmed prior to the start of procedure.
None known

## 2022-08-30 NOTE — DISCHARGE NOTE PROVIDER - ATTENDING DISCHARGE PHYSICAL EXAMINATION:
Vital Signs Last 24 Hrs  T(C): 36.3 (13 Sep 2022 05:04), Max: 36.8 (12 Sep 2022 21:41)  T(F): 97.3 (13 Sep 2022 05:04), Max: 98.2 (12 Sep 2022 21:41)  HR: 101 (13 Sep 2022 10:47) (59 - 101)  BP: 110/62 (13 Sep 2022 10:47) (102/60 - 126/74)  BP(mean): --  RR: 18 (13 Sep 2022 10:47) (18 - 18)  SpO2: 96% (13 Sep 2022 10:47) (94% - 98%)    Parameters below as of 13 Sep 2022 10:47  Patient On (Oxygen Delivery Method): room air        PHYSICAL EXAM:     GENERAL: no acute distress  HEENT: NC/AT, EOMI, neck supple, MMM  RESPIRATORY: LCTAB/L, no rhonchi, rales, or wheezing  CARDIOVASCULAR: RRR, no murmurs, gallops, rubs  ABDOMINAL: soft, non-tender, non-distended, positive bowel sounds   EXTREMITIES: no clubbing, cyanosis, or edema  NEUROLOGICAL: alert and oriented x 3, non-focal  SKIN: no rashes or lesions   MUSCULOSKELETAL: lower back TTP

## 2022-08-30 NOTE — CHART NOTE - NSCHARTNOTEFT_GEN_A_CORE
Other State Confidential Drug Utilization Report  Search Terms: teri ochoa, 09/07/1934Search Date: Aug 30, 2022 3:07:54 PMStates Searched: [place]  The Drug Utilization Report below displays the controlled substance prescriptions, if any, that were dispensed in the indicated state(s). The information displayed on this report is compiled from requests submitted to other states' PMPs, and accurately reflects the information as returned by them. Blank fields indicate data not provided by other state.    This report was requested by: Tricia Dorantes | Reference #: [id number]    Prescriptions Dispensed in [place]  There are no results for the search terms that you entered.    Prescriptions Dispensed in [place]  There are no results for the search terms that you entered.    Prescriptions Dispensed in [place]  There are no results for the search terms that you entered.    Prescriptions Dispensed in [place]  There are no results for the search terms that you entered.    Prescriptions Dispensed in [place]  There are no results for the search terms that you entered.    * - Drugs marked with an asterisk are compound drugs. If the compound drug is made up of more than one controlled substance, then each controlled substance will be a separate row in the table.

## 2022-08-30 NOTE — PROGRESS NOTE ADULT - SUBJECTIVE AND OBJECTIVE BOX
Date/Time Patient Seen:  		  Referring MD:   Data Reviewed	       Patient is a 87y old  Male who presents with a chief complaint of bronchoscopy (29 Aug 2022 23:01)      Subjective/HPI     PAST MEDICAL & SURGICAL HISTORY:  No pertinent past medical history    Skin cancer  s/p Mohs    Essential hypertension    Cerebrovascular accident (CVA), unspecified mechanism  2015    Hyperlipidemia, unspecified hyperlipidemia type    Complex tear of lateral meniscus of right knee as current injury, initial encounter    Complex tear of medial meniscus of right knee as current injury, initial encounter    Benign prostatic hyperplasia, presence of lower urinary tract symptoms unspecified, unspecified morphology    H/O pilonidal cyst          Medication list         MEDICATIONS  (STANDING):  ALBUTerol    0.083% 2.5 milliGRAM(s) Nebulizer every 6 hours  ALBUTerol    90 MICROgram(s) HFA Inhaler 1 Puff(s) Inhalation every 6 hours  atorvastatin 40 milliGRAM(s) Oral at bedtime  budesonide  80 MICROgram(s)/formoterol 4.5 MICROgram(s) Inhaler 2 Puff(s) Inhalation two times a day  enoxaparin Injectable 40 milliGRAM(s) SubCutaneous every 24 hours  finasteride 5 milliGRAM(s) Oral daily  metoprolol tartrate 50 milliGRAM(s) Oral two times a day  pantoprazole    Tablet 40 milliGRAM(s) Oral before breakfast  saccharomyces boulardii 250 milliGRAM(s) Oral two times a day  sodium chloride 0.9%. 1000 milliLiter(s) (60 mL/Hr) IV Continuous <Continuous>  tamsulosin 0.4 milliGRAM(s) Oral at bedtime    MEDICATIONS  (PRN):  acetaminophen     Tablet .. 650 milliGRAM(s) Oral every 6 hours PRN Temp greater or equal to 38C (100.4F), Mild Pain (1 - 3)  melatonin 3 milliGRAM(s) Oral at bedtime PRN Insomnia  polyethylene glycol 3350 17 Gram(s) Oral daily PRN Constipation         Vitals log        ICU Vital Signs Last 24 Hrs  T(C): 36.9 (30 Aug 2022 04:33), Max: 36.9 (30 Aug 2022 04:33)  T(F): 98.4 (30 Aug 2022 04:33), Max: 98.4 (30 Aug 2022 04:33)  HR: 64 (30 Aug 2022 04:33) (57 - 84)  BP: 160/71 (30 Aug 2022 04:33) (113/57 - 160/71)  BP(mean): 83 (29 Aug 2022 15:26) (75 - 83)  ABP: --  ABP(mean): --  RR: 18 (30 Aug 2022 04:33) (15 - 18)  SpO2: 94% (30 Aug 2022 04:33) (93% - 100%)    O2 Parameters below as of 30 Aug 2022 04:33  Patient On (Oxygen Delivery Method): nasal cannula                 Input and Output:  I&O's Detail      Lab Data                  Review of Systems	      Objective     Physical Examination    heart s1s2  lung dec bS  abd soft  on o2 support      Pertinent Lab findings & Imaging      Farrell:  NO   Adequate UO     I&O's Detail           Discussed with:     Cultures:	        Radiology

## 2022-08-30 NOTE — PROGRESS NOTE ADULT - SUBJECTIVE AND OBJECTIVE BOX
[facility] DIVISION of INFECTIOUS DISEASE  Mustapha Valente MD PhD, Sheryl Pride MD, Adriana Rodriguez MD, Oliva Myers MD, Ebenezer Rivas MD  and providing coverage with Leodan Zaragoza MD  Providing Infectious Disease Consultations at [facility], [facility], [place], [facility], [facility]'s    Office# [phone] to schedule follow up appointments  Answering Service for urgent calls or New Consults [phone]  Cell# to text for urgent issues Mustapha Valente [phone]     infectious diseases progress note:    DENNIS BRADFORD is a 87y y. o. Male patient    Overnight and events of the last 24hrs reviewed    Allergies    No Known Allergies    Intolerances        ANTIBIOTICS/RELEVANT:  antimicrobials    immunologic:    OTHER:  acetaminophen     Tablet .. 650 milliGRAM(s) Oral every 6 hours PRN  ALBUTerol    0.083% 2.5 milliGRAM(s) Nebulizer every 6 hours  ALBUTerol    90 MICROgram(s) HFA Inhaler 1 Puff(s) Inhalation every 6 hours  amLODIPine   Tablet 5 milliGRAM(s) Oral daily  atorvastatin 40 milliGRAM(s) Oral at bedtime  budesonide  80 MICROgram(s)/formoterol 4.5 MICROgram(s) Inhaler 2 Puff(s) Inhalation two times a day  enoxaparin Injectable 40 milliGRAM(s) SubCutaneous every 24 hours  finasteride 5 milliGRAM(s) Oral daily  melatonin 3 milliGRAM(s) Oral at bedtime PRN  metoprolol tartrate 50 milliGRAM(s) Oral two times a day  pantoprazole    Tablet 40 milliGRAM(s) Oral before breakfast  polyethylene glycol 3350 17 Gram(s) Oral daily PRN  saccharomyces boulardii 250 milliGRAM(s) Oral two times a day  sodium chloride 0.9%. 1000 milliLiter(s) IV Continuous <Continuous>  tamsulosin 0.4 milliGRAM(s) Oral at bedtime      Objective:  Vital Signs Last 24 Hrs  T(C): 36.9 (30 Aug 2022 04:33), Max: 36.9 (30 Aug 2022 04:33)  T(F): 98.4 (30 Aug 2022 04:33), Max: 98.4 (30 Aug 2022 04:33)  HR: 64 (30 Aug 2022 04:33) (62 - 84)  BP: 160/71 (30 Aug 2022 04:33) (116/58 - 160/71)  BP(mean): 83 (29 Aug 2022 15:26) (77 - 83)  RR: 18 (30 Aug 2022 04:33) (16 - 18)  SpO2: 94% (30 Aug 2022 04:33) (94% - 100%)    Parameters below as of 30 Aug 2022 04:33  Patient On (Oxygen Delivery Method): nasal cannula        T(C): 36.9 (08-30-22 @ 04:33), Max: 36.9 (08-30-22 @ 04:33)  T(C): 36.9 (08-30-22 @ 04:33), Max: 37.1 (08-27-22 @ 16:00)  T(C): 36.9 (08-30-22 @ 04:33), Max: 37.1 (08-27-22 @ 16:00)    PHYSICAL EXAM:  HEENT: NC atraumatic  Neck: supple  Respiratory: no accessory muscle use, breathing comfortably  Cardiovascular: distant  Gastrointestinal: normal appearing, nondistended  Extremities: no clubbing, no cyanosis,        LABS:                          11.0   10.92 )-----------( 582      ( 30 Aug 2022 05:56 )             34.9       WBC  10.92 08-30 @ 05:56  12.29 08-28 @ 06:26  11.29 08-27 @ 06:47  11.13 08-26 @ 06:00  12.49 08-25 @ 06:00  12.20 08-24 @ 06:00      08-30    141  |  110<H>  |  24<H>  ----------------------------<  85  3.9   |  25  |  0.96    Ca    8.5      30 Aug 2022 05:56    TPro  7.4  /  Alb  2.3<L>  /  TBili  0.2  /  DBili  x   /  AST  33  /  ALT  46  /  AlkPhos  192<H>  08-30      Creatinine, Serum: 0.96 mg/dL (08-30-22 @ 05:56)  Creatinine, Serum: 1.07 mg/dL (08-28-22 @ 06:26)  Creatinine, Serum: 1.46 mg/dL (08-25-22 @ 06:00)  Creatinine, Serum: 1.53 mg/dL (08-24-22 @ 20:01)                INFLAMMATORY MARKERS  Auto Neutrophil #: 7.01 K/uL (08-30-22 @ 05:56)  Auto Lymphocyte #: 2.20 K/uL (08-30-22 @ 05:56)  Auto Neutrophil #: 8.71 K/uL (08-23-22 @ 06:00)  Auto Lymphocyte #: 2.20 K/uL (08-23-22 @ 06:00)  Auto Neutrophil #: 9.75 K/uL (08-22-22 @ 06:35)  Auto Lymphocyte #: 2.29 K/uL (08-22-22 @ 06:35)  Auto Neutrophil #: 12.02 K/uL (08-21-22 @ 06:32)  Auto Lymphocyte #: 2.15 K/uL (08-21-22 @ 06:32)      Auto Eosinophil #: 0.28 K/uL (08-30-22 @ 05:56)  Auto Eosinophil #: 0.61 K/uL (08-23-22 @ 06:00)  Auto Eosinophil #: 0.51 K/uL (08-22-22 @ 06:35)  Auto Eosinophil #: 0.71 K/uL (08-21-22 @ 06:32)    Procalcitonin, Serum: 0.36 ng/mL (08-19-22 @ 06:36)    MICROBIOLOGY:    Culture - Acid Fast - Sputum w/Smear . (08.12.22 @ 09:02)    Specimen Source: .Sputum Sputum    Acid Fast Bacilli Smear:   No acid fast bacilli seen by fluorochrome stain    Culture Results:   Growth of acid fast bacilli detected in broth, identification to follow.    RADIOLOGY & ADDITIONAL STUDIES:  < from: CT Chest No Cont (08.11.22 @ 21:37) >    ACC: [id number] EXAM:  CT CHEST                        ACC: [id number] EXAM:  CT ABDOMEN AND PELVIS                          PROCEDURE DATE:  08/11/2022          INTERPRETATION:  CLINICAL INFORMATION: Cough, fever. Sepsis.  Elevated LFT.    COMPARISON:None.    CONTRAST/COMPLICATIONS:  IV Contrast: NONE  Oral Contrast: NONE  Complications: None reported at time of study completion    PROCEDURE:  CT of the Chest, Abdomen and Pelvis was performed.  Sagittal and coronal reformats were performed.    FINDINGS:    CHEST:    LUNGS AND LARGE AIRWAYS: PLEURA: There are patchy bilateral groundglass   and airspace consolidations, which are most pronounced in the left upper   and lingular lobes, right upper and right middle lobes.  This is associated with bronchiectasis, particularly in the bilateral   upper lobes.  In addition, there are scattered irregular, poorly marginated nodular   opacities throughout the bilateral lower lung zones. Some of these   contain possible small areas of cavitation.  There is a small left-sided pleural effusion and trace right-sided   pleural effusion.    The central airways remain patent.    VESSELS: Atherosclerotic changes thoracic aorta and coronary artery   calcifications.    HEART: Heart size is normal. No pericardial effusion.    MEDIASTINUM AND DEO:  Pretracheal, precarinal and subcarinal mediastinal lymph nodes measuring   up to 1 cm short axis.    CHEST WALL AND LOWER NECK: Within normal limits.    ABDOMEN AND PELVIS:    Evaluation of the solid organ parenchyma is limited without intravenous   contrast.    LIVER: Within normal limits.  BILE DUCTS: Normal caliber.  GALLBLADDER: Contracted.  SPLEEN: Within normal limits.  PANCREAS: Within normal limits.  ADRENALS: Within normal limits.  KIDNEYS/URETERS:  Exophytic cyst medial upper pole left kidney.  No hydronephrosis.    BLADDER: Within normal limits.  REPRODUCTIVE ORGANS:  The prostate is not enlarged.    BOWEL:  Colonic diverticulosis, without CT evidence of diverticulitis.  No bowel obstruction.  The appendix is not well visualized on this exam.  PERITONEUM: No ascites.    VESSELS: Atherosclerotic changes.  RETROPERITONEUM/LYMPH NODES: No lymphadenopathy.    ABDOMINAL WALL:  Right inguinal hernia containing fat and nonobstructed small bowel.  Small bilateral inguinal lymph nodes.    BONES:  Degenerative changes spine.  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    IMPRESSION:    Bilateral groundglass glass and airspace consolidations, some associated   with bronchiectasis particularly in the upper lobes, findings likely   secondary to infection.  Possible small areas of cavitation.  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    No acute intra-abdominal pathology.

## 2022-08-30 NOTE — PROGRESS NOTE ADULT - ASSESSMENT
87M with HTN, HLD, hx of CVA (no residual deficits), BPH, hx of skin CA who presents with cough and fevers.    Found to have multifocal pneumonia.  Also found have new onset afib.        Sepsis 2/2 Multifocal PNA/Hemoptysis   - pt p/w leukocytosis, SOB  - CT w/ small cavitations, b/l GGO and airspace consolidations  AFB growth in broth from 8/12 sputum samples  plan for bronchoscopy  main concerns are for atypical/nontuberculosis mycobacterial infection in this setting    Thank you for consulting us and involving us in the management of this most interesting and challenging case.  We will follow along in the care of this patient. Please call us at [phone] or text me directly on my cell# at [phone] with any concerns.

## 2022-08-30 NOTE — PROGRESS NOTE ADULT - PROBLEM SELECTOR PLAN 2
Patient with new found paradoxical atrial fibrillation  - Cardiology following, recs appreciated   - Rate Control: Continue metoprolol tartrate 100mg BID  - Anticoagulation: Start apixaban 2.5mg after bronchoscopy  - Echo with low normal LV systolic function, mild pulm HTN

## 2022-08-30 NOTE — PROGRESS NOTE ADULT - PROBLEM SELECTOR PLAN 3
Severe midline back pain refractory to tylenol in the setting of possible TB  -CXR after bronch to r/o fracture  -Pain regimen after istop Severe midline back pain refractory to tylenol in the setting of possible TB  -CXR after bronch to r/o fracture  -Pain regimen after iStop Severe midline back pain refractory to tylenol in the setting of possible TB  -CXR after bronch to r/o fracture  -iSTOP 8/30, chart note   -Start low dose opoid regimen w/ PRN bowel regimen

## 2022-08-30 NOTE — DISCHARGE NOTE PROVIDER - CARE PROVIDER_API CALL
Deedee Walton)  Critical Care Medicine; Internal Medicine; Pulmonary Disease  [address], [address]  [place] [address]  Phone: [phone]  Fax: [phone]  Established Patient  Follow Up Time:

## 2022-08-30 NOTE — PROGRESS NOTE ADULT - ATTENDING COMMENTS
86 y/o M with history of HTN, HLD, h/o CVA, BPH who was a transfer from [place] for bronchoscopy to rule out TB. Patient found to have afib during admission.     Patient seen in isolation. Reports feeling well. mild cough. Denies any chest pain.   + back pain from laying in bed.     A/P:   Sepsis on arrival   multifocal PNA     - s/p rocephin and azithromycin. patient was started on levaquin.     - ID, pulm following    - plan for bronchscopy today with pulm.     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 start eliquis if ok with pulm      - cont metoprolol 100mg BID      HTN  HLD    - cont lipitor 40mg daily, norvasc 5mg daily (increased due to elevated BP this AM)     BPH    - cont flomax and finasteride     DVT proph: lovenox 40mg due to hemoptysis. ? start full dose when stable from pulm   added low dose oxy for pain   NPO for procedure.

## 2022-08-30 NOTE — PROGRESS NOTE ADULT - ASSESSMENT
86yo M with PMH of HTN, HLD, hx of CVA (no residual deficits), BPH, hx of skin CA who presents with cough and fevers a/w sepsis due to multifocal pneumonia with progression to acute hypoxic respiratory failure now on HFNC and course further c/b new onset afib.        Sepsis 2/2 multifocal PNA   - met sepsis criteria on arrival with leukocytosis + tachycardia + source of infection  - strep PNA Ag negative. Legionella serum antibody positive, legionella urine Ag negative - ID ordered a legionella serum PCR to get more clarity if pt had legionella PNA  - s/p ceftriaxone and azithromycin. Continue Levaquin for now per ID ([facility]), recs appreciated   - pulmonary ([facility]), recs appreciated  - AFB smear negative x3  - still with leukocytosis but downtrending to 15 ; down from peak of 26K earlier in admission ; O2 requirements are starting to improve also, with HFNC down to 40% FiO2 now on 3 liters  PLAN:  - f/up sputum culture  - may need bronchoscopy   - f/up Fungitell (sent to look for alternative etiology like PCP pneumonia as pt had been on high FiO2 for 10 days on Abx) / HIV negative  once optimized, pt was dc'ed to [facility] ([facility]    Pt to f/u with Dr. Walton pulmonary as outpatient.   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   - pulmonary ([facility]), recs appreciated  - Continue HFNC, continue to monitor and will trial wean   - O2 requirements are starting to improve also, with HFNC down to 40% FiO2 from 60% yesterday   - full code, intubate if needed  as above    Cavitary lesion  - doubt TB, but patient had blood tinged sputum, possible early cavitation on CT, and fevers. Low suspicion for TB as per ID, discontinued isolation  - quantiferon equivocal. AFB smear negative x3  - plan for bronch on monday    New afib   - [id number] score of at least 5 -> was on heparin drip but had worsening hemoptysis and cardio felt risk > benefits so full A/C discontinued and pt just given ppx dose lovenox  - cardiology (Miya) consulted, recs appreciated  - rate control if needed  - TTE shows Low normal left ventricular systolic function. Bi-atrial enlargement. Mild pulmonary hypertension  - on Lovenox, will eventually need to transition to DOAC when feel confident hemoptysis has resolved     Transaminitis  - likely from current illness ; possibly from legionella if that is the culprit of the PNA  - CT A/P without significant pathology to cause the transaminitis; monitor labs  - LFTs are improving  - can consider GI consult if does not improve with Abx therapy     CINDI 2/2 suspected prerenal azotemia    - likely 2/2 poor PO intake  - oral hydration encouraged, monitor BMP - likely now at baseline renal function  - hold diuretic  - avoid nephrotoxic meds    HTN/HLD  - cont with norvasc  - hold lasix  - cont with atorvastatin    BPH  - cont with tamsulosin and finasteride    VTE ppx  - c/w Lovenox, will eventually transition to DOAC when it is clear hemoptysis has resolved      88yo M with PMH of HTN, HLD, hx of CVA (no residual deficits), BPH, hx of skin CA who presents as a transfer from [facility] for bronchoscopy to r/o , satting well on 1L NC.      Sepsis 2/2 multifocal PNA   - met sepsis criteria on arrival with leukocytosis + tachycardia + source of infection  - strep PNA Ag negative. Legionella serum antibody positive, legionella urine Ag negative - ID ordered a legionella serum PCR to get more clarity if pt had legionella PNA  - s/p ceftriaxone and azithromycin. Continue Levaquin for now per ID ([facility]), recs appreciated   - pulmonary ([facility]), recs appreciated  - AFB smear negative x3  - still with leukocytosis but downtrending to 15 ; down from peak of 26K earlier in admission ; O2 requirements are starting to improve also, with HFNC down to 40% FiO2 now on 3 liters  PLAN:  - f/up sputum culture  - may need bronchoscopy   - f/up Fungitell (sent to look for alternative etiology like PCP pneumonia as pt had been on high FiO2 for 10 days on Abx) / HIV negative  once optimized, pt was dc'ed to [facility].    Pt to f/u with Dr. Anton weiss as outpatient.   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   - pulmonary ([facility]), recs appreciated  - Continue HFNC, continue to monitor and will trial wean   - O2 requirements are starting to improve also, with HFNC down to 40% FiO2 from 60% yesterday   - full code, intubate if needed  as above    Cavitary lesion  - doubt TB, but patient had blood tinged sputum, possible early cavitation on CT, and fevers. Low suspicion for TB as per ID, discontinued isolation  - quantiferon equivocal. AFB smear negative x3  - plan for bronch on monday    New afib   - [id number] score of at least 5 -> was on heparin drip but had worsening hemoptysis and cardio felt risk > benefits so full A/C discontinued and pt just given ppx dose lovenox  - cardiology (Miya) consulted, recs appreciated  - rate control if needed  - TTE shows Low normal left ventricular systolic function. Bi-atrial enlargement. Mild pulmonary hypertension  - on Lovenox, will eventually need to transition to DOAC when feel confident hemoptysis has resolved     Transaminitis  - likely from current illness ; possibly from legionella if that is the culprit of the PNA  - CT A/P without significant pathology to cause the transaminitis; monitor labs  - LFTs are improving  - can consider GI consult if does not improve with Abx therapy     CINDI 2/2 suspected prerenal azotemia    - likely 2/2 poor PO intake  - oral hydration encouraged, monitor BMP - likely now at baseline renal function  - hold diuretic  - avoid nephrotoxic meds    HTN/HLD  - cont with norvasc  - hold lasix  - cont with atorvastatin    BPH  - cont with tamsulosin and finasteride    VTE ppx  - c/w Lovenox, will eventually transition to DOAC when it is clear hemoptysis has resolved      86yo M with PMH of HTN, HLD, hx of CVA (no residual deficits), BPH, hx of skin CA who presents as a transfer from [facility] for bronchoscopy to r/o TB, satting well on 1L NC.

## 2022-08-30 NOTE — PROGRESS NOTE ADULT - PROBLEM SELECTOR PLAN 4
8/30 Pressures high 150-160 systolically  -Increase amlodipine 5mg qD 8/30 Pressures high 150-160 systolically  -Increase amlodipine 5mg qD  -Continue to monitor hemodynamics, previously septic in [place]br[place]ny

## 2022-08-30 NOTE — DISCHARGE NOTE PROVIDER - NSDCMRMEDTOKEN_GEN_ALL_CORE_FT
albuterol 2.5 mg/3 mL (0.083%) inhalation solution: 3 milliliter(s) inhaled every 6 hours  albuterol 90 mcg/inh inhalation aerosol: 2 puff(s) inhaled every 6 hours  atorvastatin 40 mg oral tablet: 1 tab(s) orally once a day (at bedtime)  budesonide-formoterol 80 mcg-4.5 mcg/inh inhalation aerosol: 1 puff(s) inhaled 2 times a day  finasteride 5 mg oral tablet: 1 tab(s) orally once a day (at bedtime)  metoprolol tartrate 50 mg oral tablet: 1 tab(s) orally 2 times a day  polyethylene glycol 3350 oral powder for reconstitution: 17 gram(s) orally once a day, As needed, Constipation  Protonix 40 mg oral delayed release tablet: 1 tab(s) orally once a day  saccharomyces boulardii lyo 250 mg oral capsule: 1 cap(s) orally 2 times a day  tamsulosin 0.4 mg oral capsule: 1 cap(s) orally once a day (at bedtime)   albuterol 2.5 mg/3 mL (0.083%) inhalation solution: 3 milliliter(s) inhaled every 6 hours  albuterol 90 mcg/inh inhalation aerosol: 2 puff(s) inhaled every 6 hours  atorvastatin 40 mg oral tablet: 1 tab(s) orally once a day (at bedtime)  budesonide-formoterol 80 mcg-4.5 mcg/inh inhalation aerosol: 1 puff(s) inhaled 2 times a day  Eliquis 5 mg oral tablet: 1 tab(s) orally 2 times a day MDD:2  finasteride 5 mg oral tablet: 1 tab(s) orally once a day (at bedtime)  metoprolol tartrate 50 mg oral tablet: 1 tab(s) orally 2 times a day  polyethylene glycol 3350 oral powder for reconstitution: 17 gram(s) orally once a day, As needed, Constipation  Protonix 40 mg oral delayed release tablet: 1 tab(s) orally once a day  saccharomyces boulardii lyo 250 mg oral capsule: 1 cap(s) orally 2 times a day  tamsulosin 0.4 mg oral capsule: 1 cap(s) orally once a day (at bedtime)   albuterol 2.5 mg/3 mL (0.083%) inhalation solution: 3 milliliter(s) inhaled every 6 hours  albuterol 90 mcg/inh inhalation aerosol: 2 puff(s) inhaled every 6 hours  amLODIPine 5 mg oral tablet: 1 tab(s) orally once a day  atorvastatin 40 mg oral tablet: 1 tab(s) orally once a day (at bedtime)  bisacodyl 5 mg oral delayed release tablet: 1 tab(s) orally once a day, As needed, Constipation  budesonide-formoterol 80 mcg-4.5 mcg/inh inhalation aerosol: 1 puff(s) inhaled 2 times a day  cyclobenzaprine 5 mg oral tablet: 1 tab(s) orally 3 times a day, As needed, Muscle Spasm  Eliquis 5 mg oral tablet: 1 tab(s) orally 2 times a day MDD:2  finasteride 5 mg oral tablet: 1 tab(s) orally once a day (at bedtime)  metoprolol tartrate 50 mg oral tablet: 1 tab(s) orally 2 times a day  polyethylene glycol 3350 oral powder for reconstitution: 17 gram(s) orally once a day, As needed, Constipation  Protonix 40 mg oral delayed release tablet: 1 tab(s) orally once a day  saccharomyces boulardii lyo 250 mg oral capsule: 1 cap(s) orally 2 times a day  senna leaf extract oral tablet: 2 tab(s) orally once a day (at bedtime)  tamsulosin 0.4 mg oral capsule: 1 cap(s) orally once a day (at bedtime)

## 2022-08-30 NOTE — CHART NOTE - NSCHARTNOTEFT_GEN_A_CORE
Patient seen and examined at bedside.   denies any active chest pain  history of CVA 2015 with no residual symptoms.   new onset afib currently medically managed by cardio   Medically optimized for planned bronchoscopy    d/w Dr Dorantes in agreement.

## 2022-08-30 NOTE — DISCHARGE NOTE PROVIDER - DETAILS OF MALNUTRITION DIAGNOSIS/DIAGNOSES
This patient has been assessed with a concern for Malnutrition and was treated during this hospitalization for the following Nutrition diagnosis/diagnoses:     -  09/05/2022: Severe protein-calorie malnutrition

## 2022-08-30 NOTE — PROGRESS NOTE ADULT - ASSESSMENT
The patient is an 87 year old male with a history of HTN, HL, BPH, CVA who presents with cough and hemoptysis in the setting of PNA and r/o TB.    Plan:  - Intermittent episodes of AF on telemetry  - Continue metoprolol tartrate 100 mg bid  - Resume amlodipine 2.5 mg daily  - Echo with low normal LV systolic function, mild pulm HTN  - Sputum culture now positive for AFB  - Pulm and ID follow-up  - Start apixaban 2.5 mg bid after bronchoscopy  - Plan for bronchoscopy. Optimized to proceed from a cardiac standpoint.

## 2022-08-30 NOTE — PROGRESS NOTE ADULT - SUBJECTIVE AND OBJECTIVE BOX
Chief Complaint: Hemoptysis    Interval Events: Transferred to [place] for bronch.    Review of Systems:  General: No fevers, chills, weight gain  Skin: No rashes, color changes  Cardiovascular: No chest pain, orthopnea  Respiratory: No shortness of breath, cough  Gastrointestinal: No nausea, abdominal pain  Genitourinary: No incontinence, pain with urination  Musculoskeletal: No pain, swelling, decreased range of motion  Neurological: No headache, weakness  Psychiatric: No depression, anxiety  Endocrine: No weight gain, increased thirst  All other systems are comprehensively negative.    Physical Exam:  Vital Signs Last 24 Hrs  T(C): 36.9 (30 Aug 2022 04:33), Max: 36.9 (30 Aug 2022 04:33)  T(F): 98.4 (30 Aug 2022 04:33), Max: 98.4 (30 Aug 2022 04:33)  HR: 64 (30 Aug 2022 04:33) (58 - 84)  BP: 160/71 (30 Aug 2022 04:33) (113/57 - 160/71)  BP(mean): 83 (29 Aug 2022 15:26) (75 - 83)  RR: 18 (30 Aug 2022 04:33) (16 - 18)  SpO2: 94% (30 Aug 2022 04:33) (93% - 100%)  Parameters below as of 30 Aug 2022 04:33  Patient On (Oxygen Delivery Method): nasal cannula  General: NAD  HEENT: MMM  Neck: No JVD, no carotid bruit  Lungs: CTAB  CV: RRR, nl S1/S2, no M/R/G  Abdomen: S/NT/ND, +BS  Extremities: No LE edema, no cyanosis  Neuro: AAOx3, non-focal  Skin: No rash    Labs:                          11.0   10.92 )-----------( 582      ( 30 Aug 2022 05:56 )             34.9       Telemetry: Sinus rhythm

## 2022-08-30 NOTE — PROGRESS NOTE ADULT - ASSESSMENT
86yo M with PMH of HTN, HLD, hx of CVA (no residual deficits), BPH, hx of skin CA who presents with cough and fevers a/w sepsis due to multifocal pneumonia with progression to acute hypoxic respiratory failure now on HFNC and course further c/b new onset afib.  Transferred from [place] for bronchoscopy.     NPO for Bronchoscopy  eval malignancy - TB - PNA - infection  spoke with pt - dtr -   s/p Levaquin course in [facility]  o2 support as needed to keep sat > 88 pct  isolation precs  ID follow up

## 2022-08-30 NOTE — DISCHARGE NOTE PROVIDER - NSDCCPCAREPLAN_GEN_ALL_CORE_FT
PRINCIPAL DISCHARGE DIAGNOSIS  Diagnosis: Tuberculosis high risk  Assessment and Plan of Treatment:        PRINCIPAL DISCHARGE DIAGNOSIS  Diagnosis: Tuberculosis high risk  Assessment and Plan of Treatment: You came in with with cough and fevers a/w sepsis due to multifocal pneumonia with progression to acute hypoxic respiratory failure and course further c/b new onset afib.  Transferred from [place] for bronchoscopy.   You  underwent a bronchoscopy for bronchoalveolar lavage. Bronchial, sputum cultures were followed. Infectious disease, pulmonology were consulted for TB work up. Bronchial cultures were negative on AFB stain along with sputum cultures. However, an acid fast broth grew an organism from the sputum culture, which required a lengthy amount of time to probe for an organism, requiring a lengthy hospital stay. Bronchial fungal cultures showed normal alexander. Pt to be discharged to [facility] under potential isolation protocol while acid fast broth cultures continue to grow. Daily labs were trended, all electrolytes were replenished. Stable for discharge to [facility]  Patient discharge to [facility]      SECONDARY DISCHARGE DIAGNOSES  Diagnosis: Afib  Assessment and Plan of Treatment: You came in with new found paroxysmal atrial fibrillation.Cardio Dr Miya seguraulted.  Rate Control: Started on  metoprolol tartrate 50mg BID  For Anti coagulation,  Continue eliquis 5mg BID, renal dosing per pharmacy  Echo was performed, shows low normal LV systolic function, mild pulm HTN.  - CONTINUE metoprolol  tartrate 50mg BID  - For Anti coagulation, Continue eliquis 5mg BID,  PLEASE FOLLOW UP with Primary care doctor and cardiologist within 1 week of discharge    Diagnosis: Back pain  Assessment and Plan of Treatment: you came in with  complaint of lower back pain  - Pain improved with standing tylenol, add lidoderm patch  - low dose opoid regimen w/ PRN bowel regimen  - continue muscle relaxants.    Diagnosis: HLD (hyperlipidemia)  Assessment and Plan of Treatment: Continue atorvastatin 40mg daily    Diagnosis: History of BPH  Assessment and Plan of Treatment: Continue home tamsulosin and finasteride.    Diagnosis: HTN (hypertension)  Assessment and Plan of Treatment: continue amlodipine, metoprolol as prescribed     PRINCIPAL DISCHARGE DIAGNOSIS  Diagnosis: Tuberculosis high risk  Assessment and Plan of Treatment: You came in with with cough and fevers a/w sepsis due to multifocal pneumonia with progression to acute hypoxic respiratory failure and course further c/b new onset afib.  Transferred from [place] for bronchoscopy.   You  underwent a bronchoscopy for bronchoalveolar lavage. Bronchial, sputum cultures were followed. Infectious disease, pulmonology were consulted for TB work up. Bronchial cultures were negative on AFB stain along with sputum cultures. However, an acid fast broth grew an organism from the sputum culture, which required a lengthy amount of time to probe for an organism, requiring a lengthy hospital stay. Bronchial fungal cultures showed normal alexander. No need for isolation.   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 You were seen by infectious disease doctor and pulmonologist, who gave CLEARANCE to discharge to REHAB.   Patient discharge to [facility]      SECONDARY DISCHARGE DIAGNOSES  Diagnosis: Afib  Assessment and Plan of Treatment: You came in with new found paroxysmal atrial fibrillation, which means fast heart rate. Cardio Dr Miya seguraulted. You were started on  metoprolol tartrate 50mg BID and  eliquis 5mg BID  Echo was performed, shows low normal LV systolic function, mild pulm HTN.  - CONTINUE metoprolol  tartrate 50mg 1 tablet twice daily   - For Anti coagulation, Continue eliquis 5mg 1 tablet twice daily   PLEASE FOLLOW UP with Primary care doctor and cardiologist within 1 week of discharge    Diagnosis: Back pain  Assessment and Plan of Treatment: you came in with  complaint of lower back pain  - Pain improved with standing tylenol, and lidoderm patch  - low dose opoid regimen w/ PRN bowel regimen  - continue muscle relaxants cyclobenzapril 5mg 1 tablet three times as needed  - Follow up with your primary care physician within 1 week of discharge    Diagnosis: HLD (hyperlipidemia)  Assessment and Plan of Treatment: Continue atorvastatin 40mg daily    Diagnosis: History of BPH  Assessment and Plan of Treatment: Continue home tamsulosin and finasteride.    Diagnosis: HTN (hypertension)  Assessment and Plan of Treatment: continue amlodipine 5mg 1 tablet daily and metoprolol tartrate 50mg 1 tablet twice daily as prescribed     PRINCIPAL DISCHARGE DIAGNOSIS  Diagnosis: Tuberculosis high risk  Assessment and Plan of Treatment: You came in with with cough and fevers a/w sepsis due to multifocal pneumonia with progression to acute hypoxic respiratory failure and course further c/b new onset afib.  Transferred from [place] for bronchoscopy.   You  underwent a bronchoscopy for bronchoalveolar lavage. Bronchial, sputum cultures were followed. Infectious disease, pulmonology were consulted for TB work up. Bronchial cultures were negative on AFB stain along with sputum cultures. However, an acid fast broth grew an organism from the sputum culture, which required a lengthy amount of time to probe for an organism, requiring a lengthy hospital stay. Bronchial fungal cultures showed normal alexander. No need for isolation.   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 You were seen by infectious disease doctor and pulmonologist, who gave CLEARANCE to discharge to REHAB.   Patient discharge to [facility]      SECONDARY DISCHARGE DIAGNOSES  Diagnosis: Afib  Assessment and Plan of Treatment: You came in with new found paroxysmal atrial fibrillation, which means fast irregular heart rate. Cardio Dr Holliday cosjennyulted. You were started on  metoprolol tartrate 50mg BID and  eliquis 5mg BID  Echo was performed, shows low normal LV systolic function, mild pulm HTN.  - CONTINUE metoprolol  tartrate 50mg 1 tablet twice daily   - For Anti coagulation, Continue eliquis 5mg 1 tablet twice daily   PLEASE FOLLOW UP with Primary care doctor and cardiologist within 1 week of discharge    Diagnosis: Back pain  Assessment and Plan of Treatment: you came in with  complaint of lower back pain  - Pain improved with standing tylenol, and lidoderm patch  - low dose opoid regimen w/ PRN bowel regimen  - continue muscle relaxants cyclobenzapril 5mg 1 tablet three times as needed  - Follow up with your primary care physician within 1 week of discharge    Diagnosis: HTN (hypertension)  Assessment and Plan of Treatment: continue amlodipine 5mg 1 tablet daily and metoprolol tartrate 50mg 1 tablet twice daily as prescribed    Diagnosis: HLD (hyperlipidemia)  Assessment and Plan of Treatment: Continue atorvastatin 40mg daily    Diagnosis: History of BPH  Assessment and Plan of Treatment: Continue home tamsulosin and finasteride.

## 2022-08-30 NOTE — DISCHARGE NOTE PROVIDER - HOSPITAL COURSE
HPI:  87M with HTN, HLD, hx of CVA (no residual deficits), BPH, hx of skin CA who presents with cough and fevers.  Symptoms started about 5-6 days prior to admission.  Started with left sided upper back pain.  Then started to have a cough associated with green phlegm and possible blood.  Associated with SOB and DONG.  No weight changes or night sweats.  Had a feveras high as 102F.  Some nausea but no vomiting with loss of appetite.  No chest pain.  No diarrhea.  No abdominal pain.  No recent travel or sick contacts.  Went to see his PMD 3 days prior to [place] admission and was given a z-kellie and tesslon perles.  Reported had negative COVID tests at home.   In the [place] ED, patient's triage vitals were /78    RR  21, 93% on RA (thought dropped down to 88%) and T 98.5F.  Labs showed WBC 18 with a left shift, CINDI with BUN/Cr 40/1.88, elevated LFTs, normal lactate, negative COVID (last booster in 12/2021 with Pfizer vaccine), neg influenza, neg RSV.  CT chest showed "patchy groundglass and more dense opacities in both lungs, suspicious for multifocal PNA...several small lucencies with areas of lung opacity...may represent early cavitation versus areas of focal bronchiectasis."  Patient started on azithromycin and ceftriaxone empirically for multifocal PNA.  Also of note, patient was found to be in new afib on EKG.  Patient denies any afib history but said when he was younger he did feel some irregular heartbeats.  Patient admitted to [facility] with sepsis due to multifocal pneumonia with progression to acute hypoxic respiratory failure now on HFNC and course further c/b new onset afib. Patient was seen by ID, pulm and cardiology. He is now transferred to [facility] for bronchoscopy. Patient denies states he is feeling well at this time.  (29 Aug 2022 23:01)      ---  HOSPITAL COURSE: Pt underwent a bronchoscopy for bronchoalveolar lavage. Home medications such as metoprolol, amlodipine, tamsulosin, and finasteride were continued. Patient was started on elliquis for new onset afib after the bronch???    ---  CONSULTANTS:   Cardiology: Gualberto Mai   Infectious Disease: Mustapha Benoit  Pulmonology: Ha Gray  ---  TIME SPENT:  I, the attending physician, was physically present for the key portions of the evaluation and management (E/M) service provided. The total amount of time spent reviewing the hospital notes, laboratory values, imaging findings, assessing/counseling the patient, discussing with consultant physicians, social work, nursing staff was -- minutes    ---  Primary care provider was made aware of plan for discharge:      [  ] NO     [  ] YES   HPI:  87M with HTN, HLD, hx of CVA (no residual deficits), BPH, hx of skin CA who presents with cough and fevers.  Symptoms started about 5-6 days prior to admission.  Started with left sided upper back pain.  Then started to have a cough associated with green phlegm and possible blood.  Associated with SOB and DONG.  No weight changes or night sweats.  Had a feveras high as 102F.  Some nausea but no vomiting with loss of appetite.  No chest pain.  No diarrhea.  No abdominal pain.  No recent travel or sick contacts.  Went to see his PMD 3 days prior to [place] admission and was given a z-kellie and tesslon perles.  Reported had negative COVID tests at home.   In the [place] ED, patient's triage vitals were /78    RR  21, 93% on RA (thought dropped down to 88%) and T 98.5F.  Labs showed WBC 18 with a left shift, CINDI with BUN/Cr 40/1.88, elevated LFTs, normal lactate, negative COVID (last booster in 12/2021 with Pfizer vaccine), neg influenza, neg RSV.  CT chest showed "patchy groundglass and more dense opacities in both lungs, suspicious for multifocal PNA...several small lucencies with areas of lung opacity...may represent early cavitation versus areas of focal bronchiectasis."  Patient started on azithromycin and ceftriaxone empirically for multifocal PNA.  Also of note, patient was found to be in new afib on EKG.  Patient denies any afib history but said when he was younger he did feel some irregular heartbeats.  Patient admitted to [facility] with sepsis due to multifocal pneumonia with progression to acute hypoxic respiratory failure now on HFNC and course further c/b new onset afib. Patient was seen by ID, pulm and cardiology. He is now transferred to [facility] for bronchoscopy. Patient denies states he is feeling well at this time.  (29 Aug 2022 23:01)      ---  HOSPITAL COURSE: Pt underwent a bronchoscopy for bronchoalveolar lavage. Home medications such as metoprolol, amlodipine, tamsulosin, and finasteride were continued. Patient was started on elliquis for new onset afib after bronchoscopy per cardiology, and bronchial, sputum cultures were followed. Infectious disease, pulmonology were consulted for TB work up. Bronchial cultures were negative on AFB stain along with sputum cultures. However, an acid fast broth grew an organism from the sputum culture, which required a lengthy amount of time to probe for an organism, requiring a lengthy hospital stay. Bronchial fungal cultures showed normal alexander. Pt to be discharged to [facility] under potential isolation protocol while acid fast broth cultures continue to grow. Daily labs were trended, all electrolytes were replenished. All questions were answered.      T(C): 36.4 (09-12-22 @ 04:00), Max: 36.4 (09-11-22 @ 19:42)  HR: 65 (09-12-22 @ 04:00) (65 - 72)  BP: 109/65 (09-12-22 @ 04:00) (105/56 - 133/72)  RR: 17 (09-12-22 @ 04:00) (16 - 18)  SpO2: 96% (09-12-22 @ 04:00) (96% - 96%)    GENERAL: patient appears well, no acute distress, appropriate, pleasant  EYES: sclera clear, no exudates  ENMT: oropharynx clear without erythema, no exudates, moist mucous membranes  NECK: supple, soft, no thyromegaly noted  LUNGS: good air entry bilaterally, clear to auscultation, symmetric breath sounds, no wheezing or rhonchi appreciated  HEART: soft S1/S2, regular rate and rhythm, no murmurs noted, no lower extremity edema  GASTROINTESTINAL: abdomen is soft, nontender, nondistended, normoactive bowel sounds, no palpable masses  INTEGUMENT: good skin turgor, warm skin, appears well perfused  MUSCULOSKELETAL: Lower back, buttocks TTP  NEUROLOGIC: awake, alert, oriented x3, good muscle tone in 4 extremities, no obvious sensory deficits  PSYCHIATRIC: mood is good, affect is congruent, linear and logical thought process  HEME/LYMPH: no palpable supraclavicular nodules, no obvious ecchymosis or petechiae   ---  CONSULTANTS:   Cardiology: Gualberto Mai   Infectious Disease: Mustapha Benoit  Pulmonology: Ha Gray  ---  TIME SPENT:  I, the attending physician, was physically present for the key portions of the evaluation and management (E/M) service provided. The total amount of time spent reviewing the hospital notes, laboratory values, imaging findings, assessing/counseling the patient, discussing with consultant physicians, social work, nursing staff was -- minutes    ---  Primary care provider was made aware of plan for discharge:      [  ] NO     [  ] YES   HPI:  87M with HTN, HLD, hx of CVA (no residual deficits), BPH, hx of skin CA who presents with cough and fevers.  Symptoms started about 5-6 days prior to admission.  Started with left sided upper back pain.  Then started to have a cough associated with green phlegm and possible blood.  Associated with SOB and DONG.  No weight changes or night sweats.  Had a feveras high as 102F.  Some nausea but no vomiting with loss of appetite.  No chest pain.  No diarrhea.  No abdominal pain.  No recent travel or sick contacts.  Went to see his PMD 3 days prior to [place] admission and was given a z-kellie and tesslon perles.  Reported had negative COVID tests at home.   In the [place] ED, patient's triage vitals were /78    RR  21, 93% on RA (thought dropped down to 88%) and T 98.5F.  Labs showed WBC 18 with a left shift, CINDI with BUN/Cr 40/1.88, elevated LFTs, normal lactate, negative COVID (last booster in 12/2021 with Pfizer vaccine), neg influenza, neg RSV.  CT chest showed "patchy groundglass and more dense opacities in both lungs, suspicious for multifocal PNA...several small lucencies with areas of lung opacity...may represent early cavitation versus areas of focal bronchiectasis."  Patient started on azithromycin and ceftriaxone empirically for multifocal PNA.  Also of note, patient was found to be in new afib on EKG.  Patient denies any afib history but said when he was younger he did feel some irregular heartbeats.  Patient admitted to [facility] with sepsis due to multifocal pneumonia with progression to acute hypoxic respiratory failure now on HFNC and course further c/b new onset afib. Patient was seen by ID, pulm and cardiology. He is now transferred to [facility] for bronchoscopy. Patient denies states he is feeling well at this time.  (29 Aug 2022 23:01)      ---  HOSPITAL COURSE: Pt underwent a bronchoscopy for bronchoalveolar lavage. Home medications such as metoprolol, amlodipine, tamsulosin, and finasteride were continued. Patient was started on elliquis for new onset afib after bronchoscopy per cardiology, and bronchial, sputum cultures were followed. Infectious disease, pulmonology were consulted for TB work up. Bronchial cultures were negative on AFB stain along with sputum cultures. 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 Isolation protocol was discontinued. Bronchial fungal cultures showed normal alexander. Pt to be discharged to [facility] for physical rehabilitation. Pt found to be in mild CINDI, was given a bolus of fluid. Pt encouraged to drink plenty of fluids. We recommend follow up with a BMP in 3 days. Daily labs were trended, all electrolytes were replenished. All questions were answered.      T(C): 36.3 (09-13-22 @ 05:04), Max: 36.8 (09-12-22 @ 21:41)  T(F): 97.3 (09-13-22 @ 05:04), Max: 98.2 (09-12-22 @ 21:41)  HR: 101 (09-13-22 @ 10:47) (59 - 101)  BP: 110/62 (09-13-22 @ 10:47) (102/60 - 126/74)  RR: 18 (09-13-22 @ 10:47) (18 - 18)  SpO2: 96% (09-13-22 @ 10:47) (94% - 98%)      GENERAL: patient appears well, no acute distress, appropriate, pleasant  EYES: sclera clear, no exudates  ENMT: oropharynx clear without erythema, no exudates, moist mucous membranes  NECK: supple, soft, no thyromegaly noted  LUNGS: good air entry bilaterally, clear to auscultation, symmetric breath sounds, no wheezing or rhonchi appreciated  HEART: soft S1/S2, regular rate and rhythm, no murmurs noted, no lower extremity edema  GASTROINTESTINAL: abdomen is soft, nontender, nondistended, normoactive bowel sounds, no palpable masses  INTEGUMENT: good skin turgor, warm skin, appears well perfused  MUSCULOSKELETAL: Lower back, buttocks TTP  NEUROLOGIC: awake, alert, oriented x3, good muscle tone in 4 extremities, no obvious sensory deficits  PSYCHIATRIC: mood is good, affect is congruent, linear and logical thought process  HEME/LYMPH: no palpable supraclavicular nodules, no obvious ecchymosis or petechiae   ---  CONSULTANTS:   Cardiology: Dr. HollidayGualberto   Infectious Disease: Mustapha Benoit  Pulmonology: Ha Gray  ---  TIME SPENT:  I, the attending physician, was physically present for the key portions of the evaluation and management (E/M) service provided. The total amount of time spent reviewing the hospital notes, laboratory values, imaging findings, assessing/counseling the patient, discussing with consultant physicians, social work, nursing staff was -- minutes    ---  Primary care provider was made aware of plan for discharge:      [  ] NO     [  ] YES   HPI:  87M with HTN, HLD, hx of CVA (no residual deficits), BPH, hx of skin CA who presents with cough and fevers.  Symptoms started about 5-6 days prior to admission.  Started with left sided upper back pain.  Then started to have a cough associated with green phlegm and possible blood.  Associated with SOB and DONG.  No weight changes or night sweats.  Had a feveras high as 102F.  Some nausea but no vomiting with loss of appetite.  No chest pain.  No diarrhea.  No abdominal pain.  No recent travel or sick contacts.  Went to see his PMD 3 days prior to [place] admission and was given a z-kellie and tesslon perles.  Reported had negative COVID tests at home.   In the [place] ED, patient's triage vitals were /78    RR  21, 93% on RA (thought dropped down to 88%) and T 98.5F.  Labs showed WBC 18 with a left shift, CINDI with BUN/Cr 40/1.88, elevated LFTs, normal lactate, negative COVID (last booster in 12/2021 with Pfizer vaccine), neg influenza, neg RSV.  CT chest showed "patchy groundglass and more dense opacities in both lungs, suspicious for multifocal PNA...several small lucencies with areas of lung opacity...may represent early cavitation versus areas of focal bronchiectasis."  Patient started on azithromycin and ceftriaxone empirically for multifocal PNA.  Also of note, patient was found to be in new afib on EKG.  Patient denies any afib history but said when he was younger he did feel some irregular heartbeats.  Patient admitted to [facility] with sepsis due to multifocal pneumonia with progression to acute hypoxic respiratory failure now on HFNC and course further c/b new onset afib. Patient was seen by ID, pulm and cardiology. He is now transferred to [facility] for bronchoscopy. Patient denies states he is feeling well at this time.  (29 Aug 2022 23:01)      ---  HOSPITAL COURSE: Pt underwent a bronchoscopy for bronchoalveolar lavage. Home medications such as metoprolol, amlodipine, tamsulosin, and finasteride were continued. Patient was started on elliquis for new onset afib after bronchoscopy per cardiology, and bronchial, sputum cultures were followed. Infectious disease, pulmonology were consulted for TB work up. Bronchial cultures were negative on AFB stain along with sputum cultures. 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 Isolation protocol was discontinued. Bronchial fungal cultures showed normal alexander. Pt to be discharged to [facility] for physical rehabilitation. Pt found to be in mild CINDI, was given a bolus of fluid. Pt encouraged to drink plenty of fluids. We recommend follow up with a BMP in 3 days. Daily labs were trended, all electrolytes were replenished. All questions were answered.      Vital Signs Last 24 Hrs  T(C): 36.3 (13 Sep 2022 05:04), Max: 36.8 (12 Sep 2022 21:41)  T(F): 97.3 (13 Sep 2022 05:04), Max: 98.2 (12 Sep 2022 21:41)  HR: 101 (13 Sep 2022 10:47) (59 - 101)  BP: 110/62 (13 Sep 2022 10:47) (102/60 - 126/74)  BP(mean): --  RR: 18 (13 Sep 2022 10:47) (18 - 18)  SpO2: 96% (13 Sep 2022 10:47) (94% - 98%)    Parameters below as of 13 Sep 2022 10:47  Patient On (Oxygen Delivery Method): room air    PHYSICAL EXAM ON THE DAY OF DISCHARGE :  GENERAL: patient appears well, no acute distress, appropriate, pleasant  EYES: sclera clear, no exudates  ENMT: oropharynx clear without erythema, no exudates, moist mucous membranes  NECK: supple, soft, no thyromegaly noted  LUNGS: good air entry bilaterally, clear to auscultation, symmetric breath sounds, no wheezing or rhonchi appreciated  HEART: soft S1/S2, regular rate and rhythm, no murmurs noted, no lower extremity edema  GASTROINTESTINAL: abdomen is soft, nontender, nondistended, normoactive bowel sounds, no palpable masses  INTEGUMENT: good skin turgor, warm skin, appears well perfused  MUSCULOSKELETAL: Lower back, buttocks TTP  NEUROLOGIC: awake, alert, oriented x3, good muscle tone in 4 extremities, no obvious sensory deficits  PSYCHIATRIC: mood is good, affect is congruent, linear and logical thought process  HEME/LYMPH: no palpable supraclavicular nodules, no obvious ecchymosis or petechiae   ---  CONSULTANTS:   Cardiology: Gualberto Mai   Infectious Disease: Mustapha Benoit  Pulmonology: Ha Gray  ---  TIME SPENT:  I, the attending physician, was physically present for the key portions of the evaluation and management (E/M) service provided. The total amount of time spent reviewing the hospital notes, laboratory values, imaging findings, assessing/counseling the patient, discussing with consultant physicians, social work, nursing staff was -- minutes    ---  Primary care provider was made aware of plan for discharge:      [  ] NO     [  ] YES   HPI:  87M with HTN, HLD, hx of CVA (no residual deficits), BPH, hx of skin CA who presents with cough and fevers.  Symptoms started about 5-6 days prior to admission.  Started with left sided upper back pain.  Then started to have a cough associated with green phlegm and possible blood.  Associated with SOB and DONG.  No weight changes or night sweats.  Had a feveras high as 102F.  Some nausea but no vomiting with loss of appetite.  No chest pain.  No diarrhea.  No abdominal pain.  No recent travel or sick contacts.  Went to see his PMD 3 days prior to [place] admission and was given a z-kellie and tesslon perles.  Reported had negative COVID tests at home.   In the [place] ED, patient's triage vitals were /78    RR  21, 93% on RA (thought dropped down to 88%) and T 98.5F.  Labs showed WBC 18 with a left shift, CINDI with BUN/Cr 40/1.88, elevated LFTs, normal lactate, negative COVID (last booster in 12/2021 with Pfizer vaccine), neg influenza, neg RSV.  CT chest showed "patchy groundglass and more dense opacities in both lungs, suspicious for multifocal PNA...several small lucencies with areas of lung opacity...may represent early cavitation versus areas of focal bronchiectasis."  Patient started on azithromycin and ceftriaxone empirically for multifocal PNA.  Also of note, patient was found to be in new afib on EKG.  Patient denies any afib history but said when he was younger he did feel some irregular heartbeats.  Patient admitted to [facility] with sepsis due to multifocal pneumonia with progression to acute hypoxic respiratory failure now on HFNC and course further c/b new onset afib. Patient was seen by ID, pulm and cardiology. He is now transferred to [facility] for bronchoscopy. Patient denies states he is feeling well at this time.  (29 Aug 2022 23:01)      ---  HOSPITAL COURSE: Pt underwent a bronchoscopy for bronchoalveolar lavage. Home medications such as metoprolol, amlodipine, tamsulosin, and finasteride were continued. Patient was started on elliquis for new onset afib after bronchoscopy per cardiology, and bronchial, sputum cultures were followed. Infectious disease, pulmonology were consulted for TB work up. Bronchial cultures were negative on AFB stain along with sputum cultures. 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 Isolation protocol was discontinued. Bronchial fungal cultures showed normal alexander. Pt to be discharged to [facility] for physical rehabilitation. Pt found to be in mild CINDI, was given a bolus of fluid. Pt encouraged to drink plenty of fluids. We recommend follow up with a BMP in 3 days. Daily labs were trended, all electrolytes were replenished. All questions were answered.      Vital Signs Last 24 Hrs  T(C): 36.3 (13 Sep 2022 05:04), Max: 36.8 (12 Sep 2022 21:41)  T(F): 97.3 (13 Sep 2022 05:04), Max: 98.2 (12 Sep 2022 21:41)  HR: 101 (13 Sep 2022 10:47) (59 - 101)  BP: 110/62 (13 Sep 2022 10:47) (102/60 - 126/74)  BP(mean): --  RR: 18 (13 Sep 2022 10:47) (18 - 18)  SpO2: 96% (13 Sep 2022 10:47) (94% - 98%)    Parameters below as of 13 Sep 2022 10:47  Patient On (Oxygen Delivery Method): room air    PHYSICAL EXAM ON THE DAY OF DISCHARGE :  GENERAL: patient appears well, no acute distress, appropriate, pleasant  EYES: sclera clear, no exudates  ENMT: oropharynx clear without erythema, no exudates, moist mucous membranes  NECK: supple, soft, no thyromegaly noted  LUNGS: good air entry bilaterally, clear to auscultation, symmetric breath sounds, no wheezing or rhonchi appreciated  HEART: soft S1/S2, regular rate and rhythm, no murmurs noted, no lower extremity edema  GASTROINTESTINAL: abdomen is soft, nontender, nondistended, normoactive bowel sounds, no palpable masses  INTEGUMENT: good skin turgor, warm skin, appears well perfused  MUSCULOSKELETAL: Lower back, buttocks TTP  NEUROLOGIC: awake, alert, oriented x3, good muscle tone in 4 extremities, no obvious sensory deficits  PSYCHIATRIC: mood is good, affect is congruent, linear and logical thought process  HEME/LYMPH: no palpable supraclavicular nodules, no obvious ecchymosis or petechiae   ---  CONSULTANTS:   Cardiology: Gualberto Mai   Infectious Disease: Mustapha Benoit  Pulmonology: Ha Gray  ---

## 2022-08-31 LAB
ANION GAP SERPL CALC-SCNC: 7 MMOL/L — SIGNIFICANT CHANGE UP (ref 5–17)
BUN SERPL-MCNC: 21 MG/DL — SIGNIFICANT CHANGE UP (ref 7–23)
CALCIUM SERPL-MCNC: 8.4 MG/DL — LOW (ref 8.5–10.1)
CHLORIDE SERPL-SCNC: 110 MMOL/L — HIGH (ref 96–108)
CO2 SERPL-SCNC: 25 MMOL/L — SIGNIFICANT CHANGE UP (ref 22–31)
CREAT SERPL-MCNC: 1 MG/DL — SIGNIFICANT CHANGE UP (ref 0.5–1.3)
EGFR: 73 ML/MIN/1.73M2 — SIGNIFICANT CHANGE UP
GLUCOSE SERPL-MCNC: 80 MG/DL — SIGNIFICANT CHANGE UP (ref 70–99)
GRAM STN FLD: SIGNIFICANT CHANGE UP
HCT VFR BLD CALC: 34.2 % — LOW (ref 39–50)
HGB BLD-MCNC: 10.7 G/DL — LOW (ref 13–17)
MCHC RBC-ENTMCNC: 30.9 PG — SIGNIFICANT CHANGE UP (ref 27–34)
MCHC RBC-ENTMCNC: 31.3 GM/DL — LOW (ref 32–36)
MCV RBC AUTO: 98.8 FL — SIGNIFICANT CHANGE UP (ref 80–100)
NIGHT BLUE STAIN TISS: SIGNIFICANT CHANGE UP
NRBC # BLD: 0 /100 WBCS — SIGNIFICANT CHANGE UP (ref 0–0)
PLATELET # BLD AUTO: 526 K/UL — HIGH (ref 150–400)
POTASSIUM SERPL-MCNC: 4.2 MMOL/L — SIGNIFICANT CHANGE UP (ref 3.5–5.3)
POTASSIUM SERPL-SCNC: 4.2 MMOL/L — SIGNIFICANT CHANGE UP (ref 3.5–5.3)
RBC # BLD: 3.46 M/UL — LOW (ref 4.2–5.8)
RBC # FLD: 14.1 % — SIGNIFICANT CHANGE UP (ref 10.3–14.5)
SODIUM SERPL-SCNC: 142 MMOL/L — SIGNIFICANT CHANGE UP (ref 135–145)
SPECIMEN SOURCE: SIGNIFICANT CHANGE UP
SPECIMEN SOURCE: SIGNIFICANT CHANGE UP
WBC # BLD: 11.26 K/UL — HIGH (ref 3.8–10.5)
WBC # FLD AUTO: 11.26 K/UL — HIGH (ref 3.8–10.5)

## 2022-08-31 PROCEDURE — 99233 SBSQ HOSP IP/OBS HIGH 50: CPT | Mod: GC

## 2022-08-31 RX ORDER — APIXABAN 2.5 MG/1
5 TABLET, FILM COATED ORAL
Refills: 0 | Status: DISCONTINUED | OUTPATIENT
Start: 2022-08-31 | End: 2022-08-31

## 2022-08-31 RX ORDER — ACETAMINOPHEN 500 MG
650 TABLET ORAL EVERY 6 HOURS
Refills: 0 | Status: COMPLETED | OUTPATIENT
Start: 2022-08-31 | End: 2022-09-01

## 2022-08-31 RX ORDER — APIXABAN 2.5 MG/1
2.5 TABLET, FILM COATED ORAL
Refills: 0 | Status: DISCONTINUED | OUTPATIENT
Start: 2022-08-31 | End: 2022-08-31

## 2022-08-31 RX ORDER — APIXABAN 2.5 MG/1
5 TABLET, FILM COATED ORAL
Refills: 0 | Status: DISCONTINUED | OUTPATIENT
Start: 2022-08-31 | End: 2022-09-13

## 2022-08-31 RX ADMIN — ATORVASTATIN CALCIUM 40 MILLIGRAM(S): 80 TABLET, FILM COATED ORAL at 20:28

## 2022-08-31 RX ADMIN — AMLODIPINE BESYLATE 5 MILLIGRAM(S): 2.5 TABLET ORAL at 05:05

## 2022-08-31 RX ADMIN — BUDESONIDE AND FORMOTEROL FUMARATE DIHYDRATE 2 PUFF(S): 160; 4.5 AEROSOL RESPIRATORY (INHALATION) at 05:04

## 2022-08-31 RX ADMIN — Medication 50 MILLIGRAM(S): at 18:08

## 2022-08-31 RX ADMIN — Medication 250 MILLIGRAM(S): at 18:08

## 2022-08-31 RX ADMIN — Medication 650 MILLIGRAM(S): at 06:00

## 2022-08-31 RX ADMIN — FINASTERIDE 5 MILLIGRAM(S): 5 TABLET, FILM COATED ORAL at 11:50

## 2022-08-31 RX ADMIN — ALBUTEROL 2.5 MILLIGRAM(S): 90 AEROSOL, METERED ORAL at 19:30

## 2022-08-31 RX ADMIN — Medication 250 MILLIGRAM(S): at 05:04

## 2022-08-31 RX ADMIN — APIXABAN 5 MILLIGRAM(S): 2.5 TABLET, FILM COATED ORAL at 18:44

## 2022-08-31 RX ADMIN — OXYCODONE HYDROCHLORIDE 2.5 MILLIGRAM(S): 5 TABLET ORAL at 00:18

## 2022-08-31 RX ADMIN — SENNA PLUS 2 TABLET(S): 8.6 TABLET ORAL at 20:28

## 2022-08-31 RX ADMIN — Medication 650 MILLIGRAM(S): at 05:05

## 2022-08-31 RX ADMIN — Medication 650 MILLIGRAM(S): at 11:52

## 2022-08-31 RX ADMIN — ALBUTEROL 2.5 MILLIGRAM(S): 90 AEROSOL, METERED ORAL at 14:16

## 2022-08-31 RX ADMIN — Medication 3 MILLIGRAM(S): at 20:28

## 2022-08-31 RX ADMIN — ALBUTEROL 2.5 MILLIGRAM(S): 90 AEROSOL, METERED ORAL at 07:58

## 2022-08-31 RX ADMIN — PANTOPRAZOLE SODIUM 40 MILLIGRAM(S): 20 TABLET, DELAYED RELEASE ORAL at 05:04

## 2022-08-31 RX ADMIN — Medication 650 MILLIGRAM(S): at 18:12

## 2022-08-31 RX ADMIN — Medication 650 MILLIGRAM(S): at 18:09

## 2022-08-31 RX ADMIN — Medication 650 MILLIGRAM(S): at 11:50

## 2022-08-31 RX ADMIN — BUDESONIDE AND FORMOTEROL FUMARATE DIHYDRATE 2 PUFF(S): 160; 4.5 AEROSOL RESPIRATORY (INHALATION) at 18:08

## 2022-08-31 RX ADMIN — Medication 50 MILLIGRAM(S): at 05:05

## 2022-08-31 RX ADMIN — TAMSULOSIN HYDROCHLORIDE 0.4 MILLIGRAM(S): 0.4 CAPSULE ORAL at 20:28

## 2022-08-31 NOTE — PROGRESS NOTE ADULT - PROBLEM SELECTOR PLAN 3
Severe midline back pain refractory to tylenol in the setting of possible TB  -CXR after bronch to r/o fracture  -iSTOP 8/30, chart note   -Start low dose opoid regimen w/ PRN bowel regimen Pt has complaint of lower back pain  - Pain noted over buttocks b/l, most likely 2/2 increased sedentary status  - consider lumbosacral XRs if pain persists into tomorrow  - iSTOP 8/30, chart note   - low dose opoid regimen w/ PRN bowel regimen

## 2022-08-31 NOTE — PROGRESS NOTE ADULT - SUBJECTIVE AND OBJECTIVE BOX
Date/Time Patient Seen:  		  Referring MD:   Data Reviewed	       Patient is a 87y old  Male who presents with a chief complaint of bronchoscopy (30 Aug 2022 16:56)      Subjective/HPI     PAST MEDICAL & SURGICAL HISTORY:  No pertinent past medical history    Skin cancer  s/p Mohs    Essential hypertension    Cerebrovascular accident (CVA), unspecified mechanism  2015    Hyperlipidemia, unspecified hyperlipidemia type    Complex tear of lateral meniscus of right knee as current injury, initial encounter    Complex tear of medial meniscus of right knee as current injury, initial encounter    Benign prostatic hyperplasia, presence of lower urinary tract symptoms unspecified, unspecified morphology    H/O pilonidal cyst          Medication list         MEDICATIONS  (STANDING):  ALBUTerol    0.083% 2.5 milliGRAM(s) Nebulizer every 6 hours  ALBUTerol    90 MICROgram(s) HFA Inhaler 1 Puff(s) Inhalation every 6 hours  amLODIPine   Tablet 5 milliGRAM(s) Oral daily  atorvastatin 40 milliGRAM(s) Oral at bedtime  budesonide  80 MICROgram(s)/formoterol 4.5 MICROgram(s) Inhaler 2 Puff(s) Inhalation two times a day  enoxaparin Injectable 40 milliGRAM(s) SubCutaneous every 24 hours  finasteride 5 milliGRAM(s) Oral daily  metoprolol tartrate 50 milliGRAM(s) Oral two times a day  naloxone Injectable 0.4 milliGRAM(s) IV Push once  pantoprazole    Tablet 40 milliGRAM(s) Oral before breakfast  saccharomyces boulardii 250 milliGRAM(s) Oral two times a day  senna 2 Tablet(s) Oral at bedtime  sodium chloride 0.9%. 1000 milliLiter(s) (60 mL/Hr) IV Continuous <Continuous>  tamsulosin 0.4 milliGRAM(s) Oral at bedtime    MEDICATIONS  (PRN):  acetaminophen     Tablet .. 650 milliGRAM(s) Oral every 6 hours PRN Temp greater or equal to 38C (100.4F), Mild Pain (1 - 3)  bisacodyl 5 milliGRAM(s) Oral daily PRN Constipation  melatonin 3 milliGRAM(s) Oral at bedtime PRN Insomnia  oxyCODONE    IR 2.5 milliGRAM(s) Oral every 4 hours PRN Moderate Pain (4 - 6)  polyethylene glycol 3350 17 Gram(s) Oral daily PRN Constipation         Vitals log        ICU Vital Signs Last 24 Hrs  T(C): 36.6 (31 Aug 2022 04:29), Max: 37.1 (30 Aug 2022 15:54)  T(F): 97.8 (31 Aug 2022 04:29), Max: 98.8 (30 Aug 2022 15:54)  HR: 62 (31 Aug 2022 04:29) (62 - 91)  BP: 130/64 (31 Aug 2022 04:29) (119/59 - 142/72)  BP(mean): --  ABP: --  ABP(mean): --  RR: 16 (31 Aug 2022 04:29) (16 - 18)  SpO2: 94% (31 Aug 2022 04:29) (94% - 100%)    O2 Parameters below as of 31 Aug 2022 04:29  Patient On (Oxygen Delivery Method): nasal cannula                 Input and Output:  I&O's Detail    30 Aug 2022 07:01  -  31 Aug 2022 06:35  --------------------------------------------------------  IN:    Oral Fluid: 240 mL    sodium chloride 0.9%: 240 mL  Total IN: 480 mL    OUT:    Voided (mL): 1100 mL  Total OUT: 1100 mL    Total NET: -620 mL          Lab Data                        11.0   10.92 )-----------( 582      ( 30 Aug 2022 05:56 )             34.9     08-30    141  |  110<H>  |  24<H>  ----------------------------<  85  3.9   |  25  |  0.96    Ca    8.5      30 Aug 2022 05:56    TPro  7.4  /  Alb  2.3<L>  /  TBili  0.2  /  DBili  x   /  AST  33  /  ALT  46  /  AlkPhos  192<H>  08-30            Review of Systems	      Objective     Physical Examination    heart s1s2  lung dc BS  abd soft      Pertinent Lab findings & Imaging      Bud:  NO   Adequate UO     I&O's Detail    30 Aug 2022 07:01  -  31 Aug 2022 06:35  --------------------------------------------------------  IN:    Oral Fluid: 240 mL    sodium chloride 0.9%: 240 mL  Total IN: 480 mL    OUT:    Voided (mL): 1100 mL  Total OUT: 1100 mL    Total NET: -620 mL               Discussed with:     Cultures:	        Radiology

## 2022-08-31 NOTE — PHARMACOTHERAPY INTERVENTION NOTE - COMMENTS
Patient is an 87-year-old Male on Eliquis for new-onset pAfib. Discussed with hospitalist Dr. Phelan and Dr. Aggarwal that the patient qualifies for 5 mg bid since they do not meet 2/3 criteria for lower dosing (age >80, wt <60kg, SCr >1.5). Team agreed to change dosing to 5 mg bid.

## 2022-08-31 NOTE — PROGRESS NOTE ADULT - ASSESSMENT
88yo M with PMH of HTN, HLD, hx of CVA (no residual deficits), BPH, hx of skin CA who presents with cough and fevers a/w sepsis due to multifocal pneumonia with progression to acute hypoxic respiratory failure now on HFNC and course further c/b new onset afib.  Transferred from [facility] for bronchoscopy.     Bronch done - Aug 30th - studies pending -     eval malignancy - TB - PNA - infection  spoke with pt - dtr -   s/p Levaquin course in [facility]  o2 support as needed to keep sat > 88 pct  isolation precs  ID follow up

## 2022-08-31 NOTE — PROGRESS NOTE ADULT - ASSESSMENT
The patient is an 87 year old male with a history of HTN, HL, BPH, CVA who presents with cough and hemoptysis in the setting of PNA and r/o TB.    Plan:  - Intermittent episodes of AF on telemetry  - Continue metoprolol tartrate 100 mg bid  - Resume amlodipine 2.5 mg daily  - Echo with low normal LV systolic function, mild pulm HTN  - Sputum culture now positive for AFB  - Pulm and ID follow-up  - If no further procedures planned, start apixaban 2.5 mg bid  - Await results of bronchoscopy

## 2022-08-31 NOTE — PROGRESS NOTE ADULT - ATTENDING COMMENTS
86yo M with PMH of HTN, HLD, hx of CVA (no residual deficits), BPH, hx of skin CA who presents as a transfer from [facility] for bronchoscopy to r/o TB, satting well on 3L NC, f/u bronch cultures. monitor response to tylenol for back pain, consider xray tomorrow if no improvement. Started on Eliquis for PAF.

## 2022-08-31 NOTE — PROGRESS NOTE ADULT - PROBLEM SELECTOR PLAN 2
Patient with new found paradoxical atrial fibrillation  - Cardiology following, recs appreciated   - Rate Control: Continue metoprolol tartrate 100mg BID  - Anticoagulation: Start apixaban 2.5mg after bronchoscopy  - Echo with low normal LV systolic function, mild pulm HTN Patient with new found paradoxical atrial fibrillation  - Cardiology following  - Rate Control: Continue metoprolol tartrate 50mg BID  - Anticoagulation: Start apixaban 2.5mg BID after bronchoscopy  - Echo with low normal LV systolic function, mild pulm HTN Patient with new found paroxysmal atrial fibrillation  - Cardiology following  - Rate Control: Continue metoprolol tartrate 50mg BID  - Start Apixaban.  - Echo with low normal LV systolic function, mild pulm HTN

## 2022-08-31 NOTE — PROGRESS NOTE ADULT - SUBJECTIVE AND OBJECTIVE BOX
Chief Complaint: Hemoptysis    Interval Events: Underwent bronchoscopy yesterday.    Review of Systems:  General: No fevers, chills, weight gain  Skin: No rashes, color changes  Cardiovascular: No chest pain, orthopnea  Respiratory: No shortness of breath, cough  Gastrointestinal: No nausea, abdominal pain  Genitourinary: No incontinence, pain with urination  Musculoskeletal: No pain, swelling, decreased range of motion  Neurological: No headache, weakness  Psychiatric: No depression, anxiety  Endocrine: No weight gain, increased thirst  All other systems are comprehensively negative.    Physical Exam:  Vital Signs Last 24 Hrs  T(C): 36.6 (31 Aug 2022 04:29), Max: 37.1 (30 Aug 2022 15:54)  T(F): 97.8 (31 Aug 2022 04:29), Max: 98.8 (30 Aug 2022 15:54)  HR: 62 (31 Aug 2022 04:29) (62 - 91)  BP: 130/64 (31 Aug 2022 04:29) (119/59 - 142/72)  BP(mean): --  RR: 16 (31 Aug 2022 04:29) (16 - 18)  SpO2: 94% (31 Aug 2022 04:29) (94% - 100%)  Parameters below as of 31 Aug 2022 04:29  Patient On (Oxygen Delivery Method): nasal cannula  General: NAD  HEENT: MMM  Neck: No JVD, no carotid bruit  Lungs: CTAB  CV: RRR, nl S1/S2, no M/R/G  Abdomen: S/NT/ND, +BS  Extremities: No LE edema, no cyanosis  Neuro: AAOx3, non-focal  Skin: No rash    Labs:    08-31    142  |  110<H>  |  21  ----------------------------<  80  4.2   |  25  |  1.00    Ca    8.4<L>      31 Aug 2022 06:22    TPro  7.4  /  Alb  2.3<L>  /  TBili  0.2  /  DBili  x   /  AST  33  /  ALT  46  /  AlkPhos  192<H>  08-30                        10.7   11.26 )-----------( 526      ( 31 Aug 2022 06:22 )             34.2       Telemetry: Sinus rhythm

## 2022-08-31 NOTE — PROGRESS NOTE ADULT - ASSESSMENT
88yo M with PMH of HTN, HLD, hx of CVA (no residual deficits), BPH, hx of skin CA who presents as a transfer from [facility] for bronchoscopy to r/o TB, satting well on 1L NC. 88yo M with PMH of HTN, HLD, hx of CVA (no residual deficits), BPH, hx of skin CA who presents as a transfer from [facility] for bronchoscopy to r/o TB, satting well on 3L NC.

## 2022-08-31 NOTE — PROGRESS NOTE ADULT - PROBLEM SELECTOR PLAN 4
8/30 Pressures high 150-160 systolically  -Increase amlodipine 5mg qD  -Continue to monitor hemodynamics, previously septic in [place] 8/31 Pressures 120-130, well controlled now  - amlodipine 5mg qD  - Continue to monitor hemodynamics, previously septic in [place] 8/31 Pressures 120-130, well controlled now  - amlodipine 5mg qD  - Continue to monitor hemodynamics, previously septic in [place]

## 2022-08-31 NOTE — PROGRESS NOTE ADULT - PROBLEM SELECTOR PLAN 1
-[place] workup indeterminant for tb, AFB growth in broth positive, 3x AFB smears negative. Quant gold indeterminant, here from bronchoscopy scheduled for 8/30.  -8/22 CXR: Persistent advanced irregular infiltrates involving the left mid and upper lung fields and the right upper lobe.  -Without fevers since admission  -Persistent slight leukocytosis 11k, (11-13 k the past week)  - S/p bronchoscopy 8/30  - F/u bronchoscopy cultures  - Bronch cx- NGTD 8/31, AF+, F/u Fungal cx -[place] workup indeterminant for tb, AFB growth in broth positive, 3x AFB smears negative. Quant gold indeterminant, here from bronchoscopy scheduled for 8/30.  -8/22 CXR: Persistent advanced irregular infiltrates involving the left mid and upper lung fields and the right upper lobe.  -Without fevers since admission  -Persistent slight leukocytosis 11k, (11-13 k the past week)  - S/p bronchoscopy 8/30  - F/u bronchoscopy cultures  - Bronch cx- NGTD 8/31, AF-, F/u Fungal cx  - Sputum cx AF+ -[place] workup indeterminant for TB, AFB growth in broth positive, 3x AFB smears negative. Quant gold indeterminant, here from bronchoscopy scheduled for 8/30.  -8/22 CXR: Persistent advanced irregular infiltrates involving the left mid and upper lung fields and the right upper lobe.  -Without fevers since admission  -Persistent slight leukocytosis 11k, (11-13 k the past week)  - S/p bronchoscopy 8/30  - F/u bronchoscopy cultures  - Bronch cx- NGTD 8/31, AF-, F/u Fungal cx  - Sputum cx AF+

## 2022-08-31 NOTE — PROGRESS NOTE ADULT - SUBJECTIVE AND OBJECTIVE BOX
[facility] DIVISION of INFECTIOUS DISEASE  Mustapha Valente MD PhD, Sheryl Pride MD, Adriana Rodriguez MD, Oliva Myers MD, Ebenezer Rivas MD  and providing coverage with Leodan Zaragoza MD  Providing Infectious Disease Consultations at [facility], [facility], [place], [facility], [facility]    Office# [phone] to schedule follow up appointments  Answering Service for urgent calls or New Consults [phone]  Cell# to text for urgent issues Mustapha Valente [phone]     infectious diseases progress note:    DENNIS BRADFORD is a 87y y. o. Male patient    Overnight and events of the last 24hrs reviewed    Allergies    No Known Allergies    Intolerances        ANTIBIOTICS/RELEVANT:  antimicrobials    immunologic:    OTHER:  acetaminophen     Tablet .. 650 milliGRAM(s) Oral every 6 hours  ALBUTerol    0.083% 2.5 milliGRAM(s) Nebulizer every 6 hours  ALBUTerol    90 MICROgram(s) HFA Inhaler 1 Puff(s) Inhalation every 6 hours  amLODIPine   Tablet 5 milliGRAM(s) Oral daily  apixaban 2.5 milliGRAM(s) Oral two times a day  atorvastatin 40 milliGRAM(s) Oral at bedtime  bisacodyl 5 milliGRAM(s) Oral daily PRN  budesonide  80 MICROgram(s)/formoterol 4.5 MICROgram(s) Inhaler 2 Puff(s) Inhalation two times a day  finasteride 5 milliGRAM(s) Oral daily  melatonin 3 milliGRAM(s) Oral at bedtime PRN  metoprolol tartrate 50 milliGRAM(s) Oral two times a day  naloxone Injectable 0.4 milliGRAM(s) IV Push once  oxyCODONE    IR 2.5 milliGRAM(s) Oral every 4 hours PRN  pantoprazole    Tablet 40 milliGRAM(s) Oral before breakfast  polyethylene glycol 3350 17 Gram(s) Oral daily PRN  saccharomyces boulardii 250 milliGRAM(s) Oral two times a day  senna 2 Tablet(s) Oral at bedtime  sodium chloride 0.9%. 1000 milliLiter(s) IV Continuous <Continuous>  tamsulosin 0.4 milliGRAM(s) Oral at bedtime      Objective:  Vital Signs Last 24 Hrs  T(C): 36.3 (31 Aug 2022 14:22), Max: 36.9 (30 Aug 2022 19:17)  T(F): 97.4 (31 Aug 2022 14:22), Max: 98.5 (30 Aug 2022 19:17)  HR: 61 (31 Aug 2022 14:22) (61 - 72)  BP: 120/57 (31 Aug 2022 14:22) (119/59 - 130/64)  BP(mean): --  RR: 18 (31 Aug 2022 14:22) (16 - 18)  SpO2: 97% (31 Aug 2022 14:22) (94% - 99%)    Parameters below as of 31 Aug 2022 14:22  Patient On (Oxygen Delivery Method): nasal cannula  O2 Flow (L/min): 3      T(C): 36.3 (08-31-22 @ 14:22), Max: 37.1 (08-30-22 @ 15:54)  T(C): 36.3 (08-31-22 @ 14:22), Max: 37.1 (08-30-22 @ 15:54)  T(C): 36.3 (08-31-22 @ 14:22), Max: 37.1 (08-30-22 @ 15:54)    PHYSICAL EXAM:  HEENT: NC atraumatic  Neck: supple  Respiratory: no accessory muscle use, breathing comfortably  Cardiovascular: distant  Gastrointestinal: normal appearing, nondistended  Extremities: no clubbing, no cyanosis,        LABS:                          10.7   11.26 )-----------( 526      ( 31 Aug 2022 06:22 )             34.2       WBC  11.26 08-31 @ 06:22  10.92 08-30 @ 05:56  12.29 08-28 @ 06:26  11.29 08-27 @ 06:47  11.13 08-26 @ 06:00  12.49 08-25 @ 06:00      08-31    142  |  110<H>  |  21  ----------------------------<  80  4.2   |  25  |  1.00    Ca    8.4<L>      31 Aug 2022 06:22    TPro  7.4  /  Alb  2.3<L>  /  TBili  0.2  /  DBili  x   /  AST  33  /  ALT  46  /  AlkPhos  192<H>  08-30      Creatinine, Serum: 1.00 mg/dL (08-31-22 @ 06:22)  Creatinine, Serum: 0.96 mg/dL (08-30-22 @ 05:56)  Creatinine, Serum: 1.07 mg/dL (08-28-22 @ 06:26)  Creatinine, Serum: 1.46 mg/dL (08-25-22 @ 06:00)  Creatinine, Serum: 1.53 mg/dL (08-24-22 @ 20:01)                INFLAMMATORY MARKERS  Auto Neutrophil #: 7.01 K/uL (08-30-22 @ 05:56)  Auto Lymphocyte #: 2.20 K/uL (08-30-22 @ 05:56)  Auto Neutrophil #: 8.71 K/uL (08-23-22 @ 06:00)  Auto Lymphocyte #: 2.20 K/uL (08-23-22 @ 06:00)  Auto Neutrophil #: 9.75 K/uL (08-22-22 @ 06:35)  Auto Lymphocyte #: 2.29 K/uL (08-22-22 @ 06:35)  Auto Neutrophil #: 12.02 K/uL (08-21-22 @ 06:32)  Auto Lymphocyte #: 2.15 K/uL (08-21-22 @ 06:32)      Auto Eosinophil #: 0.28 K/uL (08-30-22 @ 05:56)  Auto Eosinophil #: 0.61 K/uL (08-23-22 @ 06:00)  Auto Eosinophil #: 0.51 K/uL (08-22-22 @ 06:35)  Auto Eosinophil #: 0.71 K/uL (08-21-22 @ 06:32)        Procalcitonin, Serum: 0.36 ng/mL (08-19-22 @ 06:36)                          MICROBIOLOGY:              RADIOLOGY & ADDITIONAL STUDIES:

## 2022-08-31 NOTE — PROGRESS NOTE ADULT - ASSESSMENT
87M with HTN, HLD, hx of CVA (no residual deficits), BPH, hx of skin CA who presents with cough and fevers.    Found to have multifocal pneumonia.  Also found have new onset afib.        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  We will follow along in the care of this patient. Please call us at [phone] or text me directly on my cell# at [phone] with any concerns.

## 2022-08-31 NOTE — PROGRESS NOTE ADULT - SUBJECTIVE AND OBJECTIVE BOX
HPI:  87M with HTN, HLD, hx of CVA (no residual deficits), BPH, hx of skin CA who presents with cough and fevers.  Symptoms started about 5-6 days prior to admission.  Started with left sided upper back pain.  Then started to have a cough associated with green phlegm and possible blood.  Associated with SOB and DONG.  No weight changes or night sweats.  Had a feveras high as 102F.  Some nausea but no vomiting with loss of appetite.  No chest pain.  No diarrhea.  No abdominal pain.  No recent travel or sick contacts.  Went to see his PMD 3 days prior to [facility] admission and was given a z-kellie and tesslon perles.  Reported had negative COVID tests at home.   In the [facility] ED, patient's triage vitals were /78    RR  21, 93% on RA (thought dropped down to 88%) and T 98.5F.  Labs showed WBC 18 with a left shift, CINDI with BUN/Cr 40/1.88, elevated LFTs, normal lactate, negative COVID (last booster in 12/2021 with Pfizer vaccine), neg influenza, neg RSV.  CT chest showed "patchy groundglass and more dense opacities in both lungs, suspicious for multifocal PNA...several small lucencies with areas of lung opacity...may represent early cavitation versus areas of focal bronchiectasis."  Patient started on azithromycin and ceftriaxone empirically for multifocal PNA.  Also of note, patient was found to be in new afib on EKG.  Patient denies any afib history but said when he was younger he did feel some irregular heartbeats.  Patient admitted to [facility] with sepsis due to multifocal pneumonia with progression to acute hypoxic respiratory failure now on HFNC and course further c/b new onset afib. Patient was seen by ID, pulm and cardiology. He is now transferred to [facility] for bronchoscopy. Patient denies states he is feeling well at this time.  (29 Aug 2022 23:01)      INTERVAL HPI/OVERNIGHT EVENTS: Pt was seen and examined at bedside. No acute overnight events. Pt denies headache, dizziness, lightheadedness, fever, chills, body aches, CP, SOB, palpitations, abdominal pain, n/v, numbness/tingling.  No other complaints at this time.     REVIEW OF SYSTEMS:    CONSTITUTIONAL: No weakness, fevers or chills  EYES/ENT: No visual changes, no throat pain   RESPIRATORY: No cough, wheezing, hemoptysis; No shortness of breath  CARDIOVASCULAR: No chest pain or palpitations  GASTROINTESTINAL: No abdominal, nausea, vomiting, or hematemesis; No diarrhea or constipation. No melena or hematochezia.  GENITOURINARY: No dysuria, frequency or hematuria  NEUROLOGICAL: No dizziness, numbness, or weakness  SKIN: No itching, burning, rashes, or lesions   All other review of systems is negative unless indicated above.    VITAL SIGNS:    T(C): 36.6 (08-31-22 @ 04:29), Max: 37.1 (08-30-22 @ 15:54)  HR: 68 (08-31-22 @ 08:48) (62 - 91)  BP: 130/64 (08-31-22 @ 04:29) (119/59 - 142/72)  RR: 16 (08-31-22 @ 04:29) (16 - 18)  SpO2: 99% (08-31-22 @ 08:48) (94% - 100%)    PHYSICAL EXAM:     GENERAL: no acute distress  HEENT: NC/AT, EOMI, neck supple, MMM  RESPIRATORY: LCTAB/L, no rhonchi, rales, or wheezing  CARDIOVASCULAR: RRR, no murmurs, gallops, rubs  ABDOMINAL: soft, non-tender, non-distended, positive bowel sounds   EXTREMITIES: no clubbing, cyanosis, or edema  NEUROLOGICAL: alert and oriented x 3, non-focal  SKIN: no rashes or lesions   MUSCULOSKELETAL: no gross joint deformity                          10.7   11.26 )-----------( 526      ( 31 Aug 2022 06:22 )             34.2     08-31    142  |  110<H>  |  21  ----------------------------<  80  4.2   |  25  |  1.00    Ca    8.4<L>      31 Aug 2022 06:22    TPro  7.4  /  Alb  2.3<L>  /  TBili  0.2  /  DBili  x   /  AST  33  /  ALT  46  /  AlkPhos  192<H>  08-30      CAPILLARY BLOOD GLUCOSE          MEDICATIONS  (STANDING):  ALBUTerol    0.083% 2.5 milliGRAM(s) Nebulizer every 6 hours  ALBUTerol    90 MICROgram(s) HFA Inhaler 1 Puff(s) Inhalation every 6 hours  amLODIPine   Tablet 5 milliGRAM(s) Oral daily  atorvastatin 40 milliGRAM(s) Oral at bedtime  budesonide  80 MICROgram(s)/formoterol 4.5 MICROgram(s) Inhaler 2 Puff(s) Inhalation two times a day  enoxaparin Injectable 40 milliGRAM(s) SubCutaneous every 24 hours  finasteride 5 milliGRAM(s) Oral daily  metoprolol tartrate 50 milliGRAM(s) Oral two times a day  naloxone Injectable 0.4 milliGRAM(s) IV Push once  pantoprazole    Tablet 40 milliGRAM(s) Oral before breakfast  saccharomyces boulardii 250 milliGRAM(s) Oral two times a day  senna 2 Tablet(s) Oral at bedtime  sodium chloride 0.9%. 1000 milliLiter(s) (60 mL/Hr) IV Continuous <Continuous>  tamsulosin 0.4 milliGRAM(s) Oral at bedtime       HPI:  87M with HTN, HLD, hx of CVA (no residual deficits), BPH, hx of skin CA who presents with cough and fevers.  Symptoms started about 5-6 days prior to admission.  Started with left sided upper back pain.  Then started to have a cough associated with green phlegm and possible blood.  Associated with SOB and DONG.  No weight changes or night sweats.  Had a feveras high as 102F.  Some nausea but no vomiting with loss of appetite.  No chest pain.  No diarrhea.  No abdominal pain.  No recent travel or sick contacts.  Went to see his PMD 3 days prior to [facility] admission and was given a z-kellie and tesslon perles.  Reported had negative COVID tests at home.   In the [facility] ED, patient's triage vitals were /78    RR  21, 93% on RA (thought dropped down to 88%) and T 98.5F.  Labs showed WBC 18 with a left shift, CINDI with BUN/Cr 40/1.88, elevated LFTs, normal lactate, negative COVID (last booster in 12/2021 with Pfizer vaccine), neg influenza, neg RSV.  CT chest showed "patchy groundglass and more dense opacities in both lungs, suspicious for multifocal PNA...several small lucencies with areas of lung opacity...may represent early cavitation versus areas of focal bronchiectasis."  Patient started on azithromycin and ceftriaxone empirically for multifocal PNA.  Also of note, patient was found to be in new afib on EKG.  Patient denies any afib history but said when he was younger he did feel some irregular heartbeats.  Patient admitted to [facility] with sepsis due to multifocal pneumonia with progression to acute hypoxic respiratory failure now on HFNC and course further c/b new onset afib. Patient was seen by ID, pulm and cardiology. He is now transferred to [facility] for bronchoscopy. Patient denies states he is feeling well at this time.  (29 Aug 2022 23:01)      INTERVAL HPI/OVERNIGHT EVENTS: Pt was seen and examined at bedside. No acute overnight events. Pt endorses some lower back pain. Pt denies headache, dizziness, lightheadedness, fever, chills, CP, SOB, palpitations, abdominal pain, n/v, numbness/tingling.  No other complaints at this time.     REVIEW OF SYSTEMS:    CONSTITUTIONAL: No weakness, fevers or chills  EYES/ENT: No visual changes, no throat pain   RESPIRATORY: No cough, wheezing, hemoptysis; No shortness of breath  CARDIOVASCULAR: No chest pain or palpitations  GASTROINTESTINAL: No abdominal, nausea, vomiting, or hematemesis; No diarrhea or constipation. No melena or hematochezia.  GENITOURINARY: No dysuria, frequency or hematuria  NEUROLOGICAL: No dizziness, numbness, or weakness  SKIN: No itching, burning, rashes, or lesions   All other review of systems is negative unless indicated above.    VITAL SIGNS:    T(C): 36.6 (08-31-22 @ 04:29), Max: 37.1 (08-30-22 @ 15:54)  HR: 68 (08-31-22 @ 08:48) (62 - 91)  BP: 130/64 (08-31-22 @ 04:29) (119/59 - 142/72)  RR: 16 (08-31-22 @ 04:29) (16 - 18)  SpO2: 99% (08-31-22 @ 08:48) (94% - 100%)    PHYSICAL EXAM:     GENERAL: no acute distress  HEENT: NC/AT, EOMI, neck supple, MMM  RESPIRATORY: LCTAB/L, no rhonchi, rales, or wheezing  CARDIOVASCULAR: RRR, no murmurs, gallops, rubs  ABDOMINAL: soft, non-tender, non-distended, positive bowel sounds   EXTREMITIES: no clubbing, cyanosis, or edema  NEUROLOGICAL: alert and oriented x 3, non-focal  SKIN: no rashes or lesions   MUSCULOSKELETAL: some b/l lower back pain                           10.7   11.26 )-----------( 526      ( 31 Aug 2022 06:22 )             34.2     08-31    142  |  110<H>  |  21  ----------------------------<  80  4.2   |  25  |  1.00    Ca    8.4<L>      31 Aug 2022 06:22    TPro  7.4  /  Alb  2.3<L>  /  TBili  0.2  /  DBili  x   /  AST  33  /  ALT  46  /  AlkPhos  192<H>  08-30      CAPILLARY BLOOD GLUCOSE          MEDICATIONS  (STANDING):  ALBUTerol    0.083% 2.5 milliGRAM(s) Nebulizer every 6 hours  ALBUTerol    90 MICROgram(s) HFA Inhaler 1 Puff(s) Inhalation every 6 hours  amLODIPine   Tablet 5 milliGRAM(s) Oral daily  atorvastatin 40 milliGRAM(s) Oral at bedtime  budesonide  80 MICROgram(s)/formoterol 4.5 MICROgram(s) Inhaler 2 Puff(s) Inhalation two times a day  enoxaparin Injectable 40 milliGRAM(s) SubCutaneous every 24 hours  finasteride 5 milliGRAM(s) Oral daily  metoprolol tartrate 50 milliGRAM(s) Oral two times a day  naloxone Injectable 0.4 milliGRAM(s) IV Push once  pantoprazole    Tablet 40 milliGRAM(s) Oral before breakfast  saccharomyces boulardii 250 milliGRAM(s) Oral two times a day  senna 2 Tablet(s) Oral at bedtime  sodium chloride 0.9%. 1000 milliLiter(s) (60 mL/Hr) IV Continuous <Continuous>  tamsulosin 0.4 milliGRAM(s) Oral at bedtime       HPI:  87M with HTN, HLD, hx of CVA (no residual deficits), BPH, hx of skin CA who presents with cough and fevers.  Symptoms started about 5-6 days prior to admission.  Started with left sided upper back pain.  Then started to have a cough associated with green phlegm and possible blood.  Associated with SOB and DONG.  No weight changes or night sweats.  Had a feveras high as 102F.  Some nausea but no vomiting with loss of appetite.  No chest pain.  No diarrhea.  No abdominal pain.  No recent travel or sick contacts.  Went to see his PMD 3 days prior to [facility] admission and was given a z-kellie and tesslon perles.  Reported had negative COVID tests at home.   In the [facility] ED, patient's triage vitals were /78    RR  21, 93% on RA (thought dropped down to 88%) and T 98.5F.  Labs showed WBC 18 with a left shift, CINDI with BUN/Cr 40/1.88, elevated LFTs, normal lactate, negative COVID (last booster in 12/2021 with Pfizer vaccine), neg influenza, neg RSV.  CT chest showed "patchy groundglass and more dense opacities in both lungs, suspicious for multifocal PNA...several small lucencies with areas of lung opacity...may represent early cavitation versus areas of focal bronchiectasis."  Patient started on azithromycin and ceftriaxone empirically for multifocal PNA.  Also of note, patient was found to be in new afib on EKG.  Patient denies any afib history but said when he was younger he did feel some irregular heartbeats.  Patient admitted to [facility] with sepsis due to multifocal pneumonia with progression to acute hypoxic respiratory failure now on HFNC and course further c/b new onset afib. Patient was seen by ID, pulm and cardiology. He is now transferred to [facility] for bronchoscopy. Patient denies states he is feeling well at this time.  (29 Aug 2022 23:01)      INTERVAL HPI/OVERNIGHT EVENTS: Pt was seen and examined at bedside. No acute overnight events. Pt endorses some lower back pain. Pt denies headache, dizziness, lightheadedness, fever, chills, CP, SOB, palpitations, abdominal pain, n/v, numbness/tingling.  No other complaints at this time.     REVIEW OF SYSTEMS:    CONSTITUTIONAL: No weakness, fevers or chills  EYES/ENT: No visual changes, no throat pain   RESPIRATORY: No cough, wheezing, hemoptysis; No shortness of breath  CARDIOVASCULAR: No chest pain or palpitations  GASTROINTESTINAL: No abdominal, nausea, vomiting, or hematemesis; No diarrhea or constipation. No melena or hematochezia.  GENITOURINARY: No dysuria, frequency or hematuria  NEUROLOGICAL: No dizziness, numbness, or weakness  SKIN: No itching, burning, rashes, or lesions   All other review of systems is negative unless indicated above.    VITAL SIGNS:    T(C): 36.6 (08-31-22 @ 04:29), Max: 37.1 (08-30-22 @ 15:54)  HR: 68 (08-31-22 @ 08:48) (62 - 91)  BP: 130/64 (08-31-22 @ 04:29) (119/59 - 142/72)  RR: 16 (08-31-22 @ 04:29) (16 - 18)  SpO2: 99% (08-31-22 @ 08:48) (94% - 100%)    PHYSICAL EXAM:     GENERAL: no acute distress  HEENT: NC/AT, EOMI, neck supple, MMM  RESPIRATORY: LCTAB/L, no rhonchi, rales, or wheezing  CARDIOVASCULAR: RRR, no murmurs, gallops, rubs  ABDOMINAL: soft, non-tender, non-distended, positive bowel sounds   EXTREMITIES: no clubbing, cyanosis, or edema  NEUROLOGICAL: alert and oriented x 3, non-focal  SKIN: no rashes or lesions   MUSCULOSKELETAL: some b/l lower back pain with palpation                          10.7   11.26 )-----------( 526      ( 31 Aug 2022 06:22 )             34.2     08-31    142  |  110<H>  |  21  ----------------------------<  80  4.2   |  25  |  1.00    Ca    8.4<L>      31 Aug 2022 06:22    TPro  7.4  /  Alb  2.3<L>  /  TBili  0.2  /  DBili  x   /  AST  33  /  ALT  46  /  AlkPhos  192<H>  08-30      CAPILLARY BLOOD GLUCOSE          MEDICATIONS  (STANDING):  ALBUTerol    0.083% 2.5 milliGRAM(s) Nebulizer every 6 hours  ALBUTerol    90 MICROgram(s) HFA Inhaler 1 Puff(s) Inhalation every 6 hours  amLODIPine   Tablet 5 milliGRAM(s) Oral daily  atorvastatin 40 milliGRAM(s) Oral at bedtime  budesonide  80 MICROgram(s)/formoterol 4.5 MICROgram(s) Inhaler 2 Puff(s) Inhalation two times a day  enoxaparin Injectable 40 milliGRAM(s) SubCutaneous every 24 hours  finasteride 5 milliGRAM(s) Oral daily  metoprolol tartrate 50 milliGRAM(s) Oral two times a day  naloxone Injectable 0.4 milliGRAM(s) IV Push once  pantoprazole    Tablet 40 milliGRAM(s) Oral before breakfast  saccharomyces boulardii 250 milliGRAM(s) Oral two times a day  senna 2 Tablet(s) Oral at bedtime  sodium chloride 0.9%. 1000 milliLiter(s) (60 mL/Hr) IV Continuous <Continuous>  tamsulosin 0.4 milliGRAM(s) Oral at bedtime

## 2022-09-01 LAB
ANION GAP SERPL CALC-SCNC: 5 MMOL/L — SIGNIFICANT CHANGE UP (ref 5–17)
BUN SERPL-MCNC: 18 MG/DL — SIGNIFICANT CHANGE UP (ref 7–23)
CALCIUM SERPL-MCNC: 8.6 MG/DL — SIGNIFICANT CHANGE UP (ref 8.5–10.1)
CHLORIDE SERPL-SCNC: 107 MMOL/L — SIGNIFICANT CHANGE UP (ref 96–108)
CO2 SERPL-SCNC: 28 MMOL/L — SIGNIFICANT CHANGE UP (ref 22–31)
CREAT SERPL-MCNC: 0.95 MG/DL — SIGNIFICANT CHANGE UP (ref 0.5–1.3)
CULTURE RESULTS: SIGNIFICANT CHANGE UP
EGFR: 77 ML/MIN/1.73M2 — SIGNIFICANT CHANGE UP
GLUCOSE SERPL-MCNC: 88 MG/DL — SIGNIFICANT CHANGE UP (ref 70–99)
HCT VFR BLD CALC: 36.7 % — LOW (ref 39–50)
HGB BLD-MCNC: 11.8 G/DL — LOW (ref 13–17)
MCHC RBC-ENTMCNC: 31.5 PG — SIGNIFICANT CHANGE UP (ref 27–34)
MCHC RBC-ENTMCNC: 32.2 GM/DL — SIGNIFICANT CHANGE UP (ref 32–36)
MCV RBC AUTO: 97.9 FL — SIGNIFICANT CHANGE UP (ref 80–100)
NRBC # BLD: 0 /100 WBCS — SIGNIFICANT CHANGE UP (ref 0–0)
PLATELET # BLD AUTO: 530 K/UL — HIGH (ref 150–400)
POTASSIUM SERPL-MCNC: 4.4 MMOL/L — SIGNIFICANT CHANGE UP (ref 3.5–5.3)
POTASSIUM SERPL-SCNC: 4.4 MMOL/L — SIGNIFICANT CHANGE UP (ref 3.5–5.3)
RBC # BLD: 3.75 M/UL — LOW (ref 4.2–5.8)
RBC # FLD: 13.9 % — SIGNIFICANT CHANGE UP (ref 10.3–14.5)
SODIUM SERPL-SCNC: 140 MMOL/L — SIGNIFICANT CHANGE UP (ref 135–145)
SPECIMEN SOURCE: SIGNIFICANT CHANGE UP
WBC # BLD: 12.5 K/UL — HIGH (ref 3.8–10.5)
WBC # FLD AUTO: 12.5 K/UL — HIGH (ref 3.8–10.5)

## 2022-09-01 PROCEDURE — 99233 SBSQ HOSP IP/OBS HIGH 50: CPT | Mod: GC

## 2022-09-01 RX ORDER — MAGNESIUM HYDROXIDE 400 MG/1
30 TABLET, CHEWABLE ORAL ONCE
Refills: 0 | Status: COMPLETED | OUTPATIENT
Start: 2022-09-01 | End: 2022-09-01

## 2022-09-01 RX ADMIN — BUDESONIDE AND FORMOTEROL FUMARATE DIHYDRATE 2 PUFF(S): 160; 4.5 AEROSOL RESPIRATORY (INHALATION) at 06:08

## 2022-09-01 RX ADMIN — PANTOPRAZOLE SODIUM 40 MILLIGRAM(S): 20 TABLET, DELAYED RELEASE ORAL at 06:06

## 2022-09-01 RX ADMIN — FINASTERIDE 5 MILLIGRAM(S): 5 TABLET, FILM COATED ORAL at 12:26

## 2022-09-01 RX ADMIN — MAGNESIUM HYDROXIDE 30 MILLILITER(S): 400 TABLET, CHEWABLE ORAL at 18:55

## 2022-09-01 RX ADMIN — OXYCODONE HYDROCHLORIDE 2.5 MILLIGRAM(S): 5 TABLET ORAL at 23:34

## 2022-09-01 RX ADMIN — BUDESONIDE AND FORMOTEROL FUMARATE DIHYDRATE 2 PUFF(S): 160; 4.5 AEROSOL RESPIRATORY (INHALATION) at 18:56

## 2022-09-01 RX ADMIN — OXYCODONE HYDROCHLORIDE 2.5 MILLIGRAM(S): 5 TABLET ORAL at 01:30

## 2022-09-01 RX ADMIN — Medication 650 MILLIGRAM(S): at 00:44

## 2022-09-01 RX ADMIN — Medication 250 MILLIGRAM(S): at 06:06

## 2022-09-01 RX ADMIN — APIXABAN 5 MILLIGRAM(S): 2.5 TABLET, FILM COATED ORAL at 06:06

## 2022-09-01 RX ADMIN — AMLODIPINE BESYLATE 5 MILLIGRAM(S): 2.5 TABLET ORAL at 06:06

## 2022-09-01 RX ADMIN — Medication 50 MILLIGRAM(S): at 06:05

## 2022-09-01 RX ADMIN — OXYCODONE HYDROCHLORIDE 2.5 MILLIGRAM(S): 5 TABLET ORAL at 00:48

## 2022-09-01 RX ADMIN — Medication 250 MILLIGRAM(S): at 18:52

## 2022-09-01 RX ADMIN — Medication 650 MILLIGRAM(S): at 00:15

## 2022-09-01 RX ADMIN — Medication 650 MILLIGRAM(S): at 12:27

## 2022-09-01 RX ADMIN — SENNA PLUS 2 TABLET(S): 8.6 TABLET ORAL at 22:26

## 2022-09-01 RX ADMIN — Medication 650 MILLIGRAM(S): at 06:06

## 2022-09-01 RX ADMIN — ALBUTEROL 2.5 MILLIGRAM(S): 90 AEROSOL, METERED ORAL at 07:43

## 2022-09-01 RX ADMIN — ATORVASTATIN CALCIUM 40 MILLIGRAM(S): 80 TABLET, FILM COATED ORAL at 22:26

## 2022-09-01 RX ADMIN — ALBUTEROL 2.5 MILLIGRAM(S): 90 AEROSOL, METERED ORAL at 20:12

## 2022-09-01 RX ADMIN — ALBUTEROL 2.5 MILLIGRAM(S): 90 AEROSOL, METERED ORAL at 14:22

## 2022-09-01 RX ADMIN — Medication 650 MILLIGRAM(S): at 12:26

## 2022-09-01 RX ADMIN — APIXABAN 5 MILLIGRAM(S): 2.5 TABLET, FILM COATED ORAL at 18:52

## 2022-09-01 RX ADMIN — TAMSULOSIN HYDROCHLORIDE 0.4 MILLIGRAM(S): 0.4 CAPSULE ORAL at 22:26

## 2022-09-01 NOTE — PROGRESS NOTE ADULT - SUBJECTIVE AND OBJECTIVE BOX
Date/Time Patient Seen:  		  Referring MD:   Data Reviewed	       Patient is a 87y old  Male who presents with a chief complaint of bronchoscopy (31 Aug 2022 16:00)      Subjective/HPI     PAST MEDICAL & SURGICAL HISTORY:  No pertinent past medical history    Skin cancer  s/p Mohs    Essential hypertension    Cerebrovascular accident (CVA), unspecified mechanism  2015    Hyperlipidemia, unspecified hyperlipidemia type    Complex tear of lateral meniscus of right knee as current injury, initial encounter    Complex tear of medial meniscus of right knee as current injury, initial encounter    Benign prostatic hyperplasia, presence of lower urinary tract symptoms unspecified, unspecified morphology    H/O pilonidal cyst          Medication list         MEDICATIONS  (STANDING):  acetaminophen     Tablet .. 650 milliGRAM(s) Oral every 6 hours  ALBUTerol    0.083% 2.5 milliGRAM(s) Nebulizer every 6 hours  ALBUTerol    90 MICROgram(s) HFA Inhaler 1 Puff(s) Inhalation every 6 hours  amLODIPine   Tablet 5 milliGRAM(s) Oral daily  apixaban 5 milliGRAM(s) Oral two times a day  atorvastatin 40 milliGRAM(s) Oral at bedtime  budesonide  80 MICROgram(s)/formoterol 4.5 MICROgram(s) Inhaler 2 Puff(s) Inhalation two times a day  finasteride 5 milliGRAM(s) Oral daily  metoprolol tartrate 50 milliGRAM(s) Oral two times a day  naloxone Injectable 0.4 milliGRAM(s) IV Push once  pantoprazole    Tablet 40 milliGRAM(s) Oral before breakfast  saccharomyces boulardii 250 milliGRAM(s) Oral two times a day  senna 2 Tablet(s) Oral at bedtime  sodium chloride 0.9%. 1000 milliLiter(s) (60 mL/Hr) IV Continuous <Continuous>  tamsulosin 0.4 milliGRAM(s) Oral at bedtime    MEDICATIONS  (PRN):  bisacodyl 5 milliGRAM(s) Oral daily PRN Constipation  melatonin 3 milliGRAM(s) Oral at bedtime PRN Insomnia  oxyCODONE    IR 2.5 milliGRAM(s) Oral every 4 hours PRN Moderate Pain (4 - 6)  polyethylene glycol 3350 17 Gram(s) Oral daily PRN Constipation         Vitals log        ICU Vital Signs Last 24 Hrs  T(C): 36.7 (01 Sep 2022 04:59), Max: 36.7 (01 Sep 2022 04:59)  T(F): 98 (01 Sep 2022 04:59), Max: 98 (01 Sep 2022 04:59)  HR: 60 (01 Sep 2022 04:59) (57 - 72)  BP: 138/56 (01 Sep 2022 04:59) (120/57 - 164/77)  BP(mean): --  ABP: --  ABP(mean): --  RR: 18 (01 Sep 2022 04:59) (17 - 18)  SpO2: 96% (01 Sep 2022 04:59) (96% - 99%)    O2 Parameters below as of 01 Sep 2022 04:59  Patient On (Oxygen Delivery Method): nasal cannula  O2 Flow (L/min): 2               Input and Output:  I&O's Detail    30 Aug 2022 07:01  -  31 Aug 2022 07:00  --------------------------------------------------------  IN:    Oral Fluid: 240 mL    sodium chloride 0.9%: 240 mL  Total IN: 480 mL    OUT:    Voided (mL): 1400 mL  Total OUT: 1400 mL    Total NET: -920 mL      31 Aug 2022 07:01  -  01 Sep 2022 06:33  --------------------------------------------------------  IN:  Total IN: 0 mL    OUT:    Voided (mL): 400 mL  Total OUT: 400 mL    Total NET: -400 mL          Lab Data                        10.7   11.26 )-----------( 526      ( 31 Aug 2022 06:22 )             34.2     08-31    142  |  110<H>  |  21  ----------------------------<  80  4.2   |  25  |  1.00    Ca    8.4<L>      31 Aug 2022 06:22              Review of Systems	      Objective     Physical Examination    heart s1s2  lung dec BS  abd soft  head nc      Pertinent Lab findings & Imaging      Bud:  NO   Adequate UO     I&O's Detail    30 Aug 2022 07:01  -  31 Aug 2022 07:00  --------------------------------------------------------  IN:    Oral Fluid: 240 mL    sodium chloride 0.9%: 240 mL  Total IN: 480 mL    OUT:    Voided (mL): 1400 mL  Total OUT: 1400 mL    Total NET: -920 mL      31 Aug 2022 07:01  -  01 Sep 2022 06:33  --------------------------------------------------------  IN:  Total IN: 0 mL    OUT:    Voided (mL): 400 mL  Total OUT: 400 mL    Total NET: -400 mL               Discussed with:     Cultures:	        Radiology

## 2022-09-01 NOTE — PROGRESS NOTE ADULT - ASSESSMENT
87M with HTN, HLD, hx of CVA (no residual deficits), BPH, hx of skin CA who presents with cough and fevers.    Found to have multifocal pneumonia.  Also found have new onset afib.        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  We will follow along in the care of this patient. Please call us at [phone] or text me directly on my cell# at [phone] with any concerns.

## 2022-09-01 NOTE — PROGRESS NOTE ADULT - ASSESSMENT
86yo M with PMH of HTN, HLD, hx of CVA (no residual deficits), BPH, hx of skin CA who presents with cough and fevers a/w sepsis due to multifocal pneumonia with progression to acute hypoxic respiratory failure now on HFNC and course further c/b new onset afib.  Transferred from [place] for bronchoscopy.     Bronch done - Aug 30th -   Bronch AFB neg  ID follow up   DC isol - per ID recs      eval malignancy - TB - PNA - infection  spoke with pt - dtr -   s/p Levaquin course in [facility]  o2 support as needed to keep sat > 88 pct  isolation precs  ID follow up

## 2022-09-01 NOTE — PROGRESS NOTE ADULT - SUBJECTIVE AND OBJECTIVE BOX
Chief Complaint: Hemoptysis    Interval Events: No events overnight.    Review of Systems:  General: No fevers, chills, weight gain  Skin: No rashes, color changes  Cardiovascular: No chest pain, orthopnea  Respiratory: No shortness of breath, cough  Gastrointestinal: No nausea, abdominal pain  Genitourinary: No incontinence, pain with urination  Musculoskeletal: No pain, swelling, decreased range of motion  Neurological: No headache, weakness  Psychiatric: No depression, anxiety  Endocrine: No weight gain, increased thirst  All other systems are comprehensively negative.    Physical Exam:  Vital Signs Last 24 Hrs  T(C): 36.7 (01 Sep 2022 04:59), Max: 36.7 (01 Sep 2022 04:59)  T(F): 98 (01 Sep 2022 04:59), Max: 98 (01 Sep 2022 04:59)  HR: 57 (01 Sep 2022 08:30) (57 - 61)  BP: 138/56 (01 Sep 2022 04:59) (120/57 - 164/77)  BP(mean): --  RR: 18 (01 Sep 2022 04:59) (17 - 18)  SpO2: 98% (01 Sep 2022 08:30) (96% - 99%)  Parameters below as of 01 Sep 2022 08:30  Patient On (Oxygen Delivery Method): nasal cannula,3L  General: NAD  HEENT: MMM  Neck: No JVD, no carotid bruit  Lungs: CTAB  CV: RRR, nl S1/S2, no M/R/G  Abdomen: S/NT/ND, +BS  Extremities: No LE edema, no cyanosis  Neuro: AAOx3, non-focal  Skin: No rash    Labs:    09-01    140  |  107  |  18  ----------------------------<  88  4.4   |  28  |  0.95    Ca    8.6      01 Sep 2022 06:24                          11.8   12.50 )-----------( 530      ( 01 Sep 2022 06:24 )             36.7       Telemetry: Sinus rhythm

## 2022-09-01 NOTE — PROGRESS NOTE ADULT - SUBJECTIVE AND OBJECTIVE BOX
HPI:  87M with HTN, HLD, hx of CVA (no residual deficits), BPH, hx of skin CA who presents with cough and fevers.  Symptoms started about 5-6 days prior to admission.  Started with left sided upper back pain.  Then started to have a cough associated with green phlegm and possible blood.  Associated with SOB and DONG.  No weight changes or night sweats.  Had a feveras high as 102F.  Some nausea but no vomiting with loss of appetite.  No chest pain.  No diarrhea.  No abdominal pain.  No recent travel or sick contacts.  Went to see his PMD 3 days prior to [place] admission and was given a z-kellie and tesslon perles.  Reported had negative COVID tests at home.   In the [place] ED, patient's triage vitals were /78    RR  21, 93% on RA (thought dropped down to 88%) and T 98.5F.  Labs showed WBC 18 with a left shift, CINDI with BUN/Cr 40/1.88, elevated LFTs, normal lactate, negative COVID (last booster in 12/2021 with Pfizer vaccine), neg influenza, neg RSV.  CT chest showed "patchy groundglass and more dense opacities in both lungs, suspicious for multifocal PNA...several small lucencies with areas of lung opacity...may represent early cavitation versus areas of focal bronchiectasis."  Patient started on azithromycin and ceftriaxone empirically for multifocal PNA.  Also of note, patient was found to be in new afib on EKG.  Patient denies any afib history but said when he was younger he did feel some irregular heartbeats.  Patient admitted to [facility] with sepsis due to multifocal pneumonia with progression to acute hypoxic respiratory failure now on HFNC and course further c/b new onset afib. Patient was seen by ID, pulm and cardiology. He is now transferred to [facility] [place] for bronchoscopy. Patient denies states he is feeling well at this time.  (29 Aug 2022 23:01)      INTERVAL HPI/OVERNIGHT EVENTS: Pt was seen and examined at bedside. No acute overnight events. Pt reports improvement in lower back/buttock pain after yesterday's pain medicine regimen.   Pt denies headache, dizziness, lightheadedness, fever, chills, body aches, CP, SOB, palpitations, abdominal pain, n/v, numbness/tingling.  No other complaints at this time.     REVIEW OF SYSTEMS:    CONSTITUTIONAL: No weakness, fevers or chills  EYES/ENT: No visual changes, no throat pain   RESPIRATORY: No cough, wheezing, hemoptysis; No shortness of breath  CARDIOVASCULAR: No chest pain or palpitations  GASTROINTESTINAL: No abdominal, nausea, vomiting, or hematemesis; No diarrhea or constipation. No melena or hematochezia.  GENITOURINARY: No dysuria, frequency or hematuria  NEUROLOGICAL: No dizziness, numbness, or weakness  SKIN: No itching, burning, rashes, or lesions   All other review of systems is negative unless indicated above.    VITAL SIGNS:    T(C): 36.7 (09-01-22 @ 04:59), Max: 36.7 (09-01-22 @ 04:59)  HR: 57 (09-01-22 @ 08:30) (57 - 61)  BP: 138/56 (09-01-22 @ 04:59) (120/57 - 164/77)  RR: 18 (09-01-22 @ 04:59) (17 - 18)  SpO2: 98% (09-01-22 @ 08:30) (96% - 99%)    PHYSICAL EXAM:     GENERAL: no acute distress  HEENT: NC/AT, EOMI, neck supple, MMM  RESPIRATORY: LCTAB/L, no rhonchi, rales, or wheezing  CARDIOVASCULAR: RRR, no murmurs, gallops, rubs  ABDOMINAL: soft, non-tender, non-distended, positive bowel sounds   EXTREMITIES: no clubbing, cyanosis, or edema  NEUROLOGICAL: alert and oriented x 3, non-focal  SKIN: no rashes or lesions   MUSCULOSKELETAL: no gross joint deformity                          11.8   12.50 )-----------( 530      ( 01 Sep 2022 06:24 )             36.7     09-01    140  |  107  |  18  ----------------------------<  88  4.4   |  28  |  0.95    Ca    8.6      01 Sep 2022 06:24        CAPILLARY BLOOD GLUCOSE          MEDICATIONS  (STANDING):  acetaminophen     Tablet .. 650 milliGRAM(s) Oral every 6 hours  ALBUTerol    0.083% 2.5 milliGRAM(s) Nebulizer every 6 hours  ALBUTerol    90 MICROgram(s) HFA Inhaler 1 Puff(s) Inhalation every 6 hours  amLODIPine   Tablet 5 milliGRAM(s) Oral daily  apixaban 5 milliGRAM(s) Oral two times a day  atorvastatin 40 milliGRAM(s) Oral at bedtime  budesonide  80 MICROgram(s)/formoterol 4.5 MICROgram(s) Inhaler 2 Puff(s) Inhalation two times a day  finasteride 5 milliGRAM(s) Oral daily  metoprolol tartrate 50 milliGRAM(s) Oral two times a day  naloxone Injectable 0.4 milliGRAM(s) IV Push once  pantoprazole    Tablet 40 milliGRAM(s) Oral before breakfast  saccharomyces boulardii 250 milliGRAM(s) Oral two times a day  senna 2 Tablet(s) Oral at bedtime  sodium chloride 0.9%. 1000 milliLiter(s) (60 mL/Hr) IV Continuous <Continuous>  tamsulosin 0.4 milliGRAM(s) Oral at bedtime

## 2022-09-01 NOTE — PHYSICAL THERAPY INITIAL EVALUATION ADULT - PERTINENT HX OF CURRENT PROBLEM, REHAB EVAL
87M presents with cough and fevers x5-6 days prior to admission. Associated with SOB and DONG. Negative COVID, neg influenza, neg RSV.  CT chest: patchy groundglass and more dense opacities in both lungs, suspicious for multifocal PNA. Pt admitted to [facility] with sepsis due to multifocal pneumonia with progression to acute hypoxic respiratory failure. Course c/b new onset afib. P now transferred to [place] [facility] for bronchoscopy to r/o TB. Pt reports improvement in lower back/buttock pain. CXR: Persistent infiltrates of left mid and upper lung fields and the right upper lobe.

## 2022-09-01 NOTE — PROGRESS NOTE ADULT - ASSESSMENT
The patient is an 87 year old male with a history of HTN, HL, BPH, CVA who presents with cough and hemoptysis in the setting of PNA and r/o TB.    Plan:  - Intermittent episodes of AF on telemetry  - Continue metoprolol tartrate 100 mg bid  - Continue amlodipine 2.5 mg daily  - Echo with low normal LV systolic function, mild pulm HTN  - Sputum culture now positive for AFB  - Pulm and ID follow-up  - Continue apixaban 5 mg bid  - Await results of bronchoscopy

## 2022-09-01 NOTE — PROGRESS NOTE ADULT - PROBLEM SELECTOR PLAN 2
Patient with new found paroxysmal atrial fibrillation  - Cardiology following  - Rate Control: Continue metoprolol tartrate 50mg BID  - Pt on eliquis 5mg BID renal dosing per pharmacy  - Echo with low normal LV systolic function, mild pulm HTN

## 2022-09-01 NOTE — PHYSICAL THERAPY INITIAL EVALUATION ADULT - ADDITIONAL COMMENTS
Pt lives alone, in ranch house w/ steps to basement for laundry.  Was independent prior to admission, driving, no assistive device

## 2022-09-01 NOTE — PROGRESS NOTE ADULT - SUBJECTIVE AND OBJECTIVE BOX
[facility] DIVISION of INFECTIOUS DISEASE  Mustapha Valente MD PhD, Sheryl Pride MD, Adriana Rodriguez MD, Oliva Myers MD, Ebenezer Rivas MD  and providing coverage with Leodan Zaragoza MD  Providing Infectious Disease Consultations at [facility], [facility], [place], [facility], [facility]'s    Office# [phone] to schedule follow up appointments  Answering Service for urgent calls or New Consults [phone]  Cell# to text for urgent issues Mustapha Valente [phone]     infectious diseases progress note:    DENNIS BRADFORD is a 87y y. o. Male patient    Overnight and events of the last 24hrs reviewed    Allergies    No Known Allergies    Intolerances        ANTIBIOTICS/RELEVANT:  antimicrobials    immunologic:    OTHER:  ALBUTerol    0.083% 2.5 milliGRAM(s) Nebulizer every 6 hours  ALBUTerol    90 MICROgram(s) HFA Inhaler 1 Puff(s) Inhalation every 6 hours  amLODIPine   Tablet 5 milliGRAM(s) Oral daily  apixaban 5 milliGRAM(s) Oral two times a day  atorvastatin 40 milliGRAM(s) Oral at bedtime  bisacodyl 5 milliGRAM(s) Oral daily PRN  budesonide  80 MICROgram(s)/formoterol 4.5 MICROgram(s) Inhaler 2 Puff(s) Inhalation two times a day  finasteride 5 milliGRAM(s) Oral daily  melatonin 3 milliGRAM(s) Oral at bedtime PRN  metoprolol tartrate 50 milliGRAM(s) Oral two times a day  naloxone Injectable 0.4 milliGRAM(s) IV Push once  oxyCODONE    IR 2.5 milliGRAM(s) Oral every 4 hours PRN  pantoprazole    Tablet 40 milliGRAM(s) Oral before breakfast  polyethylene glycol 3350 17 Gram(s) Oral daily PRN  saccharomyces boulardii 250 milliGRAM(s) Oral two times a day  senna 2 Tablet(s) Oral at bedtime  sodium chloride 0.9%. 1000 milliLiter(s) IV Continuous <Continuous>  tamsulosin 0.4 milliGRAM(s) Oral at bedtime      Objective:  Vital Signs Last 24 Hrs  T(C): 36.6 (01 Sep 2022 13:14), Max: 36.7 (01 Sep 2022 04:59)  T(F): 97.9 (01 Sep 2022 13:14), Max: 98 (01 Sep 2022 04:59)  HR: 59 (01 Sep 2022 15:26) (57 - 61)  BP: 102/50 (01 Sep 2022 13:14) (102/50 - 164/77)  BP(mean): --  RR: 18 (01 Sep 2022 13:14) (17 - 18)  SpO2: 97% (01 Sep 2022 15:26) (90% - 99%)    Parameters below as of 01 Sep 2022 15:26  Patient On (Oxygen Delivery Method): nasal cannula,3L        T(C): 36.6 (09-01-22 @ 13:14), Max: 37.1 (08-30-22 @ 15:54)  T(C): 36.6 (09-01-22 @ 13:14), Max: 37.1 (08-30-22 @ 15:54)  T(C): 36.6 (09-01-22 @ 13:14), Max: 37.1 (08-30-22 @ 15:54)    PHYSICAL EXAM:  HEENT: NC atraumatic  Neck: supple  Respiratory: no accessory muscle use, breathing comfortably  Cardiovascular: distant  Gastrointestinal: normal appearing, nondistended  Extremities: no clubbing, no cyanosis,        LABS:                          11.8   12.50 )-----------( 530      ( 01 Sep 2022 06:24 )             36.7       WBC  12.50 09-01 @ 06:24  11.26 08-31 @ 06:22  10.92 08-30 @ 05:56  12.29 08-28 @ 06:26  11.29 08-27 @ 06:47  11.13 08-26 @ 06:00      09-01    140  |  107  |  18  ----------------------------<  88  4.4   |  28  |  0.95    Ca    8.6      01 Sep 2022 06:24        Creatinine, Serum: 0.95 mg/dL (09-01-22 @ 06:24)  Creatinine, Serum: 1.00 mg/dL (08-31-22 @ 06:22)  Creatinine, Serum: 0.96 mg/dL (08-30-22 @ 05:56)  Creatinine, Serum: 1.07 mg/dL (08-28-22 @ 06:26)                INFLAMMATORY MARKERS  Auto Neutrophil #: 7.01 K/uL (08-30-22 @ 05:56)  Auto Lymphocyte #: 2.20 K/uL (08-30-22 @ 05:56)  Auto Neutrophil #: 8.71 K/uL (08-23-22 @ 06:00)  Auto Lymphocyte #: 2.20 K/uL (08-23-22 @ 06:00)  Auto Neutrophil #: 9.75 K/uL (08-22-22 @ 06:35)  Auto Lymphocyte #: 2.29 K/uL (08-22-22 @ 06:35)  Auto Neutrophil #: 12.02 K/uL (08-21-22 @ 06:32)  Auto Lymphocyte #: 2.15 K/uL (08-21-22 @ 06:32)      Auto Eosinophil #: 0.28 K/uL (08-30-22 @ 05:56)  Auto Eosinophil #: 0.61 K/uL (08-23-22 @ 06:00)  Auto Eosinophil #: 0.51 K/uL (08-22-22 @ 06:35)  Auto Eosinophil #: 0.71 K/uL (08-21-22 @ 06:32)        Procalcitonin, Serum: 0.36 ng/mL (08-19-22 @ 06:36)                          MICROBIOLOGY:              RADIOLOGY & ADDITIONAL STUDIES:

## 2022-09-01 NOTE — PROGRESS NOTE ADULT - PROBLEM SELECTOR PLAN 1
-[place] workup indeterminant for TB, AFB growth in broth positive, 3x AFB smears negative. Quant gold indeterminant, here from bronchoscopy scheduled for 8/30.  -8/22 CXR: Persistent advanced irregular infiltrates involving the left mid and upper lung fields and the right upper lobe.  -Without fevers since admission  -Persistent slight leukocytosis 11k, (11-13 k the past week)  - S/p bronchoscopy 8/30  - F/u bronchoscopy cultures  - Bronch cx- NGTD 8/31, AF-, F/u Fungal cx  - Sputum cx AF+  - Awaiting identification of AF bacteria from sputum cx -[place] workup indeterminant for TB, AFB growth in broth positive, 3x AFB smears negative. Quant gold indeterminant, here from bronchoscopy scheduled for 8/30.  -8/22 CXR: Persistent advanced irregular infiltrates involving the left mid and upper lung fields and the right upper lobe.  -Without fevers since admission  -Persistent slight leukocytosis 11k, (11-13 k the past week)  - S/p bronchoscopy 8/30  - F/u bronchoscopy cultures  - Bronch cx- NGTD 9/1, AF-, F/u Fungal cx  - Sputum cx AF+  - Awaiting identification of AF bacteria from sputum cx -[place] workup indeterminant for TB, AFB growth in broth positive, 3x AFB smears negative. Quant gold indeterminant, here from bronchoscopy scheduled for 8/30.  -8/22 CXR: Persistent advanced irregular infiltrates involving the left mid and upper lung fields and the right upper lobe.  -Without fevers since admission  -Persistent slight leukocytosis 11k, (11-13 k the past week)  - S/p bronchoscopy 8/30  - F/u bronchoscopy cultures  - Bronch cx- NGTD 9/1, AF-, F/u Fungal cx  - Sputum cx AF+  - Awaiting identification of AF bacteria from sputum cx  - Bx bronchial cells 9/1: -ve for malignancy, -ve AFB - [place] workup indeterminant for TB, AFB growth in broth positive, 3x AFB smears negative. Quant gold indeterminant, here from bronchoscopy scheduled for 8/30.  -8/22 CXR: Persistent advanced irregular infiltrates involving the left mid and upper lung fields and the right upper lobe.  -Without fevers since admission  -Persistent slight leukocytosis 11k, (11-13 k the past week)  - S/p bronchoscopy 8/30  - F/u bronchoscopy cultures  - Bronch cx- NGTD 9/1, AF-, F/u Fungal cx  - Sputum cx AF+  - Awaiting identification of AF bacteria from sputum cx  - Bx bronchial cells 9/1: -ve for malignancy, -ve AFB

## 2022-09-01 NOTE — PROGRESS NOTE ADULT - PROBLEM SELECTOR PLAN 3
Pt has complaint of lower back pain  - Pain resolved with standing tylenol, oxy 2.5  - consider lumbosacral XRs if pain reoccurs  - iSTOP 8/30, chart note   - low dose opoid regimen w/ PRN bowel regimen

## 2022-09-01 NOTE — PROGRESS NOTE ADULT - ASSESSMENT
86yo M with PMH of HTN, HLD, hx of CVA (no residual deficits), BPH, hx of skin CA who presents as a transfer from [facility] for bronchoscopy to r/o TB, satting well on 3L NC.

## 2022-09-02 LAB
ANION GAP SERPL CALC-SCNC: 4 MMOL/L — LOW (ref 5–17)
BUN SERPL-MCNC: 23 MG/DL — SIGNIFICANT CHANGE UP (ref 7–23)
CALCIUM SERPL-MCNC: 8.8 MG/DL — SIGNIFICANT CHANGE UP (ref 8.5–10.1)
CHLORIDE SERPL-SCNC: 105 MMOL/L — SIGNIFICANT CHANGE UP (ref 96–108)
CO2 SERPL-SCNC: 30 MMOL/L — SIGNIFICANT CHANGE UP (ref 22–31)
CREAT SERPL-MCNC: 1.1 MG/DL — SIGNIFICANT CHANGE UP (ref 0.5–1.3)
EGFR: 65 ML/MIN/1.73M2 — SIGNIFICANT CHANGE UP
GLUCOSE SERPL-MCNC: 90 MG/DL — SIGNIFICANT CHANGE UP (ref 70–99)
HCT VFR BLD CALC: 35.9 % — LOW (ref 39–50)
HGB BLD-MCNC: 11.1 G/DL — LOW (ref 13–17)
MCHC RBC-ENTMCNC: 30.2 PG — SIGNIFICANT CHANGE UP (ref 27–34)
MCHC RBC-ENTMCNC: 30.9 GM/DL — LOW (ref 32–36)
MCV RBC AUTO: 97.8 FL — SIGNIFICANT CHANGE UP (ref 80–100)
NRBC # BLD: 0 /100 WBCS — SIGNIFICANT CHANGE UP (ref 0–0)
PLATELET # BLD AUTO: 462 K/UL — HIGH (ref 150–400)
POTASSIUM SERPL-MCNC: 4.9 MMOL/L — SIGNIFICANT CHANGE UP (ref 3.5–5.3)
POTASSIUM SERPL-SCNC: 4.9 MMOL/L — SIGNIFICANT CHANGE UP (ref 3.5–5.3)
RBC # BLD: 3.67 M/UL — LOW (ref 4.2–5.8)
RBC # FLD: 14.1 % — SIGNIFICANT CHANGE UP (ref 10.3–14.5)
SODIUM SERPL-SCNC: 139 MMOL/L — SIGNIFICANT CHANGE UP (ref 135–145)
WBC # BLD: 11.88 K/UL — HIGH (ref 3.8–10.5)
WBC # FLD AUTO: 11.88 K/UL — HIGH (ref 3.8–10.5)

## 2022-09-02 PROCEDURE — 99233 SBSQ HOSP IP/OBS HIGH 50: CPT | Mod: GC

## 2022-09-02 RX ORDER — CARBAMIDE PEROXIDE 81.86 MG/ML
10 SOLUTION/ DROPS AURICULAR (OTIC) EVERY 12 HOURS
Refills: 0 | Status: COMPLETED | OUTPATIENT
Start: 2022-09-02 | End: 2022-09-06

## 2022-09-02 RX ORDER — METOPROLOL TARTRATE 50 MG
50 TABLET ORAL
Refills: 0 | Status: DISCONTINUED | OUTPATIENT
Start: 2022-09-02 | End: 2022-09-13

## 2022-09-02 RX ORDER — METOPROLOL TARTRATE 50 MG
50 TABLET ORAL
Refills: 0 | Status: DISCONTINUED | OUTPATIENT
Start: 2022-09-02 | End: 2022-09-02

## 2022-09-02 RX ADMIN — Medication 250 MILLIGRAM(S): at 17:28

## 2022-09-02 RX ADMIN — APIXABAN 5 MILLIGRAM(S): 2.5 TABLET, FILM COATED ORAL at 05:17

## 2022-09-02 RX ADMIN — ALBUTEROL 2.5 MILLIGRAM(S): 90 AEROSOL, METERED ORAL at 12:53

## 2022-09-02 RX ADMIN — BUDESONIDE AND FORMOTEROL FUMARATE DIHYDRATE 2 PUFF(S): 160; 4.5 AEROSOL RESPIRATORY (INHALATION) at 05:30

## 2022-09-02 RX ADMIN — Medication 250 MILLIGRAM(S): at 05:19

## 2022-09-02 RX ADMIN — AMLODIPINE BESYLATE 5 MILLIGRAM(S): 2.5 TABLET ORAL at 05:19

## 2022-09-02 RX ADMIN — OXYCODONE HYDROCHLORIDE 2.5 MILLIGRAM(S): 5 TABLET ORAL at 03:41

## 2022-09-02 RX ADMIN — ALBUTEROL 2.5 MILLIGRAM(S): 90 AEROSOL, METERED ORAL at 07:42

## 2022-09-02 RX ADMIN — OXYCODONE HYDROCHLORIDE 2.5 MILLIGRAM(S): 5 TABLET ORAL at 22:20

## 2022-09-02 RX ADMIN — PANTOPRAZOLE SODIUM 40 MILLIGRAM(S): 20 TABLET, DELAYED RELEASE ORAL at 05:17

## 2022-09-02 RX ADMIN — BUDESONIDE AND FORMOTEROL FUMARATE DIHYDRATE 2 PUFF(S): 160; 4.5 AEROSOL RESPIRATORY (INHALATION) at 20:09

## 2022-09-02 RX ADMIN — Medication 50 MILLIGRAM(S): at 17:28

## 2022-09-02 RX ADMIN — ALBUTEROL 2.5 MILLIGRAM(S): 90 AEROSOL, METERED ORAL at 19:52

## 2022-09-02 RX ADMIN — OXYCODONE HYDROCHLORIDE 2.5 MILLIGRAM(S): 5 TABLET ORAL at 21:19

## 2022-09-02 RX ADMIN — Medication 50 MILLIGRAM(S): at 05:20

## 2022-09-02 RX ADMIN — OXYCODONE HYDROCHLORIDE 2.5 MILLIGRAM(S): 5 TABLET ORAL at 04:45

## 2022-09-02 RX ADMIN — OXYCODONE HYDROCHLORIDE 2.5 MILLIGRAM(S): 5 TABLET ORAL at 00:00

## 2022-09-02 RX ADMIN — SENNA PLUS 2 TABLET(S): 8.6 TABLET ORAL at 21:19

## 2022-09-02 RX ADMIN — ATORVASTATIN CALCIUM 40 MILLIGRAM(S): 80 TABLET, FILM COATED ORAL at 21:20

## 2022-09-02 RX ADMIN — CARBAMIDE PEROXIDE 10 DROP(S): 81.86 SOLUTION/ DROPS AURICULAR (OTIC) at 21:29

## 2022-09-02 RX ADMIN — FINASTERIDE 5 MILLIGRAM(S): 5 TABLET, FILM COATED ORAL at 11:21

## 2022-09-02 RX ADMIN — APIXABAN 5 MILLIGRAM(S): 2.5 TABLET, FILM COATED ORAL at 17:28

## 2022-09-02 RX ADMIN — TAMSULOSIN HYDROCHLORIDE 0.4 MILLIGRAM(S): 0.4 CAPSULE ORAL at 21:19

## 2022-09-02 RX ADMIN — ALBUTEROL 2.5 MILLIGRAM(S): 90 AEROSOL, METERED ORAL at 01:26

## 2022-09-02 NOTE — PROGRESS NOTE ADULT - ASSESSMENT
87M with HTN, HLD, hx of CVA (no residual deficits), BPH, hx of skin CA who presents with cough and fevers.    Found to have multifocal pneumonia.  Also found have new onset afib.        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  We will follow along in the care of this patient. Please call us at [phone] or text me directly on my cell# at [phone] with any concerns.

## 2022-09-02 NOTE — PROGRESS NOTE ADULT - ASSESSMENT
88yo M with PMH of HTN, HLD, hx of CVA (no residual deficits), BPH, hx of skin CA who presents as a transfer from [facility] for bronchoscopy to r/o TB, satting well on 3L NC.

## 2022-09-02 NOTE — PROGRESS NOTE ADULT - PROBLEM SELECTOR PLAN 1
- [place] workup indeterminant for TB, AFB growth in broth positive, 3x AFB smears negative. Quant gold indeterminant. Afebrile w/ persistent leukocytosis, 12-->11k, downtrending  - Bronchial cx (bronchoscopy 8/30): no acid fast growth. final.   - Sputum cx: normal alexander. final.  - blood cx: no growth. final.  - AFB and TB: no acid fast growth. final.  - Bx bronchial cells 9/1: -ve for malignancy, -ve AFB  - CXR 8/22: Persistent advanced irregular infiltrates involving the left mid and upper lung fields and the right upper lobe  - pulm following  - ID consulted, recs appreciated

## 2022-09-02 NOTE — PROGRESS NOTE ADULT - SUBJECTIVE AND OBJECTIVE BOX
Patient is a 87y old  Male who presents with a chief complaint of bronchoscopy (02 Sep 2022 06:40)    INTERVAL HPI/OVERNIGHT EVENTS: No acute overnight events. Pt was seen and examined at bedside. Pt states lower back/buttock pain improved. Pt reports resolution of hemoptosis. Pt denies headache, dizziness, lightheadedness, fever, chills, body aches, CP, SOB, palpitations, abdominal pain, n/v, numbness/tingling. No other complaints at this time.     MEDICATIONS  (STANDING):  ALBUTerol    0.083% 2.5 milliGRAM(s) Nebulizer every 6 hours  ALBUTerol    90 MICROgram(s) HFA Inhaler 1 Puff(s) Inhalation every 6 hours  amLODIPine   Tablet 5 milliGRAM(s) Oral daily  apixaban 5 milliGRAM(s) Oral two times a day  atorvastatin 40 milliGRAM(s) Oral at bedtime  budesonide  80 MICROgram(s)/formoterol 4.5 MICROgram(s) Inhaler 2 Puff(s) Inhalation two times a day  finasteride 5 milliGRAM(s) Oral daily  metoprolol tartrate 50 milliGRAM(s) Oral two times a day  naloxone Injectable 0.4 milliGRAM(s) IV Push once  pantoprazole    Tablet 40 milliGRAM(s) Oral before breakfast  saccharomyces boulardii 250 milliGRAM(s) Oral two times a day  senna 2 Tablet(s) Oral at bedtime  sodium chloride 0.9%. 1000 milliLiter(s) (60 mL/Hr) IV Continuous <Continuous>  tamsulosin 0.4 milliGRAM(s) Oral at bedtime    MEDICATIONS  (PRN):  bisacodyl 5 milliGRAM(s) Oral daily PRN Constipation  melatonin 3 milliGRAM(s) Oral at bedtime PRN Insomnia  oxyCODONE    IR 2.5 milliGRAM(s) Oral every 4 hours PRN Moderate Pain (4 - 6)  polyethylene glycol 3350 17 Gram(s) Oral daily PRN Constipation    Allergies  No Known Allergies    Intolerances    REVIEW OF SYSTEMS:  CONSTITUTIONAL: No fever or chills  HEENT:  No headache, no sore throat  RESPIRATORY: No cough, wheezing, or shortness of breath  CARDIOVASCULAR: No chest pain, palpitations  GASTROINTESTINAL: No abd pain, nausea, vomiting, or diarrhea  GENITOURINARY: No dysuria, frequency, or hematuria  NEUROLOGICAL: no focal weakness or dizziness  MUSCULOSKELETAL: no myalgias     Vital Signs Last 24 Hrs  T(C): 36.4 (02 Sep 2022 09:44), Max: 36.9 (01 Sep 2022 20:15)  T(F): 97.5 (02 Sep 2022 09:44), Max: 98.5 (01 Sep 2022 20:15)  HR: 91 (02 Sep 2022 09:44) (59 - 97)  BP: 107/48 (02 Sep 2022 09:44) (101/67 - 112/64)  BP(mean): --  RR: 17 (02 Sep 2022 09:44) (17 - 18)  SpO2: 94% (02 Sep 2022 09:44) (90% - 98%)    Parameters below as of 02 Sep 2022 09:44  Patient On (Oxygen Delivery Method): nasal cannula  O2 Flow (L/min): 2    PHYSICAL EXAM:  GENERAL: NAD  HEENT:  anicteric, moist mucous membranes  CHEST/LUNG:  CTA b/l, no rales, wheezes, or rhonchi  HEART:  RRR, S1, S2  ABDOMEN:  BS+, soft, nontender, nondistended  EXTREMITIES: no edema, cyanosis, or calf tenderness  NERVOUS SYSTEM: answers questions and follows commands appropriately    LABS:                        11.1   11.88 )-----------( 462      ( 02 Sep 2022 07:18 )             35.9     CBC Full  -  ( 02 Sep 2022 07:18 )  WBC Count : 11.88 K/uL  Hemoglobin : 11.1 g/dL  Hematocrit : 35.9 %  Platelet Count - Automated : 462 K/uL  Mean Cell Volume : 97.8 fl  Mean Cell Hemoglobin : 30.2 pg  Mean Cell Hemoglobin Concentration : 30.9 gm/dL  Auto Neutrophil # : x  Auto Lymphocyte # : x  Auto Monocyte # : x  Auto Eosinophil # : x  Auto Basophil # : x  Auto Neutrophil % : x  Auto Lymphocyte % : x  Auto Monocyte % : x  Auto Eosinophil % : x  Auto Basophil % : x    02 Sep 2022 07:18    139    |  105    |  23     ----------------------------<  90     4.9     |  30     |  1.10     Ca    8.8        02 Sep 2022 07:18      CAPILLARY BLOOD GLUCOSE      Culture - Fungal, Bronchial (collected 08-30-22 @ 15:30)  Source: .Bronchial Bronchial  Preliminary Report (09-01-22 @ 11:27):  Testing in progress    Culture - Bronchial (collected 08-30-22 @ 15:30)  Source: .Bronchial Bronchial    Gram Stain (08-31-22 @ 06:56):  No polymorphonuclear leukocytes seen per low power field  Few Squamous epithelial cells seen per low power field  Moderate Gram Variable Cocci seen per oil power field    Final Report (09-01-22 @ 17:02):  Normal Respiratory Dora present    Culture - Acid Fast - Bronchial w/Smear (collected 08-30-22 @ 15:30)  Source: .Bronchial Bronchial      RADIOLOGY & ADDITIONAL TESTS:    Personally reviewed.     Consultant(s) Notes Reviewed:  [x] YES  [ ] NO

## 2022-09-02 NOTE — PROGRESS NOTE ADULT - SUBJECTIVE AND OBJECTIVE BOX
[facility] DIVISION of INFECTIOUS DISEASE  Mustapha Valente MD PhD, Sheryl Pride MD, Adriana Rodriguez MD, Oliva Myers MD, Ebenezer Rivas MD  and providing coverage with Leodan Zaragoza MD  Providing Infectious Disease Consultations at [facility], [facility], [place], [facility], [facility]'s    Office# [phone] to schedule follow up appointments  Answering Service for urgent calls or New Consults [phone]  Cell# to text for urgent issues Mustapha Valente [phone]     infectious diseases progress note:    DENNIS BRADFORD is a 87y y. o. Male patient    Overnight and events of the last 24hrs reviewed    Allergies    No Known Allergies    Intolerances        ANTIBIOTICS/RELEVANT:  antimicrobials    immunologic:    OTHER:  ALBUTerol    0.083% 2.5 milliGRAM(s) Nebulizer every 6 hours  ALBUTerol    90 MICROgram(s) HFA Inhaler 1 Puff(s) Inhalation every 6 hours  amLODIPine   Tablet 5 milliGRAM(s) Oral daily  apixaban 5 milliGRAM(s) Oral two times a day  atorvastatin 40 milliGRAM(s) Oral at bedtime  bisacodyl 5 milliGRAM(s) Oral daily PRN  budesonide  80 MICROgram(s)/formoterol 4.5 MICROgram(s) Inhaler 2 Puff(s) Inhalation two times a day  finasteride 5 milliGRAM(s) Oral daily  melatonin 3 milliGRAM(s) Oral at bedtime PRN  metoprolol tartrate 50 milliGRAM(s) Oral two times a day  naloxone Injectable 0.4 milliGRAM(s) IV Push once  oxyCODONE    IR 2.5 milliGRAM(s) Oral every 4 hours PRN  pantoprazole    Tablet 40 milliGRAM(s) Oral before breakfast  polyethylene glycol 3350 17 Gram(s) Oral daily PRN  saccharomyces boulardii 250 milliGRAM(s) Oral two times a day  senna 2 Tablet(s) Oral at bedtime  sodium chloride 0.9%. 1000 milliLiter(s) IV Continuous <Continuous>  tamsulosin 0.4 milliGRAM(s) Oral at bedtime      Objective:  Vital Signs Last 24 Hrs  T(C): 36.6 (02 Sep 2022 11:50), Max: 36.9 (01 Sep 2022 20:15)  T(F): 97.8 (02 Sep 2022 11:50), Max: 98.5 (01 Sep 2022 20:15)  HR: 65 (02 Sep 2022 11:50) (59 - 97)  BP: 104/65 (02 Sep 2022 11:50) (101/67 - 112/64)  BP(mean): --  RR: 17 (02 Sep 2022 11:50) (17 - 18)  SpO2: 95% (02 Sep 2022 11:50) (90% - 98%)    Parameters below as of 02 Sep 2022 11:50  Patient On (Oxygen Delivery Method): nasal cannula  O2 Flow (L/min): 2      T(C): 36.6 (09-02-22 @ 11:50), Max: 36.9 (09-01-22 @ 20:15)  T(C): 36.6 (09-02-22 @ 11:50), Max: 37.1 (08-30-22 @ 15:54)  T(C): 36.6 (09-02-22 @ 11:50), Max: 37.1 (08-30-22 @ 15:54)    PHYSICAL EXAM:  HEENT: NC atraumatic  Neck: supple  Respiratory: no accessory muscle use, breathing comfortably  Cardiovascular: distant  Gastrointestinal: normal appearing, nondistended  Extremities: no clubbing, no cyanosis,        LABS:                          11.1   11.88 )-----------( 462      ( 02 Sep 2022 07:18 )             35.9       WBC  11.88 09-02 @ 07:18  12.50 09-01 @ 06:24  11.26 08-31 @ 06:22  10.92 08-30 @ 05:56  12.29 08-28 @ 06:26  11.29 08-27 @ 06:47      09-02    139  |  105  |  23  ----------------------------<  90  4.9   |  30  |  1.10    Ca    8.8      02 Sep 2022 07:18        Creatinine, Serum: 1.10 mg/dL (09-02-22 @ 07:18)  Creatinine, Serum: 0.95 mg/dL (09-01-22 @ 06:24)  Creatinine, Serum: 1.00 mg/dL (08-31-22 @ 06:22)  Creatinine, Serum: 0.96 mg/dL (08-30-22 @ 05:56)  Creatinine, Serum: 1.07 mg/dL (08-28-22 @ 06:26)                INFLAMMATORY MARKERS  Auto Neutrophil #: 7.01 K/uL (08-30-22 @ 05:56)  Auto Lymphocyte #: 2.20 K/uL (08-30-22 @ 05:56)  Auto Neutrophil #: 8.71 K/uL (08-23-22 @ 06:00)  Auto Lymphocyte #: 2.20 K/uL (08-23-22 @ 06:00)  Auto Neutrophil #: 9.75 K/uL (08-22-22 @ 06:35)  Auto Lymphocyte #: 2.29 K/uL (08-22-22 @ 06:35)  Auto Neutrophil #: 12.02 K/uL (08-21-22 @ 06:32)  Auto Lymphocyte #: 2.15 K/uL (08-21-22 @ 06:32)      Auto Eosinophil #: 0.28 K/uL (08-30-22 @ 05:56)  Auto Eosinophil #: 0.61 K/uL (08-23-22 @ 06:00)  Auto Eosinophil #: 0.51 K/uL (08-22-22 @ 06:35)  Auto Eosinophil #: 0.71 K/uL (08-21-22 @ 06:32)                                MICROBIOLOGY:              RADIOLOGY & ADDITIONAL STUDIES:

## 2022-09-02 NOTE — PROGRESS NOTE ADULT - PROBLEM SELECTOR PLAN 2
Patient with new found paroxysmal atrial fibrillation  - Cardiology following  - Rate Control: Continue metoprolol tartrate 50mg BID  - Continue eliquis 5mg BID, renal dosing per pharmacy  - Echo with low normal LV systolic function, mild pulm HTN

## 2022-09-02 NOTE — PROGRESS NOTE ADULT - ATTENDING COMMENTS
I have personally seen and examined the patient.  I fully participated in the care of this patient.  I have made amendments to the documentation where necessary, and agree with the history, physical exam, and plan as documented by the Resident.     The patient was seen and evaluated independently by the attending physician.  - I have personally reviewed the pt's labs, imaging, micro data and consultant recommendations.    #atypical pna  - r/o Tb  - continue current regimen  - ID following - f/u recs  - isolation precuations    #constipation  - improving

## 2022-09-02 NOTE — PROGRESS NOTE ADULT - PROBLEM SELECTOR PLAN 4
9/1 Pressures in 130s, previously septic in [place]  - cardio following: continue amlodipine 5mg qD  - Continue to monitor hemodynamics, well controlled now

## 2022-09-03 DIAGNOSIS — K59.00 CONSTIPATION, UNSPECIFIED: ICD-10-CM

## 2022-09-03 LAB
ANION GAP SERPL CALC-SCNC: 4 MMOL/L — LOW (ref 5–17)
BUN SERPL-MCNC: 33 MG/DL — HIGH (ref 7–23)
CALCIUM SERPL-MCNC: 8.8 MG/DL — SIGNIFICANT CHANGE UP (ref 8.5–10.1)
CHLORIDE SERPL-SCNC: 104 MMOL/L — SIGNIFICANT CHANGE UP (ref 96–108)
CO2 SERPL-SCNC: 31 MMOL/L — SIGNIFICANT CHANGE UP (ref 22–31)
CREAT SERPL-MCNC: 1.2 MG/DL — SIGNIFICANT CHANGE UP (ref 0.5–1.3)
EGFR: 59 ML/MIN/1.73M2 — LOW
GLUCOSE SERPL-MCNC: 86 MG/DL — SIGNIFICANT CHANGE UP (ref 70–99)
HCT VFR BLD CALC: 34.7 % — LOW (ref 39–50)
HGB BLD-MCNC: 10.9 G/DL — LOW (ref 13–17)
MCHC RBC-ENTMCNC: 31.1 PG — SIGNIFICANT CHANGE UP (ref 27–34)
MCHC RBC-ENTMCNC: 31.4 GM/DL — LOW (ref 32–36)
MCV RBC AUTO: 98.9 FL — SIGNIFICANT CHANGE UP (ref 80–100)
NRBC # BLD: 0 /100 WBCS — SIGNIFICANT CHANGE UP (ref 0–0)
PLATELET # BLD AUTO: 418 K/UL — HIGH (ref 150–400)
POTASSIUM SERPL-MCNC: 5.2 MMOL/L — SIGNIFICANT CHANGE UP (ref 3.5–5.3)
POTASSIUM SERPL-SCNC: 5.2 MMOL/L — SIGNIFICANT CHANGE UP (ref 3.5–5.3)
RBC # BLD: 3.51 M/UL — LOW (ref 4.2–5.8)
RBC # FLD: 14.3 % — SIGNIFICANT CHANGE UP (ref 10.3–14.5)
SODIUM SERPL-SCNC: 139 MMOL/L — SIGNIFICANT CHANGE UP (ref 135–145)
WBC # BLD: 11.75 K/UL — HIGH (ref 3.8–10.5)
WBC # FLD AUTO: 11.75 K/UL — HIGH (ref 3.8–10.5)

## 2022-09-03 PROCEDURE — 99232 SBSQ HOSP IP/OBS MODERATE 35: CPT | Mod: GC

## 2022-09-03 RX ORDER — MAGNESIUM HYDROXIDE 400 MG/1
30 TABLET, CHEWABLE ORAL DAILY
Refills: 0 | Status: DISCONTINUED | OUTPATIENT
Start: 2022-09-04 | End: 2022-09-13

## 2022-09-03 RX ORDER — ACETAMINOPHEN 500 MG
650 TABLET ORAL EVERY 6 HOURS
Refills: 0 | Status: DISCONTINUED | OUTPATIENT
Start: 2022-09-03 | End: 2022-09-13

## 2022-09-03 RX ORDER — POLYETHYLENE GLYCOL 3350 17 G/17G
17 POWDER, FOR SOLUTION ORAL
Refills: 0 | Status: DISCONTINUED | OUTPATIENT
Start: 2022-09-03 | End: 2022-09-13

## 2022-09-03 RX ORDER — MAGNESIUM HYDROXIDE 400 MG/1
30 TABLET, CHEWABLE ORAL ONCE
Refills: 0 | Status: COMPLETED | OUTPATIENT
Start: 2022-09-03 | End: 2022-09-03

## 2022-09-03 RX ADMIN — ALBUTEROL 2.5 MILLIGRAM(S): 90 AEROSOL, METERED ORAL at 07:48

## 2022-09-03 RX ADMIN — Medication 250 MILLIGRAM(S): at 05:07

## 2022-09-03 RX ADMIN — Medication 650 MILLIGRAM(S): at 22:07

## 2022-09-03 RX ADMIN — Medication 50 MILLIGRAM(S): at 17:28

## 2022-09-03 RX ADMIN — BUDESONIDE AND FORMOTEROL FUMARATE DIHYDRATE 2 PUFF(S): 160; 4.5 AEROSOL RESPIRATORY (INHALATION) at 22:01

## 2022-09-03 RX ADMIN — ALBUTEROL 2.5 MILLIGRAM(S): 90 AEROSOL, METERED ORAL at 20:08

## 2022-09-03 RX ADMIN — CARBAMIDE PEROXIDE 10 DROP(S): 81.86 SOLUTION/ DROPS AURICULAR (OTIC) at 18:10

## 2022-09-03 RX ADMIN — PANTOPRAZOLE SODIUM 40 MILLIGRAM(S): 20 TABLET, DELAYED RELEASE ORAL at 06:04

## 2022-09-03 RX ADMIN — Medication 50 MILLIGRAM(S): at 05:07

## 2022-09-03 RX ADMIN — MAGNESIUM HYDROXIDE 30 MILLILITER(S): 400 TABLET, CHEWABLE ORAL at 11:38

## 2022-09-03 RX ADMIN — CARBAMIDE PEROXIDE 10 DROP(S): 81.86 SOLUTION/ DROPS AURICULAR (OTIC) at 05:13

## 2022-09-03 RX ADMIN — APIXABAN 5 MILLIGRAM(S): 2.5 TABLET, FILM COATED ORAL at 05:07

## 2022-09-03 RX ADMIN — ATORVASTATIN CALCIUM 40 MILLIGRAM(S): 80 TABLET, FILM COATED ORAL at 22:01

## 2022-09-03 RX ADMIN — Medication 650 MILLIGRAM(S): at 08:15

## 2022-09-03 RX ADMIN — Medication 3 MILLIGRAM(S): at 22:01

## 2022-09-03 RX ADMIN — Medication 650 MILLIGRAM(S): at 22:40

## 2022-09-03 RX ADMIN — AMLODIPINE BESYLATE 5 MILLIGRAM(S): 2.5 TABLET ORAL at 05:07

## 2022-09-03 RX ADMIN — Medication 250 MILLIGRAM(S): at 17:28

## 2022-09-03 RX ADMIN — APIXABAN 5 MILLIGRAM(S): 2.5 TABLET, FILM COATED ORAL at 17:28

## 2022-09-03 RX ADMIN — BUDESONIDE AND FORMOTEROL FUMARATE DIHYDRATE 2 PUFF(S): 160; 4.5 AEROSOL RESPIRATORY (INHALATION) at 05:14

## 2022-09-03 RX ADMIN — Medication 650 MILLIGRAM(S): at 09:15

## 2022-09-03 RX ADMIN — FINASTERIDE 5 MILLIGRAM(S): 5 TABLET, FILM COATED ORAL at 11:38

## 2022-09-03 RX ADMIN — ALBUTEROL 2.5 MILLIGRAM(S): 90 AEROSOL, METERED ORAL at 13:34

## 2022-09-03 RX ADMIN — TAMSULOSIN HYDROCHLORIDE 0.4 MILLIGRAM(S): 0.4 CAPSULE ORAL at 22:01

## 2022-09-03 NOTE — PROGRESS NOTE ADULT - ASSESSMENT
88yo M with PMH of HTN, HLD, hx of CVA (no residual deficits), BPH, hx of skin CA who presents as a transfer from [facility] for bronchoscopy to r/o TB, satting well on 3L NC.

## 2022-09-03 NOTE — PROGRESS NOTE ADULT - ASSESSMENT
86yo M with PMH of HTN, HLD, hx of CVA (no residual deficits), BPH, hx of skin CA who presents with cough and fevers a/w sepsis due to multifocal pneumonia with progression to acute hypoxic respiratory failure now on HFNC and course further c/b new onset afib.  Transferred from [place] for bronchoscopy.     Bronch done - Aug 30th -   Bronch AFB neg  ID follow up - reviewed -   AFB identification pending -     eval malignancy - TB - PNA - infection  spoke with pt - dtr -   s/p Levaquin course in [facility]  o2 support as needed to keep sat > 88 pct  isolation precs  ID follow up

## 2022-09-03 NOTE — PROGRESS NOTE ADULT - PROBLEM SELECTOR PLAN 3
- Back pain resolved with Tylenol and prn oxy.    - Consider lumbosacral XRs if pain reoccurs  - iSTOP 8/30, chart note   - Patient also with left hip pain, associated with poor mobility in the hospital, improving with positional changes and sitting on chair, and Tylenol. No recent- new doses of oxy.   - Continue Tylenol, low dose oxy prn, regimen w/ PRN bowel regimen  - Persists with constipation despite Miralax, senna and 1 dose of magnesium. No clinical concerns for bowel obstruction at the moment.  Continue to monitor.   - Continue Magnesium.

## 2022-09-03 NOTE — PROGRESS NOTE ADULT - SUBJECTIVE AND OBJECTIVE BOX
Patient is a 87y Male with a known history of :  Positive tuberculin test [R76.11]    Tuberculosis high risk [Z91.89]    Afib [I48.91]    HTN (hypertension) [I10]    HLD (hyperlipidemia) [E78.5]    History of BPH [Z87.438]    Need for prophylactic measure [Z29.9]    Back pain [M54.9]      HPI:  87M with HTN, HLD, hx of CVA (no residual deficits), BPH, hx of skin CA who presents with cough and fevers.  Symptoms started about 5-6 days prior to admission.  Started with left sided upper back pain.  Then started to have a cough associated with green phlegm and possible blood.  Associated with SOB and DONG.  No weight changes or night sweats.  Had a feveras high as 102F.  Some nausea but no vomiting with loss of appetite.  No chest pain.  No diarrhea.  No abdominal pain.  No recent travel or sick contacts.  Went to see his PMD 3 days prior to [place] admission and was given a z-kellie and tesslon perles.  Reported had negative COVID tests at home.   In the [place] ED, patient's triage vitals were /78    RR  21, 93% on RA (thought dropped down to 88%) and T 98.5F.  Labs showed WBC 18 with a left shift, CINDI with BUN/Cr 40/1.88, elevated LFTs, normal lactate, negative COVID (last booster in 12/2021 with Pfizer vaccine), neg influenza, neg RSV.  CT chest showed "patchy groundglass and more dense opacities in both lungs, suspicious for multifocal PNA...several small lucencies with areas of lung opacity...may represent early cavitation versus areas of focal bronchiectasis."  Patient started on azithromycin and ceftriaxone empirically for multifocal PNA.  Also of note, patient was found to be in new afib on EKG.  Patient denies any afib history but said when he was younger he did feel some irregular heartbeats.  Patient admitted to [facility] with sepsis due to multifocal pneumonia with progression to acute hypoxic respiratory failure now on HFNC and course further c/b new onset afib. Patient was seen by ID, pulm and cardiology. He is now transferred to [facility] for bronchoscopy. Patient denies states he is feeling well at this time.  (29 Aug 2022 23:01)      REVIEW OF SYSTEMS:    CONSTITUTIONAL: No fever, weight loss, or fatigue  EYES: No eye pain, visual disturbances, or discharge  ENMT:  No difficulty hearing, tinnitus, vertigo; No sinus or throat pain  NECK: No pain or stiffness  BREASTS: No pain, masses, or nipple discharge  RESPIRATORY: No cough, wheezing, chills or hemoptysis; No shortness of breath  CARDIOVASCULAR: No chest pain, palpitations, dizziness, or leg swelling  GASTROINTESTINAL: No abdominal or epigastric pain. No nausea, vomiting, or hematemesis; No diarrhea or constipation. No melena or hematochezia.  GENITOURINARY: No dysuria, frequency, hematuria, or incontinence  NEUROLOGICAL: No headaches, memory loss, loss of strength, numbness, or tremors  SKIN: No itching, burning, rashes, or lesions   LYMPH NODES: No enlarged glands  ENDOCRINE: No heat or cold intolerance; No hair loss  MUSCULOSKELETAL: No joint pain or swelling; No muscle, back, or extremity pain  PSYCHIATRIC: No depression, anxiety, mood swings, or difficulty sleeping  HEME/LYMPH: No easy bruising, or bleeding gums  ALLERGY AND IMMUNOLOGIC: No hives or eczema    MEDICATIONS  (STANDING):  ALBUTerol    0.083% 2.5 milliGRAM(s) Nebulizer every 6 hours  ALBUTerol    90 MICROgram(s) HFA Inhaler 1 Puff(s) Inhalation every 6 hours  amLODIPine   Tablet 5 milliGRAM(s) Oral daily  apixaban 5 milliGRAM(s) Oral two times a day  atorvastatin 40 milliGRAM(s) Oral at bedtime  budesonide  80 MICROgram(s)/formoterol 4.5 MICROgram(s) Inhaler 2 Puff(s) Inhalation two times a day  carbamide peroxide Otic Solution 10 Drop(s) Right Ear every 12 hours  finasteride 5 milliGRAM(s) Oral daily  metoprolol tartrate 50 milliGRAM(s) Oral two times a day  naloxone Injectable 0.4 milliGRAM(s) IV Push once  pantoprazole    Tablet 40 milliGRAM(s) Oral before breakfast  saccharomyces boulardii 250 milliGRAM(s) Oral two times a day  senna 2 Tablet(s) Oral at bedtime  sodium chloride 0.9%. 1000 milliLiter(s) (60 mL/Hr) IV Continuous <Continuous>  tamsulosin 0.4 milliGRAM(s) Oral at bedtime    MEDICATIONS  (PRN):  acetaminophen     Tablet .. 650 milliGRAM(s) Oral every 6 hours PRN Mild Pain (1 - 3)  bisacodyl 5 milliGRAM(s) Oral daily PRN Constipation  melatonin 3 milliGRAM(s) Oral at bedtime PRN Insomnia  oxyCODONE    IR 2.5 milliGRAM(s) Oral every 4 hours PRN Moderate Pain (4 - 6)  polyethylene glycol 3350 17 Gram(s) Oral daily PRN Constipation      ALLERGIES: No Known Allergies      FAMILY HISTORY:  Family history of ischemic heart disease and other diseases of the circulatory system (Father)        Social history:  Alochol:   Smoking:   Drug Use:   Marital Status:     PHYSICAL EXAMINATION:  -----------------------------  T(C): 36.5 (09-03-22 @ 05:05), Max: 36.6 (09-02-22 @ 11:50)  HR: 63 (09-03-22 @ 07:50) (60 - 91)  BP: 123/57 (09-03-22 @ 05:05) (104/65 - 123/57)  RR: 18 (09-03-22 @ 05:05) (17 - 18)  SpO2: 97% (09-03-22 @ 07:50) (94% - 99%)  Wt(kg): --    LABS:   --------  09-03    139  |  104  |  33<H>  ----------------------------<  86  5.2   |  31  |  1.20    Ca    8.8      03 Sep 2022 06:30                           10.9   11.75 )-----------( 418      ( 03 Sep 2022 06:30 )             34.7                   Radiology:    < from: US Transthoracic Echocardiogram w/Doppler Complete (08.12.22 @ 08:08) >    ACC: [id number] EXAM:  US TTE W DOPPLER COMPLETE                          PROCEDURE DATE:  08/12/2022          INTERPRETATION:  Ordering Physician: LEONARDA WHITTINGTON [id number]    Indication: Atrial fibrillation    Technician: LJ    Study Quality: Good  A complete echocardiographic study was performed utilizing standard   protocol including spectral and color Doppler in all echocardiographic   windows.    Height: 172 cm  Weight: 72 kg  BSA: 1.85 m2  Blood Pressure: 151/67 mmHg    MEASUREMENTS  IVS: 0.9 cm  PWT: 0.9 cm  LA: 3.1 cm  AO: 3.3 cm  LVIDd: 5.3 cm  LVIDs: 3.9 cm    LVEF: 50-55%  RVSP: 50 mmHg  RA Pressure: 15 mmHg    FINDINGS  Left Ventricle: The left ventricle is normal in size, wall thickness. The   systolic function is low normal and varies beat to beat. Estimated EF is   50-55%.  Right Ventricle: The right ventricle is normal in size and function.  Left Atrium: The left atrium is dilated.  Right Atrium: The right atrium is dilated.  Mitral Valve: Mitral annular calcification. There is mild mitral valve   prolapse of the anterior leaflet. Mild mitral regurgitation.  Aortic Valve: The aortic valve is structurally normal. No aortic   regurgitation.  Tricuspid Valve: The tricuspid valve is structurally normal. Mild   tricuspid regurgitation. Estimated pulmonary artery systolic pressure is   50 mmHg.  Pulmonic Valve: The pulmonic valve is not well visualized. Trace pulmonic   regurgitation.  Diastolic Function: Normal diastolic function.  Pericardium/Pleura: No pericardial effusion visualized.  Aorta: The aortic root is normal in size.    IMPRESSION:  1. Low normal left ventricular systolic function.  2. Bi-atrial enlargement.  3. Mild pulmonary hypertension.    --- End of Report ---            EDU CARBALLO MD; Attending Cardiologist  This document has been electronically signed. Aug 12 2022  9:11AM    < end of copied text >

## 2022-09-03 NOTE — PROGRESS NOTE ADULT - ASSESSMENT
The patient is an 87 year old male with a history of HTN, HL, BPH, CVA who presents with cough and hemoptysis in the setting of PNA and r/o TB.    9/3/22  Seen at [facility]  Sitting up, awake and alert    Plan:  - Intermittent episodes of AF on telemetry  - Continue metoprolol tartrate 100 mg bid  - Continue amlodipine 2.5 mg daily  - Echo with low normal LV systolic function, mild pulm HTN  - Sputum culture now positive for AFB  - Pulm and ID follow-up  - Continue apixaban 5 mg bid  - Await results of bronchoscopy The patient is an 87 year old male with a history of HTN, HL, BPH, CVA who presents with cough and hemoptysis in the setting of PNA and r/o TB.    9/3/22  Seen at [facility] telemetry  Sitting up, awake and alert    Plan:  - Intermittent episodes of AF on telemetry  - Continue metoprolol tartrate 100 mg bid  - Continue amlodipine 2.5 mg daily  - Echo with low normal LV systolic function, mild pulm HTN  - Sputum culture now positive for AFB  - Pulm and ID follow-up  - Continue apixaban 5 mg bid  - Await results of bronchoscopy

## 2022-09-03 NOTE — PROGRESS NOTE ADULT - SUBJECTIVE AND OBJECTIVE BOX
Date/Time Patient Seen:  		  Referring MD:   Data Reviewed	       Patient is a 87y old  Male who presents with a chief complaint of bronchoscopy (02 Sep 2022 12:46)      Subjective/HPI     PAST MEDICAL & SURGICAL HISTORY:  No pertinent past medical history    Skin cancer  s/p Mohs    Essential hypertension    Cerebrovascular accident (CVA), unspecified mechanism  2015    Hyperlipidemia, unspecified hyperlipidemia type    Complex tear of lateral meniscus of right knee as current injury, initial encounter    Complex tear of medial meniscus of right knee as current injury, initial encounter    Benign prostatic hyperplasia, presence of lower urinary tract symptoms unspecified, unspecified morphology    H/O pilonidal cyst          Medication list         MEDICATIONS  (STANDING):  ALBUTerol    0.083% 2.5 milliGRAM(s) Nebulizer every 6 hours  ALBUTerol    90 MICROgram(s) HFA Inhaler 1 Puff(s) Inhalation every 6 hours  amLODIPine   Tablet 5 milliGRAM(s) Oral daily  apixaban 5 milliGRAM(s) Oral two times a day  atorvastatin 40 milliGRAM(s) Oral at bedtime  budesonide  80 MICROgram(s)/formoterol 4.5 MICROgram(s) Inhaler 2 Puff(s) Inhalation two times a day  carbamide peroxide Otic Solution 10 Drop(s) Right Ear every 12 hours  finasteride 5 milliGRAM(s) Oral daily  metoprolol tartrate 50 milliGRAM(s) Oral two times a day  naloxone Injectable 0.4 milliGRAM(s) IV Push once  pantoprazole    Tablet 40 milliGRAM(s) Oral before breakfast  saccharomyces boulardii 250 milliGRAM(s) Oral two times a day  senna 2 Tablet(s) Oral at bedtime  sodium chloride 0.9%. 1000 milliLiter(s) (60 mL/Hr) IV Continuous <Continuous>  tamsulosin 0.4 milliGRAM(s) Oral at bedtime    MEDICATIONS  (PRN):  acetaminophen     Tablet .. 650 milliGRAM(s) Oral every 6 hours PRN Mild Pain (1 - 3)  bisacodyl 5 milliGRAM(s) Oral daily PRN Constipation  melatonin 3 milliGRAM(s) Oral at bedtime PRN Insomnia  oxyCODONE    IR 2.5 milliGRAM(s) Oral every 4 hours PRN Moderate Pain (4 - 6)  polyethylene glycol 3350 17 Gram(s) Oral daily PRN Constipation         Vitals log        ICU Vital Signs Last 24 Hrs  T(C): 36.5 (03 Sep 2022 05:05), Max: 36.6 (02 Sep 2022 11:50)  T(F): 97.7 (03 Sep 2022 05:05), Max: 97.8 (02 Sep 2022 11:50)  HR: 63 (03 Sep 2022 07:50) (60 - 91)  BP: 123/57 (03 Sep 2022 05:05) (104/65 - 123/57)  BP(mean): --  ABP: --  ABP(mean): --  RR: 18 (03 Sep 2022 05:05) (17 - 18)  SpO2: 97% (03 Sep 2022 07:50) (94% - 99%)    O2 Parameters below as of 03 Sep 2022 07:50  Patient On (Oxygen Delivery Method): nasal cannula,2LNC                 Input and Output:  I&O's Detail      Lab Data                        10.9   11.75 )-----------( 418      ( 03 Sep 2022 06:30 )             34.7     09-02    139  |  105  |  23  ----------------------------<  90  4.9   |  30  |  1.10    Ca    8.8      02 Sep 2022 07:18              Review of Systems	      Objective     Physical Examination    heart s1s2  lung dec BS  abd soft  head nc      Pertinent Lab findings & Imaging      Bud:  NO   Adequate UO     I&O's Detail           Discussed with:     Cultures:	        Radiology

## 2022-09-03 NOTE — PROGRESS NOTE ADULT - PROBLEM SELECTOR PLAN 4
- BP controlled and stable.   - Continue amlodipine 5mg daily   - Echo with low normal LV systolic function, mild pulm HTN  - Continue to monitor hemodynamics

## 2022-09-03 NOTE — PROGRESS NOTE ADULT - PROBLEM SELECTOR PLAN 2
- Patient with new found of paroxysmal atrial fibrillation  - Telemetry with episodes of Afib and back to NSR spontaneously, with normal rate mostly in 70's, range 50's to 90's.   - Continue metoprolol tartrate 50mg BID  - Continue Eliquis 5mg BID.   - Cardiology following

## 2022-09-03 NOTE — PROGRESS NOTE ADULT - ASSESSMENT
88yo M with PMH of HTN, HLD, hx of CVA (no residual deficits), BPH, hx of skin CA who presents as a transfer from [facility] for bronchoscopy to r/o , satting well on O2 by NC.

## 2022-09-03 NOTE — PROGRESS NOTE ADULT - PROBLEM SELECTOR PLAN 1
- [facility] workup indeterminant for TB, AFB growth in broth positive, 3x AFB smears negative. Quant gold indeterminant. Afebrile w/ persistent mild leukocytosis  - Bronchoscopy 8/30: no AFB on smear, culture with normal alexander, pending cultures for AFB.   - Pending final sputum cultures from [facility] 8/12.   - Blood cx: no growth final.  - Bx bronchial cells 9/1: -ve for malignancy, -ve AFB  - CXR 8/22: Persistent advanced irregular infiltrates involving the left mid and upper lung fields and the right upper lobe  - Pulm following  - ID consulted, recs appreciated

## 2022-09-03 NOTE — PROGRESS NOTE ADULT - ASSESSMENT
87M with HTN, HLD, hx of CVA (no residual deficits), BPH, hx of skin CA who presents with cough and fevers.    Found to have multifocal pneumonia.  Also found have new onset afib.        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  We will follow along in the care of this patient. Please call us at [phone] or text me directly on my cell# at [phone] with any concerns.

## 2022-09-03 NOTE — PROGRESS NOTE ADULT - PROBLEM SELECTOR PLAN 1
- [facility] workup indeterminant for TB, AFB growth in broth positive, 3x AFB smears negative. Quant gold indeterminant. Afebrile w/ persistent leukocytosis, 12-->11k, downtrending  - Bronchial cx (bronchoscopy 8/30): no acid fast growth. final.   - Sputum cx: normal alexander. final.  - blood cx: no growth. final.  - AFB and TB: no acid fast growth. final.  - Bx bronchial cells 9/1: -ve for malignancy, -ve AFB  - CXR 8/22: Persistent advanced irregular infiltrates involving the left mid and upper lung fields and the right upper lobe  - pulm following  - ID consulted, recs appreciated

## 2022-09-03 NOTE — PROGRESS NOTE ADULT - SUBJECTIVE AND OBJECTIVE BOX
[facility] DIVISION of INFECTIOUS DISEASE  Mustapha Valente MD PhD, Sheryl Pride MD, Adriana Rodriguez MD, Oliva Myers MD, Ebenezer Rivas MD  and providing coverage with Leodan Zaragoza MD  Providing Infectious Disease Consultations at [facility], [facility], [place], [facility], [facility]'s    Office# [phone] to schedule follow up appointments  Answering Service for urgent calls or New Consults [phone]  Cell# to text for urgent issues Mustapha Valente [phone]     infectious diseases progress note:    DENNIS BRADFORD is a 87y y. o. Male patient    Overnight and events of the last 24hrs reviewed    Allergies    No Known Allergies    Intolerances        ANTIBIOTICS/RELEVANT:  antimicrobials    immunologic:    OTHER:  acetaminophen     Tablet .. 650 milliGRAM(s) Oral every 6 hours PRN  ALBUTerol    0.083% 2.5 milliGRAM(s) Nebulizer every 6 hours  ALBUTerol    90 MICROgram(s) HFA Inhaler 1 Puff(s) Inhalation every 6 hours  amLODIPine   Tablet 5 milliGRAM(s) Oral daily  apixaban 5 milliGRAM(s) Oral two times a day  atorvastatin 40 milliGRAM(s) Oral at bedtime  bisacodyl 5 milliGRAM(s) Oral daily PRN  budesonide  80 MICROgram(s)/formoterol 4.5 MICROgram(s) Inhaler 2 Puff(s) Inhalation two times a day  carbamide peroxide Otic Solution 10 Drop(s) Right Ear every 12 hours  finasteride 5 milliGRAM(s) Oral daily  melatonin 3 milliGRAM(s) Oral at bedtime PRN  metoprolol tartrate 50 milliGRAM(s) Oral two times a day  naloxone Injectable 0.4 milliGRAM(s) IV Push once  oxyCODONE    IR 2.5 milliGRAM(s) Oral every 4 hours PRN  pantoprazole    Tablet 40 milliGRAM(s) Oral before breakfast  polyethylene glycol 3350 17 Gram(s) Oral daily PRN  saccharomyces boulardii 250 milliGRAM(s) Oral two times a day  senna 2 Tablet(s) Oral at bedtime  sodium chloride 0.9%. 1000 milliLiter(s) IV Continuous <Continuous>  tamsulosin 0.4 milliGRAM(s) Oral at bedtime      Objective:  Vital Signs Last 24 Hrs  T(C): 36.5 (03 Sep 2022 05:05), Max: 36.5 (03 Sep 2022 05:05)  T(F): 97.7 (03 Sep 2022 05:05), Max: 97.7 (03 Sep 2022 05:05)  HR: 63 (03 Sep 2022 07:50) (60 - 64)  BP: 123/57 (03 Sep 2022 05:05) (113/63 - 123/57)  BP(mean): --  RR: 18 (03 Sep 2022 05:05) (18 - 18)  SpO2: 97% (03 Sep 2022 07:50) (97% - 99%)    Parameters below as of 03 Sep 2022 07:50  Patient On (Oxygen Delivery Method): nasal cannula,2LNC        T(C): 36.5 (09-03-22 @ 05:05), Max: 36.9 (09-01-22 @ 20:15)  T(C): 36.5 (09-03-22 @ 05:05), Max: 36.9 (09-01-22 @ 20:15)  T(C): 36.5 (09-03-22 @ 05:05), Max: 37.1 (08-30-22 @ 15:54)    PHYSICAL EXAM:  HEENT: NC atraumatic  Neck: supple  Respiratory: no accessory muscle use, breathing comfortably  Cardiovascular: distant  Gastrointestinal: normal appearing, nondistended  Extremities: no clubbing, no cyanosis,        LABS:                          10.9   11.75 )-----------( 418      ( 03 Sep 2022 06:30 )             34.7       WBC  11.75 09-03 @ 06:30  11.88 09-02 @ 07:18  12.50 09-01 @ 06:24  11.26 08-31 @ 06:22  10.92 08-30 @ 05:56  12.29 08-28 @ 06:26      09-03    139  |  104  |  33<H>  ----------------------------<  86  5.2   |  31  |  1.20    Ca    8.8      03 Sep 2022 06:30        Creatinine, Serum: 1.20 mg/dL (09-03-22 @ 06:30)  Creatinine, Serum: 1.10 mg/dL (09-02-22 @ 07:18)  Creatinine, Serum: 0.95 mg/dL (09-01-22 @ 06:24)  Creatinine, Serum: 1.00 mg/dL (08-31-22 @ 06:22)  Creatinine, Serum: 0.96 mg/dL (08-30-22 @ 05:56)  Creatinine, Serum: 1.07 mg/dL (08-28-22 @ 06:26)                INFLAMMATORY MARKERS  Auto Neutrophil #: 7.01 K/uL (08-30-22 @ 05:56)  Auto Lymphocyte #: 2.20 K/uL (08-30-22 @ 05:56)  Auto Neutrophil #: 8.71 K/uL (08-23-22 @ 06:00)  Auto Lymphocyte #: 2.20 K/uL (08-23-22 @ 06:00)  Auto Neutrophil #: 9.75 K/uL (08-22-22 @ 06:35)  Auto Lymphocyte #: 2.29 K/uL (08-22-22 @ 06:35)  Auto Neutrophil #: 12.02 K/uL (08-21-22 @ 06:32)  Auto Lymphocyte #: 2.15 K/uL (08-21-22 @ 06:32)      Auto Eosinophil #: 0.28 K/uL (08-30-22 @ 05:56)  Auto Eosinophil #: 0.61 K/uL (08-23-22 @ 06:00)  Auto Eosinophil #: 0.51 K/uL (08-22-22 @ 06:35)  Auto Eosinophil #: 0.71 K/uL (08-21-22 @ 06:32)                                MICROBIOLOGY:              RADIOLOGY & ADDITIONAL STUDIES:

## 2022-09-03 NOTE — PROGRESS NOTE ADULT - ATTENDING COMMENTS
I have personally seen and examined the patient.  I fully participated in the care of this patient.  I have made amendments to the documentation where necessary, and agree with the history, physical exam, and plan as documented by the Resident.     The patient was seen and evaluated independently by the attending physician.  - I have personally reviewed the pt's labs, imaging, micro data and consultant recommendations.    #atypical pna  - r/o Tb  - continue current regimen  - ID following - f/u recs  - isolation precuations    #constipation  - improving I have personally seen and examined the patient.  I fully participated in the care of this patient.  I have made amendments to the documentation where necessary, and agree with the history, physical exam, and plan as documented by the Resident.     The patient was seen and evaluated independently by the attending physician.  - I have personally reviewed the pt's labs, imaging, micro data and consultant recommendations.    86yo M with PMH of HTN, HLD, hx of CVA (no residual deficits), BPH, hx of skin CA who presents as a transfer from [place] for bronchoscopy to r/o TB, satting well on 3L NC.    Pt has no complaints. Still no BM yet despite two doses of milk of magnesia. He does not wish to take Mag Citrate. Will increase miralax to bid from qdaily.     #atypical pna  - r/o Tb  - continue current regimen  - ID following - f/u recs  - isolation precautions

## 2022-09-03 NOTE — PROGRESS NOTE ADULT - SUBJECTIVE AND OBJECTIVE BOX
Patient is a 87y old  Male who presents with a chief complaint of bronchoscopy (03 Sep 2022 17:30)    Subjective:  INTERVAL HPI/OVERNIGHT EVENTS: Patient seen and examined at bedside. No overnight events occurred. Patient persist with constipation, but passing gas and tolerating PO diet.  No fever.  Reports left hip pain mainly when lying down on bed and improves in a sitting position.  Mild cough with no SOB, no new hemoptysis.  Denies headache, lightheadedness, chest pain, palpitations, abdominal pain.     MEDICATIONS  (STANDING):  ALBUTerol    0.083% 2.5 milliGRAM(s) Nebulizer every 6 hours  ALBUTerol    90 MICROgram(s) HFA Inhaler 1 Puff(s) Inhalation every 6 hours  amLODIPine   Tablet 5 milliGRAM(s) Oral daily  apixaban 5 milliGRAM(s) Oral two times a day  atorvastatin 40 milliGRAM(s) Oral at bedtime  budesonide  80 MICROgram(s)/formoterol 4.5 MICROgram(s) Inhaler 2 Puff(s) Inhalation two times a day  carbamide peroxide Otic Solution 10 Drop(s) Right Ear every 12 hours  finasteride 5 milliGRAM(s) Oral daily  metoprolol tartrate 50 milliGRAM(s) Oral two times a day  naloxone Injectable 0.4 milliGRAM(s) IV Push once  pantoprazole    Tablet 40 milliGRAM(s) Oral before breakfast  polyethylene glycol 3350 17 Gram(s) Oral two times a day  saccharomyces boulardii 250 milliGRAM(s) Oral two times a day  senna 2 Tablet(s) Oral at bedtime  sodium chloride 0.9%. 1000 milliLiter(s) (60 mL/Hr) IV Continuous <Continuous>  tamsulosin 0.4 milliGRAM(s) Oral at bedtime    MEDICATIONS  (PRN):  acetaminophen     Tablet .. 650 milliGRAM(s) Oral every 6 hours PRN Mild Pain (1 - 3)  bisacodyl 5 milliGRAM(s) Oral daily PRN Constipation  melatonin 3 milliGRAM(s) Oral at bedtime PRN Insomnia  oxyCODONE    IR 2.5 milliGRAM(s) Oral every 4 hours PRN Moderate Pain (4 - 6)      Allergies  No Known Allergies    Intolerances    REVIEW OF SYSTEMS:  CONSTITUTIONAL: No fever or chills  HEENT:  No headache, no sore throat  RESPIRATORY: as mentioned.   CARDIOVASCULAR: No chest pain, palpitations  GASTROINTESTINAL: + constipation   GENITOURINARY: No dysuri  NEUROLOGICAL: no focal weakness  MUSCULOSKELETAL: as mentioned     Objective:  Vital Signs Last 24 Hrs  T(C): 36.7 (03 Sep 2022 13:44), Max: 36.7 (03 Sep 2022 13:44)  T(F): 98 (03 Sep 2022 13:44), Max: 98 (03 Sep 2022 13:44)  HR: 67 (03 Sep 2022 13:44) (60 - 67)  BP: 120/60 (03 Sep 2022 13:44) (113/63 - 123/57)  RR: 18 (03 Sep 2022 13:44) (18 - 18)  SpO2: 98% (03 Sep 2022 13:44) (96% - 99%)    Parameters below as of 03 Sep 2022 13:44  Patient On (Oxygen Delivery Method): nasal cannula  O2 Flow (L/min): 2      GENERAL: NAD, lying in bed comfortably, no signs of acute respiratory distress with O2 by NC 2 lt.   HEAD:  Atraumatic, Normocephalic  EYES: EOMI, PERRLA, conjunctiva and sclera clear  ENT: Moist oral mucous   NECK: Supple, No JVD  CHEST/LUNG: Clear to auscultation bilaterally.  HEART: S1,S2. Regular rate and rhythm. No murmurs  ABDOMEN: Bowel sounds present. Soft, nontender, mildly distended. EXTREMITIES:  No clubbing, cyanosis. Mild distal LE edema  NERVOUS SYSTEM:  Alert & Oriented X3, speech clear. No acute deficit. Non focal   SKIN: Warm.    LABS:                        10.9   11.75 )-----------( 418      ( 03 Sep 2022 06:30 )             34.7     CBC Full  -  ( 03 Sep 2022 06:30 )  WBC Count : 11.75 K/uL  Hemoglobin : 10.9 g/dL  Hematocrit : 34.7 %  Platelet Count - Automated : 418 K/uL  Mean Cell Volume : 98.9 fl  Mean Cell Hemoglobin : 31.1 pg  Mean Cell Hemoglobin Concentration : 31.4 gm/dL      03 Sep 2022 06:30    139    |  104    |  33     ----------------------------<  86     5.2     |  31     |  1.20     Ca    8.8        03 Sep 2022 06:30        Culture - Fungal, Bronchial (collected 08-30-22 @ 15:30)  Source: .Bronchial Bronchial  Preliminary Report (09-01-22 @ 11:27):    Testing in progress    Culture - Bronchial (collected 08-30-22 @ 15:30)  Source: .Bronchial Bronchial  Gram Stain (08-31-22 @ 06:56):    No polymorphonuclear leukocytes seen per low power field    Few Squamous epithelial cells seen per low power field    Moderate Gram Variable Cocci seen per oil power field  Final Report (09-01-22 @ 17:02):    Normal Respiratory Dora present    Culture - Acid Fast - Bronchial w/Smear (collected 08-30-22 @ 15:30)  Source: .Bronchial Bronchial  Preliminary Report (09-03-22 @ 15:04):    Culture is being performed.        Personally reviewed.     Consultant(s) Notes Reviewed:  [x] YES  [ ] NO

## 2022-09-03 NOTE — PROGRESS NOTE ADULT - SUBJECTIVE AND OBJECTIVE BOX
Name: DENNIS BRADFORD  MRN: [id number]  LOCATION: [facility] TELN 326 D1    ----  Patient is a 87y old  Male who presents with a chief complaint of bronchoscopy (03 Sep 2022 13:18)      FROM ADMISSION H+P:   HPI:  87M with HTN, HLD, hx of CVA (no residual deficits), BPH, hx of skin CA who presents with cough and fevers.  Symptoms started about 5-6 days prior to admission.  Started with left sided upper back pain.  Then started to have a cough associated with green phlegm and possible blood.  Associated with SOB and DONG.  No weight changes or night sweats.  Had a feveras high as 102F.  Some nausea but no vomiting with loss of appetite.  No chest pain.  No diarrhea.  No abdominal pain.  No recent travel or sick contacts.  Went to see his PMD 3 days prior to [place] admission and was given a z-kellie and tesslon perles.  Reported had negative COVID tests at home.   In the [facility] ED, patient's triage vitals were /78    RR  21, 93% on RA (thought dropped down to 88%) and T 98.5F.  Labs showed WBC 18 with a left shift, CINDI with BUN/Cr 40/1.88, elevated LFTs, normal lactate, negative COVID (last booster in 12/2021 with Pfizer vaccine), neg influenza, neg RSV.  CT chest showed "patchy groundglass and more dense opacities in both lungs, suspicious for multifocal PNA...several small lucencies with areas of lung opacity...may represent early cavitation versus areas of focal bronchiectasis."  Patient started on azithromycin and ceftriaxone empirically for multifocal PNA.  Also of note, patient was found to be in new afib on EKG.  Patient denies any afib history but said when he was younger he did feel some irregular heartbeats.  Patient admitted to [facility] with sepsis due to multifocal pneumonia with progression to acute hypoxic respiratory failure now on HFNC and course further c/b new onset afib. Patient was seen by ID, pulm and cardiology. He is now transferred to [facility] for bronchoscopy. Patient denies states he is feeling well at this time.  (29 Aug 2022 23:01)      ----  INTERVAL HPI/OVERNIGHT EVENTS: Pt seen and evaluated at the bedside. No acute overnight events occurred.     ----  PAST MEDICAL & SURGICAL HISTORY:  Skin cancer  s/p Mohs      Essential hypertension      Cerebrovascular accident (CVA), unspecified mechanism  2015      Hyperlipidemia, unspecified hyperlipidemia type      Complex tear of lateral meniscus of right knee as current injury, initial encounter      Complex tear of medial meniscus of right knee as current injury, initial encounter      Benign prostatic hyperplasia, presence of lower urinary tract symptoms unspecified, unspecified morphology      H/O pilonidal cyst          FAMILY HISTORY:  Family history of ischemic heart disease and other diseases of the circulatory system (Father)        Allergies    No Known Allergies    Intolerances        ----  ANTIMICROBIALS:    CARDIOVASCULAR:  amLODIPine   Tablet 5 milliGRAM(s) Oral daily  metoprolol tartrate 50 milliGRAM(s) Oral two times a day  tamsulosin 0.4 milliGRAM(s) Oral at bedtime    GASTROINTESTINAL:  bisacodyl 5 milliGRAM(s) Oral daily PRN  pantoprazole    Tablet 40 milliGRAM(s) Oral before breakfast  polyethylene glycol 3350 17 Gram(s) Oral two times a day  senna 2 Tablet(s) Oral at bedtime    PULMONARY:  ALBUTerol    0.083% 2.5 milliGRAM(s) Nebulizer every 6 hours  ALBUTerol    90 MICROgram(s) HFA Inhaler 1 Puff(s) Inhalation every 6 hours  budesonide  80 MICROgram(s)/formoterol 4.5 MICROgram(s) Inhaler 2 Puff(s) Inhalation two times a day      ----  REVIEW OF SYSTEMS:  CONSTITUTIONAL: denies fever, chills, fatigue, weakness  HEENT: denies blurred vision, sore throat  CARDIOVASCULAR: denies chest pain, chest pressure, palpitations  RESPIRATORY: denies shortness of breath, sputum production  GASTROINTESTINAL: denies nausea, vomiting, diarrhea, abdominal pain  NEUROLOGICAL: denies numbness, headache, focal weakness  MUSCULOSKELETAL: denies new joint pain, muscle aches    ----  PHYSICAL EXAM:  GENERAL: patient appears well, no acute distress  ENMT: oropharynx clear without erythema, moist mucous membranes  LUNGS: good air entry bilaterally, clear to auscultation, symmetric breath sounds, no wheezing or rhonchi appreciated  HEART: soft S1/S2, regular rate and rhythm, no murmurs noted, no noted edema to b/l LE  GASTROINTESTINAL: abdomen is soft, nontender, nondistended, normoactive bowel sounds, no palpable masses  MUSCULOSKELETAL: no clubbing or cyanosis, no obvious deformity  NEUROLOGIC: awake, alert, readily interactive, good muscle tone in 4 extremities    T(C): 36.7 (09-03-22 @ 13:44), Max: 36.7 (09-03-22 @ 13:44)  HR: 67 (09-03-22 @ 13:44) (60 - 67)  BP: 120/60 (09-03-22 @ 13:44) (113/63 - 123/57)  RR: 18 (09-03-22 @ 13:44) (18 - 18)  SpO2: 98% (09-03-22 @ 13:44) (96% - 99%)  Wt(kg): --    ----  INTAKE & OUTPUT:  I&O's Summary      LABS:                        10.9   11.75 )-----------( 418      ( 03 Sep 2022 06:30 )             34.7     09-03    139  |  104  |  33<H>  ----------------------------<  86  5.2   |  31  |  1.20    Ca    8.8      03 Sep 2022 06:30          CAPILLARY BLOOD GLUCOSE              ----  Personally reviewed:  Vital sign trends: [  ] yes    [  ] no     [  ] n/a  Laboratory results: [  ] yes    [  ] no     [  ] n/a  Radiology results: [  ] yes    [  ] no     [  ] n/a  Microbio results: [  ] yes    [  ] no     [  ] n/a  Consultant recommendations: [  ] yes    [  ] no     [  ] n/a         Name: DENNIS BRADFORD  MRN: [id number]  LOCATION: [facility] TELN 326 D1    ----  Patient is a 87y old  Male who presents with a chief complaint of bronchoscopy (03 Sep 2022 13:18)      FROM ADMISSION H+P:   HPI:  87M with HTN, HLD, hx of CVA (no residual deficits), BPH, hx of skin CA who presents with cough and fevers.  Symptoms started about 5-6 days prior to admission.  Started with left sided upper back pain.  Then started to have a cough associated with green phlegm and possible blood.  Associated with SOB and DONG.  No weight changes or night sweats.  Had a feveras high as 102F.  Some nausea but no vomiting with loss of appetite.  No chest pain.  No diarrhea.  No abdominal pain.  No recent travel or sick contacts.  Went to see his PMD 3 days prior to [place] admission and was given a z-kellie and tesslon perles.  Reported had negative COVID tests at home.   In the [place] ED, patient's triage vitals were /78    RR  21, 93% on RA (thought dropped down to 88%) and T 98.5F.  Labs showed WBC 18 with a left shift, CINDI with BUN/Cr 40/1.88, elevated LFTs, normal lactate, negative COVID (last booster in 12/2021 with Pfizer vaccine), neg influenza, neg RSV.  CT chest showed "patchy groundglass and more dense opacities in both lungs, suspicious for multifocal PNA...several small lucencies with areas of lung opacity...may represent early cavitation versus areas of focal bronchiectasis."  Patient started on azithromycin and ceftriaxone empirically for multifocal PNA.  Also of note, patient was found to be in new afib on EKG.  Patient denies any afib history but said when he was younger he did feel some irregular heartbeats.  Patient admitted to [facility] with sepsis due to multifocal pneumonia with progression to acute hypoxic respiratory failure now on HFNC and course further c/b new onset afib. Patient was seen by ID, pulm and cardiology. He is now transferred to [facility] for bronchoscopy. Patient denies states he is feeling well at this time.  (29 Aug 2022 23:01)      ----  INTERVAL HPI/OVERNIGHT EVENTS: Pt seen and evaluated at the bedside. No acute overnight events occurred. The pt reports feeling well. He's in good spirits. Eager to hear about his culture results so he can discuss dc planning to [facility]. Hasn't had a BM in 4 days.     ----  PAST MEDICAL & SURGICAL HISTORY:  Skin cancer  s/p Mohs      Essential hypertension      Cerebrovascular accident (CVA), unspecified mechanism  2015      Hyperlipidemia, unspecified hyperlipidemia type      Complex tear of lateral meniscus of right knee as current injury, initial encounter      Complex tear of medial meniscus of right knee as current injury, initial encounter      Benign prostatic hyperplasia, presence of lower urinary tract symptoms unspecified, unspecified morphology      H/O pilonidal cyst          FAMILY HISTORY:  Family history of ischemic heart disease and other diseases of the circulatory system (Father)        Allergies    No Known Allergies    Intolerances        ----  ANTIMICROBIALS:    CARDIOVASCULAR:  amLODIPine   Tablet 5 milliGRAM(s) Oral daily  metoprolol tartrate 50 milliGRAM(s) Oral two times a day  tamsulosin 0.4 milliGRAM(s) Oral at bedtime    GASTROINTESTINAL:  bisacodyl 5 milliGRAM(s) Oral daily PRN  pantoprazole    Tablet 40 milliGRAM(s) Oral before breakfast  polyethylene glycol 3350 17 Gram(s) Oral two times a day  senna 2 Tablet(s) Oral at bedtime    PULMONARY:  ALBUTerol    0.083% 2.5 milliGRAM(s) Nebulizer every 6 hours  ALBUTerol    90 MICROgram(s) HFA Inhaler 1 Puff(s) Inhalation every 6 hours  budesonide  80 MICROgram(s)/formoterol 4.5 MICROgram(s) Inhaler 2 Puff(s) Inhalation two times a day      ----  REVIEW OF SYSTEMS:  CONSTITUTIONAL: denies fever, chills, fatigue, weakness  HEENT: denies blurred vision, sore throat  CARDIOVASCULAR: denies chest pain, chest pressure, palpitations  RESPIRATORY: denies shortness of breath, sputum production  GASTROINTESTINAL: reports constipation. no nausea or po intolerance  NEUROLOGICAL: denies numbness, headache, focal weakness  MUSCULOSKELETAL: denies new joint pain, muscle aches    ----  PHYSICAL EXAM:  GENERAL: NAD, nontoxic  HEENT:  anicteric, moist mucous membranes  CHEST/LUNG:  CTA b/l, no rales, wheezes, or rhonchi  HEART:  RRR, S1, S2  ABDOMEN:  BS+, soft, nontender, nondistended  EXTREMITIES: no edema, cyanosis, or calf tenderness  NERVOUS SYSTEM: answers questions and follows commands appropriately    T(C): 36.7 (09-03-22 @ 13:44), Max: 36.7 (09-03-22 @ 13:44)  HR: 67 (09-03-22 @ 13:44) (60 - 67)  BP: 120/60 (09-03-22 @ 13:44) (113/63 - 123/57)  RR: 18 (09-03-22 @ 13:44) (18 - 18)  SpO2: 98% (09-03-22 @ 13:44) (96% - 99%)  Wt(kg): --    ----  INTAKE & OUTPUT:  I&O's Summary      LABS:                        10.9   11.75 )-----------( 418      ( 03 Sep 2022 06:30 )             34.7     09-03    139  |  104  |  33<H>  ----------------------------<  86  5.2   |  31  |  1.20    Ca    8.8      03 Sep 2022 06:30

## 2022-09-04 LAB
ALBUMIN SERPL ELPH-MCNC: 2.4 G/DL — LOW (ref 3.3–5)
ALP SERPL-CCNC: 153 U/L — HIGH (ref 40–120)
ALT FLD-CCNC: 35 U/L — SIGNIFICANT CHANGE UP (ref 12–78)
ANION GAP SERPL CALC-SCNC: 5 MMOL/L — SIGNIFICANT CHANGE UP (ref 5–17)
AST SERPL-CCNC: 35 U/L — SIGNIFICANT CHANGE UP (ref 15–37)
BILIRUB SERPL-MCNC: 0.4 MG/DL — SIGNIFICANT CHANGE UP (ref 0.2–1.2)
BUN SERPL-MCNC: 38 MG/DL — HIGH (ref 7–23)
CALCIUM SERPL-MCNC: 8.7 MG/DL — SIGNIFICANT CHANGE UP (ref 8.5–10.1)
CHLORIDE SERPL-SCNC: 104 MMOL/L — SIGNIFICANT CHANGE UP (ref 96–108)
CO2 SERPL-SCNC: 30 MMOL/L — SIGNIFICANT CHANGE UP (ref 22–31)
CREAT SERPL-MCNC: 1.2 MG/DL — SIGNIFICANT CHANGE UP (ref 0.5–1.3)
EGFR: 59 ML/MIN/1.73M2 — LOW
GLUCOSE SERPL-MCNC: 86 MG/DL — SIGNIFICANT CHANGE UP (ref 70–99)
HCT VFR BLD CALC: 32.9 % — LOW (ref 39–50)
HGB BLD-MCNC: 10.5 G/DL — LOW (ref 13–17)
MAGNESIUM SERPL-MCNC: 2.1 MG/DL — SIGNIFICANT CHANGE UP (ref 1.6–2.6)
MCHC RBC-ENTMCNC: 31.2 PG — SIGNIFICANT CHANGE UP (ref 27–34)
MCHC RBC-ENTMCNC: 31.9 GM/DL — LOW (ref 32–36)
MCV RBC AUTO: 97.6 FL — SIGNIFICANT CHANGE UP (ref 80–100)
NRBC # BLD: 0 /100 WBCS — SIGNIFICANT CHANGE UP (ref 0–0)
PHOSPHATE SERPL-MCNC: 3.8 MG/DL — SIGNIFICANT CHANGE UP (ref 2.5–4.5)
PLATELET # BLD AUTO: 375 K/UL — SIGNIFICANT CHANGE UP (ref 150–400)
POTASSIUM SERPL-MCNC: 5.1 MMOL/L — SIGNIFICANT CHANGE UP (ref 3.5–5.3)
POTASSIUM SERPL-SCNC: 5.1 MMOL/L — SIGNIFICANT CHANGE UP (ref 3.5–5.3)
PROT SERPL-MCNC: 7.4 G/DL — SIGNIFICANT CHANGE UP (ref 6–8.3)
RBC # BLD: 3.37 M/UL — LOW (ref 4.2–5.8)
RBC # FLD: 14.1 % — SIGNIFICANT CHANGE UP (ref 10.3–14.5)
SODIUM SERPL-SCNC: 139 MMOL/L — SIGNIFICANT CHANGE UP (ref 135–145)
WBC # BLD: 10.5 K/UL — SIGNIFICANT CHANGE UP (ref 3.8–10.5)
WBC # FLD AUTO: 10.5 K/UL — SIGNIFICANT CHANGE UP (ref 3.8–10.5)

## 2022-09-04 PROCEDURE — 93010 ELECTROCARDIOGRAM REPORT: CPT

## 2022-09-04 PROCEDURE — 99232 SBSQ HOSP IP/OBS MODERATE 35: CPT | Mod: GC

## 2022-09-04 RX ORDER — CYCLOBENZAPRINE HYDROCHLORIDE 10 MG/1
5 TABLET, FILM COATED ORAL THREE TIMES A DAY
Refills: 0 | Status: DISCONTINUED | OUTPATIENT
Start: 2022-09-04 | End: 2022-09-13

## 2022-09-04 RX ADMIN — ALBUTEROL 2.5 MILLIGRAM(S): 90 AEROSOL, METERED ORAL at 20:42

## 2022-09-04 RX ADMIN — APIXABAN 5 MILLIGRAM(S): 2.5 TABLET, FILM COATED ORAL at 17:54

## 2022-09-04 RX ADMIN — BUDESONIDE AND FORMOTEROL FUMARATE DIHYDRATE 2 PUFF(S): 160; 4.5 AEROSOL RESPIRATORY (INHALATION) at 05:44

## 2022-09-04 RX ADMIN — CYCLOBENZAPRINE HYDROCHLORIDE 5 MILLIGRAM(S): 10 TABLET, FILM COATED ORAL at 17:55

## 2022-09-04 RX ADMIN — CARBAMIDE PEROXIDE 10 DROP(S): 81.86 SOLUTION/ DROPS AURICULAR (OTIC) at 17:56

## 2022-09-04 RX ADMIN — APIXABAN 5 MILLIGRAM(S): 2.5 TABLET, FILM COATED ORAL at 05:40

## 2022-09-04 RX ADMIN — BUDESONIDE AND FORMOTEROL FUMARATE DIHYDRATE 2 PUFF(S): 160; 4.5 AEROSOL RESPIRATORY (INHALATION) at 22:16

## 2022-09-04 RX ADMIN — AMLODIPINE BESYLATE 5 MILLIGRAM(S): 2.5 TABLET ORAL at 05:40

## 2022-09-04 RX ADMIN — ALBUTEROL 2.5 MILLIGRAM(S): 90 AEROSOL, METERED ORAL at 08:01

## 2022-09-04 RX ADMIN — OXYCODONE HYDROCHLORIDE 2.5 MILLIGRAM(S): 5 TABLET ORAL at 04:24

## 2022-09-04 RX ADMIN — TAMSULOSIN HYDROCHLORIDE 0.4 MILLIGRAM(S): 0.4 CAPSULE ORAL at 22:23

## 2022-09-04 RX ADMIN — Medication 650 MILLIGRAM(S): at 22:47

## 2022-09-04 RX ADMIN — Medication 50 MILLIGRAM(S): at 17:53

## 2022-09-04 RX ADMIN — ATORVASTATIN CALCIUM 40 MILLIGRAM(S): 80 TABLET, FILM COATED ORAL at 22:16

## 2022-09-04 RX ADMIN — CARBAMIDE PEROXIDE 10 DROP(S): 81.86 SOLUTION/ DROPS AURICULAR (OTIC) at 06:19

## 2022-09-04 RX ADMIN — Medication 250 MILLIGRAM(S): at 05:42

## 2022-09-04 RX ADMIN — Medication 650 MILLIGRAM(S): at 22:17

## 2022-09-04 RX ADMIN — ALBUTEROL 2.5 MILLIGRAM(S): 90 AEROSOL, METERED ORAL at 14:04

## 2022-09-04 RX ADMIN — Medication 50 MILLIGRAM(S): at 05:40

## 2022-09-04 RX ADMIN — Medication 250 MILLIGRAM(S): at 17:53

## 2022-09-04 RX ADMIN — FINASTERIDE 5 MILLIGRAM(S): 5 TABLET, FILM COATED ORAL at 11:16

## 2022-09-04 RX ADMIN — PANTOPRAZOLE SODIUM 40 MILLIGRAM(S): 20 TABLET, DELAYED RELEASE ORAL at 05:41

## 2022-09-04 RX ADMIN — Medication 3 MILLIGRAM(S): at 22:16

## 2022-09-04 NOTE — PROGRESS NOTE ADULT - PROBLEM SELECTOR PLAN 5
----- Message from Austin Syed MD sent at 1/27/2020 11:17 AM EST -----  Child needs ENT referral per audiology note from Jan 2020 for conductive hearing loss of left ear  S/P myringotomy tubes  Please call mom to see if she has ENT follow up scheduled    If not we can put in another referral        ----- Message -----  From: Wayne Graves  Sent: 1/22/2020   4:05 PM EST  To: Austin Syed MD Continue atorvastatin 40mg qhs

## 2022-09-04 NOTE — PROGRESS NOTE ADULT - SUBJECTIVE AND OBJECTIVE BOX
HPI:  87M with HTN, HLD, hx of CVA (no residual deficits), BPH, hx of skin CA who presents with cough and fevers.  Symptoms started about 5-6 days prior to admission.  Started with left sided upper back pain.  Then started to have a cough associated with green phlegm and possible blood.  Associated with SOB and DONG.  No weight changes or night sweats.  Had a feveras high as 102F.  Some nausea but no vomiting with loss of appetite.  No chest pain.  No diarrhea.  No abdominal pain.  No recent travel or sick contacts.  Went to see his PMD 3 days prior to [place] admission and was given a z-kellie and tesslon perles.  Reported had negative COVID tests at home.   In the [place] ED, patient's triage vitals were /78    RR  21, 93% on RA (thought dropped down to 88%) and T 98.5F.  Labs showed WBC 18 with a left shift, CINDI with BUN/Cr 40/1.88, elevated LFTs, normal lactate, negative COVID (last booster in 12/2021 with Pfizer vaccine), neg influenza, neg RSV.  CT chest showed "patchy groundglass and more dense opacities in both lungs, suspicious for multifocal PNA...several small lucencies with areas of lung opacity...may represent early cavitation versus areas of focal bronchiectasis."  Patient started on azithromycin and ceftriaxone empirically for multifocal PNA.  Also of note, patient was found to be in new afib on EKG.  Patient denies any afib history but said when he was younger he did feel some irregular heartbeats.  Patient admitted to [facility] with sepsis due to multifocal pneumonia with progression to acute hypoxic respiratory failure now on HFNC and course further c/b new onset afib. Patient was seen by ID, pulm and cardiology. He is now transferred to [facility] for bronchoscopy. Patient denies states he is feeling well at this time.  (29 Aug 2022 23:01)      INTERVAL HPI/OVERNIGHT EVENTS: Pt was seen and examined at bedside. No acute overnight events. Pt states that   Pt denies headache, dizziness, lightheadedness, fever, chills, body aches, CP, SOB, palpitations, abdominal pain, n/v, numbness/tingling.  No other complaints at this time.     REVIEW OF SYSTEMS:    CONSTITUTIONAL: No weakness, fevers or chills  EYES/ENT: No visual changes, no throat pain   RESPIRATORY: No cough, wheezing, hemoptysis; No shortness of breath  CARDIOVASCULAR: No chest pain or palpitations  GASTROINTESTINAL: No abdominal, nausea, vomiting, or hematemesis; No diarrhea or constipation. No melena or hematochezia.  GENITOURINARY: No dysuria, frequency or hematuria  NEUROLOGICAL: No dizziness, numbness, or weakness  SKIN: No itching, burning, rashes, or lesions   All other review of systems is negative unless indicated above.    VITAL SIGNS:    T(C): 36.9 (09-04-22 @ 04:30), Max: 36.9 (09-04-22 @ 04:30)  HR: 62 (09-04-22 @ 08:17) (60 - 75)  BP: 136/66 (09-04-22 @ 04:30) (120/60 - 136/66)  RR: 18 (09-04-22 @ 04:30) (18 - 19)  SpO2: 94% (09-04-22 @ 08:17) (93% - 98%)    PHYSICAL EXAM:     GENERAL: no acute distress  HEENT: NC/AT, EOMI, neck supple, MMM  RESPIRATORY: LCTAB/L, no rhonchi, rales, or wheezing  CARDIOVASCULAR: RRR, no murmurs, gallops, rubs  ABDOMINAL: soft, non-tender, non-distended, positive bowel sounds   EXTREMITIES: no clubbing, cyanosis, or edema  NEUROLOGICAL: alert and oriented x 3, non-focal  SKIN: no rashes or lesions   MUSCULOSKELETAL: no gross joint deformity                          10.5   10.50 )-----------( 375      ( 04 Sep 2022 06:15 )             32.9     09-04    139  |  104  |  38<H>  ----------------------------<  86  5.1   |  30  |  1.20    Ca    8.7      04 Sep 2022 06:15  Phos  3.8     09-04  Mg     2.1     09-04    TPro  7.4  /  Alb  2.4<L>  /  TBili  0.4  /  DBili  x   /  AST  35  /  ALT  35  /  AlkPhos  153<H>  09-04      CAPILLARY BLOOD GLUCOSE          MEDICATIONS  (STANDING):  ALBUTerol    0.083% 2.5 milliGRAM(s) Nebulizer every 6 hours  ALBUTerol    90 MICROgram(s) HFA Inhaler 1 Puff(s) Inhalation every 6 hours  amLODIPine   Tablet 5 milliGRAM(s) Oral daily  apixaban 5 milliGRAM(s) Oral two times a day  atorvastatin 40 milliGRAM(s) Oral at bedtime  budesonide  80 MICROgram(s)/formoterol 4.5 MICROgram(s) Inhaler 2 Puff(s) Inhalation two times a day  carbamide peroxide Otic Solution 10 Drop(s) Right Ear every 12 hours  finasteride 5 milliGRAM(s) Oral daily  magnesium hydroxide Suspension 30 milliLiter(s) Oral daily  metoprolol tartrate 50 milliGRAM(s) Oral two times a day  naloxone Injectable 0.4 milliGRAM(s) IV Push once  pantoprazole    Tablet 40 milliGRAM(s) Oral before breakfast  polyethylene glycol 3350 17 Gram(s) Oral two times a day  saccharomyces boulardii 250 milliGRAM(s) Oral two times a day  senna 2 Tablet(s) Oral at bedtime  sodium chloride 0.9%. 1000 milliLiter(s) (60 mL/Hr) IV Continuous <Continuous>  tamsulosin 0.4 milliGRAM(s) Oral at bedtime

## 2022-09-04 NOTE — PROGRESS NOTE ADULT - ASSESSMENT
The patient is an 87 year old male with a history of HTN, HL, BPH, CVA who presents with cough and hemoptysis in the setting of PNA and r/o TB.    9/4/22  Seen at [facility] telemetry  Sitting in chair  No complaints offered    Plan:  - Intermittent episodes of AF on telemetry  - Continue metoprolol tartrate 100 mg bid  - Continue amlodipine 2.5 mg daily  - Echo with low normal LV systolic function, mild pulm HTN  - Sputum culture now positive for AFB  - Pulm and ID follow-up  - Continue apixaban 5 mg bid  - Await results of bronchoscopy

## 2022-09-04 NOTE — PROGRESS NOTE ADULT - ASSESSMENT
86yo M with PMH of HTN, HLD, hx of CVA (no residual deficits), BPH, hx of skin CA who presents with cough and fevers a/w sepsis due to multifocal pneumonia with progression to acute hypoxic respiratory failure now on HFNC and course further c/b new onset afib.  Transferred from [place] for bronchoscopy.     Bronch done - Aug 30th -   Bronch AFB neg  ID follow up - reviewed -   AFB identification pending -     eval malignancy - TB - PNA - infection  spoke with pt - dtr -   s/p Levaquin course in [facility]  o2 support as needed to keep sat > 88 pct  isolation precs  ID follow up

## 2022-09-04 NOTE — PROGRESS NOTE ADULT - ATTENDING COMMENTS
The patient was seen and evaluated independently by the attending physician.  - I have personally reviewed the pt's labs, imaging, micro data and consultant recommendations.    86yo M with PMH of HTN, HLD, hx of CVA (no residual deficits), BPH, hx of skin CA who presents as a transfer from [place] for bronchoscopy to r/o TB, satting well on 3L NC.    Pt has no complaints. Had large BM overnight. Feeling fatigued. Afebrile. Remains on supplemental O2. Reviewed w/ ID, anticipate having more information about microbial specimens on Tuesday.     Coarse breath sounds on exam, no accessory muscle use, no wheezing. Speaking in full sentences.     #atypical pna  - r/o Tb  - continue current regimen  - ID following - spoke w/ ID today  - isolation precautions  - met w/ family to provide education on 9/3/22    #earwax in R ear  - added debrox drops

## 2022-09-04 NOTE — PROGRESS NOTE ADULT - SUBJECTIVE AND OBJECTIVE BOX
[facility] DIVISION of INFECTIOUS DISEASE  Mustapha Valente MD PhD, Sheryl Pride MD, Adriana Rodriguez MD, Oliva Myers MD, Ebenezer Rivas MD  and providing coverage with Leodan Zaragoza MD  Providing Infectious Disease Consultations at [facility], [facility], [facility], [facility]'s    Office# [phone] to schedule follow up appointments  Answering Service for urgent calls or New Consults [phone]  Cell# to text for urgent issues Mustapha Valente [phone]     infectious diseases progress note:    DENNIS BRADFORD is a 87y y. o. Male patient    Overnight and events of the last 24hrs reviewed    Allergies    No Known Allergies    Intolerances        ANTIBIOTICS/RELEVANT:  antimicrobials    immunologic:    OTHER:  acetaminophen     Tablet .. 650 milliGRAM(s) Oral every 6 hours PRN  ALBUTerol    0.083% 2.5 milliGRAM(s) Nebulizer every 6 hours  ALBUTerol    90 MICROgram(s) HFA Inhaler 1 Puff(s) Inhalation every 6 hours  amLODIPine   Tablet 5 milliGRAM(s) Oral daily  apixaban 5 milliGRAM(s) Oral two times a day  atorvastatin 40 milliGRAM(s) Oral at bedtime  bisacodyl 5 milliGRAM(s) Oral daily PRN  budesonide  80 MICROgram(s)/formoterol 4.5 MICROgram(s) Inhaler 2 Puff(s) Inhalation two times a day  carbamide peroxide Otic Solution 10 Drop(s) Right Ear every 12 hours  cyclobenzaprine 5 milliGRAM(s) Oral three times a day PRN  finasteride 5 milliGRAM(s) Oral daily  magnesium hydroxide Suspension 30 milliLiter(s) Oral daily  melatonin 3 milliGRAM(s) Oral at bedtime PRN  metoprolol tartrate 50 milliGRAM(s) Oral two times a day  naloxone Injectable 0.4 milliGRAM(s) IV Push once  oxyCODONE    IR 2.5 milliGRAM(s) Oral every 4 hours PRN  pantoprazole    Tablet 40 milliGRAM(s) Oral before breakfast  polyethylene glycol 3350 17 Gram(s) Oral two times a day  saccharomyces boulardii 250 milliGRAM(s) Oral two times a day  senna 2 Tablet(s) Oral at bedtime  sodium chloride 0.9%. 1000 milliLiter(s) IV Continuous <Continuous>  tamsulosin 0.4 milliGRAM(s) Oral at bedtime      Objective:  Vital Signs Last 24 Hrs  T(C): 36.9 (04 Sep 2022 04:30), Max: 36.9 (04 Sep 2022 04:30)  T(F): 98.4 (04 Sep 2022 04:30), Max: 98.4 (04 Sep 2022 04:30)  HR: 62 (04 Sep 2022 08:17) (60 - 75)  BP: 136/66 (04 Sep 2022 04:30) (120/60 - 136/66)  BP(mean): --  RR: 18 (04 Sep 2022 04:30) (18 - 19)  SpO2: 94% (04 Sep 2022 08:17) (93% - 98%)    Parameters below as of 04 Sep 2022 08:17  Patient On (Oxygen Delivery Method): nasal cannula        T(C): 36.9 (09-04-22 @ 04:30), Max: 36.9 (09-04-22 @ 04:30)  T(C): 36.9 (09-04-22 @ 04:30), Max: 36.9 (09-01-22 @ 20:15)  T(C): 36.9 (09-04-22 @ 04:30), Max: 36.9 (09-01-22 @ 20:15)    PHYSICAL EXAM:  HEENT: NC atraumatic  Neck: supple  Respiratory: no accessory muscle use, breathing comfortably  Cardiovascular: distant  Gastrointestinal: normal appearing, nondistended  Extremities: no clubbing, no cyanosis,        LABS:                          10.5   10.50 )-----------( 375      ( 04 Sep 2022 06:15 )             32.9       WBC  10.50 09-04 @ 06:15  11.75 09-03 @ 06:30  11.88 09-02 @ 07:18  12.50 09-01 @ 06:24  11.26 08-31 @ 06:22  10.92 08-30 @ 05:56      09-04    139  |  104  |  38<H>  ----------------------------<  86  5.1   |  30  |  1.20    Ca    8.7      04 Sep 2022 06:15  Phos  3.8     09-04  Mg     2.1     09-04    TPro  7.4  /  Alb  2.4<L>  /  TBili  0.4  /  DBili  x   /  AST  35  /  ALT  35  /  AlkPhos  153<H>  09-04      Creatinine, Serum: 1.20 mg/dL (09-04-22 @ 06:15)  Creatinine, Serum: 1.20 mg/dL (09-03-22 @ 06:30)  Creatinine, Serum: 1.10 mg/dL (09-02-22 @ 07:18)  Creatinine, Serum: 0.95 mg/dL (09-01-22 @ 06:24)  Creatinine, Serum: 1.00 mg/dL (08-31-22 @ 06:22)  Creatinine, Serum: 0.96 mg/dL (08-30-22 @ 05:56)                INFLAMMATORY MARKERS  Auto Neutrophil #: 7.01 K/uL (08-30-22 @ 05:56)  Auto Lymphocyte #: 2.20 K/uL (08-30-22 @ 05:56)  Auto Neutrophil #: 8.71 K/uL (08-23-22 @ 06:00)  Auto Lymphocyte #: 2.20 K/uL (08-23-22 @ 06:00)  Auto Neutrophil #: 9.75 K/uL (08-22-22 @ 06:35)  Auto Lymphocyte #: 2.29 K/uL (08-22-22 @ 06:35)      Auto Eosinophil #: 0.28 K/uL (08-30-22 @ 05:56)  Auto Eosinophil #: 0.61 K/uL (08-23-22 @ 06:00)  Auto Eosinophil #: 0.51 K/uL (08-22-22 @ 06:35)                                MICROBIOLOGY:              RADIOLOGY & ADDITIONAL STUDIES:

## 2022-09-04 NOTE — PROGRESS NOTE ADULT - ASSESSMENT
86yo M with PMH of HTN, HLD, hx of CVA (no residual deficits), BPH, hx of skin CA who presents as a transfer from [facility] for bronchoscopy to r/o TB, satting well on 3L NC.

## 2022-09-04 NOTE — PROGRESS NOTE ADULT - PROBLEM SELECTOR PLAN 1
- [place] workup indeterminant for TB, AFB growth in broth positive, 3x AFB smears negative. Quant gold indeterminant. Afebrile w/ persistent leukocytosis, 12-->11k, downtrending  - Bronchial cx (bronchoscopy 8/30): no acid fast growth. final.   - Sputum cx: AFB+ in broth, to be identified  - blood cx: no growth. final.  - Bronchial AFB and TB: no acid fast growth. final.  - Bx bronchial cells 9/1: -ve for malignancy, -ve AFB  - CXR 8/22: Persistent advanced irregular infiltrates involving the left mid and upper lung fields and the right upper lobe  - pulm following  - ID consulted, recs appreciated

## 2022-09-04 NOTE — CHART NOTE - NSCHARTNOTEFT_GEN_A_CORE
Called by RN for elevated T-waves on tele monitor  Charge nurse covering for tele tech stated that on personal read t waves looked elevated. On review, stated that trend with T waves has been present throughout hospital course   Electrolytes reviewed. WNL. F/u AM CMP   Stat EKG ordered. EKG with artifact; however, appears unchanged from prior. Shows afib with right bundle branch block. No acute T wave elevation seen on personal read.   Pt asymptomatic. Laying in bed comfortably. VSS.

## 2022-09-04 NOTE — PROGRESS NOTE ADULT - SUBJECTIVE AND OBJECTIVE BOX
Patient is a 87y Male with a known history of :  Positive tuberculin test [R76.11]    Tuberculosis high risk [Z91.89]    Afib [I48.91]    HTN (hypertension) [I10]    HLD (hyperlipidemia) [E78.5]    History of BPH [Z87.438]    Need for prophylactic measure [Z29.9]    Back pain [M54.9]    Constipation [K59.00]      HPI:  87M with HTN, HLD, hx of CVA (no residual deficits), BPH, hx of skin CA who presents with cough and fevers.  Symptoms started about 5-6 days prior to admission.  Started with left sided upper back pain.  Then started to have a cough associated with green phlegm and possible blood.  Associated with SOB and DONG.  No weight changes or night sweats.  Had a feveras high as 102F.  Some nausea but no vomiting with loss of appetite.  No chest pain.  No diarrhea.  No abdominal pain.  No recent travel or sick contacts.  Went to see his PMD 3 days prior to [place] admission and was given a z-kellie and tesslon perles.  Reported had negative COVID tests at home.   In the [place] ED, patient's triage vitals were /78    RR  21, 93% on RA (thought dropped down to 88%) and T 98.5F.  Labs showed WBC 18 with a left shift, CINDI with BUN/Cr 40/1.88, elevated LFTs, normal lactate, negative COVID (last booster in 12/2021 with Pfizer vaccine), neg influenza, neg RSV.  CT chest showed "patchy groundglass and more dense opacities in both lungs, suspicious for multifocal PNA...several small lucencies with areas of lung opacity...may represent early cavitation versus areas of focal bronchiectasis."  Patient started on azithromycin and ceftriaxone empirically for multifocal PNA.  Also of note, patient was found to be in new afib on EKG.  Patient denies any afib history but said when he was younger he did feel some irregular heartbeats.  Patient admitted to [facility] with sepsis due to multifocal pneumonia with progression to acute hypoxic respiratory failure now on HFNC and course further c/b new onset afib. Patient was seen by ID, pulm and cardiology. He is now transferred to [facility] for bronchoscopy. Patient denies states he is feeling well at this time.  (29 Aug 2022 23:01)      REVIEW OF SYSTEMS:    CONSTITUTIONAL: No fever, weight loss, or fatigue  EYES: No eye pain, visual disturbances, or discharge  ENMT:  No difficulty hearing, tinnitus, vertigo; No sinus or throat pain  NECK: No pain or stiffness  BREASTS: No pain, masses, or nipple discharge  RESPIRATORY: No cough, wheezing, chills or hemoptysis; No shortness of breath  CARDIOVASCULAR: No chest pain, palpitations, dizziness, or leg swelling  GASTROINTESTINAL: No abdominal or epigastric pain. No nausea, vomiting, or hematemesis; No diarrhea or constipation. No melena or hematochezia.  GENITOURINARY: No dysuria, frequency, hematuria, or incontinence  NEUROLOGICAL: No headaches, memory loss, loss of strength, numbness, or tremors  SKIN: No itching, burning, rashes, or lesions   LYMPH NODES: No enlarged glands  ENDOCRINE: No heat or cold intolerance; No hair loss  MUSCULOSKELETAL: No joint pain or swelling; No muscle, back, or extremity pain  PSYCHIATRIC: No depression, anxiety, mood swings, or difficulty sleeping  HEME/LYMPH: No easy bruising, or bleeding gums  ALLERGY AND IMMUNOLOGIC: No hives or eczema    MEDICATIONS  (STANDING):  ALBUTerol    0.083% 2.5 milliGRAM(s) Nebulizer every 6 hours  ALBUTerol    90 MICROgram(s) HFA Inhaler 1 Puff(s) Inhalation every 6 hours  amLODIPine   Tablet 5 milliGRAM(s) Oral daily  apixaban 5 milliGRAM(s) Oral two times a day  atorvastatin 40 milliGRAM(s) Oral at bedtime  budesonide  80 MICROgram(s)/formoterol 4.5 MICROgram(s) Inhaler 2 Puff(s) Inhalation two times a day  carbamide peroxide Otic Solution 10 Drop(s) Right Ear every 12 hours  finasteride 5 milliGRAM(s) Oral daily  magnesium hydroxide Suspension 30 milliLiter(s) Oral daily  metoprolol tartrate 50 milliGRAM(s) Oral two times a day  naloxone Injectable 0.4 milliGRAM(s) IV Push once  pantoprazole    Tablet 40 milliGRAM(s) Oral before breakfast  polyethylene glycol 3350 17 Gram(s) Oral two times a day  saccharomyces boulardii 250 milliGRAM(s) Oral two times a day  senna 2 Tablet(s) Oral at bedtime  sodium chloride 0.9%. 1000 milliLiter(s) (60 mL/Hr) IV Continuous <Continuous>  tamsulosin 0.4 milliGRAM(s) Oral at bedtime    MEDICATIONS  (PRN):  acetaminophen     Tablet .. 650 milliGRAM(s) Oral every 6 hours PRN Mild Pain (1 - 3)  bisacodyl 5 milliGRAM(s) Oral daily PRN Constipation  melatonin 3 milliGRAM(s) Oral at bedtime PRN Insomnia  oxyCODONE    IR 2.5 milliGRAM(s) Oral every 4 hours PRN Moderate Pain (4 - 6)      ALLERGIES: No Known Allergies      FAMILY HISTORY:  Family history of ischemic heart disease and other diseases of the circulatory system (Father)        Social history:  Alochol:   Smoking:   Drug Use:   Marital Status:     PHYSICAL EXAMINATION:  -----------------------------  T(C): 36.9 (09-04-22 @ 04:30), Max: 36.9 (09-04-22 @ 04:30)  HR: 67 (09-04-22 @ 04:30) (60 - 75)  BP: 136/66 (09-04-22 @ 04:30) (120/60 - 136/66)  RR: 18 (09-04-22 @ 04:30) (18 - 19)  SpO2: 94% (09-04-22 @ 04:30) (93% - 98%)  Wt(kg): --        Constitutional: well developed, normal appearance, well groomed, well nourished, no deformities and no acute distress.   Eyes: the conjunctiva exhibited no abnormalities and the eyelids demonstrated no xanthelasmas.   HEENT: normal oral mucosa, no oral pallor and no oral cyanosis.   Neck: normal jugular venous A waves present, normal jugular venous V waves present and no jugular venous mcdaniels A waves.   Pulmonary: no respiratory distress, normal respiratory rhythm and effort, no accessory muscle use and lungs were clear to auscultation bilaterally.   Cardiovascular: heart rate and rhythm were normal, normal S1 and S2 and no murmur, gallop, rub, heave or thrill are present.   Musculoskeletal: the gait could not be assessed.   Extremities: no clubbing of the fingernails, no localized cyanosis, no petechial hemorrhages and no ischemic changes.   Skin: normal skin color and pigmentation, no rash, no venous stasis, no skin lesions, no skin ulcer and no xanthoma was observed.   Psychiatric: oriented to person, place, and time, the affect was normal, the mood was normal and not feeling anxious.     LABS:   --------  09-03    139  |  104  |  33<H>  ----------------------------<  86  5.2   |  31  |  1.20    Ca    8.8      03 Sep 2022 06:30                           10.9   11.75 )-----------( 418      ( 03 Sep 2022 06:30 )             34.7                   Radiology:

## 2022-09-04 NOTE — PROGRESS NOTE ADULT - SUBJECTIVE AND OBJECTIVE BOX
Date/Time Patient Seen:  		  Referring MD:   Data Reviewed	       Patient is a 87y old  Male who presents with a chief complaint of bronchoscopy (03 Sep 2022 18:04)      Subjective/HPI     PAST MEDICAL & SURGICAL HISTORY:  No pertinent past medical history    Skin cancer  s/p Mohs    Essential hypertension    Cerebrovascular accident (CVA), unspecified mechanism  2015    Hyperlipidemia, unspecified hyperlipidemia type    Complex tear of lateral meniscus of right knee as current injury, initial encounter    Complex tear of medial meniscus of right knee as current injury, initial encounter    Benign prostatic hyperplasia, presence of lower urinary tract symptoms unspecified, unspecified morphology    H/O pilonidal cyst          Medication list         MEDICATIONS  (STANDING):  ALBUTerol    0.083% 2.5 milliGRAM(s) Nebulizer every 6 hours  ALBUTerol    90 MICROgram(s) HFA Inhaler 1 Puff(s) Inhalation every 6 hours  amLODIPine   Tablet 5 milliGRAM(s) Oral daily  apixaban 5 milliGRAM(s) Oral two times a day  atorvastatin 40 milliGRAM(s) Oral at bedtime  budesonide  80 MICROgram(s)/formoterol 4.5 MICROgram(s) Inhaler 2 Puff(s) Inhalation two times a day  carbamide peroxide Otic Solution 10 Drop(s) Right Ear every 12 hours  finasteride 5 milliGRAM(s) Oral daily  magnesium hydroxide Suspension 30 milliLiter(s) Oral daily  metoprolol tartrate 50 milliGRAM(s) Oral two times a day  naloxone Injectable 0.4 milliGRAM(s) IV Push once  pantoprazole    Tablet 40 milliGRAM(s) Oral before breakfast  polyethylene glycol 3350 17 Gram(s) Oral two times a day  saccharomyces boulardii 250 milliGRAM(s) Oral two times a day  senna 2 Tablet(s) Oral at bedtime  sodium chloride 0.9%. 1000 milliLiter(s) (60 mL/Hr) IV Continuous <Continuous>  tamsulosin 0.4 milliGRAM(s) Oral at bedtime    MEDICATIONS  (PRN):  acetaminophen     Tablet .. 650 milliGRAM(s) Oral every 6 hours PRN Mild Pain (1 - 3)  bisacodyl 5 milliGRAM(s) Oral daily PRN Constipation  melatonin 3 milliGRAM(s) Oral at bedtime PRN Insomnia  oxyCODONE    IR 2.5 milliGRAM(s) Oral every 4 hours PRN Moderate Pain (4 - 6)         Vitals log        ICU Vital Signs Last 24 Hrs  T(C): 36.9 (04 Sep 2022 04:30), Max: 36.9 (04 Sep 2022 04:30)  T(F): 98.4 (04 Sep 2022 04:30), Max: 98.4 (04 Sep 2022 04:30)  HR: 67 (04 Sep 2022 04:30) (60 - 75)  BP: 136/66 (04 Sep 2022 04:30) (120/60 - 136/66)  BP(mean): --  ABP: --  ABP(mean): --  RR: 18 (04 Sep 2022 04:30) (18 - 19)  SpO2: 94% (04 Sep 2022 04:30) (93% - 98%)    O2 Parameters below as of 04 Sep 2022 04:30  Patient On (Oxygen Delivery Method): nasal cannula  O2 Flow (L/min): 2               Input and Output:  I&O's Detail      Lab Data                        10.9   11.75 )-----------( 418      ( 03 Sep 2022 06:30 )             34.7     09-03    139  |  104  |  33<H>  ----------------------------<  86  5.2   |  31  |  1.20    Ca    8.8      03 Sep 2022 06:30              Review of Systems	      Objective     Physical Examination    heart s1s2  lung dec BS  abd soft  head nc      Pertinent Lab findings & Imaging      Bud:  NO   Adequate UO     I&O's Detail           Discussed with:     Cultures:	        Radiology

## 2022-09-04 NOTE — PROGRESS NOTE ADULT - PROBLEM SELECTOR PLAN 3
Pt has complaint of lower back pain  - Pain resolved with standing tylenol, oxy 2.5  - iSTOP 8/30, chart note   - low dose opoid regimen w/ PRN bowel regimen  - Pt reports more spasmodic pain in buttocks, paraspinals- muscle relaxants began

## 2022-09-04 NOTE — PROGRESS NOTE ADULT - ASSESSMENT
87M with HTN, HLD, hx of CVA (no residual deficits), BPH, hx of skin CA who presents with cough and fevers.    Found to have multifocal pneumonia.  Also found have new onset afib.        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  We will follow along in the care of this patient. Please call us at [phone] or text me directly on my cell# at [phone] with any concerns.

## 2022-09-05 LAB
ANION GAP SERPL CALC-SCNC: 4 MMOL/L — LOW (ref 5–17)
BUN SERPL-MCNC: 33 MG/DL — HIGH (ref 7–23)
CALCIUM SERPL-MCNC: 8.5 MG/DL — SIGNIFICANT CHANGE UP (ref 8.5–10.1)
CHLORIDE SERPL-SCNC: 106 MMOL/L — SIGNIFICANT CHANGE UP (ref 96–108)
CO2 SERPL-SCNC: 31 MMOL/L — SIGNIFICANT CHANGE UP (ref 22–31)
CREAT SERPL-MCNC: 1.2 MG/DL — SIGNIFICANT CHANGE UP (ref 0.5–1.3)
EGFR: 59 ML/MIN/1.73M2 — LOW
GLUCOSE SERPL-MCNC: 83 MG/DL — SIGNIFICANT CHANGE UP (ref 70–99)
POTASSIUM SERPL-MCNC: 4.6 MMOL/L — SIGNIFICANT CHANGE UP (ref 3.5–5.3)
POTASSIUM SERPL-SCNC: 4.6 MMOL/L — SIGNIFICANT CHANGE UP (ref 3.5–5.3)
SODIUM SERPL-SCNC: 141 MMOL/L — SIGNIFICANT CHANGE UP (ref 135–145)

## 2022-09-05 PROCEDURE — 99233 SBSQ HOSP IP/OBS HIGH 50: CPT

## 2022-09-05 RX ADMIN — Medication 250 MILLIGRAM(S): at 05:54

## 2022-09-05 RX ADMIN — CARBAMIDE PEROXIDE 10 DROP(S): 81.86 SOLUTION/ DROPS AURICULAR (OTIC) at 06:00

## 2022-09-05 RX ADMIN — APIXABAN 5 MILLIGRAM(S): 2.5 TABLET, FILM COATED ORAL at 17:32

## 2022-09-05 RX ADMIN — ALBUTEROL 2.5 MILLIGRAM(S): 90 AEROSOL, METERED ORAL at 08:09

## 2022-09-05 RX ADMIN — BUDESONIDE AND FORMOTEROL FUMARATE DIHYDRATE 2 PUFF(S): 160; 4.5 AEROSOL RESPIRATORY (INHALATION) at 05:55

## 2022-09-05 RX ADMIN — CYCLOBENZAPRINE HYDROCHLORIDE 5 MILLIGRAM(S): 10 TABLET, FILM COATED ORAL at 21:30

## 2022-09-05 RX ADMIN — APIXABAN 5 MILLIGRAM(S): 2.5 TABLET, FILM COATED ORAL at 05:54

## 2022-09-05 RX ADMIN — SENNA PLUS 2 TABLET(S): 8.6 TABLET ORAL at 21:29

## 2022-09-05 RX ADMIN — CYCLOBENZAPRINE HYDROCHLORIDE 5 MILLIGRAM(S): 10 TABLET, FILM COATED ORAL at 05:54

## 2022-09-05 RX ADMIN — TAMSULOSIN HYDROCHLORIDE 0.4 MILLIGRAM(S): 0.4 CAPSULE ORAL at 21:29

## 2022-09-05 RX ADMIN — ALBUTEROL 2.5 MILLIGRAM(S): 90 AEROSOL, METERED ORAL at 14:22

## 2022-09-05 RX ADMIN — BUDESONIDE AND FORMOTEROL FUMARATE DIHYDRATE 2 PUFF(S): 160; 4.5 AEROSOL RESPIRATORY (INHALATION) at 17:33

## 2022-09-05 RX ADMIN — CARBAMIDE PEROXIDE 10 DROP(S): 81.86 SOLUTION/ DROPS AURICULAR (OTIC) at 17:36

## 2022-09-05 RX ADMIN — OXYCODONE HYDROCHLORIDE 2.5 MILLIGRAM(S): 5 TABLET ORAL at 23:55

## 2022-09-05 RX ADMIN — Medication 250 MILLIGRAM(S): at 17:32

## 2022-09-05 RX ADMIN — POLYETHYLENE GLYCOL 3350 17 GRAM(S): 17 POWDER, FOR SOLUTION ORAL at 17:35

## 2022-09-05 RX ADMIN — ALBUTEROL 2.5 MILLIGRAM(S): 90 AEROSOL, METERED ORAL at 21:00

## 2022-09-05 RX ADMIN — FINASTERIDE 5 MILLIGRAM(S): 5 TABLET, FILM COATED ORAL at 11:48

## 2022-09-05 RX ADMIN — PANTOPRAZOLE SODIUM 40 MILLIGRAM(S): 20 TABLET, DELAYED RELEASE ORAL at 05:54

## 2022-09-05 RX ADMIN — ATORVASTATIN CALCIUM 40 MILLIGRAM(S): 80 TABLET, FILM COATED ORAL at 21:29

## 2022-09-05 RX ADMIN — Medication 50 MILLIGRAM(S): at 17:32

## 2022-09-05 RX ADMIN — ALBUTEROL 2.5 MILLIGRAM(S): 90 AEROSOL, METERED ORAL at 02:33

## 2022-09-05 RX ADMIN — Medication 50 MILLIGRAM(S): at 05:54

## 2022-09-05 RX ADMIN — AMLODIPINE BESYLATE 5 MILLIGRAM(S): 2.5 TABLET ORAL at 05:55

## 2022-09-05 NOTE — PROGRESS NOTE ADULT - ASSESSMENT
88yo M with PMH of HTN, HLD, hx of CVA (no residual deficits), BPH, hx of skin CA who presents as a transfer from [facility] for bronchoscopy to r/o TB, satting well on 3L NC.

## 2022-09-05 NOTE — PROGRESS NOTE ADULT - PROBLEM SELECTOR PLAN 1
- [place] workup indeterminant for TB, AFB growth in broth positive, 3x AFB smears negative. Quant gold indeterminant. Afebrile w/ leukocytosis, downtrending  - Bronchial cx (bronchoscopy 8/30): no acid fast growth. final.   - Sputum cx: AFB+ in broth, to be identified  - blood cx: no growth. final.  - Bronchial AFB and TB: no acid fast growth. final.  - Bx bronchial cells 9/1: -ve for malignancy, -ve AFB  - CXR 8/22: Persistent advanced irregular infiltrates involving the left mid and upper lung fields and the right upper lobe  - pulm following  - ID consulted, recs appreciated

## 2022-09-05 NOTE — PROGRESS NOTE ADULT - ASSESSMENT
88yo M with PMH of HTN, HLD, hx of CVA (no residual deficits), BPH, hx of skin CA who presents with cough and fevers a/w sepsis due to multifocal pneumonia with progression to acute hypoxic respiratory failure now on HFNC and course further c/b new onset afib.  Transferred from [facility] for bronchoscopy.     Bronch done - Aug 30th -   Bronch AFB neg  ID follow up - reviewed -   AFB identification pending -   Flexeril added -     eval malignancy - TB - PNA - infection  spoke with pt - dtr -   s/p Levaquin course in [facility]  o2 support as needed to keep sat > 88 pct  isolation precs  ID follow up

## 2022-09-05 NOTE — PROGRESS NOTE ADULT - PROBLEM SELECTOR PLAN 3
Pt has complaint of lower back pain  - Pain resolved with standing tylenol, oxy 2.5  - iSTOP 8/30, chart note   - low dose opoid regimen w/ PRN bowel regimen  - Pt reports more spasmodic pain in buttocks, paraspinals- continue muscle relaxants

## 2022-09-05 NOTE — DIETITIAN INITIAL EVALUATION ADULT - PERTINENT LABORATORY DATA
09-04    139  |  104  |  38<H>  ----------------------------<  86  5.1   |  30  |  1.20    Ca    8.7      04 Sep 2022 06:15  Phos  3.8     09-04  Mg     2.1     09-04    TPro  7.4  /  Alb  2.4<L>  /  TBili  0.4  /  DBili  x   /  AST  35  /  ALT  35  /  AlkPhos  153<H>  09-04  A1C with Estimated Average Glucose Result: 5.9 % (08-12-22 @ 06:00)

## 2022-09-05 NOTE — PROGRESS NOTE ADULT - SUBJECTIVE AND OBJECTIVE BOX
[facility] DIVISION of INFECTIOUS DISEASE  Mustapha Valente MD PhD, Sheryl Pride MD, Adriana Rodriguez MD, Oliva Myers MD, Ebenezer Rivas MD  and providing coverage with Leodan Zaragoza MD  Providing Infectious Disease Consultations at [facility], [facility], [facility], [facility]'s    Office# [phone] to schedule follow up appointments  Answering Service for urgent calls or New Consults [phone]  Cell# to text for urgent issues Mustapha Valente [phone]     infectious diseases progress note:    DENNIS BRADFORD is a 87y y. o. Male patient    Overnight and events of the last 24hrs reviewed    Allergies    No Known Allergies    Intolerances        ANTIBIOTICS/RELEVANT:  antimicrobials    immunologic:    OTHER:  acetaminophen     Tablet .. 650 milliGRAM(s) Oral every 6 hours PRN  ALBUTerol    0.083% 2.5 milliGRAM(s) Nebulizer every 6 hours  ALBUTerol    90 MICROgram(s) HFA Inhaler 1 Puff(s) Inhalation every 6 hours  amLODIPine   Tablet 5 milliGRAM(s) Oral daily  apixaban 5 milliGRAM(s) Oral two times a day  atorvastatin 40 milliGRAM(s) Oral at bedtime  bisacodyl 5 milliGRAM(s) Oral daily PRN  budesonide  80 MICROgram(s)/formoterol 4.5 MICROgram(s) Inhaler 2 Puff(s) Inhalation two times a day  carbamide peroxide Otic Solution 10 Drop(s) Right Ear every 12 hours  cyclobenzaprine 5 milliGRAM(s) Oral three times a day PRN  finasteride 5 milliGRAM(s) Oral daily  magnesium hydroxide Suspension 30 milliLiter(s) Oral daily  melatonin 3 milliGRAM(s) Oral at bedtime PRN  metoprolol tartrate 50 milliGRAM(s) Oral two times a day  naloxone Injectable 0.4 milliGRAM(s) IV Push once  oxyCODONE    IR 2.5 milliGRAM(s) Oral every 4 hours PRN  pantoprazole    Tablet 40 milliGRAM(s) Oral before breakfast  polyethylene glycol 3350 17 Gram(s) Oral two times a day  saccharomyces boulardii 250 milliGRAM(s) Oral two times a day  senna 2 Tablet(s) Oral at bedtime  sodium chloride 0.9%. 1000 milliLiter(s) IV Continuous <Continuous>  tamsulosin 0.4 milliGRAM(s) Oral at bedtime      Objective:  Vital Signs Last 24 Hrs  T(C): 36.7 (05 Sep 2022 05:55), Max: 36.7 (05 Sep 2022 05:55)  T(F): 98 (05 Sep 2022 05:55), Max: 98 (05 Sep 2022 05:55)  HR: 53 (05 Sep 2022 08:19) (53 - 72)  BP: 119/61 (05 Sep 2022 05:55) (119/61 - 135/60)  BP(mean): --  RR: 18 (05 Sep 2022 05:55) (18 - 18)  SpO2: 96% (05 Sep 2022 08:19) (93% - 96%)    Parameters below as of 05 Sep 2022 08:19  Patient On (Oxygen Delivery Method): nasal cannula        T(C): 36.7 (09-05-22 @ 05:55), Max: 36.9 (09-04-22 @ 04:30)  T(C): 36.7 (09-05-22 @ 05:55), Max: 36.9 (09-04-22 @ 04:30)  T(C): 36.7 (09-05-22 @ 05:55), Max: 36.9 (09-01-22 @ 20:15)    PHYSICAL EXAM:  HEENT: NC atraumatic  Neck: supple  Respiratory: no accessory muscle use, breathing comfortably  Cardiovascular: distant  Gastrointestinal: normal appearing, nondistended  Extremities: no clubbing, no cyanosis,        LABS:                          10.6   10.06 )-----------( 352      ( 05 Sep 2022 06:33 )             33.1       WBC  10.06 09-05 @ 06:33  10.50 09-04 @ 06:15  11.75 09-03 @ 06:30  11.88 09-02 @ 07:18  12.50 09-01 @ 06:24  11.26 08-31 @ 06:22  10.92 08-30 @ 05:56      09-05    141  |  106  |  33<H>  ----------------------------<  83  4.6   |  31  |  1.20    Ca    8.5      05 Sep 2022 06:33  Phos  3.8     09-04  Mg     2.1     09-04    TPro  7.4  /  Alb  2.4<L>  /  TBili  0.4  /  DBili  x   /  AST  35  /  ALT  35  /  AlkPhos  153<H>  09-04      Creatinine, Serum: 1.20 mg/dL (09-05-22 @ 06:33)  Creatinine, Serum: 1.20 mg/dL (09-04-22 @ 06:15)  Creatinine, Serum: 1.20 mg/dL (09-03-22 @ 06:30)  Creatinine, Serum: 1.10 mg/dL (09-02-22 @ 07:18)  Creatinine, Serum: 0.95 mg/dL (09-01-22 @ 06:24)  Creatinine, Serum: 1.00 mg/dL (08-31-22 @ 06:22)  Creatinine, Serum: 0.96 mg/dL (08-30-22 @ 05:56)                INFLAMMATORY MARKERS  Auto Neutrophil #: 7.01 K/uL (08-30-22 @ 05:56)  Auto Lymphocyte #: 2.20 K/uL (08-30-22 @ 05:56)  Auto Neutrophil #: 8.71 K/uL (08-23-22 @ 06:00)  Auto Lymphocyte #: 2.20 K/uL (08-23-22 @ 06:00)      Auto Eosinophil #: 0.28 K/uL (08-30-22 @ 05:56)  Auto Eosinophil #: 0.61 K/uL (08-23-22 @ 06:00)      MICROBIOLOGY:    Culture - Fungal, Bronchial (08.30.22 @ 15:30)    Specimen Source: .Bronchial Bronchial    Culture Results:   Rare Mold Like Fungus    Culture - Acid Fast - Bronchial w/Smear (08.30.22 @ 15:30)    Specimen Source: .Bronchial Bronchial    Acid Fast Bacilli Smear:   No acid fast bacilli seen by fluorochrome stain    Culture Results:   Culture is being performed.    Culture - Acid Fast - Sputum w/Smear . (08.12.22 @ 09:02)    Specimen Source: .Sputum Sputum    Acid Fast Bacilli Smear:   No acid fast bacilli seen by fluorochrome stain    Culture Results:   Growth of acid fast bacilli detected in broth, identification to follow.        RADIOLOGY & ADDITIONAL STUDIES:

## 2022-09-05 NOTE — DIETITIAN INITIAL EVALUATION ADULT - OTHER INFO
88yo M with PMH of HTN, HLD, hx of CVA (no residual deficits), BPH, hx of skin CA who presents with cough and fevers a/w sepsis due to multifocal pneumonia with progression to acute hypoxic respiratory failure now on HFNC and course further c/b new onset afib.  Transferred from [place] for bronchoscopy. Bronch done  Aug 30th 88yo M with PMH of HTN, HLD, hx of CVA (no residual deficits), BPH, hx of skin CA who presents with cough and fevers a/w sepsis due to multifocal pneumonia with progression to acute hypoxic respiratory failure now on HFNC and course further c/b new onset afib.  Transferred from [place] for bronchoscopy. Bronch done - Aug 30th . Bronch AFB neg   ( MD progress note excerpts ) 88yo M with PMH of HTN, HLD, hx of CVA (no residual deficits), BPH, hx of skin CA who presents with cough and fevers a/w sepsis due to multifocal pneumonia with progression to acute hypoxic respiratory failure now on HFNC and course further c/b new onset afib.  Transferred from [place] for bronchoscopy. Bronch done  Aug 30th . Bronch AFB neg  Pt tells undersigned RD that he is 5'8" UBW recently 150# ( weighed a couple of weeks ago) most he ever weighed was 170# 60 years ago. had a BM 2 days ago, lives alone has a son in [place] that visits every other week and a daughter nearby, both food shop for him. He describes his appetite and intake to be fair to good, improving

## 2022-09-05 NOTE — PROGRESS NOTE ADULT - SUBJECTIVE AND OBJECTIVE BOX
Chief Complaint: Hemoptysis    Interval Events: No events overnight.    Review of Systems:  General: No fevers, chills, weight gain  Skin: No rashes, color changes  Cardiovascular: No chest pain, orthopnea  Respiratory: No shortness of breath, cough  Gastrointestinal: No nausea, abdominal pain  Genitourinary: No incontinence, pain with urination  Musculoskeletal: No pain, swelling, decreased range of motion  Neurological: No headache, weakness  Psychiatric: No depression, anxiety  Endocrine: No weight gain, increased thirst  All other systems are comprehensively negative.    Physical Exam:  Vital Signs Last 24 Hrs  T(C): 36.7 (05 Sep 2022 05:55), Max: 36.7 (05 Sep 2022 05:55)  T(F): 98 (05 Sep 2022 05:55), Max: 98 (05 Sep 2022 05:55)  HR: 53 (05 Sep 2022 08:19) (53 - 72)  BP: 119/61 (05 Sep 2022 05:55) (119/61 - 135/60)  BP(mean): --  RR: 18 (05 Sep 2022 05:55) (18 - 18)  SpO2: 96% (05 Sep 2022 08:19) (93% - 96%)  Parameters below as of 05 Sep 2022 08:19  Patient On (Oxygen Delivery Method): nasal cannula  General: NAD  HEENT: MMM  Neck: No JVD, no carotid bruit  Lungs: CTAB  CV: RRR, nl S1/S2, no M/R/G  Abdomen: S/NT/ND, +BS  Extremities: No LE edema, no cyanosis  Neuro: AAOx3, non-focal  Skin: No rash    Labs:    09-05    141  |  106  |  33<H>  ----------------------------<  83  4.6   |  31  |  1.20    Ca    8.5      05 Sep 2022 06:33  Phos  3.8     09-04  Mg     2.1     09-04    TPro  7.4  /  Alb  2.4<L>  /  TBili  0.4  /  DBili  x   /  AST  35  /  ALT  35  /  AlkPhos  153<H>  09-04                        10.6   10.06 )-----------( 352      ( 05 Sep 2022 06:33 )             33.1       Telemetry: Sinus rhythm

## 2022-09-05 NOTE — PROGRESS NOTE ADULT - ASSESSMENT
87M with HTN, HLD, hx of CVA (no residual deficits), BPH, hx of skin CA who presents with cough and fevers.    Found to have multifocal pneumonia.  Also found have new onset afib.        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  We will follow along in the care of this patient. Please call us at [phone] or text me directly on my cell# at [phone] with any concerns.

## 2022-09-05 NOTE — PROGRESS NOTE ADULT - SUBJECTIVE AND OBJECTIVE BOX
Date/Time Patient Seen:  		  Referring MD:   Data Reviewed	       Patient is a 87y old  Male who presents with a chief complaint of bronchoscopy (04 Sep 2022 13:32)      Subjective/HPI     PAST MEDICAL & SURGICAL HISTORY:  No pertinent past medical history    Skin cancer  s/p Mohs    Essential hypertension    Cerebrovascular accident (CVA), unspecified mechanism  2015    Hyperlipidemia, unspecified hyperlipidemia type    Complex tear of lateral meniscus of right knee as current injury, initial encounter    Complex tear of medial meniscus of right knee as current injury, initial encounter    Benign prostatic hyperplasia, presence of lower urinary tract symptoms unspecified, unspecified morphology    H/O pilonidal cyst          Medication list         MEDICATIONS  (STANDING):  ALBUTerol    0.083% 2.5 milliGRAM(s) Nebulizer every 6 hours  ALBUTerol    90 MICROgram(s) HFA Inhaler 1 Puff(s) Inhalation every 6 hours  amLODIPine   Tablet 5 milliGRAM(s) Oral daily  apixaban 5 milliGRAM(s) Oral two times a day  atorvastatin 40 milliGRAM(s) Oral at bedtime  budesonide  80 MICROgram(s)/formoterol 4.5 MICROgram(s) Inhaler 2 Puff(s) Inhalation two times a day  carbamide peroxide Otic Solution 10 Drop(s) Right Ear every 12 hours  finasteride 5 milliGRAM(s) Oral daily  magnesium hydroxide Suspension 30 milliLiter(s) Oral daily  metoprolol tartrate 50 milliGRAM(s) Oral two times a day  naloxone Injectable 0.4 milliGRAM(s) IV Push once  pantoprazole    Tablet 40 milliGRAM(s) Oral before breakfast  polyethylene glycol 3350 17 Gram(s) Oral two times a day  saccharomyces boulardii 250 milliGRAM(s) Oral two times a day  senna 2 Tablet(s) Oral at bedtime  sodium chloride 0.9%. 1000 milliLiter(s) (60 mL/Hr) IV Continuous <Continuous>  tamsulosin 0.4 milliGRAM(s) Oral at bedtime    MEDICATIONS  (PRN):  acetaminophen     Tablet .. 650 milliGRAM(s) Oral every 6 hours PRN Mild Pain (1 - 3)  bisacodyl 5 milliGRAM(s) Oral daily PRN Constipation  cyclobenzaprine 5 milliGRAM(s) Oral three times a day PRN Muscle Spasm  melatonin 3 milliGRAM(s) Oral at bedtime PRN Insomnia  oxyCODONE    IR 2.5 milliGRAM(s) Oral every 4 hours PRN Moderate Pain (4 - 6)         Vitals log        ICU Vital Signs Last 24 Hrs  T(C): 36.7 (05 Sep 2022 05:55), Max: 36.7 (05 Sep 2022 05:55)  T(F): 98 (05 Sep 2022 05:55), Max: 98 (05 Sep 2022 05:55)  HR: 63 (05 Sep 2022 05:55) (62 - 72)  BP: 119/61 (05 Sep 2022 05:55) (119/61 - 135/60)  BP(mean): --  ABP: --  ABP(mean): --  RR: 18 (05 Sep 2022 05:55) (18 - 18)  SpO2: 93% (05 Sep 2022 05:55) (93% - 95%)    O2 Parameters below as of 05 Sep 2022 05:55    O2 Flow (L/min): 2               Input and Output:  I&O's Detail    04 Sep 2022 07:01  -  05 Sep 2022 06:28  --------------------------------------------------------  IN:  Total IN: 0 mL    OUT:    Voided (mL): 420 mL  Total OUT: 420 mL    Total NET: -420 mL          Lab Data                        10.5   10.50 )-----------( 375      ( 04 Sep 2022 06:15 )             32.9     09-04    139  |  104  |  38<H>  ----------------------------<  86  5.1   |  30  |  1.20    Ca    8.7      04 Sep 2022 06:15  Phos  3.8     09-04  Mg     2.1     09-04    TPro  7.4  /  Alb  2.4<L>  /  TBili  0.4  /  DBili  x   /  AST  35  /  ALT  35  /  AlkPhos  153<H>  09-04            Review of Systems	      Objective     Physical Examination    heart s1s2  lung dec BS  abd soft      Pertinent Lab findings & Imaging      Bud:  NO   Adequate UO     I&O's Detail    04 Sep 2022 07:01  -  05 Sep 2022 06:28  --------------------------------------------------------  IN:  Total IN: 0 mL    OUT:    Voided (mL): 420 mL  Total OUT: 420 mL    Total NET: -420 mL               Discussed with:     Cultures:	        Radiology

## 2022-09-05 NOTE — DIETITIAN INITIAL EVALUATION ADULT - PERTINENT MEDS FT
MEDICATIONS  (STANDING):  ALBUTerol    0.083% 2.5 milliGRAM(s) Nebulizer every 6 hours  ALBUTerol    90 MICROgram(s) HFA Inhaler 1 Puff(s) Inhalation every 6 hours  amLODIPine   Tablet 5 milliGRAM(s) Oral daily  apixaban 5 milliGRAM(s) Oral two times a day  atorvastatin 40 milliGRAM(s) Oral at bedtime  budesonide  80 MICROgram(s)/formoterol 4.5 MICROgram(s) Inhaler 2 Puff(s) Inhalation two times a day  carbamide peroxide Otic Solution 10 Drop(s) Right Ear every 12 hours  finasteride 5 milliGRAM(s) Oral daily  magnesium hydroxide Suspension 30 milliLiter(s) Oral daily  metoprolol tartrate 50 milliGRAM(s) Oral two times a day  naloxone Injectable 0.4 milliGRAM(s) IV Push once  pantoprazole    Tablet 40 milliGRAM(s) Oral before breakfast  polyethylene glycol 3350 17 Gram(s) Oral two times a day  saccharomyces boulardii 250 milliGRAM(s) Oral two times a day  senna 2 Tablet(s) Oral at bedtime  sodium chloride 0.9%. 1000 milliLiter(s) (60 mL/Hr) IV Continuous <Continuous>  tamsulosin 0.4 milliGRAM(s) Oral at bedtime    MEDICATIONS  (PRN):  acetaminophen     Tablet .. 650 milliGRAM(s) Oral every 6 hours PRN Mild Pain (1 - 3)  bisacodyl 5 milliGRAM(s) Oral daily PRN Constipation  cyclobenzaprine 5 milliGRAM(s) Oral three times a day PRN Muscle Spasm  melatonin 3 milliGRAM(s) Oral at bedtime PRN Insomnia  oxyCODONE    IR 2.5 milliGRAM(s) Oral every 4 hours PRN Moderate Pain (4 - 6)

## 2022-09-05 NOTE — PROGRESS NOTE ADULT - SUBJECTIVE AND OBJECTIVE BOX
Patient is a 87y old  Male who presents with a chief complaint of Pneumonia due to infectious organism     (05 Sep 2022 07:22)      INTERVAL HPI/OVERNIGHT EVENTS: Patient seen and examined at bedside. No overnight events occurred. Patient feels well this am. states muscle relaxers have helped his pain. Denies fevers, chills, headache, lightheadedness, chest pain, dyspnea, abdominal pain, n/v/d/c.    MEDICATIONS  (STANDING):  ALBUTerol    0.083% 2.5 milliGRAM(s) Nebulizer every 6 hours  ALBUTerol    90 MICROgram(s) HFA Inhaler 1 Puff(s) Inhalation every 6 hours  amLODIPine   Tablet 5 milliGRAM(s) Oral daily  apixaban 5 milliGRAM(s) Oral two times a day  atorvastatin 40 milliGRAM(s) Oral at bedtime  budesonide  80 MICROgram(s)/formoterol 4.5 MICROgram(s) Inhaler 2 Puff(s) Inhalation two times a day  carbamide peroxide Otic Solution 10 Drop(s) Right Ear every 12 hours  finasteride 5 milliGRAM(s) Oral daily  magnesium hydroxide Suspension 30 milliLiter(s) Oral daily  metoprolol tartrate 50 milliGRAM(s) Oral two times a day  naloxone Injectable 0.4 milliGRAM(s) IV Push once  pantoprazole    Tablet 40 milliGRAM(s) Oral before breakfast  polyethylene glycol 3350 17 Gram(s) Oral two times a day  saccharomyces boulardii 250 milliGRAM(s) Oral two times a day  senna 2 Tablet(s) Oral at bedtime  sodium chloride 0.9%. 1000 milliLiter(s) (60 mL/Hr) IV Continuous <Continuous>  tamsulosin 0.4 milliGRAM(s) Oral at bedtime    MEDICATIONS  (PRN):  acetaminophen     Tablet .. 650 milliGRAM(s) Oral every 6 hours PRN Mild Pain (1 - 3)  bisacodyl 5 milliGRAM(s) Oral daily PRN Constipation  cyclobenzaprine 5 milliGRAM(s) Oral three times a day PRN Muscle Spasm  melatonin 3 milliGRAM(s) Oral at bedtime PRN Insomnia  oxyCODONE    IR 2.5 milliGRAM(s) Oral every 4 hours PRN Moderate Pain (4 - 6)      Allergies    No Known Allergies    Intolerances        REVIEW OF SYSTEMS:  CONSTITUTIONAL: No fever or chills  HEENT:  No headache, no sore throat  RESPIRATORY: No cough, wheezing, or shortness of breath  CARDIOVASCULAR: No chest pain, palpitations  GASTROINTESTINAL: No abd pain, nausea, vomiting, or diarrhea  GENITOURINARY: No dysuria, frequency, or hematuria  NEUROLOGICAL: no focal weakness or dizziness  MUSCULOSKELETAL: no myalgias     Vital Signs Last 24 Hrs  T(C): 36.7 (05 Sep 2022 05:55), Max: 36.7 (05 Sep 2022 05:55)  T(F): 98 (05 Sep 2022 05:55), Max: 98 (05 Sep 2022 05:55)  HR: 53 (05 Sep 2022 08:19) (53 - 72)  BP: 119/61 (05 Sep 2022 05:55) (119/61 - 135/60)  BP(mean): --  RR: 18 (05 Sep 2022 05:55) (18 - 18)  SpO2: 96% (05 Sep 2022 08:19) (93% - 96%)    Parameters below as of 05 Sep 2022 08:19  Patient On (Oxygen Delivery Method): nasal cannula        PHYSICAL EXAM:  GENERAL: NAD  HEENT:  anicteric, moist mucous membranes  CHEST/LUNG:  CTA b/l, no rales, wheezes, or rhonchi  HEART:  RRR, S1, S2  ABDOMEN:  BS+, soft, nontender, nondistended  EXTREMITIES: no edema, cyanosis, or calf tenderness  NERVOUS SYSTEM: answers questions and follows commands appropriately    LABS:                        10.6   10.06 )-----------( 352      ( 05 Sep 2022 06:33 )             33.1     CBC Full  -  ( 05 Sep 2022 06:33 )  WBC Count : 10.06 K/uL  Hemoglobin : 10.6 g/dL  Hematocrit : 33.1 %  Platelet Count - Automated : 352 K/uL  Mean Cell Volume : 98.8 fl  Mean Cell Hemoglobin : 31.6 pg  Mean Cell Hemoglobin Concentration : 32.0 gm/dL  Auto Neutrophil # : x  Auto Lymphocyte # : x  Auto Monocyte # : x  Auto Eosinophil # : x  Auto Basophil # : x  Auto Neutrophil % : x  Auto Lymphocyte % : x  Auto Monocyte % : x  Auto Eosinophil % : x  Auto Basophil % : x    05 Sep 2022 06:33    141    |  106    |  33     ----------------------------<  83     4.6     |  31     |  1.20     Ca    8.5        05 Sep 2022 06:33          CAPILLARY BLOOD GLUCOSE            Culture - Fungal, Bronchial (collected 08-30-22 @ 15:30)  Source: .Bronchial Bronchial  Preliminary Report (09-05-22 @ 10:45):    Rare Mold Like Fungus    Culture - Bronchial (collected 08-30-22 @ 15:30)  Source: .Bronchial Bronchial  Gram Stain (08-31-22 @ 06:56):    No polymorphonuclear leukocytes seen per low power field    Few Squamous epithelial cells seen per low power field    Moderate Gram Variable Cocci seen per oil power field  Final Report (09-01-22 @ 17:02):    Normal Respiratory Dora present    Culture - Acid Fast - Bronchial w/Smear (collected 08-30-22 @ 15:30)  Source: .Bronchial Bronchial  Preliminary Report (09-03-22 @ 15:04):    Culture is being performed.        RADIOLOGY & ADDITIONAL TESTS:    Personally reviewed.     Consultant(s) Notes Reviewed:  [x] YES  [ ] NO

## 2022-09-05 NOTE — DIETITIAN INITIAL EVALUATION ADULT - ORAL INTAKE PTA/DIET HISTORY
decreased po intake for the last few weeks, does not follow any diet restrictions. noted at [facility] pt received chopped meats

## 2022-09-06 LAB
ALBUMIN SERPL ELPH-MCNC: 2.5 G/DL — LOW (ref 3.3–5)
ALP SERPL-CCNC: 152 U/L — HIGH (ref 40–120)
ALT FLD-CCNC: 34 U/L — SIGNIFICANT CHANGE UP (ref 12–78)
ANION GAP SERPL CALC-SCNC: 4 MMOL/L — LOW (ref 5–17)
AST SERPL-CCNC: 37 U/L — SIGNIFICANT CHANGE UP (ref 15–37)
BASOPHILS # BLD AUTO: 0.13 K/UL — SIGNIFICANT CHANGE UP (ref 0–0.2)
BASOPHILS NFR BLD AUTO: 1.3 % — SIGNIFICANT CHANGE UP (ref 0–2)
BILIRUB SERPL-MCNC: 0.4 MG/DL — SIGNIFICANT CHANGE UP (ref 0.2–1.2)
BUN SERPL-MCNC: 32 MG/DL — HIGH (ref 7–23)
CALCIUM SERPL-MCNC: 9 MG/DL — SIGNIFICANT CHANGE UP (ref 8.5–10.1)
CHLORIDE SERPL-SCNC: 106 MMOL/L — SIGNIFICANT CHANGE UP (ref 96–108)
CO2 SERPL-SCNC: 29 MMOL/L — SIGNIFICANT CHANGE UP (ref 22–31)
CREAT SERPL-MCNC: 1.2 MG/DL — SIGNIFICANT CHANGE UP (ref 0.5–1.3)
EGFR: 59 ML/MIN/1.73M2 — LOW
EOSINOPHIL # BLD AUTO: 0.37 K/UL — SIGNIFICANT CHANGE UP (ref 0–0.5)
EOSINOPHIL NFR BLD AUTO: 3.6 % — SIGNIFICANT CHANGE UP (ref 0–6)
GLUCOSE SERPL-MCNC: 94 MG/DL — SIGNIFICANT CHANGE UP (ref 70–99)
HCT VFR BLD CALC: 34.1 % — LOW (ref 39–50)
HGB BLD-MCNC: 10.9 G/DL — LOW (ref 13–17)
IMM GRANULOCYTES NFR BLD AUTO: 0.6 % — SIGNIFICANT CHANGE UP (ref 0–1.5)
LYMPHOCYTES # BLD AUTO: 1.87 K/UL — SIGNIFICANT CHANGE UP (ref 1–3.3)
LYMPHOCYTES # BLD AUTO: 18.2 % — SIGNIFICANT CHANGE UP (ref 13–44)
MCHC RBC-ENTMCNC: 31.4 PG — SIGNIFICANT CHANGE UP (ref 27–34)
MCHC RBC-ENTMCNC: 32 GM/DL — SIGNIFICANT CHANGE UP (ref 32–36)
MCV RBC AUTO: 98.3 FL — SIGNIFICANT CHANGE UP (ref 80–100)
MONOCYTES # BLD AUTO: 0.93 K/UL — HIGH (ref 0–0.9)
MONOCYTES NFR BLD AUTO: 9.1 % — SIGNIFICANT CHANGE UP (ref 2–14)
NEUTROPHILS # BLD AUTO: 6.91 K/UL — SIGNIFICANT CHANGE UP (ref 1.8–7.4)
NEUTROPHILS NFR BLD AUTO: 67.2 % — SIGNIFICANT CHANGE UP (ref 43–77)
NRBC # BLD: 0 /100 WBCS — SIGNIFICANT CHANGE UP (ref 0–0)
PLATELET # BLD AUTO: 339 K/UL — SIGNIFICANT CHANGE UP (ref 150–400)
POTASSIUM SERPL-MCNC: 5.5 MMOL/L — HIGH (ref 3.5–5.3)
POTASSIUM SERPL-SCNC: 5.5 MMOL/L — HIGH (ref 3.5–5.3)
PROT SERPL-MCNC: 7.4 G/DL — SIGNIFICANT CHANGE UP (ref 6–8.3)
RBC # BLD: 3.47 M/UL — LOW (ref 4.2–5.8)
RBC # FLD: 14.2 % — SIGNIFICANT CHANGE UP (ref 10.3–14.5)
SARS-COV-2 RNA SPEC QL NAA+PROBE: SIGNIFICANT CHANGE UP
SODIUM SERPL-SCNC: 139 MMOL/L — SIGNIFICANT CHANGE UP (ref 135–145)
WBC # BLD: 10.27 K/UL — SIGNIFICANT CHANGE UP (ref 3.8–10.5)
WBC # FLD AUTO: 10.27 K/UL — SIGNIFICANT CHANGE UP (ref 3.8–10.5)

## 2022-09-06 PROCEDURE — 99233 SBSQ HOSP IP/OBS HIGH 50: CPT | Mod: GC

## 2022-09-06 RX ADMIN — SENNA PLUS 2 TABLET(S): 8.6 TABLET ORAL at 21:36

## 2022-09-06 RX ADMIN — Medication 50 MILLIGRAM(S): at 05:10

## 2022-09-06 RX ADMIN — CARBAMIDE PEROXIDE 10 DROP(S): 81.86 SOLUTION/ DROPS AURICULAR (OTIC) at 17:26

## 2022-09-06 RX ADMIN — AMLODIPINE BESYLATE 5 MILLIGRAM(S): 2.5 TABLET ORAL at 05:08

## 2022-09-06 RX ADMIN — Medication 650 MILLIGRAM(S): at 23:34

## 2022-09-06 RX ADMIN — OXYCODONE HYDROCHLORIDE 2.5 MILLIGRAM(S): 5 TABLET ORAL at 05:40

## 2022-09-06 RX ADMIN — BUDESONIDE AND FORMOTEROL FUMARATE DIHYDRATE 2 PUFF(S): 160; 4.5 AEROSOL RESPIRATORY (INHALATION) at 17:31

## 2022-09-06 RX ADMIN — FINASTERIDE 5 MILLIGRAM(S): 5 TABLET, FILM COATED ORAL at 11:18

## 2022-09-06 RX ADMIN — CYCLOBENZAPRINE HYDROCHLORIDE 5 MILLIGRAM(S): 10 TABLET, FILM COATED ORAL at 21:37

## 2022-09-06 RX ADMIN — Medication 250 MILLIGRAM(S): at 17:22

## 2022-09-06 RX ADMIN — ALBUTEROL 2.5 MILLIGRAM(S): 90 AEROSOL, METERED ORAL at 02:05

## 2022-09-06 RX ADMIN — ATORVASTATIN CALCIUM 40 MILLIGRAM(S): 80 TABLET, FILM COATED ORAL at 21:36

## 2022-09-06 RX ADMIN — POLYETHYLENE GLYCOL 3350 17 GRAM(S): 17 POWDER, FOR SOLUTION ORAL at 06:38

## 2022-09-06 RX ADMIN — CARBAMIDE PEROXIDE 10 DROP(S): 81.86 SOLUTION/ DROPS AURICULAR (OTIC) at 05:11

## 2022-09-06 RX ADMIN — ALBUTEROL 2.5 MILLIGRAM(S): 90 AEROSOL, METERED ORAL at 13:30

## 2022-09-06 RX ADMIN — Medication 50 MILLIGRAM(S): at 17:22

## 2022-09-06 RX ADMIN — POLYETHYLENE GLYCOL 3350 17 GRAM(S): 17 POWDER, FOR SOLUTION ORAL at 17:24

## 2022-09-06 RX ADMIN — OXYCODONE HYDROCHLORIDE 2.5 MILLIGRAM(S): 5 TABLET ORAL at 00:55

## 2022-09-06 RX ADMIN — TAMSULOSIN HYDROCHLORIDE 0.4 MILLIGRAM(S): 0.4 CAPSULE ORAL at 21:36

## 2022-09-06 RX ADMIN — ALBUTEROL 2.5 MILLIGRAM(S): 90 AEROSOL, METERED ORAL at 07:29

## 2022-09-06 RX ADMIN — BUDESONIDE AND FORMOTEROL FUMARATE DIHYDRATE 2 PUFF(S): 160; 4.5 AEROSOL RESPIRATORY (INHALATION) at 07:52

## 2022-09-06 RX ADMIN — APIXABAN 5 MILLIGRAM(S): 2.5 TABLET, FILM COATED ORAL at 05:07

## 2022-09-06 RX ADMIN — OXYCODONE HYDROCHLORIDE 2.5 MILLIGRAM(S): 5 TABLET ORAL at 05:10

## 2022-09-06 RX ADMIN — PANTOPRAZOLE SODIUM 40 MILLIGRAM(S): 20 TABLET, DELAYED RELEASE ORAL at 06:37

## 2022-09-06 RX ADMIN — APIXABAN 5 MILLIGRAM(S): 2.5 TABLET, FILM COATED ORAL at 17:22

## 2022-09-06 RX ADMIN — Medication 250 MILLIGRAM(S): at 05:10

## 2022-09-06 NOTE — PROGRESS NOTE ADULT - SUBJECTIVE AND OBJECTIVE BOX
Chief Complaint: Hemoptysis    Interval Events: No events overnight.    Review of Systems:  General: No fevers, chills, weight gain  Skin: No rashes, color changes  Cardiovascular: No chest pain, orthopnea  Respiratory: No shortness of breath, cough  Gastrointestinal: No nausea, abdominal pain  Genitourinary: No incontinence, pain with urination  Musculoskeletal: No pain, swelling, decreased range of motion  Neurological: No headache, weakness  Psychiatric: No depression, anxiety  Endocrine: No weight gain, increased thirst  All other systems are comprehensively negative.    Physical Exam:  Vital Signs Last 24 Hrs  T(C): 36.4 (06 Sep 2022 05:00), Max: 36.5 (05 Sep 2022 12:00)  T(F): 97.6 (06 Sep 2022 05:00), Max: 97.7 (05 Sep 2022 12:00)  HR: 64 (06 Sep 2022 05:00) (60 - 68)  BP: 147/78 (06 Sep 2022 05:00) (106/63 - 147/78)  BP(mean): --  RR: 16 (06 Sep 2022 05:00) (16 - 18)  SpO2: 94% (06 Sep 2022 05:00) (94% - 97%)  Parameters below as of 06 Sep 2022 05:00  Patient On (Oxygen Delivery Method): nasal cannula  O2 Flow (L/min): 2  General: NAD  HEENT: MMM  Neck: No JVD, no carotid bruit  Lungs: CTAB  CV: RRR, nl S1/S2, no M/R/G  Abdomen: S/NT/ND, +BS  Extremities: No LE edema, no cyanosis  Neuro: AAOx3, non-focal  Skin: No rash    Labs:    09-06    139  |  106  |  32<H>  ----------------------------<  94  5.5<H>   |  29  |  1.20    Ca    9.0      06 Sep 2022 07:35    TPro  7.4  /  Alb  2.5<L>  /  TBili  0.4  /  DBili  x   /  AST  37  /  ALT  34  /  AlkPhos  152<H>  09-06                        10.9   10.27 )-----------( 339      ( 06 Sep 2022 07:35 )             34.1       Telemetry: Sinus rhythm

## 2022-09-06 NOTE — PROGRESS NOTE ADULT - ASSESSMENT
88yo M with PMH of HTN, HLD, hx of CVA (no residual deficits), BPH, hx of skin CA who presents as a transfer from [facility] for bronchoscopy to r/o TB, satting well on 3L NC.

## 2022-09-06 NOTE — PROGRESS NOTE ADULT - SUBJECTIVE AND OBJECTIVE BOX
[facility] DIV[facility] of INFECTIOUS DISEASE  Mustapha Valente MD PhD, Sheryl Pride MD, Adriana Rodriguez MD, Oliva Myers MD, Ebenezer Rivas MD  and providing coverage with Leodan Zaragoza MD  Providing Infectious Disease Consultations at [facility], [facility], [facility], [facility]'s    Office# [phone] to schedule follow up appointments  Answering Service for urgent calls or New Consults [phone]  Cell# to text for urgent issues Mustapha Valente [phone]     infectious diseases progress note:    DENNIS BRADFORD is a 87y y. o. Male patient    Overnight and events of the last 24hrs reviewed    Allergies    No Known Allergies    Intolerances        ANTIBIOTICS/RELEVANT:  antimicrobials    immunologic:    OTHER:  acetaminophen     Tablet .. 650 milliGRAM(s) Oral every 6 hours PRN  ALBUTerol    0.083% 2.5 milliGRAM(s) Nebulizer every 6 hours  ALBUTerol    90 MICROgram(s) HFA Inhaler 1 Puff(s) Inhalation every 6 hours  amLODIPine   Tablet 5 milliGRAM(s) Oral daily  apixaban 5 milliGRAM(s) Oral two times a day  atorvastatin 40 milliGRAM(s) Oral at bedtime  bisacodyl 5 milliGRAM(s) Oral daily PRN  budesonide  80 MICROgram(s)/formoterol 4.5 MICROgram(s) Inhaler 2 Puff(s) Inhalation two times a day  carbamide peroxide Otic Solution 10 Drop(s) Right Ear every 12 hours  cyclobenzaprine 5 milliGRAM(s) Oral three times a day PRN  finasteride 5 milliGRAM(s) Oral daily  magnesium hydroxide Suspension 30 milliLiter(s) Oral daily  melatonin 3 milliGRAM(s) Oral at bedtime PRN  metoprolol tartrate 50 milliGRAM(s) Oral two times a day  naloxone Injectable 0.4 milliGRAM(s) IV Push once  oxyCODONE    IR 2.5 milliGRAM(s) Oral every 4 hours PRN  pantoprazole    Tablet 40 milliGRAM(s) Oral before breakfast  polyethylene glycol 3350 17 Gram(s) Oral two times a day  saccharomyces boulardii 250 milliGRAM(s) Oral two times a day  senna 2 Tablet(s) Oral at bedtime  tamsulosin 0.4 milliGRAM(s) Oral at bedtime      Objective:  Vital Signs Last 24 Hrs  T(C): 36.4 (06 Sep 2022 05:00), Max: 36.5 (05 Sep 2022 12:00)  T(F): 97.6 (06 Sep 2022 05:00), Max: 97.7 (05 Sep 2022 12:00)  HR: 64 (06 Sep 2022 05:00) (60 - 68)  BP: 147/78 (06 Sep 2022 05:00) (106/63 - 147/78)  BP(mean): --  RR: 16 (06 Sep 2022 05:00) (16 - 18)  SpO2: 94% (06 Sep 2022 05:00) (94% - 97%)    Parameters below as of 06 Sep 2022 05:00  Patient On (Oxygen Delivery Method): nasal cannula  O2 Flow (L/min): 2      T(C): 36.4 (09-06-22 @ 05:00), Max: 36.7 (09-05-22 @ 05:55)  T(C): 36.4 (09-06-22 @ 05:00), Max: 36.9 (09-04-22 @ 04:30)  T(C): 36.4 (09-06-22 @ 05:00), Max: 36.9 (09-04-22 @ 04:30)    PHYSICAL EXAM:  HEENT: NC atraumatic  Neck: supple  Respiratory: no accessory muscle use, breathing comfortably  Cardiovascular: distant  Gastrointestinal: normal appearing, nondistended  Extremities: no clubbing, no cyanosis,        LABS:                          10.9   10.27 )-----------( 339      ( 06 Sep 2022 07:35 )             34.1       WBC  10.27 09-06 @ 07:35  10.06 09-05 @ 06:33  10.50 09-04 @ 06:15  11.75 09-03 @ 06:30  11.88 09-02 @ 07:18  12.50 09-01 @ 06:24  11.26 08-31 @ 06:22      09-06    139  |  106  |  32<H>  ----------------------------<  94  5.5<H>   |  29  |  1.20    Ca    9.0      06 Sep 2022 07:35    TPro  7.4  /  Alb  2.5<L>  /  TBili  0.4  /  DBili  x   /  AST  37  /  ALT  34  /  AlkPhos  152<H>  09-06      Creatinine, Serum: 1.20 mg/dL (09-06-22 @ 07:35)  Creatinine, Serum: 1.20 mg/dL (09-05-22 @ 06:33)  Creatinine, Serum: 1.20 mg/dL (09-04-22 @ 06:15)  Creatinine, Serum: 1.20 mg/dL (09-03-22 @ 06:30)  Creatinine, Serum: 1.10 mg/dL (09-02-22 @ 07:18)  Creatinine, Serum: 0.95 mg/dL (09-01-22 @ 06:24)  Creatinine, Serum: 1.00 mg/dL (08-31-22 @ 06:22)                INFLAMMATORY MARKERS  Auto Neutrophil #: 6.91 K/uL (09-06-22 @ 07:35)  Auto Lymphocyte #: 1.87 K/uL (09-06-22 @ 07:35)  Auto Neutrophil #: 7.01 K/uL (08-30-22 @ 05:56)  Auto Lymphocyte #: 2.20 K/uL (08-30-22 @ 05:56)      Auto Eosinophil #: 0.37 K/uL (09-06-22 @ 07:35)  Auto Eosinophil #: 0.28 K/uL (08-30-22 @ 05:56)                                MICROBIOLOGY:              RADIOLOGY & ADDITIONAL STUDIES:

## 2022-09-06 NOTE — PROGRESS NOTE ADULT - ATTENDING COMMENTS
88yo M with PMH of HTN, HLD, hx of CVA (no residual deficits), BPH, hx of skin CA who presents as a transfer from [facility] for bronchoscopy to r/o TB, satting well on 3L NC.    Agree with above. Edited personally where appropriate directly into the body of the note. Awaiting final determination of AFB identification

## 2022-09-06 NOTE — PROGRESS NOTE ADULT - ASSESSMENT
88yo M with PMH of HTN, HLD, hx of CVA (no residual deficits), BPH, hx of skin CA who presents with cough and fevers a/w sepsis due to multifocal pneumonia with progression to acute hypoxic respiratory failure now on HFNC and course further c/b new onset afib.  Transferred from [place] for bronchoscopy.     Bronch done - Aug 30th -   Bronch AFB neg  ID follow up - reviewed -   AFB identification pending -   Flexeril added -     eval malignancy - TB - PNA - infection  spoke with pt - dtr -   s/p Levaquin course in [facility]  o2 support as needed to keep sat > 88 pct  isolation precs  ID follow up

## 2022-09-06 NOTE — PROGRESS NOTE ADULT - ASSESSMENT
The patient is an 87 year old male with a history of HTN, HL, BPH, CVA who presents with cough and hemoptysis in the setting of PNA and r/o TB.    Plan:  - Intermittent episodes of AF on telemetry  - Echo with low normal LV systolic function, mild pulm HTN  - Continue metoprolol tartrate 50 mg bid  - Continue amlodipine 5 mg daily  - Continue apixaban 5 mg bid  - Sputum culture positive for AFB  - Pulm and ID follow-up  - Await results of bronchoscopy

## 2022-09-06 NOTE — PROGRESS NOTE ADULT - SUBJECTIVE AND OBJECTIVE BOX
Date/Time Patient Seen:  		  Referring MD:   Data Reviewed	       Patient is a 87y old  Male who presents with a chief complaint of bronchoscopy (05 Sep 2022 12:30)      Subjective/HPI     PAST MEDICAL & SURGICAL HISTORY:  No pertinent past medical history    Skin cancer  s/p Mohs    Essential hypertension    Cerebrovascular accident (CVA), unspecified mechanism  2015    Hyperlipidemia, unspecified hyperlipidemia type    Complex tear of lateral meniscus of right knee as current injury, initial encounter    Complex tear of medial meniscus of right knee as current injury, initial encounter    Benign prostatic hyperplasia, presence of lower urinary tract symptoms unspecified, unspecified morphology    H/O pilonidal cyst          Medication list         MEDICATIONS  (STANDING):  ALBUTerol    0.083% 2.5 milliGRAM(s) Nebulizer every 6 hours  ALBUTerol    90 MICROgram(s) HFA Inhaler 1 Puff(s) Inhalation every 6 hours  amLODIPine   Tablet 5 milliGRAM(s) Oral daily  apixaban 5 milliGRAM(s) Oral two times a day  atorvastatin 40 milliGRAM(s) Oral at bedtime  budesonide  80 MICROgram(s)/formoterol 4.5 MICROgram(s) Inhaler 2 Puff(s) Inhalation two times a day  carbamide peroxide Otic Solution 10 Drop(s) Right Ear every 12 hours  finasteride 5 milliGRAM(s) Oral daily  magnesium hydroxide Suspension 30 milliLiter(s) Oral daily  metoprolol tartrate 50 milliGRAM(s) Oral two times a day  naloxone Injectable 0.4 milliGRAM(s) IV Push once  pantoprazole    Tablet 40 milliGRAM(s) Oral before breakfast  polyethylene glycol 3350 17 Gram(s) Oral two times a day  saccharomyces boulardii 250 milliGRAM(s) Oral two times a day  senna 2 Tablet(s) Oral at bedtime  sodium chloride 0.9%. 1000 milliLiter(s) (60 mL/Hr) IV Continuous <Continuous>  tamsulosin 0.4 milliGRAM(s) Oral at bedtime    MEDICATIONS  (PRN):  acetaminophen     Tablet .. 650 milliGRAM(s) Oral every 6 hours PRN Mild Pain (1 - 3)  bisacodyl 5 milliGRAM(s) Oral daily PRN Constipation  cyclobenzaprine 5 milliGRAM(s) Oral three times a day PRN Muscle Spasm  melatonin 3 milliGRAM(s) Oral at bedtime PRN Insomnia  oxyCODONE    IR 2.5 milliGRAM(s) Oral every 4 hours PRN Moderate Pain (4 - 6)         Vitals log        ICU Vital Signs Last 24 Hrs  T(C): 36.4 (06 Sep 2022 05:00), Max: 36.5 (05 Sep 2022 12:00)  T(F): 97.6 (06 Sep 2022 05:00), Max: 97.7 (05 Sep 2022 12:00)  HR: 64 (06 Sep 2022 05:00) (53 - 68)  BP: 147/78 (06 Sep 2022 05:00) (106/63 - 147/78)  BP(mean): --  ABP: --  ABP(mean): --  RR: 16 (06 Sep 2022 05:00) (16 - 18)  SpO2: 94% (06 Sep 2022 05:00) (94% - 97%)    O2 Parameters below as of 06 Sep 2022 05:00  Patient On (Oxygen Delivery Method): nasal cannula  O2 Flow (L/min): 2               Input and Output:  I&O's Detail    04 Sep 2022 07:01  -  05 Sep 2022 07:00  --------------------------------------------------------  IN:  Total IN: 0 mL    OUT:    Voided (mL): 420 mL  Total OUT: 420 mL    Total NET: -420 mL      05 Sep 2022 07:01  -  06 Sep 2022 06:34  --------------------------------------------------------  IN:  Total IN: 0 mL    OUT:    Voided (mL): 1100 mL  Total OUT: 1100 mL    Total NET: -1100 mL          Lab Data                        10.6   10.06 )-----------( 352      ( 05 Sep 2022 06:33 )             33.1     09-05    141  |  106  |  33<H>  ----------------------------<  83  4.6   |  31  |  1.20    Ca    8.5      05 Sep 2022 06:33              Review of Systems	      Objective     Physical Examination  heart s1s2  lung dec BS  abd soft        Pertinent Lab findings & Imaging      Bud:  NO   Adequate UO     I&O's Detail    04 Sep 2022 07:01  -  05 Sep 2022 07:00  --------------------------------------------------------  IN:  Total IN: 0 mL    OUT:    Voided (mL): 420 mL  Total OUT: 420 mL    Total NET: -420 mL      05 Sep 2022 07:01  -  06 Sep 2022 06:34  --------------------------------------------------------  IN:  Total IN: 0 mL    OUT:    Voided (mL): 1100 mL  Total OUT: 1100 mL    Total NET: -1100 mL               Discussed with:     Cultures:	        Radiology

## 2022-09-06 NOTE — PROGRESS NOTE ADULT - SUBJECTIVE AND OBJECTIVE BOX
HPI:  87M with HTN, HLD, hx of CVA (no residual deficits), BPH, hx of skin CA who presents with cough and fevers.  Symptoms started about 5-6 days prior to admission.  Started with left sided upper back pain.  Then started to have a cough associated with green phlegm and possible blood.  Associated with SOB and DONG.  No weight changes or night sweats.  Had a feveras high as 102F.  Some nausea but no vomiting with loss of appetite.  No chest pain.  No diarrhea.  No abdominal pain.  No recent travel or sick contacts.  Went to see his PMD 3 days prior to [place] admission and was given a z-kellie and tesslon perles.  Reported had negative COVID tests at home.   In the [place] ED, patient's triage vitals were /78    RR  21, 93% on RA (thought dropped down to 88%) and T 98.5F.  Labs showed WBC 18 with a left shift, CINDI with BUN/Cr 40/1.88, elevated LFTs, normal lactate, negative COVID (last booster in 12/2021 with Pfizer vaccine), neg influenza, neg RSV.  CT chest showed "patchy groundglass and more dense opacities in both lungs, suspicious for multifocal PNA...several small lucencies with areas of lung opacity...may represent early cavitation versus areas of focal bronchiectasis."  Patient started on azithromycin and ceftriaxone empirically for multifocal PNA.  Also of note, patient was found to be in new afib on EKG.  Patient denies any afib history but said when he was younger he did feel some irregular heartbeats.  Patient admitted to [facility] with sepsis due to multifocal pneumonia with progression to acute hypoxic respiratory failure now on HFNC and course further c/b new onset afib. Patient was seen by ID, pulm and cardiology. He is now transferred to \[facility]\"" for bronchoscopy. Patient denies states he is feeling well at this time.  (29 Aug 2022 23:01)      INTERVAL HPI/OVERNIGHT EVENTS: Pt was seen and examined at bedside. No acute overnight events. Pt states back pain has improved with muscle relaxants, pt to continue receiving per standing order. At this time pt denies f/c/n/v/cp/sob.     REVIEW OF SYSTEMS:    CONSTITUTIONAL: Back pain  EYES/ENT: No visual changes, no throat pain   RESPIRATORY: No cough, wheezing, hemoptysis; No shortness of breath  CARDIOVASCULAR: No chest pain or palpitations  GASTROINTESTINAL: No abdominal, nausea, vomiting, or hematemesis; No diarrhea or constipation. No melena or hematochezia.  GENITOURINARY: No dysuria, frequency or hematuria  NEUROLOGICAL: No dizziness, numbness, or weakness  SKIN: No itching, burning, rashes, or lesions   All other review of systems is negative unless indicated above.    VITAL SIGNS:    T(C): 36.4 (09-06-22 @ 05:00), Max: 36.4 (09-06-22 @ 05:00)  HR: 64 (09-06-22 @ 05:00) (60 - 68)  BP: 147/78 (09-06-22 @ 05:00) (147/78 - 147/78)  RR: 16 (09-06-22 @ 05:00) (16 - 16)  SpO2: 94% (09-06-22 @ 05:00) (94% - 97%)    PHYSICAL EXAM:     GENERAL: no acute distress  HEENT: NC/AT, EOMI, neck supple, MMM  RESPIRATORY: LCTAB/L, no rhonchi, rales, or wheezing  CARDIOVASCULAR: RRR, no murmurs, gallops, rubs  ABDOMINAL: soft, non-tender, non-distended, positive bowel sounds   EXTREMITIES: no clubbing, cyanosis, or edema  NEUROLOGICAL: alert and oriented x 3, non-focal  SKIN: no rashes or lesions   MUSCULOSKELETAL: lower back, buttocks TTP                          10.9   10.27 )-----------( 339      ( 06 Sep 2022 07:35 )             34.1     09-06    139  |  106  |  32<H>  ----------------------------<  94  5.5<H>   |  29  |  1.20    Ca    9.0      06 Sep 2022 07:35    TPro  7.4  /  Alb  2.5<L>  /  TBili  0.4  /  DBili  x   /  AST  37  /  ALT  34  /  AlkPhos  152<H>  09-06      CAPILLARY BLOOD GLUCOSE          MEDICATIONS  (STANDING):  ALBUTerol    0.083% 2.5 milliGRAM(s) Nebulizer every 6 hours  ALBUTerol    90 MICROgram(s) HFA Inhaler 1 Puff(s) Inhalation every 6 hours  amLODIPine   Tablet 5 milliGRAM(s) Oral daily  apixaban 5 milliGRAM(s) Oral two times a day  atorvastatin 40 milliGRAM(s) Oral at bedtime  budesonide  80 MICROgram(s)/formoterol 4.5 MICROgram(s) Inhaler 2 Puff(s) Inhalation two times a day  carbamide peroxide Otic Solution 10 Drop(s) Right Ear every 12 hours  finasteride 5 milliGRAM(s) Oral daily  magnesium hydroxide Suspension 30 milliLiter(s) Oral daily  metoprolol tartrate 50 milliGRAM(s) Oral two times a day  naloxone Injectable 0.4 milliGRAM(s) IV Push once  pantoprazole    Tablet 40 milliGRAM(s) Oral before breakfast  polyethylene glycol 3350 17 Gram(s) Oral two times a day  saccharomyces boulardii 250 milliGRAM(s) Oral two times a day  senna 2 Tablet(s) Oral at bedtime  tamsulosin 0.4 milliGRAM(s) Oral at bedtime

## 2022-09-06 NOTE — PROGRESS NOTE ADULT - PROBLEM SELECTOR PLAN 1
- [place] workup indeterminant for TB, AFB growth in broth positive, 3x AFB smears negative. Quant gold indeterminant. Afebrile w/ leukocytosis, downtrending  - Bronchial cx (bronchoscopy 8/30): no acid fast growth. final.   - Sputum cx: AFB+ in broth, to be identified  - blood cx: no growth. final.  - Bronchial AFB and TB: no acid fast growth. final.  - Bx bronchial cells 9/1: -ve for malignancy, -ve AFB  - Bronchial fungal cx: rare mold  - CXR 8/22: Persistent advanced irregular infiltrates involving the left mid and upper lung fields and the right upper lobe  - pulm following  - ID consulted, recs appreciated

## 2022-09-06 NOTE — PROGRESS NOTE ADULT - ASSESSMENT
87M with HTN, HLD, hx of CVA (no residual deficits), BPH, hx of skin CA who presents with cough and fevers.    Found to have multifocal pneumonia.  Also found have new onset afib.        Sepsis 2/2 Multifocal PNA/Hemoptysis   - pt p/w leukocytosis, SOB  - CT w/ small cavitations, b/l GGO and airspace consolidations  AFB growth in broth from 8/12 sputum samples  sp bronchoscopy  main concerns are for atypical/nontuberculosis mycobacterial infection in this setting  recs to follow when AFB identified - anticipate ID later today 9/6     Thank you for consulting us and involving us in the management of this most interesting and challenging case.  We will follow along in the care of this patient. Please call us at [phone] or text me directly on my cell# at [phone] with any concerns.

## 2022-09-07 LAB
ALBUMIN SERPL ELPH-MCNC: 2.6 G/DL — LOW (ref 3.3–5)
ALP SERPL-CCNC: 146 U/L — HIGH (ref 40–120)
ALT FLD-CCNC: 34 U/L — SIGNIFICANT CHANGE UP (ref 12–78)
ANION GAP SERPL CALC-SCNC: 3 MMOL/L — LOW (ref 5–17)
AST SERPL-CCNC: 36 U/L — SIGNIFICANT CHANGE UP (ref 15–37)
BASOPHILS # BLD AUTO: 0.12 K/UL — SIGNIFICANT CHANGE UP (ref 0–0.2)
BASOPHILS NFR BLD AUTO: 1.3 % — SIGNIFICANT CHANGE UP (ref 0–2)
BILIRUB SERPL-MCNC: 0.5 MG/DL — SIGNIFICANT CHANGE UP (ref 0.2–1.2)
BUN SERPL-MCNC: 31 MG/DL — HIGH (ref 7–23)
CALCIUM SERPL-MCNC: 8.9 MG/DL — SIGNIFICANT CHANGE UP (ref 8.5–10.1)
CHLORIDE SERPL-SCNC: 105 MMOL/L — SIGNIFICANT CHANGE UP (ref 96–108)
CO2 SERPL-SCNC: 31 MMOL/L — SIGNIFICANT CHANGE UP (ref 22–31)
CREAT SERPL-MCNC: 1.2 MG/DL — SIGNIFICANT CHANGE UP (ref 0.5–1.3)
EGFR: 58 ML/MIN/1.73M2 — LOW
EOSINOPHIL # BLD AUTO: 0.55 K/UL — HIGH (ref 0–0.5)
EOSINOPHIL NFR BLD AUTO: 6.2 % — HIGH (ref 0–6)
GLUCOSE SERPL-MCNC: 85 MG/DL — SIGNIFICANT CHANGE UP (ref 70–99)
HCT VFR BLD CALC: 35 % — LOW (ref 39–50)
HGB BLD-MCNC: 11.4 G/DL — LOW (ref 13–17)
IMM GRANULOCYTES NFR BLD AUTO: 0.7 % — SIGNIFICANT CHANGE UP (ref 0–1.5)
LYMPHOCYTES # BLD AUTO: 2.13 K/UL — SIGNIFICANT CHANGE UP (ref 1–3.3)
LYMPHOCYTES # BLD AUTO: 23.9 % — SIGNIFICANT CHANGE UP (ref 13–44)
MCHC RBC-ENTMCNC: 31.9 PG — SIGNIFICANT CHANGE UP (ref 27–34)
MCHC RBC-ENTMCNC: 32.6 GM/DL — SIGNIFICANT CHANGE UP (ref 32–36)
MCV RBC AUTO: 98 FL — SIGNIFICANT CHANGE UP (ref 80–100)
MONOCYTES # BLD AUTO: 0.96 K/UL — HIGH (ref 0–0.9)
MONOCYTES NFR BLD AUTO: 10.8 % — SIGNIFICANT CHANGE UP (ref 2–14)
NEUTROPHILS # BLD AUTO: 5.09 K/UL — SIGNIFICANT CHANGE UP (ref 1.8–7.4)
NEUTROPHILS NFR BLD AUTO: 57.1 % — SIGNIFICANT CHANGE UP (ref 43–77)
NRBC # BLD: 0 /100 WBCS — SIGNIFICANT CHANGE UP (ref 0–0)
PLATELET # BLD AUTO: 321 K/UL — SIGNIFICANT CHANGE UP (ref 150–400)
POTASSIUM SERPL-MCNC: 4.7 MMOL/L — SIGNIFICANT CHANGE UP (ref 3.5–5.3)
POTASSIUM SERPL-SCNC: 4.7 MMOL/L — SIGNIFICANT CHANGE UP (ref 3.5–5.3)
PROT SERPL-MCNC: 7.9 G/DL — SIGNIFICANT CHANGE UP (ref 6–8.3)
RBC # BLD: 3.57 M/UL — LOW (ref 4.2–5.8)
RBC # FLD: 14 % — SIGNIFICANT CHANGE UP (ref 10.3–14.5)
SODIUM SERPL-SCNC: 139 MMOL/L — SIGNIFICANT CHANGE UP (ref 135–145)
WBC # BLD: 8.91 K/UL — SIGNIFICANT CHANGE UP (ref 3.8–10.5)
WBC # FLD AUTO: 8.91 K/UL — SIGNIFICANT CHANGE UP (ref 3.8–10.5)

## 2022-09-07 PROCEDURE — 99232 SBSQ HOSP IP/OBS MODERATE 35: CPT

## 2022-09-07 RX ADMIN — FINASTERIDE 5 MILLIGRAM(S): 5 TABLET, FILM COATED ORAL at 11:59

## 2022-09-07 RX ADMIN — PANTOPRAZOLE SODIUM 40 MILLIGRAM(S): 20 TABLET, DELAYED RELEASE ORAL at 06:00

## 2022-09-07 RX ADMIN — POLYETHYLENE GLYCOL 3350 17 GRAM(S): 17 POWDER, FOR SOLUTION ORAL at 17:13

## 2022-09-07 RX ADMIN — AMLODIPINE BESYLATE 5 MILLIGRAM(S): 2.5 TABLET ORAL at 05:58

## 2022-09-07 RX ADMIN — APIXABAN 5 MILLIGRAM(S): 2.5 TABLET, FILM COATED ORAL at 17:13

## 2022-09-07 RX ADMIN — POLYETHYLENE GLYCOL 3350 17 GRAM(S): 17 POWDER, FOR SOLUTION ORAL at 05:58

## 2022-09-07 RX ADMIN — Medication 250 MILLIGRAM(S): at 05:57

## 2022-09-07 RX ADMIN — SENNA PLUS 2 TABLET(S): 8.6 TABLET ORAL at 21:40

## 2022-09-07 RX ADMIN — ATORVASTATIN CALCIUM 40 MILLIGRAM(S): 80 TABLET, FILM COATED ORAL at 21:40

## 2022-09-07 RX ADMIN — Medication 250 MILLIGRAM(S): at 17:13

## 2022-09-07 RX ADMIN — APIXABAN 5 MILLIGRAM(S): 2.5 TABLET, FILM COATED ORAL at 06:19

## 2022-09-07 RX ADMIN — Medication 3 MILLIGRAM(S): at 21:41

## 2022-09-07 RX ADMIN — Medication 50 MILLIGRAM(S): at 05:57

## 2022-09-07 RX ADMIN — CYCLOBENZAPRINE HYDROCHLORIDE 5 MILLIGRAM(S): 10 TABLET, FILM COATED ORAL at 21:40

## 2022-09-07 RX ADMIN — Medication 50 MILLIGRAM(S): at 17:13

## 2022-09-07 RX ADMIN — TAMSULOSIN HYDROCHLORIDE 0.4 MILLIGRAM(S): 0.4 CAPSULE ORAL at 21:40

## 2022-09-07 RX ADMIN — Medication 650 MILLIGRAM(S): at 00:00

## 2022-09-07 NOTE — PROGRESS NOTE ADULT - SUBJECTIVE AND OBJECTIVE BOX
Chief Complaint: Hemoptysis    Interval Events: No events overnight.    Review of Systems:  General: No fevers, chills, weight gain  Skin: No rashes, color changes  Cardiovascular: No chest pain, orthopnea  Respiratory: No shortness of breath, cough  Gastrointestinal: No nausea, abdominal pain  Genitourinary: No incontinence, pain with urination  Musculoskeletal: No pain, swelling, decreased range of motion  Neurological: No headache, weakness  Psychiatric: No depression, anxiety  Endocrine: No weight gain, increased thirst  All other systems are comprehensively negative.    Physical Exam:  Vital Signs Last 24 Hrs  T(C): 36.7 (07 Sep 2022 05:54), Max: 36.7 (07 Sep 2022 05:54)  T(F): 98 (07 Sep 2022 05:54), Max: 98 (07 Sep 2022 05:54)  HR: 63 (07 Sep 2022 05:54) (60 - 72)  BP: 145/70 (07 Sep 2022 05:54) (95/53 - 145/70)  BP(mean): --  RR: 18 (07 Sep 2022 05:54) (18 - 19)  SpO2: 97% (07 Sep 2022 05:54) (93% - 99%)  Parameters below as of 07 Sep 2022 05:54  Patient On (Oxygen Delivery Method): nasal cannula  O2 Flow (L/min): 2  General: NAD  HEENT: MMM  Neck: No JVD, no carotid bruit  Lungs: CTAB  CV: RRR, nl S1/S2, no M/R/G  Abdomen: S/NT/ND, +BS  Extremities: No LE edema, no cyanosis  Neuro: AAOx3, non-focal  Skin: No rash    Labs:    09-07    x   |  x   |  31<H>  ----------------------------<  85  x    |  31  |  1.20    Ca    8.9      07 Sep 2022 08:03    TPro  7.9  /  Alb  2.6<L>  /  TBili  x   /  DBili  x   /  AST  36  /  ALT  34  /  AlkPhos  x   09-07                        11.4   8.91  )-----------( 321      ( 07 Sep 2022 08:03 )             35.0       Telemetry: Sinus rhythm

## 2022-09-07 NOTE — PROGRESS NOTE ADULT - ASSESSMENT
88yo M with PMH of HTN, HLD, hx of CVA (no residual deficits), BPH, hx of skin CA who presents as a transfer from [facility] for bronchoscopy to r/o TB, satting well on 3L NC.

## 2022-09-07 NOTE — PROGRESS NOTE ADULT - PROBLEM SELECTOR PLAN 4
9/1 Pressures in 130s, well controlled now  - amlodipine 5mg qD  - Continue to monitor hemodynamics, previously septic in [place]

## 2022-09-07 NOTE — PROGRESS NOTE ADULT - PROBLEM SELECTOR PLAN 1
- [place] workup indeterminant for TB, AFB growth in broth positive, 3x AFB smears negative. Quant gold indeterminant. Afebrile w/ leukocytosis, downtrending  - Bronchial cx (bronchoscopy 8/30): no acid fast growth. final.   - Sputum cx: AFB+ in broth, to be identified  - blood cx: no growth. final.  - Bronchial AFB and TB: no acid fast growth. final.  - Bx bronchial cells 9/1: -ve for malignancy, -ve AFB  - Bronchial fungal cx: rare mold  - CXR 8/22: Persistent advanced irregular infiltrates involving the left mid and upper lung fields and the right upper lobe  - pulm following  - ID consulted, recs appreciated

## 2022-09-07 NOTE — PROGRESS NOTE ADULT - SUBJECTIVE AND OBJECTIVE BOX
Date/Time Patient Seen:  		  Referring MD:   Data Reviewed	       Patient is a 88y old  Male who presents with a chief complaint of bronchoscopy (06 Sep 2022 12:08)      Subjective/HPI     PAST MEDICAL & SURGICAL HISTORY:  No pertinent past medical history    Skin cancer  s/p Mohs    Essential hypertension    Cerebrovascular accident (CVA), unspecified mechanism  2015    Hyperlipidemia, unspecified hyperlipidemia type    Complex tear of lateral meniscus of right knee as current injury, initial encounter    Complex tear of medial meniscus of right knee as current injury, initial encounter    Benign prostatic hyperplasia, presence of lower urinary tract symptoms unspecified, unspecified morphology    H/O pilonidal cyst          Medication list         MEDICATIONS  (STANDING):  ALBUTerol    0.083% 2.5 milliGRAM(s) Nebulizer every 6 hours  ALBUTerol    90 MICROgram(s) HFA Inhaler 1 Puff(s) Inhalation every 6 hours  amLODIPine   Tablet 5 milliGRAM(s) Oral daily  apixaban 5 milliGRAM(s) Oral two times a day  atorvastatin 40 milliGRAM(s) Oral at bedtime  budesonide  80 MICROgram(s)/formoterol 4.5 MICROgram(s) Inhaler 2 Puff(s) Inhalation two times a day  finasteride 5 milliGRAM(s) Oral daily  magnesium hydroxide Suspension 30 milliLiter(s) Oral daily  metoprolol tartrate 50 milliGRAM(s) Oral two times a day  naloxone Injectable 0.4 milliGRAM(s) IV Push once  pantoprazole    Tablet 40 milliGRAM(s) Oral before breakfast  polyethylene glycol 3350 17 Gram(s) Oral two times a day  saccharomyces boulardii 250 milliGRAM(s) Oral two times a day  senna 2 Tablet(s) Oral at bedtime  tamsulosin 0.4 milliGRAM(s) Oral at bedtime    MEDICATIONS  (PRN):  acetaminophen     Tablet .. 650 milliGRAM(s) Oral every 6 hours PRN Mild Pain (1 - 3)  bisacodyl 5 milliGRAM(s) Oral daily PRN Constipation  cyclobenzaprine 5 milliGRAM(s) Oral three times a day PRN Muscle Spasm  melatonin 3 milliGRAM(s) Oral at bedtime PRN Insomnia         Vitals log        ICU Vital Signs Last 24 Hrs  T(C): 36.7 (07 Sep 2022 05:54), Max: 36.7 (07 Sep 2022 05:54)  T(F): 98 (07 Sep 2022 05:54), Max: 98 (07 Sep 2022 05:54)  HR: 63 (07 Sep 2022 05:54) (57 - 72)  BP: 145/70 (07 Sep 2022 05:54) (95/53 - 145/70)  BP(mean): --  ABP: --  ABP(mean): --  RR: 18 (07 Sep 2022 05:54) (18 - 19)  SpO2: 97% (07 Sep 2022 05:54) (93% - 99%)    O2 Parameters below as of 07 Sep 2022 05:54  Patient On (Oxygen Delivery Method): nasal cannula  O2 Flow (L/min): 2               Input and Output:  I&O's Detail    05 Sep 2022 07:01  -  06 Sep 2022 07:00  --------------------------------------------------------  IN:  Total IN: 0 mL    OUT:    Voided (mL): 1100 mL  Total OUT: 1100 mL    Total NET: -1100 mL          Lab Data                        10.9   10.27 )-----------( 339      ( 06 Sep 2022 07:35 )             34.1     09-06    139  |  106  |  32<H>  ----------------------------<  94  5.5<H>   |  29  |  1.20    Ca    9.0      06 Sep 2022 07:35    TPro  7.4  /  Alb  2.5<L>  /  TBili  0.4  /  DBili  x   /  AST  37  /  ALT  34  /  AlkPhos  152<H>  09-06            Review of Systems	      Objective     Physical Examination  heart s1s2  lung dec bS  abd soft        Pertinent Lab findings & Imaging      Bud:  NO   Adequate UO     I&O's Detail    05 Sep 2022 07:01  -  06 Sep 2022 07:00  --------------------------------------------------------  IN:  Total IN: 0 mL    OUT:    Voided (mL): 1100 mL  Total OUT: 1100 mL    Total NET: -1100 mL               Discussed with:     Cultures:	        Radiology

## 2022-09-07 NOTE — PROGRESS NOTE ADULT - ASSESSMENT
The patient is an 87 year old male with a history of HTN, HL, BPH, CVA who presents with cough and hemoptysis in the setting of PNA and r/o TB.    Plan:  - Intermittent episodes of AF on telemetry  - Echo with low normal LV systolic function, mild pulm HTN  - Continue metoprolol tartrate 50 mg bid  - Continue amlodipine 5 mg daily  - Continue apixaban 5 mg bid  - Sputum culture positive for AFB  - Pulm and ID follow-up

## 2022-09-07 NOTE — PROGRESS NOTE ADULT - ASSESSMENT
87M with HTN, HLD, hx of CVA (no residual deficits), BPH, hx of skin CA who presents with cough and fevers.    Found to have multifocal pneumonia.  Also found have new onset afib.        Sepsis 2/2 Multifocal PNA/Hemoptysis   - pt p/w leukocytosis, SOB  - CT w/ small cavitations, b/l GGO and airspace consolidations  AFB growth in broth from 8/12 sputum samples  sp bronchoscopy  main concerns are for atypical/nontuberculosis mycobacterial infection in this setting  recs to follow when AFB identified - if not MTB then fine to dc and follow up as outpatient. If MTB please call for further guidance    Thank you for consulting us and involving us in the management of this most interesting and challenging case.  We will follow along in the care of this patient. Please call us at [phone] or text me directly on my cell# at [phone] with any concerns.

## 2022-09-07 NOTE — PROGRESS NOTE ADULT - ASSESSMENT
88yo M with PMH of HTN, HLD, hx of CVA (no residual deficits), BPH, hx of skin CA who presents with cough and fevers a/w sepsis due to multifocal pneumonia with progression to acute hypoxic respiratory failure now on HFNC and course further c/b new onset afib.  Transferred from [place] for bronchoscopy.     Bronch done - Aug 30th -   Bronch AFB neg  ID follow up - reviewed -   AFB identification pending -   Flexeril added -     eval malignancy - TB - PNA - infection  spoke with pt - dtr -   s/p Levaquin course in [facility]  o2 support as needed to keep sat > 88 pct  isolation precs  ID follow up

## 2022-09-07 NOTE — PROGRESS NOTE ADULT - SUBJECTIVE AND OBJECTIVE BOX
[facility] DIVISION of INFECTIOUS DISEASE  Mustapha Valente MD PhD, Sheryl Pride MD, Adriana Rodriguez MD, Oliva Myers MD, Ebenezer Rivas MD  and providing coverage with Leodan Zaragoza MD  Providing Infectious Disease Consultations at [facility], [facility], [place], [facility], [facility]    Office# [phone] to schedule follow up appointments  Answering Service for urgent calls or New Consults [phone]  Cell# to text for urgent issues Mustapha Valente [phone]     infectious diseases progress note:    DENNIS BRADFORD is a 88y y. o. Male patient    Overnight and events of the last 24hrs reviewed    Allergies    No Known Allergies    Intolerances        ANTIBIOTICS/RELEVANT:  antimicrobials    immunologic:    OTHER:  acetaminophen     Tablet .. 650 milliGRAM(s) Oral every 6 hours PRN  ALBUTerol    0.083% 2.5 milliGRAM(s) Nebulizer every 6 hours  ALBUTerol    90 MICROgram(s) HFA Inhaler 1 Puff(s) Inhalation every 6 hours  amLODIPine   Tablet 5 milliGRAM(s) Oral daily  apixaban 5 milliGRAM(s) Oral two times a day  atorvastatin 40 milliGRAM(s) Oral at bedtime  bisacodyl 5 milliGRAM(s) Oral daily PRN  budesonide  80 MICROgram(s)/formoterol 4.5 MICROgram(s) Inhaler 2 Puff(s) Inhalation two times a day  cyclobenzaprine 5 milliGRAM(s) Oral three times a day PRN  finasteride 5 milliGRAM(s) Oral daily  magnesium hydroxide Suspension 30 milliLiter(s) Oral daily  melatonin 3 milliGRAM(s) Oral at bedtime PRN  metoprolol tartrate 50 milliGRAM(s) Oral two times a day  naloxone Injectable 0.4 milliGRAM(s) IV Push once  pantoprazole    Tablet 40 milliGRAM(s) Oral before breakfast  polyethylene glycol 3350 17 Gram(s) Oral two times a day  saccharomyces boulardii 250 milliGRAM(s) Oral two times a day  senna 2 Tablet(s) Oral at bedtime  tamsulosin 0.4 milliGRAM(s) Oral at bedtime      Objective:  Vital Signs Last 24 Hrs  T(C): 36.9 (07 Sep 2022 12:43), Max: 36.9 (07 Sep 2022 12:43)  T(F): 98.5 (07 Sep 2022 12:43), Max: 98.5 (07 Sep 2022 12:43)  HR: 60 (07 Sep 2022 12:32) (60 - 66)  BP: 100/61 (07 Sep 2022 12:32) (100/61 - 145/70)  BP(mean): --  RR: 19 (07 Sep 2022 12:32) (18 - 19)  SpO2: 99% (07 Sep 2022 12:32) (97% - 99%)    Parameters below as of 07 Sep 2022 12:32  Patient On (Oxygen Delivery Method): nasal cannula        T(C): 36.9 (09-07-22 @ 12:43), Max: 36.9 (09-07-22 @ 12:43)  T(C): 36.9 (09-07-22 @ 12:43), Max: 36.9 (09-07-22 @ 12:43)  T(C): 36.9 (09-07-22 @ 12:43), Max: 36.9 (09-04-22 @ 04:30)    PHYSICAL EXAM:  HEENT: NC atraumatic  Neck: supple  Respiratory: no accessory muscle use, breathing comfortably  Cardiovascular: distant  Gastrointestinal: normal appearing, nondistended  Extremities: no clubbing, no cyanosis,        LABS:                          11.4   8.91  )-----------( 321      ( 07 Sep 2022 08:03 )             35.0       WBC  8.91 09-07 @ 08:03  10.27 09-06 @ 07:35  10.06 09-05 @ 06:33  10.50 09-04 @ 06:15  11.75 09-03 @ 06:30  11.88 09-02 @ 07:18  12.50 09-01 @ 06:24      09-07    139  |  105  |  31<H>  ----------------------------<  85  4.7   |  31  |  1.20    Ca    8.9      07 Sep 2022 08:03    TPro  7.9  /  Alb  2.6<L>  /  TBili  0.5  /  DBili  x   /  AST  36  /  ALT  34  /  AlkPhos  146<H>  09-07      Creatinine, Serum: 1.20 mg/dL (09-07-22 @ 08:03)  Creatinine, Serum: 1.20 mg/dL (09-06-22 @ 07:35)  Creatinine, Serum: 1.20 mg/dL (09-05-22 @ 06:33)  Creatinine, Serum: 1.20 mg/dL (09-04-22 @ 06:15)  Creatinine, Serum: 1.20 mg/dL (09-03-22 @ 06:30)  Creatinine, Serum: 1.10 mg/dL (09-02-22 @ 07:18)  Creatinine, Serum: 0.95 mg/dL (09-01-22 @ 06:24)                INFLAMMATORY MARKERS  Auto Neutrophil #: 5.09 K/uL (09-07-22 @ 08:03)  Auto Lymphocyte #: 2.13 K/uL (09-07-22 @ 08:03)  Auto Neutrophil #: 6.91 K/uL (09-06-22 @ 07:35)  Auto Lymphocyte #: 1.87 K/uL (09-06-22 @ 07:35)  Auto Neutrophil #: 7.01 K/uL (08-30-22 @ 05:56)  Auto Lymphocyte #: 2.20 K/uL (08-30-22 @ 05:56)      Auto Eosinophil #: 0.55 K/uL (09-07-22 @ 08:03)  Auto Eosinophil #: 0.37 K/uL (09-06-22 @ 07:35)  Auto Eosinophil #: 0.28 K/uL (08-30-22 @ 05:56)                                MICROBIOLOGY:              RADIOLOGY & ADDITIONAL STUDIES:

## 2022-09-07 NOTE — PROGRESS NOTE ADULT - SUBJECTIVE AND OBJECTIVE BOX
Patient is a 88y old  Male who presents with a chief complaint of bronchoscopy (07 Sep 2022 06:12)        HPI:  87M with HTN, HLD, hx of CVA (no residual deficits), BPH, hx of skin CA who presents with cough and fevers.  Symptoms started about 5-6 days prior to admission.  Started with left sided upper back pain.  Then started to have a cough associated with green phlegm and possible blood.  Associated with SOB and DONG.  No weight changes or night sweats.  Had a feveras high as 102F.  Some nausea but no vomiting with loss of appetite.  No chest pain.  No diarrhea.  No abdominal pain.  No recent travel or sick contacts.  Went to see his PMD 3 days prior to [place] admission and was given a z-kellie and tesslon perles.  Reported had negative COVID tests at home.   In the [place] ED, patient's triage vitals were /78    RR  21, 93% on RA (thought dropped down to 88%) and T 98.5F.  Labs showed WBC 18 with a left shift, CINDI with BUN/Cr 40/1.88, elevated LFTs, normal lactate, negative COVID (last booster in 12/2021 with Pfizer vaccine), neg influenza, neg RSV.  CT chest showed "patchy groundglass and more dense opacities in both lungs, suspicious for multifocal PNA...several small lucencies with areas of lung opacity...may represent early cavitation versus areas of focal bronchiectasis."  Patient started on azithromycin and ceftriaxone empirically for multifocal PNA.  Also of note, patient was found to be in new afib on EKG.  Patient denies any afib history but said when he was younger he did feel some irregular heartbeats.  Patient admitted to [facility] with sepsis due to multifocal pneumonia with progression to acute hypoxic respiratory failure now on HFNC and course further c/b new onset afib. Patient was seen by ID, pulm and cardiology. He is now transferred to [facility] for bronchoscopy. Patient denies states he is feeling well at this time.  (29 Aug 2022 23:01)      SUBJECTIVE & OBJECTIVE: Pt seen and examined at bedside. nad    PHYSICAL EXAM:  T(C): 36.7 (09-07-22 @ 05:54), Max: 36.7 (09-07-22 @ 05:54)  HR: 63 (09-07-22 @ 05:54) (60 - 72)  BP: 145/70 (09-07-22 @ 05:54) (95/53 - 145/70)  RR: 18 (09-07-22 @ 05:54) (18 - 19)  SpO2: 97% (09-07-22 @ 05:54) (93% - 99%)  Wt(kg): --   GENERAL: NAD, well-groomed, well-developed  NECK: Supple, No JVD  NERVOUS SYSTEM:  Alert & Oriented X3  CHEST/LUNG: Clear to auscultation bilaterally; No rales, rhonchi, wheezing, or rubs  HEART: Regular rate and rhythm; No murmurs, rubs, or gallops  ABDOMEN: Soft, Nontender, Nondistended; Bowel sounds present  EXTREMITIES:  2+ Peripheral Pulses, No clubbing, cyanosis, or edema        MEDICATIONS  (STANDING):  ALBUTerol    0.083% 2.5 milliGRAM(s) Nebulizer every 6 hours  ALBUTerol    90 MICROgram(s) HFA Inhaler 1 Puff(s) Inhalation every 6 hours  amLODIPine   Tablet 5 milliGRAM(s) Oral daily  apixaban 5 milliGRAM(s) Oral two times a day  atorvastatin 40 milliGRAM(s) Oral at bedtime  budesonide  80 MICROgram(s)/formoterol 4.5 MICROgram(s) Inhaler 2 Puff(s) Inhalation two times a day  finasteride 5 milliGRAM(s) Oral daily  magnesium hydroxide Suspension 30 milliLiter(s) Oral daily  metoprolol tartrate 50 milliGRAM(s) Oral two times a day  naloxone Injectable 0.4 milliGRAM(s) IV Push once  pantoprazole    Tablet 40 milliGRAM(s) Oral before breakfast  polyethylene glycol 3350 17 Gram(s) Oral two times a day  saccharomyces boulardii 250 milliGRAM(s) Oral two times a day  senna 2 Tablet(s) Oral at bedtime  tamsulosin 0.4 milliGRAM(s) Oral at bedtime    MEDICATIONS  (PRN):  acetaminophen     Tablet .. 650 milliGRAM(s) Oral every 6 hours PRN Mild Pain (1 - 3)  bisacodyl 5 milliGRAM(s) Oral daily PRN Constipation  cyclobenzaprine 5 milliGRAM(s) Oral three times a day PRN Muscle Spasm  melatonin 3 milliGRAM(s) Oral at bedtime PRN Insomnia      LABS:                        11.4   8.91  )-----------( 321      ( 07 Sep 2022 08:03 )             35.0     09-07    139  |  105  |  31<H>  ----------------------------<  85  4.7   |  31  |  1.20    Ca    8.9      07 Sep 2022 08:03    TPro  7.9  /  Alb  2.6<L>  /  TBili  0.5  /  DBili  x   /  AST  36  /  ALT  34  /  AlkPhos  146<H>  09-07          CAPILLARY BLOOD GLUCOSE          CAPILLARY BLOOD GLUCOSE        CAPILLARY BLOOD GLUCOSE                RECENT CULTURES:      RADIOLOGY & ADDITIONAL TESTS:                        DVT/GI ppx  Discussed with pt @ bedside

## 2022-09-08 LAB
ALBUMIN SERPL ELPH-MCNC: 2.6 G/DL — LOW (ref 3.3–5)
ALP SERPL-CCNC: 151 U/L — HIGH (ref 40–120)
ALT FLD-CCNC: 37 U/L — SIGNIFICANT CHANGE UP (ref 12–78)
ANION GAP SERPL CALC-SCNC: 5 MMOL/L — SIGNIFICANT CHANGE UP (ref 5–17)
AST SERPL-CCNC: 33 U/L — SIGNIFICANT CHANGE UP (ref 15–37)
BILIRUB SERPL-MCNC: 0.5 MG/DL — SIGNIFICANT CHANGE UP (ref 0.2–1.2)
BUN SERPL-MCNC: 33 MG/DL — HIGH (ref 7–23)
CALCIUM SERPL-MCNC: 8.8 MG/DL — SIGNIFICANT CHANGE UP (ref 8.5–10.1)
CHLORIDE SERPL-SCNC: 104 MMOL/L — SIGNIFICANT CHANGE UP (ref 96–108)
CO2 SERPL-SCNC: 28 MMOL/L — SIGNIFICANT CHANGE UP (ref 22–31)
CREAT SERPL-MCNC: 1.2 MG/DL — SIGNIFICANT CHANGE UP (ref 0.5–1.3)
EGFR: 58 ML/MIN/1.73M2 — LOW
GLUCOSE SERPL-MCNC: 116 MG/DL — HIGH (ref 70–99)
HCT VFR BLD CALC: 33.3 % — LOW (ref 39–50)
HGB BLD-MCNC: 10.7 G/DL — LOW (ref 13–17)
MCHC RBC-ENTMCNC: 31.2 PG — SIGNIFICANT CHANGE UP (ref 27–34)
MCHC RBC-ENTMCNC: 32.1 GM/DL — SIGNIFICANT CHANGE UP (ref 32–36)
MCV RBC AUTO: 97.1 FL — SIGNIFICANT CHANGE UP (ref 80–100)
NRBC # BLD: 0 /100 WBCS — SIGNIFICANT CHANGE UP (ref 0–0)
PLATELET # BLD AUTO: 291 K/UL — SIGNIFICANT CHANGE UP (ref 150–400)
POTASSIUM SERPL-MCNC: 5.1 MMOL/L — SIGNIFICANT CHANGE UP (ref 3.5–5.3)
POTASSIUM SERPL-SCNC: 5.1 MMOL/L — SIGNIFICANT CHANGE UP (ref 3.5–5.3)
PROT SERPL-MCNC: 7.7 G/DL — SIGNIFICANT CHANGE UP (ref 6–8.3)
RBC # BLD: 3.43 M/UL — LOW (ref 4.2–5.8)
RBC # FLD: 14 % — SIGNIFICANT CHANGE UP (ref 10.3–14.5)
SODIUM SERPL-SCNC: 137 MMOL/L — SIGNIFICANT CHANGE UP (ref 135–145)
WBC # BLD: 14.58 K/UL — HIGH (ref 3.8–10.5)
WBC # FLD AUTO: 14.58 K/UL — HIGH (ref 3.8–10.5)

## 2022-09-08 PROCEDURE — 99232 SBSQ HOSP IP/OBS MODERATE 35: CPT

## 2022-09-08 RX ADMIN — Medication 50 MILLIGRAM(S): at 05:34

## 2022-09-08 RX ADMIN — Medication 650 MILLIGRAM(S): at 04:00

## 2022-09-08 RX ADMIN — AMLODIPINE BESYLATE 5 MILLIGRAM(S): 2.5 TABLET ORAL at 05:34

## 2022-09-08 RX ADMIN — TAMSULOSIN HYDROCHLORIDE 0.4 MILLIGRAM(S): 0.4 CAPSULE ORAL at 21:41

## 2022-09-08 RX ADMIN — Medication 650 MILLIGRAM(S): at 21:41

## 2022-09-08 RX ADMIN — POLYETHYLENE GLYCOL 3350 17 GRAM(S): 17 POWDER, FOR SOLUTION ORAL at 05:34

## 2022-09-08 RX ADMIN — Medication 250 MILLIGRAM(S): at 16:57

## 2022-09-08 RX ADMIN — PANTOPRAZOLE SODIUM 40 MILLIGRAM(S): 20 TABLET, DELAYED RELEASE ORAL at 06:27

## 2022-09-08 RX ADMIN — ATORVASTATIN CALCIUM 40 MILLIGRAM(S): 80 TABLET, FILM COATED ORAL at 21:41

## 2022-09-08 RX ADMIN — Medication 650 MILLIGRAM(S): at 22:40

## 2022-09-08 RX ADMIN — FINASTERIDE 5 MILLIGRAM(S): 5 TABLET, FILM COATED ORAL at 11:17

## 2022-09-08 RX ADMIN — Medication 3 MILLIGRAM(S): at 21:42

## 2022-09-08 RX ADMIN — SENNA PLUS 2 TABLET(S): 8.6 TABLET ORAL at 21:42

## 2022-09-08 RX ADMIN — Medication 250 MILLIGRAM(S): at 05:34

## 2022-09-08 RX ADMIN — Medication 1 ENEMA: at 01:33

## 2022-09-08 RX ADMIN — Medication 650 MILLIGRAM(S): at 03:41

## 2022-09-08 RX ADMIN — CYCLOBENZAPRINE HYDROCHLORIDE 5 MILLIGRAM(S): 10 TABLET, FILM COATED ORAL at 19:57

## 2022-09-08 RX ADMIN — APIXABAN 5 MILLIGRAM(S): 2.5 TABLET, FILM COATED ORAL at 05:34

## 2022-09-08 RX ADMIN — APIXABAN 5 MILLIGRAM(S): 2.5 TABLET, FILM COATED ORAL at 16:57

## 2022-09-08 NOTE — CHART NOTE - NSCHARTNOTEFT_GEN_A_CORE
Nutrtion re-assessment/follow up for malnutrition:      Factors impacting intake: [ ] none [ ] nausea  [ ] vomiting [ ] diarrhea [ ] constipation  [ ]chewing problems [ ] swallowing issues  [ ] other:     Diet Presciption: Diet, DASH/TLC:   Sodium & Cholesterol Restricted (08-30-22 @ 18:12)    Intake:     Current Weight:   % Weight Change    Pertinent Medications: MEDICATIONS  (STANDING):  ALBUTerol    0.083% 2.5 milliGRAM(s) Nebulizer every 6 hours  ALBUTerol    90 MICROgram(s) HFA Inhaler 1 Puff(s) Inhalation every 6 hours  amLODIPine   Tablet 5 milliGRAM(s) Oral daily  apixaban 5 milliGRAM(s) Oral two times a day  atorvastatin 40 milliGRAM(s) Oral at bedtime  budesonide  80 MICROgram(s)/formoterol 4.5 MICROgram(s) Inhaler 2 Puff(s) Inhalation two times a day  finasteride 5 milliGRAM(s) Oral daily  magnesium hydroxide Suspension 30 milliLiter(s) Oral daily  metoprolol tartrate 50 milliGRAM(s) Oral two times a day  naloxone Injectable 0.4 milliGRAM(s) IV Push once  pantoprazole    Tablet 40 milliGRAM(s) Oral before breakfast  polyethylene glycol 3350 17 Gram(s) Oral two times a day  saccharomyces boulardii 250 milliGRAM(s) Oral two times a day  senna 2 Tablet(s) Oral at bedtime  tamsulosin 0.4 milliGRAM(s) Oral at bedtime    MEDICATIONS  (PRN):  acetaminophen     Tablet .. 650 milliGRAM(s) Oral every 6 hours PRN Mild Pain (1 - 3)  bisacodyl 5 milliGRAM(s) Oral daily PRN Constipation  cyclobenzaprine 5 milliGRAM(s) Oral three times a day PRN Muscle Spasm  melatonin 3 milliGRAM(s) Oral at bedtime PRN Insomnia    Pertinent Labs: 09-07 Na139 mmol/L Glu 85 mg/dL K+ 4.7 mmol/L Cr  1.20 mg/dL BUN 31 mg/dL<H> 09-04 Phos 3.8 mg/dL 09-07 Alb 2.6 g/dL<L>     CAPILLARY BLOOD GLUCOSE        Skin:     Estimated Needs:   [ ] no change since previous assessment  [ ] recalculated:     Previous Nutrition Diagnosis:   [ ] Inadequate Energy Intake [ ]Inadequate Oral Intake [ ] Excessive Energy Intake   [ ] Underweight [ ] Increased Nutrient Needs [ ] Overweight/Obesity   [ ] Altered GI Function [ ] Unintended Weight Loss [ ] Food & Nutrition Related Knowledge Deficit [ ] Malnutrition     Nutrition Diagnosis is [ ] ongoing  [ ] resolved [ ] not applicable     New Nutrition Diagnosis: [ ] not applicable       Interventions:   Recommend  [ ] Change Diet To:  [ ] Nutrition Supplement  [ ] Nutrition Support  [ ] Other:     Monitoring and Evaluation:   [ ] PO intake [ x ] Tolerance to diet prescription [ x ] weights [ x ] labs[ x ] follow up per protocol  [ ] other: Nutrtion re-assessment/follow up for malnutrition:  86yo M with PMH of HTN, HLD, hx of CVA (no residual deficits), BPH, hx of skin CA who presents as a transfer from [place] for bronchoscopy to r/o TB, satting well on 3L NC.  bronchial cells 9/1: -ve for malignancy, -ve AFB .CXR 8/22: Persistent advanced irregular infiltrates involving the left mid and upper lung fields and the right upper lobe    Pt tells undersigned RD that for breakfast this am he ate all of eggs, all of english muffin with margarine, juice and milk , last BM was wednesday     Factors impacting intake: [ ] none [ ] nausea  [ ] vomiting [ ] diarrhea [ ] constipation  [ ]chewing problems [ ] swallowing issues  [ ] other:     Diet Prescription: Diet, DASH/TLC:   Sodium & Cholesterol Restricted (08-30-22 @ 18:12)    Intake:     Current Weight:   % Weight Change    Pertinent Medications: MEDICATIONS  (STANDING):  ALBUTerol    0.083% 2.5 milliGRAM(s) Nebulizer every 6 hours  ALBUTerol    90 MICROgram(s) HFA Inhaler 1 Puff(s) Inhalation every 6 hours  amLODIPine   Tablet 5 milliGRAM(s) Oral daily  apixaban 5 milliGRAM(s) Oral two times a day  atorvastatin 40 milliGRAM(s) Oral at bedtime  budesonide  80 MICROgram(s)/formoterol 4.5 MICROgram(s) Inhaler 2 Puff(s) Inhalation two times a day  finasteride 5 milliGRAM(s) Oral daily  magnesium hydroxide Suspension 30 milliLiter(s) Oral daily  metoprolol tartrate 50 milliGRAM(s) Oral two times a day  naloxone Injectable 0.4 milliGRAM(s) IV Push once  pantoprazole    Tablet 40 milliGRAM(s) Oral before breakfast  polyethylene glycol 3350 17 Gram(s) Oral two times a day  saccharomyces boulardii 250 milliGRAM(s) Oral two times a day  senna 2 Tablet(s) Oral at bedtime  tamsulosin 0.4 milliGRAM(s) Oral at bedtime    MEDICATIONS  (PRN):  acetaminophen     Tablet .. 650 milliGRAM(s) Oral every 6 hours PRN Mild Pain (1 - 3)  bisacodyl 5 milliGRAM(s) Oral daily PRN Constipation  cyclobenzaprine 5 milliGRAM(s) Oral three times a day PRN Muscle Spasm  melatonin 3 milliGRAM(s) Oral at bedtime PRN Insomnia    Pertinent Labs: 09-07 Na139 mmol/L Glu 85 mg/dL K+ 4.7 mmol/L Cr  1.20 mg/dL BUN 31 mg/dL<H> 09-04 Phos 3.8 mg/dL 09-07 Alb 2.6 g/dL<L>     CAPILLARY BLOOD GLUCOSE        Skin:     Estimated Needs:   [ ] no change since previous assessment  [ ] recalculated:     Previous Nutrition Diagnosis:   [ ] Inadequate Energy Intake [ ]Inadequate Oral Intake [ ] Excessive Energy Intake   [ ] Underweight [ ] Increased Nutrient Needs [ ] Overweight/Obesity   [ ] Altered GI Function [ ] Unintended Weight Loss [ ] Food & Nutrition Related Knowledge Deficit [ ] Malnutrition     Nutrition Diagnosis is [ ] ongoing  [ ] resolved [ ] not applicable     New Nutrition Diagnosis: [ ] not applicable       Interventions:   Recommend  [ ] Change Diet To:  [ ] Nutrition Supplement  [ ] Nutrition Support  [ ] Other:     Monitoring and Evaluation:   [ ] PO intake [ x ] Tolerance to diet prescription [ x ] weights [ x ] labs[ x ] follow up per protocol  [ ] other: Nutrtion re-assessment/follow up for malnutrition:  86yo M with PMH of HTN, HLD, hx of CVA (no residual deficits), BPH, hx of skin CA who presents as a transfer from [place] for bronchoscopy to r/o TB, satting well on 3L NC.  bronchial cells 9/1: -ve for malignancy, -ve AFB .CXR 8/22: Persistent advanced irregular infiltrates involving the left mid and upper lung fields and the right upper lobe    Pt tells undersigned RD that for breakfast this am he ate all of eggs, all of english muffin with margarine, juice and milk , last BM was wednesday. Undersigned RD called and spoke withDr. Andre    Factors impacting intake: [ ] none [ ] nausea  [ ] vomiting [ ] diarrhea [ ] constipation  [ ]chewing problems [ ] swallowing issues  [ ] other:     Diet Prescription: Diet, DASH/TLC:   Sodium & Cholesterol Restricted (08-30-22 @ 18:12)    Intake:     Current Weight:   % Weight Change    Pertinent Medications: MEDICATIONS  (STANDING):  ALBUTerol    0.083% 2.5 milliGRAM(s) Nebulizer every 6 hours  ALBUTerol    90 MICROgram(s) HFA Inhaler 1 Puff(s) Inhalation every 6 hours  amLODIPine   Tablet 5 milliGRAM(s) Oral daily  apixaban 5 milliGRAM(s) Oral two times a day  atorvastatin 40 milliGRAM(s) Oral at bedtime  budesonide  80 MICROgram(s)/formoterol 4.5 MICROgram(s) Inhaler 2 Puff(s) Inhalation two times a day  finasteride 5 milliGRAM(s) Oral daily  magnesium hydroxide Suspension 30 milliLiter(s) Oral daily  metoprolol tartrate 50 milliGRAM(s) Oral two times a day  naloxone Injectable 0.4 milliGRAM(s) IV Push once  pantoprazole    Tablet 40 milliGRAM(s) Oral before breakfast  polyethylene glycol 3350 17 Gram(s) Oral two times a day  saccharomyces boulardii 250 milliGRAM(s) Oral two times a day  senna 2 Tablet(s) Oral at bedtime  tamsulosin 0.4 milliGRAM(s) Oral at bedtime    MEDICATIONS  (PRN):  acetaminophen     Tablet .. 650 milliGRAM(s) Oral every 6 hours PRN Mild Pain (1 - 3)  bisacodyl 5 milliGRAM(s) Oral daily PRN Constipation  cyclobenzaprine 5 milliGRAM(s) Oral three times a day PRN Muscle Spasm  melatonin 3 milliGRAM(s) Oral at bedtime PRN Insomnia    Pertinent Labs: 09-07 Na139 mmol/L Glu 85 mg/dL K+ 4.7 mmol/L Cr  1.20 mg/dL BUN 31 mg/dL<H> 09-04 Phos 3.8 mg/dL 09-07 Alb 2.6 g/dL<L>     CAPILLARY BLOOD GLUCOSE        Skin:     Estimated Needs:   [ ] no change since previous assessment  [ ] recalculated:     Previous Nutrition Diagnosis:   [ ] Inadequate Energy Intake [ ]Inadequate Oral Intake [ ] Excessive Energy Intake   [ ] Underweight [ ] Increased Nutrient Needs [ ] Overweight/Obesity   [ ] Altered GI Function [ ] Unintended Weight Loss [ ] Food & Nutrition Related Knowledge Deficit [ ] Malnutrition     Nutrition Diagnosis is [ ] ongoing  [ ] resolved [ ] not applicable     New Nutrition Diagnosis: [ ] not applicable       Interventions:   Recommend  [ ] Change Diet To:  [ ] Nutrition Supplement  [ ] Nutrition Support  [ ] Other:     Monitoring and Evaluation:   [ ] PO intake [ x ] Tolerance to diet prescription [ x ] weights [ x ] labs[ x ] follow up per protocol  [ ] other: Nutrtion re-assessment/follow up for malnutrition:  86yo M with PMH of HTN, HLD, hx of CVA (no residual deficits), BPH, hx of skin CA who presents as a transfer from [place] for bronchoscopy to r/o TB, satting well on 3L NC.  bronchial cells 9/1: -ve for malignancy, -ve AFB .CXR 8/22: Persistent advanced irregular infiltrates involving the left mid and upper lung fields and the right upper lobe    Pt tells undersigned RD that for breakfast this am he ate all of eggs, all of english muffin with margarine, juice and milk , last BM was wednesday. Undersigned RD called and spoke with Dr. Sarmiento re: accepting diet order with Ensure Enlive supplementation pending sign off; states he will    Factors impacting intake: [X ] none [ ] nausea  [ ] vomiting [ ] diarrhea [ ] constipation  [ ]chewing problems [ ] swallowing issues  [ ] other:     Diet Prescription: Diet, DASH/TLC:   Sodium & Cholesterol Restricted (08-30-22 @ 18:12)    Intake: %    Current Weight: 65.5 kg ( 9/8/22) adm 67.4 kg   % Weight Change : if accurate, 1.9 kg/2.8 % wt loss. RD will cont to monitor wt trends    Pertinent Medications: MEDICATIONS  (STANDING):  ALBUTerol    0.083% 2.5 milliGRAM(s) Nebulizer every 6 hours  ALBUTerol    90 MICROgram(s) HFA Inhaler 1 Puff(s) Inhalation every 6 hours  amLODIPine   Tablet 5 milliGRAM(s) Oral daily  apixaban 5 milliGRAM(s) Oral two times a day  atorvastatin 40 milliGRAM(s) Oral at bedtime  budesonide  80 MICROgram(s)/formoterol 4.5 MICROgram(s) Inhaler 2 Puff(s) Inhalation two times a day  finasteride 5 milliGRAM(s) Oral daily  magnesium hydroxide Suspension 30 milliLiter(s) Oral daily  metoprolol tartrate 50 milliGRAM(s) Oral two times a day  naloxone Injectable 0.4 milliGRAM(s) IV Push once  pantoprazole    Tablet 40 milliGRAM(s) Oral before breakfast  polyethylene glycol 3350 17 Gram(s) Oral two times a day  saccharomyces boulardii 250 milliGRAM(s) Oral two times a day  senna 2 Tablet(s) Oral at bedtime  tamsulosin 0.4 milliGRAM(s) Oral at bedtime    MEDICATIONS  (PRN):  acetaminophen     Tablet .. 650 milliGRAM(s) Oral every 6 hours PRN Mild Pain (1 - 3)  bisacodyl 5 milliGRAM(s) Oral daily PRN Constipation  cyclobenzaprine 5 milliGRAM(s) Oral three times a day PRN Muscle Spasm  melatonin 3 milliGRAM(s) Oral at bedtime PRN Insomnia    Pertinent Labs: 09-07 Na139 mmol/L Glu 85 mg/dL K+ 4.7 mmol/L Cr  1.20 mg/dL BUN 31 mg/dL<H> 09-04 Phos 3.8 mg/dL 09-07 Alb 2.6 g/dL<L>     CAPILLARY BLOOD GLUCOSE      Skin: intact    Estimated Needs:   [ X] no change since previous assessment :IBW 69.8 kg ( 25-30 kcals) 1745- 2095 kcals and ( 1-1.2 gm pro/kg) ~ 70-85 gm pro  [ ] recalculated:     Previous Nutrition Diagnosis:   [ ] Inadequate Energy Intake [ ]Inadequate Oral Intake [ ] Excessive Energy Intake   [ ] Underweight [ ] Increased Nutrient Needs [ ] Overweight/Obesity   [ ] Altered GI Function [ ] Unintended Weight Loss [ ] Food & Nutrition Related Knowledge Deficit [X ] Malnutrition     Nutrition Diagnosis is [ X] ongoing  [ ] resolved [ ] not applicable     New Nutrition Diagnosis: [ ] not applicable       Interventions:   Recommend  [ ] Change Diet To:  [ ] Nutrition Supplement  [ ] Nutrition Support  [X ] Other: cont POC    Monitoring and Evaluation:   [ X] PO intake [ x ] Tolerance to diet prescription [ x ] weights [ x ] labs[ x ] follow up per protocol  [X ] other: skin integrity, GI s/s if any

## 2022-09-08 NOTE — PROGRESS NOTE ADULT - PROBLEM SELECTOR PLAN 1
- [place] workup indeterminant for TB, AFB growth in broth positive, 3x AFB smears negative. Quant gold indeterminant. Afebrile w/ leukocytosis, downtrending  - Bronchial cx (bronchoscopy 8/30): no acid fast growth. final.   - Sputum cx: AFB+ in broth, to be identified  - blood cx: no growth. final.  - Bronchial AFB and TB: no acid fast growth. final.  - Bx bronchial cells 9/1: -ve for malignancy, -ve AFB  - Bronchial fungal cx: rare mold  - CXR 8/22: Persistent advanced irregular infiltrates involving the left mid and upper lung fields and the right upper lobe  - pulm following  - ID consulted, recs appreciated

## 2022-09-08 NOTE — PROGRESS NOTE ADULT - ATTENDING COMMENTS
Tuberculosis high risk.   - Bronchial cx (bronchoscopy 8/30): no acid fast growth. final.   - Sputum cx: AFB+ in broth, to be identified  - blood cx: no growth. final.  - Bronchial AFB and TB: no acid fast growth. final.  - Bx bronchial cells 9/1: -ve for malignancy, -ve AFB  - Bronchial fungal cx: rare mold  - CXR 8/22: Persistent advanced irregular infiltrates involving the left mid and upper lung fields and the right upper lobe  - pulm following  - ID consulted, recs appreciated.  d/c planning once ID cleared and d/c from isolation

## 2022-09-08 NOTE — PROGRESS NOTE ADULT - ASSESSMENT
88yo M with PMH of HTN, HLD, hx of CVA (no residual deficits), BPH, hx of skin CA who presents with cough and fevers a/w sepsis due to multifocal pneumonia with progression to acute hypoxic respiratory failure now on HFNC and course further c/b new onset afib.  Transferred from [place] for bronchoscopy.     Bronch done - Aug 30th -   Bronch AFB neg  ID follow up - reviewed -   AFB identification pending -   Flexeril added -     eval malignancy - TB - PNA - infection  spoke with pt - dtr -   s/p Levaquin course in [facility]  o2 support as needed to keep sat > 88 pct  isolation precs  ID follow up

## 2022-09-08 NOTE — PROGRESS NOTE ADULT - SUBJECTIVE AND OBJECTIVE BOX
Chief Complaint: Hemoptysis    Interval Events: No events overnight.    Review of Systems:  General: No fevers, chills, weight gain  Skin: No rashes, color changes  Cardiovascular: No chest pain, orthopnea  Respiratory: No shortness of breath, cough  Gastrointestinal: No nausea, abdominal pain  Genitourinary: No incontinence, pain with urination  Musculoskeletal: No pain, swelling, decreased range of motion  Neurological: No headache, weakness  Psychiatric: No depression, anxiety  Endocrine: No weight gain, increased thirst  All other systems are comprehensively negative.    Physical Exam:  Vital Signs Last 24 Hrs  T(C): 36.3 (08 Sep 2022 04:50), Max: 36.9 (07 Sep 2022 12:43)  T(F): 97.3 (08 Sep 2022 04:50), Max: 98.5 (07 Sep 2022 12:43)  HR: 84 (08 Sep 2022 04:50) (60 - 84)  BP: 144/66 (08 Sep 2022 04:50) (100/61 - 147/73)  BP(mean): --  RR: 17 (08 Sep 2022 04:50) (17 - 19)  SpO2: 92% (08 Sep 2022 04:50) (92% - 99%)  Parameters below as of 08 Sep 2022 04:50  Patient On (Oxygen Delivery Method): nasal cannula  O2 Flow (L/min): 2  General: NAD  HEENT: MMM  Neck: No JVD, no carotid bruit  Lungs: CTAB  CV: RRR, nl S1/S2, no M/R/G  Abdomen: S/NT/ND, +BS  Extremities: No LE edema, no cyanosis  Neuro: AAOx3, non-focal  Skin: No rash    Labs:    09-07    139  |  105  |  31<H>  ----------------------------<  85  4.7   |  31  |  1.20    Ca    8.9      07 Sep 2022 08:03    TPro  7.9  /  Alb  2.6<L>  /  TBili  0.5  /  DBili  x   /  AST  36  /  ALT  34  /  AlkPhos  146<H>  09-07                        11.4   8.91  )-----------( 321      ( 07 Sep 2022 08:03 )             35.0       Telemetry: Sinus rhythm

## 2022-09-08 NOTE — PROGRESS NOTE ADULT - SUBJECTIVE AND OBJECTIVE BOX
HPI:  87M with HTN, HLD, hx of CVA (no residual deficits), BPH, hx of skin CA who presents with cough and fevers.  Symptoms started about 5-6 days prior to admission.  Started with left sided upper back pain.  Then started to have a cough associated with green phlegm and possible blood.  Associated with SOB and DONG.  No weight changes or night sweats.  Had a feveras high as 102F.  Some nausea but no vomiting with loss of appetite.  No chest pain.  No diarrhea.  No abdominal pain.  No recent travel or sick contacts.  Went to see his PMD 3 days prior to [place] admission and was given a z-kellie and tesslon perles.  Reported had negative COVID tests at home.   In the [place] ED, patient's triage vitals were /78    RR  21, 93% on RA (thought dropped down to 88%) and T 98.5F.  Labs showed WBC 18 with a left shift, CINDI with BUN/Cr 40/1.88, elevated LFTs, normal lactate, negative COVID (last booster in 12/2021 with Pfizer vaccine), neg influenza, neg RSV.  CT chest showed "patchy groundglass and more dense opacities in both lungs, suspicious for multifocal PNA...several small lucencies with areas of lung opacity...may represent early cavitation versus areas of focal bronchiectasis."  Patient started on azithromycin and ceftriaxone empirically for multifocal PNA.  Also of note, patient was found to be in new afib on EKG.  Patient denies any afib history but said when he was younger he did feel some irregular heartbeats.  Patient admitted to [facility] with sepsis due to multifocal pneumonia with progression to acute hypoxic respiratory failure now on HFNC and course further c/b new onset afib. Patient was seen by ID, pulm and cardiology. He is now transferred to [facility] for bronchoscopy. Patient denies states he is feeling well at this time.  (29 Aug 2022 23:01)      INTERVAL HPI/OVERNIGHT EVENTS: Pt was seen and examined at bedside. No acute overnight events. Pt states that back pain is improving somewhat.  Pt denies headache, dizziness, lightheadedness, fever, chills, CP, SOB, palpitations, abdominal pain, n/v, numbness/tingling.  No other complaints at this time.     REVIEW OF SYSTEMS:    CONSTITUTIONAL: No weakness, fevers or chills  EYES/ENT: No visual changes, no throat pain   RESPIRATORY: No cough, wheezing, hemoptysis; No shortness of breath  CARDIOVASCULAR: No chest pain or palpitations  GASTROINTESTINAL: No abdominal, nausea, vomiting, or hematemesis; No diarrhea or constipation. No melena or hematochezia.  GENITOURINARY: No dysuria, frequency or hematuria  NEUROLOGICAL: No dizziness, numbness, or weakness  SKIN: No itching, burning, rashes, or lesions   All other review of systems is negative unless indicated above.    VITAL SIGNS:    T(C): 36.4 (09-08-22 @ 11:48), Max: 36.4 (09-07-22 @ 21:59)  HR: 65 (09-08-22 @ 11:48) (60 - 84)  BP: 87/52 (09-08-22 @ 11:48) (87/52 - 147/73)  RR: 17 (09-08-22 @ 11:48) (17 - 18)  SpO2: 97% (09-08-22 @ 11:48) (92% - 99%)    PHYSICAL EXAM:     GENERAL: no acute distress  HEENT: NC/AT, EOMI, neck supple, MMM  RESPIRATORY: LCTAB/L, no rhonchi, rales, or wheezing  CARDIOVASCULAR: RRR, no murmurs, gallops, rubs  ABDOMINAL: soft, non-tender, non-distended, positive bowel sounds   EXTREMITIES: no clubbing, cyanosis, or edema  NEUROLOGICAL: alert and oriented x 3, non-focal  SKIN: no rashes or lesions   MUSCULOSKELETAL: mildly ttp lower back                          10.7   14.58 )-----------( 291      ( 08 Sep 2022 10:17 )             33.3     09-08    137  |  104  |  33<H>  ----------------------------<  116<H>  5.1   |  28  |  1.20    Ca    8.8      08 Sep 2022 10:17    TPro  7.7  /  Alb  2.6<L>  /  TBili  0.5  /  DBili  x   /  AST  33  /  ALT  37  /  AlkPhos  151<H>  09-08      CAPILLARY BLOOD GLUCOSE          MEDICATIONS  (STANDING):  ALBUTerol    0.083% 2.5 milliGRAM(s) Nebulizer every 6 hours  ALBUTerol    90 MICROgram(s) HFA Inhaler 1 Puff(s) Inhalation every 6 hours  amLODIPine   Tablet 5 milliGRAM(s) Oral daily  apixaban 5 milliGRAM(s) Oral two times a day  atorvastatin 40 milliGRAM(s) Oral at bedtime  budesonide  80 MICROgram(s)/formoterol 4.5 MICROgram(s) Inhaler 2 Puff(s) Inhalation two times a day  finasteride 5 milliGRAM(s) Oral daily  magnesium hydroxide Suspension 30 milliLiter(s) Oral daily  metoprolol tartrate 50 milliGRAM(s) Oral two times a day  naloxone Injectable 0.4 milliGRAM(s) IV Push once  pantoprazole    Tablet 40 milliGRAM(s) Oral before breakfast  polyethylene glycol 3350 17 Gram(s) Oral two times a day  saccharomyces boulardii 250 milliGRAM(s) Oral two times a day  senna 2 Tablet(s) Oral at bedtime  tamsulosin 0.4 milliGRAM(s) Oral at bedtime

## 2022-09-08 NOTE — PROGRESS NOTE ADULT - ASSESSMENT
86yo M with PMH of HTN, HLD, hx of CVA (no residual deficits), BPH, hx of skin CA who presents as a transfer from [facility] for bronchoscopy to r/o TB, satting well on 3L NC.

## 2022-09-08 NOTE — PROGRESS NOTE ADULT - SUBJECTIVE AND OBJECTIVE BOX
Date/Time Patient Seen:  		  Referring MD:   Data Reviewed	       Patient is a 88y old  Male who presents with a chief complaint of bronchoscopy (07 Sep 2022 15:25)      Subjective/HPI     PAST MEDICAL & SURGICAL HISTORY:  No pertinent past medical history    Skin cancer  s/p Mohs    Essential hypertension    Cerebrovascular accident (CVA), unspecified mechanism  2015    Hyperlipidemia, unspecified hyperlipidemia type    Complex tear of lateral meniscus of right knee as current injury, initial encounter    Complex tear of medial meniscus of right knee as current injury, initial encounter    Benign prostatic hyperplasia, presence of lower urinary tract symptoms unspecified, unspecified morphology    H/O pilonidal cyst          Medication list         MEDICATIONS  (STANDING):  ALBUTerol    0.083% 2.5 milliGRAM(s) Nebulizer every 6 hours  ALBUTerol    90 MICROgram(s) HFA Inhaler 1 Puff(s) Inhalation every 6 hours  amLODIPine   Tablet 5 milliGRAM(s) Oral daily  apixaban 5 milliGRAM(s) Oral two times a day  atorvastatin 40 milliGRAM(s) Oral at bedtime  budesonide  80 MICROgram(s)/formoterol 4.5 MICROgram(s) Inhaler 2 Puff(s) Inhalation two times a day  finasteride 5 milliGRAM(s) Oral daily  magnesium hydroxide Suspension 30 milliLiter(s) Oral daily  metoprolol tartrate 50 milliGRAM(s) Oral two times a day  naloxone Injectable 0.4 milliGRAM(s) IV Push once  pantoprazole    Tablet 40 milliGRAM(s) Oral before breakfast  polyethylene glycol 3350 17 Gram(s) Oral two times a day  saccharomyces boulardii 250 milliGRAM(s) Oral two times a day  senna 2 Tablet(s) Oral at bedtime  tamsulosin 0.4 milliGRAM(s) Oral at bedtime    MEDICATIONS  (PRN):  acetaminophen     Tablet .. 650 milliGRAM(s) Oral every 6 hours PRN Mild Pain (1 - 3)  bisacodyl 5 milliGRAM(s) Oral daily PRN Constipation  cyclobenzaprine 5 milliGRAM(s) Oral three times a day PRN Muscle Spasm  melatonin 3 milliGRAM(s) Oral at bedtime PRN Insomnia         Vitals log        ICU Vital Signs Last 24 Hrs  T(C): 36.3 (08 Sep 2022 04:50), Max: 36.9 (07 Sep 2022 12:43)  T(F): 97.3 (08 Sep 2022 04:50), Max: 98.5 (07 Sep 2022 12:43)  HR: 84 (08 Sep 2022 04:50) (60 - 84)  BP: 144/66 (08 Sep 2022 04:50) (100/61 - 147/73)  BP(mean): --  ABP: --  ABP(mean): --  RR: 17 (08 Sep 2022 04:50) (17 - 19)  SpO2: 92% (08 Sep 2022 04:50) (92% - 99%)    O2 Parameters below as of 08 Sep 2022 04:50  Patient On (Oxygen Delivery Method): nasal cannula  O2 Flow (L/min): 2               Input and Output:  I&O's Detail    06 Sep 2022 07:01  -  07 Sep 2022 07:00  --------------------------------------------------------  IN:  Total IN: 0 mL    OUT:    Voided (mL): 1300 mL  Total OUT: 1300 mL    Total NET: -1300 mL          Lab Data                        11.4   8.91  )-----------( 321      ( 07 Sep 2022 08:03 )             35.0     09-07    139  |  105  |  31<H>  ----------------------------<  85  4.7   |  31  |  1.20    Ca    8.9      07 Sep 2022 08:03    TPro  7.9  /  Alb  2.6<L>  /  TBili  0.5  /  DBili  x   /  AST  36  /  ALT  34  /  AlkPhos  146<H>  09-07            Review of Systems	      Objective     Physical Examination    heart s1s2  lung dc BS  abd soft      Pertinent Lab findings & Imaging      Bud:  NO   Adequate UO     I&O's Detail    06 Sep 2022 07:01  -  07 Sep 2022 07:00  --------------------------------------------------------  IN:  Total IN: 0 mL    OUT:    Voided (mL): 1300 mL  Total OUT: 1300 mL    Total NET: -1300 mL               Discussed with:     Cultures:	        Radiology

## 2022-09-08 NOTE — PROGRESS NOTE ADULT - ASSESSMENT
87M with HTN, HLD, hx of CVA (no residual deficits), BPH, hx of skin CA who presents with cough and fevers.    Found to have multifocal pneumonia.  Also found have new onset afib.        Sepsis 2/2 Multifocal PNA/Hemoptysis   - pt p/w leukocytosis, SOB  - CT w/ small cavitations, b/l GGO and airspace consolidations  AFB growth in broth from 8/12 sputum samples  sp bronchoscopy  main concerns are for atypical/nontuberculosis mycobacterial infection in this setting    I spoke to lab and they are still waiting for the culture to get the point that it can be probed for ID, lab has my cell and will call me as soon as we have identification    recs to follow when AFB identified - if not MTB then fine to dc and follow up as outpatient. If MTB please call for further guidance    Thank you for consulting us and involving us in the management of this most interesting and challenging case.  We will follow along in the care of this patient. Please call us at [phone] or text me directly on my cell# at [phone] with any concerns.

## 2022-09-08 NOTE — PROGRESS NOTE ADULT - SUBJECTIVE AND OBJECTIVE BOX
[facility] DIV[facility] of INFECTIOUS DISEASE  Mustapha Valente MD PhD, Sheryl Pride MD, Adriana Rodriguez MD, Oliva Myers MD, Ebenezer Rivas MD  and providing coverage with Leodan Zaragoza MD  Providing Infectious Disease Consultations at [facility], [facility], [facility]'s    Office# [phone] to schedule follow up appointments  Answering Service for urgent calls or New Consults [phone]  Cell# to text for urgent issues Mustapha Valente [phone]     infectious diseases progress note:    DENNIS BRADFORD is a 88y y. o. Male patient    No concerning overnight events    Allergies    No Known Allergies    Intolerances        ANTIBIOTICS/RELEVANT:  antimicrobials    immunologic:    OTHER:  acetaminophen     Tablet .. 650 milliGRAM(s) Oral every 6 hours PRN  ALBUTerol    0.083% 2.5 milliGRAM(s) Nebulizer every 6 hours  ALBUTerol    90 MICROgram(s) HFA Inhaler 1 Puff(s) Inhalation every 6 hours  amLODIPine   Tablet 5 milliGRAM(s) Oral daily  apixaban 5 milliGRAM(s) Oral two times a day  atorvastatin 40 milliGRAM(s) Oral at bedtime  bisacodyl 5 milliGRAM(s) Oral daily PRN  budesonide  80 MICROgram(s)/formoterol 4.5 MICROgram(s) Inhaler 2 Puff(s) Inhalation two times a day  cyclobenzaprine 5 milliGRAM(s) Oral three times a day PRN  finasteride 5 milliGRAM(s) Oral daily  magnesium hydroxide Suspension 30 milliLiter(s) Oral daily  melatonin 3 milliGRAM(s) Oral at bedtime PRN  metoprolol tartrate 50 milliGRAM(s) Oral two times a day  naloxone Injectable 0.4 milliGRAM(s) IV Push once  pantoprazole    Tablet 40 milliGRAM(s) Oral before breakfast  polyethylene glycol 3350 17 Gram(s) Oral two times a day  saccharomyces boulardii 250 milliGRAM(s) Oral two times a day  senna 2 Tablet(s) Oral at bedtime  tamsulosin 0.4 milliGRAM(s) Oral at bedtime      Objective:  Vital Signs Last 24 Hrs  T(C): 36.4 (08 Sep 2022 11:48), Max: 36.4 (07 Sep 2022 21:59)  T(F): 97.5 (08 Sep 2022 11:48), Max: 97.6 (07 Sep 2022 21:59)  HR: 65 (08 Sep 2022 11:48) (60 - 84)  BP: 87/52 (08 Sep 2022 11:48) (87/52 - 147/73)  BP(mean): --  RR: 17 (08 Sep 2022 11:48) (17 - 18)  SpO2: 97% (08 Sep 2022 11:48) (92% - 99%)    Parameters below as of 08 Sep 2022 11:48  Patient On (Oxygen Delivery Method): nasal cannula        T(C): 36.4 (09-08-22 @ 11:48), Max: 36.9 (09-07-22 @ 12:43)  T(C): 36.4 (09-08-22 @ 11:48), Max: 36.9 (09-07-22 @ 12:43)  T(C): 36.4 (09-08-22 @ 11:48), Max: 36.9 (09-07-22 @ 12:43)    PHYSICAL EXAM:    Constitutional: NAD  HEENT: EOMI, PERRLA, oropharynx normal  Neck: supple  Respiratory: no accessory muscle use, breathing comfortably  Cardiovascular: rrr  Gastrointestinal: soft, NT  Extremities: no clubbing no cyanosis, edema is absent              LABS:                          10.7   14.58 )-----------( 291      ( 08 Sep 2022 10:17 )             33.3       14.58 09-08 @ 10:17  8.91 09-07 @ 08:03  10.27 09-06 @ 07:35  10.06 09-05 @ 06:33  10.50 09-04 @ 06:15  11.75 09-03 @ 06:30  11.88 09-02 @ 07:18      09-08    137  |  104  |  33<H>  ----------------------------<  116<H>  5.1   |  28  |  1.20    Ca    8.8      08 Sep 2022 10:17    TPro  7.7  /  Alb  2.6<L>  /  TBili  0.5  /  DBili  x   /  AST  33  /  ALT  37  /  AlkPhos  151<H>  09-08      Creatinine, Serum: 1.20 mg/dL (09-08-22 @ 10:17)  Creatinine, Serum: 1.20 mg/dL (09-07-22 @ 08:03)  Creatinine, Serum: 1.20 mg/dL (09-06-22 @ 07:35)  Creatinine, Serum: 1.20 mg/dL (09-05-22 @ 06:33)  Creatinine, Serum: 1.20 mg/dL (09-04-22 @ 06:15)  Creatinine, Serum: 1.20 mg/dL (09-03-22 @ 06:30)  Creatinine, Serum: 1.10 mg/dL (09-02-22 @ 07:18)                INFLAMMATORY MARKERS  Auto Neutrophil #: 5.09 K/uL (09-07-22 @ 08:03)  Auto Lymphocyte #: 2.13 K/uL (09-07-22 @ 08:03)  Auto Neutrophil #: 6.91 K/uL (09-06-22 @ 07:35)  Auto Lymphocyte #: 1.87 K/uL (09-06-22 @ 07:35)  Auto Neutrophil #: 7.01 K/uL (08-30-22 @ 05:56)  Auto Lymphocyte #: 2.20 K/uL (08-30-22 @ 05:56)      Auto Eosinophil #: 0.55 K/uL (09-07-22 @ 08:03)  Auto Eosinophil #: 0.37 K/uL (09-06-22 @ 07:35)  Auto Eosinophil #: 0.28 K/uL (08-30-22 @ 05:56)                                MICROBIOLOGY:              RADIOLOGY & ADDITIONAL STUDIES:

## 2022-09-09 LAB
ALBUMIN SERPL ELPH-MCNC: 2.6 G/DL — LOW (ref 3.3–5)
ALP SERPL-CCNC: 146 U/L — HIGH (ref 40–120)
ALT FLD-CCNC: 32 U/L — SIGNIFICANT CHANGE UP (ref 12–78)
ANION GAP SERPL CALC-SCNC: 3 MMOL/L — LOW (ref 5–17)
AST SERPL-CCNC: 29 U/L — SIGNIFICANT CHANGE UP (ref 15–37)
BASOPHILS # BLD AUTO: 0.14 K/UL — SIGNIFICANT CHANGE UP (ref 0–0.2)
BASOPHILS NFR BLD AUTO: 1.5 % — SIGNIFICANT CHANGE UP (ref 0–2)
BILIRUB SERPL-MCNC: 0.5 MG/DL — SIGNIFICANT CHANGE UP (ref 0.2–1.2)
BUN SERPL-MCNC: 30 MG/DL — HIGH (ref 7–23)
CALCIUM SERPL-MCNC: 9 MG/DL — SIGNIFICANT CHANGE UP (ref 8.5–10.1)
CHLORIDE SERPL-SCNC: 105 MMOL/L — SIGNIFICANT CHANGE UP (ref 96–108)
CO2 SERPL-SCNC: 31 MMOL/L — SIGNIFICANT CHANGE UP (ref 22–31)
CREAT SERPL-MCNC: 1.2 MG/DL — SIGNIFICANT CHANGE UP (ref 0.5–1.3)
EGFR: 58 ML/MIN/1.73M2 — LOW
EOSINOPHIL # BLD AUTO: 0.52 K/UL — HIGH (ref 0–0.5)
EOSINOPHIL NFR BLD AUTO: 5.6 % — SIGNIFICANT CHANGE UP (ref 0–6)
GLUCOSE SERPL-MCNC: 86 MG/DL — SIGNIFICANT CHANGE UP (ref 70–99)
HCT VFR BLD CALC: 33.5 % — LOW (ref 39–50)
HGB BLD-MCNC: 10.9 G/DL — LOW (ref 13–17)
IMM GRANULOCYTES NFR BLD AUTO: 0.5 % — SIGNIFICANT CHANGE UP (ref 0–1.5)
LYMPHOCYTES # BLD AUTO: 2.4 K/UL — SIGNIFICANT CHANGE UP (ref 1–3.3)
LYMPHOCYTES # BLD AUTO: 26.1 % — SIGNIFICANT CHANGE UP (ref 13–44)
MCHC RBC-ENTMCNC: 31.7 PG — SIGNIFICANT CHANGE UP (ref 27–34)
MCHC RBC-ENTMCNC: 32.5 GM/DL — SIGNIFICANT CHANGE UP (ref 32–36)
MCV RBC AUTO: 97.4 FL — SIGNIFICANT CHANGE UP (ref 80–100)
MONOCYTES # BLD AUTO: 0.98 K/UL — HIGH (ref 0–0.9)
MONOCYTES NFR BLD AUTO: 10.6 % — SIGNIFICANT CHANGE UP (ref 2–14)
NEUTROPHILS # BLD AUTO: 5.12 K/UL — SIGNIFICANT CHANGE UP (ref 1.8–7.4)
NEUTROPHILS NFR BLD AUTO: 55.7 % — SIGNIFICANT CHANGE UP (ref 43–77)
NRBC # BLD: 0 /100 WBCS — SIGNIFICANT CHANGE UP (ref 0–0)
PLATELET # BLD AUTO: 293 K/UL — SIGNIFICANT CHANGE UP (ref 150–400)
POTASSIUM SERPL-MCNC: 5.2 MMOL/L — SIGNIFICANT CHANGE UP (ref 3.5–5.3)
POTASSIUM SERPL-SCNC: 5.2 MMOL/L — SIGNIFICANT CHANGE UP (ref 3.5–5.3)
PROT SERPL-MCNC: 7.5 G/DL — SIGNIFICANT CHANGE UP (ref 6–8.3)
RBC # BLD: 3.44 M/UL — LOW (ref 4.2–5.8)
RBC # FLD: 14 % — SIGNIFICANT CHANGE UP (ref 10.3–14.5)
SODIUM SERPL-SCNC: 139 MMOL/L — SIGNIFICANT CHANGE UP (ref 135–145)
WBC # BLD: 9.21 K/UL — SIGNIFICANT CHANGE UP (ref 3.8–10.5)
WBC # FLD AUTO: 9.21 K/UL — SIGNIFICANT CHANGE UP (ref 3.8–10.5)

## 2022-09-09 PROCEDURE — 99232 SBSQ HOSP IP/OBS MODERATE 35: CPT

## 2022-09-09 RX ADMIN — APIXABAN 5 MILLIGRAM(S): 2.5 TABLET, FILM COATED ORAL at 04:59

## 2022-09-09 RX ADMIN — Medication 3 MILLIGRAM(S): at 21:06

## 2022-09-09 RX ADMIN — CYCLOBENZAPRINE HYDROCHLORIDE 5 MILLIGRAM(S): 10 TABLET, FILM COATED ORAL at 19:49

## 2022-09-09 RX ADMIN — POLYETHYLENE GLYCOL 3350 17 GRAM(S): 17 POWDER, FOR SOLUTION ORAL at 04:58

## 2022-09-09 RX ADMIN — Medication 650 MILLIGRAM(S): at 21:55

## 2022-09-09 RX ADMIN — Medication 50 MILLIGRAM(S): at 17:13

## 2022-09-09 RX ADMIN — AMLODIPINE BESYLATE 5 MILLIGRAM(S): 2.5 TABLET ORAL at 04:59

## 2022-09-09 RX ADMIN — FINASTERIDE 5 MILLIGRAM(S): 5 TABLET, FILM COATED ORAL at 11:21

## 2022-09-09 RX ADMIN — APIXABAN 5 MILLIGRAM(S): 2.5 TABLET, FILM COATED ORAL at 17:12

## 2022-09-09 RX ADMIN — PANTOPRAZOLE SODIUM 40 MILLIGRAM(S): 20 TABLET, DELAYED RELEASE ORAL at 05:00

## 2022-09-09 RX ADMIN — Medication 250 MILLIGRAM(S): at 04:59

## 2022-09-09 RX ADMIN — TAMSULOSIN HYDROCHLORIDE 0.4 MILLIGRAM(S): 0.4 CAPSULE ORAL at 21:06

## 2022-09-09 RX ADMIN — ATORVASTATIN CALCIUM 40 MILLIGRAM(S): 80 TABLET, FILM COATED ORAL at 21:06

## 2022-09-09 RX ADMIN — SENNA PLUS 2 TABLET(S): 8.6 TABLET ORAL at 21:06

## 2022-09-09 RX ADMIN — Medication 250 MILLIGRAM(S): at 17:13

## 2022-09-09 RX ADMIN — Medication 650 MILLIGRAM(S): at 21:06

## 2022-09-09 NOTE — PROGRESS NOTE ADULT - PROBLEM SELECTOR PLAN 1
- [place] workup indeterminant for TB, AFB growth in broth positive, 3x AFB smears negative. Quant gold indeterminant. Afebrile w/ leukocytosis, downtrending  - Bronchial cx (bronchoscopy 8/30): no acid fast growth. final.   - Sputum cx: AFB+ in broth, to be identified  - blood cx: no growth. final.  - Bronchial AFB and TB: no acid fast growth. final.  - Bx bronchial cells 9/1: -ve for malignancy, -ve AFB  - Bronchial fungal cx: rare mold w yeast 9/9  - CXR 8/22: Persistent advanced irregular infiltrates involving the left mid and upper lung fields and the right upper lobe  - pulm following  - ID consulted, recs appreciated - [place] workup indeterminant for TB, AFB growth in broth positive, 3x AFB smears negative. Quant gold indeterminant. Afebrile w/ leukocytosis, downtrending  - Bronchial cx (bronchoscopy 8/30): no acid fast growth. final.   - Sputum cx: AFB+ in broth, to be identified  - blood cx: no growth. final.  - Bronchial AFB and TB: no acid fast growth. final.  - Bx bronchial cells 9/1: -ve for malignancy, -ve AFB    - Bronchial fungal cx: rare Geotrichum species 9/9 (prelim)    - CXR 8/22: Persistent advanced irregular infiltrates involving the left mid and upper lung fields and the right upper lobe  - pulm following  - ID consulted, recs appreciated

## 2022-09-09 NOTE — PROGRESS NOTE ADULT - ASSESSMENT
86yo M with PMH of HTN, HLD, hx of CVA (no residual deficits), BPH, hx of skin CA who presents as a transfer from [facility] for bronchoscopy to r/o TB, satting well on 3L NC.

## 2022-09-09 NOTE — PROGRESS NOTE ADULT - ASSESSMENT
The patient is an 87 year old male with a history of HTN, HL, BPH, CVA who presents with cough and hemoptysis in the setting of PNA and r/o TB.    Plan:  - Intermittent episodes of AF on telemetry  - Echo with low normal LV systolic function, mild pulm HTN  - Continue metoprolol tartrate 50 mg bid  - Continue amlodipine 5 mg daily  - Continue apixaban 5 mg bid  - Sputum culture positive for AFB  - Pulm and ID follow-up  - Awaiting results of AFB cultures and speciation

## 2022-09-09 NOTE — PROGRESS NOTE ADULT - ASSESSMENT
88yo M with PMH of HTN, HLD, hx of CVA (no residual deficits), BPH, hx of skin CA who presents with cough and fevers a/w sepsis due to multifocal pneumonia with progression to acute hypoxic respiratory failure now on HFNC and course further c/b new onset afib.  Transferred from [place] for bronchoscopy.     Bronch done - Aug 30th -   Bronch AFB neg  ID follow up - reviewed -   AFB identification pending -   Flexeril added -     eval malignancy - TB - PNA - infection  spoke with pt - dtr -   s/p Levaquin course in [facility]  o2 support as needed to keep sat > 88 pct  isolation precs  ID follow up

## 2022-09-09 NOTE — PROGRESS NOTE ADULT - ATTENDING COMMENTS
Tuberculosis high risk.   [place] workup indeterminant for TB, AFB growth in broth positive, 3x AFB smears negative. Quant gold indeterminant. Afebrile w/ leukocytosis, downtrending  - Bronchial cx (bronchoscopy 8/30): no acid fast growth. final.   - Sputum cx: AFB+ in broth, to be identified  - blood cx: no growth. final.  - Bronchial AFB and TB: no acid fast growth. final.  - Bx bronchial cells 9/1: -ve for malignancy, -ve AFB  - Bronchial fungal cx: rare mold w yeast 9/9  - CXR 8/22: Persistent advanced irregular infiltrates involving the left mid and upper lung fields and the right upper lobe  - pulm following  - ID consulted, recs appreciated.  awaiting final cultures , once negative d/c planning

## 2022-09-09 NOTE — PROGRESS NOTE ADULT - SUBJECTIVE AND OBJECTIVE BOX
Date/Time Patient Seen:  		  Referring MD:   Data Reviewed	       Patient is a 88y old  Male who presents with a chief complaint of bronchoscopy (08 Sep 2022 15:08)      Subjective/HPI     PAST MEDICAL & SURGICAL HISTORY:  No pertinent past medical history    Skin cancer  s/p Mohs    Essential hypertension    Cerebrovascular accident (CVA), unspecified mechanism  2015    Hyperlipidemia, unspecified hyperlipidemia type    Complex tear of lateral meniscus of right knee as current injury, initial encounter    Complex tear of medial meniscus of right knee as current injury, initial encounter    Benign prostatic hyperplasia, presence of lower urinary tract symptoms unspecified, unspecified morphology    H/O pilonidal cyst          Medication list         MEDICATIONS  (STANDING):  ALBUTerol    0.083% 2.5 milliGRAM(s) Nebulizer every 6 hours  ALBUTerol    90 MICROgram(s) HFA Inhaler 1 Puff(s) Inhalation every 6 hours  amLODIPine   Tablet 5 milliGRAM(s) Oral daily  apixaban 5 milliGRAM(s) Oral two times a day  atorvastatin 40 milliGRAM(s) Oral at bedtime  budesonide  80 MICROgram(s)/formoterol 4.5 MICROgram(s) Inhaler 2 Puff(s) Inhalation two times a day  finasteride 5 milliGRAM(s) Oral daily  magnesium hydroxide Suspension 30 milliLiter(s) Oral daily  metoprolol tartrate 50 milliGRAM(s) Oral two times a day  naloxone Injectable 0.4 milliGRAM(s) IV Push once  pantoprazole    Tablet 40 milliGRAM(s) Oral before breakfast  polyethylene glycol 3350 17 Gram(s) Oral two times a day  saccharomyces boulardii 250 milliGRAM(s) Oral two times a day  senna 2 Tablet(s) Oral at bedtime  tamsulosin 0.4 milliGRAM(s) Oral at bedtime    MEDICATIONS  (PRN):  acetaminophen     Tablet .. 650 milliGRAM(s) Oral every 6 hours PRN Mild Pain (1 - 3)  bisacodyl 5 milliGRAM(s) Oral daily PRN Constipation  cyclobenzaprine 5 milliGRAM(s) Oral three times a day PRN Muscle Spasm  melatonin 3 milliGRAM(s) Oral at bedtime PRN Insomnia         Vitals log        ICU Vital Signs Last 24 Hrs  T(C): 36.3 (09 Sep 2022 04:50), Max: 36.6 (08 Sep 2022 19:59)  T(F): 97.4 (09 Sep 2022 04:50), Max: 97.8 (08 Sep 2022 19:59)  HR: 59 (09 Sep 2022 04:50) (59 - 66)  BP: 115/55 (09 Sep 2022 04:50) (87/52 - 116/65)  BP(mean): --  ABP: --  ABP(mean): --  RR: 18 (09 Sep 2022 04:50) (17 - 18)  SpO2: 99% (09 Sep 2022 04:50) (97% - 99%)    O2 Parameters below as of 09 Sep 2022 04:50  Patient On (Oxygen Delivery Method): nasal cannula  O2 Flow (L/min): 2               Input and Output:  I&O's Detail    08 Sep 2022 07:01  -  09 Sep 2022 06:41  --------------------------------------------------------  IN:  Total IN: 0 mL    OUT:    Voided (mL): 300 mL  Total OUT: 300 mL    Total NET: -300 mL          Lab Data                        10.7   14.58 )-----------( 291      ( 08 Sep 2022 10:17 )             33.3     09-08    137  |  104  |  33<H>  ----------------------------<  116<H>  5.1   |  28  |  1.20    Ca    8.8      08 Sep 2022 10:17    TPro  7.7  /  Alb  2.6<L>  /  TBili  0.5  /  DBili  x   /  AST  33  /  ALT  37  /  AlkPhos  151<H>  09-08            Review of Systems	      Objective     Physical Examination    heart s1s2  lung dec BS  abd soft      Pertinent Lab findings & Imaging      Bud:  NO   Adequate UO     I&O's Detail    08 Sep 2022 07:01  -  09 Sep 2022 06:41  --------------------------------------------------------  IN:  Total IN: 0 mL    OUT:    Voided (mL): 300 mL  Total OUT: 300 mL    Total NET: -300 mL               Discussed with:     Cultures:	        Radiology

## 2022-09-09 NOTE — PROGRESS NOTE ADULT - SUBJECTIVE AND OBJECTIVE BOX
HPI:  87M with HTN, HLD, hx of CVA (no residual deficits), BPH, hx of skin CA who presents with cough and fevers.  Symptoms started about 5-6 days prior to admission.  Started with left sided upper back pain.  Then started to have a cough associated with green phlegm and possible blood.  Associated with SOB and DONG.  No weight changes or night sweats.  Had a feveras high as 102F.  Some nausea but no vomiting with loss of appetite.  No chest pain.  No diarrhea.  No abdominal pain.  No recent travel or sick contacts.  Went to see his PMD 3 days prior to [place] admission and was given a z-kellie and tesslon perles.  Reported had negative COVID tests at home.   In the [place] ED, patient's triage vitals were /78    RR  21, 93% on RA (thought dropped down to 88%) and T 98.5F.  Labs showed WBC 18 with a left shift, CINDI with BUN/Cr 40/1.88, elevated LFTs, normal lactate, negative COVID (last booster in 12/2021 with Pfizer vaccine), neg influenza, neg RSV.  CT chest showed "patchy groundglass and more dense opacities in both lungs, suspicious for multifocal PNA...several small lucencies with areas of lung opacity...may represent early cavitation versus areas of focal bronchiectasis."  Patient started on azithromycin and ceftriaxone empirically for multifocal PNA.  Also of note, patient was found to be in new afib on EKG.  Patient denies any afib history but said when he was younger he did feel some irregular heartbeats.  Patient admitted to [facility] with sepsis due to multifocal pneumonia with progression to acute hypoxic respiratory failure now on HFNC and course further c/b new onset afib. Patient was seen by ID, pulm and cardiology. He is now transferred to [facility] for bronchoscopy. Patient denies states he is feeling well at this time.  (29 Aug 2022 23:01)      INTERVAL HPI/OVERNIGHT EVENTS: Pt was seen and examined at bedside. No acute overnight events. Pt states that   Pt denies headache, dizziness, lightheadedness, fever, chills, body aches, CP, SOB, palpitations, abdominal pain, n/v, numbness/tingling.  No other complaints at this time.     REVIEW OF SYSTEMS:    CONSTITUTIONAL: No weakness, fevers or chills  EYES/ENT: No visual changes, no throat pain   RESPIRATORY: No cough, wheezing, hemoptysis; No shortness of breath  CARDIOVASCULAR: No chest pain or palpitations  GASTROINTESTINAL: No abdominal, nausea, vomiting, or hematemesis; No diarrhea or constipation. No melena or hematochezia.  GENITOURINARY: No dysuria, frequency or hematuria  NEUROLOGICAL: No dizziness, numbness, or weakness  SKIN: No itching, burning, rashes, or lesions   All other review of systems is negative unless indicated above.    VITAL SIGNS:    T(C): 36.3 (09-09-22 @ 04:50), Max: 36.6 (09-08-22 @ 19:59)  HR: 59 (09-09-22 @ 04:50) (59 - 66)  BP: 115/55 (09-09-22 @ 04:50) (98/55 - 116/65)  RR: 18 (09-09-22 @ 04:50) (18 - 18)  SpO2: 99% (09-09-22 @ 04:50) (97% - 99%)    PHYSICAL EXAM:     GENERAL: no acute distress  HEENT: NC/AT, EOMI, neck supple, MMM  RESPIRATORY: LCTAB/L, no rhonchi, rales, or wheezing  CARDIOVASCULAR: RRR, no murmurs, gallops, rubs  ABDOMINAL: soft, non-tender, non-distended, positive bowel sounds   EXTREMITIES: no clubbing, cyanosis, or edema  NEUROLOGICAL: alert and oriented x 3, non-focal  SKIN: no rashes or lesions   MUSCULOSKELETAL: no gross joint deformity                          10.9   9.21  )-----------( 293      ( 09 Sep 2022 06:04 )             33.5     09-09    139  |  105  |  30<H>  ----------------------------<  86  5.2   |  31  |  1.20    Ca    9.0      09 Sep 2022 06:04    TPro  7.5  /  Alb  2.6<L>  /  TBili  0.5  /  DBili  x   /  AST  29  /  ALT  32  /  AlkPhos  146<H>  09-09      CAPILLARY BLOOD GLUCOSE          MEDICATIONS  (STANDING):  ALBUTerol    0.083% 2.5 milliGRAM(s) Nebulizer every 6 hours  ALBUTerol    90 MICROgram(s) HFA Inhaler 1 Puff(s) Inhalation every 6 hours  amLODIPine   Tablet 5 milliGRAM(s) Oral daily  apixaban 5 milliGRAM(s) Oral two times a day  atorvastatin 40 milliGRAM(s) Oral at bedtime  budesonide  80 MICROgram(s)/formoterol 4.5 MICROgram(s) Inhaler 2 Puff(s) Inhalation two times a day  finasteride 5 milliGRAM(s) Oral daily  magnesium hydroxide Suspension 30 milliLiter(s) Oral daily  metoprolol tartrate 50 milliGRAM(s) Oral two times a day  naloxone Injectable 0.4 milliGRAM(s) IV Push once  pantoprazole    Tablet 40 milliGRAM(s) Oral before breakfast  polyethylene glycol 3350 17 Gram(s) Oral two times a day  saccharomyces boulardii 250 milliGRAM(s) Oral two times a day  senna 2 Tablet(s) Oral at bedtime  tamsulosin 0.4 milliGRAM(s) Oral at bedtime       HPI:  87M with HTN, HLD, hx of CVA (no residual deficits), BPH, hx of skin CA who presents with cough and fevers.  Symptoms started about 5-6 days prior to admission.  Started with left sided upper back pain.  Then started to have a cough associated with green phlegm and possible blood.  Associated with SOB and DONG.  No weight changes or night sweats.  Had a feveras high as 102F.  Some nausea but no vomiting with loss of appetite.  No chest pain.  No diarrhea.  No abdominal pain.  No recent travel or sick contacts.  Went to see his PMD 3 days prior to [place] admission and was given a z-kellie and tesslon perles.  Reported had negative COVID tests at home.   In the [place] ED, patient's triage vitals were /78    RR  21, 93% on RA (thought dropped down to 88%) and T 98.5F.  Labs showed WBC 18 with a left shift, CINDI with BUN/Cr 40/1.88, elevated LFTs, normal lactate, negative COVID (last booster in 12/2021 with Pfizer vaccine), neg influenza, neg RSV.  CT chest showed "patchy groundglass and more dense opacities in both lungs, suspicious for multifocal PNA...several small lucencies with areas of lung opacity...may represent early cavitation versus areas of focal bronchiectasis."  Patient started on azithromycin and ceftriaxone empirically for multifocal PNA.  Also of note, patient was found to be in new afib on EKG.  Patient denies any afib history but said when he was younger he did feel some irregular heartbeats.  Patient admitted to [facility] with sepsis due to multifocal pneumonia with progression to acute hypoxic respiratory failure now on HFNC and course further c/b new onset afib. Patient was seen by ID, pulm and cardiology. He is now transferred to [facility] for bronchoscopy. Patient denies states he is feeling well at this time.  (29 Aug 2022 23:01)      INTERVAL HPI/OVERNIGHT EVENTS: Pt was seen and examined at bedside. No acute overnight events. Pt denies headache, dizziness, lightheadedness, fever, chills, body aches, CP, SOB, palpitations, abdominal pain, n/v, numbness/tingling.  No other complaints at this time.     REVIEW OF SYSTEMS:    CONSTITUTIONAL: No weakness, fevers or chills  EYES/ENT: No visual changes, no throat pain   RESPIRATORY: No cough, wheezing, hemoptysis; No shortness of breath  CARDIOVASCULAR: No chest pain or palpitations  GASTROINTESTINAL: No abdominal, nausea, vomiting, or hematemesis; No diarrhea or constipation. No melena or hematochezia.  GENITOURINARY: No dysuria, frequency or hematuria  NEUROLOGICAL: No dizziness, numbness, or weakness  SKIN: No itching, burning, rashes, or lesions   All other review of systems is negative unless indicated above.    VITAL SIGNS:    T(C): 36.3 (09-09-22 @ 04:50), Max: 36.6 (09-08-22 @ 19:59)  HR: 59 (09-09-22 @ 04:50) (59 - 66)  BP: 115/55 (09-09-22 @ 04:50) (98/55 - 116/65)  RR: 18 (09-09-22 @ 04:50) (18 - 18)  SpO2: 99% (09-09-22 @ 04:50) (97% - 99%)    PHYSICAL EXAM:     GENERAL: no acute distress  HEENT: NC/AT, EOMI, neck supple, MMM  RESPIRATORY: LCTAB/L, no rhonchi, rales, or wheezing  CARDIOVASCULAR: RRR, no murmurs, gallops, rubs  ABDOMINAL: soft, non-tender, non-distended, positive bowel sounds   EXTREMITIES: no clubbing, cyanosis, or edema  NEUROLOGICAL: alert and oriented x 3, non-focal  SKIN: no rashes or lesions   MUSCULOSKELETAL: no gross joint deformity                          10.9   9.21  )-----------( 293      ( 09 Sep 2022 06:04 )             33.5     09-09    139  |  105  |  30<H>  ----------------------------<  86  5.2   |  31  |  1.20    Ca    9.0      09 Sep 2022 06:04    TPro  7.5  /  Alb  2.6<L>  /  TBili  0.5  /  DBili  x   /  AST  29  /  ALT  32  /  AlkPhos  146<H>  09-09      CAPILLARY BLOOD GLUCOSE          MEDICATIONS  (STANDING):  ALBUTerol    0.083% 2.5 milliGRAM(s) Nebulizer every 6 hours  ALBUTerol    90 MICROgram(s) HFA Inhaler 1 Puff(s) Inhalation every 6 hours  amLODIPine   Tablet 5 milliGRAM(s) Oral daily  apixaban 5 milliGRAM(s) Oral two times a day  atorvastatin 40 milliGRAM(s) Oral at bedtime  budesonide  80 MICROgram(s)/formoterol 4.5 MICROgram(s) Inhaler 2 Puff(s) Inhalation two times a day  finasteride 5 milliGRAM(s) Oral daily  magnesium hydroxide Suspension 30 milliLiter(s) Oral daily  metoprolol tartrate 50 milliGRAM(s) Oral two times a day  naloxone Injectable 0.4 milliGRAM(s) IV Push once  pantoprazole    Tablet 40 milliGRAM(s) Oral before breakfast  polyethylene glycol 3350 17 Gram(s) Oral two times a day  saccharomyces boulardii 250 milliGRAM(s) Oral two times a day  senna 2 Tablet(s) Oral at bedtime  tamsulosin 0.4 milliGRAM(s) Oral at bedtime

## 2022-09-09 NOTE — PROGRESS NOTE ADULT - SUBJECTIVE AND OBJECTIVE BOX
Chief Complaint: Hemoptysis    Interval Events: No events overnight.    Review of Systems:  General: No fevers, chills, weight gain  Skin: No rashes, color changes  Cardiovascular: No chest pain, orthopnea  Respiratory: No shortness of breath, cough  Gastrointestinal: No nausea, abdominal pain  Genitourinary: No incontinence, pain with urination  Musculoskeletal: No pain, swelling, decreased range of motion  Neurological: No headache, weakness  Psychiatric: No depression, anxiety  Endocrine: No weight gain, increased thirst  All other systems are comprehensively negative.    Physical Exam:  Vital Signs Last 24 Hrs  T(C): 36.3 (09 Sep 2022 04:50), Max: 36.6 (08 Sep 2022 19:59)  T(F): 97.4 (09 Sep 2022 04:50), Max: 97.8 (08 Sep 2022 19:59)  HR: 59 (09 Sep 2022 04:50) (59 - 66)  BP: 115/55 (09 Sep 2022 04:50) (87/52 - 116/65)  BP(mean): --  RR: 18 (09 Sep 2022 04:50) (17 - 18)  SpO2: 99% (09 Sep 2022 04:50) (97% - 99%)  Parameters below as of 09 Sep 2022 04:50  Patient On (Oxygen Delivery Method): nasal cannula  O2 Flow (L/min): 2  General: NAD  HEENT: MMM  Neck: No JVD, no carotid bruit  Lungs: CTAB  CV: RRR, nl S1/S2, no M/R/G  Abdomen: S/NT/ND, +BS  Extremities: No LE edema, no cyanosis  Neuro: AAOx3, non-focal  Skin: No rash    Labs:    09-09    139  |  105  |  30<H>  ----------------------------<  86  5.2   |  31  |  1.20    Ca    9.0      09 Sep 2022 06:04    TPro  7.5  /  Alb  2.6<L>  /  TBili  0.5  /  DBili  x   /  AST  29  /  ALT  32  /  AlkPhos  146<H>  09-09                        10.9   9.21  )-----------( 293      ( 09 Sep 2022 06:04 )             33.5       Telemetry: Sinus rhythm

## 2022-09-10 LAB
ALBUMIN SERPL ELPH-MCNC: 2.6 G/DL — LOW (ref 3.3–5)
ALP SERPL-CCNC: 135 U/L — HIGH (ref 40–120)
ALT FLD-CCNC: 32 U/L — SIGNIFICANT CHANGE UP (ref 12–78)
ANION GAP SERPL CALC-SCNC: 6 MMOL/L — SIGNIFICANT CHANGE UP (ref 5–17)
AST SERPL-CCNC: 31 U/L — SIGNIFICANT CHANGE UP (ref 15–37)
BASOPHILS # BLD AUTO: 0.13 K/UL — SIGNIFICANT CHANGE UP (ref 0–0.2)
BASOPHILS NFR BLD AUTO: 1.4 % — SIGNIFICANT CHANGE UP (ref 0–2)
BILIRUB SERPL-MCNC: 0.5 MG/DL — SIGNIFICANT CHANGE UP (ref 0.2–1.2)
BUN SERPL-MCNC: 32 MG/DL — HIGH (ref 7–23)
CALCIUM SERPL-MCNC: 8.8 MG/DL — SIGNIFICANT CHANGE UP (ref 8.5–10.1)
CHLORIDE SERPL-SCNC: 105 MMOL/L — SIGNIFICANT CHANGE UP (ref 96–108)
CO2 SERPL-SCNC: 27 MMOL/L — SIGNIFICANT CHANGE UP (ref 22–31)
CREAT SERPL-MCNC: 1.1 MG/DL — SIGNIFICANT CHANGE UP (ref 0.5–1.3)
EGFR: 65 ML/MIN/1.73M2 — SIGNIFICANT CHANGE UP
EOSINOPHIL # BLD AUTO: 0.65 K/UL — HIGH (ref 0–0.5)
EOSINOPHIL NFR BLD AUTO: 6.8 % — HIGH (ref 0–6)
GLUCOSE SERPL-MCNC: 81 MG/DL — SIGNIFICANT CHANGE UP (ref 70–99)
HCT VFR BLD CALC: 34.4 % — LOW (ref 39–50)
HGB BLD-MCNC: 10.9 G/DL — LOW (ref 13–17)
IMM GRANULOCYTES NFR BLD AUTO: 0.5 % — SIGNIFICANT CHANGE UP (ref 0–1.5)
LYMPHOCYTES # BLD AUTO: 2.04 K/UL — SIGNIFICANT CHANGE UP (ref 1–3.3)
LYMPHOCYTES # BLD AUTO: 21.2 % — SIGNIFICANT CHANGE UP (ref 13–44)
MCHC RBC-ENTMCNC: 30.7 PG — SIGNIFICANT CHANGE UP (ref 27–34)
MCHC RBC-ENTMCNC: 31.7 GM/DL — LOW (ref 32–36)
MCV RBC AUTO: 96.9 FL — SIGNIFICANT CHANGE UP (ref 80–100)
MONOCYTES # BLD AUTO: 0.87 K/UL — SIGNIFICANT CHANGE UP (ref 0–0.9)
MONOCYTES NFR BLD AUTO: 9.1 % — SIGNIFICANT CHANGE UP (ref 2–14)
NEUTROPHILS # BLD AUTO: 5.87 K/UL — SIGNIFICANT CHANGE UP (ref 1.8–7.4)
NEUTROPHILS NFR BLD AUTO: 61 % — SIGNIFICANT CHANGE UP (ref 43–77)
NRBC # BLD: 0 /100 WBCS — SIGNIFICANT CHANGE UP (ref 0–0)
PLATELET # BLD AUTO: 288 K/UL — SIGNIFICANT CHANGE UP (ref 150–400)
POTASSIUM SERPL-MCNC: 4.3 MMOL/L — SIGNIFICANT CHANGE UP (ref 3.5–5.3)
POTASSIUM SERPL-SCNC: 4.3 MMOL/L — SIGNIFICANT CHANGE UP (ref 3.5–5.3)
PROT SERPL-MCNC: 7.3 G/DL — SIGNIFICANT CHANGE UP (ref 6–8.3)
RBC # BLD: 3.55 M/UL — LOW (ref 4.2–5.8)
RBC # FLD: 13.9 % — SIGNIFICANT CHANGE UP (ref 10.3–14.5)
SODIUM SERPL-SCNC: 138 MMOL/L — SIGNIFICANT CHANGE UP (ref 135–145)
WBC # BLD: 9.61 K/UL — SIGNIFICANT CHANGE UP (ref 3.8–10.5)
WBC # FLD AUTO: 9.61 K/UL — SIGNIFICANT CHANGE UP (ref 3.8–10.5)

## 2022-09-10 PROCEDURE — 99232 SBSQ HOSP IP/OBS MODERATE 35: CPT | Mod: GC

## 2022-09-10 RX ADMIN — POLYETHYLENE GLYCOL 3350 17 GRAM(S): 17 POWDER, FOR SOLUTION ORAL at 17:31

## 2022-09-10 RX ADMIN — TAMSULOSIN HYDROCHLORIDE 0.4 MILLIGRAM(S): 0.4 CAPSULE ORAL at 22:05

## 2022-09-10 RX ADMIN — PANTOPRAZOLE SODIUM 40 MILLIGRAM(S): 20 TABLET, DELAYED RELEASE ORAL at 05:46

## 2022-09-10 RX ADMIN — Medication 250 MILLIGRAM(S): at 05:46

## 2022-09-10 RX ADMIN — Medication 650 MILLIGRAM(S): at 04:00

## 2022-09-10 RX ADMIN — BUDESONIDE AND FORMOTEROL FUMARATE DIHYDRATE 2 PUFF(S): 160; 4.5 AEROSOL RESPIRATORY (INHALATION) at 22:08

## 2022-09-10 RX ADMIN — FINASTERIDE 5 MILLIGRAM(S): 5 TABLET, FILM COATED ORAL at 17:31

## 2022-09-10 RX ADMIN — APIXABAN 5 MILLIGRAM(S): 2.5 TABLET, FILM COATED ORAL at 17:30

## 2022-09-10 RX ADMIN — POLYETHYLENE GLYCOL 3350 17 GRAM(S): 17 POWDER, FOR SOLUTION ORAL at 05:46

## 2022-09-10 RX ADMIN — ATORVASTATIN CALCIUM 40 MILLIGRAM(S): 80 TABLET, FILM COATED ORAL at 22:05

## 2022-09-10 RX ADMIN — CYCLOBENZAPRINE HYDROCHLORIDE 5 MILLIGRAM(S): 10 TABLET, FILM COATED ORAL at 22:06

## 2022-09-10 RX ADMIN — Medication 650 MILLIGRAM(S): at 22:06

## 2022-09-10 RX ADMIN — Medication 50 MILLIGRAM(S): at 17:31

## 2022-09-10 RX ADMIN — CYCLOBENZAPRINE HYDROCHLORIDE 5 MILLIGRAM(S): 10 TABLET, FILM COATED ORAL at 06:21

## 2022-09-10 RX ADMIN — SENNA PLUS 2 TABLET(S): 8.6 TABLET ORAL at 22:07

## 2022-09-10 RX ADMIN — AMLODIPINE BESYLATE 5 MILLIGRAM(S): 2.5 TABLET ORAL at 05:46

## 2022-09-10 RX ADMIN — Medication 650 MILLIGRAM(S): at 23:06

## 2022-09-10 RX ADMIN — Medication 3 MILLIGRAM(S): at 22:06

## 2022-09-10 RX ADMIN — APIXABAN 5 MILLIGRAM(S): 2.5 TABLET, FILM COATED ORAL at 05:46

## 2022-09-10 RX ADMIN — Medication 250 MILLIGRAM(S): at 17:30

## 2022-09-10 RX ADMIN — Medication 650 MILLIGRAM(S): at 03:06

## 2022-09-10 NOTE — PROGRESS NOTE ADULT - SUBJECTIVE AND OBJECTIVE BOX
[facility] DIVISION of INFECTIOUS DISEASE  Mustapha Valente MD PhD, Sheryl Pride MD, Adriana Rodriguez MD, Oliva Myers MD, Ebenezer Rivas MD  and providing coverage with Leodan Zaragoza MD  Providing Infectious Disease Consultations at [facility], [facility], [facility], [facility]'s    Office# [phone] to schedule follow up appointments  Answering Service for urgent calls or New Consults [phone]  Cell# to text for urgent issues Mustapha Valente [phone]     infectious diseases progress note:    DENNIS BRADFORD is a 88y y. o. Male patient    Overnight and events of the last 24hrs reviewed    Allergies    No Known Allergies    Intolerances        ANTIBIOTICS/RELEVANT:  antimicrobials    immunologic:    OTHER:  acetaminophen     Tablet .. 650 milliGRAM(s) Oral every 6 hours PRN  ALBUTerol    0.083% 2.5 milliGRAM(s) Nebulizer every 6 hours  ALBUTerol    90 MICROgram(s) HFA Inhaler 1 Puff(s) Inhalation every 6 hours  amLODIPine   Tablet 5 milliGRAM(s) Oral daily  apixaban 5 milliGRAM(s) Oral two times a day  atorvastatin 40 milliGRAM(s) Oral at bedtime  bisacodyl 5 milliGRAM(s) Oral daily PRN  budesonide  80 MICROgram(s)/formoterol 4.5 MICROgram(s) Inhaler 2 Puff(s) Inhalation two times a day  cyclobenzaprine 5 milliGRAM(s) Oral three times a day PRN  finasteride 5 milliGRAM(s) Oral daily  magnesium hydroxide Suspension 30 milliLiter(s) Oral daily  melatonin 3 milliGRAM(s) Oral at bedtime PRN  metoprolol tartrate 50 milliGRAM(s) Oral two times a day  naloxone Injectable 0.4 milliGRAM(s) IV Push once  pantoprazole    Tablet 40 milliGRAM(s) Oral before breakfast  polyethylene glycol 3350 17 Gram(s) Oral two times a day  saccharomyces boulardii 250 milliGRAM(s) Oral two times a day  senna 2 Tablet(s) Oral at bedtime  tamsulosin 0.4 milliGRAM(s) Oral at bedtime      Objective:  Vital Signs Last 24 Hrs  T(C): 36.5 (10 Sep 2022 05:09), Max: 36.9 (09 Sep 2022 12:53)  T(F): 97.7 (10 Sep 2022 05:09), Max: 98.5 (09 Sep 2022 12:53)  HR: 54 (10 Sep 2022 05:38) (54 - 68)  BP: 121/66 (10 Sep 2022 05:09) (118/59 - 123/64)  BP(mean): --  RR: 18 (10 Sep 2022 05:09) (18 - 19)  SpO2: 95% (10 Sep 2022 05:09) (91% - 98%)    Parameters below as of 10 Sep 2022 05:09  Patient On (Oxygen Delivery Method): nasal cannula  O2 Flow (L/min): 2      T(C): 36.5 (09-10-22 @ 05:09), Max: 36.9 (09-09-22 @ 12:53)  T(C): 36.5 (09-10-22 @ 05:09), Max: 36.9 (09-07-22 @ 12:43)  T(C): 36.5 (09-10-22 @ 05:09), Max: 36.9 (09-07-22 @ 12:43)    PHYSICAL EXAM:  HEENT: NC atraumatic  Neck: supple  Respiratory: no accessory muscle use, breathing comfortably  Cardiovascular: distant  Gastrointestinal: normal appearing, nondistended  Extremities: no clubbing, no cyanosis,        LABS:                          10.9   9.61  )-----------( 288      ( 10 Sep 2022 07:10 )             34.4       WBC  9.61 09-10 @ 07:10  9.21 09-09 @ 06:04  14.58 09-08 @ 10:17  8.91 09-07 @ 08:03  10.27 09-06 @ 07:35  10.06 09-05 @ 06:33  10.50 09-04 @ 06:15      09-10    138  |  105  |  32<H>  ----------------------------<  81  4.3   |  27  |  1.10    Ca    8.8      10 Sep 2022 07:10    TPro  7.3  /  Alb  2.6<L>  /  TBili  0.5  /  DBili  x   /  AST  31  /  ALT  32  /  AlkPhos  135<H>  09-10      Creatinine, Serum: 1.10 mg/dL (09-10-22 @ 07:10)  Creatinine, Serum: 1.20 mg/dL (09-09-22 @ 06:04)  Creatinine, Serum: 1.20 mg/dL (09-08-22 @ 10:17)  Creatinine, Serum: 1.20 mg/dL (09-07-22 @ 08:03)  Creatinine, Serum: 1.20 mg/dL (09-06-22 @ 07:35)  Creatinine, Serum: 1.20 mg/dL (09-05-22 @ 06:33)  Creatinine, Serum: 1.20 mg/dL (09-04-22 @ 06:15)                INFLAMMATORY MARKERS  Auto Neutrophil #: 5.87 K/uL (09-10-22 @ 07:10)  Auto Lymphocyte #: 2.04 K/uL (09-10-22 @ 07:10)  Auto Neutrophil #: 5.12 K/uL (09-09-22 @ 06:04)  Auto Lymphocyte #: 2.40 K/uL (09-09-22 @ 06:04)  Auto Neutrophil #: 5.09 K/uL (09-07-22 @ 08:03)  Auto Lymphocyte #: 2.13 K/uL (09-07-22 @ 08:03)  Auto Neutrophil #: 6.91 K/uL (09-06-22 @ 07:35)  Auto Lymphocyte #: 1.87 K/uL (09-06-22 @ 07:35)  Auto Neutrophil #: 7.01 K/uL (08-30-22 @ 05:56)  Auto Lymphocyte #: 2.20 K/uL (08-30-22 @ 05:56)      Auto Eosinophil #: 0.65 K/uL (09-10-22 @ 07:10)  Auto Eosinophil #: 0.52 K/uL (09-09-22 @ 06:04)  Auto Eosinophil #: 0.55 K/uL (09-07-22 @ 08:03)  Auto Eosinophil #: 0.37 K/uL (09-06-22 @ 07:35)  Auto Eosinophil #: 0.28 K/uL (08-30-22 @ 05:56)      MICROBIOLOGY:    Culture - Fungal, Bronchial (08.30.22 @ 15:30)    Specimen Source: .Bronchial Bronchial    Culture Results:   Rare Geotrichum species  Few Yeast        RADIOLOGY & ADDITIONAL STUDIES:

## 2022-09-10 NOTE — PROGRESS NOTE ADULT - SUBJECTIVE AND OBJECTIVE BOX
Patient is a 88y old  Male who presents with a chief complaint of bronchoscopy (10 Sep 2022 12:10)      INTERVAL HPI/OVERNIGHT EVENTS: Patient seen and examined at bedside. No overnight events occurred. Patient has no complaints at this time. Denies fevers, chills, headache, lightheadedness, chest pain, dyspnea, abdominal pain, n/v/d/c.    MEDICATIONS  (STANDING):  ALBUTerol    0.083% 2.5 milliGRAM(s) Nebulizer every 6 hours  ALBUTerol    90 MICROgram(s) HFA Inhaler 1 Puff(s) Inhalation every 6 hours  amLODIPine   Tablet 5 milliGRAM(s) Oral daily  apixaban 5 milliGRAM(s) Oral two times a day  atorvastatin 40 milliGRAM(s) Oral at bedtime  budesonide  80 MICROgram(s)/formoterol 4.5 MICROgram(s) Inhaler 2 Puff(s) Inhalation two times a day  finasteride 5 milliGRAM(s) Oral daily  magnesium hydroxide Suspension 30 milliLiter(s) Oral daily  metoprolol tartrate 50 milliGRAM(s) Oral two times a day  naloxone Injectable 0.4 milliGRAM(s) IV Push once  pantoprazole    Tablet 40 milliGRAM(s) Oral before breakfast  polyethylene glycol 3350 17 Gram(s) Oral two times a day  saccharomyces boulardii 250 milliGRAM(s) Oral two times a day  senna 2 Tablet(s) Oral at bedtime  tamsulosin 0.4 milliGRAM(s) Oral at bedtime    MEDICATIONS  (PRN):  acetaminophen     Tablet .. 650 milliGRAM(s) Oral every 6 hours PRN Mild Pain (1 - 3)  bisacodyl 5 milliGRAM(s) Oral daily PRN Constipation  cyclobenzaprine 5 milliGRAM(s) Oral three times a day PRN Muscle Spasm  melatonin 3 milliGRAM(s) Oral at bedtime PRN Insomnia      Allergies    No Known Allergies    Intolerances        REVIEW OF SYSTEMS:  CONSTITUTIONAL: No fever or chills  HEENT:  No headache, no sore throat  RESPIRATORY: No cough, wheezing, or shortness of breath  CARDIOVASCULAR: No chest pain, palpitations  GASTROINTESTINAL: No abd pain, nausea, vomiting, or diarrhea  GENITOURINARY: No dysuria, frequency, or hematuria  NEUROLOGICAL: no focal weakness or dizziness  MUSCULOSKELETAL: no myalgias     Vital Signs Last 24 Hrs  T(C): 36.4 (10 Sep 2022 12:42), Max: 36.6 (09 Sep 2022 19:53)  T(F): 97.6 (10 Sep 2022 12:42), Max: 97.8 (09 Sep 2022 19:53)  HR: 59 (10 Sep 2022 12:42) (54 - 64)  BP: 122/64 (10 Sep 2022 12:42) (121/66 - 122/64)  BP(mean): --  RR: 17 (10 Sep 2022 12:42) (17 - 18)  SpO2: 98% (10 Sep 2022 12:42) (91% - 98%)    Parameters below as of 10 Sep 2022 12:42  Patient On (Oxygen Delivery Method): nasal cannula        PHYSICAL EXAM:  GENERAL: NAD, elderly  HEENT:  anicteric, moist mucous membranes  CHEST/LUNG:  CTA b/l, no rales, wheezes, or rhonchi  HEART:  RRR, S1, S2  ABDOMEN:  BS+, soft, nontender, nondistended  EXTREMITIES: no edema, cyanosis, or calf tenderness  NERVOUS SYSTEM: answers questions and follows commands appropriately    LABS:                        10.9   9.61  )-----------( 288      ( 10 Sep 2022 07:10 )             34.4     CBC Full  -  ( 10 Sep 2022 07:10 )  WBC Count : 9.61 K/uL  Hemoglobin : 10.9 g/dL  Hematocrit : 34.4 %  Platelet Count - Automated : 288 K/uL  Mean Cell Volume : 96.9 fl  Mean Cell Hemoglobin : 30.7 pg  Mean Cell Hemoglobin Concentration : 31.7 gm/dL  Auto Neutrophil # : 5.87 K/uL  Auto Lymphocyte # : 2.04 K/uL  Auto Monocyte # : 0.87 K/uL  Auto Eosinophil # : 0.65 K/uL  Auto Basophil # : 0.13 K/uL  Auto Neutrophil % : 61.0 %  Auto Lymphocyte % : 21.2 %  Auto Monocyte % : 9.1 %  Auto Eosinophil % : 6.8 %  Auto Basophil % : 1.4 %    10 Sep 2022 07:10    138    |  105    |  32     ----------------------------<  81     4.3     |  27     |  1.10     Ca    8.8        10 Sep 2022 07:10    TPro  7.3    /  Alb  2.6    /  TBili  0.5    /  DBili  x      /  AST  31     /  ALT  32     /  AlkPhos  135    10 Sep 2022 07:10        CAPILLARY BLOOD GLUCOSE              RADIOLOGY & ADDITIONAL TESTS:  none in past 24 hours  Personally reviewed.     Consultant(s) Notes Reviewed:  [x] YES  [ ] NO

## 2022-09-10 NOTE — PROGRESS NOTE ADULT - SUBJECTIVE AND OBJECTIVE BOX
Date/Time Patient Seen:  		  Referring MD:   Data Reviewed	       Patient is a 88y old  Male who presents with a chief complaint of bronchoscopy (09 Sep 2022 11:55)      Subjective/HPI     PAST MEDICAL & SURGICAL HISTORY:  No pertinent past medical history    Skin cancer  s/p Mohs    Essential hypertension    Cerebrovascular accident (CVA), unspecified mechanism  2015    Hyperlipidemia, unspecified hyperlipidemia type    Complex tear of lateral meniscus of right knee as current injury, initial encounter    Complex tear of medial meniscus of right knee as current injury, initial encounter    Benign prostatic hyperplasia, presence of lower urinary tract symptoms unspecified, unspecified morphology    H/O pilonidal cyst          Medication list         MEDICATIONS  (STANDING):  ALBUTerol    0.083% 2.5 milliGRAM(s) Nebulizer every 6 hours  ALBUTerol    90 MICROgram(s) HFA Inhaler 1 Puff(s) Inhalation every 6 hours  amLODIPine   Tablet 5 milliGRAM(s) Oral daily  apixaban 5 milliGRAM(s) Oral two times a day  atorvastatin 40 milliGRAM(s) Oral at bedtime  budesonide  80 MICROgram(s)/formoterol 4.5 MICROgram(s) Inhaler 2 Puff(s) Inhalation two times a day  finasteride 5 milliGRAM(s) Oral daily  magnesium hydroxide Suspension 30 milliLiter(s) Oral daily  metoprolol tartrate 50 milliGRAM(s) Oral two times a day  naloxone Injectable 0.4 milliGRAM(s) IV Push once  pantoprazole    Tablet 40 milliGRAM(s) Oral before breakfast  polyethylene glycol 3350 17 Gram(s) Oral two times a day  saccharomyces boulardii 250 milliGRAM(s) Oral two times a day  senna 2 Tablet(s) Oral at bedtime  tamsulosin 0.4 milliGRAM(s) Oral at bedtime    MEDICATIONS  (PRN):  acetaminophen     Tablet .. 650 milliGRAM(s) Oral every 6 hours PRN Mild Pain (1 - 3)  bisacodyl 5 milliGRAM(s) Oral daily PRN Constipation  cyclobenzaprine 5 milliGRAM(s) Oral three times a day PRN Muscle Spasm  melatonin 3 milliGRAM(s) Oral at bedtime PRN Insomnia         Vitals log        ICU Vital Signs Last 24 Hrs  T(C): 36.5 (10 Sep 2022 05:09), Max: 36.9 (09 Sep 2022 12:53)  T(F): 97.7 (10 Sep 2022 05:09), Max: 98.5 (09 Sep 2022 12:53)  HR: 54 (10 Sep 2022 05:38) (54 - 68)  BP: 121/66 (10 Sep 2022 05:09) (118/59 - 123/64)  BP(mean): --  ABP: --  ABP(mean): --  RR: 18 (10 Sep 2022 05:09) (18 - 19)  SpO2: 95% (10 Sep 2022 05:09) (91% - 98%)    O2 Parameters below as of 10 Sep 2022 05:09  Patient On (Oxygen Delivery Method): nasal cannula  O2 Flow (L/min): 2               Input and Output:  I&O's Detail    08 Sep 2022 07:01  -  09 Sep 2022 07:00  --------------------------------------------------------  IN:  Total IN: 0 mL    OUT:    Voided (mL): 300 mL  Total OUT: 300 mL    Total NET: -300 mL      09 Sep 2022 07:01  -  10 Sep 2022 06:23  --------------------------------------------------------  IN:  Total IN: 0 mL    OUT:    Voided (mL): 200 mL  Total OUT: 200 mL    Total NET: -200 mL          Lab Data                        10.9   9.21  )-----------( 293      ( 09 Sep 2022 06:04 )             33.5     09-09    139  |  105  |  30<H>  ----------------------------<  86  5.2   |  31  |  1.20    Ca    9.0      09 Sep 2022 06:04    TPro  7.5  /  Alb  2.6<L>  /  TBili  0.5  /  DBili  x   /  AST  29  /  ALT  32  /  AlkPhos  146<H>  09-09            Review of Systems	      Objective     Physical Examination    heart s1s2  lung dec BS  abd soft      Pertinent Lab findings & Imaging      Bud:  NO   Adequate UO     I&O's Detail    08 Sep 2022 07:01  -  09 Sep 2022 07:00  --------------------------------------------------------  IN:  Total IN: 0 mL    OUT:    Voided (mL): 300 mL  Total OUT: 300 mL    Total NET: -300 mL      09 Sep 2022 07:01  -  10 Sep 2022 06:23  --------------------------------------------------------  IN:  Total IN: 0 mL    OUT:    Voided (mL): 200 mL  Total OUT: 200 mL    Total NET: -200 mL               Discussed with:     Cultures:	        Radiology

## 2022-09-10 NOTE — PROGRESS NOTE ADULT - ASSESSMENT
87M with HTN, HLD, hx of CVA (no residual deficits), BPH, hx of skin CA who presents with cough and fevers.    Found to have multifocal pneumonia.  Also found have new onset afib.        Sepsis 2/2 Multifocal PNA/Hemoptysis   - pt p/w leukocytosis, SOB  - CT w/ small cavitations, b/l GGO and airspace consolidations  AFB growth in broth from 8/12 sputum samples  sp bronchoscopy, Rare Geotrichum species, Few Yeast -suggest colonization so no Rx  main concerns are for atypical/nontuberculosis mycobacterial infection in this setting    I spoke to lab and they are still waiting for the culture to get the point that it can be probed for ID, lab has my cell and will call me as soon as we have identification    recs to follow when AFB identified - if not MTB then fine to dc and follow up as outpatient. If MTB please call for further guidance    Thank you for consulting us and involving us in the management of this most interesting and challenging case.  We will follow along in the care of this patient. Please call us at [phone] or text me directly on my cell# at [phone] with any concerns.

## 2022-09-10 NOTE — PROGRESS NOTE ADULT - ASSESSMENT
88yo M with PMH of HTN, HLD, hx of CVA (no residual deficits), BPH, hx of skin CA who presents with cough and fevers a/w sepsis due to multifocal pneumonia with progression to acute hypoxic respiratory failure now on HFNC and course further c/b new onset afib.  Transferred from [place] for bronchoscopy.     Bronch done - Aug 30th -   Bronch AFB neg  ID follow up - reviewed -   AFB identification pending -   Flexeril added -     eval malignancy - TB - PNA - infection  spoke with pt - dtr -   s/p Levaquin course in [facility]  o2 support as needed to keep sat > 88 pct  isolation precs  ID follow up

## 2022-09-10 NOTE — PROGRESS NOTE ADULT - PROBLEM SELECTOR PLAN 1
- Davey workup indeterminant for TB, AFB growth in broth positive, 3x AFB smears negative. Quant gold indeterminant. Afebrile w/ leukocytosis, downtrending  - Bronchial cx (bronchoscopy 8/30): no acid fast growth. final.   - Sputum cx: AFB+ in broth, to be identified  - blood cx: no growth. final.  - Bronchial AFB and TB: no acid fast growth. final.  - Bx bronchial cells 9/1: -ve for malignancy, -ve AFB    - Bronchial fungal cx: rare Geotrichum species 9/9 (prelim)    - CXR 8/22: Persistent advanced irregular infiltrates involving the left mid and upper lung fields and the right upper lobe  - pulm following  - wean o2 as tolerated  - ID consulted, recs appreciated

## 2022-09-10 NOTE — PROGRESS NOTE ADULT - SUBJECTIVE AND OBJECTIVE BOX
Chief Complaint: Hemoptysis    Interval Events: No events overnight.    Review of Systems:  General: No fevers, chills, weight gain  Skin: No rashes, color changes  Cardiovascular: No chest pain, orthopnea  Respiratory: No shortness of breath, cough  Gastrointestinal: No nausea, abdominal pain  Genitourinary: No incontinence, pain with urination  Musculoskeletal: No pain, swelling, decreased range of motion  Neurological: No headache, weakness  Psychiatric: No depression, anxiety  Endocrine: No weight gain, increased thirst  All other systems are comprehensively negative.    Physical Exam:  Vital Signs Last 24 Hrs  T(C): 36.5 (10 Sep 2022 05:09), Max: 36.9 (09 Sep 2022 12:53)  T(F): 97.7 (10 Sep 2022 05:09), Max: 98.5 (09 Sep 2022 12:53)  HR: 54 (10 Sep 2022 05:38) (54 - 68)  BP: 121/66 (10 Sep 2022 05:09) (118/59 - 123/64)  BP(mean): --  RR: 18 (10 Sep 2022 05:09) (18 - 19)  SpO2: 95% (10 Sep 2022 05:09) (91% - 98%)  Parameters below as of 10 Sep 2022 05:09  Patient On (Oxygen Delivery Method): nasal cannula  O2 Flow (L/min): 2  General: NAD  HEENT: MMM  Neck: No JVD, no carotid bruit  Lungs: CTAB  CV: RRR, nl S1/S2, no M/R/G  Abdomen: S/NT/ND, +BS  Extremities: No LE edema, no cyanosis  Neuro: AAOx3, non-focal  Skin: No rash    Labs:    09-10    138  |  105  |  32<H>  ----------------------------<  81  4.3   |  27  |  1.10    Ca    8.8      10 Sep 2022 07:10    TPro  7.3  /  Alb  2.6<L>  /  TBili  0.5  /  DBili  x   /  AST  31  /  ALT  32  /  AlkPhos  135<H>  09-10                        10.9   9.61  )-----------( 288      ( 10 Sep 2022 07:10 )             34.4       Telemetry: Sinus rhythm

## 2022-09-10 NOTE — PROGRESS NOTE ADULT - ATTENDING COMMENTS
89yo M with PMH of HTN, HLD, hx of CVA (no residual deficits), BPH, hx of skin CA who presents as a transfer from [facility] for bronchoscopy to r/o TB, satting well on 3L NC.    awaiting final culture results, ID following, once negative can dc patient to [facility]

## 2022-09-10 NOTE — PROGRESS NOTE ADULT - ASSESSMENT
89yo M with PMH of HTN, HLD, hx of CVA (no residual deficits), BPH, hx of skin CA who presents as a transfer from [facility] for bronchoscopy to r/o TB, satting well on 3L NC.

## 2022-09-11 LAB
ANION GAP SERPL CALC-SCNC: 5 MMOL/L — SIGNIFICANT CHANGE UP (ref 5–17)
BUN SERPL-MCNC: 29 MG/DL — HIGH (ref 7–23)
CALCIUM SERPL-MCNC: 8.9 MG/DL — SIGNIFICANT CHANGE UP (ref 8.5–10.1)
CHLORIDE SERPL-SCNC: 105 MMOL/L — SIGNIFICANT CHANGE UP (ref 96–108)
CO2 SERPL-SCNC: 29 MMOL/L — SIGNIFICANT CHANGE UP (ref 22–31)
CREAT SERPL-MCNC: 1.2 MG/DL — SIGNIFICANT CHANGE UP (ref 0.5–1.3)
EGFR: 58 ML/MIN/1.73M2 — LOW
GLUCOSE SERPL-MCNC: 121 MG/DL — HIGH (ref 70–99)
HCT VFR BLD CALC: 35.7 % — LOW (ref 39–50)
HGB BLD-MCNC: 11.4 G/DL — LOW (ref 13–17)
MCHC RBC-ENTMCNC: 31 PG — SIGNIFICANT CHANGE UP (ref 27–34)
MCHC RBC-ENTMCNC: 31.9 GM/DL — LOW (ref 32–36)
MCV RBC AUTO: 97 FL — SIGNIFICANT CHANGE UP (ref 80–100)
NRBC # BLD: 0 /100 WBCS — SIGNIFICANT CHANGE UP (ref 0–0)
PLATELET # BLD AUTO: 293 K/UL — SIGNIFICANT CHANGE UP (ref 150–400)
POTASSIUM SERPL-MCNC: 4.5 MMOL/L — SIGNIFICANT CHANGE UP (ref 3.5–5.3)
POTASSIUM SERPL-SCNC: 4.5 MMOL/L — SIGNIFICANT CHANGE UP (ref 3.5–5.3)
RBC # BLD: 3.68 M/UL — LOW (ref 4.2–5.8)
RBC # FLD: 13.9 % — SIGNIFICANT CHANGE UP (ref 10.3–14.5)
SODIUM SERPL-SCNC: 139 MMOL/L — SIGNIFICANT CHANGE UP (ref 135–145)
WBC # BLD: 10.97 K/UL — HIGH (ref 3.8–10.5)
WBC # FLD AUTO: 10.97 K/UL — HIGH (ref 3.8–10.5)

## 2022-09-11 PROCEDURE — 99233 SBSQ HOSP IP/OBS HIGH 50: CPT | Mod: GC

## 2022-09-11 RX ORDER — LIDOCAINE 4 G/100G
1 CREAM TOPICAL DAILY
Refills: 0 | Status: DISCONTINUED | OUTPATIENT
Start: 2022-09-11 | End: 2022-09-13

## 2022-09-11 RX ADMIN — CYCLOBENZAPRINE HYDROCHLORIDE 5 MILLIGRAM(S): 10 TABLET, FILM COATED ORAL at 22:41

## 2022-09-11 RX ADMIN — Medication 650 MILLIGRAM(S): at 23:10

## 2022-09-11 RX ADMIN — LIDOCAINE 1 PATCH: 4 CREAM TOPICAL at 17:36

## 2022-09-11 RX ADMIN — Medication 650 MILLIGRAM(S): at 17:36

## 2022-09-11 RX ADMIN — POLYETHYLENE GLYCOL 3350 17 GRAM(S): 17 POWDER, FOR SOLUTION ORAL at 06:14

## 2022-09-11 RX ADMIN — Medication 50 MILLIGRAM(S): at 06:15

## 2022-09-11 RX ADMIN — Medication 3 MILLIGRAM(S): at 22:42

## 2022-09-11 RX ADMIN — TAMSULOSIN HYDROCHLORIDE 0.4 MILLIGRAM(S): 0.4 CAPSULE ORAL at 22:00

## 2022-09-11 RX ADMIN — Medication 250 MILLIGRAM(S): at 17:36

## 2022-09-11 RX ADMIN — Medication 650 MILLIGRAM(S): at 22:41

## 2022-09-11 RX ADMIN — ATORVASTATIN CALCIUM 40 MILLIGRAM(S): 80 TABLET, FILM COATED ORAL at 22:00

## 2022-09-11 RX ADMIN — FINASTERIDE 5 MILLIGRAM(S): 5 TABLET, FILM COATED ORAL at 11:13

## 2022-09-11 RX ADMIN — MAGNESIUM HYDROXIDE 30 MILLILITER(S): 400 TABLET, CHEWABLE ORAL at 11:13

## 2022-09-11 RX ADMIN — AMLODIPINE BESYLATE 5 MILLIGRAM(S): 2.5 TABLET ORAL at 06:12

## 2022-09-11 RX ADMIN — SENNA PLUS 2 TABLET(S): 8.6 TABLET ORAL at 21:59

## 2022-09-11 RX ADMIN — Medication 250 MILLIGRAM(S): at 06:11

## 2022-09-11 RX ADMIN — Medication 50 MILLIGRAM(S): at 17:36

## 2022-09-11 RX ADMIN — LIDOCAINE 1 PATCH: 4 CREAM TOPICAL at 21:55

## 2022-09-11 RX ADMIN — APIXABAN 5 MILLIGRAM(S): 2.5 TABLET, FILM COATED ORAL at 06:11

## 2022-09-11 RX ADMIN — CYCLOBENZAPRINE HYDROCHLORIDE 5 MILLIGRAM(S): 10 TABLET, FILM COATED ORAL at 02:19

## 2022-09-11 RX ADMIN — POLYETHYLENE GLYCOL 3350 17 GRAM(S): 17 POWDER, FOR SOLUTION ORAL at 17:36

## 2022-09-11 RX ADMIN — Medication 650 MILLIGRAM(S): at 18:36

## 2022-09-11 RX ADMIN — PANTOPRAZOLE SODIUM 40 MILLIGRAM(S): 20 TABLET, DELAYED RELEASE ORAL at 06:14

## 2022-09-11 RX ADMIN — APIXABAN 5 MILLIGRAM(S): 2.5 TABLET, FILM COATED ORAL at 17:36

## 2022-09-11 NOTE — PROGRESS NOTE ADULT - PROBLEM SELECTOR PLAN 4
9/1 Pressures in 130s, previously septic in [place]  - cardio following: continue amlodipine 5mg qD  - Continue to monitor hemodynamics, well controlled now - continue amlodipine, metoprolol   - Continue to monitor hemodynamics, well controlled now

## 2022-09-11 NOTE — PROGRESS NOTE ADULT - SUBJECTIVE AND OBJECTIVE BOX
Chief Complaint: Hemoptysis    Interval Events: No events overnight.    Review of Systems:  General: No fevers, chills, weight gain  Skin: No rashes, color changes  Cardiovascular: No chest pain, orthopnea  Respiratory: No shortness of breath, cough  Gastrointestinal: No nausea, abdominal pain  Genitourinary: No incontinence, pain with urination  Musculoskeletal: No pain, swelling, decreased range of motion  Neurological: No headache, weakness  Psychiatric: No depression, anxiety  Endocrine: No weight gain, increased thirst  All other systems are comprehensively negative.    Physical Exam:  Vital Signs Last 24 Hrs  T(C): 36.6 (11 Sep 2022 05:02), Max: 36.7 (10 Sep 2022 19:55)  T(F): 97.8 (11 Sep 2022 05:02), Max: 98 (10 Sep 2022 19:55)  HR: 74 (11 Sep 2022 05:02) (59 - 74)  BP: 137/63 (11 Sep 2022 05:02) (117/66 - 137/63)  BP(mean): --  RR: 18 (11 Sep 2022 05:02) (17 - 18)  SpO2: 94% (11 Sep 2022 05:02) (88% - 98%)  Parameters below as of 11 Sep 2022 05:02  Patient On (Oxygen Delivery Method): room air  General: NAD  HEENT: MMM  Neck: No JVD, no carotid bruit  Lungs: CTAB  CV: RRR, nl S1/S2, no M/R/G  Abdomen: S/NT/ND, +BS  Extremities: No LE edema, no cyanosis  Neuro: AAOx3, non-focal  Skin: No rash    Labs:    09-10    138  |  105  |  32<H>  ----------------------------<  81  4.3   |  27  |  1.10    Ca    8.8      10 Sep 2022 07:10    TPro  7.3  /  Alb  2.6<L>  /  TBili  0.5  /  DBili  x   /  AST  31  /  ALT  32  /  AlkPhos  135<H>  09-10                        10.9   9.61  )-----------( 288      ( 10 Sep 2022 07:10 )             34.4       Telemetry: Sinus rhythm

## 2022-09-11 NOTE — PROGRESS NOTE ADULT - ASSESSMENT
87M with HTN, HLD, hx of CVA (no residual deficits), BPH, hx of skin CA who presents with cough and fevers.    Found to have multifocal pneumonia.  Also found have new onset afib.        Sepsis 2/2 Multifocal PNA/Hemoptysis   - pt p/w leukocytosis, SOB  - CT w/ small cavitations, b/l GGO and airspace consolidations  AFB growth in broth from 8/12 sputum samples  sp bronchoscopy, Rare Geotrichum species, Few Yeast -suggest colonization so no Rx  main concerns are for atypical/nontuberculosis mycobacterial infection in this setting    I spoke to lab and they are still waiting for the culture to get the point that it can be probed for ID, lab has my cell and will call me as soon as we have identification    with time to get ID no issues from ID with dc to home isolation if allowable per JOE and acceptable by patient as we have no firm promise on when we will get this identification     We will follow along in the care of this patient. Please call us at [phone] or text me directly on my cell# at [phone] with any concerns.

## 2022-09-11 NOTE — PROGRESS NOTE ADULT - ASSESSMENT
88yo M with PMH of HTN, HLD, hx of CVA (no residual deficits), BPH, hx of skin CA who presents with cough and fevers a/w sepsis due to multifocal pneumonia with progression to acute hypoxic respiratory failure now on HFNC and course further c/b new onset afib.  Transferred from [place] for bronchoscopy.     Bronch done - Aug 30th -   Bronch AFB neg  ID follow up - reviewed -   AFB identification pending -   Flexeril added -     eval malignancy - TB - PNA - infection  spoke with pt - dtr -   s/p Levaquin course in [facility]  o2 support as needed to keep sat > 88 pct  isolation precs  ID follow up

## 2022-09-11 NOTE — PROGRESS NOTE ADULT - PROBLEM SELECTOR PLAN 3
Pt has complaint of lower back pain  - Pain resolved with standing tylenol, oxy 2.5  - iSTOP 8/30, chart note   - low dose opoid regimen w/ PRN bowel regimen  - Pt reports more spasmodic pain in buttocks, paraspinals- continue muscle relaxants Pt has complaint of lower back pain  - Pain improved with standing tylenol, add lidoderm patch  - iSTOP 8/30, chart note   - low dose opoid regimen w/ PRN bowel regimen  - Pt reports more spasmodic pain in buttocks, paraspinals- continue muscle relaxants

## 2022-09-11 NOTE — PROGRESS NOTE ADULT - SUBJECTIVE AND OBJECTIVE BOX
[facility] DIVISION of INFECTIOUS DISEASE  Mustapha Valente MD PhD, Sheryl Pride MD, Adriana Rodriguez MD, Oliva Myers MD, Ebenezer Rivas MD  and providing coverage with Leodan Zaraogza MD  Providing Infectious Disease Consultations at [facility], [facility], [place], [facility], [facility]'s    Office# [phone] to schedule follow up appointments  Answering Service for urgent calls or New Consults [phone]  Cell# to text for urgent issues Mustapha Valente [phone]     infectious diseases progress note:    DENNIS BRADFORD is a 88y y. o. Male patient    Overnight and events of the last 24hrs reviewed    Allergies    No Known Allergies    Intolerances        ANTIBIOTICS/RELEVANT:  antimicrobials    immunologic:    OTHER:  acetaminophen     Tablet .. 650 milliGRAM(s) Oral every 6 hours PRN  ALBUTerol    0.083% 2.5 milliGRAM(s) Nebulizer every 6 hours  ALBUTerol    90 MICROgram(s) HFA Inhaler 1 Puff(s) Inhalation every 6 hours  amLODIPine   Tablet 5 milliGRAM(s) Oral daily  apixaban 5 milliGRAM(s) Oral two times a day  atorvastatin 40 milliGRAM(s) Oral at bedtime  bisacodyl 5 milliGRAM(s) Oral daily PRN  budesonide  80 MICROgram(s)/formoterol 4.5 MICROgram(s) Inhaler 2 Puff(s) Inhalation two times a day  cyclobenzaprine 5 milliGRAM(s) Oral three times a day PRN  finasteride 5 milliGRAM(s) Oral daily  magnesium hydroxide Suspension 30 milliLiter(s) Oral daily  melatonin 3 milliGRAM(s) Oral at bedtime PRN  metoprolol tartrate 50 milliGRAM(s) Oral two times a day  naloxone Injectable 0.4 milliGRAM(s) IV Push once  pantoprazole    Tablet 40 milliGRAM(s) Oral before breakfast  polyethylene glycol 3350 17 Gram(s) Oral two times a day  saccharomyces boulardii 250 milliGRAM(s) Oral two times a day  senna 2 Tablet(s) Oral at bedtime  tamsulosin 0.4 milliGRAM(s) Oral at bedtime      Objective:  Vital Signs Last 24 Hrs  T(C): 36.6 (11 Sep 2022 05:02), Max: 36.7 (10 Sep 2022 19:55)  T(F): 97.8 (11 Sep 2022 05:02), Max: 98 (10 Sep 2022 19:55)  HR: 74 (11 Sep 2022 05:02) (59 - 74)  BP: 137/63 (11 Sep 2022 05:02) (117/66 - 137/63)  BP(mean): --  RR: 18 (11 Sep 2022 05:02) (17 - 18)  SpO2: 94% (11 Sep 2022 05:02) (88% - 98%)    Parameters below as of 11 Sep 2022 05:02  Patient On (Oxygen Delivery Method): room air        T(C): 36.6 (09-11-22 @ 05:02), Max: 36.9 (09-09-22 @ 12:53)  T(C): 36.6 (09-11-22 @ 05:02), Max: 36.9 (09-09-22 @ 12:53)  T(C): 36.6 (09-11-22 @ 05:02), Max: 36.9 (09-07-22 @ 12:43)    PHYSICAL EXAM:  HEENT: NC atraumatic  Neck: supple  Respiratory: no accessory muscle use, breathing comfortably  Cardiovascular: distant  Gastrointestinal: normal appearing, nondistended  Extremities: no clubbing, no cyanosis,        LABS:                          11.4   10.97 )-----------( 293      ( 11 Sep 2022 09:17 )             35.7       WBC  10.97 09-11 @ 09:17  9.61 09-10 @ 07:10  9.21 09-09 @ 06:04  14.58 09-08 @ 10:17  8.91 09-07 @ 08:03  10.27 09-06 @ 07:35  10.06 09-05 @ 06:33      09-11    139  |  105  |  29<H>  ----------------------------<  121<H>  4.5   |  29  |  1.20    Ca    8.9      11 Sep 2022 09:17    TPro  7.3  /  Alb  2.6<L>  /  TBili  0.5  /  DBili  x   /  AST  31  /  ALT  32  /  AlkPhos  135<H>  09-10      Creatinine, Serum: 1.20 mg/dL (09-11-22 @ 09:17)  Creatinine, Serum: 1.10 mg/dL (09-10-22 @ 07:10)  Creatinine, Serum: 1.20 mg/dL (09-09-22 @ 06:04)  Creatinine, Serum: 1.20 mg/dL (09-08-22 @ 10:17)  Creatinine, Serum: 1.20 mg/dL (09-07-22 @ 08:03)  Creatinine, Serum: 1.20 mg/dL (09-06-22 @ 07:35)  Creatinine, Serum: 1.20 mg/dL (09-05-22 @ 06:33)                INFLAMMATORY MARKERS  Auto Neutrophil #: 5.87 K/uL (09-10-22 @ 07:10)  Auto Lymphocyte #: 2.04 K/uL (09-10-22 @ 07:10)  Auto Neutrophil #: 5.12 K/uL (09-09-22 @ 06:04)  Auto Lymphocyte #: 2.40 K/uL (09-09-22 @ 06:04)  Auto Neutrophil #: 5.09 K/uL (09-07-22 @ 08:03)  Auto Lymphocyte #: 2.13 K/uL (09-07-22 @ 08:03)  Auto Neutrophil #: 6.91 K/uL (09-06-22 @ 07:35)  Auto Lymphocyte #: 1.87 K/uL (09-06-22 @ 07:35)  Auto Neutrophil #: 7.01 K/uL (08-30-22 @ 05:56)  Auto Lymphocyte #: 2.20 K/uL (08-30-22 @ 05:56)      Auto Eosinophil #: 0.65 K/uL (09-10-22 @ 07:10)  Auto Eosinophil #: 0.52 K/uL (09-09-22 @ 06:04)  Auto Eosinophil #: 0.55 K/uL (09-07-22 @ 08:03)  Auto Eosinophil #: 0.37 K/uL (09-06-22 @ 07:35)  Auto Eosinophil #: 0.28 K/uL (08-30-22 @ 05:56)                                MICROBIOLOGY:              RADIOLOGY & ADDITIONAL STUDIES:

## 2022-09-11 NOTE — PROGRESS NOTE ADULT - SUBJECTIVE AND OBJECTIVE BOX
Date/Time Patient Seen:  		  Referring MD:   Data Reviewed	       Patient is a 88y old  Male who presents with a chief complaint of bronchoscopy (10 Sep 2022 17:09)      Subjective/HPI     PAST MEDICAL & SURGICAL HISTORY:  No pertinent past medical history    Skin cancer  s/p Mohs    Essential hypertension    Cerebrovascular accident (CVA), unspecified mechanism  2015    Hyperlipidemia, unspecified hyperlipidemia type    Complex tear of lateral meniscus of right knee as current injury, initial encounter    Complex tear of medial meniscus of right knee as current injury, initial encounter    Benign prostatic hyperplasia, presence of lower urinary tract symptoms unspecified, unspecified morphology    H/O pilonidal cyst          Medication list         MEDICATIONS  (STANDING):  ALBUTerol    0.083% 2.5 milliGRAM(s) Nebulizer every 6 hours  ALBUTerol    90 MICROgram(s) HFA Inhaler 1 Puff(s) Inhalation every 6 hours  amLODIPine   Tablet 5 milliGRAM(s) Oral daily  apixaban 5 milliGRAM(s) Oral two times a day  atorvastatin 40 milliGRAM(s) Oral at bedtime  budesonide  80 MICROgram(s)/formoterol 4.5 MICROgram(s) Inhaler 2 Puff(s) Inhalation two times a day  finasteride 5 milliGRAM(s) Oral daily  magnesium hydroxide Suspension 30 milliLiter(s) Oral daily  metoprolol tartrate 50 milliGRAM(s) Oral two times a day  naloxone Injectable 0.4 milliGRAM(s) IV Push once  pantoprazole    Tablet 40 milliGRAM(s) Oral before breakfast  polyethylene glycol 3350 17 Gram(s) Oral two times a day  saccharomyces boulardii 250 milliGRAM(s) Oral two times a day  senna 2 Tablet(s) Oral at bedtime  tamsulosin 0.4 milliGRAM(s) Oral at bedtime    MEDICATIONS  (PRN):  acetaminophen     Tablet .. 650 milliGRAM(s) Oral every 6 hours PRN Mild Pain (1 - 3)  bisacodyl 5 milliGRAM(s) Oral daily PRN Constipation  cyclobenzaprine 5 milliGRAM(s) Oral three times a day PRN Muscle Spasm  melatonin 3 milliGRAM(s) Oral at bedtime PRN Insomnia         Vitals log        ICU Vital Signs Last 24 Hrs  T(C): 36.6 (11 Sep 2022 05:02), Max: 36.7 (10 Sep 2022 19:55)  T(F): 97.8 (11 Sep 2022 05:02), Max: 98 (10 Sep 2022 19:55)  HR: 74 (11 Sep 2022 05:02) (59 - 74)  BP: 137/63 (11 Sep 2022 05:02) (117/66 - 137/63)  BP(mean): --  ABP: --  ABP(mean): --  RR: 18 (11 Sep 2022 05:02) (17 - 18)  SpO2: 94% (11 Sep 2022 05:02) (88% - 98%)    O2 Parameters below as of 11 Sep 2022 05:02  Patient On (Oxygen Delivery Method): room air                 Input and Output:  I&O's Detail    09 Sep 2022 07:01  -  10 Sep 2022 07:00  --------------------------------------------------------  IN:  Total IN: 0 mL    OUT:    Voided (mL): 200 mL  Total OUT: 200 mL    Total NET: -200 mL          Lab Data                        10.9   9.61  )-----------( 288      ( 10 Sep 2022 07:10 )             34.4     09-10    138  |  105  |  32<H>  ----------------------------<  81  4.3   |  27  |  1.10    Ca    8.8      10 Sep 2022 07:10    TPro  7.3  /  Alb  2.6<L>  /  TBili  0.5  /  DBili  x   /  AST  31  /  ALT  32  /  AlkPhos  135<H>  09-10            Review of Systems	      Objective     Physical Examination  heart s1s2  lung dec BS  abd soft        Pertinent Lab findings & Imaging      Bud:  NO   Adequate UO     I&O's Detail    09 Sep 2022 07:01  -  10 Sep 2022 07:00  --------------------------------------------------------  IN:  Total IN: 0 mL    OUT:    Voided (mL): 200 mL  Total OUT: 200 mL    Total NET: -200 mL               Discussed with:     Cultures:	        Radiology

## 2022-09-11 NOTE — PROGRESS NOTE ADULT - ASSESSMENT
89yo M with PMH of HTN, HLD, hx of CVA (no residual deficits), BPH, hx of skin CA who presents as a transfer from [facility] for bronchoscopy to r/o TB, satting well on 3L NC.

## 2022-09-11 NOTE — PROGRESS NOTE ADULT - SUBJECTIVE AND OBJECTIVE BOX
HPI:  87M with HTN, HLD, hx of CVA (no residual deficits), BPH, hx of skin CA who presents with cough and fevers.  Symptoms started about 5-6 days prior to admission.  Started with left sided upper back pain.  Then started to have a cough associated with green phlegm and possible blood.  Associated with SOB and DONG.  No weight changes or night sweats.  Had a feveras high as 102F.  Some nausea but no vomiting with loss of appetite.  No chest pain.  No diarrhea.  No abdominal pain.  No recent travel or sick contacts.  Went to see his PMD 3 days prior to [facility] admission and was given a z-kellie and tesslon perles.  Reported had negative COVID tests at home.   In the [facility] ED, patient's triage vitals were /78    RR  21, 93% on RA (thought dropped down to 88%) and T 98.5F.  Labs showed WBC 18 with a left shift, CINDI with BUN/Cr 40/1.88, elevated LFTs, normal lactate, negative COVID (last booster in 12/2021 with Pfizer vaccine), neg influenza, neg RSV.  CT chest showed "patchy groundglass and more dense opacities in both lungs, suspicious for multifocal PNA...several small lucencies with areas of lung opacity...may represent early cavitation versus areas of focal bronchiectasis."  Patient started on azithromycin and ceftriaxone empirically for multifocal PNA.  Also of note, patient was found to be in new afib on EKG.  Patient denies any afib history but said when he was younger he did feel some irregular heartbeats.  Patient admitted to [facility] with sepsis due to multifocal pneumonia with progression to acute hypoxic respiratory failure now on HFNC and course further c/b new onset afib. Patient was seen by ID, pulm and cardiology. He is now transferred to [facility] for bronchoscopy. Patient denies states he is feeling well at this time.  (29 Aug 2022 23:01)      INTERVAL HPI/OVERNIGHT EVENTS: Pt was seen and examined at bedside. No acute overnight events. Pt reports some back pain, otherwise no acute complaints at this time.  Pt denies headache, dizziness, lightheadedness, fever, chills, body aches, CP, SOB, palpitations, abdominal pain, n/v, numbness/tingling.  No other complaints at this time.     REVIEW OF SYSTEMS:    CONSTITUTIONAL: No weakness, fevers or chills  EYES/ENT: No visual changes, no throat pain   RESPIRATORY: No cough, wheezing, hemoptysis; No shortness of breath  CARDIOVASCULAR: No chest pain or palpitations  GASTROINTESTINAL: No abdominal, nausea, vomiting, or hematemesis; No diarrhea or constipation. No melena or hematochezia.  GENITOURINARY: No dysuria, frequency or hematuria  NEUROLOGICAL: No dizziness, numbness, or weakness  SKIN: No itching, burning, rashes, or lesions   All other review of systems is negative unless indicated above.    VITAL SIGNS:    T(C): 36.2 (09-11-22 @ 11:40), Max: 36.7 (09-10-22 @ 19:55)  HR: 67 (09-11-22 @ 11:40) (67 - 74)  BP: 105/56 (09-11-22 @ 11:40) (105/56 - 137/63)  RR: 18 (09-11-22 @ 11:40) (18 - 18)  SpO2: 96% (09-11-22 @ 11:40) (88% - 96%)    PHYSICAL EXAM:     GENERAL: no acute distress  HEENT: NC/AT, EOMI, neck supple, MMM  RESPIRATORY: LCTAB/L, no rhonchi, rales, or wheezing  CARDIOVASCULAR: RRR, no murmurs, gallops, rubs  ABDOMINAL: soft, non-tender, non-distended, positive bowel sounds   EXTREMITIES: no clubbing, cyanosis, or edema  NEUROLOGICAL: alert and oriented x 3, non-focal  SKIN: no rashes or lesions   MUSCULOSKELETAL: no gross joint deformity                          11.4   10.97 )-----------( 293      ( 11 Sep 2022 09:17 )             35.7     09-11    139  |  105  |  29<H>  ----------------------------<  121<H>  4.5   |  29  |  1.20    Ca    8.9      11 Sep 2022 09:17    TPro  7.3  /  Alb  2.6<L>  /  TBili  0.5  /  DBili  x   /  AST  31  /  ALT  32  /  AlkPhos  135<H>  09-10      CAPILLARY BLOOD GLUCOSE          MEDICATIONS  (STANDING):  ALBUTerol    0.083% 2.5 milliGRAM(s) Nebulizer every 6 hours  ALBUTerol    90 MICROgram(s) HFA Inhaler 1 Puff(s) Inhalation every 6 hours  amLODIPine   Tablet 5 milliGRAM(s) Oral daily  apixaban 5 milliGRAM(s) Oral two times a day  atorvastatin 40 milliGRAM(s) Oral at bedtime  budesonide  80 MICROgram(s)/formoterol 4.5 MICROgram(s) Inhaler 2 Puff(s) Inhalation two times a day  finasteride 5 milliGRAM(s) Oral daily  magnesium hydroxide Suspension 30 milliLiter(s) Oral daily  metoprolol tartrate 50 milliGRAM(s) Oral two times a day  naloxone Injectable 0.4 milliGRAM(s) IV Push once  pantoprazole    Tablet 40 milliGRAM(s) Oral before breakfast  polyethylene glycol 3350 17 Gram(s) Oral two times a day  saccharomyces boulardii 250 milliGRAM(s) Oral two times a day  senna 2 Tablet(s) Oral at bedtime  tamsulosin 0.4 milliGRAM(s) Oral at bedtime       INTERVAL HPI/OVERNIGHT EVENTS: Pt was seen and examined at bedside. No acute overnight events. Pt reports some back pain, otherwise no acute complaints at this time.  Pt denies headache, dizziness, lightheadedness, fever, chills, body aches, CP, SOB, palpitations, abdominal pain, n/v, numbness/tingling.  No other complaints at this time.     REVIEW OF SYSTEMS:    CONSTITUTIONAL: No weakness, fevers or chills  EYES/ENT: No visual changes, no throat pain   RESPIRATORY: No cough, wheezing, hemoptysis; No shortness of breath  CARDIOVASCULAR: No chest pain or palpitations  GASTROINTESTINAL: No abdominal, nausea, vomiting, or hematemesis; No diarrhea or constipation. No melena or hematochezia.  GENITOURINARY: No dysuria, frequency or hematuria  NEUROLOGICAL: No dizziness, numbness, or weakness  SKIN: No itching, burning, rashes, or lesions   All other review of systems is negative unless indicated above.    VITAL SIGNS:    T(C): 36.2 (09-11-22 @ 11:40), Max: 36.7 (09-10-22 @ 19:55)  HR: 67 (09-11-22 @ 11:40) (67 - 74)  BP: 105/56 (09-11-22 @ 11:40) (105/56 - 137/63)  RR: 18 (09-11-22 @ 11:40) (18 - 18)  SpO2: 96% (09-11-22 @ 11:40) (88% - 96%)    PHYSICAL EXAM:     GENERAL: no acute distress  HEENT: NC/AT, EOMI, neck supple, MMM  RESPIRATORY: LCTAB/L, no rhonchi, rales, or wheezing  CARDIOVASCULAR: RRR, no murmurs, gallops, rubs  ABDOMINAL: soft, non-tender, non-distended, positive bowel sounds   EXTREMITIES: no clubbing, cyanosis, or edema  NEUROLOGICAL: alert and oriented x 3, non-focal  SKIN: no rashes or lesions   MUSCULOSKELETAL: no gross joint deformity                          11.4   10.97 )-----------( 293      ( 11 Sep 2022 09:17 )             35.7     09-11    139  |  105  |  29<H>  ----------------------------<  121<H>  4.5   |  29  |  1.20    Ca    8.9      11 Sep 2022 09:17    TPro  7.3  /  Alb  2.6<L>  /  TBili  0.5  /  DBili  x   /  AST  31  /  ALT  32  /  AlkPhos  135<H>  09-10      CAPILLARY BLOOD GLUCOSE          MEDICATIONS  (STANDING):  ALBUTerol    0.083% 2.5 milliGRAM(s) Nebulizer every 6 hours  ALBUTerol    90 MICROgram(s) HFA Inhaler 1 Puff(s) Inhalation every 6 hours  amLODIPine   Tablet 5 milliGRAM(s) Oral daily  apixaban 5 milliGRAM(s) Oral two times a day  atorvastatin 40 milliGRAM(s) Oral at bedtime  budesonide  80 MICROgram(s)/formoterol 4.5 MICROgram(s) Inhaler 2 Puff(s) Inhalation two times a day  finasteride 5 milliGRAM(s) Oral daily  magnesium hydroxide Suspension 30 milliLiter(s) Oral daily  metoprolol tartrate 50 milliGRAM(s) Oral two times a day  naloxone Injectable 0.4 milliGRAM(s) IV Push once  pantoprazole    Tablet 40 milliGRAM(s) Oral before breakfast  polyethylene glycol 3350 17 Gram(s) Oral two times a day  saccharomyces boulardii 250 milliGRAM(s) Oral two times a day  senna 2 Tablet(s) Oral at bedtime  tamsulosin 0.4 milliGRAM(s) Oral at bedtime

## 2022-09-11 NOTE — PROGRESS NOTE ADULT - PROBLEM SELECTOR PLAN 1
- [place] workup indeterminant for TB, AFB growth in broth positive, 3x AFB smears negative. Quant gold indeterminant. Afebrile w/ leukocytosis, downtrending  - Bronchial cx (bronchoscopy 8/30): no acid fast growth. final.   - Sputum cx: AFB+ in broth, to be identified  - blood cx: no growth. final.  - Bronchial AFB and TB: no acid fast growth. final.  - Bx bronchial cells 9/1: -ve for malignancy, -ve AFB    - Bronchial fungal cx: rare Geotrichum species 9/9, ID: part of normal alexander  - Possible discharge for pt to home with self isolation precautions, ID also on board  - CXR 8/22: Persistent advanced irregular infiltrates involving the left mid and upper lung fields and the right upper lobe  - pulm following  - wean o2 as tolerated  - ID consulted, recs appreciated - [place] workup indeterminant for TB, AFB growth in broth positive, 3x AFB smears negative. Quant gold indeterminant. Afebrile w/ leukocytosis, downtrending  - Bronchial cx (bronchoscopy 8/30): no acid fast growth. final.   - Sputum cx: AFB+ in broth, to be identified  - blood cx: no growth. final.  - Bronchial AFB and TB: no acid fast growth. final.  - Bx bronchial cells 9/1: -ve for malignancy, -ve AFB    - Bronchial fungal cx: rare Geotrichum species 9/9, ID: part of normal alexander  - CXR 8/22: Persistent advanced irregular infiltrates involving the left mid and upper lung fields and the right upper lobe  - pulm following  - wean o2 as tolerated  - ID consulted, recs appreciated  - plan for [facility]

## 2022-09-12 LAB
ALBUMIN SERPL ELPH-MCNC: 2.9 G/DL — LOW (ref 3.3–5)
ALP SERPL-CCNC: 142 U/L — HIGH (ref 40–120)
ALT FLD-CCNC: 31 U/L — SIGNIFICANT CHANGE UP (ref 12–78)
ANION GAP SERPL CALC-SCNC: 7 MMOL/L — SIGNIFICANT CHANGE UP (ref 5–17)
AST SERPL-CCNC: 28 U/L — SIGNIFICANT CHANGE UP (ref 15–37)
BASOPHILS # BLD AUTO: 0.12 K/UL — SIGNIFICANT CHANGE UP (ref 0–0.2)
BASOPHILS NFR BLD AUTO: 1.1 % — SIGNIFICANT CHANGE UP (ref 0–2)
BILIRUB SERPL-MCNC: 0.4 MG/DL — SIGNIFICANT CHANGE UP (ref 0.2–1.2)
BUN SERPL-MCNC: 35 MG/DL — HIGH (ref 7–23)
CALCIUM SERPL-MCNC: 9.1 MG/DL — SIGNIFICANT CHANGE UP (ref 8.5–10.1)
CHLORIDE SERPL-SCNC: 105 MMOL/L — SIGNIFICANT CHANGE UP (ref 96–108)
CO2 SERPL-SCNC: 27 MMOL/L — SIGNIFICANT CHANGE UP (ref 22–31)
CREAT SERPL-MCNC: 1.3 MG/DL — SIGNIFICANT CHANGE UP (ref 0.5–1.3)
EGFR: 53 ML/MIN/1.73M2 — LOW
EOSINOPHIL # BLD AUTO: 0.33 K/UL — SIGNIFICANT CHANGE UP (ref 0–0.5)
EOSINOPHIL NFR BLD AUTO: 2.9 % — SIGNIFICANT CHANGE UP (ref 0–6)
GLUCOSE SERPL-MCNC: 100 MG/DL — HIGH (ref 70–99)
HCT VFR BLD CALC: 33.5 % — LOW (ref 39–50)
HGB BLD-MCNC: 10.9 G/DL — LOW (ref 13–17)
IMM GRANULOCYTES NFR BLD AUTO: 0.6 % — SIGNIFICANT CHANGE UP (ref 0–1.5)
LYMPHOCYTES # BLD AUTO: 2.56 K/UL — SIGNIFICANT CHANGE UP (ref 1–3.3)
LYMPHOCYTES # BLD AUTO: 22.9 % — SIGNIFICANT CHANGE UP (ref 13–44)
MCHC RBC-ENTMCNC: 31.3 PG — SIGNIFICANT CHANGE UP (ref 27–34)
MCHC RBC-ENTMCNC: 32.5 GM/DL — SIGNIFICANT CHANGE UP (ref 32–36)
MCV RBC AUTO: 96.3 FL — SIGNIFICANT CHANGE UP (ref 80–100)
MONOCYTES # BLD AUTO: 1.22 K/UL — HIGH (ref 0–0.9)
MONOCYTES NFR BLD AUTO: 10.9 % — SIGNIFICANT CHANGE UP (ref 2–14)
NEUTROPHILS # BLD AUTO: 6.89 K/UL — SIGNIFICANT CHANGE UP (ref 1.8–7.4)
NEUTROPHILS NFR BLD AUTO: 61.6 % — SIGNIFICANT CHANGE UP (ref 43–77)
NRBC # BLD: 0 /100 WBCS — SIGNIFICANT CHANGE UP (ref 0–0)
PLATELET # BLD AUTO: 289 K/UL — SIGNIFICANT CHANGE UP (ref 150–400)
POTASSIUM SERPL-MCNC: 4.6 MMOL/L — SIGNIFICANT CHANGE UP (ref 3.5–5.3)
POTASSIUM SERPL-SCNC: 4.6 MMOL/L — SIGNIFICANT CHANGE UP (ref 3.5–5.3)
PROT SERPL-MCNC: 7.8 G/DL — SIGNIFICANT CHANGE UP (ref 6–8.3)
RBC # BLD: 3.48 M/UL — LOW (ref 4.2–5.8)
RBC # FLD: 14.2 % — SIGNIFICANT CHANGE UP (ref 10.3–14.5)
SARS-COV-2 RNA SPEC QL NAA+PROBE: SIGNIFICANT CHANGE UP
SODIUM SERPL-SCNC: 139 MMOL/L — SIGNIFICANT CHANGE UP (ref 135–145)
WBC # BLD: 11.19 K/UL — HIGH (ref 3.8–10.5)
WBC # FLD AUTO: 11.19 K/UL — HIGH (ref 3.8–10.5)

## 2022-09-12 PROCEDURE — 99233 SBSQ HOSP IP/OBS HIGH 50: CPT | Mod: GC

## 2022-09-12 RX ADMIN — LIDOCAINE 1 PATCH: 4 CREAM TOPICAL at 05:58

## 2022-09-12 RX ADMIN — SENNA PLUS 2 TABLET(S): 8.6 TABLET ORAL at 21:52

## 2022-09-12 RX ADMIN — PANTOPRAZOLE SODIUM 40 MILLIGRAM(S): 20 TABLET, DELAYED RELEASE ORAL at 05:42

## 2022-09-12 RX ADMIN — Medication 250 MILLIGRAM(S): at 17:49

## 2022-09-12 RX ADMIN — APIXABAN 5 MILLIGRAM(S): 2.5 TABLET, FILM COATED ORAL at 17:49

## 2022-09-12 RX ADMIN — TAMSULOSIN HYDROCHLORIDE 0.4 MILLIGRAM(S): 0.4 CAPSULE ORAL at 21:52

## 2022-09-12 RX ADMIN — LIDOCAINE 1 PATCH: 4 CREAM TOPICAL at 23:47

## 2022-09-12 RX ADMIN — Medication 650 MILLIGRAM(S): at 21:52

## 2022-09-12 RX ADMIN — Medication 50 MILLIGRAM(S): at 17:49

## 2022-09-12 RX ADMIN — POLYETHYLENE GLYCOL 3350 17 GRAM(S): 17 POWDER, FOR SOLUTION ORAL at 17:49

## 2022-09-12 RX ADMIN — BUDESONIDE AND FORMOTEROL FUMARATE DIHYDRATE 2 PUFF(S): 160; 4.5 AEROSOL RESPIRATORY (INHALATION) at 05:43

## 2022-09-12 RX ADMIN — MAGNESIUM HYDROXIDE 30 MILLILITER(S): 400 TABLET, CHEWABLE ORAL at 11:43

## 2022-09-12 RX ADMIN — POLYETHYLENE GLYCOL 3350 17 GRAM(S): 17 POWDER, FOR SOLUTION ORAL at 05:41

## 2022-09-12 RX ADMIN — APIXABAN 5 MILLIGRAM(S): 2.5 TABLET, FILM COATED ORAL at 05:42

## 2022-09-12 RX ADMIN — Medication 250 MILLIGRAM(S): at 05:42

## 2022-09-12 RX ADMIN — Medication 50 MILLIGRAM(S): at 05:42

## 2022-09-12 RX ADMIN — LIDOCAINE 1 PATCH: 4 CREAM TOPICAL at 11:43

## 2022-09-12 RX ADMIN — AMLODIPINE BESYLATE 5 MILLIGRAM(S): 2.5 TABLET ORAL at 05:41

## 2022-09-12 RX ADMIN — ATORVASTATIN CALCIUM 40 MILLIGRAM(S): 80 TABLET, FILM COATED ORAL at 21:51

## 2022-09-12 RX ADMIN — FINASTERIDE 5 MILLIGRAM(S): 5 TABLET, FILM COATED ORAL at 11:42

## 2022-09-12 RX ADMIN — Medication 3 MILLIGRAM(S): at 21:52

## 2022-09-12 NOTE — PROGRESS NOTE ADULT - PROBLEM SELECTOR PROBLEM 3
Back pain

## 2022-09-12 NOTE — PROGRESS NOTE ADULT - PROBLEM SELECTOR PLAN 2
Patient with new found paroxysmal atrial fibrillation  - Cardiology following  - Rate Control: Continue metoprolol tartrate 50mg BID  - Continue eliquis 5mg BID, renal dosing per pharmacy  - Echo with low normal LV systolic function, mild pulm HTN Patient with new found paroxysmal atrial fibrillation  - Cardiology following  - Rate Control: Continue metoprolol tartrate 50mg BID  - Continue eliquis 5mg BID  - Echo with low normal LV systolic function, mild pulm HTN

## 2022-09-12 NOTE — PROGRESS NOTE ADULT - PROBLEM SELECTOR PLAN 1
- [place] workup indeterminant for TB, AFB growth in broth positive, 3x AFB smears negative. Quant gold indeterminant. Afebrile w/ leukocytosis, downtrending  - Bronchial cx (bronchoscopy 8/30): no acid fast growth. final.   - Sputum cx: AFB+ in broth, to be identified  - blood cx: no growth. final.  - Bronchial AFB and TB: no acid fast growth. final.  - Bx bronchial cells 9/1: -ve for malignancy, -ve AFB    - Bronchial fungal cx: rare Geotrichum species 9/9, ID: part of normal [place]lexander  - CXR 8/22: Persistent advanced irregular infiltrates involving the left mid and upper lung fields and the right upper lobe  - pulm following  - wean o2 as tolerated  - ID: geotrichum part of normal alexander, awaiting AF broth identification  - plan for [facility] once TB status determined - [place] workup indeterminant for TB, AFB growth in broth positive, 3x AFB smears negative. Quant gold indeterminant. Afebrile w/ leukocytosis, downtrending  - Bronchial cx (bronchoscopy 8/30): no acid fast growth. final.   - Sputum cx: AFB+ in broth, to be identified  - blood cx: no growth. final.  - Bronchial AFB and TB: no acid fast growth. final.  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

## 2022-09-12 NOTE — CHART NOTE - NSCHARTNOTEFT_GEN_A_CORE
patient seen for malnutrition follow up   88y old  Male who presents with a chief complaint of bronchoscopy high risk TB , Afib back pain  patient seen in room with RN . good PO per RN   9/11 large BM per RN      Factors impacting intake: [x ] none [ ] nausea  [ ] vomiting [ ] diarrhea [ ] constipation  [ ]chewing problems [ ] swallowing issues  [ ] other:     Diet Prescription: Diet, DASH/TLC:   Sodium & Cholesterol Restricted  Supplement Feeding Modality:  Oral  Ensure Enlive Cans or Servings Per Day:  1       Frequency:  Daily (09-05-22 @ 11:10)    Intake: good PO per RN    Current Weight: 9/9 145.5# 66kg , admit 67.4kg       Pertinent Medications: MEDICATIONS  (STANDING):  ALBUTerol    0.083% 2.5 milliGRAM(s) Nebulizer every 6 hours  ALBUTerol    90 MICROgram(s) HFA Inhaler 1 Puff(s) Inhalation every 6 hours  amLODIPine   Tablet 5 milliGRAM(s) Oral daily  apixaban 5 milliGRAM(s) Oral two times a day  atorvastatin 40 milliGRAM(s) Oral at bedtime  budesonide  80 MICROgram(s)/formoterol 4.5 MICROgram(s) Inhaler 2 Puff(s) Inhalation two times a day  finasteride 5 milliGRAM(s) Oral daily  lidocaine   4% Patch 1 Patch Transdermal daily  magnesium hydroxide Suspension 30 milliLiter(s) Oral daily  metoprolol tartrate 50 milliGRAM(s) Oral two times a day  naloxone Injectable 0.4 milliGRAM(s) IV Push once  pantoprazole    Tablet 40 milliGRAM(s) Oral before breakfast  polyethylene glycol 3350 17 Gram(s) Oral two times a day  saccharomyces boulardii 250 milliGRAM(s) Oral two times a day  senna 2 Tablet(s) Oral at bedtime  tamsulosin 0.4 milliGRAM(s) Oral at bedtime    MEDICATIONS  (PRN):  acetaminophen     Tablet .. 650 milliGRAM(s) Oral every 6 hours PRN Mild Pain (1 - 3)  bisacodyl 5 milliGRAM(s) Oral daily PRN Constipation  cyclobenzaprine 5 milliGRAM(s) Oral three times a day PRN Muscle Spasm  melatonin 3 milliGRAM(s) Oral at bedtime PRN Insomnia    Skin: no pressure injury no edema     Estimated Needs:   [x ] no change since previous assessment based on # [id number] 25-30kcals/kg  and 70-85gms protein 1-1.2gms/kg   [ ] recalculated:     Previous Nutrition Diagnosis:    [x ] Malnutrition     Nutrition Diagnosis is [ x] ongoing  [ ] resolved [ ] not applicable     New Nutrition Diagnosis: [x ] not applicable       Interventions:   Recommend  [ ] Change Diet To:  [ ] Nutrition Supplement  [ ] Nutrition Support  [x ] Other: continue diet as ordered with ensure . will continue to follow and remain available     Monitoring and Evaluation:   [x ] PO intake [ x ] Tolerance to diet prescription [ x ] weights [ x ] labs[ x ] follow up per protocol  [ ] other: patient seen for malnutrition follow up   88y old  Male who presents with a chief complaint of bronchoscopy high risk TB , Afib back pain  patient  in room with RN . good PO per RN   9/11 large BM per RN      Factors impacting intake: [x ] none [ ] nausea  [ ] vomiting [ ] diarrhea [ ] constipation  [ ]chewing problems [ ] swallowing issues  [ ] other:     Diet Prescription: Diet, DASH/TLC:   Sodium & Cholesterol Restricted  Supplement Feeding Modality:  Oral  Ensure Enlive Cans or Servings Per Day:  1       Frequency:  Daily (09-05-22 @ 11:10)    Intake: good PO per RN    Current Weight: 9/9 145.5# 66kg , admit 67.4kg       Pertinent Medications: MEDICATIONS  (STANDING):  ALBUTerol    0.083% 2.5 milliGRAM(s) Nebulizer every 6 hours  ALBUTerol    90 MICROgram(s) HFA Inhaler 1 Puff(s) Inhalation every 6 hours  amLODIPine   Tablet 5 milliGRAM(s) Oral daily  apixaban 5 milliGRAM(s) Oral two times a day  atorvastatin 40 milliGRAM(s) Oral at bedtime  budesonide  80 MICROgram(s)/formoterol 4.5 MICROgram(s) Inhaler 2 Puff(s) Inhalation two times a day  finasteride 5 milliGRAM(s) Oral daily  lidocaine   4% Patch 1 Patch Transdermal daily  magnesium hydroxide Suspension 30 milliLiter(s) Oral daily  metoprolol tartrate 50 milliGRAM(s) Oral two times a day  naloxone Injectable 0.4 milliGRAM(s) IV Push once  pantoprazole    Tablet 40 milliGRAM(s) Oral before breakfast  polyethylene glycol 3350 17 Gram(s) Oral two times a day  saccharomyces boulardii 250 milliGRAM(s) Oral two times a day  senna 2 Tablet(s) Oral at bedtime  tamsulosin 0.4 milliGRAM(s) Oral at bedtime    MEDICATIONS  (PRN):  acetaminophen     Tablet .. 650 milliGRAM(s) Oral every 6 hours PRN Mild Pain (1 - 3)  bisacodyl 5 milliGRAM(s) Oral daily PRN Constipation  cyclobenzaprine 5 milliGRAM(s) Oral three times a day PRN Muscle Spasm  melatonin 3 milliGRAM(s) Oral at bedtime PRN Insomnia    Skin: no pressure injury no edema     Estimated Needs:   [x ] no change since previous assessment based on # [id number] 25-30kcals/kg  and 70-85gms protein 1-1.2gms/kg   [ ] recalculated:     Previous Nutrition Diagnosis:    [x ] Malnutrition     Nutrition Diagnosis is [ x] ongoing  [ ] resolved [ ] not applicable     New Nutrition Diagnosis: [x ] not applicable       Interventions:   Recommend  [ ] Change Diet To:  [ ] Nutrition Supplement  [ ] Nutrition Support  [x ] Other: continue diet as ordered with ensure . will continue to follow and remain available     Monitoring and Evaluation:   [x ] PO intake [ x ] Tolerance to diet prescription [ x ] weights [ x ] labs[ x ] follow up per protocol  [ ] other:

## 2022-09-12 NOTE — PROGRESS NOTE ADULT - NUTRITIONAL ASSESSMENT
This patient has been assessed with a concern for Malnutrition and has been determined to have a diagnosis/diagnoses of Moderate protein-calorie malnutrition.    This patient is being managed with:   Diet DASH/TLC-  Sodium & Cholesterol Restricted  Supplement Feeding Modality:  Oral  Ensure Enlive Cans or Servings Per Day:  1       Frequency:  Two Times a day  Entered: Aug 19 2022  3:22PM    Diet DASH/TLC-  Sodium & Cholesterol Restricted  Supplement Feeding Modality:  Oral  Ensure Enlive Cans or Servings Per Day:  1       Frequency:  Daily  Entered: Aug 15 2022  2:20PM    The following pending diet order is being considered for treatment of Moderate protein-calorie malnutrition:null
This patient has been assessed with a concern for Malnutrition and has been determined to have a diagnosis/diagnoses of Severe protein-calorie malnutrition.    This patient is being managed with:   Diet DASH/TLC-  Sodium & Cholesterol Restricted  Supplement Feeding Modality:  Oral  Ensure Enlive Cans or Servings Per Day:  1       Frequency:  Daily  Entered: Sep  5 2022 11:10AM    
This patient has been assessed with a concern for Malnutrition and has been determined to have a diagnosis/diagnoses of Severe protein-calorie malnutrition.    This patient is being managed with:   Diet DASH/TLC-  Sodium & Cholesterol Restricted  Supplement Feeding Modality:  Oral  Ensure Enlive Cans or Servings Per Day:  1       Frequency:  Daily  Entered: Sep  5 2022 11:10AM    Diet DASH/TLC-  Sodium & Cholesterol Restricted  Entered: Aug 30 2022  6:12PM    The following pending diet order is being considered for treatment of Severe protein-calorie malnutrition:null
This patient has been assessed with a concern for Malnutrition and has been determined to have a diagnosis/diagnoses of Severe protein-calorie malnutrition.    This patient is being managed with:   Diet DASH/TLC-  Sodium & Cholesterol Restricted  Supplement Feeding Modality:  Oral  Ensure Enlive Cans or Servings Per Day:  1       Frequency:  Daily  Entered: Sep  5 2022 11:10AM    Diet DASH/TLC-  Sodium & Cholesterol Restricted  Entered: Aug 30 2022  6:12PM    The following pending diet order is being considered for treatment of Severe protein-calorie malnutrition:null
This patient has been assessed with a concern for Malnutrition and has been determined to have a diagnosis/diagnoses of Severe protein-calorie malnutrition.    This patient is being managed with:   Diet DASH/TLC-  Sodium & Cholesterol Restricted  Supplement Feeding Modality:  Oral  Ensure Enlive Cans or Servings Per Day:  1       Frequency:  Daily  Entered: Sep  5 2022 11:10AM    
This patient has been assessed with a concern for Malnutrition and has been determined to have a diagnosis/diagnoses of Severe protein-calorie malnutrition.    This patient is being managed with:   Diet DASH/TLC-  Sodium & Cholesterol Restricted  Supplement Feeding Modality:  Oral  Ensure Enlive Cans or Servings Per Day:  1       Frequency:  Daily  Entered: Sep  5 2022 11:10AM    
This patient has been assessed with a concern for Malnutrition and has been determined to have a diagnosis/diagnoses of Severe protein-calorie malnutrition.    This patient is being managed with:   Diet DASH/TLC-  Sodium & Cholesterol Restricted  Supplement Feeding Modality:  Oral  Ensure Enlive Cans or Servings Per Day:  1       Frequency:  Daily  Entered: Sep  5 2022 11:10AM    Diet DASH/TLC-  Sodium & Cholesterol Restricted  Entered: Aug 30 2022  6:12PM    The following pending diet order is being considered for treatment of Severe protein-calorie malnutrition:null

## 2022-09-12 NOTE — PROGRESS NOTE ADULT - SUBJECTIVE AND OBJECTIVE BOX
[facility] DIVISION of INFECTIOUS DISEASE  Mustapha Valente MD PhD, Sheryl Pride MD, Adriana Rodriguez MD, Oliva Myers MD, Ebenezer Rivas MD  and providing coverage with Leodan Zaragoza MD  Providing Infectious Disease Consultations at [facility], [facility], [place], [facility], [facility]'s    Office# [phone] to schedule follow up appointments  Answering Service for urgent calls or New Consults [phone]  Cell# to text for urgent issues Mustapha Valente [phone]     infectious diseases progress note:    DENNIS BRADFORD is a 88y y. o. Male patient    Overnight and events of the last 24hrs reviewed    Allergies    No Known Allergies    Intolerances        ANTIBIOTICS/RELEVANT:  antimicrobials    immunologic:    OTHER:  acetaminophen     Tablet .. 650 milliGRAM(s) Oral every 6 hours PRN  ALBUTerol    0.083% 2.5 milliGRAM(s) Nebulizer every 6 hours  ALBUTerol    90 MICROgram(s) HFA Inhaler 1 Puff(s) Inhalation every 6 hours  amLODIPine   Tablet 5 milliGRAM(s) Oral daily  apixaban 5 milliGRAM(s) Oral two times a day  atorvastatin 40 milliGRAM(s) Oral at bedtime  bisacodyl 5 milliGRAM(s) Oral daily PRN  budesonide  80 MICROgram(s)/formoterol 4.5 MICROgram(s) Inhaler 2 Puff(s) Inhalation two times a day  cyclobenzaprine 5 milliGRAM(s) Oral three times a day PRN  finasteride 5 milliGRAM(s) Oral daily  lidocaine   4% Patch 1 Patch Transdermal daily  magnesium hydroxide Suspension 30 milliLiter(s) Oral daily  melatonin 3 milliGRAM(s) Oral at bedtime PRN  metoprolol tartrate 50 milliGRAM(s) Oral two times a day  naloxone Injectable 0.4 milliGRAM(s) IV Push once  pantoprazole    Tablet 40 milliGRAM(s) Oral before breakfast  polyethylene glycol 3350 17 Gram(s) Oral two times a day  saccharomyces boulardii 250 milliGRAM(s) Oral two times a day  senna 2 Tablet(s) Oral at bedtime  tamsulosin 0.4 milliGRAM(s) Oral at bedtime      Objective:  Vital Signs Last 24 Hrs  T(C): 36.4 (12 Sep 2022 04:00), Max: 36.4 (11 Sep 2022 19:42)  T(F): 97.6 (12 Sep 2022 04:00), Max: 97.6 (12 Sep 2022 04:00)  HR: 65 (12 Sep 2022 04:00) (65 - 72)  BP: 109/65 (12 Sep 2022 04:00) (109/65 - 133/72)  BP(mean): --  RR: 17 (12 Sep 2022 04:00) (16 - 18)  SpO2: 93% (12 Sep 2022 11:05) (93% - 96%)    Parameters below as of 12 Sep 2022 11:05  Patient On (Oxygen Delivery Method): room air        T(C): 36.4 (09-12-22 @ 04:00), Max: 36.7 (09-10-22 @ 19:55)  T(C): 36.4 (09-12-22 @ 04:00), Max: 36.9 (09-09-22 @ 17:04)  T(C): 36.4 (09-12-22 @ 04:00), Max: 36.9 (09-09-22 @ 12:53)    PHYSICAL EXAM:  HEENT: NC atraumatic  Neck: supple  Respiratory: no accessory muscle use, breathing comfortably  Cardiovascular: distant  Gastrointestinal: normal appearing, nondistended  Extremities: no clubbing, no cyanosis,        LABS:                          10.9   11.19 )-----------( 289      ( 12 Sep 2022 06:55 )             33.5       WBC  11.19 09-12 @ 06:55  10.97 09-11 @ 09:17  9.61 09-10 @ 07:10  9.21 09-09 @ 06:04  14.58 09-08 @ 10:17  8.91 09-07 @ 08:03  10.27 09-06 @ 07:35      09-12    139  |  105  |  35<H>  ----------------------------<  100<H>  4.6   |  27  |  1.30    Ca    9.1      12 Sep 2022 06:55    TPro  7.8  /  Alb  2.9<L>  /  TBili  0.4  /  DBili  x   /  AST  28  /  ALT  31  /  AlkPhos  142<H>  09-12      Creatinine, Serum: 1.30 mg/dL (09-12-22 @ 06:55)  Creatinine, Serum: 1.20 mg/dL (09-11-22 @ 09:17)  Creatinine, Serum: 1.10 mg/dL (09-10-22 @ 07:10)  Creatinine, Serum: 1.20 mg/dL (09-09-22 @ 06:04)  Creatinine, Serum: 1.20 mg/dL (09-08-22 @ 10:17)  Creatinine, Serum: 1.20 mg/dL (09-07-22 @ 08:03)  Creatinine, Serum: 1.20 mg/dL (09-06-22 @ 07:35)                INFLAMMATORY MARKERS  Auto Neutrophil #: 6.89 K/uL (09-12-22 @ 06:55)  Auto Lymphocyte #: 2.56 K/uL (09-12-22 @ 06:55)  Auto Neutrophil #: 5.87 K/uL (09-10-22 @ 07:10)  Auto Lymphocyte #: 2.04 K/uL (09-10-22 @ 07:10)  Auto Neutrophil #: 5.12 K/uL (09-09-22 @ 06:04)  Auto Lymphocyte #: 2.40 K/uL (09-09-22 @ 06:04)  Auto Neutrophil #: 5.09 K/uL (09-07-22 @ 08:03)  Auto Lymphocyte #: 2.13 K/uL (09-07-22 @ 08:03)  Auto Neutrophil #: 6.91 K/uL (09-06-22 @ 07:35)  Auto Lymphocyte #: 1.87 K/uL (09-06-22 @ 07:35)  Auto Neutrophil #: 7.01 K/uL (08-30-22 @ 05:56)  Auto Lymphocyte #: 2.20 K/uL (08-30-22 @ 05:56)      Auto Eosinophil #: 0.33 K/uL (09-12-22 @ 06:55)  Auto Eosinophil #: 0.65 K/uL (09-10-22 @ 07:10)  Auto Eosinophil #: 0.52 K/uL (09-09-22 @ 06:04)  Auto Eosinophil #: 0.55 K/uL (09-07-22 @ 08:03)  Auto Eosinophil #: 0.37 K/uL (09-06-22 @ 07:35)  Auto Eosinophil #: 0.28 K/uL (08-30-22 @ 05:56)      MICROBIOLOGY:    Culture - Acid Fast - Sputum w/Smear . (08.12.22 @ 01:04)    Specimen Source: .Sputum Sputum    Acid Fast Bacilli Smear:   No acid fast bacilli seen by fluorochrome stain    Culture Results:   Mycobacterium avium isolated  Identified by MALDI-TOF Mass Spectrometry Analysis  The performance characteristics of this test were determined  by [email or web address] It has not been cleared or approved by  the FDA        RADIOLOGY & ADDITIONAL STUDIES:

## 2022-09-12 NOTE — PROGRESS NOTE ADULT - PROBLEM SELECTOR PROBLEM 1
Tuberculosis high risk

## 2022-09-12 NOTE — PROGRESS NOTE ADULT - NSPROGADDITIONALINFOA_GEN_ALL_CORE
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

## 2022-09-12 NOTE — PROGRESS NOTE ADULT - PROBLEM/PLAN-7
DISPLAY PLAN FREE TEXT
no
DISPLAY PLAN FREE TEXT

## 2022-09-12 NOTE — PROGRESS NOTE ADULT - TIME BILLING
above diagnoses as reviewed in the body of the note and the supplemental attestation authored by the attending physician
Reviewing medical record including consultant recommendations, labs, vitals, medications, orders, imaging, and discussion of plan of care with patient, family consultants, nursing.
above diagnoses as reviewed in the body of the note and the supplemental attestation authored by the attending physician
Reviewing medical record including consultant recommendations, labs, vitals, medications, orders, imaging, and discussion of plan of care with patient, family consultants, nursing.
Pt seen + examined. Case discussed with resident. Agree with assessment and plan above (edited by me in detail):  Time spent: 40min. More than 50% of the visit was spent counseling the patient on medical condition and coordination of care
above diagnoses as reviewed in the body of the note and the supplemental attestation authored by the attending physician
above diagnoses as reviewed in the body of the note and the supplemental attestation authored by the attending physician
Reviewing medical record including consultant recommendations, labs, vitals, medications, orders, imaging, and discussion of plan of care with patient, family, consultants, nursing.
Pt seen + examined. Case discussed with resident. Agree with assessment and plan above (edited by me in detail):  Time spent: 40min. More than 50% of the visit was spent counseling the patient on medical condition and coordination of care

## 2022-09-12 NOTE — PROGRESS NOTE ADULT - ASSESSMENT
89yo M with PMH of HTN, HLD, hx of CVA (no residual deficits), BPH, hx of skin CA who presents as a transfer from [facility] for bronchoscopy to r/o TB, satting well on 3L NC.

## 2022-09-12 NOTE — PROGRESS NOTE ADULT - ASSESSMENT
87M with HTN, HLD, hx of CVA (no residual deficits), BPH, hx of skin CA who presents with cough and fevers.    Found to have multifocal pneumonia.  Also found have new onset afib.        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 Please call us at [phone] or text me directly on my cell# at [phone] with any concerns.

## 2022-09-12 NOTE — PROGRESS NOTE ADULT - ASSESSMENT
88yo M with PMH of HTN, HLD, hx of CVA (no residual deficits), BPH, hx of skin CA who presents with cough and fevers a/w sepsis due to multifocal pneumonia with progression to acute hypoxic respiratory failure now on HFNC and course further c/b new onset afib.  Transferred from [place] for bronchoscopy.     Bronch done - Aug 30th -   Bronch AFB neg  ID follow up - reviewed -   AFB identification pending -   Flexeril added -     spoke with pt - dtr -   s/p Levaquin course in [facility]  o2 support as needed to keep sat > 88 pct  isolation precs  ID follow up

## 2022-09-12 NOTE — PROGRESS NOTE ADULT - PROBLEM SELECTOR PLAN 6
Continue home tamsulosin and finasteride
-Continue home tamsulosin and finasteride
Continue home tamsulosin and finasteride
-Continue home tamsulosin and finasteride
Continue home tamsulosin and finasteride
-Continue home tamsulosin and finasteride
Continue home tamsulosin and finasteride

## 2022-09-12 NOTE — PROGRESS NOTE ADULT - SUBJECTIVE AND OBJECTIVE BOX
Date/Time Patient Seen:  		  Referring MD:   Data Reviewed	       Patient is a 88y old  Male who presents with a chief complaint of bronchoscopy (11 Sep 2022 14:00)      Subjective/HPI     PAST MEDICAL & SURGICAL HISTORY:  No pertinent past medical history    Skin cancer  s/p Mohs    Essential hypertension    Cerebrovascular accident (CVA), unspecified mechanism  2015    Hyperlipidemia, unspecified hyperlipidemia type    Complex tear of lateral meniscus of right knee as current injury, initial encounter    Complex tear of medial meniscus of right knee as current injury, initial encounter    Benign prostatic hyperplasia, presence of lower urinary tract symptoms unspecified, unspecified morphology    H/O pilonidal cyst          Medication list         MEDICATIONS  (STANDING):  ALBUTerol    0.083% 2.5 milliGRAM(s) Nebulizer every 6 hours  ALBUTerol    90 MICROgram(s) HFA Inhaler 1 Puff(s) Inhalation every 6 hours  amLODIPine   Tablet 5 milliGRAM(s) Oral daily  apixaban 5 milliGRAM(s) Oral two times a day  atorvastatin 40 milliGRAM(s) Oral at bedtime  budesonide  80 MICROgram(s)/formoterol 4.5 MICROgram(s) Inhaler 2 Puff(s) Inhalation two times a day  finasteride 5 milliGRAM(s) Oral daily  lidocaine   4% Patch 1 Patch Transdermal daily  magnesium hydroxide Suspension 30 milliLiter(s) Oral daily  metoprolol tartrate 50 milliGRAM(s) Oral two times a day  naloxone Injectable 0.4 milliGRAM(s) IV Push once  pantoprazole    Tablet 40 milliGRAM(s) Oral before breakfast  polyethylene glycol 3350 17 Gram(s) Oral two times a day  saccharomyces boulardii 250 milliGRAM(s) Oral two times a day  senna 2 Tablet(s) Oral at bedtime  tamsulosin 0.4 milliGRAM(s) Oral at bedtime    MEDICATIONS  (PRN):  acetaminophen     Tablet .. 650 milliGRAM(s) Oral every 6 hours PRN Mild Pain (1 - 3)  bisacodyl 5 milliGRAM(s) Oral daily PRN Constipation  cyclobenzaprine 5 milliGRAM(s) Oral three times a day PRN Muscle Spasm  melatonin 3 milliGRAM(s) Oral at bedtime PRN Insomnia         Vitals log        ICU Vital Signs Last 24 Hrs  T(C): 36.4 (12 Sep 2022 04:00), Max: 36.4 (11 Sep 2022 19:42)  T(F): 97.6 (12 Sep 2022 04:00), Max: 97.6 (12 Sep 2022 04:00)  HR: 65 (12 Sep 2022 04:00) (65 - 72)  BP: 109/65 (12 Sep 2022 04:00) (105/56 - 133/72)  BP(mean): 96 (11 Sep 2022 11:40) (96 - 96)  ABP: --  ABP(mean): --  RR: 17 (12 Sep 2022 04:00) (16 - 18)  SpO2: 96% (12 Sep 2022 04:00) (96% - 96%)    O2 Parameters below as of 12 Sep 2022 04:00  Patient On (Oxygen Delivery Method): room air                 Input and Output:  I&O's Detail    11 Sep 2022 07:01  -  12 Sep 2022 06:10  --------------------------------------------------------  IN:    Oral Fluid: 560 mL  Total IN: 560 mL    OUT:    Voided (mL): 700 mL  Total OUT: 700 mL    Total NET: -140 mL          Lab Data                        11.4   10.97 )-----------( 293      ( 11 Sep 2022 09:17 )             35.7     09-11    139  |  105  |  29<H>  ----------------------------<  121<H>  4.5   |  29  |  1.20    Ca    8.9      11 Sep 2022 09:17    TPro  7.3  /  Alb  2.6<L>  /  TBili  0.5  /  DBili  x   /  AST  31  /  ALT  32  /  AlkPhos  135<H>  09-10            Review of Systems	      Objective     Physical Examination    heart s1s2  lung dc BS  abd soft      Pertinent Lab findings & Imaging      Bud:  NO   Adequate UO     I&O's Detail    11 Sep 2022 07:01  -  12 Sep 2022 06:10  --------------------------------------------------------  IN:    Oral Fluid: 560 mL  Total IN: 560 mL    OUT:    Voided (mL): 700 mL  Total OUT: 700 mL    Total NET: -140 mL               Discussed with:     Cultures:	        Radiology

## 2022-09-12 NOTE — PROGRESS NOTE ADULT - PROBLEM SELECTOR PLAN 3
Pt has complaint of lower back pain  - Pain improved with standing tylenol, add lidoderm patch  - iSTOP 8/30, chart note   - low dose opoid regimen w/ PRN bowel regimen  - Pt reports more spasmodic pain in buttocks, paraspinals- continue muscle relaxants

## 2022-09-12 NOTE — PROGRESS NOTE ADULT - PROBLEM SELECTOR PROBLEM 6
History of BPH

## 2022-09-12 NOTE — PROGRESS NOTE ADULT - SUBJECTIVE AND OBJECTIVE BOX
Chief Complaint: Hemoptysis    Interval Events: No events overnight.    Review of Systems:  General: No fevers, chills, weight gain  Skin: No rashes, color changes  Cardiovascular: No chest pain, orthopnea  Respiratory: No shortness of breath, cough  Gastrointestinal: No nausea, abdominal pain  Genitourinary: No incontinence, pain with urination  Musculoskeletal: No pain, swelling, decreased range of motion  Neurological: No headache, weakness  Psychiatric: No depression, anxiety  Endocrine: No weight gain, increased thirst  All other systems are comprehensively negative.    Physical Exam:  Vital Signs Last 24 Hrs  T(C): 36.4 (12 Sep 2022 04:00), Max: 36.4 (11 Sep 2022 19:42)  T(F): 97.6 (12 Sep 2022 04:00), Max: 97.6 (12 Sep 2022 04:00)  HR: 65 (12 Sep 2022 04:00) (65 - 72)  BP: 109/65 (12 Sep 2022 04:00) (105/56 - 133/72)  BP(mean): 96 (11 Sep 2022 11:40) (96 - 96)  RR: 17 (12 Sep 2022 04:00) (16 - 18)  SpO2: 96% (12 Sep 2022 04:00) (96% - 96%)  Parameters below as of 12 Sep 2022 04:00  Patient On (Oxygen Delivery Method): room air  General: NAD  HEENT: MMM  Neck: No JVD, no carotid bruit  Lungs: CTAB  CV: RRR, nl S1/S2, no M/R/G  Abdomen: S/NT/ND, +BS  Extremities: No LE edema, no cyanosis  Neuro: AAOx3, non-focal  Skin: No rash    Labs:    09-12    139  |  105  |  35<H>  ----------------------------<  100<H>  4.6   |  27  |  1.30    Ca    9.1      12 Sep 2022 06:55    TPro  7.8  /  Alb  2.9<L>  /  TBili  0.4  /  DBili  x   /  AST  28  /  ALT  31  /  AlkPhos  142<H>  09-12                        10.9   11.19 )-----------( 289      ( 12 Sep 2022 06:55 )             33.5       Telemetry: Sinus rhythm

## 2022-09-12 NOTE — PROGRESS NOTE ADULT - SUBJECTIVE AND OBJECTIVE BOX
HPI:  87M with HTN, HLD, hx of CVA (no residual deficits), BPH, hx of skin CA who presents with cough and fevers.  Symptoms started about 5-6 days prior to admission.  Started with left sided upper back pain.  Then started to have a cough associated with green phlegm and possible blood.  Associated with SOB and DONG.  No weight changes or night sweats.  Had a feveras high as 102F.  Some nausea but no vomiting with loss of appetite.  No chest pain.  No diarrhea.  No abdominal pain.  No recent travel or sick contacts.  Went to see his PMD 3 days prior to [place] admission and was given a z-kellie and tesslon perles.  Reported had negative COVID tests at home.   In the [place] ED, patient's triage vitals were /78    RR  21, 93% on RA (thought dropped down to 88%) and T 98.5F.  Labs showed WBC 18 with a left shift, CINDI with BUN/Cr 40/1.88, elevated LFTs, normal lactate, negative COVID (last booster in 12/2021 with Pfizer vaccine), neg influenza, neg RSV.  CT chest showed "patchy groundglass and more dense opacities in both lungs, suspicious for multifocal PNA...several small lucencies with areas of lung opacity...may represent early cavitation versus areas of focal bronchiectasis."  Patient started on azithromycin and ceftriaxone empirically for multifocal PNA.  Also of note, patient was found to be in new afib on EKG.  Patient denies any afib history but said when he was younger he did feel some irregular heartbeats.  Patient admitted to [facility] with sepsis due to multifocal pneumonia with progression to acute hypoxic respiratory failure now on HFNC and course further c/b new onset afib. Patient was seen by ID, pulm and cardiology. He is now transferred to [facility] for bronchoscopy. Patient denies states he is feeling well at this time.  (29 Aug 2022 23:01)      INTERVAL HPI/OVERNIGHT EVENTS: Pt was seen and examined at bedside. No acute overnight events. Pt reports continued back discomfort despite pain regimen. Pt denies headache, dizziness, lightheadedness, fever, chills, body aches, CP, SOB, palpitations, abdominal pain, n/v, numbness/tingling.  No other complaints at this time.     REVIEW OF SYSTEMS:    CONSTITUTIONAL: No weakness, fevers or chills  EYES/ENT: No visual changes, no throat pain   RESPIRATORY: No cough, wheezing, hemoptysis; No shortness of breath  CARDIOVASCULAR: No chest pain or palpitations  GASTROINTESTINAL: No abdominal, nausea, vomiting, or hematemesis; No diarrhea or constipation. No melena or hematochezia.  GENITOURINARY: No dysuria, frequency or hematuria  NEUROLOGICAL: No dizziness, numbness, or weakness  SKIN: No itching, burning, rashes, or lesions   MSK: + Back pain  All other review of systems is negative unless indicated above.    VITAL SIGNS:    T(C): 36.4 (09-12-22 @ 04:00), Max: 36.4 (09-11-22 @ 19:42)  HR: 65 (09-12-22 @ 04:00) (65 - 72)  BP: 109/65 (09-12-22 @ 04:00) (105/56 - 133/72)  RR: 17 (09-12-22 @ 04:00) (16 - 18)  SpO2: 96% (09-12-22 @ 04:00) (96% - 96%)    PHYSICAL EXAM:     GENERAL: no acute distress  HEENT: NC/AT, EOMI, neck supple, MMM  RESPIRATORY: LCTAB/L, no rhonchi, rales, or wheezing  CARDIOVASCULAR: RRR, no murmurs, gallops, rubs  ABDOMINAL: soft, non-tender, non-distended, positive bowel sounds   EXTREMITIES: no clubbing, cyanosis, or edema  NEUROLOGICAL: alert and oriented x 3, non-focal  SKIN: no rashes or lesions   MUSCULOSKELETAL: lower back TTP                          10.9   11.19 )-----------( 289      ( 12 Sep 2022 06:55 )             33.5     09-12    139  |  105  |  35<H>  ----------------------------<  100<H>  4.6   |  27  |  1.30    Ca    9.1      12 Sep 2022 06:55    TPro  7.8  /  Alb  2.9<L>  /  TBili  0.4  /  DBili  x   /  AST  28  /  ALT  31  /  AlkPhos  142<H>  09-12      CAPILLARY BLOOD GLUCOSE          MEDICATIONS  (STANDING):  ALBUTerol    0.083% 2.5 milliGRAM(s) Nebulizer every 6 hours  ALBUTerol    90 MICROgram(s) HFA Inhaler 1 Puff(s) Inhalation every 6 hours  amLODIPine   Tablet 5 milliGRAM(s) Oral daily  apixaban 5 milliGRAM(s) Oral two times a day  atorvastatin 40 milliGRAM(s) Oral at bedtime  budesonide  80 MICROgram(s)/formoterol 4.5 MICROgram(s) Inhaler 2 Puff(s) Inhalation two times a day  finasteride 5 milliGRAM(s) Oral daily  lidocaine   4% Patch 1 Patch Transdermal daily  magnesium hydroxide Suspension 30 milliLiter(s) Oral daily  metoprolol tartrate 50 milliGRAM(s) Oral two times a day  naloxone Injectable 0.4 milliGRAM(s) IV Push once  pantoprazole    Tablet 40 milliGRAM(s) Oral before breakfast  polyethylene glycol 3350 17 Gram(s) Oral two times a day  saccharomyces boulardii 250 milliGRAM(s) Oral two times a day  senna 2 Tablet(s) Oral at bedtime  tamsulosin 0.4 milliGRAM(s) Oral at bedtime         INTERVAL HPI/OVERNIGHT EVENTS: Pt was seen and examined at bedside. No acute overnight events. Pt reports continued back discomfort despite pain regimen, states its from the bed. he is much more comfortable in the chair. requesting to sleep in chair. Pt denies headache, dizziness, lightheadedness, fever, chills, body aches, CP, SOB, palpitations, abdominal pain, n/v, numbness/tingling.  No other complaints at this time.     REVIEW OF SYSTEMS:    CONSTITUTIONAL: No weakness, fevers or chills  EYES/ENT: No visual changes, no throat pain   RESPIRATORY: No cough, wheezing, hemoptysis; No shortness of breath  CARDIOVASCULAR: No chest pain or palpitations  GASTROINTESTINAL: No abdominal, nausea, vomiting, or hematemesis; No diarrhea or constipation. No melena or hematochezia.  GENITOURINARY: No dysuria, frequency or hematuria  NEUROLOGICAL: No dizziness, numbness, or weakness  SKIN: No itching, burning, rashes, or lesions   MSK: + Back pain  All other review of systems is negative unless indicated above.    VITAL SIGNS:    T(C): 36.4 (09-12-22 @ 04:00), Max: 36.4 (09-11-22 @ 19:42)  HR: 65 (09-12-22 @ 04:00) (65 - 72)  BP: 109/65 (09-12-22 @ 04:00) (105/56 - 133/72)  RR: 17 (09-12-22 @ 04:00) (16 - 18)  SpO2: 96% (09-12-22 @ 04:00) (96% - 96%)    PHYSICAL EXAM:     GENERAL: no acute distress  HEENT: NC/AT, EOMI, neck supple, MMM  RESPIRATORY: LCTAB/L, no rhonchi, rales, or wheezing  CARDIOVASCULAR: RRR, no murmurs, gallops, rubs  ABDOMINAL: soft, non-tender, non-distended, positive bowel sounds   EXTREMITIES: no clubbing, cyanosis, or edema  NEUROLOGICAL: alert and oriented x 3, non-focal  SKIN: no rashes or lesions   MUSCULOSKELETAL: lower back TTP                          10.9   11.19 )-----------( 289      ( 12 Sep 2022 06:55 )             33.5     09-12    139  |  105  |  35<H>  ----------------------------<  100<H>  4.6   |  27  |  1.30    Ca    9.1      12 Sep 2022 06:55    TPro  7.8  /  Alb  2.9<L>  /  TBili  0.4  /  DBili  x   /  AST  28  /  ALT  31  /  AlkPhos  142<H>  09-12      CAPILLARY BLOOD GLUCOSE          MEDICATIONS  (STANDING):  ALBUTerol    0.083% 2.5 milliGRAM(s) Nebulizer every 6 hours  ALBUTerol    90 MICROgram(s) HFA Inhaler 1 Puff(s) Inhalation every 6 hours  amLODIPine   Tablet 5 milliGRAM(s) Oral daily  apixaban 5 milliGRAM(s) Oral two times a day  atorvastatin 40 milliGRAM(s) Oral at bedtime  budesonide  80 MICROgram(s)/formoterol 4.5 MICROgram(s) Inhaler 2 Puff(s) Inhalation two times a day  finasteride 5 milliGRAM(s) Oral daily  lidocaine   4% Patch 1 Patch Transdermal daily  magnesium hydroxide Suspension 30 milliLiter(s) Oral daily  metoprolol tartrate 50 milliGRAM(s) Oral two times a day  naloxone Injectable 0.4 milliGRAM(s) IV Push once  pantoprazole    Tablet 40 milliGRAM(s) Oral before breakfast  polyethylene glycol 3350 17 Gram(s) Oral two times a day  saccharomyces boulardii 250 milliGRAM(s) Oral two times a day  senna 2 Tablet(s) Oral at bedtime  tamsulosin 0.4 milliGRAM(s) Oral at bedtime

## 2022-09-13 ENCOUNTER — INPATIENT (INPATIENT)
Facility: HOSPITAL | Age: 87
LOS: 9 days | Discharge: HOME HEALTH | DRG: 949 | End: 2022-09-23
Attending: INTERNAL MEDICINE | Admitting: INTERNAL MEDICINE
Payer: MEDICARE

## 2022-09-13 ENCOUNTER — TRANSCRIPTION ENCOUNTER (OUTPATIENT)
Age: 87
End: 2022-09-13

## 2022-09-13 VITALS
SYSTOLIC BLOOD PRESSURE: 132 MMHG | WEIGHT: 148.81 LBS | OXYGEN SATURATION: 95 % | HEIGHT: 68 IN | DIASTOLIC BLOOD PRESSURE: 76 MMHG | RESPIRATION RATE: 15 BRPM | TEMPERATURE: 98 F | HEART RATE: 68 BPM

## 2022-09-13 VITALS
OXYGEN SATURATION: 95 % | DIASTOLIC BLOOD PRESSURE: 64 MMHG | SYSTOLIC BLOOD PRESSURE: 107 MMHG | TEMPERATURE: 98 F | RESPIRATION RATE: 19 BRPM | HEART RATE: 66 BPM

## 2022-09-13 DIAGNOSIS — J96.01 ACUTE RESPIRATORY FAILURE WITH HYPOXIA: ICD-10-CM

## 2022-09-13 DIAGNOSIS — Z87.2 PERSONAL HISTORY OF DISEASES OF THE SKIN AND SUBCUTANEOUS TISSUE: Chronic | ICD-10-CM

## 2022-09-13 LAB
ALBUMIN SERPL ELPH-MCNC: 3.1 G/DL — LOW (ref 3.3–5)
ALP SERPL-CCNC: 154 U/L — HIGH (ref 40–120)
ALT FLD-CCNC: 36 U/L — SIGNIFICANT CHANGE UP (ref 12–78)
ANION GAP SERPL CALC-SCNC: 4 MMOL/L — LOW (ref 5–17)
AST SERPL-CCNC: 31 U/L — SIGNIFICANT CHANGE UP (ref 15–37)
BILIRUB SERPL-MCNC: 0.5 MG/DL — SIGNIFICANT CHANGE UP (ref 0.2–1.2)
BUN SERPL-MCNC: 39 MG/DL — HIGH (ref 7–23)
CALCIUM SERPL-MCNC: 9.4 MG/DL — SIGNIFICANT CHANGE UP (ref 8.5–10.1)
CHLORIDE SERPL-SCNC: 103 MMOL/L — SIGNIFICANT CHANGE UP (ref 96–108)
CO2 SERPL-SCNC: 30 MMOL/L — SIGNIFICANT CHANGE UP (ref 22–31)
CREAT SERPL-MCNC: 1.4 MG/DL — HIGH (ref 0.5–1.3)
EGFR: 48 ML/MIN/1.73M2 — LOW
GLUCOSE SERPL-MCNC: 84 MG/DL — SIGNIFICANT CHANGE UP (ref 70–99)
HCT VFR BLD CALC: 37.5 % — LOW (ref 39–50)
HGB BLD-MCNC: 11.7 G/DL — LOW (ref 13–17)
MCHC RBC-ENTMCNC: 30.3 PG — SIGNIFICANT CHANGE UP (ref 27–34)
MCHC RBC-ENTMCNC: 31.2 GM/DL — LOW (ref 32–36)
MCV RBC AUTO: 97.2 FL — SIGNIFICANT CHANGE UP (ref 80–100)
NRBC # BLD: 0 /100 WBCS — SIGNIFICANT CHANGE UP (ref 0–0)
PLATELET # BLD AUTO: 293 K/UL — SIGNIFICANT CHANGE UP (ref 150–400)
POTASSIUM SERPL-MCNC: 4.8 MMOL/L — SIGNIFICANT CHANGE UP (ref 3.5–5.3)
POTASSIUM SERPL-SCNC: 4.8 MMOL/L — SIGNIFICANT CHANGE UP (ref 3.5–5.3)
PROT SERPL-MCNC: 8.5 G/DL — HIGH (ref 6–8.3)
RBC # BLD: 3.86 M/UL — LOW (ref 4.2–5.8)
RBC # FLD: 14.4 % — SIGNIFICANT CHANGE UP (ref 10.3–14.5)
SARS-COV-2 RNA SPEC QL NAA+PROBE: SIGNIFICANT CHANGE UP
SODIUM SERPL-SCNC: 137 MMOL/L — SIGNIFICANT CHANGE UP (ref 135–145)
WBC # BLD: 8.7 K/UL — SIGNIFICANT CHANGE UP (ref 3.8–10.5)
WBC # FLD AUTO: 8.7 K/UL — SIGNIFICANT CHANGE UP (ref 3.8–10.5)

## 2022-09-13 PROCEDURE — 84100 ASSAY OF PHOSPHORUS: CPT

## 2022-09-13 PROCEDURE — 88108 CYTOPATH CONCENTRATE TECH: CPT

## 2022-09-13 PROCEDURE — 87206 SMEAR FLUORESCENT/ACID STAI: CPT

## 2022-09-13 PROCEDURE — 36415 COLL VENOUS BLD VENIPUNCTURE: CPT

## 2022-09-13 PROCEDURE — 93005 ELECTROCARDIOGRAM TRACING: CPT

## 2022-09-13 PROCEDURE — 87102 FUNGUS ISOLATION CULTURE: CPT

## 2022-09-13 PROCEDURE — 85025 COMPLETE CBC W/AUTO DIFF WBC: CPT

## 2022-09-13 PROCEDURE — 97162 PT EVAL MOD COMPLEX 30 MIN: CPT

## 2022-09-13 PROCEDURE — 88305 TISSUE EXAM BY PATHOLOGIST: CPT

## 2022-09-13 PROCEDURE — 85027 COMPLETE CBC AUTOMATED: CPT

## 2022-09-13 PROCEDURE — U0003: CPT

## 2022-09-13 PROCEDURE — 87116 MYCOBACTERIA CULTURE: CPT

## 2022-09-13 PROCEDURE — 87635 SARS-COV-2 COVID-19 AMP PRB: CPT

## 2022-09-13 PROCEDURE — 99223 1ST HOSP IP/OBS HIGH 75: CPT

## 2022-09-13 PROCEDURE — 97530 THERAPEUTIC ACTIVITIES: CPT

## 2022-09-13 PROCEDURE — 94640 AIRWAY INHALATION TREATMENT: CPT

## 2022-09-13 PROCEDURE — 88312 SPECIAL STAINS GROUP 1: CPT

## 2022-09-13 PROCEDURE — 83735 ASSAY OF MAGNESIUM: CPT

## 2022-09-13 PROCEDURE — 87015 SPECIMEN INFECT AGNT CONCNTJ: CPT

## 2022-09-13 PROCEDURE — 80048 BASIC METABOLIC PNL TOTAL CA: CPT

## 2022-09-13 PROCEDURE — U0005: CPT

## 2022-09-13 PROCEDURE — 97116 GAIT TRAINING THERAPY: CPT

## 2022-09-13 PROCEDURE — 87070 CULTURE OTHR SPECIMN AEROBIC: CPT

## 2022-09-13 PROCEDURE — 99239 HOSP IP/OBS DSCHRG MGMT >30: CPT

## 2022-09-13 PROCEDURE — 80053 COMPREHEN METABOLIC PANEL: CPT

## 2022-09-13 PROCEDURE — 94760 N-INVAS EAR/PLS OXIMETRY 1: CPT

## 2022-09-13 RX ORDER — CYCLOBENZAPRINE HYDROCHLORIDE 10 MG/1
1 TABLET, FILM COATED ORAL
Qty: 90 | Refills: 0
Start: 2022-09-13 | End: 2022-10-12

## 2022-09-13 RX ORDER — CYCLOBENZAPRINE HYDROCHLORIDE 10 MG/1
5 TABLET, FILM COATED ORAL THREE TIMES A DAY
Refills: 0 | Status: DISCONTINUED | OUTPATIENT
Start: 2022-09-13 | End: 2022-09-23

## 2022-09-13 RX ORDER — ACETAMINOPHEN 500 MG
650 TABLET ORAL EVERY 6 HOURS
Refills: 0 | Status: DISCONTINUED | OUTPATIENT
Start: 2022-09-13 | End: 2022-09-23

## 2022-09-13 RX ORDER — AMLODIPINE BESYLATE 2.5 MG/1
5 TABLET ORAL DAILY
Refills: 0 | Status: DISCONTINUED | OUTPATIENT
Start: 2022-09-13 | End: 2022-09-13

## 2022-09-13 RX ORDER — LIDOCAINE 4 G/100G
1 CREAM TOPICAL
Refills: 0 | Status: DISCONTINUED | OUTPATIENT
Start: 2022-09-14 | End: 2022-09-15

## 2022-09-13 RX ORDER — SENNA PLUS 8.6 MG/1
2 TABLET ORAL
Qty: 60 | Refills: 0
Start: 2022-09-13 | End: 2022-10-12

## 2022-09-13 RX ORDER — INFLUENZA VIRUS VACCINE 15; 15; 15; 15 UG/.5ML; UG/.5ML; UG/.5ML; UG/.5ML
0.7 SUSPENSION INTRAMUSCULAR ONCE
Refills: 0 | Status: DISCONTINUED | OUTPATIENT
Start: 2022-09-13 | End: 2022-09-23

## 2022-09-13 RX ORDER — SODIUM CHLORIDE 9 MG/ML
500 INJECTION INTRAMUSCULAR; INTRAVENOUS; SUBCUTANEOUS
Refills: 0 | Status: DISCONTINUED | OUTPATIENT
Start: 2022-09-13 | End: 2022-09-13

## 2022-09-13 RX ORDER — POLYETHYLENE GLYCOL 3350 17 G/17G
17 POWDER, FOR SOLUTION ORAL
Refills: 0 | Status: DISCONTINUED | OUTPATIENT
Start: 2022-09-13 | End: 2022-09-23

## 2022-09-13 RX ORDER — LANOLIN ALCOHOL/MO/W.PET/CERES
3 CREAM (GRAM) TOPICAL AT BEDTIME
Refills: 0 | Status: DISCONTINUED | OUTPATIENT
Start: 2022-09-13 | End: 2022-09-23

## 2022-09-13 RX ORDER — APIXABAN 2.5 MG/1
5 TABLET, FILM COATED ORAL
Refills: 0 | Status: DISCONTINUED | OUTPATIENT
Start: 2022-09-13 | End: 2022-09-23

## 2022-09-13 RX ORDER — PANTOPRAZOLE SODIUM 20 MG/1
40 TABLET, DELAYED RELEASE ORAL
Refills: 0 | Status: DISCONTINUED | OUTPATIENT
Start: 2022-09-13 | End: 2022-09-23

## 2022-09-13 RX ORDER — SACCHAROMYCES BOULARDII 250 MG
250 POWDER IN PACKET (EA) ORAL
Refills: 0 | Status: DISCONTINUED | OUTPATIENT
Start: 2022-09-13 | End: 2022-09-23

## 2022-09-13 RX ORDER — LIDOCAINE 4 G/100G
1 CREAM TOPICAL DAILY
Refills: 0 | Status: DISCONTINUED | OUTPATIENT
Start: 2022-09-13 | End: 2022-09-13

## 2022-09-13 RX ORDER — METOPROLOL TARTRATE 50 MG
50 TABLET ORAL ONCE
Refills: 0 | Status: COMPLETED | OUTPATIENT
Start: 2022-09-13 | End: 2022-09-13

## 2022-09-13 RX ORDER — AMLODIPINE BESYLATE 2.5 MG/1
1 TABLET ORAL
Qty: 30 | Refills: 0
Start: 2022-09-13 | End: 2022-10-12

## 2022-09-13 RX ORDER — SODIUM CHLORIDE 9 MG/ML
1000 INJECTION INTRAMUSCULAR; INTRAVENOUS; SUBCUTANEOUS
Refills: 0 | Status: DISCONTINUED | OUTPATIENT
Start: 2022-09-13 | End: 2022-09-13

## 2022-09-13 RX ORDER — SODIUM CHLORIDE 9 MG/ML
500 INJECTION INTRAMUSCULAR; INTRAVENOUS; SUBCUTANEOUS ONCE
Refills: 0 | Status: COMPLETED | OUTPATIENT
Start: 2022-09-13 | End: 2022-09-13

## 2022-09-13 RX ORDER — ATORVASTATIN CALCIUM 80 MG/1
40 TABLET, FILM COATED ORAL AT BEDTIME
Refills: 0 | Status: DISCONTINUED | OUTPATIENT
Start: 2022-09-13 | End: 2022-09-23

## 2022-09-13 RX ORDER — SENNA PLUS 8.6 MG/1
2 TABLET ORAL AT BEDTIME
Refills: 0 | Status: DISCONTINUED | OUTPATIENT
Start: 2022-09-13 | End: 2022-09-23

## 2022-09-13 RX ORDER — BUDESONIDE AND FORMOTEROL FUMARATE DIHYDRATE 160; 4.5 UG/1; UG/1
2 AEROSOL RESPIRATORY (INHALATION)
Refills: 0 | Status: DISCONTINUED | OUTPATIENT
Start: 2022-09-13 | End: 2022-09-23

## 2022-09-13 RX ORDER — AMLODIPINE BESYLATE 2.5 MG/1
5 TABLET ORAL DAILY
Refills: 0 | Status: DISCONTINUED | OUTPATIENT
Start: 2022-09-14 | End: 2022-09-15

## 2022-09-13 RX ORDER — APIXABAN 2.5 MG/1
1 TABLET, FILM COATED ORAL
Qty: 60 | Refills: 0
Start: 2022-09-13 | End: 2022-10-12

## 2022-09-13 RX ORDER — FINASTERIDE 5 MG/1
5 TABLET, FILM COATED ORAL DAILY
Refills: 0 | Status: DISCONTINUED | OUTPATIENT
Start: 2022-09-13 | End: 2022-09-23

## 2022-09-13 RX ORDER — MAGNESIUM HYDROXIDE 400 MG/1
30 TABLET, CHEWABLE ORAL DAILY
Refills: 0 | Status: DISCONTINUED | OUTPATIENT
Start: 2022-09-13 | End: 2022-09-13

## 2022-09-13 RX ORDER — METOPROLOL TARTRATE 50 MG
50 TABLET ORAL
Refills: 0 | Status: DISCONTINUED | OUTPATIENT
Start: 2022-09-13 | End: 2022-09-23

## 2022-09-13 RX ORDER — ALBUTEROL 90 UG/1
1 AEROSOL, METERED ORAL EVERY 6 HOURS
Refills: 0 | Status: DISCONTINUED | OUTPATIENT
Start: 2022-09-13 | End: 2022-09-23

## 2022-09-13 RX ORDER — TAMSULOSIN HYDROCHLORIDE 0.4 MG/1
0.4 CAPSULE ORAL AT BEDTIME
Refills: 0 | Status: DISCONTINUED | OUTPATIENT
Start: 2022-09-13 | End: 2022-09-23

## 2022-09-13 RX ADMIN — Medication 250 MILLIGRAM(S): at 05:57

## 2022-09-13 RX ADMIN — TAMSULOSIN HYDROCHLORIDE 0.4 MILLIGRAM(S): 0.4 CAPSULE ORAL at 21:24

## 2022-09-13 RX ADMIN — CYCLOBENZAPRINE HYDROCHLORIDE 5 MILLIGRAM(S): 10 TABLET, FILM COATED ORAL at 21:23

## 2022-09-13 RX ADMIN — APIXABAN 5 MILLIGRAM(S): 2.5 TABLET, FILM COATED ORAL at 17:12

## 2022-09-13 RX ADMIN — Medication 50 MILLIGRAM(S): at 11:07

## 2022-09-13 RX ADMIN — BUDESONIDE AND FORMOTEROL FUMARATE DIHYDRATE 2 PUFF(S): 160; 4.5 AEROSOL RESPIRATORY (INHALATION) at 22:08

## 2022-09-13 RX ADMIN — Medication 50 MILLIGRAM(S): at 17:12

## 2022-09-13 RX ADMIN — Medication 650 MILLIGRAM(S): at 20:39

## 2022-09-13 RX ADMIN — APIXABAN 5 MILLIGRAM(S): 2.5 TABLET, FILM COATED ORAL at 05:57

## 2022-09-13 RX ADMIN — SODIUM CHLORIDE 100 MILLILITER(S): 9 INJECTION INTRAMUSCULAR; INTRAVENOUS; SUBCUTANEOUS at 11:09

## 2022-09-13 RX ADMIN — SENNA PLUS 2 TABLET(S): 8.6 TABLET ORAL at 21:23

## 2022-09-13 RX ADMIN — ALBUTEROL 2.5 MILLIGRAM(S): 90 AEROSOL, METERED ORAL at 07:50

## 2022-09-13 RX ADMIN — Medication 650 MILLIGRAM(S): at 20:09

## 2022-09-13 RX ADMIN — MAGNESIUM HYDROXIDE 30 MILLILITER(S): 400 TABLET, CHEWABLE ORAL at 11:07

## 2022-09-13 RX ADMIN — SODIUM CHLORIDE 500 MILLILITER(S): 9 INJECTION INTRAMUSCULAR; INTRAVENOUS; SUBCUTANEOUS at 11:09

## 2022-09-13 RX ADMIN — AMLODIPINE BESYLATE 5 MILLIGRAM(S): 2.5 TABLET ORAL at 05:57

## 2022-09-13 RX ADMIN — LIDOCAINE 1 PATCH: 4 CREAM TOPICAL at 11:07

## 2022-09-13 RX ADMIN — Medication 250 MILLIGRAM(S): at 17:11

## 2022-09-13 RX ADMIN — ATORVASTATIN CALCIUM 40 MILLIGRAM(S): 80 TABLET, FILM COATED ORAL at 21:24

## 2022-09-13 RX ADMIN — FINASTERIDE 5 MILLIGRAM(S): 5 TABLET, FILM COATED ORAL at 11:06

## 2022-09-13 RX ADMIN — PANTOPRAZOLE SODIUM 40 MILLIGRAM(S): 20 TABLET, DELAYED RELEASE ORAL at 06:04

## 2022-09-13 RX ADMIN — POLYETHYLENE GLYCOL 3350 17 GRAM(S): 17 POWDER, FOR SOLUTION ORAL at 05:57

## 2022-09-13 NOTE — H&P ADULT - ATTENDING COMMENTS
Seen and examined    86y/o M HxHTN, HLD, hx of CVA (7 yrs ago, residual left motor deficits), BPH, hx of skin CA. Initially admitted to [facility] 8/11 resp symptoms, Dx with multifocal Pneumonia,, ARF requiring HFNC, new onset afib, yeast colonization of bronchial apparatus and  AFB+ MAC atypical/non TB infection  Completed treatment, now stable. Acute rehab admission 9/13    Living alone, independent with ADLs/IADLs, admits to residual Left motor deficits, mostly left leg after old CVA,  Has good family support. Ambulates with no gait device  Reports chronic LE edema, previously treated with low dose lasix    Currently ambulating min distance with RW  No dyspnea or chest pain    Comfortable, sitting on his WC  HEENT-NAD  Chest --decreased air entry left lower lobe, no creps  Abd--soft non tender   Ext--LE edema up to mid shin, pitting 2+ with dry skin    AAO X 3  No cognitive deficits   LT sensation normal  Reflexes 2+ b/l  MMT 4+/5 Lt side, 5/5 on right side  Gait--fair static, poor dynamic  gait without device    Labs--unremarkable    Dx  ARF 2/2 Multifocal PNA (MAC non TB) s/p antibiotic treatment, with associated functional impairment,   Commence PT/PT  - Continue Budesonide - formoterol 80mcg-4.5 mcg BID + Albuterol Q6h  - Incentive spirometry/deep breathing exercise    Afib c/w anticoagulation  LE edema--monitor, will get imaging if non recent   Collateral hx from family   Continue all supportive treatments   Est dc at next team conference

## 2022-09-13 NOTE — PROGRESS NOTE ADULT - ASSESSMENT
The patient is an 87 year old male with a history of HTN, HL, BPH, CVA who presents with cough and hemoptysis in the setting of PNA and r/o TB.    Plan:  - Intermittent episodes of AF on telemetry  - Echo with low normal LV systolic function, mild pulm HTN  - Continue metoprolol tartrate 50 mg bid  - Continue amlodipine 5 mg daily  - Continue apixaban 5 mg bid  - Sputum culture positive for AFB - consistent with SCOTT  - Pulm and ID follow-up  - Discharge planning

## 2022-09-13 NOTE — PROGRESS NOTE ADULT - ASSESSMENT
87M with HTN, HLD, hx of CVA (no residual deficits), BPH, hx of skin CA who presents with cough and fevers.    Found to have multifocal pneumonia.  Also found have new onset afib.        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 Fine with discharge from ID standpoint when other medical issues no longer require inpatient care and social issues allow for a safe discharge plan. To schedule an outpatient ID follow up appointment please call our office at [phone]    Thank you for consulting us and involving us in the management of this most interesting and challenging case.  Please call us at [phone] or text me directly on my cell#[phone] with any concerns or further questions.

## 2022-09-13 NOTE — H&P ADULT - HISTORY OF PRESENT ILLNESS
87M with HTN, HLD, hx of CVA (no residual deficits), BPH, hx of skin CA who presents to [facility] on 8/11 with cough and fevers.  Symptoms started about 5-6 days PTA.  Started with left sided upper back pain.  Then started to have a cough + green phlegm and possible blood.  Associated with SOB and DONG.  No weight changes or night sweats.  Had a fever as high as 102F.  Some nausea but no vomiting with loss of appetite.  No chest pain.  No diarrhea.  No abdominal pain.  No recent travel or sick contacts.  Went to see his PMD 3 days prior to [facility] admission and was given a z-kellie and tesslon perles.  Reported having negative COVID home tests.   In the [place] ED, patient's triage vitals were /78    RR  21, 93% on RA (thought dropped down to 88%) and T 98.5F.  Labs showed WBC 18 with a left shift, CINDI with BUN/Cr 40/1.88, elevated LFTs, normal lactate, negative COVID (last booster in 12/2021 with Pfizer vaccine), neg influenza, neg RSV.  CT chest showed "patchy ground glass and more dense opacities in both lungs, suspicious for multifocal PNA + several small lucencies with areas of lung opacity ->>may represent early cavitation versus areas of focal bronchiectasis."  Patient started on azithromycin and ceftriaxone empirically for multifocal PNA.  Also of note, patient was found to have new afib on EKG.  Patient denies any afib history but said when he was younger he did feel some irregular heartbeats.  Admitted for sepsis due to multifocal pneumonia with progression to acute hypoxic respiratory failure now on HFNC and course further c/b new onset afib. Patient was seen by ID, pulm and cardiology. Transferred to [facility] (8/29) for bronchoscopy.     Underwent bronchoscopy for bronchoalveolar lavage. Started on eliquis for new onset afib after bronchoscopy per cardiology, and bronchial, sputum cultures were followed. ID +Pulm consulted for TB work up. Bronchial cultures were negative on AFB stain along with sputum cultures. 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 Isolation protocol was discontinued. Bronchial fungal cultures showed normal alexander. Pt found to be in mild CINDI, was given a bolus of fluid. Pt encouraged to drink plenty of fluids.     Patient was evaluated by PM&R and therapy for functional deficits and gait/ADL impairments and recommend acute rehabilitation.  Patient was medically optimized for discharge to [place] on 9/13/22.   PAST MEDICAL HISTORY:  No pertinent past medical history

## 2022-09-13 NOTE — PROGRESS NOTE ADULT - SUBJECTIVE AND OBJECTIVE BOX
[facility] DIVISION of INFECTIOUS DISEASE  Mustapha Valente MD PhD, Sheryl Pride MD, Adriana Rodriguez MD, Oliva Myers MD, Ebenezer Rivas MD  and providing coverage with Leodan Zaragoza MD  Providing Infectious Disease Consultations at [facility], [facility], [place], [facility], [facility]'s    Office# [phone] to schedule follow up appointments  Answering Service for urgent calls or New Consults [phone]  Cell# to text for urgent issues Mustapha Valente [phone]     infectious diseases progress note:    DENNIS BRADFORD is a 88y y. o. Male patient    Overnight and events of the last 24hrs reviewed    Allergies    No Known Allergies    Intolerances        ANTIBIOTICS/RELEVANT:  antimicrobials    immunologic:    OTHER:  acetaminophen     Tablet .. 650 milliGRAM(s) Oral every 6 hours PRN  ALBUTerol    0.083% 2.5 milliGRAM(s) Nebulizer every 6 hours  ALBUTerol    90 MICROgram(s) HFA Inhaler 1 Puff(s) Inhalation every 6 hours  amLODIPine   Tablet 5 milliGRAM(s) Oral daily  apixaban 5 milliGRAM(s) Oral two times a day  atorvastatin 40 milliGRAM(s) Oral at bedtime  bisacodyl 5 milliGRAM(s) Oral daily PRN  budesonide  80 MICROgram(s)/formoterol 4.5 MICROgram(s) Inhaler 2 Puff(s) Inhalation two times a day  cyclobenzaprine 5 milliGRAM(s) Oral three times a day PRN  finasteride 5 milliGRAM(s) Oral daily  lidocaine   4% Patch 1 Patch Transdermal daily  magnesium hydroxide Suspension 30 milliLiter(s) Oral daily  melatonin 3 milliGRAM(s) Oral at bedtime PRN  metoprolol tartrate 50 milliGRAM(s) Oral two times a day  naloxone Injectable 0.4 milliGRAM(s) IV Push once  pantoprazole    Tablet 40 milliGRAM(s) Oral before breakfast  polyethylene glycol 3350 17 Gram(s) Oral two times a day  saccharomyces boulardii 250 milliGRAM(s) Oral two times a day  senna 2 Tablet(s) Oral at bedtime  tamsulosin 0.4 milliGRAM(s) Oral at bedtime      Objective:  Vital Signs Last 24 Hrs  T(C): 36.3 (13 Sep 2022 05:04), Max: 36.8 (12 Sep 2022 21:41)  T(F): 97.3 (13 Sep 2022 05:04), Max: 98.2 (12 Sep 2022 21:41)  HR: 64 (13 Sep 2022 07:50) (59 - 68)  BP: 104/52 (13 Sep 2022 05:53) (102/60 - 126/74)  BP(mean): --  RR: 18 (13 Sep 2022 05:04) (18 - 18)  SpO2: 98% (13 Sep 2022 07:50) (93% - 98%)    Parameters below as of 13 Sep 2022 07:50  Patient On (Oxygen Delivery Method): room air        T(C): 36.3 (09-13-22 @ 05:04), Max: 36.8 (09-12-22 @ 21:41)  T(C): 36.3 (09-13-22 @ 05:04), Max: 36.8 (09-12-22 @ 21:41)  T(C): 36.3 (09-13-22 @ 05:04), Max: 36.9 (09-09-22 @ 12:53)    PHYSICAL EXAM:  HEENT: NC atraumatic  Neck: supple  Respiratory: no accessory muscle use, breathing comfortably  Cardiovascular: distant  Gastrointestinal: normal appearing, nondistended  Extremities: no clubbing, no cyanosis,        LABS:                          11.7   8.70  )-----------( 293      ( 13 Sep 2022 07:28 )             37.5       WBC  8.70 09-13 @ 07:28  11.19 09-12 @ 06:55  10.97 09-11 @ 09:17  9.61 09-10 @ 07:10  9.21 09-09 @ 06:04  14.58 09-08 @ 10:17  8.91 09-07 @ 08:03      09-13    137  |  103  |  39<H>  ----------------------------<  84  4.8   |  30  |  1.40<H>    Ca    9.4      13 Sep 2022 07:28    TPro  8.5<H>  /  Alb  3.1<L>  /  TBili  0.5  /  DBili  x   /  AST  31  /  ALT  36  /  AlkPhos  154<H>  09-13      Creatinine, Serum: 1.40 mg/dL (09-13-22 @ 07:28)  Creatinine, Serum: 1.30 mg/dL (09-12-22 @ 06:55)  Creatinine, Serum: 1.20 mg/dL (09-11-22 @ 09:17)  Creatinine, Serum: 1.10 mg/dL (09-10-22 @ 07:10)  Creatinine, Serum: 1.20 mg/dL (09-09-22 @ 06:04)  Creatinine, Serum: 1.20 mg/dL (09-08-22 @ 10:17)  Creatinine, Serum: 1.20 mg/dL (09-07-22 @ 08:03)                INFLAMMATORY MARKERS  Auto Neutrophil #: 6.89 K/uL (09-12-22 @ 06:55)  Auto Lymphocyte #: 2.56 K/uL (09-12-22 @ 06:55)  Auto Neutrophil #: 5.87 K/uL (09-10-22 @ 07:10)  Auto Lymphocyte #: 2.04 K/uL (09-10-22 @ 07:10)  Auto Neutrophil #: 5.12 K/uL (09-09-22 @ 06:04)  Auto Lymphocyte #: 2.40 K/uL (09-09-22 @ 06:04)  Auto Neutrophil #: 5.09 K/uL (09-07-22 @ 08:03)  Auto Lymphocyte #: 2.13 K/uL (09-07-22 @ 08:03)  Auto Neutrophil #: 6.91 K/uL (09-06-22 @ 07:35)  Auto Lymphocyte #: 1.87 K/uL (09-06-22 @ 07:35)      Auto Eosinophil #: 0.33 K/uL (09-12-22 @ 06:55)  Auto Eosinophil #: 0.65 K/uL (09-10-22 @ 07:10)  Auto Eosinophil #: 0.52 K/uL (09-09-22 @ 06:04)  Auto Eosinophil #: 0.55 K/uL (09-07-22 @ 08:03)  Auto Eosinophil #: 0.37 K/uL (09-06-22 @ 07:35)                                MICROBIOLOGY:              RADIOLOGY & ADDITIONAL STUDIES:

## 2022-09-13 NOTE — PATIENT PROFILE ADULT - FALL HARM RISK - HARM RISK INTERVENTIONS
Assistance with ambulation/Assistance OOB with selected safe patient handling equipment/Communicate Risk of Fall with Harm to all staff/Monitor gait and stability/Reinforce activity limits and safety measures with patient and family/Tailored Fall Risk Interventions/Visual Cue: Yellow wristband and red socks/Bed in lowest position, wheels locked, appropriate side rails in place/Call bell, personal items and telephone in reach/Instruct patient to call for assistance before getting out of bed or chair/Non-slip footwear when patient is out of bed/[place] to call system/Physically safe environment - no spills, clutter or unnecessary equipment/Purposeful Proactive Rounding/Room/bathroom lighting operational, light cord in reach

## 2022-09-13 NOTE — PROGRESS NOTE ADULT - SUBJECTIVE AND OBJECTIVE BOX
Chief Complaint: Hemoptysis    Interval Events: No events overnight.    Review of Systems:  General: No fevers, chills, weight gain  Skin: No rashes, color changes  Cardiovascular: No chest pain, orthopnea  Respiratory: No shortness of breath, cough  Gastrointestinal: No nausea, abdominal pain  Genitourinary: No incontinence, pain with urination  Musculoskeletal: No pain, swelling, decreased range of motion  Neurological: No headache, weakness  Psychiatric: No depression, anxiety  Endocrine: No weight gain, increased thirst  All other systems are comprehensively negative.    Physical Exam:  Vital Signs Last 24 Hrs  T(C): 36.3 (13 Sep 2022 05:04), Max: 36.8 (12 Sep 2022 21:41)  T(F): 97.3 (13 Sep 2022 05:04), Max: 98.2 (12 Sep 2022 21:41)  HR: 64 (13 Sep 2022 07:50) (59 - 68)  BP: 104/52 (13 Sep 2022 05:53) (102/60 - 126/74)  BP(mean): --  RR: 18 (13 Sep 2022 05:04) (18 - 18)  SpO2: 98% (13 Sep 2022 07:50) (93% - 98%)  Parameters below as of 13 Sep 2022 07:50  Patient On (Oxygen Delivery Method): room air  General: NAD  HEENT: MMM  Neck: No JVD, no carotid bruit  Lungs: CTAB  CV: RRR, nl S1/S2, no M/R/G  Abdomen: S/NT/ND, +BS  Extremities: No LE edema, no cyanosis  Neuro: AAOx3, non-focal  Skin: No rash    Labs:    09-12    139  |  105  |  35<H>  ----------------------------<  100<H>  4.6   |  27  |  1.30    Ca    9.1      12 Sep 2022 06:55    TPro  7.8  /  Alb  2.9<L>  /  TBili  0.4  /  DBili  x   /  AST  28  /  ALT  31  /  AlkPhos  142<H>  09-12                        10.9   11.19 )-----------( 289      ( 12 Sep 2022 06:55 )             33.5     Telemetry: Sinus rhythm

## 2022-09-13 NOTE — PROGRESS NOTE ADULT - REASON FOR ADMISSION
bronchoscopy

## 2022-09-13 NOTE — H&P ADULT - NSHPLABSRESULTS_GEN_ALL_CORE
11.7   8.70  )-----------( 293      ( 13 Sep 2022 07:28 )             37.5     09-13    137  |  103  |  39<H>  ----------------------------<  84  4.8   |  30  |  1.40<H>    Ca    9.4      13 Sep 2022 07:28    TPro  8.5<H>  /  Alb  3.1<L>  /  TBili  0.5  /  DBili  x   /  AST  31  /  ALT  36  /  AlkPhos  154<H>  09-13    RADIOLOGY, EKG & ADDITIONAL TESTS:   Xray Chest 1 View AP/PA (08.11.22 @ 20:29) >  Normal heart mediastinum.  parenchymal scarring right upper lung field similar to prior. Additional new scattered bilateral patchy opacities may represent multifocal pneumonia and/or scarring. Left hilum is prominent.    CT Abdomen and Pelvis No Cont (08.11.22 @ 21:36) >  Bilateral groundglass glass and airspace consolidations, some associated with bronchiectasis particularly in the upper lobes, findings likely   secondary to infection.  Possible small areas of cavitation.  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  No acute intra-abdominal pathology.    US Transthoracic Echocardiogram w/Doppler Complete (08.12.22 @ 08:08) >  1. Low normal left ventricular systolic function.  2. Bi-atrial enlargement.  3. Mild pulmonary hypertension.    Xray Chest  (08.15.22 @ 18:33) >  Increased bilateral upper zone diffuse airspacedisease with   RIGHT upper lobe volume loss as described. Lung bases clear.    Xray Chest  (08.20.22 @ 21:00) >  Partial clearing of the lungs.    Xray Chest (08.22.22 @ 07:26) >   Last 2 films show persistent advanced infiltrates. There is   likely some degree of chronic component.

## 2022-09-13 NOTE — DISCHARGE NOTE NURSING/CASE MANAGEMENT/SOCIAL WORK - PATIENT PORTAL LINK FT
You can access the FollowMyHealth Patient Portal offered by [facility] by registering at the following website: [email or web address]. By joining [facility]’s FollowMyHealth portal, you will also be able to view your health information using other applications (apps) compatible with our system.

## 2022-09-13 NOTE — PROGRESS NOTE ADULT - ASSESSMENT
88yo M with PMH of HTN, HLD, hx of CVA (no residual deficits), BPH, hx of skin CA who presents with cough and fevers a/w sepsis due to multifocal pneumonia with progression to acute hypoxic respiratory failure now on HFNC and course further c/b new onset afib.  Transferred from [place] for bronchoscopy.     Bronch done - Aug 30th -  - Bronch AFB neg - ID follow up - reviewed -  - AFB identification - MAC - isolation dc'd  Flexeril added -   Lido patch added -   plan for [facility] or Acute rehab   cm following    spoke with pt - dtr -   s/p Levaquin course in [facility]

## 2022-09-13 NOTE — PROGRESS NOTE ADULT - PROVIDER SPECIALTY LIST ADULT
Cardiology
Infectious Disease
Pulmonology
Cardiology
Hospitalist
Infectious Disease
Pulmonology
Cardiology
Infectious Disease
Infectious Disease
Pulmonology
Hospitalist

## 2022-09-13 NOTE — H&P ADULT - ASSESSMENT
ASSESSMENT/PLAN  87M with HTN, HLD, hx of CVA (no residual deficits), BPH, hx of skin CA who presents to [place] ER on 8/11 with cough and fevers - azithromycin and ceftriaxone empirically for multifocal PNA.  Hospital course c/b ARF requiring HFNC and new onset afib. Transferred to [facility] for bronchoscopy     COMORBIDITES/ACTIVE MEDICAL ISSUES     #Multifocal PNA/bronchial fungal  - S/p abx (levaquin, ceftriaxone and azithromycin)   - S/p bronchoscopy - rare Geotrichum species, Few Yeast -suggest colonization, no Rx.    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  No need for isolation  - Continue Budesonide - formoterol 80mcg-4.5 mcg BID + Albuterol Q6h  - Gait Instability, ADL impairments and Functional impairments: start Comprehensive Rehab Program of PT/OT     #New onset Afib  - Eliquis 5 BID   - Metoprolol 50 BID  - ECHO low normal LV systolic function, mild pulm HTN    #HTN  - Amlodipine 5  - Metoprolol 50 BID    #HLD  - Lipitor 40    #Back pain  #Pain control  - Tylenol PRN, Lidocaine patch, Cyclobenzaprine 5 TID PRN    #Insomnia  - Quiet and low stimuli environment  - Melatonin 3 PRN     #GI/Bowel Mgmt   - At risk for constipation due to neurologic diagnosis, immobility and/or medication use  - Senna,  Miralax BID, MOM QD, Dulcolax PRN  - Probiotic: Florastor  - GI ppx: Protonix    #BPH  - Flomax 0.4 HS  - Finasteride 5 HS  #Bladder management  - PVR q 8 hours (SC if > 400)  - Encourage timed voids Q4hrs while awake for independence and to promote continence during therapy    #DVT  - Eliquis 5 BID  - SCDs, TEDs     #Skin:  -At risk for pressure injury due to neurologic diagnosis and relative immobility  -Bilateral heels - soft heel protectors, apply liquid barrier film daily and monitor for tissue type changes  - Turn Q2 hr while in bed, air mattress  - Skin barrier cream as needed  - Nursing to monitor skin Qshift    FEN   - Diet - Regular + Thins  [DASH/TLC]      Precautions / PROPHYLAXIS:   - Falls  - ortho: Weight bearing status: WBAT   - Lungs: Aspiration, Incentive Spirometer   - Pressure injury/Skin: Turn Q2hrs while in bed, OOB to Chair, PT/OT      Follow ups:  Deedee Walton)  Critical Care Medicine; Internal Medicine; Pulmonary Disease  [address], [address]  [place] [address]  Phone: [phone]  Fax: [phone]    Cardiology: Gualberto Mai   Infectious Disease: Mustapha Benoit  Office: [phone].  Cell [phone]  Pulmonology: Ha Gray    MEDICAL PROGNOSIS: GOOD            REHAB POTENTIAL: GOOD             ESTIMATED DISPOSITION: HOME WITH HOME CARE            ELOS: 10-14 Days   EXPECTED THERAPY:     P.T. 2hr/day       O.T. 1hr/day         P&O Unnecessary     EXP FREQUENCY: 5 days per 7 day period     PRESCREEN COMPARISION:   I have reviewed the prescreen information and I have found no relevant changes between the preadmission screening and my post admission evaluation     RATIONALE FOR INPATIENT ADMISSION - Patient demonstrates the following: (check all that apply)  [X] Medically appropriate for rehabilitation admission  [X] Has attainable rehab goals with an appropriate initial discharge plan  [X] Has rehabilitation potential (expected to make a significant improvement within a reasonable period of time)   [X] Requires close medical management by a rehab physician, rehab nursing care, Hospitalist and comprehensive interdisciplinary team (including PT, OT, & or SLP, Prosthetics and Orthotics)   ASSESSMENT/PLAN  87M with HTN, HLD, hx of CVA (no residual deficits), BPH, hx of skin CA who presents to [place] ER on 8/11 with cough and fevers - azithromycin and ceftriaxone empirically for multifocal PNA.  Hospital course c/b ARF requiring HFNC and new onset afib. [facility] - found to have rare yeast (likely colonized), AFB+ MAC atypical/non TB infect (does not need iso)    COMORBIDITES/ACTIVE MEDICAL ISSUES     #Multifocal PNA/bronchial fungal  - S/p abx (levaquin, ceftriaxone and azithromycin)   - S/p bronchoscopy - rare Geotrichum species, Few Yeast -suggest colonization, no Rx.    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  No need for isolation  - Continue Budesonide - formoterol 80mcg-4.5 mcg BID + Albuterol Q6h  - Gait Instability, ADL impairments and Functional impairments: start Comprehensive Rehab Program of PT/OT     #New onset Afib  - Eliquis 5 BID   - Metoprolol 50 BID  - ECHO low normal LV systolic function, mild pulm HTN    #HTN  - Amlodipine 5  - Metoprolol 50 BID    #HLD  - Lipitor 40    #Back pain  #Pain control  - Tylenol PRN, Lidocaine patch, Cyclobenzaprine 5 TID PRN    #Insomnia  - Quiet and low stimuli environment  - Melatonin 3 PRN     #GI/Bowel Mgmt   - At risk for constipation due to neurologic diagnosis, immobility and/or medication use  - Senna,  Miralax BID, MOM QD, Dulcolax PRN  - Probiotic: Florastor  - GI ppx: Protonix    #BPH  - Flomax 0.4 HS  - Finasteride 5 HS  #Bladder management  - PVR q 8 hours (SC if > 400)  - Encourage timed voids Q4hrs while awake for independence and to promote continence during therapy    #DVT  - Eliquis 5 BID  - SCDs, TEDs     #Skin:  -At risk for pressure injury due to neurologic diagnosis and relative immobility  -Bilateral heels - soft heel protectors, apply liquid barrier film daily and monitor for tissue type changes  - Turn Q2 hr while in bed, air mattress  - Skin barrier cream as needed  - Nursing to monitor skin Qshift    FEN   - Diet - Regular + Thins  [DASH/TLC]      Precautions / PROPHYLAXIS:   - Falls  - ortho: Weight bearing status: WBAT   - Lungs: Aspiration, Incentive Spirometer   - Pressure injury/Skin: Turn Q2hrs while in bed, OOB to Chair, PT/OT      Follow ups:  Deedee Walton (MD)  Critical Care Medicine; Internal Medicine; Pulmonary Disease  [address] [facility] [address], [address]  [place] [address]  Phone: [phone]  Fax: [phone]    Cardiology: Gualberto Mai   Infectious Disease: Mustapha Benoit  Office: [phone].  Cell [phone]  Pulmonology: Ha Gray    MEDICAL PROGNOSIS: GOOD            REHAB POTENTIAL: GOOD             ESTIMATED DISPOSITION: HOME WITH HOME CARE            ELOS: 10-14 Days   EXPECTED THERAPY:     P.T. 2hr/day       O.T. 1hr/day         P&O Unnecessary     EXP FREQUENCY: 5 days per 7 day period     PRESCREEN COMPARISION:   I have reviewed the prescreen information and I have found no relevant changes between the preadmission screening and my post admission evaluation     RATIONALE FOR INPATIENT ADMISSION - Patient demonstrates the following: (check all that apply)  [X] Medically appropriate for rehabilitation admission  [X] Has attainable rehab goals with an appropriate initial discharge plan  [X] Has rehabilitation potential (expected to make a significant improvement within a reasonable period of time)   [X] Requires close medical management by a rehab physician, rehab nursing care, Hospitalist and comprehensive interdisciplinary team (including PT, OT, & or SLP, Prosthetics and Orthotics)   ASSESSMENT/PLAN  87M with HTN, HLD, hx of CVA (7 yrs ago, no residual deficits), BPH, hx of skin CA who presents to [place] ER on 8/11 with cough and fevers - azithromycin and ceftriaxone empirically for multifocal PNA.  Hospital course c/b ARF requiring HFNC and new onset afib. Transferred to [facility] for bronchoscopy - found to have rare yeast (likely colonized), AFB+ MAC atypical/non TB infect (does not need iso)    COMORBIDITES/ACTIVE MEDICAL ISSUES     #ARF 2/2 Multifocal PNA/bronchial fungal  - S/p abx (levaquin, ceftriaxone and azithromycin)   - S/p bronchoscopy - rare Geotrichum species, Few Yeast -suggest colonization, no Rx.    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  No need for isolation  - Continue Budesonide - formoterol 80mcg-4.5 mcg BID + Albuterol Q6h  - Incentive spirometry/deep breathing exercise  - Gait Instability, ADL impairments and Functional impairments: start Comprehensive Rehab Program of PT/OT     #New onset Afib  - Eliquis 5 BID   - Metoprolol 50 BID  - ECHO low normal LV systolic function, mild pulm HTN    #HTN  - Amlodipine 5  - Metoprolol 50 BID  #BLE edema  - Home med: Lasix 20 QD    #HLD  - Lipitor 40    #Back pain  #Pain control  - Tylenol PRN, Lidocaine patch, Cyclobenzaprine 5 TID PRN    #Insomnia  - Quiet and low stimuli environment  - Melatonin 3 PRN     #GI/Bowel Mgmt   - At risk for constipation due to neurologic diagnosis, immobility and/or medication use  - Senna,  Miralax BID, Dulcolax PRN  - Probiotic: Florastor  - GI ppx: Protonix    #BPH  - Flomax 0.4 HS  - Finasteride 5 HS  #Bladder management  - PVR q 8 hours (SC if > 400)  - Encourage timed voids Q4hrs while awake for independence and to promote continence during therapy    #DVT  - Eliquis 5 BID  - SCDs, TEDs     #Skin:  -At risk for pressure injury due to neurologic diagnosis and relative immobility  -Bilateral heels - soft heel protectors, apply liquid barrier film daily and monitor for tissue type changes  - Turn Q2 hr while in bed, air mattress  - Skin barrier cream as needed  - Nursing to monitor skin Qshift    FEN   - Diet - Regular + Thins  [DASH/TLC]      Precautions / PROPHYLAXIS:   - Falls  - ortho: Weight bearing status: WBAT   - Lungs: Aspiration, Incentive Spirometer   - Pressure injury/Skin: Turn Q2hrs while in bed, OOB to Chair, PT/OT      DaughterChichi [phone]  Pharm: [facility] [address]     Follow ups:  Deedee Walton (MD)  Critical Care Medicine; Internal Medicine; Pulmonary Disease  [address] [facility], [address]  [place] [address]  Phone: [phone]  Fax: [phone]    Cardiology: Gualberto Mai   Infectious Disease: Mustapha Benoit  Office: [phone].  Cell [phone]  Pulmonology: Ha Gray    MEDICAL PROGNOSIS: GOOD            REHAB POTENTIAL: GOOD             ESTIMATED DISPOSITION: HOME WITH HOME CARE            ELOS: 10-14 Days   EXPECTED THERAPY:     P.T. 2hr/day       O.T. 1hr/day         P&O Unnecessary     EXP FREQUENCY: 5 days per 7 day period     PRESCREEN COMPARISION:   I have reviewed the prescreen information and I have found no relevant changes between the preadmission screening and my post admission evaluation     RATIONALE FOR INPATIENT ADMISSION - Patient demonstrates the following: (check all that apply)  [X] Medically appropriate for rehabilitation admission  [X] Has attainable rehab goals with an appropriate initial discharge plan  [X] Has rehabilitation potential (expected to make a significant improvement within a reasonable period of time)   [X] Requires close medical management by a rehab physician, rehab nursing care, Hospitalist and comprehensive interdisciplinary team (including PT, OT, & or SLP, Prosthetics and Orthotics)

## 2022-09-13 NOTE — H&P ADULT - NSHPPHYSICALEXAM_GEN_ALL_CORE
PHYSICAL EXAMINATION   VItals: T(C): 36.4 (09-13-22 @ 14:50), Max: 36.8 (09-12-22 @ 21:41)  HR: 68 (09-13-22 @ 14:50) (59 - 101)  BP: 132/76 (09-13-22 @ 14:50) (102/48 - 132/76)  RR: 15 (09-13-22 @ 14:50) (15 - 19)  SpO2: 95% (09-13-22 @ 14:50) (94% - 98%)    General: NAD, Resting Comfortable,                                  HEENT: NC/AT, EOM I, PERRLA, Normal Conjunctivae  Cardio: regular rate, Normal S1-S2                              Pulm: No Respiratory Distress,  Lungs CTAB                        Abdomen: ND/NT, Soft, BS+                                                MSK: No joint swelling, Full ROM                                         Ext: No C/C, L>R  3+ pedal edema, No calf tenderness    Skin:  all skin intact                                                                   Neurological Examination    Cognitive: AAO x 3                                                                         Attention: Intact   Judgment: Good evidence of judgement                                Mood/Affect: wnl                                                                           Communication:  Fluent,  No dysarthria   CN II - XII  intact  Coordination: FTN/HTS grossly intact                                                  Sensory: Intact to light touch, PP and Vibration                                    Tone: normal Throughout     Motor    LEFT    UE: SF [4+/5], EF [4+/5], EE [4+/5], WE [4+/5],  [4+/5]  RIGHT UE: SF [5/5], EF [5/5], EE [5/5], WE [5/5],  [5/5]  LEFT    LE:  HF [4/5] secondary to pain, KE [4+/5], DF [4+/5], PF [5/5]  RIGHT LE:  HF [5/5], KE [5/5], DF [5/5], PF [5/5]    Reflex:  Wiggins negative

## 2022-09-13 NOTE — PATIENT PROFILE ADULT - FUNCTIONAL ASSESSMENT - DAILY ACTIVITY ASSESSMENT TYPE
Call placed to Karon.  Patient updated with information below.    Verbalized understanding and will call the office to schedule once PT is complete.   Admission

## 2022-09-13 NOTE — H&P ADULT - NSHPREVIEWOFSYSTEMS_GEN_ALL_CORE
REVIEW OF SYSTEMS  Constitutional: No fever, No Chills, No fatigue  HEENT: No eye pain, No visual disturbances, No difficulty hearing  Pulm: No cough,  No shortness of breath  Cardio: No chest pain, No palpitations  GI:  No abdominal pain, No nausea, No vomiting, No diarrhea, No constipation  : No dysuria, No frequency, No hematuria  Neuro: No headaches, No memory loss, No loss of strength, No numbness, No tremors  Skin: No itching, No rashes, No lesions   Endo: No temperature intolerance  MSK: No joint pain, No joint swelling, No muscle pain, No Neck or back pain  Psych:  No depression, No anxiety REVIEW OF SYSTEMS  Constitutional: No fever, No Chills, No fatigue  HEENT: No eye pain, No visual disturbances, No difficulty hearing  Pulm: No cough,  No shortness of breath  Cardio: No chest pain, No palpitations  GI:  No abdominal pain, No nausea, No vomiting, No diarrhea, +hx constipation; LBM today(soft)   : No dysuria, No frequency, No hematuria, +nocturia  Neuro: No headaches, No memory loss, No loss of strength, No numbness, No tremors  Skin: No itching, No rashes, No lesions   Endo: No temperature intolerance  MSK: No joint pain, No joint swelling, No muscle pain, No Neck or back pain  Psych:  No depression, No anxiety

## 2022-09-13 NOTE — H&P ADULT - REASON FOR ADMISSION
ARF/Sepsis secondary to multifocal PNA/Hemoptysis Acute resp failure/Sepsis secondary to multifocal PNA/Hemoptysis

## 2022-09-13 NOTE — DISCHARGE NOTE NURSING/CASE MANAGEMENT/SOCIAL WORK - NSDCPEFALRISK_GEN_ALL_CORE
For information on Fall & Injury Prevention, visit: [email or web address] OR  [email or web address] OR  https://www.cdc.gov/steadi/patient.html

## 2022-09-13 NOTE — DISCHARGE NOTE NURSING/CASE MANAGEMENT/SOCIAL WORK - NSDCVIVACCINE_GEN_ALL_CORE_FT
influenza, injectable, quadrivalent, preservative free; 16-Nov-2015 12:30; Sangeetha Aceves (RN); [employer]; AQ083PR; IntraMuscular; Deltoid Left.; 0.5 milliLiter(s); VIS (VIS Published: 07-Aug-2015, VIS Presented: 16-Nov-2015);

## 2022-09-13 NOTE — H&P ADULT - NSHPSOCIALHISTORY_GEN_ALL_CORE
SOCIAL HISTORY  Smoking - Denied  EtOH - Denied   Drugs - Denied    FUNCTIONAL HISTORY  Lives in a ______ with ______  and has __ stairs to enter + hand rails and _____ stairs inside + hand rails. Assissted device to walk ______.  Prior Level of Function: Independent in ADLs and ambulation    CURRENT FUNCTIONAL STATUS  - Bed Mobility:  - Transfers:   - Gait:  - ADLs: SOCIAL HISTORY  Smoking - Denied  EtOH - Denied   Drugs - Denied    FUNCTIONAL HISTORY  Lives alone in ranch house w/ steps to basement for laundry  Prior Level of Function: Independent in ADLs, ambulated (without AD), and drive    CURRENT FUNCTIONAL STATUS  - Bed Mobility: CG  - Transfers: CG   - Gait: 50' x 2 RW CG  - ADLs: SOCIAL HISTORY  Smoking - Denied  EtOH - Denied   Drugs - Denied    FUNCTIONAL HISTORY  Lives alone in ranch house, 4 EDUARDA with R HR, +tub shower with grab bars. Has steps to basement for laundry.  Daughter Chichi close   DME @home: RW, 2 cane  Prior Level of Function: Independent in ADLs, ambulated (without AD), and drive    CURRENT FUNCTIONAL STATUS  - Bed Mobility: CG  - Transfers: CG   - Gait: 50' x 2 RW CG  - ADLs:

## 2022-09-13 NOTE — PROGRESS NOTE ADULT - SUBJECTIVE AND OBJECTIVE BOX
Date/Time Patient Seen:  		  Referring MD:   Data Reviewed	       Patient is a 88y old  Male who presents with a chief complaint of bronchoscopy (12 Sep 2022 14:57)      Subjective/HPI     PAST MEDICAL & SURGICAL HISTORY:  No pertinent past medical history    Skin cancer  s/p Mohs    Essential hypertension    Cerebrovascular accident (CVA), unspecified mechanism  2015    Hyperlipidemia, unspecified hyperlipidemia type    Complex tear of lateral meniscus of right knee as current injury, initial encounter    Complex tear of medial meniscus of right knee as current injury, initial encounter    Benign prostatic hyperplasia, presence of lower urinary tract symptoms unspecified, unspecified morphology    H/O pilonidal cyst          Medication list         MEDICATIONS  (STANDING):  ALBUTerol    0.083% 2.5 milliGRAM(s) Nebulizer every 6 hours  ALBUTerol    90 MICROgram(s) HFA Inhaler 1 Puff(s) Inhalation every 6 hours  amLODIPine   Tablet 5 milliGRAM(s) Oral daily  apixaban 5 milliGRAM(s) Oral two times a day  atorvastatin 40 milliGRAM(s) Oral at bedtime  budesonide  80 MICROgram(s)/formoterol 4.5 MICROgram(s) Inhaler 2 Puff(s) Inhalation two times a day  finasteride 5 milliGRAM(s) Oral daily  lidocaine   4% Patch 1 Patch Transdermal daily  magnesium hydroxide Suspension 30 milliLiter(s) Oral daily  metoprolol tartrate 50 milliGRAM(s) Oral two times a day  naloxone Injectable 0.4 milliGRAM(s) IV Push once  pantoprazole    Tablet 40 milliGRAM(s) Oral before breakfast  polyethylene glycol 3350 17 Gram(s) Oral two times a day  saccharomyces boulardii 250 milliGRAM(s) Oral two times a day  senna 2 Tablet(s) Oral at bedtime  tamsulosin 0.4 milliGRAM(s) Oral at bedtime    MEDICATIONS  (PRN):  acetaminophen     Tablet .. 650 milliGRAM(s) Oral every 6 hours PRN Mild Pain (1 - 3)  bisacodyl 5 milliGRAM(s) Oral daily PRN Constipation  cyclobenzaprine 5 milliGRAM(s) Oral three times a day PRN Muscle Spasm  melatonin 3 milliGRAM(s) Oral at bedtime PRN Insomnia         Vitals log        ICU Vital Signs Last 24 Hrs  T(C): 36.3 (13 Sep 2022 05:04), Max: 36.8 (12 Sep 2022 21:41)  T(F): 97.3 (13 Sep 2022 05:04), Max: 98.2 (12 Sep 2022 21:41)  HR: 59 (13 Sep 2022 05:53) (59 - 68)  BP: 104/52 (13 Sep 2022 05:53) (102/60 - 126/74)  BP(mean): --  ABP: --  ABP(mean): --  RR: 18 (13 Sep 2022 05:04) (18 - 18)  SpO2: 94% (13 Sep 2022 05:04) (93% - 96%)    O2 Parameters below as of 13 Sep 2022 05:04  Patient On (Oxygen Delivery Method): room air                 Input and Output:  I&O's Detail    11 Sep 2022 07:01  -  12 Sep 2022 07:00  --------------------------------------------------------  IN:    Oral Fluid: 560 mL  Total IN: 560 mL    OUT:    Voided (mL): 700 mL  Total OUT: 700 mL    Total NET: -140 mL      12 Sep 2022 07:01  -  13 Sep 2022 06:42  --------------------------------------------------------  IN:    Oral Fluid: 360 mL  Total IN: 360 mL    OUT:  Total OUT: 0 mL    Total NET: 360 mL          Lab Data                        10.9   11.19 )-----------( 289      ( 12 Sep 2022 06:55 )             33.5     09-12    139  |  105  |  35<H>  ----------------------------<  100<H>  4.6   |  27  |  1.30    Ca    9.1      12 Sep 2022 06:55    TPro  7.8  /  Alb  2.9<L>  /  TBili  0.4  /  DBili  x   /  AST  28  /  ALT  31  /  AlkPhos  142<H>  09-12            Review of Systems	      Objective     Physical Examination    heart s1s2  lung dec BS  abd soft  head nc      Pertinent Lab findings & Imaging      Bud:  NO   Adequate UO     I&O's Detail    11 Sep 2022 07:01  -  12 Sep 2022 07:00  --------------------------------------------------------  IN:    Oral Fluid: 560 mL  Total IN: 560 mL    OUT:    Voided (mL): 700 mL  Total OUT: 700 mL    Total NET: -140 mL      12 Sep 2022 07:01  -  13 Sep 2022 06:42  --------------------------------------------------------  IN:    Oral Fluid: 360 mL  Total IN: 360 mL    OUT:  Total OUT: 0 mL    Total NET: 360 mL               Discussed with:     Cultures:	        Radiology

## 2022-09-14 LAB
ALBUMIN SERPL ELPH-MCNC: 2.6 G/DL — LOW (ref 3.3–5)
ALP SERPL-CCNC: 130 U/L — HIGH (ref 40–120)
ALT FLD-CCNC: 23 U/L — SIGNIFICANT CHANGE UP (ref 10–45)
ANION GAP SERPL CALC-SCNC: 6 MMOL/L — SIGNIFICANT CHANGE UP (ref 5–17)
AST SERPL-CCNC: 27 U/L — SIGNIFICANT CHANGE UP (ref 10–40)
BILIRUB SERPL-MCNC: 0.5 MG/DL — SIGNIFICANT CHANGE UP (ref 0.2–1.2)
BUN SERPL-MCNC: 38 MG/DL — HIGH (ref 7–23)
CALCIUM SERPL-MCNC: 8.8 MG/DL — SIGNIFICANT CHANGE UP (ref 8.4–10.5)
CHLORIDE SERPL-SCNC: 101 MMOL/L — SIGNIFICANT CHANGE UP (ref 96–108)
CO2 SERPL-SCNC: 30 MMOL/L — SIGNIFICANT CHANGE UP (ref 22–31)
CREAT SERPL-MCNC: 1.52 MG/DL — HIGH (ref 0.5–1.3)
EGFR: 44 ML/MIN/1.73M2 — LOW
GLUCOSE SERPL-MCNC: 81 MG/DL — SIGNIFICANT CHANGE UP (ref 70–99)
HCT VFR BLD CALC: 32.9 % — LOW (ref 39–50)
HGB BLD-MCNC: 10.7 G/DL — LOW (ref 13–17)
MCHC RBC-ENTMCNC: 31.3 PG — SIGNIFICANT CHANGE UP (ref 27–34)
MCHC RBC-ENTMCNC: 32.5 GM/DL — SIGNIFICANT CHANGE UP (ref 32–36)
MCV RBC AUTO: 96.2 FL — SIGNIFICANT CHANGE UP (ref 80–100)
NRBC # BLD: 0 /100 WBCS — SIGNIFICANT CHANGE UP (ref 0–0)
PLATELET # BLD AUTO: 256 K/UL — SIGNIFICANT CHANGE UP (ref 150–400)
POTASSIUM SERPL-MCNC: 4.5 MMOL/L — SIGNIFICANT CHANGE UP (ref 3.5–5.3)
POTASSIUM SERPL-SCNC: 4.5 MMOL/L — SIGNIFICANT CHANGE UP (ref 3.5–5.3)
PROT SERPL-MCNC: 7.2 G/DL — SIGNIFICANT CHANGE UP (ref 6–8.3)
RBC # BLD: 3.42 M/UL — LOW (ref 4.2–5.8)
RBC # FLD: 14.3 % — SIGNIFICANT CHANGE UP (ref 10.3–14.5)
SODIUM SERPL-SCNC: 137 MMOL/L — SIGNIFICANT CHANGE UP (ref 135–145)
WBC # BLD: 9.2 K/UL — SIGNIFICANT CHANGE UP (ref 3.8–10.5)
WBC # FLD AUTO: 9.2 K/UL — SIGNIFICANT CHANGE UP (ref 3.8–10.5)

## 2022-09-14 PROCEDURE — 99233 SBSQ HOSP IP/OBS HIGH 50: CPT

## 2022-09-14 PROCEDURE — 99223 1ST HOSP IP/OBS HIGH 75: CPT

## 2022-09-14 RX ORDER — SOD,AMMONIUM,POTASSIUM LACTATE
1 CREAM (GRAM) TOPICAL
Refills: 0 | Status: DISCONTINUED | OUTPATIENT
Start: 2022-09-14 | End: 2022-09-23

## 2022-09-14 RX ADMIN — Medication 1 APPLICATION(S): at 21:16

## 2022-09-14 RX ADMIN — LIDOCAINE 1 PATCH: 4 CREAM TOPICAL at 07:45

## 2022-09-14 RX ADMIN — LIDOCAINE 1 PATCH: 4 CREAM TOPICAL at 19:30

## 2022-09-14 RX ADMIN — TAMSULOSIN HYDROCHLORIDE 0.4 MILLIGRAM(S): 0.4 CAPSULE ORAL at 21:16

## 2022-09-14 RX ADMIN — BUDESONIDE AND FORMOTEROL FUMARATE DIHYDRATE 2 PUFF(S): 160; 4.5 AEROSOL RESPIRATORY (INHALATION) at 21:45

## 2022-09-14 RX ADMIN — PANTOPRAZOLE SODIUM 40 MILLIGRAM(S): 20 TABLET, DELAYED RELEASE ORAL at 05:21

## 2022-09-14 RX ADMIN — BUDESONIDE AND FORMOTEROL FUMARATE DIHYDRATE 2 PUFF(S): 160; 4.5 AEROSOL RESPIRATORY (INHALATION) at 08:28

## 2022-09-14 RX ADMIN — Medication 50 MILLIGRAM(S): at 17:41

## 2022-09-14 RX ADMIN — Medication 250 MILLIGRAM(S): at 05:20

## 2022-09-14 RX ADMIN — APIXABAN 5 MILLIGRAM(S): 2.5 TABLET, FILM COATED ORAL at 05:21

## 2022-09-14 RX ADMIN — ATORVASTATIN CALCIUM 40 MILLIGRAM(S): 80 TABLET, FILM COATED ORAL at 21:16

## 2022-09-14 RX ADMIN — APIXABAN 5 MILLIGRAM(S): 2.5 TABLET, FILM COATED ORAL at 17:42

## 2022-09-14 RX ADMIN — AMLODIPINE BESYLATE 5 MILLIGRAM(S): 2.5 TABLET ORAL at 05:20

## 2022-09-14 RX ADMIN — Medication 250 MILLIGRAM(S): at 17:41

## 2022-09-14 RX ADMIN — FINASTERIDE 5 MILLIGRAM(S): 5 TABLET, FILM COATED ORAL at 11:31

## 2022-09-14 RX ADMIN — Medication 50 MILLIGRAM(S): at 05:21

## 2022-09-14 RX ADMIN — Medication 3 MILLIGRAM(S): at 21:16

## 2022-09-14 NOTE — DIETITIAN INITIAL EVALUATION ADULT - NS FNS DIET ORDER
Diet, DASH/TLC:   Sodium & Cholesterol Restricted  Supplement Feeding Modality:  Oral  Ensure Enlive Cans or Servings Per Day:  1       Frequency:  Daily (09-13-22 @ 14:31)

## 2022-09-14 NOTE — DIETITIAN INITIAL EVALUATION ADULT - PERTINENT MEDS FT
MEDICATIONS  (STANDING):  ALBUTerol    90 MICROgram(s) HFA Inhaler 1 Puff(s) Inhalation every 6 hours  amLODIPine   Tablet 5 milliGRAM(s) Oral daily  ammonium lactate 12% Lotion 1 Application(s) Topical two times a day  apixaban 5 milliGRAM(s) Oral two times a day  atorvastatin 40 milliGRAM(s) Oral at bedtime  budesonide  80 MICROgram(s)/formoterol 4.5 MICROgram(s) Inhaler 2 Puff(s) Inhalation two times a day  finasteride 5 milliGRAM(s) Oral daily  influenza  Vaccine (HIGH DOSE) 0.7 milliLiter(s) IntraMuscular once  lidocaine   4% Patch 1 Patch Transdermal <User Schedule>  metoprolol tartrate 50 milliGRAM(s) Oral two times a day  pantoprazole    Tablet 40 milliGRAM(s) Oral before breakfast  polyethylene glycol 3350 17 Gram(s) Oral two times a day  saccharomyces boulardii 250 milliGRAM(s) Oral two times a day  senna 2 Tablet(s) Oral at bedtime  tamsulosin 0.4 milliGRAM(s) Oral at bedtime    MEDICATIONS  (PRN):  acetaminophen     Tablet .. 650 milliGRAM(s) Oral every 6 hours PRN Mild Pain (1 - 3)  bisacodyl 5 milliGRAM(s) Oral daily PRN Constipation  cyclobenzaprine 5 milliGRAM(s) Oral three times a day PRN Muscle Spasm  melatonin 3 milliGRAM(s) Oral at bedtime PRN Insomnia

## 2022-09-14 NOTE — DIETITIAN INITIAL EVALUATION ADULT - ORAL INTAKE PTA/DIET HISTORY
Patient reports adequate intake PTA during recent hospitalization course at [facility]. Noted with recent Severe protein-calorie malnutrition 9/5 per previous nutritional evaluation. Patient family does food shopping for him, he prepares all meals independently.

## 2022-09-14 NOTE — DIETITIAN NUTRITION RISK NOTIFICATION - TREATMENT: THE FOLLOWING DIET HAS BEEN RECOMMENDED
Diet, DASH/TLC:   Sodium & Cholesterol Restricted  Supplement Feeding Modality:  Oral  Ensure Enlive Cans or Servings Per Day:  1       Frequency:  Daily (09-13-22 @ 14:31) [Active]

## 2022-09-14 NOTE — CONSULT NOTE ADULT - ASSESSMENT
87M with HTN, HLD, hx of CVA (7 yrs ago, no residual deficits), BPH, hx of skin CA who presents to [place] [facility] on 8/11 with cough and fevers - azithromycin and ceftriaxone empirically for multifocal PNA.  Hospital course c/b ARF requiring HFNC and new onset afib. Transferred to [facility] for bronchoscopy - found to have rare yeast (likely colonized), AFB+ MAC atypical/non TB infect (does not need iso)  now admitted for rehab- pt/ot/dvt ppx      #ARF 2/2 Multifocal PNA/bronchial fungal  - S/p abx (levaquin, ceftriaxone and azithromycin)   - S/p bronchoscopy - rare Geotrichum species, Few Yeast -suggest colonization, no Rx.    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  No need for isolation  - Continue Budesonide - formoterol 80mcg-4.5 mcg BID + Albuterol Q6h  - Incentive spirometry/deep breathing exercise  - Gait Instability, ADL impairments and Functional impairments: start Comprehensive Rehab Program of PT/OT     #New onset Afib  - Eliquis 5 BID   - Metoprolol 50 BID  - ECHO low normal LV systolic function, mild pulm HTN    #HTN  - Amlodipine 5  - Metoprolol 50 BID    #BLE edema  - Home med: Lasix 20 QD    #HLD  - Lipitor 40    #Back pain  #Pain control  - Tylenol PRN, Lidocaine patch, Cyclobenzaprine 5 TID PRN    #Insomnia  - Quiet and low stimuli environment  - Melatonin 3 PRN     #BPH  - Flomax 0.4 HS  - Finasteride 5 HS  #Bladder management  - PVR q 8 hours (SC if > 400)  - Encourage timed voids Q4hrs while awake for independence and to promote continence during therapy    #DVT  - Eliquis 5 BID      will follow  d/w dr. reed 87M with HTN, HLD, hx of CVA (7 yrs ago, no residual deficits), BPH, hx of skin CA who presents to [place] [facility] on 8/11 with cough and fevers - azithromycin and ceftriaxone empirically for multifocal PNA.  Hospital course c/b ARF requiring HFNC and new onset afib. [facility] [place] [facility]oscopy - found to have rare yeast (likely colonized), AFB+ MAC atypical/non TB infect (does not need iso)  now admitted for rehab- pt/ot/dvt ppx      #ARF 2/2 Multifocal PNA/bronchial fungal  - S/p abx (levaquin, ceftriaxone and azithromycin)   - S/p bronchoscopy - rare Geotrichum species, Few Yeast -suggest colonization, no Rx.    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  No need for isolation  - Continue Budesonide - budesonide, Albuterol  - Incentive spirometry    #New onset Afib  - Eliquis 5 BID   - decrease Metoprolol 25  BID  - ECHO low normal LV systolic function, mild pulm HTN    #HTN  - stop Amlodipine 5  - decrease Metoprolol     #BLE edema  - hold Lasix due to elevation in creat  - leg elevation, ace wraps, teds  - reassess in am   - can restart once creat improves    #HLD  - Lipitor 40      #Insomnia  - Melatonin     #BPH  - Flomax 0.4 HS  - Finasteride 5 HS    #DVT  - Eliquis 5 BID    will follow  d/w dr. reed

## 2022-09-14 NOTE — DIETITIAN INITIAL EVALUATION ADULT - OTHER INFO
87M with HTN, HLD, hx of CVA (no residual deficits), BPH, hx of skin CA who presents to [facility] on 8/11 with cough and fevers.  Symptoms started about 5-6 days PTA.  Started with left sided upper back pain.  Then started to have a cough + green phlegm and possible blood.  Associated with SOB and DONG.  No weight changes or night sweats.  Had a fever as high as 102F.  Some nausea but no vomiting with loss of appetite.  No chest pain.  No diarrhea.  No abdominal pain.     At this time patient reports improved appetite and intake consuming % of meal tray and 100% of supplementation, dislikes broccoli. NKFA. No issues w/ chewing and swallowing, however reports dry mouth, recommend increase hydration, ice chips and Italian ice to improve oral hydration. Denies N/V/D, however w/ persistent constipation managed by medication. Last BM 9/13. Recommend increased fiber and hydration to assist with bowel regimen. UBW 150lb per patient.

## 2022-09-14 NOTE — PROGRESS NOTE ADULT - SUBJECTIVE AND OBJECTIVE BOX
Patient is a 88y old  Male who presents with a chief complaint of Acute respiratory failure with hypoxia    HPI:  87M with HTN, HLD, hx of CVA (no residual deficits), BPH, hx of skin CA who presents to [place] ER on 8/11 with cough and fevers.  Symptoms started about 5-6 days PTA.  Started with left sided upper back pain.  Then started to have a cough + green phlegm and possible blood.  Associated with SOB and DONG.  No weight changes or night sweats.  Had a fever as high as 102F.  Some nausea but no vomiting with loss of appetite.  No chest pain.  No diarrhea.  No abdominal pain.  No recent travel or sick contacts.  Went to see his PMD 3 days prior to [place] admission and was given a z-kellie and tesslon perles.  Reported having negative COVID home tests.   In the [place] ED, patient's triage vitals were /78    RR  21, 93% on RA (thought dropped down to 88%) and T 98.5F.  Labs showed WBC 18 with a left shift, CINDI with BUN/Cr 40/1.88, elevated LFTs, normal lactate, negative COVID (last booster in 12/2021 with Pfizer vaccine), neg influenza, neg RSV.  CT chest showed "patchy ground glass and more dense opacities in both lungs, suspicious for multifocal PNA + several small lucencies with areas of lung opacity ->>may represent early cavitation versus areas of focal bronchiectasis."  Patient started on azithromycin and ceftriaxone empirically for multifocal PNA.  Also of note, patient was found to have new afib on EKG.  Patient denies any afib history but said when he was younger he did feel some irregular heartbeats.  Admitted for sepsis due to multifocal pneumonia with progression to acute hypoxic respiratory failure now on HFNC and course further c/b new onset afib. Patient was seen by ID, pulm and cardiology. Transferred to [facility] (8/29) for bronchoscopy.     Underwent bronchoscopy for bronchoalveolar lavage. Started on eliquis for new onset afib after bronchoscopy per cardiology, and bronchial, sputum cultures were followed. ID +Pulm consulted for TB work up. Bronchial cultures were negative on AFB stain along with sputum cultures. 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 Isolation protocol was discontinued. Bronchial fungal cultures showed normal alexander. Pt found to be in mild CINDI, was given a bolus of fluid. Pt encouraged to drink plenty of fluids.     Patient was evaluated by PM&R and therapy for functional deficits and gait/ADL impairments and recommend acute rehabilitation.  Patient was medically optimized for discharge to [place] on 9/13/22.    PAST MEDICAL & SURGICAL HISTORY:  Skin cancer  s/p Mohs  Essential hypertension  Cerebrovascular accident (CVA), unspecified mechanism 2015  Hyperlipidemia, unspecified hyperlipidemia type  Complex tear of lateral meniscus of right knee as current injury, initial encounter  Complex tear of medial meniscus of right knee as current injury, initial encounter  Benign prostatic hyperplasia, presence of lower urinary tract symptoms unspecified, unspecified morphology  H/O pilonidal cyst    SUBJECTIVE/ROS:  Patient seen and evaluated while sitting up in WC at bedside  returned from PT - noted to desaturate to 86% on RA and took ~5-7 min to recover 93%.  At time, patient states he felt tired, but denies DONG.  No chest pain, no palpitation, no cough  No headache, no dizziness  No nausea, no abdominal pain; LBM yesterday  No dysuria, no frequency  Labs reviewed - pt informed of renal function status.  Encouraged PO hydration    PHYSICAL EXAM  88y  Vital Signs Last 24 Hrs  T(C): 36.4 (14 Sep 2022 08:33), Max: 36.7 (13 Sep 2022 20:00)  T(F): 97.6 (14 Sep 2022 08:33), Max: 98 (13 Sep 2022 20:00)  HR: 60 (14 Sep 2022 08:33) (60 - 90)  BP: 108/58 (14 Sep 2022 08:33) (108/58 - 132/76)  RR: 16 (14 Sep 2022 08:33) (15 - 16)  SpO2: 97% (14 Sep 2022 08:33) (95% - 100%) on room air    General: NAD, Resting Comfortable                               HEENT: NC/AT, EOM I, PERRLA, Normal Conjunctivae  Cardio: regular rate, Normal S1-S2                              Pulm: No Respiratory Distress,  Lungs CTAB                        Abdomen: ND/NT, Soft, BS+                                                MSK: No joint swelling, Full ROM                                         Ext: No C/C, L>R  3+ pedal edema, No calf tenderness    Skin:  all skin intact                                                                   Neurological Examination    Cognitive: AAO x 3                                                                         Attention: Intact   Judgment: Good evidence of judgement                                Mood/Affect: wnl                                                                           Communication:  Fluent,  No dysarthria   CN II - XII  intact                                            Sensory: Intact to light touch, PP and Vibration                                      Motor    LEFT    UE: SF [4+/5], EF [4+/5], EE [4+/5], WE [4+/5],  [4+/5]  RIGHT UE: SF [5/5], EF [5/5], EE [5/5], WE [5/5],  [5/5]  LEFT    LE:  HF [4/5] secondary to pain, KE [4+/5], DF [4+/5], PF [5/5]  RIGHT LE:  HF [5/5], KE [5/5], DF [5/5], PF [5/5]      RECENT LABS:                       10.7   9.20  )-----------( 256      ( 14 Sep 2022 06:00 )             32.9     09-14    137  |  101  |  38<H>  ----------------------------<  81  4.5   |  30  |  1.52<H>    Ca    8.8      14 Sep 2022 06:00    TPro  7.2  /  Alb  2.6<L>  /  TBili  0.5  /  DBili  x   /  AST  27  /  ALT  23  /  AlkPhos  130<H>  09-14    LIVER FUNCTIONS - ( 14 Sep 2022 06:00 )  Alb: 2.6 g/dL / Pro: 7.2 g/dL / ALK PHOS: 130 U/L / ALT: 23 U/L / AST: 27 U/L / GGT: x           Allergies  No Known Allergies    MEDICATIONS  (STANDING):  ALBUTerol    90 MICROgram(s) HFA Inhaler 1 Puff(s) Inhalation every 6 hours  amLODIPine   Tablet 5 milliGRAM(s) Oral daily  ammonium lactate 12% Lotion 1 Application(s) Topical two times a day  apixaban 5 milliGRAM(s) Oral two times a day  atorvastatin 40 milliGRAM(s) Oral at bedtime  budesonide  80 MICROgram(s)/formoterol 4.5 MICROgram(s) Inhaler 2 Puff(s) Inhalation two times a day  finasteride 5 milliGRAM(s) Oral daily  influenza  Vaccine (HIGH DOSE) 0.7 milliLiter(s) IntraMuscular once  lidocaine   4% Patch 1 Patch Transdermal <User Schedule>  metoprolol tartrate 50 milliGRAM(s) Oral two times a day  pantoprazole    Tablet 40 milliGRAM(s) Oral before breakfast  polyethylene glycol 3350 17 Gram(s) Oral two times a day  saccharomyces boulardii 250 milliGRAM(s) Oral two times a day  senna 2 Tablet(s) Oral at bedtime  tamsulosin 0.4 milliGRAM(s) Oral at bedtime    MEDICATIONS  (PRN):  acetaminophen     Tablet .. 650 milliGRAM(s) Oral every 6 hours PRN Mild Pain (1 - 3)  bisacodyl 5 milliGRAM(s) Oral daily PRN Constipation  cyclobenzaprine 5 milliGRAM(s) Oral three times a day PRN Muscle Spasm  melatonin 3 milliGRAM(s) Oral at bedtime PRN Insomnia   Patient is a 88y old  Male who presents with a chief complaint of Acute respiratory failure with hypoxia    HPI:  87M with HTN, HLD, hx of CVA (no residual deficits), BPH, hx of skin CA who presents to [place] ER on 8/11 with cough and fevers.  Symptoms started about 5-6 days PTA.  Started with left sided upper back pain.  Then started to have a cough + green phlegm and possible blood.  Associated with SOB and DONG.  No weight changes or night sweats.  Had a fever as high as 102F.  Some nausea but no vomiting with loss of appetite.  No chest pain.  No diarrhea.  No abdominal pain.  No recent travel or sick contacts.  Went to see his PMD 3 days prior to [place] admission and was given a z-kellie and tesslon perles.  Reported having negative COVID home tests.   In the [place] ED, patient's triage vitals were /78    RR  21, 93% on RA (thought dropped down to 88%) and T 98.5F.  Labs showed WBC 18 with a left shift, CINDI with BUN/Cr 40/1.88, elevated LFTs, normal lactate, negative COVID (last booster in 12/2021 with Pfizer vaccine), neg influenza, neg RSV.  CT chest showed "patchy ground glass and more dense opacities in both lungs, suspicious for multifocal PNA + several small lucencies with areas of lung opacity ->>may represent early cavitation versus areas of focal bronchiectasis."  Patient started on azithromycin and ceftriaxone empirically for multifocal PNA.  Also of note, patient was found to have new afib on EKG.  Patient denies any afib history but said when he was younger he did feel some irregular heartbeats.  Admitted for sepsis due to multifocal pneumonia with progression to acute hypoxic respiratory failure now on HFNC and course further c/b new onset afib. Patient was seen by ID, pulm and cardiology. Transferred to [facility] (8/29) for bronchoscopy.     Underwent bronchoscopy for bronchoalveolar lavage. Started on eliquis for new onset afib after bronchoscopy per cardiology, and bronchial, sputum cultures were followed. ID +Pulm consulted for TB work up. Bronchial cultures were negative on AFB stain along with sputum cultures. 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 Isolation protocol was discontinued. Bronchial fungal cultures showed normal alexander. Pt found to be in mild CINDI, was given a bolus of fluid. Pt encouraged to drink plenty of fluids.     Patient was evaluated by PM&R and therapy for functional deficits and gait/ADL impairments and recommend acute rehabilitation.  Patient was medically optimized for discharge to [place] on 9/13/22.    SUBJECTIVE/ROS:  Patient seen and evaluated while sitting up in WC at bedside  returned from PT - noted to desaturate to 86% on RA and took ~5-7 min to recover 93%.  At time, patient states he felt tired, but denies DONG.  No chest pain, no palpitation, no cough  No headache, no dizziness  No nausea, no abdominal pain; LBM yesterday  No dysuria, no frequency  Labs reviewed - pt informed of renal function status.  Encouraged PO hydration    PHYSICAL EXAM  88y  Vital Signs Last 24 Hrs  T(C): 36.4 (14 Sep 2022 08:33), Max: 36.7 (13 Sep 2022 20:00)  T(F): 97.6 (14 Sep 2022 08:33), Max: 98 (13 Sep 2022 20:00)  HR: 60 (14 Sep 2022 08:33) (60 - 90)  BP: 108/58 (14 Sep 2022 08:33) (108/58 - 132/76)  RR: 16 (14 Sep 2022 08:33) (15 - 16)  SpO2: 97% (14 Sep 2022 08:33) (95% - 100%) on room air    General: NAD, Resting Comfortable                               HEENT: NC/AT, EOM I, PERRLA, Normal Conjunctivae  Cardio: regular rate, Normal S1-S2                              Pulm: No Respiratory Distress,  Lungs CTAB                        Abdomen: ND/NT, Soft, BS+                                                MSK: No joint swelling, Full ROM                                         Ext: No C/C, L>R  3+ pedal edema, No calf tenderness    Skin:  all skin intact                                                                   Neurological Examination    Cognitive: AAO x 3                                                                         Attention: Intact   Judgment: Good evidence of judgement                                Mood/Affect: wnl                                                                           Communication:  Fluent,  No dysarthria   CN II - XII  intact                                            Sensory: Intact to light touch, PP and Vibration                                      Motor    LEFT    UE: SF [4+/5], EF [4+/5], EE [4+/5], WE [4+/5],  [4+/5]  RIGHT UE: SF [5/5], EF [5/5], EE [5/5], WE [5/5],  [5/5]  LEFT    LE:  HF [4/5] secondary to pain, KE [4+/5], DF [4+/5], PF [5/5]  RIGHT LE:  HF [5/5], KE [5/5], DF [5/5], PF [5/5]      RECENT LABS:                       10.7   9.20  )-----------( 256      ( 14 Sep 2022 06:00 )             32.9     09-14    137  |  101  |  38<H>  ----------------------------<  81  4.5   |  30  |  1.52<H>    Ca    8.8      14 Sep 2022 06:00    TPro  7.2  /  Alb  2.6<L>  /  TBili  0.5  /  DBili  x   /  AST  27  /  ALT  23  /  AlkPhos  130<H>  09-14    LIVER FUNCTIONS - ( 14 Sep 2022 06:00 )  Alb: 2.6 g/dL / Pro: 7.2 g/dL / ALK PHOS: 130 U/L / ALT: 23 U/L / AST: 27 U/L / GGT: x           Allergies  No Known Allergies    MEDICATIONS  (STANDING):  ALBUTerol    90 MICROgram(s) HFA Inhaler 1 Puff(s) Inhalation every 6 hours  amLODIPine   Tablet 5 milliGRAM(s) Oral daily  ammonium lactate 12% Lotion 1 Application(s) Topical two times a day  apixaban 5 milliGRAM(s) Oral two times a day  atorvastatin 40 milliGRAM(s) Oral at bedtime  budesonide  80 MICROgram(s)/formoterol 4.5 MICROgram(s) Inhaler 2 Puff(s) Inhalation two times a day  finasteride 5 milliGRAM(s) Oral daily  influenza  Vaccine (HIGH DOSE) 0.7 milliLiter(s) IntraMuscular once  lidocaine   4% Patch 1 Patch Transdermal <User Schedule>  metoprolol tartrate 50 milliGRAM(s) Oral two times a day  pantoprazole    Tablet 40 milliGRAM(s) Oral before breakfast  polyethylene glycol 3350 17 Gram(s) Oral two times a day  saccharomyces boulardii 250 milliGRAM(s) Oral two times a day  senna 2 Tablet(s) Oral at bedtime  tamsulosin 0.4 milliGRAM(s) Oral at bedtime    MEDICATIONS  (PRN):  acetaminophen     Tablet .. 650 milliGRAM(s) Oral every 6 hours PRN Mild Pain (1 - 3)  bisacodyl 5 milliGRAM(s) Oral daily PRN Constipation  cyclobenzaprine 5 milliGRAM(s) Oral three times a day PRN Muscle Spasm  melatonin 3 milliGRAM(s) Oral at bedtime PRN Insomnia

## 2022-09-14 NOTE — CONSULT NOTE ADULT - SUBJECTIVE AND OBJECTIVE BOX
87M with HTN, HLD, hx of CVA (no residual deficits), BPH, hx of skin CA who presents to [place] ER on  with cough and fevers.  Symptoms started about 5-6 days PTA.  Started with left sided upper back pain.  Then started to have a cough + green phlegm and possible blood.  Associated with SOB and DONG.  No weight changes or night sweats.  Had a fever as high as 102F.  Some nausea but no vomiting with loss of appetite.  No chest pain.  No diarrhea.  No abdominal pain.  No recent travel or sick contacts.  Went to see his PMD 3 days prior to [facility] admission and was given a z-kellie and tesslon perles.  Reported having negative COVID home tests.   In the [facility] ED, patient's triage vitals were /78    RR  21, 93% on RA (thought dropped down to 88%) and T 98.5F.  Labs showed WBC 18 with a left shift, CINDI with BUN/Cr 40/1.88, elevated LFTs, normal lactate, negative COVID (last booster in 2021 with Pfizer vaccine), neg influenza, neg RSV.  CT chest showed "patchy ground glass and more dense opacities in both lungs, suspicious for multifocal PNA + several small lucencies with areas of lung opacity ->>may represent early cavitation versus areas of focal bronchiectasis."  Patient started on azithromycin and ceftriaxone empirically for multifocal PNA.  Also of note, patient was found to have new afib on EKG.  Patient denies any afib history but said when he was younger he did feel some irregular heartbeats.  Admitted for sepsis due to multifocal pneumonia with progression to acute hypoxic respiratory failure now on HFNC and course further c/b new onset afib. Patient was seen by ID, pulm and cardiology. Transferred to [facility] () for bronchoscopy.     Underwent bronchoscopy for bronchoalveolar lavage. Started on Eliquis for new onset afib after bronchoscopy per cardiology, and bronchial, sputum cultures were followed. ID +Pulm consulted for TB work up. Bronchial cultures were negative on AFB stain along with sputum cultures. 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 Isolation protocol was discontinued. Bronchial fungal cultures showed normal alexander. Pt found to be in mild CINDI, was given a bolus of fluid. Pt encouraged to drink plenty of fluids.     Patient was evaluated by PM&R and therapy for functional deficits and gait/ADL impairments and recommend acute rehabilitation.  Patient was medically optimized for discharge to [facility] on 22.    seen at the bedside, for medical consultation, c/o pain in the left hip, 8/10, intermittent, radiates to LLE, also says he fels mildly sob on ambulating, and gets tired quickly, no o2 needs, no cp, no fever, no n/v, no dizziness.     Review of Systems: per hpi, all other negative          Allergies and Intolerances:        Allergies:  	No Known Allergies:     Home Medications:   * Patient Currently Takes Medications as of 13-Sep-2022 11:56 documented in Structured Notes  · 	bisacodyl 5 mg oral delayed release tablet: 1 tab(s) orally once a day, As needed, Constipation  · 	senna leaf extract oral tablet: 2 tab(s) orally once a day (at bedtime)  · 	amLODIPine 5 mg oral tablet: 1 tab(s) orally once a day  · 	cyclobenzaprine 5 mg oral tablet: 1 tab(s) orally 3 times a day, As needed, Muscle Spasm  · 	Eliquis 5 mg oral tablet: 1 tab(s) orally 2 times a day MDD:2  · 	metoprolol tartrate 50 mg oral tablet: 1 tab(s) orally 2 times a day  · 	saccharomyces boulardii lyo 250 mg oral capsule: 1 cap(s) orally 2 times a day  · 	polyethylene glycol 3350 oral powder for reconstitution: 17 gram(s) orally once a day, As needed, Constipation  · 	budesonide-formoterol 80 mcg-4.5 mcg/inh inhalation aerosol: 1 puff(s) inhaled 2 times a day  · 	tamsulosin 0.4 mg oral capsule: 1 cap(s) orally once a day (at bedtime)  · 	atorvastatin 40 mg oral tablet: 1 tab(s) orally once a day (at bedtime)  · 	Protonix 40 mg oral delayed release tablet: 1 tab(s) orally once a day  · 	finasteride 5 mg oral tablet: 1 tab(s) orally once a day (at bedtime)  · 	albuterol 90 mcg/inh inhalation aerosol: 2 puff(s) inhaled every 6 hours  · 	albuterol 2.5 mg/3 mL (0.083%) inhalation solution: 3 milliliter(s) inhaled every 6 hours    .    Patient History:    Past Medical, Past Surgical, and Family History:  PAST MEDICAL HISTORY:  Benign prostatic hyperplasia, presence of lower urinary tract symptoms unspecified, unspecified morphology     Cerebrovascular accident (CVA), unspecified mechanism 2015    Complex tear of lateral meniscus of right knee as current injury, initial encounter     Complex tear of medial meniscus of right knee as current injury, initial encounter     Essential hypertension     Hyperlipidemia, unspecified hyperlipidemia type     Skin cancer s/p Mohs.     PAST SURGICAL HISTORY:  H/O pilonidal cyst.     FAMILY HISTORY:  mom  at ge 86, aging, dad  vaishnavi age 74, infection       Social History:  Smoking - Denied  EtOH - Denied   Drugs - Denied      Physical Exam:   Vital Signs Last 24 Hrs  T(C): 36.4 (14 Sep 2022 08:33), Max: 36.7 (13 Sep 2022 20:00)  T(F): 97.6 (14 Sep 2022 08:33), Max: 98 (13 Sep 2022 20:00)  HR: 60 (14 Sep 2022 08:33) (60 - 90)  BP: 108/58 (14 Sep 2022 08:33) (102/48 - 132/76)  BP(mean): --  RR: 16 (14 Sep 2022 08:33) (15 - 19)  SpO2: 97% (14 Sep 2022 08:33) (95% - 100%)    Parameters below as of 14 Sep 2022 08:33  Patient On (Oxygen Delivery Method): room air        GENERAL- NAD  EAR/NOSE/MOUTH/THROAT - no pharyngeal exudates, no oral leisions,  MMM  EYES- LINDA, conjunctiva and Sclera clear  NECK- supple  RESPIRATORY-  clear to auscultation bilaterally, non laboured breathing  CARDIOVASCULAR - SIS2, RRR  GI - soft NT BS present  EXTREMITIES- LE edema L>R  NEUROLOGY- no gross focal deficits  SKIN- no rashes, warm to touch  PSYCHIATRY- AAO X 3  MUSCULOSKELETAL- ROM normal         Labs and Results:                10.7                 137  | 30   | 38           9.20  >-----------< 256     ------------------------< 81                    32.9                 4.5  | 101  | 1.52                                         Ca 8.8   Mg x     Ph x            CAPILLARY BLOOD GLUCOSE          Labs reviewed:     CXR personally reviewed: < from: Xray Chest 1 View- PORTABLE-Urgent (Xray Chest 1 View- PORTABLE-Urgent .) (22 @ 07:26) >    IMPRESSION: Last 2 films show persistent advanced infiltrates. There is   likely some degree of chronic component.    < end of copied text >      ECG reviewed and interpreted: < from: 12 Lead ECG (22 @ 03:56) >  Atrial Rate 77 BPM    P-R Interval 166 ms    QRS Duration 128 ms    Q-T Interval 406 ms    QTC Calculation(Bazett) 459 ms    R Axis -13 degrees    T Axis 15 degrees    Diagnosis Line Sinus rhythm with premature atrial complexes  Right bundle branch block  Abnormal ECG    < end of copied text >      < from: CT Chest No Cont (22 @ 21:37) >    IMPRESSION:    Bilateral groundglass glass and airspace consolidations, some associated   with bronchiectasis particularly in the upper lobes, findings likely   secondary to infection.  Possible small areas of cavitation.  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    No acute intra-abdominal pathology.    Other findings as discussed above.    This study was preliminary reported by the ED radiologist on 2022.    < end of copied text >    CT Abdomen and Pelvis No Cont (22 @ 21:36) >  Bilateral groundglass glass and airspace consolidations, some associated with bronchiectasis particularly in the upper lobes, findings likely   secondary to infection.  Possible small areas of cavitation.  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  No acute intra-abdominal pathology.    US Transthoracic Echocardiogram w/Doppler Complete (22 @ 08:08) >  1. Low normal left ventricular systolic function.  2. Bi-atrial enlargement.  3. Mild pulmonary hypertension.      MEDICATIONS  (STANDING):  ALBUTerol    90 MICROgram(s) HFA Inhaler 1 Puff(s) Inhalation every 6 hours  amLODIPine   Tablet 5 milliGRAM(s) Oral daily  ammonium lactate 12% Lotion 1 Application(s) Topical two times a day  apixaban 5 milliGRAM(s) Oral two times a day  atorvastatin 40 milliGRAM(s) Oral at bedtime  budesonide  80 MICROgram(s)/formoterol 4.5 MICROgram(s) Inhaler 2 Puff(s) Inhalation two times a day  finasteride 5 milliGRAM(s) Oral daily  influenza  Vaccine (HIGH DOSE) 0.7 milliLiter(s) IntraMuscular once  lidocaine   4% Patch 1 Patch Transdermal <User Schedule>  metoprolol tartrate 50 milliGRAM(s) Oral two times a day  pantoprazole    Tablet 40 milliGRAM(s) Oral before breakfast  polyethylene glycol 3350 17 Gram(s) Oral two times a day  saccharomyces boulardii 250 milliGRAM(s) Oral two times a day  senna 2 Tablet(s) Oral at bedtime  tamsulosin 0.4 milliGRAM(s) Oral at bedtime    MEDICATIONS  (PRN):  acetaminophen     Tablet .. 650 milliGRAM(s) Oral every 6 hours PRN Mild Pain (1 - 3)  bisacodyl 5 milliGRAM(s) Oral daily PRN Constipation  cyclobenzaprine 5 milliGRAM(s) Oral three times a day PRN Muscle Spasm  melatonin 3 milliGRAM(s) Oral at bedtime PRN Insomnia       87M with HTN, HLD, hx of CVA (no residual deficits), BPH, hx of skin CA who presents to [place] ER on  with cough and fevers.  Symptoms started about 5-6 days PTA.  Started with left sided upper back pain.  Then started to have a cough + green phlegm and possible blood.  Associated with SOB and DONG.  No weight changes or night sweats.  Had a fever as high as 102F.  Some nausea but no vomiting with loss of appetite.  No chest pain.  No diarrhea.  No abdominal pain.  No recent travel or sick contacts.  Went to see his PMD 3 days prior to [place] admission and was given a z-kellie and tesslon perles.  Reported having negative COVID home tests.   In the [place] ED, patient's triage vitals were /78    RR  21, 93% on RA (thought dropped down to 88%) and T 98.5F.  Labs showed WBC 18 with a left shift, CINDI with BUN/Cr 40/1.88, elevated LFTs, normal lactate, negative COVID (last booster in 2021 with Pfizer vaccine), neg influenza, neg RSV.  CT chest showed "patchy ground glass and more dense opacities in both lungs, suspicious for multifocal PNA + several small lucencies with areas of lung opacity ->>may represent early cavitation versus areas of focal bronchiectasis."  Patient started on azithromycin and ceftriaxone empirically for multifocal PNA.  Also of note, patient was found to have new afib on EKG.  Patient denies any afib history but said when he was younger he did feel some irregular heartbeats.  Admitted for sepsis due to multifocal pneumonia with progression to acute hypoxic respiratory failure now on HFNC and course further c/b new onset afib. Patient was seen by ID, pulm and cardiology. Transferred to [facility] () for bronchoscopy.     Underwent bronchoscopy for bronchoalveolar lavage. Started on Eliquis for new onset afib after bronchoscopy per cardiology, and bronchial, sputum cultures were followed. ID +Pulm consulted for TB work up. Bronchial cultures were negative on AFB stain along with sputum cultures. 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 Isolation protocol was discontinued. Bronchial fungal cultures showed normal alexander. Pt found to be in mild CINDI, was given a bolus of fluid. Pt encouraged to drink plenty of fluids.     Patient was evaluated by PM&R and therapy for functional deficits and gait/ADL impairments and recommend acute rehabilitation.  Patient was medically optimized for discharge to [facility] on 22.    seen at the bedside, for medical consultation, c/o pain in the left hip, 8/10, intermittent, radiates to LLE, also says he fels mildly sob on ambulating, and gets tired quickly, no o2 needs, no cp, no fever, no n/v, no dizziness.     Review of Systems: per hpi, all other negative          Allergies and Intolerances:        Allergies:  	No Known Allergies:     Home Medications:   * Patient Currently Takes Medications as of 13-Sep-2022 11:56 documented in Structured Notes  · 	bisacodyl 5 mg oral delayed release tablet: 1 tab(s) orally once a day, As needed, Constipation  · 	senna leaf extract oral tablet: 2 tab(s) orally once a day (at bedtime)  · 	amLODIPine 5 mg oral tablet: 1 tab(s) orally once a day  · 	cyclobenzaprine 5 mg oral tablet: 1 tab(s) orally 3 times a day, As needed, Muscle Spasm  · 	Eliquis 5 mg oral tablet: 1 tab(s) orally 2 times a day MDD:2  · 	metoprolol tartrate 50 mg oral tablet: 1 tab(s) orally 2 times a day  · 	saccharomyces boulardii lyo 250 mg oral capsule: 1 cap(s) orally 2 times a day  · 	polyethylene glycol 3350 oral powder for reconstitution: 17 gram(s) orally once a day, As needed, Constipation  · 	budesonide-formoterol 80 mcg-4.5 mcg/inh inhalation aerosol: 1 puff(s) inhaled 2 times a day  · 	tamsulosin 0.4 mg oral capsule: 1 cap(s) orally once a day (at bedtime)  · 	atorvastatin 40 mg oral tablet: 1 tab(s) orally once a day (at bedtime)  · 	Protonix 40 mg oral delayed release tablet: 1 tab(s) orally once a day  · 	finasteride 5 mg oral tablet: 1 tab(s) orally once a day (at bedtime)  · 	albuterol 90 mcg/inh inhalation aerosol: 2 puff(s) inhaled every 6 hours  · 	albuterol 2.5 mg/3 mL (0.083%) inhalation solution: 3 milliliter(s) inhaled every 6 hours    .    Patient History:    Past Medical, Past Surgical, and Family History:  PAST MEDICAL HISTORY:  Benign prostatic hyperplasia, presence of lower urinary tract symptoms unspecified, unspecified morphology     Cerebrovascular accident (CVA), unspecified mechanism 2015    Complex tear of lateral meniscus of right knee as current injury, initial encounter     Complex tear of medial meniscus of right knee as current injury, initial encounter     Essential hypertension     Hyperlipidemia, unspecified hyperlipidemia type     Skin cancer s/p Mohs.     PAST SURGICAL HISTORY:  H/O pilonidal cyst.     FAMILY HISTORY:  mom  at ge 86, aging, dad  vaishnavi age 74, infection       Social History:  Smoking - Denied  EtOH - Denied   Drugs - Denied      Physical Exam:   Vital Signs Last 24 Hrs  T(C): 36.4 (14 Sep 2022 08:33), Max: 36.7 (13 Sep 2022 20:00)  T(F): 97.6 (14 Sep 2022 08:33), Max: 98 (13 Sep 2022 20:00)  HR: 60 (14 Sep 2022 08:33) (60 - 90)  BP: 108/58 (14 Sep 2022 08:33) (102/48 - 132/76)  BP(mean): --  RR: 16 (14 Sep 2022 08:33) (15 - 19)  SpO2: 97% (14 Sep 2022 08:33) (95% - 100%)    Parameters below as of 14 Sep 2022 08:33  Patient On (Oxygen Delivery Method): room air        GENERAL- NAD  EAR/NOSE/MOUTH/THROAT - no pharyngeal exudates, no oral leisions,  MMM  EYES- LINDA, conjunctiva and Sclera clear  NECK- supple  RESPIRATORY-  clear to auscultation bilaterally, non laboured breathing  CARDIOVASCULAR - SIS2, RRR  GI - soft NT BS present  EXTREMITIES- LE edema L>R  NEUROLOGY- no gross focal deficits  SKIN- no rashes, warm to touch  PSYCHIATRY- AAO X 3  MUSCULOSKELETAL- ROM normal         Labs and Results:                10.7                 137  | 30   | 38           9.20  >-----------< 256     ------------------------< 81                    32.9                 4.5  | 101  | 1.52                                         Ca 8.8   Mg x     Ph x            CAPILLARY BLOOD GLUCOSE          Labs reviewed:     CXR personally reviewed: < from: Xray Chest 1 View- PORTABLE-Urgent (Xray Chest 1 View- PORTABLE-Urgent .) (22 @ 07:26) >    IMPRESSION: Last 2 films show persistent advanced infiltrates. There is   likely some degree of chronic component.    < end of copied text >      ECG reviewed and interpreted: < from: 12 Lead ECG (22 @ 03:56) >  Atrial Rate 77 BPM    P-R Interval 166 ms    QRS Duration 128 ms    Q-T Interval 406 ms    QTC Calculation(Bazett) 459 ms    R Axis -13 degrees    T Axis 15 degrees    Diagnosis Line Sinus rhythm with premature atrial complexes  Right bundle branch block  Abnormal ECG    < end of copied text >      < from: CT Chest No Cont (22 @ 21:37) >    IMPRESSION:    Bilateral groundglass glass and airspace consolidations, some associated   with bronchiectasis particularly in the upper lobes, findings likely   secondary to infection.  Possible small areas of cavitation.  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    No acute intra-abdominal pathology.    Other findings as discussed above.    This study was preliminary reported by the ED radiologist on 2022.    < end of copied text >    CT Abdomen and Pelvis No Cont (22 @ 21:36) >  Bilateral groundglass glass and airspace consolidations, some associated with bronchiectasis particularly in the upper lobes, findings likely   secondary to infection.  Possible small areas of cavitation.  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  No acute intra-abdominal pathology.    US Transthoracic Echocardiogram w/Doppler Complete (22 @ 08:08) >  1. Low normal left ventricular systolic function.  2. Bi-atrial enlargement.  3. Mild pulmonary hypertension.      MEDICATIONS  (STANDING):  ALBUTerol    90 MICROgram(s) HFA Inhaler 1 Puff(s) Inhalation every 6 hours  ammonium lactate 12% Lotion 1 Application(s) Topical two times a day  apixaban 5 milliGRAM(s) Oral two times a day  atorvastatin 40 milliGRAM(s) Oral at bedtime  budesonide  80 MICROgram(s)/formoterol 4.5 MICROgram(s) Inhaler 2 Puff(s) Inhalation two times a day  finasteride 5 milliGRAM(s) Oral daily  influenza  Vaccine (HIGH DOSE) 0.7 milliLiter(s) IntraMuscular once  lidocaine   4% Patch 1 Patch Transdermal <User Schedule>  metoprolol tartrate 50 milliGRAM(s) Oral two times a day  pantoprazole    Tablet 40 milliGRAM(s) Oral before breakfast  polyethylene glycol 3350 17 Gram(s) Oral two times a day  saccharomyces boulardii 250 milliGRAM(s) Oral two times a day  senna 2 Tablet(s) Oral at bedtime  tamsulosin 0.4 milliGRAM(s) Oral at bedtime    MEDICATIONS  (PRN):  acetaminophen     Tablet .. 650 milliGRAM(s) Oral every 6 hours PRN Mild Pain (1 - 3)  bisacodyl 5 milliGRAM(s) Oral daily PRN Constipation  cyclobenzaprine 5 milliGRAM(s) Oral three times a day PRN Muscle Spasm  melatonin 3 milliGRAM(s) Oral at bedtime PRN Insomnia

## 2022-09-14 NOTE — PROGRESS NOTE ADULT - ASSESSMENT
ASSESSMENT/PLAN  87M with HTN, HLD, hx of CVA (7 yrs ago, no residual deficits), BPH, hx of skin CA who presents to [place] ER on 8/11 with cough and fevers - azithromycin and ceftriaxone empirically for multifocal PNA.  Hospital course c/b ARF requiring HFNC and new onset afib. Transferred to [facility] for bronchoscopy - found to have rare yeast (likely colonized), AFB+ MAC atypical/non TB infect (does not need iso)    *CINDI BUN/Cr 38/1.52 -continue to hold lasix encouraged PO hydration.  May consider IVF if doesn't improved  * BLE edema - lasix on hold, amlodipine dc  *Desat with therapy - will order for supplemental O2 - 2L NC PRN for Saturation <88%  * encourage Incentive spirometry and/or deep breathing exercise    COMORBIDITES/ACTIVE MEDICAL ISSUES     #ARF 2/2 Multifocal PNA/bronchial fungal  - S/p abx (levaquin, ceftriaxone and azithromycin)   - S/p bronchoscopy - rare Geotrichum species, Few Yeast -suggest colonization, no Rx.    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  No need for isolation  - Continue Budesonide - formoterol 80mcg-4.5 mcg BID + Albuterol Q6h  - Incentive spirometry/deep breathing exercise  - Gait Instability, ADL impairments and Functional impairments: start Comprehensive Rehab Program of PT/OT     #New onset Afib  - Eliquis 5 BID   - Metoprolol 50 BID  - ECHO low normal LV systolic function, mild pulm HTN    #HTN  - Amlodipine 5 - dc d/t BLE edema  - Metoprolol 50 BID  #BLE edema  - Home med: Lasix 20 QD - remain on hold d/t BLE edema    #HLD  - Lipitor 40    #Back pain  #Pain control  - Tylenol PRN, Lidocaine patch, Cyclobenzaprine 5 TID PRN    #Insomnia  - Quiet and low stimuli environment  - Melatonin 3 PRN     #GI/Bowel Mgmt   - At risk for constipation due to neurologic diagnosis, immobility and/or medication use  - Senna,  Miralax BID, Dulcolax PRN  - Probiotic: Florastor  - GI ppx: Protonix    #BPH  - Flomax 0.4 HS  - Finasteride 5 HS  #Bladder management  - PVR q 8 hours (SC if > 400)  - Encourage timed voids Q4hrs while awake for independence and to promote continence during therapy    #DVT  - Eliquis 5 BID  - SCDs, TEDs     #Skin:  -At risk for pressure injury due to neurologic diagnosis and relative immobility  -Bilateral heels - soft heel protectors, apply liquid barrier film daily and monitor for tissue type changes  - Turn Q2 hr while in bed, air mattress  - Skin barrier cream as needed  - Nursing to monitor skin Qshift    FEN   - Diet - Regular + Thins  [DASH/TLC]      Precautions / PROPHYLAXIS:   - Falls  - ortho: Weight bearing status: WBAT   - Lungs: Aspiration, Incentive Spirometer   - Pressure injury/Skin: Turn Q2hrs while in bed, OOB to Chair, PT/OT      DaughterChichi [phone]  Pharm: [facility] on [address]     Follow ups:  Deedee Walton (MD)  Critical Care Medicine; Internal Medicine; Pulmonary Disease  [address] [facility], [address]  [place] [address]  Phone: [phone]  Fax: [phone]    Cardiology: Gualberto Mai   Infectious Disease: Mustapha Benoit  Office: [phone].  Cell [phone]  Pulmonology: Ha Gray

## 2022-09-14 NOTE — DIETITIAN INITIAL EVALUATION ADULT - PERTINENT LABORATORY DATA
09-14    137  |  101  |  38<H>  ----------------------------<  81  4.5   |  30  |  1.52<H>    Ca    8.8      14 Sep 2022 06:00    TPro  7.2  /  Alb  2.6<L>  /  TBili  0.5  /  DBili  x   /  AST  27  /  ALT  23  /  AlkPhos  130<H>  09-14  A1C with Estimated Average Glucose Result: 5.9 % (08-12-22 @ 06:00)

## 2022-09-14 NOTE — DIETITIAN INITIAL EVALUATION ADULT - ADD RECOMMEND
1) Continue DASH/TLC diet.   2) Recommend supplement Ensure Enlive 8oz PO Daily (Provides 350kcal & 20grams of Protein) to assist pt in meeting estimated protein energy needs.  3) Monitor PO intake, GI tolerance, skin integrity, labs, weight, and bowel movement regularity.   4) Honor food preferences as feasible. Assist with meals PRN and encourage PO intake.  5) Provide ongoing diet education as needed  6) RD remains available upon request and will follow-up per protocol

## 2022-09-15 DIAGNOSIS — H91.90 UNSPECIFIED HEARING LOSS, UNSPECIFIED EAR: ICD-10-CM

## 2022-09-15 LAB
ALBUMIN SERPL ELPH-MCNC: 2.6 G/DL — LOW (ref 3.3–5)
ALP SERPL-CCNC: 125 U/L — HIGH (ref 40–120)
ALT FLD-CCNC: 29 U/L — SIGNIFICANT CHANGE UP (ref 10–45)
ANION GAP SERPL CALC-SCNC: 4 MMOL/L — LOW (ref 5–17)
AST SERPL-CCNC: 29 U/L — SIGNIFICANT CHANGE UP (ref 10–40)
BASOPHILS # BLD AUTO: 0.08 K/UL — SIGNIFICANT CHANGE UP (ref 0–0.2)
BASOPHILS NFR BLD AUTO: 0.8 % — SIGNIFICANT CHANGE UP (ref 0–2)
BILIRUB SERPL-MCNC: 0.4 MG/DL — SIGNIFICANT CHANGE UP (ref 0.2–1.2)
BUN SERPL-MCNC: 39 MG/DL — HIGH (ref 7–23)
CALCIUM SERPL-MCNC: 8.7 MG/DL — SIGNIFICANT CHANGE UP (ref 8.4–10.5)
CHLORIDE SERPL-SCNC: 103 MMOL/L — SIGNIFICANT CHANGE UP (ref 96–108)
CO2 SERPL-SCNC: 32 MMOL/L — HIGH (ref 22–31)
CREAT SERPL-MCNC: 1.47 MG/DL — HIGH (ref 0.5–1.3)
EGFR: 46 ML/MIN/1.73M2 — LOW
EOSINOPHIL # BLD AUTO: 0.41 K/UL — SIGNIFICANT CHANGE UP (ref 0–0.5)
EOSINOPHIL NFR BLD AUTO: 4.3 % — SIGNIFICANT CHANGE UP (ref 0–6)
GLUCOSE SERPL-MCNC: 88 MG/DL — SIGNIFICANT CHANGE UP (ref 70–99)
HCT VFR BLD CALC: 32.8 % — LOW (ref 39–50)
HGB BLD-MCNC: 10.6 G/DL — LOW (ref 13–17)
IMM GRANULOCYTES NFR BLD AUTO: 0.5 % — SIGNIFICANT CHANGE UP (ref 0–1.5)
LYMPHOCYTES # BLD AUTO: 1.82 K/UL — SIGNIFICANT CHANGE UP (ref 1–3.3)
LYMPHOCYTES # BLD AUTO: 19.3 % — SIGNIFICANT CHANGE UP (ref 13–44)
MCHC RBC-ENTMCNC: 31.3 PG — SIGNIFICANT CHANGE UP (ref 27–34)
MCHC RBC-ENTMCNC: 32.3 GM/DL — SIGNIFICANT CHANGE UP (ref 32–36)
MCV RBC AUTO: 96.8 FL — SIGNIFICANT CHANGE UP (ref 80–100)
MONOCYTES # BLD AUTO: 1.04 K/UL — HIGH (ref 0–0.9)
MONOCYTES NFR BLD AUTO: 11 % — SIGNIFICANT CHANGE UP (ref 2–14)
NEUTROPHILS # BLD AUTO: 6.03 K/UL — SIGNIFICANT CHANGE UP (ref 1.8–7.4)
NEUTROPHILS NFR BLD AUTO: 64.1 % — SIGNIFICANT CHANGE UP (ref 43–77)
NRBC # BLD: 0 /100 WBCS — SIGNIFICANT CHANGE UP (ref 0–0)
NT-PROBNP SERPL-SCNC: 1601 PG/ML — HIGH (ref 0–300)
PLATELET # BLD AUTO: 242 K/UL — SIGNIFICANT CHANGE UP (ref 150–400)
POTASSIUM SERPL-MCNC: 4.8 MMOL/L — SIGNIFICANT CHANGE UP (ref 3.5–5.3)
POTASSIUM SERPL-SCNC: 4.8 MMOL/L — SIGNIFICANT CHANGE UP (ref 3.5–5.3)
PROT SERPL-MCNC: 7.2 G/DL — SIGNIFICANT CHANGE UP (ref 6–8.3)
RBC # BLD: 3.39 M/UL — LOW (ref 4.2–5.8)
RBC # FLD: 14.2 % — SIGNIFICANT CHANGE UP (ref 10.3–14.5)
SODIUM SERPL-SCNC: 139 MMOL/L — SIGNIFICANT CHANGE UP (ref 135–145)
WBC # BLD: 9.43 K/UL — SIGNIFICANT CHANGE UP (ref 3.8–10.5)
WBC # FLD AUTO: 9.43 K/UL — SIGNIFICANT CHANGE UP (ref 3.8–10.5)

## 2022-09-15 PROCEDURE — 99222 1ST HOSP IP/OBS MODERATE 55: CPT

## 2022-09-15 PROCEDURE — 99232 SBSQ HOSP IP/OBS MODERATE 35: CPT

## 2022-09-15 RX ORDER — LIDOCAINE 4 G/100G
1 CREAM TOPICAL DAILY
Refills: 0 | Status: DISCONTINUED | OUTPATIENT
Start: 2022-09-15 | End: 2022-09-23

## 2022-09-15 RX ADMIN — Medication 1 APPLICATION(S): at 18:37

## 2022-09-15 RX ADMIN — BUDESONIDE AND FORMOTEROL FUMARATE DIHYDRATE 2 PUFF(S): 160; 4.5 AEROSOL RESPIRATORY (INHALATION) at 08:09

## 2022-09-15 RX ADMIN — PANTOPRAZOLE SODIUM 40 MILLIGRAM(S): 20 TABLET, DELAYED RELEASE ORAL at 05:49

## 2022-09-15 RX ADMIN — LIDOCAINE 1 PATCH: 4 CREAM TOPICAL at 05:52

## 2022-09-15 RX ADMIN — Medication 1 APPLICATION(S): at 05:50

## 2022-09-15 RX ADMIN — Medication 50 MILLIGRAM(S): at 05:51

## 2022-09-15 RX ADMIN — Medication 250 MILLIGRAM(S): at 05:50

## 2022-09-15 RX ADMIN — TAMSULOSIN HYDROCHLORIDE 0.4 MILLIGRAM(S): 0.4 CAPSULE ORAL at 21:08

## 2022-09-15 RX ADMIN — FINASTERIDE 5 MILLIGRAM(S): 5 TABLET, FILM COATED ORAL at 11:28

## 2022-09-15 RX ADMIN — APIXABAN 5 MILLIGRAM(S): 2.5 TABLET, FILM COATED ORAL at 18:35

## 2022-09-15 RX ADMIN — Medication 250 MILLIGRAM(S): at 18:35

## 2022-09-15 RX ADMIN — ATORVASTATIN CALCIUM 40 MILLIGRAM(S): 80 TABLET, FILM COATED ORAL at 21:08

## 2022-09-15 RX ADMIN — APIXABAN 5 MILLIGRAM(S): 2.5 TABLET, FILM COATED ORAL at 05:50

## 2022-09-15 RX ADMIN — Medication 50 MILLIGRAM(S): at 18:35

## 2022-09-15 NOTE — PROGRESS NOTE ADULT - ASSESSMENT
ASSESSMENT/PLAN  87M with HTN, HLD, hx of CVA (7 yrs ago, no residual deficits), BPH, hx of skin CA who presents to [facility] on 8/11 with cough and fevers - azithromycin and ceftriaxone empirically for multifocal PNA.  Hospital course c/b ARF requiring HFNC and new onset afib. Transferred to [facility] for bronchoscopy - found to have rare yeast (likely colonized), AFB+ MAC atypical/non TB infect (does not need iso)    *CINDI BUN/Cr 38/1.52 -> 39/1.47 continue with PO hydration.    * BLE edema -amlodipine dc, continue to hold lasix (given CINDI)  * encourage Incentive spirometry and/or deep breathing exercise  * left hip pain resolved - lidocaine patch standing, switched to PRN  * ear congestion despite s/p debrox - ENT consult appreciated - minimal cerumen, likely presbycusis.  Rec outpatient hearing test    COMORBIDITES/ACTIVE MEDICAL ISSUES     #ARF 2/2 Multifocal PNA/bronchial fungal  - S/p abx (levaquin, ceftriaxone and azithromycin)   - S/p bronchoscopy - rare Geotrichum species, Few Yeast -suggest colonization, no Rx.    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  No need for isolation  - Continue Budesonide - formoterol 80mcg-4.5 mcg BID + Albuterol Q6h  - Incentive spirometry/deep breathing exercise  - Gait Instability, ADL impairments and Functional impairments: start Comprehensive Rehab Program of PT/OT     #New onset Afib  - Eliquis 5 BID   - Metoprolol 50 BID  - ECHO low normal LV systolic function, mild pulm HTN    #HTN  - Amlodipine 5 - dc d/t BLE edema  - Metoprolol 50 BID  #BLE edema  - Home med: Lasix 20 QD - remain on hold d/t BLE edema    #HLD  - Lipitor 40    #Back pain  #Pain control  - Tylenol PRN, Lidocaine patch (left hip) - standing to PRN Cyclobenzaprine 5 TID PRN    #Ear congestion  - s/p debrox, which seems to have worsen hearing and congestion  - ENT consult appreciated - presbycusis, rec hearing test outpatient    #Insomnia  - Quiet and low stimuli environment  - Melatonin 3 PRN     #GI/Bowel Mgmt   - At risk for constipation due to neurologic diagnosis, immobility and/or medication use  - Senna,  Miralax BID, Dulcolax PO PRN  - Probiotic: Florastor  - GI ppx: Protonix    #BPH  - Flomax 0.4 HS  - Finasteride 5 HS  #Bladder management  - PVR q 8 hours (SC if > 400)  - Encourage timed voids Q4hrs while awake for independence and to promote continence during therapy    #DVT  - Eliquis 5 BID  - SCDs, TEDs     #Skin:  -At risk for pressure injury due to neurologic diagnosis and relative immobility  -Bilateral heels - soft heel protectors, apply liquid barrier film daily and monitor for tissue type changes  - Turn Q2 hr while in bed, air mattress  - Skin barrier cream as needed  - Nursing to monitor skin Qshift    FEN   - Diet - Regular + Thins  [DASH/TLC]      Precautions / PROPHYLAXIS:   - Falls  - ortho: Weight bearing status: WBAT   - Lungs: Aspiration, Incentive Spirometer   - Pressure injury/Skin: Turn Q2hrs while in bed, OOB to Chair, PT/OT      DaughterChichi [phone]  Pharm: [facility] on [address]     Follow ups:  Deedee Walton)  Critical Care Medicine; Internal Medicine; Pulmonary Disease  [address], [address]  [place] [address]  Phone: [phone]  Fax: [phone]    Cardiology: Gualberto Mai   Infectious Disease: Mustapha Benoit  Office: [phone].  Cell [phone]  Pulmonology: Ha Gray  ENT for hearing test   ASSESSMENT/PLAN  87M with HTN, HLD, hx of CVA (7 yrs ago, no residual deficits), BPH, hx of skin CA who presents to [place] ER on 8/11 with cough and fevers - azithromycin and ceftriaxone empirically for multifocal PNA.  Hospital course c/b ARF requiring HFNC and new onset afib. Transferred to [facility] for bronchoscopy - found to have rare yeast (likely colonized), AFB+ MAC atypical/non TB infect (does not need iso)    *CINDI BUN/Cr 38/1.52 -> 39/1.47 continue with PO hydration.    * BLE edema -amlodipine dc, continue to hold lasix (given CINDI)  * encourage Incentive spirometry and/or deep breathing exercise  * left hip pain resolved - lidocaine patch standing, switched to PRN  * ear congestion despite s/p debrox - ENT consult appreciated - minimal cerumen, likely presbycusis.  Rec outpatient hearing test    COMORBIDITES/ACTIVE MEDICAL ISSUES     #ARF 2/2 Multifocal PNA/bronchial fungal  - S/p abx (levaquin, ceftriaxone and azithromycin)   - S/p bronchoscopy - rare Geotrichum species, Few Yeast -suggest colonization, no Rx.    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  No need for isolation  - Continue Budesonide - formoterol 80mcg-4.5 mcg BID + Albuterol Q6h  - Incentive spirometry/deep breathing exercise  - Gait Instability, ADL impairments and Functional impairments: start Comprehensive Rehab Program of PT/OT     #New onset Afib  - Eliquis 5 BID   - Metoprolol 50 BID  - ECHO low normal LV systolic function, mild pulm HTN    #HTN  - Amlodipine 5 - dc d/t BLE edema  - Metoprolol 50 BID  #BLE edema  - Home med: Lasix 20 QD - remain on hold d/t BLE edema    #HLD  - Lipitor 40    #Back pain  #Pain control  - Tylenol PRN, Lidocaine patch (left hip) - standing to PRN Cyclobenzaprine 5 TID PRN    #Ear congestion  - s/p debrox, which seems to have worsen hearing and congestion  - ENT consult appreciated - presbycusis, rec hearing test outpatient    #Insomnia  - Quiet and low stimuli environment  - Melatonin 3 PRN     #GI/Bowel Mgmt   - At risk for constipation due to neurologic diagnosis, immobility and/or medication use  - Senna,  Miralax BID, Dulcolax PO PRN  - Probiotic: Florastor  - GI ppx: Protonix    #BPH  - Flomax 0.4 HS  - Finasteride 5 HS  #Bladder management  - PVR q 8 hours (SC if > 400)  - Encourage timed voids Q4hrs while awake for independence and to promote continence during therapy    #DVT  - Eliquis 5 BID  - SCDs, TEDs     #Skin:  -At risk for pressure injury due to neurologic diagnosis and relative immobility  -Bilateral heels - soft heel protectors, apply liquid barrier film daily and monitor for tissue type changes  - Turn Q2 hr while in bed, air mattress  - Skin barrier cream as needed  - Nursing to monitor skin Qshift    FEN   - Diet - Regular + Thins  [DASH/TLC]      Precautions / PROPHYLAXIS:   - Falls  - ortho: Weight bearing status: WBAT   - Lungs: Aspiration, Incentive Spirometer   - Pressure injury/Skin: Turn Q2hrs while in bed, OOB to Chair, PT/OT      DaughterChichi [phone]  Pharm: [facility] on [address]     Follow ups:  Deedee Walton)  Critical Care Medicine; Internal Medicine; Pulmonary Disease  [address] [address]  [place] [address]  Phone: [phone]  Fax: [phone]    Cardiology: Gualberto Mai   Infectious Disease: Mustapha Benoit  Office: [phone].  Cell [phone]  Pulmonology: Ha Gray  ENT for hearing test    ENT --seen 9/15  Patient will need to follow up with ENT 2-3 weeks after discharge and will need formal hearing test.  - Patient can follow up with Dr. Sylvester Evans at [phone] to be seen and evaluated at our [place] office.

## 2022-09-15 NOTE — PROGRESS NOTE ADULT - ASSESSMENT
87M with HTN, HLD, hx of CVA (7 yrs ago, no residual deficits), BPH, hx of skin CA who presents to [place] ER on 8/11 with cough and fevers - azithromycin and ceftriaxone empirically for multifocal PNA.  Hospital course c/b ARF requiring HFNC and new onset afib. Transferred to [facility] for bronchoscopy - found to have rare yeast (likely colonized), AFB+ MAC atypical/non TB infect (does not need iso)  now admitted for rehab- pt/ot/dvt ppx      #ARF 2/2 Multifocal PNA/bronchial fungal  - S/p abx (levaquin, ceftriaxone and azithromycin)   - S/p bronchoscopy - rare Geotrichum species, Few Yeast -suggest colonization, no Rx.    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  No need for isolation  - Continue Budesonide - budesonide, Albuterol  - Incentive spirometry    #New onset Afib  - Eliquis 5 BID   - decrease Metoprolol 25  BID  - ECHO low normal LV systolic function, mild pulm HTN    #HTN  - stoped Amlodipine  9/15/22 due to bp on lower side  -c/w  Metoprolol     #mild LLE edema  - hold Lasix due to elevation in creat  - leg elevation, ace wraps, teds    #HLD  - Lipitor     #Insomnia  - Melatonin     #BPH  - Flomax   - Finasteride     #DVT  - Eliquis     will follow  d/w dr. reed

## 2022-09-15 NOTE — PROGRESS NOTE ADULT - SUBJECTIVE AND OBJECTIVE BOX
Patient is a 88y old  Male who presents with a chief complaint of Acute respiratory failure with hypoxia    SUBJECTIVE/ROS:  Patient seen and evaluated while sitting up in WC at bedside  Continues to c/o ear congestion and not hearing well.  To be seen by ENT  Tolerating therapy - states left hip pain now resolved, lidocaine patch for PRN instead of standing  Requested for podiatry for toenail hygiene - nails overgrown and causing him discomfort in shoe  Reviewed lab result - renal function better, but still encourage PO hydration  No chest pain, no palpitation, no cough  No headache, no dizziness  No nausea, no abdominal pain; LBM yesterday?  No dysuria, no frequency  Labs reviewed - pt informed of renal function status.  Encouraged PO hydration    PHYSICAL EXAM  88y  Vital Signs Last 24 Hrs  T(C): 36.7 (14 Sep 2022 20:36), Max: 36.7 (14 Sep 2022 20:36)  T(F): 98.1 (14 Sep 2022 20:36), Max: 98.1 (14 Sep 2022 20:36)  HR: 60 (15 Sep 2022 08:29) (58 - 83)  BP: 119/64 (15 Sep 2022 07:57) (111/59 - 143/66)  RR: 16 (15 Sep 2022 07:57) (16 - 16)  SpO2: 96% (15 Sep 2022 08:29) (95% - 99%)on room air    General: NAD, Resting Comfortable                               HEENT: NC/AT, EOM I, PERRLA, Normal Conjunctivae  Cardio: warm and well perfused                         Pulm: resp non labored                       Abdomen: ND/NT, Soft, BS+                                                MSK: No joint swelling, Full ROM                                         Ext: No C/C, L>R  3+ pedal edema, No calf tenderness    Skin:  all skin intact                                                                   Neurological Examination    Cognitive: AAO x 3                                                                         Attention: Intact   Judgment: Good evidence of judgement                                Mood/Affect: wnl                                                                           Communication:  Fluent,  No dysarthria   CN II - XII  intact                                            Sensory: Intact to light touch, PP and Vibration                                      Motor    LEFT    UE: SF [4+/5], EF [4+/5], EE [4+/5], WE [4+/5],  [4+/5]  RIGHT UE: SF [5/5], EF [5/5], EE [5/5], WE [5/5],  [5/5]  LEFT    LE:  HF [4/5] secondary to pain, KE [4+/5], DF [4+/5], PF [5/5]  RIGHT LE:  HF [5/5], KE [5/5], DF [5/5], PF [5/5]      RECENT LABS:   LAB                        10.6   9.43  )-----------( 242      ( 15 Sep 2022 06:28 )             32.8     09-15    139  |  103  |  39<H>  ----------------------------<  88  4.8   |  32<H>  |  1.47<H>    Ca    8.7      15 Sep 2022 06:28    TPro  7.2  /  Alb  2.6<L>  /  TBili  0.4  /  DBili  x   /  AST  29  /  ALT  29  /  AlkPhos  125<H>  09-15    LIVER FUNCTIONS - ( 15 Sep 2022 06:28 )  Alb: 2.6 g/dL / Pro: 7.2 g/dL / ALK PHOS: 125 U/L / ALT: 29 U/L / AST: 29 U/L / GGT: x             Allergies  No Known Allergies    MEDICATIONS  (STANDING):  ALBUTerol    90 MICROgram(s) HFA Inhaler 1 Puff(s) Inhalation every 6 hours  amLODIPine   Tablet 5 milliGRAM(s) Oral daily  ammonium lactate 12% Lotion 1 Application(s) Topical two times a day  apixaban 5 milliGRAM(s) Oral two times a day  atorvastatin 40 milliGRAM(s) Oral at bedtime  budesonide  80 MICROgram(s)/formoterol 4.5 MICROgram(s) Inhaler 2 Puff(s) Inhalation two times a day  finasteride 5 milliGRAM(s) Oral daily  influenza  Vaccine (HIGH DOSE) 0.7 milliLiter(s) IntraMuscular once  metoprolol tartrate 50 milliGRAM(s) Oral two times a day  pantoprazole    Tablet 40 milliGRAM(s) Oral before breakfast  polyethylene glycol 3350 17 Gram(s) Oral two times a day  saccharomyces boulardii 250 milliGRAM(s) Oral two times a day  senna 2 Tablet(s) Oral at bedtime  tamsulosin 0.4 milliGRAM(s) Oral at bedtime    MEDICATIONS  (PRN):  acetaminophen     Tablet .. 650 milliGRAM(s) Oral every 6 hours PRN Mild Pain (1 - 3)  bisacodyl 5 milliGRAM(s) Oral daily PRN Constipation  cyclobenzaprine 5 milliGRAM(s) Oral three times a day PRN Muscle Spasm  lidocaine   4% Patch 1 Patch Transdermal daily PRN pain  melatonin 3 milliGRAM(s) Oral at bedtime PRN Insomnia   Patient is a 88y old  Male who presents with a chief complaint of Acute respiratory failure with hypoxia    SUBJECTIVE/ROS:  Patient seen and evaluated while sitting up in WC at bedside  Continues to c/o ear congestion and not hearing well.  To be seen by ENT  Tolerating therapy - states left hip pain now resolved, lidocaine patch for PRN instead of standing  Requested for podiatry for toenail hygiene - nails overgrown and causing him discomfort in shoe  Reviewed lab result - renal function better, but still encourage PO hydration  No chest pain, no palpitation, no cough  No headache, no dizziness  No nausea, no abdominal pain; LBM yesterday?  No dysuria, no frequency  Labs reviewed - pt informed of renal function status.  Encouraged PO hydration    Therapy--engaging, motivated  Working on ambulation    PHYSICAL EXAM  88y  Vital Signs Last 24 Hrs  T(C): 36.7 (14 Sep 2022 20:36), Max: 36.7 (14 Sep 2022 20:36)  T(F): 98.1 (14 Sep 2022 20:36), Max: 98.1 (14 Sep 2022 20:36)  HR: 60 (15 Sep 2022 08:29) (58 - 83)  BP: 119/64 (15 Sep 2022 07:57) (111/59 - 143/66)  RR: 16 (15 Sep 2022 07:57) (16 - 16)  SpO2: 96% (15 Sep 2022 08:29) (95% - 99%)on room air    General: NAD, Resting Comfortable                               HEENT: NC/AT, EOM I, PERRLA, Normal Conjunctivae  Cardio: warm and well perfused                         Pulm: resp non labored                       Abdomen: ND/NT, Soft, BS+                                                MSK: No joint swelling, Full ROM                                         Ext: No C/C, L>R  3+ pedal edema, No calf tenderness    Skin:  all skin intact                                                                   Neurological Examination    Cognitive: AAO x 3                                                                         Attention: Intact   Judgment: Good evidence of judgement                                Mood/Affect: wnl                                                                           Communication:  Fluent,  No dysarthria   CN II - XII  intact                                            Sensory: Intact to light touch, PP and Vibration                                      Motor    LEFT    UE: SF [4+/5], EF [4+/5], EE [4+/5], WE [4+/5],  [4+/5]  RIGHT UE: SF [5/5], EF [5/5], EE [5/5], WE [5/5],  [5/5]  LEFT    LE:  HF [4/5] secondary to pain, KE [4+/5], DF [4+/5], PF [5/5]  RIGHT LE:  HF [5/5], KE [5/5], DF [5/5], PF [5/5]      RECENT LABS:   LAB                        10.6   9.43  )-----------( 242      ( 15 Sep 2022 06:28 )             32.8     09-15    139  |  103  |  39<H>  ----------------------------<  88  4.8   |  32<H>  |  1.47<H>    Ca    8.7      15 Sep 2022 06:28    TPro  7.2  /  Alb  2.6<L>  /  TBili  0.4  /  DBili  x   /  AST  29  /  ALT  29  /  AlkPhos  125<H>  09-15    LIVER FUNCTIONS - ( 15 Sep 2022 06:28 )  Alb: 2.6 g/dL / Pro: 7.2 g/dL / ALK PHOS: 125 U/L / ALT: 29 U/L / AST: 29 U/L / GGT: x             Allergies  No Known Allergies    MEDICATIONS  (STANDING):  ALBUTerol    90 MICROgram(s) HFA Inhaler 1 Puff(s) Inhalation every 6 hours  amLODIPine   Tablet 5 milliGRAM(s) Oral daily  ammonium lactate 12% Lotion 1 Application(s) Topical two times a day  apixaban 5 milliGRAM(s) Oral two times a day  atorvastatin 40 milliGRAM(s) Oral at bedtime  budesonide  80 MICROgram(s)/formoterol 4.5 MICROgram(s) Inhaler 2 Puff(s) Inhalation two times a day  finasteride 5 milliGRAM(s) Oral daily  influenza  Vaccine (HIGH DOSE) 0.7 milliLiter(s) IntraMuscular once  metoprolol tartrate 50 milliGRAM(s) Oral two times a day  pantoprazole    Tablet 40 milliGRAM(s) Oral before breakfast  polyethylene glycol 3350 17 Gram(s) Oral two times a day  saccharomyces boulardii 250 milliGRAM(s) Oral two times a day  senna 2 Tablet(s) Oral at bedtime  tamsulosin 0.4 milliGRAM(s) Oral at bedtime    MEDICATIONS  (PRN):  acetaminophen     Tablet .. 650 milliGRAM(s) Oral every 6 hours PRN Mild Pain (1 - 3)  bisacodyl 5 milliGRAM(s) Oral daily PRN Constipation  cyclobenzaprine 5 milliGRAM(s) Oral three times a day PRN Muscle Spasm  lidocaine   4% Patch 1 Patch Transdermal daily PRN pain  melatonin 3 milliGRAM(s) Oral at bedtime PRN Insomnia

## 2022-09-15 NOTE — CONSULT NOTE ADULT - PROBLEM SELECTOR RECOMMENDATION 9
- Both ear canals relatively clean without any obstruction  - Patient will need to follow up with ENT 2-3 weeks after discharge and will need formal hearing test.  - Patient can follow up with Dr. Sylvester Evans at [phone] to be seen and evaluated at our [place] office.  - No further intervention at this time.

## 2022-09-15 NOTE — CONSULT NOTE ADULT - SUBJECTIVE AND OBJECTIVE BOX
Patient is a 88y old  Male who presents with a chief complaint of Acute resp failure/Sepsis secondary to multifocal PNA/Hemoptysis (15 Sep 2022 11:30)      HPI:  87M with HTN, HLD, hx of CVA (no residual deficits), BPH, hx of skin CA who presents to [facility] on 8/11 with cough and fevers.  Symptoms started about 5-6 days PTA.  Started with left sided upper back pain.  Then started to have a cough + green phlegm and possible blood.  Associated with SOB and DONG.  No weight changes or night sweats.  Had a fever as high as 102F.  Some nausea but no vomiting with loss of appetite.  No chest pain.  No diarrhea.  No abdominal pain.  No recent travel or sick contacts.  Went to see his PMD 3 days prior to [facility] admission and was given a z-kellie and tesslon perles.  Reported having negative COVID home tests.   In the [facility] ED, patient's triage vitals were /78    RR  21, 93% on RA (thought dropped down to 88%) and T 98.5F.  Labs showed WBC 18 with a left shift, CINDI with BUN/Cr 40/1.88, elevated LFTs, normal lactate, negative COVID (last booster in 12/2021 with Pfizer vaccine), neg influenza, neg RSV.  CT chest showed "patchy ground glass and more dense opacities in both lungs, suspicious for multifocal PNA + several small lucencies with areas of lung opacity ->>may represent early cavitation versus areas of focal bronchiectasis."  Patient started on azithromycin and ceftriaxone empirically for multifocal PNA.  Also of note, patient was found to have new afib on EKG.  Patient denies any afib history but said when he was younger he did feel some irregular heartbeats.  Admitted for sepsis due to multifocal pneumonia with progression to acute hypoxic respiratory failure now on HFNC and course further c/b new onset afib. Patient was seen by ID, pulm and cardiology. Transferred to [facility] (8/29) for bronchoscopy.     Underwent bronchoscopy for bronchoalveolar lavage. Started on eliquis for new onset afib after bronchoscopy per cardiology, and bronchial, sputum cultures were followed. ID +Pulm consulted for TB work up. Bronchial cultures were negative on AFB stain along with sputum cultures. 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 Isolation protocol was discontinued. Bronchial fungal cultures showed normal alexander. Pt found to be in mild CINDI, was given a bolus of fluid. Pt encouraged to drink plenty of fluids.     Patient was evaluated by PM&R and therapy for functional deficits and gait/ADL impairments and recommend acute rehabilitation.  Patient was medically optimized for discharge to [facility] on 9/13/22.   (13 Sep 2022 13:34)      Interval Events:  Called to see and evaluate patient with decreased hearing.  He reports that he noticed that he has been having a difficult time hearing for a while now.  He reports that he can still hear but not as good as it did before.  He noted that he has seen an ENT but it was a while back.  He also does not recall having an updated hearing test.  Mr. Layne said that he also has a tendency of having ear wax collection in his ears at times and is concerned that it may be happening again.    PAST MEDICAL & SURGICAL HISTORY:  Skin cancer - s/p Mohs  Essential hypertension  Cerebrovascular accident (CVA), unspecified mechanism - 2015  Hyperlipidemia, unspecified hyperlipidemia type  Complex tear of lateral meniscus of right knee as current injury, initial encounter  Complex tear of medial meniscus of right knee as current injury, initial encounter  Benign prostatic hyperplasia, presence of lower urinary tract symptoms unspecified, unspecified morphology  H/O pilonidal cyst    Allergies  No Known Allergies    Social History:  SOCIAL HISTORY  Smoking - Denied  EtOH - Denied   Drugs - Denied    FAMILY HISTORY:  Family history of ischemic heart disease and other diseases of the circulatory system (Father)    REVIEW OF SYSTEMS:     CONSTITUTIONAL: No weakness, fevers or chills     EYES/ENT: No visual changes;  No vertigo or throat pain      NECK: No pain or stiffness     RESPIRATORY: No cough, wheezing, hemoptysis; No shortness of breath     CARDIOVASCULAR: No chest pain or palpitations     GASTROINTESTINAL: No abdominal or epigastric pain. No nausea, vomiting, or hematemesis; No diarrhea or constipation. No melena or hematochezia.     GENITOURINARY: No dysuria, frequency or hematuria     NEUROLOGICAL: No numbness or weakness     SKIN: No itching, burning, rashes, or lesions   All other review of systems is negative unless indicated above.    MEDICATIONS  (STANDING):  ALBUTerol    90 MICROgram(s) HFA Inhaler 1 Puff(s) Inhalation every 6 hours  ammonium lactate 12% Lotion 1 Application(s) Topical two times a day  apixaban 5 milliGRAM(s) Oral two times a day  atorvastatin 40 milliGRAM(s) Oral at bedtime  budesonide  80 MICROgram(s)/formoterol 4.5 MICROgram(s) Inhaler 2 Puff(s) Inhalation two times a day  finasteride 5 milliGRAM(s) Oral daily  influenza  Vaccine (HIGH DOSE) 0.7 milliLiter(s) IntraMuscular once  metoprolol tartrate 50 milliGRAM(s) Oral two times a day  pantoprazole    Tablet 40 milliGRAM(s) Oral before breakfast  polyethylene glycol 3350 17 Gram(s) Oral two times a day  saccharomyces boulardii 250 milliGRAM(s) Oral two times a day  senna 2 Tablet(s) Oral at bedtime  tamsulosin 0.4 milliGRAM(s) Oral at bedtime    MEDICATIONS  (PRN):  acetaminophen     Tablet .. 650 milliGRAM(s) Oral every 6 hours PRN Mild Pain (1 - 3)  bisacodyl 5 milliGRAM(s) Oral daily PRN Constipation  cyclobenzaprine 5 milliGRAM(s) Oral three times a day PRN Muscle Spasm  lidocaine   4% Patch 1 Patch Transdermal daily PRN pain  melatonin 3 milliGRAM(s) Oral at bedtime PRN Insomnia                            10.6   9.43  )-----------( 242      ( 15 Sep 2022 06:28 )             32.8     09-15    139  |  103  |  39<H>  ----------------------------<  88  4.8   |  32<H>  |  1.47<H>    Ca    8.7      15 Sep 2022 06:28    TPro  7.2  /  Alb  2.6<L>  /  TBili  0.4  /  DBili  x   /  AST  29  /  ALT  29  /  AlkPhos  125<H>  09-15    I&O's Detail    Vital Signs Last 24 Hrs  T(C): 36.7 (14 Sep 2022 20:36), Max: 36.7 (14 Sep 2022 20:36)  T(F): 98.1 (14 Sep 2022 20:36), Max: 98.1 (14 Sep 2022 20:36)  HR: 60 (15 Sep 2022 08:29) (58 - 83)  BP: 119/64 (15 Sep 2022 07:57) (111/59 - 143/66)  BP(mean): --  RR: 16 (15 Sep 2022 07:57) (16 - 16)  SpO2: 96% (15 Sep 2022 08:29) (95% - 99%)    Parameters below as of 15 Sep 2022 08:29  Patient On (Oxygen Delivery Method): room air      CBC Full  -  ( 15 Sep 2022 06:28 )  WBC Count : 9.43 K/uL  RBC Count : 3.39 M/uL  Hemoglobin : 10.6 g/dL  Hematocrit : 32.8 %  Platelet Count - Automated : 242 K/uL  Mean Cell Volume : 96.8 fl  Mean Cell Hemoglobin : 31.3 pg  Mean Cell Hemoglobin Concentration : 32.3 gm/dL  Auto Neutrophil # : 6.03 K/uL  Auto Lymphocyte # : 1.82 K/uL  Auto Monocyte # : 1.04 K/uL  Auto Eosinophil # : 0.41 K/uL  Auto Basophil # : 0.08 K/uL  Auto Neutrophil % : 64.1 %  Auto Lymphocyte % : 19.3 %  Auto Monocyte % : 11.0 %  Auto Eosinophil % : 4.3 %  Auto Basophil % : 0.8 %      PHYSICAL EXAM:     Constitutional Normal: well nourished, well developed, non-dysmorphic, no acute distress     Psychiatric: age appropriate behavior, cooperative     Skin: no obvious skin lesions     Lymphatic: no cervical lymphadenopathy     Eyes: EOM Intact, non-icteric     ENT:        Left EAC: Normal, minimal cerumen        Right EAC: Normal, No cerumen,         Left Tympanic Membrane: Normal, Clear, No effusion        Right Tympanic Membrane: Normal, Clear, No effusion			          External Nose:  Normal, no structural deformities		     Oral Cavity:  Good dentition, tongue midline, no lesions or ulcerations, uvula midline     Neck: No palpable lymphadenopathy     Pulmonary: No Acute Distress.      Neurologic: awake and alert

## 2022-09-16 PROCEDURE — 99232 SBSQ HOSP IP/OBS MODERATE 35: CPT

## 2022-09-16 RX ADMIN — Medication 50 MILLIGRAM(S): at 05:05

## 2022-09-16 RX ADMIN — Medication 50 MILLIGRAM(S): at 17:33

## 2022-09-16 RX ADMIN — APIXABAN 5 MILLIGRAM(S): 2.5 TABLET, FILM COATED ORAL at 17:33

## 2022-09-16 RX ADMIN — APIXABAN 5 MILLIGRAM(S): 2.5 TABLET, FILM COATED ORAL at 05:05

## 2022-09-16 RX ADMIN — Medication 250 MILLIGRAM(S): at 17:32

## 2022-09-16 RX ADMIN — PANTOPRAZOLE SODIUM 40 MILLIGRAM(S): 20 TABLET, DELAYED RELEASE ORAL at 05:05

## 2022-09-16 RX ADMIN — BUDESONIDE AND FORMOTEROL FUMARATE DIHYDRATE 2 PUFF(S): 160; 4.5 AEROSOL RESPIRATORY (INHALATION) at 21:09

## 2022-09-16 RX ADMIN — TAMSULOSIN HYDROCHLORIDE 0.4 MILLIGRAM(S): 0.4 CAPSULE ORAL at 22:52

## 2022-09-16 RX ADMIN — ATORVASTATIN CALCIUM 40 MILLIGRAM(S): 80 TABLET, FILM COATED ORAL at 22:52

## 2022-09-16 RX ADMIN — Medication 1 APPLICATION(S): at 22:52

## 2022-09-16 RX ADMIN — FINASTERIDE 5 MILLIGRAM(S): 5 TABLET, FILM COATED ORAL at 12:32

## 2022-09-16 RX ADMIN — Medication 1 APPLICATION(S): at 05:05

## 2022-09-16 RX ADMIN — Medication 250 MILLIGRAM(S): at 05:05

## 2022-09-16 RX ADMIN — BUDESONIDE AND FORMOTEROL FUMARATE DIHYDRATE 2 PUFF(S): 160; 4.5 AEROSOL RESPIRATORY (INHALATION) at 08:54

## 2022-09-16 NOTE — CONSULT NOTE ADULT - REASON FOR ADMISSION
Acute resp failure/Sepsis secondary to multifocal PNA/Hemoptysis
Acute resp failure/Sepsis secondary to multifocal PNA/ Hemoptysis
Acute resp failure/Sepsis secondary to multifocal PNA/Hemoptysis

## 2022-09-16 NOTE — CONSULT NOTE ADULT - SUBJECTIVE AND OBJECTIVE BOX
S :   88y year old Male seen bedside for Right and Left foot painful thick, dystrophic, and long toenails digits 1-5, and preventative foot examination.  Patient denies any history of trauma to both feet.  Patient has no other pedal complaints.       Patient admits to  (-) Fevers, (-) Chills, (-) Nausea, (-) Vomiting, (-) Shortness of Breath      PMH: No pertinent past medical history    Skin cancer    Essential hypertension    Cerebrovascular accident (CVA), unspecified mechanism    Hyperlipidemia, unspecified hyperlipidemia type    Complex tear of lateral meniscus of right knee as current injury, initial encounter    Complex tear of medial meniscus of right knee as current injury, initial encounter    Benign prostatic hyperplasia, presence of lower urinary tract symptoms unspecified, unspecified morphology      PSH:H/O pilonidal cyst        Allergies:No Known Allergies      Labs:                        10.6   9.43  )-----------( 242      ( 15 Sep 2022 06:28 )             32.8       WBC Trend  9.43 Date (09-15 @ 06:28)  9.20 Date (09-14 @ 06:00)  8.70 Date (09-13 @ 07:28)  11.19<H> Date (09-12 @ 06:55)  10.97<H> Date (09-11 @ 09:17)  9.61 Date (09-10 @ 07:10)  9.21 Date (09-09 @ 06:04)  14.58<H> Date (09-08 @ 10:17)  8.91 Date (09-07 @ 08:03)  10.27 Date (09-06 @ 07:35)  10.06 Date (09-05 @ 06:33)  10.50 Date (09-04 @ 06:15)  11.75<H> Date (09-03 @ 06:30)  11.88<H> Date (09-02 @ 07:18)  12.50<H> Date (09-01 @ 06:24)  11.26<H> Date (08-31 @ 06:22)  10.92<H> Date (08-30 @ 05:56)  12.29<H> Date (08-28 @ 06:26)  11.29<H> Date (08-27 @ 06:47)  11.13<H> Date (08-26 @ 06:00)  12.49<H> Date (08-25 @ 06:00)  12.20<H> Date (08-24 @ 06:00)      Chem  09-15    139  |  103  |  39<H>  ----------------------------<  88  4.8   |  32<H>  |  1.47<H>    Ca    8.7      15 Sep 2022 06:28    TPro  7.2  /  Alb  2.6<L>  /  TBili  0.4  /  DBili  x   /  AST  29  /  ALT  29  /  AlkPhos  125<H>  09-15          T(F): 98.6 (09-16-22 @ 08:17), Max: 98.6 (09-16-22 @ 08:17)  HR: 67 (09-16-22 @ 17:29) (64 - 67)  BP: 145/76 (09-16-22 @ 17:29) (109/62 - 145/76)  RR: 16 (09-16-22 @ 17:29) (16 - 16)  SpO2: 97% (09-16-22 @ 17:29) (96% - 97%)  Wt(kg): --    O:   Integument:  Skin warm, dry and supple bilateral.  No open lesions or inter-digital macerations noted bilateral.   Toenails 1-5 Right and Left feet thickened, elongated, discolored, and dystrophic with subungual debris.    Vascular: Dorsalis Pedis and Posterior Tibial pulses 1/4.  Capillary re-fill time less than 3 seconds digits 1-5 bilateral. Neuro: Protective sensation intact to the level of the digits bilateral.  MSK: Muscle strength 5/5 all major muscle groups bilateral. No structural abnormality, bilaterally      A:   1. Onychomycosis digits 1-5 Bilateral  2. Pain from Elongated nails    P: Discussed diagnosis and treatment with patient  Aseptic debridement of nails 1-5 bilateral with sterile nail pack  Discussed importance of daily foot examinations and proper shoe gear  Please re-consult as needed.    22 minutes spent on total encounter. Time of visit was spent providing, counselling and/or coordinating care by the attending physician.

## 2022-09-16 NOTE — PROGRESS NOTE ADULT - SUBJECTIVE AND OBJECTIVE BOX
Patient is a 88y old  Male who presents with a chief complaint of Acute respiratory failure with hypoxia    SUBJECTIVE/ROS:  Patient seen and evaluated while sitting in WC at bedside  Feels he's doing well, tolerating therapy.  States he did stairs yesterday.    Does not recall needing supplemental O2 the past 2 days.  Encourage to continue deep breathing exercises.  Hearing unchanged - seen by ENT, reviewed rec: follow up outpatient (was given card) for hearing test  No chest pain, no palpitation, no cough  No headache, no dizziness  No nausea, no abdominal pain; LBM today  No dysuria, no frequency    PHYSICAL EXAM  88y  Vital Signs Last 24 Hrs  T(C): 37 (09-16-22 @ 08:17), Max: 37 (09-16-22 @ 08:17)  T(F): 98.6 (09-16-22 @ 08:17), Max: 98.6 (09-16-22 @ 08:17)  HR: 64 (09-16-22 @ 08:54) (59 - 67)  BP: 109/62 (09-16-22 @ 08:17) (109/62 - 119/61)  RR: 16 (09-16-22 @ 08:17) (16 - 16)  SpO2: 96% (09-16-22 @ 08:54) (96% - 97%) on room air    General: NAD, Resting Comfortable                               HEENT: NC/AT, EOM I, PERRLA, Normal Conjunctivae  Cardio: regular rate, fairly regular rhythm                    Pulm: CTA                       Abdomen: ND/NT, Soft, BS+                                                MSK: No joint swelling, Full ROM                                         Ext: No C/C, L>R  1+ pedal edema (improving), No calf tenderness    Skin:  all skin intact                                                                   Neurological Examination    Cognitive: AAO x 3                                                                         Attention: Intact   Judgment: Good evidence of judgement                                Mood/Affect: wnl                                                                           Communication:  Fluent,  No dysarthria   CN II - XII  intact                                            Sensory: Intact to light touch, PP and Vibration                                      Motor    LEFT    UE: SF [4+/5], EF [4+/5], EE [4+/5], WE [4+/5],  [4+/5]  RIGHT UE: SF [5/5], EF [5/5], EE [5/5], WE [5/5],  [5/5]  LEFT    LE:  HF [4/5] secondary to pain, KE [4+/5], DF [4+/5], PF [5/5]  RIGHT LE:  HF [5/5], KE [5/5], DF [5/5], PF [5/5]      RECENT LABS:   LAB                        10.6   9.43  )-----------( 242      ( 15 Sep 2022 06:28 )             32.8     09-15    139  |  103  |  39<H>  ----------------------------<  88  4.8   |  32<H>  |  1.47<H>    Ca    8.7      15 Sep 2022 06:28    TPro  7.2  /  Alb  2.6<L>  /  TBili  0.4  /  DBili  x   /  AST  29  /  ALT  29  /  AlkPhos  125<H>  09-15    LIVER FUNCTIONS - ( 15 Sep 2022 06:28 )  Alb: 2.6 g/dL / Pro: 7.2 g/dL / ALK PHOS: 125 U/L / ALT: 29 U/L / AST: 29 U/L / GGT: x             Allergies  No Known Allergies    MEDICATIONS  (STANDING):  ALBUTerol    90 MICROgram(s) HFA Inhaler 1 Puff(s) Inhalation every 6 hours  ammonium lactate 12% Lotion 1 Application(s) Topical two times a day  apixaban 5 milliGRAM(s) Oral two times a day  atorvastatin 40 milliGRAM(s) Oral at bedtime  budesonide  80 MICROgram(s)/formoterol 4.5 MICROgram(s) Inhaler 2 Puff(s) Inhalation two times a day  finasteride 5 milliGRAM(s) Oral daily  influenza  Vaccine (HIGH DOSE) 0.7 milliLiter(s) IntraMuscular once  metoprolol tartrate 50 milliGRAM(s) Oral two times a day  pantoprazole    Tablet 40 milliGRAM(s) Oral before breakfast  polyethylene glycol 3350 17 Gram(s) Oral two times a day  saccharomyces boulardii 250 milliGRAM(s) Oral two times a day  senna 2 Tablet(s) Oral at bedtime  tamsulosin 0.4 milliGRAM(s) Oral at bedtime    MEDICATIONS  (PRN):  acetaminophen     Tablet .. 650 milliGRAM(s) Oral every 6 hours PRN Mild Pain (1 - 3)  bisacodyl 5 milliGRAM(s) Oral daily PRN Constipation  cyclobenzaprine 5 milliGRAM(s) Oral three times a day PRN Muscle Spasm  lidocaine   4% Patch 1 Patch Transdermal daily PRN pain  melatonin 3 milliGRAM(s) Oral at bedtime PRN Insomnia   Patient is a 88y old  Male who presents with a chief complaint of Acute respiratory failure with hypoxia    SUBJECTIVE/ROS:  Patient seen and evaluated while sitting in WC at bedside  Feels he's doing well, tolerating therapy.  States he did stairs yesterday.    Does not recall needing supplemental O2 the past 2 days.  Encourage to continue deep breathing exercises.  Hearing unchanged - seen by ENT, reviewed rec: follow up outpatient (was given card) for hearing test  No chest pain, no palpitation, no cough  No headache, no dizziness  No nausea, no abdominal pain; LBM today  No dysuria, no frequency    PHYSICAL EXAM  88y  Vital Signs Last 24 Hrs  T(C): 37 (09-16-22 @ 08:17), Max: 37 (09-16-22 @ 08:17)  T(F): 98.6 (09-16-22 @ 08:17), Max: 98.6 (09-16-22 @ 08:17)  HR: 64 (09-16-22 @ 08:54) (59 - 67)  BP: 109/62 (09-16-22 @ 08:17) (109/62 - 119/61)  RR: 16 (09-16-22 @ 08:17) (16 - 16)  SpO2: 96% (09-16-22 @ 08:54) (96% - 97%) on room air    General: NAD, Resting Comfortable                               HEENT: NC/AT, EOM I, PERRLA, Normal Conjunctivae  Cardio: regular rate, fairly regular rhythm                    Pulm: CTA                       Abdomen: ND/NT, Soft, BS+                                                MSK: No joint swelling, Full ROM                                         Ext: No C/C, L>R  1+ pedal edema (improving), No calf tenderness    Skin:  all skin intact                                                                   Neurological Examination    Cognitive: AAO x 3                                                                         Attention: Intact   Judgment: Good evidence of judgement                                Mood/Affect: wnl                                                                           Communication:  Fluent,  No dysarthria   CN II - XII  intact                                            Sensory: Intact to light touch, PP and Vibration                                      Motor    LEFT    UE: SF [4+/5], EF [4+/5], EE [4+/5], WE [4+/5],  [4+/5]  RIGHT UE: SF [5/5], EF [5/5], EE [5/5], WE [5/5],  [5/5]  LEFT    LE:  HF [4/5] secondary to pain, KE [4+/5], DF [4+/5], PF [5/5]  RIGHT LE:  HF [5/5], KE [5/5], DF [5/5], PF [5/5]      THERAPY--Engaging, ambulating increasing distance, started stairs negotiating  Leg edema significantly improved                          10.6   9.43  )-----------( 242      ( 15 Sep 2022 06:28 )             32.8     09-15    139  |  103  |  39<H>  ----------------------------<  88  4.8   |  32<H>  |  1.47<H>    Ca    8.7      15 Sep 2022 06:28    TPro  7.2  /  Alb  2.6<L>  /  TBili  0.4  /  DBili  x   /  AST  29  /  ALT  29  /  AlkPhos  125<H>  09-15    LIVER FUNCTIONS - ( 15 Sep 2022 06:28 )  Alb: 2.6 g/dL / Pro: 7.2 g/dL / ALK PHOS: 125 U/L / ALT: 29 U/L / AST: 29 U/L / GGT: x             Allergies  No Known Allergies    MEDICATIONS  (STANDING):  ALBUTerol    90 MICROgram(s) HFA Inhaler 1 Puff(s) Inhalation every 6 hours  ammonium lactate 12% Lotion 1 Application(s) Topical two times a day  apixaban 5 milliGRAM(s) Oral two times a day  atorvastatin 40 milliGRAM(s) Oral at bedtime  budesonide  80 MICROgram(s)/formoterol 4.5 MICROgram(s) Inhaler 2 Puff(s) Inhalation two times a day  finasteride 5 milliGRAM(s) Oral daily  influenza  Vaccine (HIGH DOSE) 0.7 milliLiter(s) IntraMuscular once  metoprolol tartrate 50 milliGRAM(s) Oral two times a day  pantoprazole    Tablet 40 milliGRAM(s) Oral before breakfast  polyethylene glycol 3350 17 Gram(s) Oral two times a day  saccharomyces boulardii 250 milliGRAM(s) Oral two times a day  senna 2 Tablet(s) Oral at bedtime  tamsulosin 0.4 milliGRAM(s) Oral at bedtime    MEDICATIONS  (PRN):  acetaminophen     Tablet .. 650 milliGRAM(s) Oral every 6 hours PRN Mild Pain (1 - 3)  bisacodyl 5 milliGRAM(s) Oral daily PRN Constipation  cyclobenzaprine 5 milliGRAM(s) Oral three times a day PRN Muscle Spasm  lidocaine   4% Patch 1 Patch Transdermal daily PRN pain  melatonin 3 milliGRAM(s) Oral at bedtime PRN Insomnia

## 2022-09-16 NOTE — PROGRESS NOTE ADULT - ASSESSMENT
ASSESSMENT/PLAN  87M with HTN, HLD, hx of CVA (7 yrs ago, no residual deficits), BPH, hx of skin CA who presents to [place] ER on 8/11 with cough and fevers - azithromycin and ceftriaxone empirically for multifocal PNA.  Hospital course c/b ARF requiring HFNC and new onset afib. Transferred to [facility] for bronchoscopy - found to have rare yeast (likely colonized), AFB+ MAC atypical/non TB infect (does not need iso)    *CINDI BUN/Cr 38/1.52 -> 39/1.47 - continue with PO hydration.  Repeat 9/19  * BLE edema (improving) - amlodipine dc, continue to hold lasix (given CINDI)  * encourage Incentive spirometry and/or deep breathing exercise    COMORBIDITES/ACTIVE MEDICAL ISSUES     #ARF 2/2 Multifocal PNA/bronchial fungal  - S/p abx (levaquin, ceftriaxone and azithromycin)   - S/p bronchoscopy - rare Geotrichum species, Few Yeast -suggest colonization, no Rx.    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  No need for isolation  - Continue Budesonide - formoterol 80mcg-4.5 mcg BID + Albuterol Q6h  - Incentive spirometry/deep breathing exercise  - Gait Instability, ADL impairments and Functional impairments: start Comprehensive Rehab Program of PT/OT     #New onset Afib  - Eliquis 5 BID   - Metoprolol 50 BID  - ECHO low normal LV systolic function, mild pulm HTN    #HTN  - Amlodipine 5 - dc d/t BLE edema  - Metoprolol 50 BID  #BLE edema  - Home med: Lasix 20 QD - remain on hold d/t BLE edema    #HLD  - Lipitor 40    #Back pain  #Pain control  - Tylenol PRN, Lidocaine patch (left hip) - standing to PRN Cyclobenzaprine 5 TID PRN    #Ear congestion  - s/p debrox, which seems to have worsen hearing and congestion  - ENT consult appreciated - presbycusis, rec hearing test outpatient    #Insomnia  - Quiet and low stimuli environment  - Melatonin 3 PRN     #GI/Bowel Mgmt   - At risk for constipation due to neurologic diagnosis, immobility and/or medication use  - Senna,  Miralax BID, Dulcolax PO PRN  - Probiotic: Florastor  - GI ppx: Protonix    #BPH  - Flomax 0.4 HS  - Finasteride 5 HS  #Bladder management  - PVR q 8 hours (SC if > 400)  - Encourage timed voids Q4hrs while awake for independence and to promote continence during therapy    #DVT  - Eliquis 5 BID  - SCDs, TEDs     #Skin:  -At risk for pressure injury due to neurologic diagnosis and relative immobility  -Bilateral heels - soft heel protectors, apply liquid barrier film daily and monitor for tissue type changes  - Turn Q2 hr while in bed, air mattress  - Skin barrier cream as needed  - Nursing to monitor skin Qshift    FEN   - Diet - Regular + Thins  [DASH/TLC]      Precautions / PROPHYLAXIS:   - Falls  - ortho: Weight bearing status: WBAT   - Lungs: Aspiration, Incentive Spirometer   - Pressure injury/Skin: Turn Q2hrs while in bed, OOB to Chair, PT/OT      DaughterChichi [phone]  Pharm: [facility] [address]     Follow ups:  Deedee Walton (MD)  Critical Care Medicine; Internal Medicine; Pulmonary Disease  [address] [facility] [address] [place], [place], [address]  [place] [address]  Phone: [phone]  Fax: [phone]    Cardiology: Gualberto Mai   Infectious Disease: Mustapha Benoit  Office: [phone].  Cell [phone]  Pulmonology: [facility]  ENT (Dr. NAIF Evans) for hearing test. Office # [phone]

## 2022-09-17 PROCEDURE — 99232 SBSQ HOSP IP/OBS MODERATE 35: CPT

## 2022-09-17 RX ADMIN — BUDESONIDE AND FORMOTEROL FUMARATE DIHYDRATE 2 PUFF(S): 160; 4.5 AEROSOL RESPIRATORY (INHALATION) at 08:58

## 2022-09-17 RX ADMIN — ATORVASTATIN CALCIUM 40 MILLIGRAM(S): 80 TABLET, FILM COATED ORAL at 21:22

## 2022-09-17 RX ADMIN — APIXABAN 5 MILLIGRAM(S): 2.5 TABLET, FILM COATED ORAL at 05:13

## 2022-09-17 RX ADMIN — FINASTERIDE 5 MILLIGRAM(S): 5 TABLET, FILM COATED ORAL at 12:08

## 2022-09-17 RX ADMIN — TAMSULOSIN HYDROCHLORIDE 0.4 MILLIGRAM(S): 0.4 CAPSULE ORAL at 21:21

## 2022-09-17 RX ADMIN — BUDESONIDE AND FORMOTEROL FUMARATE DIHYDRATE 2 PUFF(S): 160; 4.5 AEROSOL RESPIRATORY (INHALATION) at 20:42

## 2022-09-17 RX ADMIN — PANTOPRAZOLE SODIUM 40 MILLIGRAM(S): 20 TABLET, DELAYED RELEASE ORAL at 05:12

## 2022-09-17 RX ADMIN — Medication 250 MILLIGRAM(S): at 05:12

## 2022-09-17 RX ADMIN — Medication 50 MILLIGRAM(S): at 05:12

## 2022-09-17 RX ADMIN — Medication 1 APPLICATION(S): at 05:11

## 2022-09-17 NOTE — PROGRESS NOTE ADULT - SUBJECTIVE AND OBJECTIVE BOX
Patient feels great and would like to go home ASAP.  No overnight events.  Pain is controlled.   No other new ROS.  Has been tolerating rehabilitation program.    VITALS  T(C): 36.5 (09-17-22 @ 08:20), Max: 36.8 (09-16-22 @ 22:53)  HR: 58 (09-17-22 @ 08:20) (58 - 67)  BP: 125/66 (09-17-22 @ 08:20) (125/66 - 145/76)  RR: 14 (09-17-22 @ 08:20) (14 - 16)  SpO2: 98% (09-17-22 @ 08:20) (96% - 98%)  Wt(kg): --     MEDICATIONS   acetaminophen     Tablet .. 650 milliGRAM(s) every 6 hours PRN  ALBUTerol    90 MICROgram(s) HFA Inhaler 1 Puff(s) every 6 hours  ammonium lactate 12% Lotion 1 Application(s) two times a day  apixaban 5 milliGRAM(s) two times a day  atorvastatin 40 milliGRAM(s) at bedtime  bisacodyl 5 milliGRAM(s) daily PRN  budesonide  80 MICROgram(s)/formoterol 4.5 MICROgram(s) Inhaler 2 Puff(s) two times a day  cyclobenzaprine 5 milliGRAM(s) three times a day PRN  finasteride 5 milliGRAM(s) daily  influenza  Vaccine (HIGH DOSE) 0.7 milliLiter(s) once  lidocaine   4% Patch 1 Patch daily PRN  melatonin 3 milliGRAM(s) at bedtime PRN  metoprolol tartrate 50 milliGRAM(s) two times a day  pantoprazole    Tablet 40 milliGRAM(s) before breakfast  polyethylene glycol 3350 17 Gram(s) two times a day  saccharomyces boulardii 250 milliGRAM(s) two times a day  senna 2 Tablet(s) at bedtime  tamsulosin 0.4 milliGRAM(s) at bedtime      RECENT LABS/IMAGING                     ------------------------------------------  PHYSICAL EXAM  Constitutional - NAD, Comfortable  Pulm - Breathing comfortably, No wheezing  Abd - Nondistended  Extremities - No calf tenderness  Neurologic Exam - Awake, Alert  Psychiatric - Mood WNL     ASSESSMENT/PLAN  88y Male with impairments in mobility and ADLs   - Continue current rehabilitation program 3hrs a day   - Continue current medications, patient is medically stable   - DVT prophylaxis  - Skin - OOB and mobilization daily

## 2022-09-18 PROCEDURE — 99232 SBSQ HOSP IP/OBS MODERATE 35: CPT

## 2022-09-18 RX ADMIN — ATORVASTATIN CALCIUM 40 MILLIGRAM(S): 80 TABLET, FILM COATED ORAL at 22:29

## 2022-09-18 RX ADMIN — Medication 250 MILLIGRAM(S): at 17:30

## 2022-09-18 RX ADMIN — FINASTERIDE 5 MILLIGRAM(S): 5 TABLET, FILM COATED ORAL at 12:40

## 2022-09-18 RX ADMIN — Medication 250 MILLIGRAM(S): at 05:21

## 2022-09-18 RX ADMIN — Medication 1 APPLICATION(S): at 05:21

## 2022-09-18 RX ADMIN — Medication 50 MILLIGRAM(S): at 05:21

## 2022-09-18 RX ADMIN — APIXABAN 5 MILLIGRAM(S): 2.5 TABLET, FILM COATED ORAL at 17:29

## 2022-09-18 RX ADMIN — TAMSULOSIN HYDROCHLORIDE 0.4 MILLIGRAM(S): 0.4 CAPSULE ORAL at 22:28

## 2022-09-18 RX ADMIN — Medication 50 MILLIGRAM(S): at 17:29

## 2022-09-18 RX ADMIN — SENNA PLUS 2 TABLET(S): 8.6 TABLET ORAL at 22:29

## 2022-09-18 RX ADMIN — APIXABAN 5 MILLIGRAM(S): 2.5 TABLET, FILM COATED ORAL at 05:21

## 2022-09-18 RX ADMIN — BUDESONIDE AND FORMOTEROL FUMARATE DIHYDRATE 2 PUFF(S): 160; 4.5 AEROSOL RESPIRATORY (INHALATION) at 08:25

## 2022-09-18 RX ADMIN — PANTOPRAZOLE SODIUM 40 MILLIGRAM(S): 20 TABLET, DELAYED RELEASE ORAL at 05:21

## 2022-09-18 RX ADMIN — BUDESONIDE AND FORMOTEROL FUMARATE DIHYDRATE 2 PUFF(S): 160; 4.5 AEROSOL RESPIRATORY (INHALATION) at 21:17

## 2022-09-18 NOTE — PROGRESS NOTE ADULT - SUBJECTIVE AND OBJECTIVE BOX
Looking to leave as soon as possible to get home.   Slept well.  No other new ROS.  Has been tolerating rehabilitation program.    VITALS  T(C): 36.8 (09-17-22 @ 21:22), Max: 36.8 (09-17-22 @ 21:22)  HR: 66 (09-17-22 @ 21:22) (58 - 66)  BP: 132/69 (09-17-22 @ 21:22) (125/66 - 132/69)  RR: 15 (09-17-22 @ 21:22) (14 - 15)  SpO2: 97% (09-17-22 @ 21:22) (97% - 98%)  Wt(kg): --     MEDICATIONS   acetaminophen     Tablet .. 650 milliGRAM(s) every 6 hours PRN  ALBUTerol    90 MICROgram(s) HFA Inhaler 1 Puff(s) every 6 hours  ammonium lactate 12% Lotion 1 Application(s) two times a day  apixaban 5 milliGRAM(s) two times a day  atorvastatin 40 milliGRAM(s) at bedtime  bisacodyl 5 milliGRAM(s) daily PRN  budesonide  80 MICROgram(s)/formoterol 4.5 MICROgram(s) Inhaler 2 Puff(s) two times a day  cyclobenzaprine 5 milliGRAM(s) three times a day PRN  finasteride 5 milliGRAM(s) daily  influenza  Vaccine (HIGH DOSE) 0.7 milliLiter(s) once  lidocaine   4% Patch 1 Patch daily PRN  melatonin 3 milliGRAM(s) at bedtime PRN  metoprolol tartrate 50 milliGRAM(s) two times a day  pantoprazole    Tablet 40 milliGRAM(s) before breakfast  polyethylene glycol 3350 17 Gram(s) two times a day  saccharomyces boulardii 250 milliGRAM(s) two times a day  senna 2 Tablet(s) at bedtime  tamsulosin 0.4 milliGRAM(s) at bedtime      RECENT LABS/IMAGING                     ------------------------------------------  PHYSICAL EXAM  Constitutional - NAD, Comfortable  Pulm - Breathing comfortably, No wheezing  Neurologic Exam - Awake, Alert  Psychiatric - Mood WNL     ASSESSMENT/PLAN  88y Male with impairments in mobility and ADLs   - Continue current rehabilitation program 3hrs a day   - Continue current medications, patient is medically stable   - DVT prophylaxis  - Skin - OOB and mobilization daily

## 2022-09-19 LAB
ALBUMIN SERPL ELPH-MCNC: 2.7 G/DL — LOW (ref 3.3–5)
ALP SERPL-CCNC: 132 U/L — HIGH (ref 40–120)
ALT FLD-CCNC: 36 U/L — SIGNIFICANT CHANGE UP (ref 10–45)
ANION GAP SERPL CALC-SCNC: 5 MMOL/L — SIGNIFICANT CHANGE UP (ref 5–17)
AST SERPL-CCNC: 26 U/L — SIGNIFICANT CHANGE UP (ref 10–40)
BASOPHILS # BLD AUTO: 0.11 K/UL — SIGNIFICANT CHANGE UP (ref 0–0.2)
BASOPHILS NFR BLD AUTO: 1.2 % — SIGNIFICANT CHANGE UP (ref 0–2)
BILIRUB SERPL-MCNC: 0.5 MG/DL — SIGNIFICANT CHANGE UP (ref 0.2–1.2)
BUN SERPL-MCNC: 31 MG/DL — HIGH (ref 7–23)
CALCIUM SERPL-MCNC: 8.7 MG/DL — SIGNIFICANT CHANGE UP (ref 8.4–10.5)
CHLORIDE SERPL-SCNC: 105 MMOL/L — SIGNIFICANT CHANGE UP (ref 96–108)
CO2 SERPL-SCNC: 29 MMOL/L — SIGNIFICANT CHANGE UP (ref 22–31)
CREAT SERPL-MCNC: 1.27 MG/DL — SIGNIFICANT CHANGE UP (ref 0.5–1.3)
EGFR: 54 ML/MIN/1.73M2 — LOW
EOSINOPHIL # BLD AUTO: 0.53 K/UL — HIGH (ref 0–0.5)
EOSINOPHIL NFR BLD AUTO: 5.6 % — SIGNIFICANT CHANGE UP (ref 0–6)
GLUCOSE SERPL-MCNC: 91 MG/DL — SIGNIFICANT CHANGE UP (ref 70–99)
HCT VFR BLD CALC: 33.4 % — LOW (ref 39–50)
HGB BLD-MCNC: 10.8 G/DL — LOW (ref 13–17)
IMM GRANULOCYTES NFR BLD AUTO: 0.3 % — SIGNIFICANT CHANGE UP (ref 0–0.9)
LYMPHOCYTES # BLD AUTO: 1.77 K/UL — SIGNIFICANT CHANGE UP (ref 1–3.3)
LYMPHOCYTES # BLD AUTO: 18.8 % — SIGNIFICANT CHANGE UP (ref 13–44)
MCHC RBC-ENTMCNC: 31.1 PG — SIGNIFICANT CHANGE UP (ref 27–34)
MCHC RBC-ENTMCNC: 32.3 GM/DL — SIGNIFICANT CHANGE UP (ref 32–36)
MCV RBC AUTO: 96.3 FL — SIGNIFICANT CHANGE UP (ref 80–100)
MONOCYTES # BLD AUTO: 1.03 K/UL — HIGH (ref 0–0.9)
MONOCYTES NFR BLD AUTO: 10.9 % — SIGNIFICANT CHANGE UP (ref 2–14)
NEUTROPHILS # BLD AUTO: 5.94 K/UL — SIGNIFICANT CHANGE UP (ref 1.8–7.4)
NEUTROPHILS NFR BLD AUTO: 63.2 % — SIGNIFICANT CHANGE UP (ref 43–77)
NRBC # BLD: 0 /100 WBCS — SIGNIFICANT CHANGE UP (ref 0–0)
PLATELET # BLD AUTO: 264 K/UL — SIGNIFICANT CHANGE UP (ref 150–400)
POTASSIUM SERPL-MCNC: 4.7 MMOL/L — SIGNIFICANT CHANGE UP (ref 3.5–5.3)
POTASSIUM SERPL-SCNC: 4.7 MMOL/L — SIGNIFICANT CHANGE UP (ref 3.5–5.3)
PROT SERPL-MCNC: 7.4 G/DL — SIGNIFICANT CHANGE UP (ref 6–8.3)
RBC # BLD: 3.47 M/UL — LOW (ref 4.2–5.8)
RBC # FLD: 14.1 % — SIGNIFICANT CHANGE UP (ref 10.3–14.5)
SODIUM SERPL-SCNC: 139 MMOL/L — SIGNIFICANT CHANGE UP (ref 135–145)
WBC # BLD: 9.41 K/UL — SIGNIFICANT CHANGE UP (ref 3.8–10.5)
WBC # FLD AUTO: 9.41 K/UL — SIGNIFICANT CHANGE UP (ref 3.8–10.5)

## 2022-09-19 PROCEDURE — 99232 SBSQ HOSP IP/OBS MODERATE 35: CPT

## 2022-09-19 RX ADMIN — TAMSULOSIN HYDROCHLORIDE 0.4 MILLIGRAM(S): 0.4 CAPSULE ORAL at 22:57

## 2022-09-19 RX ADMIN — ATORVASTATIN CALCIUM 40 MILLIGRAM(S): 80 TABLET, FILM COATED ORAL at 22:57

## 2022-09-19 RX ADMIN — Medication 250 MILLIGRAM(S): at 05:28

## 2022-09-19 RX ADMIN — Medication 50 MILLIGRAM(S): at 18:29

## 2022-09-19 RX ADMIN — FINASTERIDE 5 MILLIGRAM(S): 5 TABLET, FILM COATED ORAL at 12:25

## 2022-09-19 RX ADMIN — Medication 50 MILLIGRAM(S): at 05:28

## 2022-09-19 RX ADMIN — APIXABAN 5 MILLIGRAM(S): 2.5 TABLET, FILM COATED ORAL at 05:28

## 2022-09-19 RX ADMIN — BUDESONIDE AND FORMOTEROL FUMARATE DIHYDRATE 2 PUFF(S): 160; 4.5 AEROSOL RESPIRATORY (INHALATION) at 08:20

## 2022-09-19 RX ADMIN — APIXABAN 5 MILLIGRAM(S): 2.5 TABLET, FILM COATED ORAL at 18:28

## 2022-09-19 RX ADMIN — Medication 250 MILLIGRAM(S): at 18:31

## 2022-09-19 RX ADMIN — PANTOPRAZOLE SODIUM 40 MILLIGRAM(S): 20 TABLET, DELAYED RELEASE ORAL at 05:30

## 2022-09-19 RX ADMIN — Medication 1 APPLICATION(S): at 05:30

## 2022-09-19 NOTE — PROGRESS NOTE ADULT - ASSESSMENT
ASSESSMENT/PLAN  87M with HTN, HLD, hx of CVA (7 yrs ago, no residual deficits), BPH, hx of skin CA who presents to [place] ER on 8/11 with cough and fevers - azithromycin and ceftriaxone empirically for multifocal PNA.  Hospital course c/b ARF requiring HFNC and new onset afib. Transferred to [facility] for bronchoscopy - found to have rare yeast (likely colonized), AFB+ MAC atypical/non TB infect (does not need iso)    *CINDI BUN/Cr 38/1.52 -> 39/1.47 (9/15)  -> 31/1.27 (9/19)  * BLE edema resolved - BP maintaining well while off medications (amlodipine, lasix)    COMORBIDITES/ACTIVE MEDICAL ISSUES     #ARF 2/2 Multifocal PNA/bronchial fungal  - S/p abx (levaquin, ceftriaxone and azithromycin)   - S/p bronchoscopy - rare Geotrichum species, Few Yeast -suggest colonization, no Rx.    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  No need for isolation  - Continue Budesonide - formoterol 80mcg-4.5 mcg BID + Albuterol Q6h  - Incentive spirometry/deep breathing exercise  - Gait Instability, ADL impairments and Functional impairments: start Comprehensive Rehab Program of PT/OT     #New onset Afib  - Eliquis 5 BID   - Metoprolol 50 BID  - ECHO low normal LV systolic function, mild pulm HTN    #HTN  - Amlodipine 5 - dc d/t BLE edema  - Metoprolol 50 BID  - /70 - 122/71 (9/19)  #BLE edema  - Home med: Lasix 20 QD - remain on hold d/t BLE edema    #HLD  - Lipitor 40    #Back pain  #Pain control  - Tylenol PRN, Lidocaine patch (left hip) - standing to PRN, Cyclobenzaprine 5 TID PRN    #Ear congestion  - s/p debrox, which seems to have worsen hearing and congestion  - ENT consult appreciated - presbycusis, rec hearing test outpatient    #Insomnia  - Quiet and low stimuli environment  - Melatonin 3 PRN     #GI/Bowel Mgmt   - At risk for constipation due to neurologic diagnosis, immobility and/or medication use  - Senna,  Miralax BID, Dulcolax PO PRN  - Probiotic: Florastor  - GI ppx: Protonix    #BPH  - Flomax 0.4 HS  - Finasteride 5 HS  #Bladder management  - Encourage timed voids Q4hrs while awake for independence and to promote continence during therapy    #DVT  - Eliquis 5 BID  - SCDs, TEDs     #Skin:  -At risk for pressure injury due to neurologic diagnosis and relative immobility  -Bilateral heels - soft heel protectors, apply liquid barrier film daily and monitor for tissue type changes  - Turn Q2 hr while in bed, air mattress  - Skin barrier cream as needed  - Nursing to monitor skin Qshift    FEN   - Diet - Regular + Thins  [DASH/TLC]      Precautions / PROPHYLAXIS:   - Falls  - ortho: Weight bearing status: WBAT   - Lungs: Aspiration, Incentive Spirometer   - Pressure injury/Skin: Turn Q2hrs while in bed, OOB to Chair, PT/OT      DaughterChichi [phone]  Pharm: [facility] on [address]     Follow ups:  Deedee Walton (MD)  Critical Care Medicine; Internal Medicine; Pulmonary Disease  [address] [facility], [address]  [place] [address]  Phone: [phone]  Fax: [phone]    Cardiology: Gualberto Mai   Infectious Disease: Mustapha Benoit  Office: [phone].  Cell [phone]  Pulmonology: [facility]  [facility] (Dr. NAIF Evans) for hearing test. Office # [phone]   ASSESSMENT/PLAN  87M with HTN, HLD, hx of CVA (7 yrs ago, no residual deficits), BPH, hx of skin CA who presents to [place] ER on 8/11 with cough and fevers - azithromycin and ceftriaxone empirically for multifocal PNA.  Hospital course c/b ARF requiring HFNC and new onset afib. Transferred to [facility] for [facility]oscopy - found to have rare yeast (likely colonized), AFB+ MAC atypical/non TB infect (does not need iso)    *CINDI BUN/Cr 38/1.52 -> 39/1.47 (9/15)  -> 31/1.27 (9/19)    * Discussed update with Daughter  * BLE edema resolved - BP maintaining well while off medications (amlodipine, lasix)    COMORBIDITES/ACTIVE MEDICAL ISSUES   #ARF 2/2 Multifocal PNA/bronchial fungal  - S/p abx (levaquin, ceftriaxone and azithromycin)   - S/p bronchoscopy - rare Geotrichum species, Few Yeast -suggest colonization, no Rx.    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  No need for isolation  - Continue Budesonide - formoterol 80mcg-4.5 mcg BID + Albuterol Q6h  - Incentive spirometry/deep breathing exercise  - Gait Instability, ADL impairments and Functional impairments: start Comprehensive Rehab Program of PT/OT     #New onset Afib  - Eliquis 5 BID   - Metoprolol 50 BID  - ECHO low normal LV systolic function, mild pulm HTN    #HTN  - Amlodipine 5 - dc d/t BLE edema  - Metoprolol 50 BID  - /70 - 122/71 (9/19)  #BLE edema  - Home med: Lasix 20 QD - remain on hold d/t BLE edema    #HLD  - Lipitor 40    #Back pain  #Pain control  - Tylenol PRN, Lidocaine patch (left hip) - standing to PRN, Cyclobenzaprine 5 TID PRN    #Ear congestion  - s/p debrox, which seems to have worsen hearing and congestion  - ENT consult appreciated - presbycusis, rec hearing test outpatient    #Insomnia  - Quiet and low stimuli environment  - Melatonin 3 PRN     #GI/Bowel Mgmt   - At risk for constipation due to neurologic diagnosis, immobility and/or medication use  - Senna,  Miralax BID, Dulcolax PO PRN  - Probiotic: Florastor  - GI ppx: Protonix    #BPH  - Flomax 0.4 HS  - Finasteride 5 HS  #Bladder management  - Encourage timed voids Q4hrs while awake for independence and to promote continence during therapy    #DVT  - Eliquis 5 BID  - SCDs, TEDs     #Skin:  -At risk for pressure injury due to neurologic diagnosis and relative immobility  -Bilateral heels - soft heel protectors, apply liquid barrier film daily and monitor for tissue type changes  - Turn Q2 hr while in bed, air mattress  - Skin barrier cream as needed  - Nursing to monitor skin Qshift    FEN   - Diet - Regular + Thins  [DASH/TLC]      Precautions / PROPHYLAXIS:   - Falls  - ortho: Weight bearing status: WBAT   - Lungs: Aspiration, Incentive Spirometer   - Pressure injury/Skin: Turn Q2hrs while in bed, OOB to Chair, PT/OT      DaughterChichi [phone]  Pharm: [facility] on [address]     Liaison with family   I called and spoke on phone with Ms Lauro Cadet, daughter  I gave update on patients medical and functional progress/improvements--resolution of leg edema, improved ambulatory distance and no oxy requirement. Discussed dc date as per IDT for 9/24  She was happy and family will be planning for dc 9/24 as planned       Follow ups:  Deedee Walton (MD)  Critical Care Medicine; Internal Medicine; Pulmonary Disease  [address] [facility] [address]  [place] [address]  Phone: [phone]  Fax: [phone]    Cardiology: Gualberto Mai   Infectious Disease: Mustapha Benoit  Office: [phone].  Cell [phone]  Pulmonology: [facility]  [facility] (Dr. NAIF Evans) for hearing test. Office # [phone]

## 2022-09-19 NOTE — PROGRESS NOTE ADULT - SUBJECTIVE AND OBJECTIVE BOX
Patient is a 88y old  Male who presents with a chief complaint of Acute respiratory failure with hypoxia    SUBJECTIVE/ROS:  Patient seen and evaluated while sitting in WC at bedside  uneventful weekend - had therapy on saturday  Overall feeling well.  Tolerating therapy.  States that he hasn't required supplemental O2.  Compliant with deep breathing exercises  No chest pain, no palpitation, no cough  No headache, no dizziness  No nausea, no abdominal pain; LBM today (9/19)  No dysuria, no frequency    PHYSICAL EXAM  88y  Vital Signs Last 24 Hrs  T(C): 36.8 (09-19-22 @ 05:31), Max: 36.9 (09-18-22 @ 22:31)  T(F): 98.3 (09-19-22 @ 05:31), Max: 98.5 (09-18-22 @ 22:31)  HR: 65 (09-19-22 @ 09:35) (64 - 67)  BP: 122/71 (09-19-22 @ 05:31) (120/69 - 122/71)  RR: 15 (09-19-22 @ 05:31) (15 - 15)  SpO2: 98% (09-19-22 @ 09:35) (97% - 98%) on room air    General: NAD, Resting Comfortable                               HEENT: NC/AT, EOM I, PERRLA, Normal Conjunctivae  Cardio: warm and well perfused                   Pulm: resp nonlabored                       Abdomen: ND/NT, Soft                                                MSK: No joint swelling, Full ROM                                         Ext: No C/C/E (BLE edema resolved), No calf tenderness, Left shin sensitivity to distal shin  Skin:  all skin intact                                                                   Neurological Examination    Cognitive: AAO x 3                                                                         CN II - XII  intact                                            Sensory: Intact to light touch, PP and Vibration                                    Motor    LEFT    UE: SF [4+/5], EF [4+/5], EE [4+/5], WE [4+/5],  [4+/5]  RIGHT UE: SF [5/5], EF [5/5], EE [5/5], WE [5/5],  [5/5]  LEFT    LE:  HF [4/5] secondary to pain, KE [4+/5], DF [4+/5], PF [5/5]  RIGHT LE:  HF [5/5], KE [5/5], DF [5/5], PF [5/5]        LAB                        10.8   9.41  )-----------( 264      ( 19 Sep 2022 06:40 )             33.4     09-19    139  |  105  |  31<H>  ----------------------------<  91  4.7   |  29  |  1.27    Ca    8.7      19 Sep 2022 06:40    TPro  7.4  /  Alb  2.7<L>  /  TBili  0.5  /  DBili  x   /  AST  26  /  ALT  36  /  AlkPhos  132<H>  09-19    LIVER FUNCTIONS - ( 19 Sep 2022 06:40 )  Alb: 2.7 g/dL / Pro: 7.4 g/dL / ALK PHOS: 132 U/L / ALT: 36 U/L / AST: 26 U/L / GGT: x           Allergies  No Known Allergies    MEDICATIONS  (STANDING):  ALBUTerol    90 MICROgram(s) HFA Inhaler 1 Puff(s) Inhalation every 6 hours  ammonium lactate 12% Lotion 1 Application(s) Topical two times a day  apixaban 5 milliGRAM(s) Oral two times a day  atorvastatin 40 milliGRAM(s) Oral at bedtime  budesonide  80 MICROgram(s)/formoterol 4.5 MICROgram(s) Inhaler 2 Puff(s) Inhalation two times a day  finasteride 5 milliGRAM(s) Oral daily  influenza  Vaccine (HIGH DOSE) 0.7 milliLiter(s) IntraMuscular once  metoprolol tartrate 50 milliGRAM(s) Oral two times a day  pantoprazole    Tablet 40 milliGRAM(s) Oral before breakfast  polyethylene glycol 3350 17 Gram(s) Oral two times a day  saccharomyces boulardii 250 milliGRAM(s) Oral two times a day  senna 2 Tablet(s) Oral at bedtime  tamsulosin 0.4 milliGRAM(s) Oral at bedtime    MEDICATIONS  (PRN):  acetaminophen     Tablet .. 650 milliGRAM(s) Oral every 6 hours PRN Mild Pain (1 - 3)  bisacodyl 5 milliGRAM(s) Oral daily PRN Constipation  cyclobenzaprine 5 milliGRAM(s) Oral three times a day PRN Muscle Spasm  lidocaine   4% Patch 1 Patch Transdermal daily PRN pain  melatonin 3 milliGRAM(s) Oral at bedtime PRN Insomnia   Patient is a 88y old  Male who presents with a chief complaint of Acute respiratory failure with hypoxia    SUBJECTIVE/ROS:  Patient seen and evaluated while sitting in WC at bedside  uneventful weekend - had therapy on saturday  Overall feeling well.  Tolerating therapy.  States that he hasn't required supplemental O2.  Compliant with deep breathing exercises  No chest pain, no palpitation, no cough  No headache, no dizziness  No nausea, no abdominal pain; LBM today (9/19)  No dysuria, no frequency    Labs unremarKable   Renal fxn normal    PHYSICAL EXAM  88y  Vital Signs Last 24 Hrs  T(C): 36.8 (09-19-22 @ 05:31), Max: 36.9 (09-18-22 @ 22:31)  T(F): 98.3 (09-19-22 @ 05:31), Max: 98.5 (09-18-22 @ 22:31)  HR: 65 (09-19-22 @ 09:35) (64 - 67)  BP: 122/71 (09-19-22 @ 05:31) (120/69 - 122/71)  RR: 15 (09-19-22 @ 05:31) (15 - 15)  SpO2: 98% (09-19-22 @ 09:35) (97% - 98%) on room air    General: NAD, Resting Comfortable                               HEENT: NC/AT, EOM I, PERRLA, Normal Conjunctivae  Cardio: warm and well perfused                   Pulm: resp nonlabored                       Abdomen: ND/NT, Soft                                                MSK: No joint swelling, Full ROM                                         Ext: No C/C/E (BLE edema resolved), No calf tenderness, Left shin sensitivity to distal shin  Skin:  all skin intact                                                                   Neurological Examination    Cognitive: AAO x 3                                                                         CN II - XII  intact                                            Sensory: Intact to light touch, PP and Vibration                                    Motor    LEFT    UE: SF [4+/5], EF [4+/5], EE [4+/5], WE [4+/5],  [4+/5]  RIGHT UE: SF [5/5], EF [5/5], EE [5/5], WE [5/5],  [5/5]  LEFT    LE:  HF [4/5] secondary to pain, KE [4+/5], DF [4+/5], PF [5/5]  RIGHT LE:  HF [5/5], KE [5/5], DF [5/5], PF [5/5]        LAB                        10.8   9.41  )-----------( 264      ( 19 Sep 2022 06:40 )             33.4     09-19    139  |  105  |  31<H>  ----------------------------<  91  4.7   |  29  |  1.27    Ca    8.7      19 Sep 2022 06:40    TPro  7.4  /  Alb  2.7<L>  /  TBili  0.5  /  DBili  x   /  AST  26  /  ALT  36  /  AlkPhos  132<H>  09-19    LIVER FUNCTIONS - ( 19 Sep 2022 06:40 )  Alb: 2.7 g/dL / Pro: 7.4 g/dL / ALK PHOS: 132 U/L / ALT: 36 U/L / AST: 26 U/L / GGT: x           Allergies  No Known Allergies    MEDICATIONS  (STANDING):  ALBUTerol    90 MICROgram(s) HFA Inhaler 1 Puff(s) Inhalation every 6 hours  ammonium lactate 12% Lotion 1 Application(s) Topical two times a day  apixaban 5 milliGRAM(s) Oral two times a day  atorvastatin 40 milliGRAM(s) Oral at bedtime  budesonide  80 MICROgram(s)/formoterol 4.5 MICROgram(s) Inhaler 2 Puff(s) Inhalation two times a day  finasteride 5 milliGRAM(s) Oral daily  influenza  Vaccine (HIGH DOSE) 0.7 milliLiter(s) IntraMuscular once  metoprolol tartrate 50 milliGRAM(s) Oral two times a day  pantoprazole    Tablet 40 milliGRAM(s) Oral before breakfast  polyethylene glycol 3350 17 Gram(s) Oral two times a day  saccharomyces boulardii 250 milliGRAM(s) Oral two times a day  senna 2 Tablet(s) Oral at bedtime  tamsulosin 0.4 milliGRAM(s) Oral at bedtime    MEDICATIONS  (PRN):  acetaminophen     Tablet .. 650 milliGRAM(s) Oral every 6 hours PRN Mild Pain (1 - 3)  bisacodyl 5 milliGRAM(s) Oral daily PRN Constipation  cyclobenzaprine 5 milliGRAM(s) Oral three times a day PRN Muscle Spasm  lidocaine   4% Patch 1 Patch Transdermal daily PRN pain  melatonin 3 milliGRAM(s) Oral at bedtime PRN Insomnia

## 2022-09-19 NOTE — CHART NOTE - NSCHARTNOTEFT_GEN_A_CORE
Nutrition Follow Up Note  Hospital Course   (Per Electronic Medical Record)    Source:  Patient [X]  Medical Record [X]      Diet:   DASH-TLC Diet w/ Thin Liquids (IDDSI Level 0)  Recommend Change Diet to Regular Diet   Tolerates Diet Consistency Well  No Chewing/Swallowing Difficulties  No Recent Nausea, Vomiting, Diarrhea or Constipation (as Per Patient)  Consumes % of Meals (as Per Documentation) - States Good PO Intake/Appetite (Per Patient)   on Ensure Enlive 8oz PO Daily (Provides 350kcal & 20grams of Protein) - Declines Nutrition Supplementation   Recommend Discontinue Supplement (Per Patient Request)   Education Provided on Proper Nutrition     Enteral/Parenteral Nutrition: Not Applicable    Current Weight: 148.8lb on 9/13  Obtain New Weight  Obtain Weights Weekly     Pertinent Medications: MEDICATIONS  (STANDING):  ALBUTerol    90 MICROgram(s) HFA Inhaler 1 Puff(s) Inhalation every 6 hours  ammonium lactate 12% Lotion 1 Application(s) Topical two times a day  apixaban 5 milliGRAM(s) Oral two times a day  atorvastatin 40 milliGRAM(s) Oral at bedtime  budesonide  80 MICROgram(s)/formoterol 4.5 MICROgram(s) Inhaler 2 Puff(s) Inhalation two times a day  finasteride 5 milliGRAM(s) Oral daily  influenza  Vaccine (HIGH DOSE) 0.7 milliLiter(s) IntraMuscular once  metoprolol tartrate 50 milliGRAM(s) Oral two times a day  pantoprazole    Tablet 40 milliGRAM(s) Oral before breakfast  polyethylene glycol 3350 17 Gram(s) Oral two times a day  saccharomyces boulardii 250 milliGRAM(s) Oral two times a day  senna 2 Tablet(s) Oral at bedtime  tamsulosin 0.4 milliGRAM(s) Oral at bedtime    MEDICATIONS  (PRN):  acetaminophen     Tablet .. 650 milliGRAM(s) Oral every 6 hours PRN Mild Pain (1 - 3)  bisacodyl 5 milliGRAM(s) Oral daily PRN Constipation  cyclobenzaprine 5 milliGRAM(s) Oral three times a day PRN Muscle Spasm  lidocaine   4% Patch 1 Patch Transdermal daily PRN pain  melatonin 3 milliGRAM(s) Oral at bedtime PRN Insomnia    Pertinent Labs:  09-19 Na139 mmol/L Glu 91 mg/dL K+ 4.7 mmol/L Cr  1.27 mg/dL BUN 31 mg/dL<H> 09-19 Alb 2.7 g/dL<L>    Skin: No Pressure Ulcers     Edema: None Noted (as Per Documentation)     Last Bowel Movement: on 9/18    Estimated Needs:   [X] No Change Since Previous Assessment    Previous Nutrition Diagnosis:   Mild Malnutrition     Nutrition Diagnosis is [X] Ongoing - Declines Nutrition Supplementation - Recommend Discontinue (Per Patient Request)   Recommend Change Diet To Regular Diet     New Nutrition Diagnosis: [X] Not Applicable    Interventions:   1. Education Provided on Proper Nutrition   2. Recommend Discontinue Ensure Enlive   3. Recommend Change Diet to Regular Diet  4. Recommend Continue Nutrition Plan of Care     Monitoring & Evaluation:   [X] Weights   [X] PO Intake   [X] Skin Integrity   [X] Follow Up (Per Protocol)  [X] Tolerance to Diet Prescription   [X] Other: Labs     Registered Dietitian/Nutritionist Remains Available.  Luis Melenedz RDN    Pager #[phone]  Phone# [phone]
[facility] Interdisciplinary Plan of Care    REHABILITATION DIAGNOSIS:  Acute respiratory failure with hypoxia due to multifocal pneumonia      COMORBIDITIES/COMPLICATING CONDITIONS IMPACTING REHABILITATION:  HEALTH ISSUES - PROBLEM Dx:      PAST MEDICAL & SURGICAL HISTORY:  Skin cancer  s/p Mohs      Essential hypertension      Cerebrovascular accident (CVA), unspecified mechanism  2015      Hyperlipidemia, unspecified hyperlipidemia type      Complex tear of lateral meniscus of right knee as current injury, initial encounter      Complex tear of medial meniscus of right knee as current injury, initial encounter      Benign prostatic hyperplasia, presence of lower urinary tract symptoms unspecified, unspecified morphology      H/O pilonidal cyst          Based upon consideration of the patient's impairments, functional status, complicating conditions and any other contributing factors and after information garnered from the assessments of all therapy disciplines involved in treating the patient and other pertinent clinicians:    INTERDISCIPLINARY REHABILITATION INTERVENTIONS:    [ X  ] Transfer Training  [ X  ] Bed Mobility  [ X  ] Therapeutic Exercise  [ X ] Balance/Coordination Exercises  [ X ] Locomotion retraining  [ X  ] Stairs  [  X ] Functional Transfer Training  [   ] Bowel/Bladder program  [   ] Pain Management  [   ] Skin/Wound Care  [   ] Visual/Perceptual Training  [   ] Therapeutic Recreation Activities  [   ] Neuromuscular Re-education  [ X  ] Activities of Daily Living  [   ] Speech Exercise  [   ] Swallowing Exercises  [   ] Vital Stim  [   ] Dietary Supplements  [   ] Calorie Count  [   ] Cognitive Exercises  [   ] Congnitive/Linguistic Treatment  [   ] Behavior Program  [   ] Neuropsych Therapy  [ X  ] Patient/Family Counseling  [ X ] Family Training  [ X  ] Community Re-entry  [   ] Orthotic Evaluation  [   ] Prosthetic Eval/Training    MEDICAL PROGNOSIS:  Fair     REHAB POTENTIAL:  Good   EXPECTED DAILY THERAPY:         PT: 1.5 hr        OT: 1.5 hr        ST: n/a       P&O: n/a    EXPECTED INTENSITY OF PROGRAM:  3 hrs / Day    EXPECTED FREQUENCY OF PROGRAM: 5 Days/ Week    ESTIMATED LOS:  [  ] 5-7 Days  [ x ] 7-10 Days  [  ] 10- 14 Days  [  ] 14- 18 Days  [  ] 18- 21 Days    ESTIMATED DISPOSITION:  [  ] Home   [  ] Home with Outpatient Therapies  [ x ] Home with Home Therapies  [  ] Assisted Living  [  ] Nursing Home  [  ] Long Term Acute Care    INTERDISCIPLINARY FUNCTIONAL OUTCOMES/GOALS:         Gait/Mobility: min a with gait device       Transfers: min a       ADLs: min a       Functional Transfers: min a       Medication Management: min a       Communication: min a       Cognitive: will be evaluated       Dysphagia: n/a        Bladder n/a        Bowel: n/a      Functional Independent Measures:  6  7 = Independent  6 = Modified Independent  5 = Supervision  4 = Minimal Assist/ Contact Guard  3 = Moderate Assistance  2 = Maximum Assistance  1 = Total Assistance  0 = Unable to assess
IDT 9/19 - lead by Dr. Torres  lives alone in a ranch style home, 4 [address].  Daughter lives near.  was independent PTA.  Has RW at home  Functional Status: overall mod I with ADLs - but has poor endurance and fatigues easily, shower transfer SV, close SV with transfers/amb 100' + verbal cues/8 steps + 2HR step to step pattern  Goals: mod I, may need some assistant with IADLs  TDD: 9/24 home

## 2022-09-20 LAB — SARS-COV-2 RNA SPEC QL NAA+PROBE: SIGNIFICANT CHANGE UP

## 2022-09-20 PROCEDURE — 99232 SBSQ HOSP IP/OBS MODERATE 35: CPT

## 2022-09-20 RX ADMIN — Medication 250 MILLIGRAM(S): at 05:14

## 2022-09-20 RX ADMIN — TAMSULOSIN HYDROCHLORIDE 0.4 MILLIGRAM(S): 0.4 CAPSULE ORAL at 22:17

## 2022-09-20 RX ADMIN — Medication 250 MILLIGRAM(S): at 17:31

## 2022-09-20 RX ADMIN — PANTOPRAZOLE SODIUM 40 MILLIGRAM(S): 20 TABLET, DELAYED RELEASE ORAL at 05:15

## 2022-09-20 RX ADMIN — Medication 50 MILLIGRAM(S): at 17:32

## 2022-09-20 RX ADMIN — APIXABAN 5 MILLIGRAM(S): 2.5 TABLET, FILM COATED ORAL at 05:15

## 2022-09-20 RX ADMIN — APIXABAN 5 MILLIGRAM(S): 2.5 TABLET, FILM COATED ORAL at 17:32

## 2022-09-20 RX ADMIN — FINASTERIDE 5 MILLIGRAM(S): 5 TABLET, FILM COATED ORAL at 13:02

## 2022-09-20 RX ADMIN — BUDESONIDE AND FORMOTEROL FUMARATE DIHYDRATE 2 PUFF(S): 160; 4.5 AEROSOL RESPIRATORY (INHALATION) at 09:00

## 2022-09-20 RX ADMIN — ATORVASTATIN CALCIUM 40 MILLIGRAM(S): 80 TABLET, FILM COATED ORAL at 22:17

## 2022-09-20 RX ADMIN — Medication 50 MILLIGRAM(S): at 05:14

## 2022-09-20 RX ADMIN — SENNA PLUS 2 TABLET(S): 8.6 TABLET ORAL at 22:17

## 2022-09-20 RX ADMIN — BUDESONIDE AND FORMOTEROL FUMARATE DIHYDRATE 2 PUFF(S): 160; 4.5 AEROSOL RESPIRATORY (INHALATION) at 21:41

## 2022-09-20 NOTE — PROGRESS NOTE ADULT - SUBJECTIVE AND OBJECTIVE BOX
SUBJECTIVE/ROS:  Patient seen and examined while sitting in WC at bedside  uneventful weekend - had therapy on saturday  Overall feeling well.  Tolerating therapy. not requiring oxy  No chest pain, no palpitation, no cough  No headache, no dizziness  No nausea, no abdominal pain; LBM today (9/20)  No dysuria, no frequency    Labs unremarKable   Renal fxn normal  Ext dd date discussed, he asked for earlier date 9/23 for family reason and we agreed to this    Vital Signs Last 24 Hrs  T(C): 36.5 (20 Sep 2022 07:33), Max: 37 (20 Sep 2022 05:16)  T(F): 97.7 (20 Sep 2022 07:33), Max: 98.6 (20 Sep 2022 05:16)  HR: 57 (20 Sep 2022 07:33) (57 - 99)  BP: 130/75 (20 Sep 2022 07:33) (116/68 - 130/75)  RR: 16 (20 Sep 2022 07:33) (15 - 16)  SpO2: 94% (20 Sep 2022 07:33) (94% - 98%)    O2 Parameters below as of 20 Sep 2022 07:33  Patient On (Oxygen Delivery Method): room air    EXAM  General: NAD, Resting Comfortable                               HEENT: NC/AT, EOM I, PERRLA, Normal Conjunctivae  Cardio: warm and well perfused                   Pulm: resp nonlabored                       Abdomen: ND/NT, Soft                                                MSK: No joint swelling, Full ROM                                         Ext: No C/C/E (BLE edema resolved), No calf tenderness,   Skin:  all skin intact                                                                   Neurological Examination    Cognitive: AAO x 3                                                                         CN II - XII  intact                                            Sensory: Intact to light touch, PP and Vibration                                    Motor    LEFT    UE: SF [4+/5], EF [4+/5], EE [4+/5], WE [4+/5],  [4+/5]  RIGHT UE: SF [5/5], EF [5/5], EE [5/5], WE [5/5],  [5/5]  LEFT    LE:  HF [4/5] secondary to pain, KE [4+/5], DF [4+/5], PF [5/5]  RIGHT LE:  HF [5/5], KE [5/5], DF [5/5], PF [5/5]      Therapy--ambulating increasing distance      LAB                        10.8   9.41  )-----------( 264      ( 19 Sep 2022 06:40 )             33.4     09-19    139  |  105  |  31<H>  ----------------------------<  91  4.7   |  29  |  1.27    Ca    8.7      19 Sep 2022 06:40    TPro  7.4  /  Alb  2.7<L>  /  TBili  0.5  /  DBili  x   /  AST  26  /  ALT  36  /  AlkPhos  132<H>  09-19    LIVER FUNCTIONS - ( 19 Sep 2022 06:40 )  Alb: 2.7 g/dL / Pro: 7.4 g/dL / ALK PHOS: 132 U/L / ALT: 36 U/L / AST: 26 U/L / GGT: x           Allergies  No Known Allergies    MEDICATIONS  (STANDING):  ALBUTerol    90 MICROgram(s) HFA Inhaler 1 Puff(s) Inhalation every 6 hours  ammonium lactate 12% Lotion 1 Application(s) Topical two times a day  apixaban 5 milliGRAM(s) Oral two times a day  atorvastatin 40 milliGRAM(s) Oral at bedtime  budesonide  80 MICROgram(s)/formoterol 4.5 MICROgram(s) Inhaler 2 Puff(s) Inhalation two times a day  finasteride 5 milliGRAM(s) Oral daily  influenza  Vaccine (HIGH DOSE) 0.7 milliLiter(s) IntraMuscular once  metoprolol tartrate 50 milliGRAM(s) Oral two times a day  pantoprazole    Tablet 40 milliGRAM(s) Oral before breakfast  polyethylene glycol 3350 17 Gram(s) Oral two times a day  saccharomyces boulardii 250 milliGRAM(s) Oral two times a day  senna 2 Tablet(s) Oral at bedtime  tamsulosin 0.4 milliGRAM(s) Oral at bedtime    MEDICATIONS  (PRN):  acetaminophen     Tablet .. 650 milliGRAM(s) Oral every 6 hours PRN Mild Pain (1 - 3)  bisacodyl 5 milliGRAM(s) Oral daily PRN Constipation  cyclobenzaprine 5 milliGRAM(s) Oral three times a day PRN Muscle Spasm  lidocaine   4% Patch 1 Patch Transdermal daily PRN pain  melatonin 3 milliGRAM(s) Oral at bedtime PRN Insomnia

## 2022-09-20 NOTE — PROGRESS NOTE ADULT - ASSESSMENT
ASSESSMENT/PLAN  87M with HTN, HLD, hx of CVA (7 yrs ago, no residual deficits), BPH, hx of skin CA who presents to [place] on 8/11 with cough and fevers - azithromycin and ceftriaxone empirically for multifocal PNA.  Hospital course c/b ARF requiring HFNC and new onset afib. Transferred to [facility] for bronchoscopy - found to have rare yeast (likely colonized), AFB+ MAC atypical/non TB infect (does not need iso)    *CINDI BUN/Cr 38/1.52 -> 39/1.47 (9/15)  -> 31/1.27 (9/19)    * DC 9/23    COMORBIDITES/ACTIVE MEDICAL ISSUES   #ARF 2/2 Multifocal PNA/bronchial fungal  - S/p abx (levaquin, ceftriaxone and azithromycin)   - S/p bronchoscopy - rare Geotrichum species, Few Yeast -suggest colonization, no Rx.    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  No need for isolation  - Continue Budesonide - formoterol 80mcg-4.5 mcg BID + Albuterol Q6h  - Incentive spirometry/deep breathing exercise  - Gait Instability, ADL impairments and Functional impairments: start Comprehensive Rehab Program of PT/OT     #New onset Afib  - Eliquis 5 BID   - Metoprolol 50 BID  - ECHO low normal LV systolic function, mild pulm HTN    #HTN  - Amlodipine 5 - dc d/t BLE edema  - Metoprolol 50 BID  - BP stable   #BLE edema  - Home med: Lasix 20 QD - remain on hold d/t BLE edema    #HLD  - Lipitor 40    #Back pain  #Pain control  - Tylenol PRN, Lidocaine patch (left hip) - standing to PRN, Cyclobenzaprine 5 TID PRN    #Ear congestion  - s/p debrox, which seems to have worsen hearing and congestion  - ENT consult appreciated - presbycusis, rec hearing test outpatient    #Insomnia  - Quiet and low stimuli environment  - Melatonin 3 PRN     #GI/Bowel Mgmt   - At risk for constipation due to neurologic diagnosis, immobility and/or medication use  - Senna,  Miralax BID, Dulcolax PO PRN  - Probiotic: Florastor  - GI ppx: Protonix    #BPH  - Flomax 0.4 HS  - Finasteride 5 HS  #Bladder management  - Encourage timed voids Q4hrs while awake for independence and to promote continence during therapy    #DVT  - Eliquis 5 BID  - SCDs, TEDs     #Skin:  -At risk for pressure injury due to neurologic diagnosis and relative immobility  -Bilateral heels - soft heel protectors, apply liquid barrier film daily and monitor for tissue type changes  - Turn Q2 hr while in bed, air mattress  - Skin barrier cream as needed  - Nursing to monitor skin Qshift    FEN   - Diet - Regular + Thins  [DASH/TLC]      Precautions / PROPHYLAXIS:   - Falls  - ortho: Weight bearing status: WBAT   - Lungs: Aspiration, Incentive Spirometer   - Pressure injury/Skin: Turn Q2hrs while in bed, OOB to Chair, PT/OT      DaughterChichi [phone]  Pharm: [facility] on [address]     Liaison with family   I called and spoke on phone with Ms Lauro Cadet, daughter  I gave update on patients medical and functional progress/improvements--resolution of leg edema, improved ambulatory distance and no oxy requirement. Discussed dc date as per IDT for 9/24  She was happy and family will be planning for dc 9/24 as planned       Follow ups:  Deedee Walton)  Critical Care Medicine; Internal Medicine; Pulmonary Disease  [address] [facility] [facility], [address]  [place] [address]  Phone: [phone]  Fax: [phone]    Cardiology: Gualberto Mai   Infectious Disease: Mustapha Benoit  Office: [phone].  Cell [phone]  Pulmonology: [facility] (Dr. NAIF Evans) for hearing test. Office # [phone]   ASSESSMENT/PLAN  87M with HTN, HLD, hx of CVA (7 yrs ago, no residual deficits), BPH, hx of skin CA who presents to [facility] ER on 8/11 with cough and fevers - azithromycin and ceftriaxone empirically for multifocal PNA.  Hospital course c/b ARF requiring HFNC and new onset afib. Transferred to [facility] for [facility] - found to have rare yeast (likely colonized), AFB+ MAC atypical/non TB infect (does not need iso)    *CINDI BUN/Cr 38/1.52 -> 39/1.47 (9/15)  -> 31/1.27 (9/19)    * DC 9/23    COMORBIDITES/ACTIVE MEDICAL ISSUES   #ARF 2/2 Multifocal PNA/bronchial fungal  - S/p abx (levaquin, ceftriaxone and azithromycin)   - S/p bronchoscopy - rare Geotrichum species, Few Yeast -suggest colonization, no Rx.    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  No need for isolation  - Continue Budesonide - formoterol 80mcg-4.5 mcg BID + Albuterol Q6h  - Incentive spirometry/deep breathing exercise  - Gait Instability, ADL impairments and Functional impairments: start Comprehensive Rehab Program of PT/OT     #New onset Afib  - Eliquis 5 BID   - Metoprolol 50 BID  - ECHO low normal LV systolic function, mild pulm HTN    #HTN  - Amlodipine 5 - dc d/t BLE edema  - Metoprolol 50 BID  - BP stable   #BLE edema  - Home med: Lasix 20 QD - remain on hold d/t BLE edema    #HLD  - Lipitor 40    #Back pain  #Pain control  - Tylenol PRN, Lidocaine patch (left hip) - standing to PRN, Cyclobenzaprine 5 TID PRN    #Ear congestion  - s/p debrox, which seems to have worsen hearing and congestion  - ENT consult appreciated - presbycusis, rec hearing test outpatient    #Insomnia  - Quiet and low stimuli environment  - Melatonin 3 PRN     #GI/Bowel Mgmt   - At risk for constipation due to neurologic diagnosis, immobility and/or medication use  - Senna,  Miralax BID, Dulcolax PO PRN  - Probiotic: Florastor  - GI ppx: Protonix    #BPH  - Flomax 0.4 HS  - Finasteride 5 HS  #Bladder management  - Encourage timed voids Q4hrs while awake for independence and to promote continence during therapy    #DVT  - Eliquis 5 BID  - SCDs, TEDs     #Skin:  -At risk for pressure injury due to neurologic diagnosis and relative immobility  -Bilateral heels - soft heel protectors, apply liquid barrier film daily and monitor for tissue type changes  - Turn Q2 hr while in bed, air mattress  - Skin barrier cream as needed  - Nursing to monitor skin Qshift    FEN   - Diet - Regular + Thins  [DASH/TLC]      Precautions / PROPHYLAXIS:   - Falls  - ortho: Weight bearing status: WBAT   - Lungs: Aspiration, Incentive Spirometer   - Pressure injury/Skin: Turn Q2hrs while in bed, OOB to Chair, PT/OT        IDT 9/19 - lead by Dr. Torres  lives alone in a ranch style home, 4 [address].  Daughter lives near.  was independent PTA.  Has RW at home  Functional Status: overall mod I with ADLs - but has poor endurance and fatigues easily, shower transfer SV, close SV with transfers/amb 100' + verbal cues/8 steps + 2HR step to step pattern  Goals: mod I, may need some assistant with IADLs  TDD: 9/24 home.changed to 9/23 as per patient's request          DaughterChichi [phone]  Pharm: [facility] on [address]     Liaison with family   I called and spoke on phone with Ms Lauro Cadet, daughter  I gave update on patients medical and functional progress/improvements--resolution of leg edema, improved ambulatory distance and no oxy requirement. Discussed dc date as per IDT for 9/24  She was happy and family will be planning for dc 9/24 as planned       Follow ups:  Deedee Walton)  Critical Care Medicine; Internal Medicine; Pulmonary Disease  [address] [facility] [address], [address]  [place] [address]  Phone: [phone]  Fax: [phone]    Cardiology: Gualberto Mai   Infectious Disease: Mustapha Benoit  Office: [phone].  Cell [phone]  Pulmonology: Dr. Almonte, [facility] (Dr. NAIF Evans) for hearing test. Office # [phone]

## 2022-09-21 ENCOUNTER — TRANSCRIPTION ENCOUNTER (OUTPATIENT)
Age: 87
End: 2022-09-21

## 2022-09-21 PROCEDURE — 99232 SBSQ HOSP IP/OBS MODERATE 35: CPT

## 2022-09-21 RX ORDER — LIDOCAINE 4 G/100G
1 CREAM TOPICAL
Qty: 0 | Refills: 0 | DISCHARGE
Start: 2022-09-21

## 2022-09-21 RX ORDER — POLYETHYLENE GLYCOL 3350 17 G/17G
17 POWDER, FOR SOLUTION ORAL
Qty: 0 | Refills: 0 | DISCHARGE
Start: 2022-09-21

## 2022-09-21 RX ORDER — SACCHAROMYCES BOULARDII 250 MG
1 POWDER IN PACKET (EA) ORAL
Qty: 0 | Refills: 0 | DISCHARGE
Start: 2022-09-21

## 2022-09-21 RX ORDER — FINASTERIDE 5 MG/1
1 TABLET, FILM COATED ORAL
Qty: 0 | Refills: 0 | DISCHARGE

## 2022-09-21 RX ORDER — ALBUTEROL 90 UG/1
3 AEROSOL, METERED ORAL
Qty: 0 | Refills: 0 | DISCHARGE

## 2022-09-21 RX ORDER — ATORVASTATIN CALCIUM 80 MG/1
1 TABLET, FILM COATED ORAL
Qty: 30 | Refills: 0
Start: 2022-09-21 | End: 2022-10-20

## 2022-09-21 RX ORDER — TAMSULOSIN HYDROCHLORIDE 0.4 MG/1
1 CAPSULE ORAL
Qty: 30 | Refills: 0
Start: 2022-09-21 | End: 2022-10-20

## 2022-09-21 RX ORDER — PANTOPRAZOLE SODIUM 20 MG/1
1 TABLET, DELAYED RELEASE ORAL
Qty: 0 | Refills: 0 | DISCHARGE

## 2022-09-21 RX ORDER — BUDESONIDE AND FORMOTEROL FUMARATE DIHYDRATE 160; 4.5 UG/1; UG/1
2 AEROSOL RESPIRATORY (INHALATION)
Qty: 1 | Refills: 0
Start: 2022-09-21 | End: 2022-10-20

## 2022-09-21 RX ORDER — ALBUTEROL 90 UG/1
2 AEROSOL, METERED ORAL
Qty: 0 | Refills: 0 | DISCHARGE

## 2022-09-21 RX ORDER — SENNA PLUS 8.6 MG/1
2 TABLET ORAL
Qty: 0 | Refills: 0 | DISCHARGE
Start: 2022-09-21

## 2022-09-21 RX ORDER — METOPROLOL TARTRATE 50 MG
1 TABLET ORAL
Qty: 60 | Refills: 0
Start: 2022-09-21 | End: 2022-10-20

## 2022-09-21 RX ORDER — ALBUTEROL 90 UG/1
1 AEROSOL, METERED ORAL
Qty: 1 | Refills: 0
Start: 2022-09-21 | End: 2022-10-20

## 2022-09-21 RX ORDER — ACETAMINOPHEN 500 MG
2 TABLET ORAL
Qty: 0 | Refills: 0 | DISCHARGE
Start: 2022-09-21

## 2022-09-21 RX ORDER — LANOLIN ALCOHOL/MO/W.PET/CERES
1 CREAM (GRAM) TOPICAL
Qty: 0 | Refills: 0 | DISCHARGE
Start: 2022-09-21

## 2022-09-21 RX ORDER — FINASTERIDE 5 MG/1
1 TABLET, FILM COATED ORAL
Qty: 30 | Refills: 0
Start: 2022-09-21 | End: 2022-10-20

## 2022-09-21 RX ORDER — APIXABAN 2.5 MG/1
1 TABLET, FILM COATED ORAL
Qty: 60 | Refills: 0
Start: 2022-09-21 | End: 2022-10-20

## 2022-09-21 RX ADMIN — Medication 250 MILLIGRAM(S): at 05:45

## 2022-09-21 RX ADMIN — APIXABAN 5 MILLIGRAM(S): 2.5 TABLET, FILM COATED ORAL at 19:18

## 2022-09-21 RX ADMIN — FINASTERIDE 5 MILLIGRAM(S): 5 TABLET, FILM COATED ORAL at 12:51

## 2022-09-21 RX ADMIN — Medication 50 MILLIGRAM(S): at 05:45

## 2022-09-21 RX ADMIN — BUDESONIDE AND FORMOTEROL FUMARATE DIHYDRATE 2 PUFF(S): 160; 4.5 AEROSOL RESPIRATORY (INHALATION) at 08:27

## 2022-09-21 RX ADMIN — APIXABAN 5 MILLIGRAM(S): 2.5 TABLET, FILM COATED ORAL at 05:45

## 2022-09-21 RX ADMIN — Medication 50 MILLIGRAM(S): at 19:17

## 2022-09-21 RX ADMIN — ATORVASTATIN CALCIUM 40 MILLIGRAM(S): 80 TABLET, FILM COATED ORAL at 21:36

## 2022-09-21 RX ADMIN — Medication 250 MILLIGRAM(S): at 19:17

## 2022-09-21 RX ADMIN — TAMSULOSIN HYDROCHLORIDE 0.4 MILLIGRAM(S): 0.4 CAPSULE ORAL at 21:36

## 2022-09-21 RX ADMIN — PANTOPRAZOLE SODIUM 40 MILLIGRAM(S): 20 TABLET, DELAYED RELEASE ORAL at 05:45

## 2022-09-21 NOTE — DISCHARGE NOTE PROVIDER - NSDCMRMEDTOKEN_GEN_ALL_CORE_FT
acetaminophen 325 mg oral tablet: 2 tab(s) orally every 6 hours, As needed, Mild Pain (1 - 3)  albuterol 90 mcg/inh inhalation aerosol: 1 puff(s) inhaled every 8 hours   apixaban 5 mg oral tablet: 1 tab(s) orally 2 times a day  atorvastatin 40 mg oral tablet: 1 tab(s) orally once a day (at bedtime)  bisacodyl 5 mg oral delayed release tablet: 1 tab(s) orally once a day, As needed, Constipation  budesonide-formoterol 80 mcg-4.5 mcg/inh inhalation aerosol: 2 puff(s) inhaled 2 times a day  finasteride 5 mg oral tablet: 1 tab(s) orally once a day  lidocaine 4% topical film: Apply topically to affected area once a day, As Needed for superficial pain  (over the counter pain patch)  12 hours on and 12 hours off  melatonin 3 mg oral tablet: 1 tab(s) orally once a day (at bedtime), As needed, Insomnia  metoprolol tartrate 50 mg oral tablet: 1 tab(s) orally 2 times a day  polyethylene glycol 3350 oral powder for reconstitution: 17 gram(s) orally 2 times a day  saccharomyces boulardii lyo 250 mg oral capsule: 1 cap(s) orally 2 times a day  senna leaf extract oral tablet: 2 tab(s) orally once a day (at bedtime)  tamsulosin 0.4 mg oral capsule: 1 cap(s) orally once a day (at bedtime)   acetaminophen 325 mg oral tablet: 2 tab(s) orally every 6 hours, As needed, Mild Pain (1 - 3)  albuterol 90 mcg/inh inhalation aerosol: 1 puff(s) inhaled every 8 hours   apixaban 5 mg oral tablet: 1 tab(s) orally 2 times a day  atorvastatin 40 mg oral tablet: 1 tab(s) orally once a day (at bedtime)  bacitracin 500 units/g topical ointment: 1 application topically 2 times a day x 3 more days  bisacodyl 5 mg oral delayed release tablet: 1 tab(s) orally once a day, As needed, Constipation  budesonide-formoterol 80 mcg-4.5 mcg/inh inhalation aerosol: 2 puff(s) inhaled 2 times a day  finasteride 5 mg oral tablet: 1 tab(s) orally once a day  lidocaine 4% topical film: Apply topically to affected area once a day, As Needed for superficial pain  (over the counter pain patch)  12 hours on and 12 hours off  melatonin 3 mg oral tablet: 1 tab(s) orally once a day (at bedtime), As needed, Insomnia  metoprolol tartrate 50 mg oral tablet: 1 tab(s) orally 2 times a day  polyethylene glycol 3350 oral powder for reconstitution: 17 gram(s) orally 2 times a day  saccharomyces boulardii lyo 250 mg oral capsule: 1 cap(s) orally 2 times a day  senna leaf extract oral tablet: 2 tab(s) orally once a day (at bedtime)  tamsulosin 0.4 mg oral capsule: 1 cap(s) orally once a day (at bedtime)

## 2022-09-21 NOTE — DISCHARGE NOTE PROVIDER - NSDCCPCAREPLAN_GEN_ALL_CORE_FT
PRINCIPAL DISCHARGE DIAGNOSIS  Diagnosis: Acute respiratory failure with hypoxia  Assessment and Plan of Treatment: You cough and fevers a/w sepsis due to multifocal pneumonia with progression to acute hypoxic respiratory failure and course further c/b new onset afib.  Transferred from [facility] for bronchoscopy.   You  underwent a bronchoscopy for bronchoalveolar lavage. Bronchial, sputum cultures were followed. Infectious disease, pulmonology were consulted for TB work up. Bronchial cultures were negative on AFB stain along with sputum cultures. However, an acid fast broth grew an organism from the sputum culture, which required a lengthy amount of time to probe for an organism, requiring a lengthy hospital stay. Bronchial fungal cultures showed normal alexander. No need for isolation.   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 You were seen by infectious disease doctor and pulmonologist, who gave CLEARANCE to discharge to REHAB.   Patient discharge to [facility]         PRINCIPAL DISCHARGE DIAGNOSIS  Diagnosis: Acute respiratory failure with hypoxia  Assessment and Plan of Treatment: You went to [place] ER with cough and fevers.  Had sepsis due to multifocal pneumonia with progression to acute hypoxic respiratory failure and course further complicated by new onset afib (an irregular heart rhythm that increase risk of blood clot).  Transferred from [facility] to [facility] for bronchoscopy.   You underwent a bronchoscopy for bronchoalveolar lavage. Bronchial, sputum cultures were followed. Infectious disease, pulmonology were consulted for TB work up. Bronchial cultures were negative on AFB stain along with sputum cultures. However, an acid fast broth grew an organism from the sputum culture, which required a lengthy amount of time to probe for an organism, requiring a lengthy hospital stay. Bronchial fungal cultures showed normal alexander. No need for isolation.   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   Please follow up with infectious disease, pulmonologist, and Primary Care provider        SECONDARY DISCHARGE DIAGNOSES  Diagnosis: Hearing deficit  Assessment and Plan of Treatment: Complained of persistent ear congestion effectiving hearing.  Seen by ENT to recommends outpatient hearing test.    Follow up with Dr. Evans    Diagnosis: HTN (hypertension)  Assessment and Plan of Treatment: Blood pressure Amlodipine was discontinue due to lower extremity edema.  Please do not take water pill, Furosemide at this time.  Edema resolved without mediation(s) and blood pressure remains within normal range  Please follow up with Primary care for monitoring.  Discuss alternative medication that doesn't cause leg swelling    Diagnosis: Atrial fibrillation and flutter  Assessment and Plan of Treatment:

## 2022-09-21 NOTE — PROGRESS NOTE ADULT - ASSESSMENT
ASSESSMENT/PLAN  87M with HTN, HLD, hx of CVA (7 yrs ago, no residual deficits), BPH, hx of skin CA who presents to [place] [facility] on 8/11 with cough and fevers - azithromycin and ceftriaxone empirically for multifocal PNA.  Hospital course c/b ARF requiring HFNC and new onset afib. Transferred to [facility] for bronchoscopy - found to have rare yeast (likely colonized), AFB+ MAC atypical/non TB infect (does not need iso)    *CINDI BUN/Cr 38/1.52 -> 39/1.47 (9/15)  -> 31/1.27 (9/19)    * DC 9/23  * Ammonium lactate for dry skin    COMORBIDITES/ACTIVE MEDICAL ISSUES   #ARF 2/2 Multifocal PNA/bronchial fungal  - S/p abx (levaquin, ceftriaxone and azithromycin)   - S/p bronchoscopy - rare Geotrichum species, Few Yeast -suggest colonization, no Rx.    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  No need for isolation  - Continue Budesonide - formoterol 80mcg-4.5 mcg BID + Albuterol Q6h  - Incentive spirometry/deep breathing exercise  - Gait Instability, ADL impairments and Functional impairments: start Comprehensive Rehab Program of PT/OT     #New onset Afib  - Eliquis 5 BID   - Metoprolol 50 BID  - ECHO low normal LV systolic function, mild pulm HTN    #HTN  - Amlodipine 5 - dc d/t BLE edema  - Metoprolol 50 BID  - BP stable   #BLE edema  - Home med: Lasix 20 QD - remain on hold d/t BLE edema    #HLD  - Lipitor 40    #Back pain  #Pain control  - Tylenol PRN, Lidocaine patch (left hip) - standing to PRN, Cyclobenzaprine 5 TID PRN    #Ear congestion  - s/p debrox, which seems to have worsen hearing and congestion  - ENT consult appreciated - presbycusis, rec hearing test outpatient    #Insomnia  - Quiet and low stimuli environment  - Melatonin 3 PRN     #GI/Bowel Mgmt   - At risk for constipation due to neurologic diagnosis, immobility and/or medication use  - Senna,  Miralax BID, Dulcolax PO PRN  - Probiotic: Florastor  - GI ppx: Protonix    #BPH  - Flomax 0.4 HS  - Finasteride 5 HS  #Bladder management  - Encourage timed voids Q4hrs while awake for independence and to promote continence during therapy    #DVT  - Eliquis 5 BID  - SCDs, TEDs     #Skin:--dry skin--c/w ammonium lactate to legs  -At risk for pressure injury due to neurologic diagnosis and relative immobility  -Bilateral heels - soft heel protectors, apply liquid barrier film daily and monitor for tissue type changes      FEN   - Diet - Regular + Thins  [DASH/TLC]      Precautions / PROPHYLAXIS:   - Falls  - ortho: Weight bearing status: WBAT   - Lungs: Aspiration, Incentive Spirometer   - Pressure injury/Skin: Turn Q2hrs while in bed, OOB to Chair, PT/OT        IDT 9/19 - lead by Dr. Torres  lives alone in a ranch style home, 4 [address].  Daughter lives near.  was independent PTA.  Has RW at home  Functional Status: overall mod I with ADLs - but has poor endurance and fatigues easily, shower transfer SV, close SV with transfers/amb 100' + verbal cues/8 steps + 2HR step to step pattern  Goals: mod I, may need some assistant with IADLs  TDD: 9/24 home.changed to 9/23 as per patient's request          DaughterChichi [phone]  Pharm: [facility] on [address]     Liaison with family   I called and spoke on phone with Ms Lauro Cadet, daughter  I gave update on patients medical and functional progress/improvements--resolution of leg edema, improved ambulatory distance and no oxy requirement. Discussed dc date as per IDT for 9/24  She was happy and family will be planning for dc 9/24 as planned       Follow ups:  Deedee Walton)  Critical Care Medicine; Internal Medicine; Pulmonary Disease  [address], [address]  [place] [address]  Phone: [phone]  Fax: [phone]    Cardiology: Gualberto Mai   Infectious Disease: Mustapha Benoit  Office: [phone].  Cell [phone]  Pulmonology: [facility]  ENT (Dr. NAIF Evans) for hearing test. Office # [phone]

## 2022-09-21 NOTE — DISCHARGE NOTE PROVIDER - CARE PROVIDERS DIRECT ADDRESSES
,DirectAddress_Unknown,[email or web address],DirectAddress_Unknown,[email or web address],DirectAddress_Unknown,DirectAddress_Unknown

## 2022-09-21 NOTE — PROGRESS NOTE ADULT - ASSESSMENT
87M with HTN, HLD, hx of CVA (7 years ago, no residual deficits), BPH, hx of skin CA who presents to [place] ER on 8/11 with cough and fevers - azithromycin and ceftriaxone empirically for multifocal PNA.  Hospital course c/b ARF requiring HFNC and new onset afib. Transferred to [facility] for bronchoscopy - found to have rare yeast (likely colonized), AFB+ MAC atypical/non TB infect (does not need iso), admitted to [facility] for acute rehab.    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 No need for isolation  - Continue Budesonide, Albuterol  - Incentive spirometry    #New Onset Afib  - Eliquis 5 mg BID   - Decrease Metoprolol tartrate 50 mg BID  - Echo showed low normal LV systolic function, mild pulm HTN    #HTN  - Stopped Amlodipine 9/15 due to BP on lower side  - Continue Metoprolol     #Mild LLE Edema  - Improved with Lasix, hold further Lasix due to elevation in Cr  - Leg elevation, ACE wraps, TEDs    #HLD  - Lipitor     #Insomnia  - Melatonin     #BPH  - Flomax   - Finasteride     #DVT  - Eliquis 87M with HTN, HLD, hx of CVA (7 years ago, no residual deficits), BPH, hx of skin CA who presents to [place] ER on 8/11 with cough and fevers - azithromycin and ceftriaxone empirically for multifocal PNA.  Hospital course c/b ARF requiring HFNC and new onset afib. [facility] - found to have rare yeast (likely colonized), AFB+ MAC atypical/non TB infect (does not need iso), admitted to [facility] for acute rehab.    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 No need for isolation  - Continue Budesonide, Albuterol  - Incentive spirometry    #New Onset Afib  - Eliquis 5 mg BID   - Metoprolol tartrate 50 mg BID  - Echo showed low normal LV systolic function, mild pulm HTN    #HTN  - Stopped Amlodipine 9/15 due to BP on lower side  - Continue Metoprolol     #Mild LLE Edema  - Improved with Lasix, hold further Lasix due to elevation in Cr  - Leg elevation, ACE wraps, TEDs    #HLD  - Lipitor     #Insomnia  - Melatonin     #BPH  - Flomax   - Finasteride     #DVT  - Eliquis

## 2022-09-21 NOTE — DISCHARGE NOTE PROVIDER - PROVIDER TOKENS
PROVIDER:[TOKEN:[[id number]],FOLLOWUP:[2 weeks]],PROVIDER:[TOKEN:[[id number]],FOLLOWUP:[2 weeks]],PROVIDER:[TOKEN:[[id number]],FOLLOWUP:[2 weeks]],PROVIDER:[TOKEN:[[id number]],FOLLOWUP:[1 month]],PROVIDER:[TOKEN:[[id number]],FOLLOWUP:[1 month]],FREE:[LAST:[Krstevenin],FIRST:[Jarrell],PHONE:[phone]],FAX:[(   )    -],ADDRESS:[Primary Care Provider],FOLLOWUP:[1 week]]

## 2022-09-21 NOTE — PROGRESS NOTE ADULT - SUBJECTIVE AND OBJECTIVE BOX
Patient is a 88y old  Male who presents with a chief complaint of Acute resp failure/Sepsis secondary to multifocal PNA/Hemoptysis (21 Sep 2022 10:31)    Patient seen and examined at bedside. No acute overnight events. Denies fever, chest pain, shortness of breath, nausea, vomiting or abdominal pain. Bilateral leg edema has improved after stopping Norvasc.    ALLERGIES:  No Known Allergies    MEDICATIONS  (STANDING):  ALBUTerol    90 MICROgram(s) HFA Inhaler 1 Puff(s) Inhalation every 6 hours  ammonium lactate 12% Lotion 1 Application(s) Topical two times a day  apixaban 5 milliGRAM(s) Oral two times a day  atorvastatin 40 milliGRAM(s) Oral at bedtime  budesonide  80 MICROgram(s)/formoterol 4.5 MICROgram(s) Inhaler 2 Puff(s) Inhalation two times a day  finasteride 5 milliGRAM(s) Oral daily  influenza  Vaccine (HIGH DOSE) 0.7 milliLiter(s) IntraMuscular once  metoprolol tartrate 50 milliGRAM(s) Oral two times a day  pantoprazole    Tablet 40 milliGRAM(s) Oral before breakfast  polyethylene glycol 3350 17 Gram(s) Oral two times a day  saccharomyces boulardii 250 milliGRAM(s) Oral two times a day  senna 2 Tablet(s) Oral at bedtime  tamsulosin 0.4 milliGRAM(s) Oral at bedtime    MEDICATIONS  (PRN):  acetaminophen     Tablet .. 650 milliGRAM(s) Oral every 6 hours PRN Mild Pain (1 - 3)  bisacodyl 5 milliGRAM(s) Oral daily PRN Constipation  cyclobenzaprine 5 milliGRAM(s) Oral three times a day PRN Muscle Spasm  lidocaine   4% Patch 1 Patch Transdermal daily PRN pain  melatonin 3 milliGRAM(s) Oral at bedtime PRN Insomnia    Vital Signs Last 24 Hrs  T(F): 97.8 (20 Sep 2022 20:17), Max: 97.8 (20 Sep 2022 20:17)  HR: 51 (21 Sep 2022 08:30) (51 - 64)  BP: 138/72 (21 Sep 2022 08:03) (108/60 - 143/6)  RR: 16 (21 Sep 2022 08:03) (15 - 16)  SpO2: 97% (21 Sep 2022 08:30) (94% - 99%)      PHYSICAL EXAM:  GENERAL: NAD  HEENT: Anicteric, moist mucous membranes, no pharyngeal exudates  EYES: Conjunctiva clear, no scleral icterus  CHEST/LUNG: Clear to auscultation bilaterally, breathing nonlabored, no rales, wheezes, or rhonchi  HEART: S1 S2, regular rate and rhythm  ABDOMEN: BS+, soft, nontender, nondistended  EXTREMITIES: No edema, cyanosis, or calf tenderness  NERVOUS SYSTEM: A&Ox3, good concentration  PSYCH: Answers questions and follows commands appropriately    LABS:                        10.8   9.41  )-----------( 264      ( 19 Sep 2022 06:40 )             33.4       09-19    139  |  105  |  31  ----------------------------<  91  4.7   |  29  |  1.27    Ca    8.7      19 Sep 2022 06:40    TPro  7.4  /  Alb  2.7  /  TBili  0.5  /  DBili  x   /  AST  26  /  ALT  36  /  AlkPhos  132  09-19      COVID-19 PCR: NotDetec (09-20-22 @ 05:00)  COVID-19 PCR: NotDetec (09-13-22 @ 17:24)  COVID-19 PCR: NotDetec (09-12-22 @ 05:00)  COVID-19 PCR: NotDetec (09-06-22 @ 12:30)  COVID-19 PCR: NotDetec (08-30-22 @ 10:15)      RADIOLOGY & ADDITIONAL TESTS:     Care Discussed with Consultants/Other Providers: PM&R Dr. Torres

## 2022-09-21 NOTE — PROGRESS NOTE ADULT - SUBJECTIVE AND OBJECTIVE BOX
SUBJECTIVE/ROS:  Patient seen and examined while sitting in WC at bedside  No med complaint  Not requiring oxy for prolonged period  Dry legs noted, will order ammonium lactate    Overall feeling well.  Tolerating therapy. not requiring oxy    ROS  No chest pain, no palpitation, no cough  No headache, no dizziness  No nausea, no abdominal pain; LBM today (9/20)  No dysuria, no frequency    Labs unremarKable     Vital Signs Last 24 Hrs  T(C): 36.6 (20 Sep 2022 20:17), Max: 36.6 (20 Sep 2022 20:17)  T(F): 97.8 (20 Sep 2022 20:17), Max: 97.8 (20 Sep 2022 20:17)  HR: 51 (21 Sep 2022 08:30) (51 - 64)  BP: 138/72 (21 Sep 2022 08:03) (108/60 - 143/6)  RR: 16 (21 Sep 2022 08:03) (15 - 16)  SpO2: 97% (21 Sep 2022 08:30) (94% - 99%)    O2 Parameters below as of 21 Sep 2022 08:30  Patient On (Oxygen Delivery Method): room air      EXAM  General: NAD, Resting Comfortable                               HEENT: NC/AT, EOM I, PERRLA, Normal Conjunctivae  Cardio: warm and well perfused                   Pulm: resp nonlabored                       Abdomen: ND/NT, Soft                                                MSK: No joint swelling, Full ROM                                         Ext: No C/C/E (BLE edema resolved), No calf tenderness,   Skin:  all skin intact                                                                   Neurological Examination    Cognitive: AAO x 3                                                                         CN II - XII  intact                                            Sensory: Intact to light touch, PP and Vibration                                    Motor    LEFT    UE: SF [4+/5], EF [4+/5], EE [4+/5], WE [4+/5],  [4+/5]  RIGHT UE: SF [5/5], EF [5/5], EE [5/5], WE [5/5],  [5/5]  LEFT    LE:  HF [4/5] secondary to pain, KE [4+/5], DF [4+/5], PF [5/5]  RIGHT LE:  HF [5/5], KE [5/5], DF [5/5], PF [5/5]      Therapy--ambulating increasing distance, endurance improving                        10.8   9.41  )-----------( 264      ( 19 Sep 2022 06:40 )             33.4     09-19    139  |  105  |  31<H>  ----------------------------<  91  4.7   |  29  |  1.27    Ca    8.7      19 Sep 2022 06:40    TPro  7.4  /  Alb  2.7<L>  /  TBili  0.5  /  DBili  x   /  AST  26  /  ALT  36  /  AlkPhos  132<H>  09-19    LIVER FUNCTIONS - ( 19 Sep 2022 06:40 )  Alb: 2.7 g/dL / Pro: 7.4 g/dL / ALK PHOS: 132 U/L / ALT: 36 U/L / AST: 26 U/L / GGT: x           Allergies  No Known Allergies    MEDICATIONS  (STANDING):  ALBUTerol    90 MICROgram(s) HFA Inhaler 1 Puff(s) Inhalation every 6 hours  ammonium lactate 12% Lotion 1 Application(s) Topical two times a day  apixaban 5 milliGRAM(s) Oral two times a day  atorvastatin 40 milliGRAM(s) Oral at bedtime  budesonide  80 MICROgram(s)/formoterol 4.5 MICROgram(s) Inhaler 2 Puff(s) Inhalation two times a day  finasteride 5 milliGRAM(s) Oral daily  influenza  Vaccine (HIGH DOSE) 0.7 milliLiter(s) IntraMuscular once  metoprolol tartrate 50 milliGRAM(s) Oral two times a day  pantoprazole    Tablet 40 milliGRAM(s) Oral before breakfast  polyethylene glycol 3350 17 Gram(s) Oral two times a day  saccharomyces boulardii 250 milliGRAM(s) Oral two times a day  senna 2 Tablet(s) Oral at bedtime  tamsulosin 0.4 milliGRAM(s) Oral at bedtime    MEDICATIONS  (PRN):  acetaminophen     Tablet .. 650 milliGRAM(s) Oral every 6 hours PRN Mild Pain (1 - 3)  bisacodyl 5 milliGRAM(s) Oral daily PRN Constipation  cyclobenzaprine 5 milliGRAM(s) Oral three times a day PRN Muscle Spasm  lidocaine   4% Patch 1 Patch Transdermal daily PRN pain  melatonin 3 milliGRAM(s) Oral at bedtime PRN Insomnia

## 2022-09-21 NOTE — DISCHARGE NOTE PROVIDER - CARE PROVIDER_API CALL
Gualberto Holliday (MD)  Cardiovascular Disease; Internal Medicine  [address], [address]  [place] [address]  Phone: [phone]  Fax: [phone]  Follow Up Time: 2 weeks    Mustapha Valente; PhD)  Infectious Disease; Internal Medicine  [address] [place] [address]  Phone: [phone]  Fax: [phone]  Follow Up Time: 2 weeks    Ha Almonte)  Critical Care Medicine; HospicePalliative Medicine; Internal Medicine; Pulmonary Disease  [address] [place] [address]  Phone: [phone]  Fax: [phone]  Follow Up Time: 2 weeks    Sylvester Evans)  Otolaryngology  [address] [place] [address]  Phone: [phone]  Fax: [phone]  Follow Up Time: 1 month    Mirza Torres; MPH)  Surgery  Phys Medicine  [facility]  [address] [place] [address]  Phone: [phone]  Fax: [phone]  Follow Up Time: 1 month    Jarrell Vann  Primary Care Provider  Phone: [phone]  Fax: (   )    -  Follow Up Time: 1 week

## 2022-09-22 LAB
ALBUMIN SERPL ELPH-MCNC: 2.6 G/DL — LOW (ref 3.3–5)
ALP SERPL-CCNC: 133 U/L — HIGH (ref 40–120)
ALT FLD-CCNC: 52 U/L — HIGH (ref 10–45)
ANION GAP SERPL CALC-SCNC: 7 MMOL/L — SIGNIFICANT CHANGE UP (ref 5–17)
AST SERPL-CCNC: 34 U/L — SIGNIFICANT CHANGE UP (ref 10–40)
BASOPHILS # BLD AUTO: 0.12 K/UL — SIGNIFICANT CHANGE UP (ref 0–0.2)
BASOPHILS NFR BLD AUTO: 1.2 % — SIGNIFICANT CHANGE UP (ref 0–2)
BILIRUB SERPL-MCNC: 0.5 MG/DL — SIGNIFICANT CHANGE UP (ref 0.2–1.2)
BUN SERPL-MCNC: 28 MG/DL — HIGH (ref 7–23)
CALCIUM SERPL-MCNC: 8.5 MG/DL — SIGNIFICANT CHANGE UP (ref 8.4–10.5)
CHLORIDE SERPL-SCNC: 106 MMOL/L — SIGNIFICANT CHANGE UP (ref 96–108)
CO2 SERPL-SCNC: 28 MMOL/L — SIGNIFICANT CHANGE UP (ref 22–31)
CREAT SERPL-MCNC: 1.35 MG/DL — HIGH (ref 0.5–1.3)
EGFR: 51 ML/MIN/1.73M2 — LOW
EOSINOPHIL # BLD AUTO: 0.47 K/UL — SIGNIFICANT CHANGE UP (ref 0–0.5)
EOSINOPHIL NFR BLD AUTO: 4.7 % — SIGNIFICANT CHANGE UP (ref 0–6)
GLUCOSE SERPL-MCNC: 83 MG/DL — SIGNIFICANT CHANGE UP (ref 70–99)
HCT VFR BLD CALC: 32.5 % — LOW (ref 39–50)
HGB BLD-MCNC: 10.6 G/DL — LOW (ref 13–17)
IMM GRANULOCYTES NFR BLD AUTO: 0.4 % — SIGNIFICANT CHANGE UP (ref 0–0.9)
LYMPHOCYTES # BLD AUTO: 2.07 K/UL — SIGNIFICANT CHANGE UP (ref 1–3.3)
LYMPHOCYTES # BLD AUTO: 20.5 % — SIGNIFICANT CHANGE UP (ref 13–44)
MCHC RBC-ENTMCNC: 31.6 PG — SIGNIFICANT CHANGE UP (ref 27–34)
MCHC RBC-ENTMCNC: 32.6 GM/DL — SIGNIFICANT CHANGE UP (ref 32–36)
MCV RBC AUTO: 97 FL — SIGNIFICANT CHANGE UP (ref 80–100)
MONOCYTES # BLD AUTO: 1.03 K/UL — HIGH (ref 0–0.9)
MONOCYTES NFR BLD AUTO: 10.2 % — SIGNIFICANT CHANGE UP (ref 2–14)
NEUTROPHILS # BLD AUTO: 6.36 K/UL — SIGNIFICANT CHANGE UP (ref 1.8–7.4)
NEUTROPHILS NFR BLD AUTO: 63 % — SIGNIFICANT CHANGE UP (ref 43–77)
NRBC # BLD: 0 /100 WBCS — SIGNIFICANT CHANGE UP (ref 0–0)
PLATELET # BLD AUTO: 292 K/UL — SIGNIFICANT CHANGE UP (ref 150–400)
POTASSIUM SERPL-MCNC: 4.5 MMOL/L — SIGNIFICANT CHANGE UP (ref 3.5–5.3)
POTASSIUM SERPL-SCNC: 4.5 MMOL/L — SIGNIFICANT CHANGE UP (ref 3.5–5.3)
PROT SERPL-MCNC: 7 G/DL — SIGNIFICANT CHANGE UP (ref 6–8.3)
RBC # BLD: 3.35 M/UL — LOW (ref 4.2–5.8)
RBC # FLD: 14.3 % — SIGNIFICANT CHANGE UP (ref 10.3–14.5)
SODIUM SERPL-SCNC: 141 MMOL/L — SIGNIFICANT CHANGE UP (ref 135–145)
WBC # BLD: 10.09 K/UL — SIGNIFICANT CHANGE UP (ref 3.8–10.5)
WBC # FLD AUTO: 10.09 K/UL — SIGNIFICANT CHANGE UP (ref 3.8–10.5)

## 2022-09-22 PROCEDURE — 99233 SBSQ HOSP IP/OBS HIGH 50: CPT

## 2022-09-22 RX ORDER — BACITRACIN ZINC 500 UNIT/G
1 OINTMENT IN PACKET (EA) TOPICAL
Refills: 0 | Status: DISCONTINUED | OUTPATIENT
Start: 2022-09-22 | End: 2022-09-23

## 2022-09-22 RX ADMIN — Medication 250 MILLIGRAM(S): at 06:01

## 2022-09-22 RX ADMIN — FINASTERIDE 5 MILLIGRAM(S): 5 TABLET, FILM COATED ORAL at 11:47

## 2022-09-22 RX ADMIN — ATORVASTATIN CALCIUM 40 MILLIGRAM(S): 80 TABLET, FILM COATED ORAL at 21:14

## 2022-09-22 RX ADMIN — BUDESONIDE AND FORMOTEROL FUMARATE DIHYDRATE 2 PUFF(S): 160; 4.5 AEROSOL RESPIRATORY (INHALATION) at 09:30

## 2022-09-22 RX ADMIN — SENNA PLUS 2 TABLET(S): 8.6 TABLET ORAL at 21:13

## 2022-09-22 RX ADMIN — TAMSULOSIN HYDROCHLORIDE 0.4 MILLIGRAM(S): 0.4 CAPSULE ORAL at 21:13

## 2022-09-22 RX ADMIN — PANTOPRAZOLE SODIUM 40 MILLIGRAM(S): 20 TABLET, DELAYED RELEASE ORAL at 06:01

## 2022-09-22 RX ADMIN — Medication 1 APPLICATION(S): at 17:28

## 2022-09-22 RX ADMIN — Medication 50 MILLIGRAM(S): at 17:26

## 2022-09-22 RX ADMIN — Medication 250 MILLIGRAM(S): at 17:26

## 2022-09-22 RX ADMIN — POLYETHYLENE GLYCOL 3350 17 GRAM(S): 17 POWDER, FOR SOLUTION ORAL at 17:26

## 2022-09-22 RX ADMIN — Medication 1 APPLICATION(S): at 17:27

## 2022-09-22 RX ADMIN — APIXABAN 5 MILLIGRAM(S): 2.5 TABLET, FILM COATED ORAL at 06:01

## 2022-09-22 RX ADMIN — Medication 50 MILLIGRAM(S): at 06:01

## 2022-09-22 RX ADMIN — APIXABAN 5 MILLIGRAM(S): 2.5 TABLET, FILM COATED ORAL at 17:26

## 2022-09-22 NOTE — PROGRESS NOTE ADULT - ASSESSMENT
ASSESSMENT/PLAN  87M with HTN, HLD, hx of CVA (7 yrs ago, no residual deficits), BPH, hx of skin CA who presents to [place] ER on 8/11 with cough and fevers - azithromycin and ceftriaxone empirically for multifocal PNA.  Hospital course c/b ARF requiring HFNC and new onset afib. Transferred to [facility] for bronchoscopy - found to have rare yeast (likely colonized), AFB+ MAC atypical/non TB infect (does not need iso)    *CINDI BUN/Cr 38/1.52 -> 39/1.47 (9/15)  -> 31/1.27 (9/19) -> 28/1.35  (9/22)  *left shin skin tear - ordered for bacitracin     COMORBIDITES/ACTIVE MEDICAL ISSUES   #ARF 2/2 Multifocal PNA/bronchial fungal  - S/p abx (levaquin, ceftriaxone and azithromycin)   - S/p bronchoscopy - rare Geotrichum species, Few Yeast -suggest colonization, no Rx.    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  No need for isolation  - Continue Budesonide - formoterol 80mcg-4.5 mcg BID + Albuterol Q6h  - Incentive spirometry/deep breathing exercise  - Gait Instability, ADL impairments and Functional impairments: start Comprehensive Rehab Program of PT/OT     #New onset Afib  - Eliquis 5 BID   - Metoprolol 50 BID  - ECHO low normal LV systolic function, mild pulm HTN    #HTN  - Amlodipine 5 - dc d/t BLE edema  - Metoprolol 50 BID  - BP stable   #BLE edema  - Home med: Lasix 20 QD - remain on hold d/t BLE edema    #HLD  - Lipitor 40    #Back pain  #Pain control  - Tylenol PRN, Lidocaine patch (left hip) - standing to PRN, Cyclobenzaprine 5 TID PRN    #Ear congestion  - s/p debrox, which seems to have worsen hearing and congestion  - ENT consult appreciated - presbycusis, rec hearing test outpatient    #Insomnia  - Quiet and low stimuli environment  - Melatonin 3 PRN     #GI/Bowel Mgmt   - At risk for constipation due to neurologic diagnosis, immobility and/or medication use  - Senna,  Miralax BID, Dulcolax PO PRN  - Probiotic: Florastor  - GI ppx: Protonix    #BPH  - Flomax 0.4 HS  - Finasteride 5 HS  #Bladder management  - Encourage timed voids Q4hrs while awake for independence and to promote continence during therapy    #DVT  - Eliquis 5 BID  - SCDs, TEDs     #Skin:  --dry skin--c/w ammonium lactate to legs  - left shin skin tear - bacitracin BID  -At risk for pressure injury due to neurologic diagnosis and relative immobility  -Bilateral heels - soft heel protectors, apply liquid barrier film daily and monitor for tissue type changes    FEN   - Diet - Regular + Thins  [DASH/TLC]      Precautions / PROPHYLAXIS:   - Falls  - ortho: Weight bearing status: WBAT   - Lungs: Aspiration, Incentive Spirometer   - Pressure injury/Skin: Turn Q2hrs while in bed, OOB to Chair, PT/OT        IDT 9/19 - lead by Dr. Torres  lives alone in a ranch style home, 4 [address].  Daughter lives near.  was independent PTA.  Has RW at home  Functional Status: overall mod I with ADLs - but has poor endurance and fatigues easily, shower transfer SV, close SV with transfers/amb 100' + verbal cues/8 steps + 2HR step to step pattern  Goals: mod I, may need some assistant with IADLs  TDD: 9/24 home.changed to 9/23 as per patient's request          DaughterChichi [phone]  Pharm: [facility] [address]     Liaison with family   I called and spoke on phone with Ms Lauro Cadet, daughter  I gave update on patients medical and functional progress/improvements--resolution of leg edema, improved ambulatory distance and no oxy requirement. Discussed dc date as per IDT for 9/24  She was happy and family will be planning for dc 9/24 as planned       Follow ups:  Deedee Walton)  Critical Care Medicine; Internal Medicine; Pulmonary Disease  [address], [address]  [place] [address]  Phone: [phone]  Fax: [phone]    Cardiology: Gualberto Mai   Infectious Disease: Mustapha Benoit  Office: [phone].  Cell [phone]  Pulmonology: Dr. Almonte, [facility]  [facility] (Dr. NAIF Evans) for hearing test. Office # [phone]   ASSESSMENT/PLAN  87M with HTN, HLD, hx of CVA (7 yrs ago, no residual deficits), BPH, hx of skin CA who presents to [place] ER on 8/11 with cough and fevers - azithromycin and ceftriaxone empirically for multifocal PNA.  Hospital course c/b ARF requiring HFNC and new onset afib. Transferred to [facility] for [facility] - found to have rare yeast (likely colonized), AFB+ MAC atypical/non TB infect (does not need iso)    *CINDI BUN/Cr 38/1.52 -> 39/1.47 (9/15)  -> 31/1.27 (9/19) -> 28/1.35  (9/22)  Cr slightly elevated--oral hydration discussed  *left shin skin tear - ordered for bacitracin     COMORBIDITES/ACTIVE MEDICAL ISSUES   #ARF 2/2 Multifocal PNA/bronchial fungal  - S/p abx (levaquin, ceftriaxone and azithromycin)   - S/p bronchoscopy - rare Geotrichum species, Few Yeast -suggest colonization, no Rx.    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  No need for isolation  - Continue Budesonide - formoterol 80mcg-4.5 mcg BID + Albuterol Q6h  - Incentive spirometry/deep breathing exercise  - Gait Instability, ADL impairments and Functional impairments: start Comprehensive Rehab Program of PT/OT     #New onset Afib  - Eliquis 5 BID   - Metoprolol 50 BID  - ECHO low normal LV systolic function, mild pulm HTN    #HTN  - Amlodipine 5 - dc d/t BLE edema  - Metoprolol 50 BID  - BP stable   #BLE edema  - Home med: Lasix 20 QD - remain on hold d/t BLE edema    #HLD  - Lipitor 40    #Back pain  #Pain control  - Tylenol PRN, Lidocaine patch (left hip) - standing to PRN, Cyclobenzaprine 5 TID PRN    #Ear congestion  - s/p debrox, which seems to have worsen hearing and congestion  - ENT consult appreciated - presbycusis, rec hearing test outpatient    #Insomnia  - Quiet and low stimuli environment  - Melatonin 3 PRN     #GI/Bowel Mgmt   - At risk for constipation due to neurologic diagnosis, immobility and/or medication use  - Senna,  Miralax BID, Dulcolax PO PRN  - Probiotic: Florastor  - GI ppx: Protonix    #BPH  - Flomax 0.4 HS  - Finasteride 5 HS  #Bladder management  - Encourage timed voids Q4hrs while awake for independence and to promote continence during therapy    #DVT  - Eliquis 5 BID  - SCDs, TEDs     #Skin:  --dry skin--c/w ammonium lactate to legs  - left shin skin tear - bacitracin BID  -At risk for pressure injury due to neurologic diagnosis and relative immobility  -Bilateral heels - soft heel protectors, apply liquid barrier film daily and monitor for tissue type changes    FEN   - Diet - Regular + Thins  [DASH/TLC]      Precautions / PROPHYLAXIS:   - Falls  - ortho: Weight bearing status: WBAT   - Lungs: Aspiration, Incentive Spirometer   - Pressure injury/Skin: Turn Q2hrs while in bed, OOB to Chair, PT/OT        IDT 9/19 - lead by Dr. Torres  lives alone in a ranch style home, 4 [address].  Daughter lives near.  was independent PTA.  Has RW at home  Functional Status: overall mod I with ADLs - but has poor endurance and fatigues easily, shower transfer SV, close SV with transfers/amb 100' + verbal cues/8 steps + 2HR step to step pattern  Goals: mod I, may need some assistant with IADLs  TDD: 9/24 home.changed to 9/23 as per patient's request          DaughterChichi [phone]  Pharm: [facility] on [address]     Liaison with family   I called and spoke on phone with Ms Lauro Cadet, daughter  I gave update on patients medical and functional progress/improvements--resolution of leg edema, improved ambulatory distance and no oxy requirement. Discussed dc date as per IDT for 9/24  She was happy and family will be planning for dc 9/24 as planned       Follow ups:  Deedee Walton)  Critical Care Medicine; Internal Medicine; Pulmonary Disease  [address] [facility] [address], [address]  [place] [address]  Phone: [phone]  Fax: [phone]    Cardiology: Gualberto Mai   Infectious Disease: Mustapha Benoit  Office: [phone].  Cell [phone]  Pulmonology: Dr. Almonte, [facility]  [facility] (Dr. NAIF Evans) for hearing test. Office # [phone]

## 2022-09-22 NOTE — PROGRESS NOTE ADULT - NS ATTEND AMEND GEN_ALL_CORE FT
Seen and examined  findings as noted, note revised    Clinically stable, leg edema resolving  Oxy sat normal, NC oxy prn    Labs Cr downtrending    Engaging in therapy, motor function improving, ambulating but distance is limited    Continue PT/OT  NC oxy prn  F/u with ENT out patient for hearing test  Hospitalist f/u for leg edema  Continue oral fluid intake for CINDI  Monitor renal fxn
Seen with NP    No med complaint  Sustained functional improvement  Leg edema resolved, still off lasix    Labs unremarkable    IDT--Making progress, decreased endurance noted ambulating 100ft with CS    Continue PT/OT  Monitor renal fxn   Work on endurance
Seen and examined    No med complaint    Therapy-- engaging, working on endurance     Cr slightly elevated--oral hydration discussed    Clinically stable  Skin tear left shin    Continue PT/OT  Bacitracin to skin tear  [place] fluid intake for mild Cr rise   D/C planning
Seen and examined  Patient reports feeling of fullness or ear being blocked, since getting ear drops during acute care, probably to remove wax  No discharge    Still has chronic leg edema--on f/u with hospitalist, amlodipine d/tanya, recent Echo unremarkable  Exertional dyspnea during therapy to oxy sat 86%, improved with oxy NC    BP normal  No acute distress  Mod wax left ear, mild in right ear  shiny tympanic membrane    Continue PT/OT  F/u hospitalist recs for exertional dyspnea and rising Cr  Oral hydration  Monitor renal fxn and leg edema  oxy nc prn   ENT review for sensation of fullness in ear
Seen with NP, findings as noted, with revision    No interval med complaint  Has not required NC oxygen since last episode 3 days, prior, when he needed it for exertional dyspnea during therapy    making progress in therapy as stated    Clinically stable  Fully oriented      Continue PT/OT  Monitor oxy requirement,   Monitor leg edema--improving, lasix on hold

## 2022-09-22 NOTE — PROGRESS NOTE ADULT - SUBJECTIVE AND OBJECTIVE BOX
SUBJECTIVE/ROS:  Patient seen and evaluated while sitting in WC at bedside  Feeling good, doing well  Tolerating therapy - aware that today is his last day of therapy, pending DC tomorrow  Discussed discharge medication - Anti HTN d/c and now without leg edema + BP stable.  PCP monitoring outpatient.  New medications: AC + betablocker for rate control  No chest pain, no palpitation, no cough  No headache, no dizziness  No nausea, no abdominal pain; LBM yesterday (9/21)  No dysuria, no frequency    Vital Signs Last 24 Hrs  T(C): 36.3 (09-22-22 @ 07:47), Max: 36.4 (09-21-22 @ 20:44)  T(F): 97.4 (09-22-22 @ 07:47), Max: 97.5 (09-21-22 @ 20:44)  HR: 62 (09-22-22 @ 07:47) (60 - 76)  BP: 117/64 (09-22-22 @ 07:47) (106/60 - 133/74)  RR: 16 (09-22-22 @ 07:47) (16 - 16)  SpO2: 95% (09-22-22 @ 07:47) (95% - 97%) on room air    EXAM  General: NAD, Resting Comfortable                               HEENT: NC/AT, EOM I, PERRLA, Normal Conjunctivae  Cardio: warm and well perfused                   Pulm: resp nonlabored                       Abdomen: ND/NT, Soft                                                MSK: No joint swelling, Full ROM                                         Ext: No C/C/E, No calf tenderness  Skin:  B/L leg dryness, small skin tear to left shin (open but not draining)                                             Neurological Examination    Cognitive: AAO x 3                                                                         CN II - XII  intact                                            Sensory: Intact to light touch, PP and Vibration                                    Motor    LEFT    UE: SF [4+/5], EF [4+/5], EE [4+/5], WE [4+/5],  [4+/5]  RIGHT UE: SF [5/5], EF [5/5], EE [5/5], WE [5/5],  [5/5]  LEFT    LE:  HF [4/5] secondary to pain, KE [4+/5], DF [4+/5], PF [5/5]  RIGHT LE:  HF [5/5], KE [5/5], DF [5/5], PF [5/5]                 LAB                        10.6   10.09 )-----------( 292      ( 22 Sep 2022 06:20 )             32.5     09-22    141  |  106  |  28<H>  ----------------------------<  83  4.5   |  28  |  1.35<H>    Ca    8.5      22 Sep 2022 06:20    TPro  7.0  /  Alb  2.6<L>  /  TBili  0.5  /  DBili  x   /  AST  34  /  ALT  52<H>  /  AlkPhos  133<H>  09-22    LIVER FUNCTIONS - ( 22 Sep 2022 06:20 )  Alb: 2.6 g/dL / Pro: 7.0 g/dL / ALK PHOS: 133 U/L / ALT: 52 U/L / AST: 34 U/L / GGT: x           Allergies  No Known Allergies    MEDICATIONS  (STANDING):  ALBUTerol    90 MICROgram(s) HFA Inhaler 1 Puff(s) Inhalation every 6 hours  ammonium lactate 12% Lotion 1 Application(s) Topical two times a day  apixaban 5 milliGRAM(s) Oral two times a day  atorvastatin 40 milliGRAM(s) Oral at bedtime  BACItracin   Ointment 1 Application(s) Topical two times a day  budesonide  80 MICROgram(s)/formoterol 4.5 MICROgram(s) Inhaler 2 Puff(s) Inhalation two times a day  finasteride 5 milliGRAM(s) Oral daily  influenza  Vaccine (HIGH DOSE) 0.7 milliLiter(s) IntraMuscular once  metoprolol tartrate 50 milliGRAM(s) Oral two times a day  pantoprazole    Tablet 40 milliGRAM(s) Oral before breakfast  polyethylene glycol 3350 17 Gram(s) Oral two times a day  saccharomyces boulardii 250 milliGRAM(s) Oral two times a day  senna 2 Tablet(s) Oral at bedtime  tamsulosin 0.4 milliGRAM(s) Oral at bedtime    MEDICATIONS  (PRN):  acetaminophen     Tablet .. 650 milliGRAM(s) Oral every 6 hours PRN Mild Pain (1 - 3)  bisacodyl 5 milliGRAM(s) Oral daily PRN Constipation  cyclobenzaprine 5 milliGRAM(s) Oral three times a day PRN Muscle Spasm  lidocaine   4% Patch 1 Patch Transdermal daily PRN pain  melatonin 3 milliGRAM(s) Oral at bedtime PRN Insomnia     SUBJECTIVE/ROS:  Patient seen and evaluated while sitting in WC at bedside  Feeling good, doing well  Tolerating therapy - aware that today is his last day of therapy, pending DC tomorrow  Discussed discharge medication - Anti HTN d/c and now without leg edema + BP stable.  PCP monitoring outpatient.  New medications: AC + betablocker for rate control  No chest pain, no palpitation, no cough  No headache, no dizziness  No nausea, no abdominal pain; LBM yesterday (9/21)  No dysuria, no frequency    Therapy engaging, working on endurance   Cr slightly elevated--oral hydration discussed   Vital Signs Last 24 Hrs  T(C): 36.3 (09-22-22 @ 07:47), Max: 36.4 (09-21-22 @ 20:44)  T(F): 97.4 (09-22-22 @ 07:47), Max: 97.5 (09-21-22 @ 20:44)  HR: 62 (09-22-22 @ 07:47) (60 - 76)  BP: 117/64 (09-22-22 @ 07:47) (106/60 - 133/74)  RR: 16 (09-22-22 @ 07:47) (16 - 16)  SpO2: 95% (09-22-22 @ 07:47) (95% - 97%) on room air    EXAM  General: NAD, Resting Comfortable                               HEENT: NC/AT, EOM I, PERRLA, Normal Conjunctivae  Cardio: warm and well perfused                   Pulm: resp nonlabored                       Abdomen: ND/NT, Soft                                                MSK: No joint swelling, Full ROM                                         Ext: No C/C/E, No calf tenderness  Skin:  B/L leg dryness, small skin tear to left shin (open but not draining)                                             Neurological Examination    Cognitive: AAO x 3                                                                         CN II - XII  intact                                            Sensory: Intact to light touch, PP and Vibration                                    Motor    LEFT    UE: SF [4+/5], EF [4+/5], EE [4+/5], WE [4+/5],  [4+/5]  RIGHT UE: SF [5/5], EF [5/5], EE [5/5], WE [5/5],  [5/5]  LEFT    LE:  HF [4/5] secondary to pain, KE [4+/5], DF [4+/5], PF [5/5]  RIGHT LE:  HF [5/5], KE [5/5], DF [5/5], PF [5/5]                 LAB                        10.6   10.09 )-----------( 292      ( 22 Sep 2022 06:20 )             32.5     09-22    141  |  106  |  28<H>  ----------------------------<  83  4.5   |  28  |  1.35<H>    Ca    8.5      22 Sep 2022 06:20    TPro  7.0  /  Alb  2.6<L>  /  TBili  0.5  /  DBili  x   /  AST  34  /  ALT  52<H>  /  AlkPhos  133<H>  09-22    LIVER FUNCTIONS - ( 22 Sep 2022 06:20 )  Alb: 2.6 g/dL / Pro: 7.0 g/dL / ALK PHOS: 133 U/L / ALT: 52 U/L / AST: 34 U/L / GGT: x           Allergies  No Known Allergies    MEDICATIONS  (STANDING):  ALBUTerol    90 MICROgram(s) HFA Inhaler 1 Puff(s) Inhalation every 6 hours  ammonium lactate 12% Lotion 1 Application(s) Topical two times a day  apixaban 5 milliGRAM(s) Oral two times a day  atorvastatin 40 milliGRAM(s) Oral at bedtime  BACItracin   Ointment 1 Application(s) Topical two times a day  budesonide  80 MICROgram(s)/formoterol 4.5 MICROgram(s) Inhaler 2 Puff(s) Inhalation two times a day  finasteride 5 milliGRAM(s) Oral daily  influenza  Vaccine (HIGH DOSE) 0.7 milliLiter(s) IntraMuscular once  metoprolol tartrate 50 milliGRAM(s) Oral two times a day  pantoprazole    Tablet 40 milliGRAM(s) Oral before breakfast  polyethylene glycol 3350 17 Gram(s) Oral two times a day  saccharomyces boulardii 250 milliGRAM(s) Oral two times a day  senna 2 Tablet(s) Oral at bedtime  tamsulosin 0.4 milliGRAM(s) Oral at bedtime    MEDICATIONS  (PRN):  acetaminophen     Tablet .. 650 milliGRAM(s) Oral every 6 hours PRN Mild Pain (1 - 3)  bisacodyl 5 milliGRAM(s) Oral daily PRN Constipation  cyclobenzaprine 5 milliGRAM(s) Oral three times a day PRN Muscle Spasm  lidocaine   4% Patch 1 Patch Transdermal daily PRN pain  melatonin 3 milliGRAM(s) Oral at bedtime PRN Insomnia

## 2022-09-23 ENCOUNTER — TRANSCRIPTION ENCOUNTER (OUTPATIENT)
Age: 87
End: 2022-09-23

## 2022-09-23 VITALS — OXYGEN SATURATION: 98 %

## 2022-09-23 PROCEDURE — 99239 HOSP IP/OBS DSCHRG MGMT >30: CPT

## 2022-09-23 PROCEDURE — 99232 SBSQ HOSP IP/OBS MODERATE 35: CPT

## 2022-09-23 RX ORDER — BACITRACIN ZINC 500 UNIT/G
1 OINTMENT IN PACKET (EA) TOPICAL
Qty: 0 | Refills: 0 | DISCHARGE
Start: 2022-09-23

## 2022-09-23 RX ADMIN — PANTOPRAZOLE SODIUM 40 MILLIGRAM(S): 20 TABLET, DELAYED RELEASE ORAL at 05:38

## 2022-09-23 RX ADMIN — FINASTERIDE 5 MILLIGRAM(S): 5 TABLET, FILM COATED ORAL at 11:20

## 2022-09-23 RX ADMIN — APIXABAN 5 MILLIGRAM(S): 2.5 TABLET, FILM COATED ORAL at 05:37

## 2022-09-23 RX ADMIN — Medication 1 APPLICATION(S): at 05:36

## 2022-09-23 RX ADMIN — Medication 50 MILLIGRAM(S): at 05:37

## 2022-09-23 RX ADMIN — BUDESONIDE AND FORMOTEROL FUMARATE DIHYDRATE 2 PUFF(S): 160; 4.5 AEROSOL RESPIRATORY (INHALATION) at 09:16

## 2022-09-23 RX ADMIN — Medication 250 MILLIGRAM(S): at 05:37

## 2022-09-23 NOTE — PROGRESS NOTE ADULT - SUBJECTIVE AND OBJECTIVE BOX
SUBJECTIVE/ROS:  Patient seen and evaluated while sitting in WC at bedside  No complaint   Tolerating diet  Skin abrasion on left shin, dressing in situ, no bleeding    Anticipating dc today  ROS  No chest pain, no palpitation, no cough  No headache, no dizziness  No nausea, no abdominal pain; LBM yesterday (9/23)  No dysuria, no frequency     Vital Signs Last 24 Hrs  T(C): 36.6 (23 Sep 2022 08:07), Max: 36.8 (22 Sep 2022 21:10)  T(F): 97.8 (23 Sep 2022 08:07), Max: 98.2 (22 Sep 2022 21:10)  HR: 88 (23 Sep 2022 09:16) (58 - 88)  BP: 118/64 (23 Sep 2022 08:07) (118/64 - 150/74)  RR: 14 (23 Sep 2022 08:07) (14 - 16)  SpO2: 98% (23 Sep 2022 09:16) (96% - 98%)    O2 Parameters below as of 23 Sep 2022 09:16  Patient On (Oxygen Delivery Method): room air      EXAM  General: NAD, Resting Comfortable                               HEENT: NC/AT, EOM I, PERRLA, Normal Conjunctivae  Cardio: warm and well perfused                   Pulm: resp nonlabored                       Abdomen: ND/NT, Soft                                                MSK: No joint swelling, Full ROM                                         Ext: No C/C/E, No calf tenderness  Skin:  B/L leg dryness, small skin tear to left shin (open but not draining)                                             Neurological Examination    Cognitive: AAO x 3                                                                         CN II - XII  intact                                            Sensory: Intact to light touch, PP and Vibration                                    Motor    LEFT    UE: SF [4+/5], EF [4+/5], EE [4+/5], WE [4+/5],  [4+/5]  RIGHT UE: SF [5/5], EF [5/5], EE [5/5], WE [5/5],  [5/5]  LEFT    LE:  HF [4/5] secondary to pain, KE [4+/5], DF [4+/5], PF [5/5]  RIGHT LE:  HF [5/5], KE [5/5], DF [5/5], PF [5/5]                 LAB                        10.6   10.09 )-----------( 292      ( 22 Sep 2022 06:20 )             32.5     09-22    141  |  106  |  28<H>  ----------------------------<  83  4.5   |  28  |  1.35<H>    Ca    8.5      22 Sep 2022 06:20    TPro  7.0  /  Alb  2.6<L>  /  TBili  0.5  /  DBili  x   /  AST  34  /  ALT  52<H>  /  AlkPhos  133<H>  09-22    LIVER FUNCTIONS - ( 22 Sep 2022 06:20 )  Alb: 2.6 g/dL / Pro: 7.0 g/dL / ALK PHOS: 133 U/L / ALT: 52 U/L / AST: 34 U/L / GGT: x           Allergies  No Known Allergies    MEDICATIONS  (STANDING):  ALBUTerol    90 MICROgram(s) HFA Inhaler 1 Puff(s) Inhalation every 6 hours  ammonium lactate 12% Lotion 1 Application(s) Topical two times a day  apixaban 5 milliGRAM(s) Oral two times a day  atorvastatin 40 milliGRAM(s) Oral at bedtime  BACItracin   Ointment 1 Application(s) Topical two times a day  budesonide  80 MICROgram(s)/formoterol 4.5 MICROgram(s) Inhaler 2 Puff(s) Inhalation two times a day  finasteride 5 milliGRAM(s) Oral daily  influenza  Vaccine (HIGH DOSE) 0.7 milliLiter(s) IntraMuscular once  metoprolol tartrate 50 milliGRAM(s) Oral two times a day  pantoprazole    Tablet 40 milliGRAM(s) Oral before breakfast  polyethylene glycol 3350 17 Gram(s) Oral two times a day  saccharomyces boulardii 250 milliGRAM(s) Oral two times a day  senna 2 Tablet(s) Oral at bedtime  tamsulosin 0.4 milliGRAM(s) Oral at bedtime    MEDICATIONS  (PRN):  acetaminophen     Tablet .. 650 milliGRAM(s) Oral every 6 hours PRN Mild Pain (1 - 3)  bisacodyl 5 milliGRAM(s) Oral daily PRN Constipation  cyclobenzaprine 5 milliGRAM(s) Oral three times a day PRN Muscle Spasm  lidocaine   4% Patch 1 Patch Transdermal daily PRN pain  melatonin 3 milliGRAM(s) Oral at bedtime PRN Insomnia

## 2022-09-23 NOTE — DISCHARGE NOTE NURSING/CASE MANAGEMENT/SOCIAL WORK - NSDCVIVACCINE_GEN_ALL_CORE_FT
influenza, injectable, quadrivalent, preservative free; 16-Nov-2015 12:30; Sangeetha Aceves (RN); [employer]; [id number]; IntraMuscular; Deltoid Left.; 0.5 milliLiter(s); VIS (VIS Published: 07-Aug-2015, VIS Presented: 16-Nov-2015);

## 2022-09-23 NOTE — PROGRESS NOTE ADULT - ASSESSMENT
ASSESSMENT/PLAN  87M with HTN, HLD, hx of CVA (7 yrs ago, no residual deficits), BPH, hx of skin CA who presents to [place] ER on 8/11 with cough and fevers - azithromycin and ceftriaxone empirically for multifocal PNA.  Hospital course c/b ARF requiring HFNC and new onset afib. Transferred to [facility] for bronchoscopy - found to have rare yeast (likely colonized), AFB+ MAC atypical/non TB infect (does not need iso)    Stable  dc home today   *left shin skin tear - c/w for bacitracin     COMORBIDITES/ACTIVE MEDICAL ISSUES   #ARF 2/2 Multifocal PNA/bronchial fungal  - S/p abx (levaquin, ceftriaxone and azithromycin)   - S/p bronchoscopy - rare Geotrichum species, Few Yeast -suggest colonization, no Rx.    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  No need for isolation  - Continue Budesonide - formoterol 80mcg-4.5 mcg BID + Albuterol Q6h  - Incentive spirometry/deep breathing exercise  - Gait Instability, ADL impairments and Functional impairments: start Comprehensive Rehab Program of PT/OT     #New onset Afib  - Eliquis 5 BID   - Metoprolol 50 BID  - ECHO low normal LV systolic function, mild pulm HTN    #HTN  - Amlodipine 5 - dc d/t BLE edema  - Metoprolol 50 BID  - BP stable   #BLE edema  - Home med: Lasix 20 QD - remain on hold d/t BLE edema    #HLD  - Lipitor 40    #Back pain  #Pain control  - Tylenol PRN, Lidocaine patch (left hip) - standing to PRN, Cyclobenzaprine 5 TID PRN    #Ear congestion  - s/p debrox, which seems to have worsen hearing and congestion  - ENT consult appreciated - presbycusis, rec hearing test outpatient    #Insomnia  - Quiet and low stimuli environment  - Melatonin 3 PRN     #GI/Bowel Mgmt   - At risk for constipation due to neurologic diagnosis, immobility and/or medication use  - Senna,  Miralax BID, Dulcolax PO PRN  - Probiotic: Florastor  - GI ppx: Protonix    #BPH  - Flomax 0.4 HS  - Finasteride 5 HS  #Bladder management  - Encourage timed voids Q4hrs while awake for independence and to promote continence during therapy    #DVT  - Eliquis 5 BID  - SCDs, TEDs     #Skin:  --dry skin--c/w ammonium lactate to legs  - left shin skin tear - bacitracin BID  -At risk for pressure injury due to neurologic diagnosis and relative immobility  -Bilateral heels - soft heel protectors, apply liquid barrier film daily and monitor for tissue type changes    FEN   - Diet - Regular + Thins  [DASH/TLC]      Precautions / PROPHYLAXIS:   - Falls  - ortho: Weight bearing status: WBAT   - Lungs: Aspiration, Incentive Spirometer   - Pressure injury/Skin: Turn Q2hrs while in bed, OOB to Chair, PT/OT        IDT 9/19 - lead by Dr. Torres  lives alone in a ranch style home, 4 [address].  Daughter lives near.  was independent PTA.  Has RW at home  Functional Status: overall mod I with ADLs - but has poor endurance and fatigues easily, shower transfer SV, close SV with transfers/amb 100' + verbal cues/8 steps + 2HR step to step pattern  Goals: mod I, may need some assistant with IADLs  TDD: 9/24 home.changed to 9/23 as per patient's request          DaughterChichi [phone]  Pharm: [facility] on [address]     Liaison with family   I called and spoke on phone with Ms Lauro Cadet, daughter  I gave update on patients medical and functional progress/improvements--resolution of leg edema, improved ambulatory distance and no oxy requirement. Discussed dc date as per IDT for 9/24  She was happy and family will be planning for dc 9/24 as planned       Follow ups:  Deedee Walton)  Critical Care Medicine; Internal Medicine; Pulmonary Disease  [address] [facility] [address]  [place] [address]  Phone: [phone]  Fax: [phone]    Cardiology: Gualberto Mai   Infectious Disease: Mustapha Benoit  Office: [phone].  Cell [phone]  Pulmonology: Dr. Almonte, [facility] (Dr. NAIF Evans) for hearing test. Office # [phone]

## 2022-09-23 NOTE — PROGRESS NOTE ADULT - ASSESSMENT
87M with HTN, HLD, hx of CVA (7 years ago, no residual deficits), BPH, hx of skin CA who presents to [place] ER on 8/11 with cough and fevers - azithromycin and ceftriaxone empirically for multifocal PNA.  Hospital course c/b ARF requiring HFNC and new onset afib. Transferred to [facility]"" for bronchoscopy - found to have rare yeast (likely colonized), AFB+ MAC atypical/non TB infect (does not need iso), admitted to [facility] for acute rehab.    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 No need for isolation  - Continue Budesonide, Albuterol  - Incentive spirometry    #New Onset Afib  - Eliquis 5 mg BID   - Metoprolol tartrate 50 mg BID  - Echo showed low normal LV systolic function, mild pulm HTN    #HTN  - Stopped Amlodipine 9/15 due to BP on lower side  - Continue Metoprolol on discharge    #Mild LLE Edema  - Improved with Lasix, hold further Lasix due to elevation in Cr  - Leg elevation, ACE wraps, TEDs    #HLD  - Lipitor     #Insomnia  - Melatonin     #BPH  - Flomax   - Finasteride     #DVT  - Eliquis

## 2022-09-23 NOTE — PROGRESS NOTE ADULT - SUBJECTIVE AND OBJECTIVE BOX
Patient is a 88y old  Male who presents with a chief complaint of Acute resp failure/Sepsis secondary to multifocal PNA/Hemoptysis (22 Sep 2022 11:23)    Patient seen and examined at bedside. No acute overnight events.     ALLERGIES:  No Known Allergies    MEDICATIONS  (STANDING):  ALBUTerol    90 MICROgram(s) HFA Inhaler 1 Puff(s) Inhalation every 6 hours  ammonium lactate 12% Lotion 1 Application(s) Topical two times a day  apixaban 5 milliGRAM(s) Oral two times a day  atorvastatin 40 milliGRAM(s) Oral at bedtime  BACItracin   Ointment 1 Application(s) Topical two times a day  budesonide  80 MICROgram(s)/formoterol 4.5 MICROgram(s) Inhaler 2 Puff(s) Inhalation two times a day  finasteride 5 milliGRAM(s) Oral daily  influenza  Vaccine (HIGH DOSE) 0.7 milliLiter(s) IntraMuscular once  metoprolol tartrate 50 milliGRAM(s) Oral two times a day  pantoprazole    Tablet 40 milliGRAM(s) Oral before breakfast  polyethylene glycol 3350 17 Gram(s) Oral two times a day  saccharomyces boulardii 250 milliGRAM(s) Oral two times a day  senna 2 Tablet(s) Oral at bedtime  tamsulosin 0.4 milliGRAM(s) Oral at bedtime    MEDICATIONS  (PRN):  acetaminophen     Tablet .. 650 milliGRAM(s) Oral every 6 hours PRN Mild Pain (1 - 3)  bisacodyl 5 milliGRAM(s) Oral daily PRN Constipation  cyclobenzaprine 5 milliGRAM(s) Oral three times a day PRN Muscle Spasm  lidocaine   4% Patch 1 Patch Transdermal daily PRN pain  melatonin 3 milliGRAM(s) Oral at bedtime PRN Insomnia    Vital Signs Last 24 Hrs  T(F): 97.8 (23 Sep 2022 08:07), Max: 98.2 (22 Sep 2022 21:10)  HR: 58 (23 Sep 2022 08:07) (58 - 70)  BP: 118/64 (23 Sep 2022 08:07) (118/64 - 150/74)  RR: 14 (23 Sep 2022 08:07) (14 - 16)  SpO2: 98% (23 Sep 2022 08:07) (96% - 98%)    I&O's Summary    22 Sep 2022 07:01  -  23 Sep 2022 07:00  --------------------------------------------------------  IN: 0 mL / OUT: 350 mL / NET: -350 mL          PHYSICAL EXAM:  GENERAL: NAD  HEENT: Anicteric, moist mucous membranes, no pharyngeal exudates  EYES: Conjunctiva clear, no scleral icterus  CHEST/LUNG: Clear to auscultation bilaterally, breathing nonlabored, no rales, wheezes, or rhonchi  HEART: S1 S2, regular rate and rhythm  ABDOMEN: BS+, soft, nontender, nondistended  EXTREMITIES: No edema, cyanosis, or calf tenderness  NERVOUS SYSTEM: A&Ox3, good concentration  PSYCH: Answers questions and follows commands appropriately    LABS:                        10.6   10.09 )-----------( 292      ( 22 Sep 2022 06:20 )             32.5       09-22    141  |  106  |  28  ----------------------------<  83  4.5   |  28  |  1.35    Ca    8.5      22 Sep 2022 06:20    TPro  7.0  /  Alb  2.6  /  TBili  0.5  /  DBili  x   /  AST  34  /  ALT  52  /  AlkPhos  133  09-22       COVID-19 PCR: NotDetec (09-20-22 @ 05:00)  COVID-19 PCR: NotDetec (09-13-22 @ 17:24)  COVID-19 PCR: NotDetec (09-12-22 @ 05:00)  COVID-19 PCR: NotDetec (09-06-22 @ 12:30)  COVID-19 PCR: NotDetec (08-30-22 @ 10:15)      RADIOLOGY & ADDITIONAL TESTS:     Care Discussed with Consultants/Other Providers: PM&R Dr. Torres

## 2022-09-23 NOTE — PROGRESS NOTE ADULT - REASON FOR ADMISSION
Acute resp failure/Sepsis secondary to multifocal PNA/ Hemoptysis
Acute resp failure/Sepsis secondary to multifocal PNA/Hemoptysis
Acute respiratory failure/Sepsis secondary to multifocal PNA/Hemoptysis
Acute resp failure/Sepsis secondary to multifocal PNA/Hemoptysis

## 2022-09-23 NOTE — DISCHARGE NOTE NURSING/CASE MANAGEMENT/SOCIAL WORK - PATIENT PORTAL LINK FT
You can access the FollowMyHealth Patient Portal offered by [facility] by registering at the following website: [email or web address]. By joining [facility]’s FollowMyHealth portal, you will also be able to view your health information using other applications (apps) compatible with our system.

## 2022-09-23 NOTE — PROGRESS NOTE ADULT - PROVIDER SPECIALTY LIST ADULT
Rehab Medicine
Hospitalist
Rehab Medicine
Hospitalist
Hospitalist
Rehab Medicine

## 2022-09-23 NOTE — DISCHARGE NOTE NURSING/CASE MANAGEMENT/SOCIAL WORK - NSDCPEFALRISK_GEN_ALL_CORE
For information on Fall & Injury Prevention, visit: [email or web address] OR  [email or web address] OR  https://www.cdc.gov/steadi/patient.html

## 2022-09-23 NOTE — PROGRESS NOTE ADULT - NUTRITIONAL ASSESSMENT
This patient has been assessed with a concern for Malnutrition and has been determined to have a diagnosis/diagnoses of Mild protein-calorie malnutrition.    This patient is being managed with:   Diet Regular-  Entered: Sep 19 2022  9:49AM    
This patient has been assessed with a concern for Malnutrition and has been determined to have a diagnosis/diagnoses of Mild protein-calorie malnutrition.    This patient is being managed with:   Diet DASH/TLC-  Sodium & Cholesterol Restricted  Supplement Feeding Modality:  Oral  Ensure Enlive Cans or Servings Per Day:  1       Frequency:  Daily  Entered: Sep 13 2022  2:52PM    
This patient has been assessed with a concern for Malnutrition and has been determined to have a diagnosis/diagnoses of Mild protein-calorie malnutrition.    This patient is being managed with:   Diet DASH/TLC-  Sodium & Cholesterol Restricted  Supplement Feeding Modality:  Oral  Ensure Enlive Cans or Servings Per Day:  1       Frequency:  Daily  Entered: Sep 13 2022  2:52PM    The following pending diet order is being considered for treatment of Mild protein-calorie malnutrition:  Diet Regular-  Entered: Sep 19 2022  9:49AM  
This patient has been assessed with a concern for Malnutrition and has been determined to have a diagnosis/diagnoses of Mild protein-calorie malnutrition.    This patient is being managed with:   Diet Regular-  Entered: Sep 19 2022  9:49AM    
This patient has been assessed with a concern for Malnutrition and has been determined to have a diagnosis/diagnoses of Mild protein-calorie malnutrition.    This patient is being managed with:   Diet DASH/TLC-  Sodium & Cholesterol Restricted  Supplement Feeding Modality:  Oral  Ensure Enlive Cans or Servings Per Day:  1       Frequency:  Daily  Entered: Sep 13 2022  2:52PM    The following pending diet order is being considered for treatment of Mild protein-calorie malnutrition:  Diet Regular-  Entered: Sep 19 2022  9:49AM  
This patient has been assessed with a concern for Malnutrition and has been determined to have a diagnosis/diagnoses of Mild protein-calorie malnutrition.    This patient is being managed with:   Diet Regular-  Entered: Sep 19 2022  9:49AM

## 2022-09-28 ENCOUNTER — NON-APPOINTMENT (OUTPATIENT)
Age: 87
End: 2022-09-28

## 2022-09-28 ENCOUNTER — APPOINTMENT (OUTPATIENT)
Dept: INTERNAL MEDICINE | Facility: CLINIC | Age: 87
End: 2022-09-28

## 2022-09-28 VITALS
RESPIRATION RATE: 14 BRPM | DIASTOLIC BLOOD PRESSURE: 70 MMHG | HEIGHT: 68 IN | SYSTOLIC BLOOD PRESSURE: 130 MMHG | HEART RATE: 79 BPM | OXYGEN SATURATION: 94 %

## 2022-09-28 DIAGNOSIS — I63.50 CEREBRAL INFARCTION DUE TO UNSPECIFIED OCCLUSION OR STENOSIS OF UNSPECIFIED CEREBRAL ARTERY: ICD-10-CM

## 2022-09-28 DIAGNOSIS — H91.91 UNSPECIFIED HEARING LOSS, RIGHT EAR: ICD-10-CM

## 2022-09-28 DIAGNOSIS — Z09 ENCOUNTER FOR FOLLOW-UP EXAMINATION AFTER COMPLETED TREATMENT FOR CONDITIONS OTHER THAN MALIGNANT NEOPLASM: ICD-10-CM

## 2022-09-28 DIAGNOSIS — R60.9 EDEMA, UNSPECIFIED: ICD-10-CM

## 2022-09-28 DIAGNOSIS — Z23 ENCOUNTER FOR IMMUNIZATION: ICD-10-CM

## 2022-09-28 PROCEDURE — 99214 OFFICE O/P EST MOD 30 MIN: CPT | Mod: 25

## 2022-09-28 PROCEDURE — G0008: CPT

## 2022-09-28 PROCEDURE — 90662 IIV NO PRSV INCREASED AG IM: CPT

## 2022-09-28 RX ORDER — TAMSULOSIN HYDROCHLORIDE 0.4 MG/1
0.4 CAPSULE ORAL
Refills: 0 | Status: ACTIVE | COMMUNITY
Start: 2022-09-28

## 2022-09-28 RX ORDER — FUROSEMIDE 20 MG/1
20 TABLET ORAL
Refills: 0 | Status: COMPLETED | COMMUNITY
End: 2022-09-28

## 2022-09-28 RX ORDER — FINASTERIDE 5 MG/1
5 TABLET, FILM COATED ORAL
Refills: 0 | Status: COMPLETED | COMMUNITY
End: 2022-09-28

## 2022-09-28 RX ORDER — FINASTERIDE 5 MG/1
5 TABLET, FILM COATED ORAL
Refills: 0 | Status: ACTIVE | COMMUNITY
Start: 2022-09-28

## 2022-09-28 NOTE — PHYSICAL EXAM
[No Murmur] : no murmur heard [Pedal Pulses Present] : the pedal pulses are present [Normal] : soft, non-tender, non-distended, no masses palpated, no HSM and normal bowel sounds [Coordination Grossly Intact] : coordination grossly intact [No Focal Deficits] : no focal deficits [Alert and Oriented x3] : oriented to person, place, and time [de-identified] : cerumen noted in bilateral ears  [de-identified] : irregularly irregular, rate controlled.  [de-identified] : venous stasis changes noted in bilateral lower extremities, trace nonpitting edema noted

## 2022-09-28 NOTE — PLAN
[FreeTextEntry1] : Hospital Discharge Follow-Up\par Patient following up s/p admission for acute hypoxic respiratory failure and sepsis 2/2 multifocal PNA, CINDI, and new onset atrial fibrillation \par Pt is doing better. He has intermittent SOB with exertion that improves with inhaler use, likely residual from recent PNA. Lungs clear on exam today. Continue albuterol and budesonide inhalers. See pulmonologist for close followup. \par Will check CBC and CMP today to monitor anemia noted in recent labs on discharge as well as a mild CINDI \par Trace edema on bilateral lower extremiities: appears to be due to venous stasis. Workup in hospital was negative. Continue compression stockings, elevate legs \par Atrial fibrillation: continue metoprolol 50 mg BID, Continue Eliquis BID. F/U with cardiology \par BPH: continue finasteride and Flomax \par Hearing loss: likely due to cerumen impaction. Patient will f/u with ENT \par High dose flu shot administered in office today. \par \par Patient will return to the office in 1-2 months for first AWV. \par \par \par

## 2022-09-28 NOTE — HEALTH RISK ASSESSMENT
[Never] : Never [No] : No [0] : 2) Feeling down, depressed, or hopeless: Not at all (0) [PHQ-2 Negative - No further assessment needed] : PHQ-2 Negative - No further assessment needed [[id number]] : 0

## 2022-09-28 NOTE — HISTORY OF PRESENT ILLNESS
[Post-hospitalization from ___ Hospital] : Post-hospitalization from [unfilled] Hospital [Admitted on: ___] : The patient was admitted on [unfilled] [Discharged on ___] : discharged on [unfilled] [FreeTextEntry2] : 87 y/o M with PMH HTN, HLD, hx of CVA (no residual deficits), BPH, hx of skin CA, pAF (on Eliquis) who presents today as a new patient for hospital followup. Patient was hospitalized at [facility] in late August for sepsis 2/2 multifocal PNA with progression to acute hypoxic respiratory failure with course complicated by CINDI and new onset paroxysmal afib. His PNA was evaluated by ID; he had initial AFB that was positive however after bronchoscopy and BAL specimens were tested his PNA was found to be due to MAC. He was treated with appropriate antibiotics. He was found to be in new onset afib and started on metoprolol and Eliquis. His amlodipine was discontinued due to LE edema which resolved with elevation and compression stockings. His CINDI improved with fluids. He was sent to  rehab after hospitalization and did well while in [facility]. He was discharged from [facility] on 9/23/22. \par \par Patient states that he has been feeling much better since discharge. He does report some intermittent SOB when he exerts himself; however, when he uses his inhalers he has noticed some improvement. He has been taking all his medications as prescribed. He states that while at rehab he was having some trouble with hearing out of his right ear due to cerumen; he was provided with some Debrox drops but he states that he feels that the wax is still in his ear. He denies any other acute complaints. \par

## 2022-09-29 LAB
CULTURE RESULTS: SIGNIFICANT CHANGE UP
SPECIMEN SOURCE: SIGNIFICANT CHANGE UP

## 2022-09-30 LAB
ALBUMIN SERPL ELPH-MCNC: 3.9 G/DL
ALP BLD-CCNC: 146 U/L
ALT SERPL-CCNC: 33 U/L
ANION GAP SERPL CALC-SCNC: 12 MMOL/L
AST SERPL-CCNC: 28 U/L
BASOPHILS # BLD AUTO: 0.1 K/UL
BASOPHILS NFR BLD AUTO: 1 %
BILIRUB SERPL-MCNC: 0.4 MG/DL
BUN SERPL-MCNC: 23 MG/DL
CALCIUM SERPL-MCNC: 8.7 MG/DL
CHLORIDE SERPL-SCNC: 105 MMOL/L
CO2 SERPL-SCNC: 25 MMOL/L
CREAT SERPL-MCNC: 1.21 MG/DL
EGFR: 58 ML/MIN/1.73M2
EOSINOPHIL # BLD AUTO: 0.32 K/UL
EOSINOPHIL NFR BLD AUTO: 3 %
GGT SERPL-CCNC: 30 U/L
GLUCOSE SERPL-MCNC: 85 MG/DL
HCT VFR BLD CALC: 38.5 %
HGB BLD-MCNC: 11.7 G/DL
IMM GRANULOCYTES NFR BLD AUTO: 0.6 %
LYMPHOCYTES # BLD AUTO: 2.24 K/UL
LYMPHOCYTES NFR BLD AUTO: 21.3 %
MAN DIFF?: NORMAL
MCHC RBC-ENTMCNC: 30.4 GM/DL
MCHC RBC-ENTMCNC: 31.3 PG
MCV RBC AUTO: 102.9 FL
MONOCYTES # BLD AUTO: 1.15 K/UL
MONOCYTES NFR BLD AUTO: 10.9 %
NEUTROPHILS # BLD AUTO: 6.64 K/UL
NEUTROPHILS NFR BLD AUTO: 63.2 %
PLATELET # BLD AUTO: 346 K/UL
POTASSIUM SERPL-SCNC: 5.3 MMOL/L
PROT SERPL-MCNC: 7.8 G/DL
RBC # BLD: 3.74 M/UL
RBC # FLD: 14.9 %
SODIUM SERPL-SCNC: 143 MMOL/L
WBC # FLD AUTO: 10.51 K/UL

## 2022-10-02 PROCEDURE — 97535 SELF CARE MNGMENT TRAINING: CPT

## 2022-10-02 PROCEDURE — 97116 GAIT TRAINING THERAPY: CPT

## 2022-10-02 PROCEDURE — 97530 THERAPEUTIC ACTIVITIES: CPT

## 2022-10-02 PROCEDURE — 85025 COMPLETE CBC W/AUTO DIFF WBC: CPT

## 2022-10-02 PROCEDURE — 85027 COMPLETE CBC AUTOMATED: CPT

## 2022-10-02 PROCEDURE — 97110 THERAPEUTIC EXERCISES: CPT

## 2022-10-02 PROCEDURE — 94640 AIRWAY INHALATION TREATMENT: CPT

## 2022-10-02 PROCEDURE — U0003: CPT

## 2022-10-02 PROCEDURE — 97167 OT EVAL HIGH COMPLEX 60 MIN: CPT

## 2022-10-02 PROCEDURE — 36415 COLL VENOUS BLD VENIPUNCTURE: CPT

## 2022-10-02 PROCEDURE — 87635 SARS-COV-2 COVID-19 AMP PRB: CPT

## 2022-10-02 PROCEDURE — 97163 PT EVAL HIGH COMPLEX 45 MIN: CPT

## 2022-10-02 PROCEDURE — 97112 NEUROMUSCULAR REEDUCATION: CPT

## 2022-10-02 PROCEDURE — U0005: CPT

## 2022-10-02 PROCEDURE — 80053 COMPREHEN METABOLIC PANEL: CPT

## 2022-10-02 PROCEDURE — 83880 ASSAY OF NATRIURETIC PEPTIDE: CPT

## 2022-10-03 LAB
ALP BLD-CCNC: 154 IU/L
ALP BONE CFR SERPL: 19 %
ALP INTEST CFR SERPL: 1 %
ALP LIVER CFR SERPL: 80 %

## 2022-10-05 LAB
CULTURE RESULTS: SIGNIFICANT CHANGE UP
SPECIMEN SOURCE: SIGNIFICANT CHANGE UP

## 2022-10-06 ENCOUNTER — APPOINTMENT (OUTPATIENT)
Dept: OTOLARYNGOLOGY | Facility: CLINIC | Age: 87
End: 2022-10-06

## 2022-10-06 VITALS
HEART RATE: 56 BPM | DIASTOLIC BLOOD PRESSURE: 72 MMHG | HEIGHT: 68 IN | WEIGHT: 150 LBS | SYSTOLIC BLOOD PRESSURE: 150 MMHG | BODY MASS INDEX: 22.73 KG/M2

## 2022-10-06 DIAGNOSIS — H65.199 OTHER ACUTE NONSUPPURATIVE OTITIS MEDIA, UNSPECIFIED EAR: ICD-10-CM

## 2022-10-06 DIAGNOSIS — J34.2 DEVIATED NASAL SEPTUM: ICD-10-CM

## 2022-10-06 PROCEDURE — 92557 COMPREHENSIVE HEARING TEST: CPT

## 2022-10-06 PROCEDURE — 92567 TYMPANOMETRY: CPT

## 2022-10-06 PROCEDURE — 92511 NASOPHARYNGOSCOPY: CPT

## 2022-10-06 PROCEDURE — 99213 OFFICE O/P EST LOW 20 MIN: CPT | Mod: 25

## 2022-10-06 NOTE — PHYSICAL EXAM
[Midline] : trachea located in midline position [Normal] : no rashes [] : septum deviated to the right [de-identified] : Right TM with serous effusion. Left TM intact with no effusion.

## 2022-10-06 NOTE — CONSULT LETTER
[Dear  ___] : Dear  [unfilled], [Consult Letter:] : I had the pleasure of evaluating your patient, [unfilled]. [Please see my note below.] : Please see my note below. [Consult Closing:] : Thank you very much for allowing me to participate in the care of this patient.  If you have any questions, please do not hesitate to contact me. [Sincerely,] : Sincerely, [FreeTextEntry3] : Dudley Rodriguez M.D.

## 2022-10-06 NOTE — DATA REVIEWED
[de-identified] : - TYPE B TYMP IN THE RIGHT, TYPE A IN THE LEFT\par RIGHT: MILD SLOPING TO SEVERE TO PROFOUND MIXED LOSS\par LEFT: BORDERLINE NORMAL AT 250HZ SLOPING TO A MOD-SEVERE SNHL

## 2022-10-06 NOTE — HISTORY OF PRESENT ILLNESS
[de-identified] : Cedrick Layne is an 89 yo male with hx atrial fibrillation who was referred by Dr. Brown for evaluation of hearing. He was recently admitted for pneumonia and he was told he has right cerumen impaction. His daughter notes that prior to this, he was having difficulty hearing. He denies tinnitus, vertigo, otalgia, or otorrhea. He denies history of recurrent ear infections. He denies fevers, chills, facial weakness, facial numbness. He denies family history of early onset hearing loss or significant noise exposure. He denies exposure to ototoxic medications.

## 2022-10-06 NOTE — REVIEW OF SYSTEMS
[Hearing Loss] : hearing loss [Nasal Congestion] : nasal congestion [Hoarseness] : hoarseness [Throat Clearing] : throat clearing [Throat Dryness] : throat dryness [Throat Itching] : throat itching [Shortness Of Breath] : shortness of breath [Itching] : itching [Easy Bruising] : tendency for easy bruising [Negative] : Endocrine

## 2022-10-17 ENCOUNTER — NON-APPOINTMENT (OUTPATIENT)
Age: 87
End: 2022-10-17

## 2022-10-17 ENCOUNTER — APPOINTMENT (OUTPATIENT)
Dept: PULMONOLOGY | Facility: CLINIC | Age: 87
End: 2022-10-17

## 2022-10-17 VITALS — HEART RATE: 59 BPM | SYSTOLIC BLOOD PRESSURE: 144 MMHG | DIASTOLIC BLOOD PRESSURE: 74 MMHG | OXYGEN SATURATION: 95 %

## 2022-10-17 DIAGNOSIS — A31.0 PULMONARY MYCOBACTERIAL INFECTION: ICD-10-CM

## 2022-10-17 PROCEDURE — 94010 BREATHING CAPACITY TEST: CPT

## 2022-10-17 PROCEDURE — 71046 X-RAY EXAM CHEST 2 VIEWS: CPT

## 2022-10-17 PROCEDURE — 99204 OFFICE O/P NEW MOD 45 MIN: CPT | Mod: 25

## 2022-10-17 PROCEDURE — 94727 GAS DIL/WSHOT DETER LNG VOL: CPT

## 2022-10-17 PROCEDURE — ZZZZZ: CPT

## 2022-10-17 PROCEDURE — 94729 DIFFUSING CAPACITY: CPT

## 2022-10-18 ENCOUNTER — NON-APPOINTMENT (OUTPATIENT)
Age: 87
End: 2022-10-18

## 2022-10-18 ENCOUNTER — LABORATORY RESULT (OUTPATIENT)
Age: 87
End: 2022-10-18

## 2022-10-18 ENCOUNTER — APPOINTMENT (OUTPATIENT)
Dept: CARDIOLOGY | Facility: CLINIC | Age: 87
End: 2022-10-18

## 2022-10-18 VITALS
DIASTOLIC BLOOD PRESSURE: 80 MMHG | HEART RATE: 58 BPM | WEIGHT: 150 LBS | OXYGEN SATURATION: 98 % | BODY MASS INDEX: 22.73 KG/M2 | SYSTOLIC BLOOD PRESSURE: 142 MMHG | HEIGHT: 68 IN

## 2022-10-18 PROCEDURE — 99214 OFFICE O/P EST MOD 30 MIN: CPT

## 2022-10-18 PROCEDURE — 93000 ELECTROCARDIOGRAM COMPLETE: CPT

## 2022-10-18 NOTE — CHART NOTE - NSCHARTNOTEFT_GEN_A_CORE
Assessment: Per H&P "87M with HTN, HLD, hx of CVA (no residual deficits), BPH, hx of skin CA who presents with cough and fevers.    Found to have multifocal pneumonia.  Also found have new onset afib."    8/19  Pt due for malnutrition follow-up. Spoke with pt in room, remains on high flow, c/o getting tired when eating. Presents with poor-fair appetite, intake decreased since last visit. Willing to work with RD to consume adequate pro/energy. Obtained new food preferences, introduced easy to eat items, offered meal tray set-up assistant. Denies n/v/d/c. No reported difficulty chewing or swallowing, but tired, offered chopped meats, pt receptive. NKFA. Pertinent medications/nutrition labs reviewed; noted elevated BUN. Recommend increase Ensure Enlive 8oz (350kcal, 20g pro) to BID, double egg at breakfast, yogurt and milk to optimize intake. Continue to encourage po intake as able. RD to continue to monitor nutrition status per protocol.     Factors impacting intake: [ ] none [ ] nausea  [ ] vomiting [ ] diarrhea [ ] constipation  [ ]chewing problems [ ] swallowing issues  [x ] other: fatigue    Diet Prescription: Diet, DASH/TLC:   Sodium & Cholesterol Restricted  Supplement Feeding Modality:  Oral  Ensure Enlive Cans or Servings Per Day:  1       Frequency:  Daily (08-15-22 @ 14:20)    Intake: poor-fair, 25-50%    Current Weight: 72.6 kg (8-19); 73.2 kg (8-14)  ---wt in house appears to be stable, noted mild edema    Pertinent Medications: MEDICATIONS  (STANDING):  atorvastatin 40 milliGRAM(s) Oral at bedtime  cyclobenzaprine 5 milliGRAM(s) Oral at bedtime  enoxaparin Injectable 40 milliGRAM(s) SubCutaneous every 24 hours  finasteride 5 milliGRAM(s) Oral daily  guaifenesin/dextromethorphan Oral Liquid 10 milliLiter(s) Oral every 6 hours  levoFLOXacin IVPB 750 milliGRAM(s) IV Intermittent every 48 hours  melatonin 5 milliGRAM(s) Oral at bedtime  metoprolol tartrate 100 milliGRAM(s) Oral two times a day  pantoprazole    Tablet 40 milliGRAM(s) Oral before breakfast  senna 2 Tablet(s) Oral at bedtime  tamsulosin 0.4 milliGRAM(s) Oral at bedtime    MEDICATIONS  (PRN):  acetaminophen     Tablet .. 650 milliGRAM(s) Oral every 6 hours PRN Temp greater or equal to 38C (100.4F), Mild Pain (1 - 3)  albuterol/ipratropium for Nebulization 3 milliLiter(s) Nebulizer every 6 hours PRN Shortness of Breath and/or Wheezing  aluminum hydroxide/magnesium hydroxide/simethicone Suspension 30 milliLiter(s) Oral every 4 hours PRN Dyspepsia  magnesium hydroxide Suspension 30 milliLiter(s) Oral daily PRN Constipation  ondansetron Injectable 4 milliGRAM(s) IV Push every 8 hours PRN Nausea and/or Vomiting  polyethylene glycol 3350 17 Gram(s) Oral daily PRN Constipation    Pertinent Labs: 08-18 Na137 mmol/L Glu 117 mg/dL<H> K+ 4.4 mmol/L Cr  1.28 mg/dL BUN 32 mg/dL<H> 08-18 Phos 4.2 mg/dL 08-18 Alb 1.6 g/dL<L>     CAPILLARY BLOOD GLUCOSE    Skin: +1 edema r/l ankle, no pressure injury    Estimated Needs:   [ x] no change since previous assessment  [ ] recalculated:     Previous Nutrition Diagnosis:   [ ] Inadequate Energy Intake [ ]Inadequate Oral Intake [ ] Excessive Energy Intake   [ ] Underweight [ ] Increased Nutrient Needs [ ] Overweight/Obesity   [ ] Altered GI Function [ ] Unintended Weight Loss [ ] Food & Nutrition Related Knowledge Deficit [ x] Malnutrition     Nutrition Diagnosis is [x ] ongoing  [ ] resolved [ ] not applicable     New Nutrition Diagnosis: [ x] not applicable       Interventions:  Recommend increase Ensure Enlive 8oz (350kcal, 20g pro) to BID, double egg at breakfast, yogurt and milk to optimize intake. Continue to encourage po intake as able.   Recommend  [ ] Change Diet To:  [x ] Nutrition Supplement  [x ] Nutrition Support  [ ] Other:     Monitoring and Evaluation:   [X ] PO intake [ x ] Tolerance to diet prescription [ x ] weights [ x ] labs[ x ] follow up per protocol  [ ] other: Present, accurate, and signed

## 2022-10-19 ENCOUNTER — APPOINTMENT (OUTPATIENT)
Dept: CARDIOLOGY | Facility: CLINIC | Age: 87
End: 2022-10-19

## 2022-10-19 LAB
CULTURE RESULTS: SIGNIFICANT CHANGE UP
SPECIMEN SOURCE: SIGNIFICANT CHANGE UP

## 2022-10-19 NOTE — PHYSICAL EXAM
[No Acute Distress] : no acute distress [Well Nourished] : well nourished [Well Developed] : well developed [No Resp Distress] : no resp distress [No Acc Muscle Use] : no acc muscle use [Clear to Auscultation Bilaterally] : clear to auscultation bilaterally [Kyphosis] : kyphosis [No Focal Deficits] : no focal deficits [Oriented x3] : oriented x3

## 2022-10-20 NOTE — ASSESSMENT
[FreeTextEntry1] : SCOTT discussed- hold on any treatment for now- want to see his repeat CT chest\par cardio eval\par f/u after ct chest\par \par of note- probnp 7K when admitted\par quantiferon indeterminate

## 2022-10-20 NOTE — HISTORY OF PRESENT ILLNESS
[FreeTextEntry1] : 89 y/o M with PMH HTN, HLD, hx of CVA (no residual deficits), BPH, hx of skin CA, pAF (on Eliquis) who presents today as a new patient for hospital followup. Patient was hospitalized at [facility] in late August for sepsis 2/2 multifocal PNA with progression to acute hypoxic respiratory failure with course complicated by CINDI and new onset paroxysmal afib. His PNA was evaluated by ID; he had initial AFB that was positive however after bronchoscopy and BAL specimens were tested his PNA was found to be due to MAC. He was treated with appropriate antibiotics. He was found to be in new onset afib and started on metoprolol and Eliquis pt had amlodipine leg  which resolved after d/c of drug.\par  pt feels well today but has DONG.

## 2022-10-20 NOTE — HISTORY OF PRESENT ILLNESS
[Never] : never [TextBox_4] : DENNIS BRADFORD is a 88 year old male who presents with family for dyspnea\par \par \par  89 y/o M with PMH HTN, HLD, hx of CVA (no residual deficits), BPH, hx of skin CA\par \par Admitted to [facility] 8/29 for dyspnea/ fevers/ illness, sent to [place] for broncho secondary to hemoptysis\par newly diagnosed afib and now on eliquis and bronchoscopy on 9/1- AFB growth in broth from 8/12 sputum samples SCOTT +\par  eventually discharged to rehab on 9/17. home from rehab on 9/23\par \par some dyspnea with exertion. some wheezing. on inhalers from hospitalization\par trace LE swelling\par no cough

## 2022-10-20 NOTE — PROCEDURE
[FreeTextEntry1] : 6 min walk- mild desaturation at min 5 to 91%\par spirometry shows restriction, volumes are mildly decreased. dlco moderate reduction but corrects for va\par \par xray [facility] 8/22\par ct chest [facility] 8/11\par \par xray today 10/2022- mild improvement in infiltrate HUMERA. unchanged right. \par no effusions

## 2022-10-21 LAB
ALBUMIN SERPL ELPH-MCNC: 3.8 G/DL
ALP BLD-CCNC: 119 U/L
ALT SERPL-CCNC: 38 U/L
ANION GAP SERPL CALC-SCNC: 13 MMOL/L
APPEARANCE: CLEAR
AST SERPL-CCNC: 31 U/L
BACTERIA: NEGATIVE
BASOPHILS # BLD AUTO: 0.09 K/UL
BASOPHILS NFR BLD AUTO: 0.8 %
BILIRUB DIRECT SERPL-MCNC: 0.1 MG/DL
BILIRUB INDIRECT SERPL-MCNC: 0.3 MG/DL
BILIRUB SERPL-MCNC: 0.4 MG/DL
BILIRUBIN URINE: NEGATIVE
BLOOD URINE: NEGATIVE
BUN SERPL-MCNC: 27 MG/DL
CALCIUM SERPL-MCNC: 8.9 MG/DL
CHLORIDE SERPL-SCNC: 106 MMOL/L
CHOLEST SERPL-MCNC: 130 MG/DL
CK SERPL-CCNC: 51 U/L
CO2 SERPL-SCNC: 23 MMOL/L
COLOR: NORMAL
COVID-19 NUCLEOCAPSID  GAM ANTIBODY INTERPRETATION: NEGATIVE
COVID-19 SPIKE DOMAIN ANTIBODY INTERPRETATION: POSITIVE
CREAT SERPL-MCNC: 1.19 MG/DL
EGFR: 59 ML/MIN/1.73M2
EOSINOPHIL # BLD AUTO: 0.2 K/UL
EOSINOPHIL NFR BLD AUTO: 1.8 %
ESTIMATED AVERAGE GLUCOSE: 111 MG/DL
GLUCOSE QUALITATIVE U: NEGATIVE
GLUCOSE SERPL-MCNC: 88 MG/DL
HBA1C MFR BLD HPLC: 5.5 %
HCT VFR BLD CALC: 39.1 %
HDLC SERPL-MCNC: 36 MG/DL
HGB BLD-MCNC: 12.2 G/DL
HYALINE CASTS: 0 /LPF
IMM GRANULOCYTES NFR BLD AUTO: 0.5 %
KETONES URINE: NEGATIVE
LDLC SERPL CALC-MCNC: 69 MG/DL
LEUKOCYTE ESTERASE URINE: NEGATIVE
LYMPHOCYTES # BLD AUTO: 2.43 K/UL
LYMPHOCYTES NFR BLD AUTO: 21.9 %
MAGNESIUM SERPL-MCNC: 1.9 MG/DL
MAN DIFF?: NORMAL
MCHC RBC-ENTMCNC: 31.2 GM/DL
MCHC RBC-ENTMCNC: 31.9 PG
MCV RBC AUTO: 102.4 FL
MICROSCOPIC-UA: NORMAL
MONOCYTES # BLD AUTO: 1.32 K/UL
MONOCYTES NFR BLD AUTO: 11.9 %
NEUTROPHILS # BLD AUTO: 6.99 K/UL
NEUTROPHILS NFR BLD AUTO: 63.1 %
NITRITE URINE: NEGATIVE
NONHDLC SERPL-MCNC: 94 MG/DL
NT-PROBNP SERPL-MCNC: 1232 PG/ML
OSMOLALITY SERPL: 295 MOSMOL/KG
PH URINE: 6
PHOSPHATE SERPL-MCNC: 4 MG/DL
PLATELET # BLD AUTO: 238 K/UL
POTASSIUM SERPL-SCNC: 5.2 MMOL/L
PROT SERPL-MCNC: 7.7 G/DL
PROTEIN URINE: ABNORMAL
RBC # BLD: 3.82 M/UL
RBC # FLD: 14.9 %
RED BLOOD CELLS URINE: 1 /HPF
SARS-COV-2 AB SERPL IA-ACNC: >250 U/ML
SARS-COV-2 AB SERPL QL IA: 0.07 INDEX
SODIUM SERPL-SCNC: 141 MMOL/L
SPECIFIC GRAVITY URINE: 1.02
SQUAMOUS EPITHELIAL CELLS: 0 /HPF
T3RU NFR SERPL: 0.9 TBI
T4 FREE SERPL-MCNC: 1 NG/DL
T4 SERPL-MCNC: 5.5 UG/DL
TRIGL SERPL-MCNC: 122 MG/DL
TSH SERPL-ACNC: 2.44 UIU/ML
URATE SERPL-MCNC: 5.4 MG/DL
UROBILINOGEN URINE: NORMAL
WBC # FLD AUTO: 11.08 K/UL
WHITE BLOOD CELLS URINE: 2 /HPF

## 2022-10-21 NOTE — PROGRESS NOTE ADULT - PROBLEM SELECTOR PLAN 7
On lovenox, transition to DOAC after bronch - controlled  -  SBP in the 140's  -  Continue amlodipine with parameters   -  Monitor BP

## 2022-10-24 ENCOUNTER — NON-APPOINTMENT (OUTPATIENT)
Age: 87
End: 2022-10-24

## 2022-10-25 ENCOUNTER — APPOINTMENT (OUTPATIENT)
Dept: CARDIOLOGY | Facility: CLINIC | Age: 87
End: 2022-10-25

## 2022-10-25 PROCEDURE — 93015 CV STRESS TEST SUPVJ I&R: CPT

## 2022-10-25 PROCEDURE — 99214 OFFICE O/P EST MOD 30 MIN: CPT | Mod: 25

## 2022-10-25 PROCEDURE — 78452 HT MUSCLE IMAGE SPECT MULT: CPT

## 2022-10-25 PROCEDURE — A9500: CPT

## 2022-10-25 RX ORDER — REGADENOSON 0.08 MG/ML
0.4 INJECTION, SOLUTION INTRAVENOUS
Qty: 1 | Refills: 0 | Status: COMPLETED | OUTPATIENT
Start: 2022-10-25

## 2022-10-25 RX ADMIN — REGADENOSON 5 MG/5ML: 0.08 INJECTION, SOLUTION INTRAVENOUS at 00:00

## 2022-10-27 ENCOUNTER — APPOINTMENT (OUTPATIENT)
Dept: OTOLARYNGOLOGY | Facility: CLINIC | Age: 87
End: 2022-10-27

## 2022-10-27 VITALS
SYSTOLIC BLOOD PRESSURE: 188 MMHG | HEIGHT: 68 IN | BODY MASS INDEX: 22.73 KG/M2 | HEART RATE: 64 BPM | DIASTOLIC BLOOD PRESSURE: 80 MMHG | WEIGHT: 150 LBS

## 2022-10-27 VITALS — SYSTOLIC BLOOD PRESSURE: 146 MMHG | DIASTOLIC BLOOD PRESSURE: 86 MMHG

## 2022-10-27 VITALS — HEART RATE: 58 BPM | SYSTOLIC BLOOD PRESSURE: 175 MMHG | DIASTOLIC BLOOD PRESSURE: 75 MMHG

## 2022-10-27 DIAGNOSIS — H69.81 OTHER SPECIFIED DISORDERS OF EUSTACHIAN TUBE, RIGHT EAR: ICD-10-CM

## 2022-10-27 DIAGNOSIS — H93.293 OTHER ABNORMAL AUDITORY PERCEPTIONS, BILATERAL: ICD-10-CM

## 2022-10-27 DIAGNOSIS — H90.3 SENSORINEURAL HEARING LOSS, BILATERAL: ICD-10-CM

## 2022-10-27 PROCEDURE — 92557 COMPREHENSIVE HEARING TEST: CPT

## 2022-10-27 PROCEDURE — 92567 TYMPANOMETRY: CPT

## 2022-10-27 PROCEDURE — 99213 OFFICE O/P EST LOW 20 MIN: CPT

## 2022-10-27 RX ORDER — BISACODYL 5 MG/1
5 TABLET ORAL
Qty: 30 | Refills: 0 | Status: ACTIVE | COMMUNITY
Start: 2022-09-13

## 2022-10-27 RX ORDER — AMLODIPINE BESYLATE 5 MG/1
5 TABLET ORAL
Qty: 90 | Refills: 0 | Status: ACTIVE | COMMUNITY
Start: 2022-07-15

## 2022-10-27 NOTE — HISTORY OF PRESENT ILLNESS
[de-identified] : Cedrick Layne is an 89 yo male with hx atrial fibrillation who was referred by Dr. Brown for evaluation of hearing. He was recently admitted for pneumonia and he was told he has right cerumen impaction. His daughter notes that prior to this, he was having difficulty hearing. He denies tinnitus, vertigo, otalgia, or otorrhea. He denies history of recurrent ear infections. He denies fevers, chills, facial weakness, facial numbness. He denies family history of early onset hearing loss or significant noise exposure. He denies exposure to ototoxic medications. [FreeTextEntry1] : 10/27/22 - Mr. Layne presents for follow-up. He completed course of augmentin, medrol dose pack, and flonase. He notes improvement in rihgt aural fullness and hearing. He denies tinnitus, vertigo, otalgia, or otorrhea. He denies fevers or chills.

## 2022-10-27 NOTE — DATA REVIEWED
[de-identified] : \par -TYMPS: TYPE C AD, TYPE A AS\par -AD: MILD SLOPING TO SEVERE ESSENTIALLY SNHL 250-8000 HZ (CONDUCTIVE COMPONENT  HZ)\par -AS: -500 HZ, MILD SLOPING TO SEVERE SNHL 0554-7901 HZ\par *THRESHOLDS STABLE/ DECREASE IN WRS (NOTE MLV VS RECORDED SPEECH) RE 10/6/2022*

## 2022-10-27 NOTE — ASSESSMENT
[FreeTextEntry1] : Cedrick Layne presents for follow-up. He was treated for right acute otitis media with effusion at last visit. Effusion has resolved on exam. Previous nasopharyngoscopy was normal. Audiogram today shows type C tymp AD, type A tymp AS, mild sloping to severe SNHL AD, and normal to mild sloping to severe SNHL AS. He has right eustachian tube dysfunction. There is an asymmetry with the right ear being worse. Offered MRI brain/IAC but he deferred for now. Offered hearing aid evaluation, also deferred.\par \par - Continue flonase 2 sprays BID\par - Follow up in 6 months with audio at that time.

## 2022-10-29 NOTE — HISTORY OF PRESENT ILLNESS
[FreeTextEntry1] : 87 y/o M with PMH HTN, HLD, hx of CVA (no residual deficits), BPH, hx of skin CA, pAF (on Eliquis) who presents today as a new patient for hospital followup. Patient was hospitalized at [facility] in late August for sepsis 2/2 multifocal PNA with progression to acute hypoxic respiratory failure with course complicated by CINDI and new onset paroxysmal afib. His PNA was evaluated by ID; he had initial AFB that was positive however after bronchoscopy and BAL specimens were tested his PNA was found to be due to MAC. He was treated with appropriate antibiotics. He was found to be in new onset afib and started on metoprolol and Eliquis pt had amlodipine leg  which resolved after d/c of drug.\par  pt feels well today but has DONG. pt had positve  NST.

## 2022-10-31 ENCOUNTER — OUTPATIENT (OUTPATIENT)
Dept: OUTPATIENT SERVICES | Facility: HOSPITAL | Age: 87
LOS: 1 days | End: 2022-10-31
Payer: MEDICARE

## 2022-10-31 DIAGNOSIS — Z87.2 PERSONAL HISTORY OF DISEASES OF THE SKIN AND SUBCUTANEOUS TISSUE: Chronic | ICD-10-CM

## 2022-10-31 DIAGNOSIS — Z11.52 ENCOUNTER FOR SCREENING FOR COVID-19: ICD-10-CM

## 2022-10-31 LAB — SARS-COV-2 RNA SPEC QL NAA+PROBE: SIGNIFICANT CHANGE UP

## 2022-10-31 PROCEDURE — U0003: CPT

## 2022-10-31 PROCEDURE — U0005: CPT

## 2022-10-31 PROCEDURE — C9803: CPT

## 2022-11-03 ENCOUNTER — TRANSCRIPTION ENCOUNTER (OUTPATIENT)
Age: 87
End: 2022-11-03

## 2022-11-03 ENCOUNTER — OUTPATIENT (OUTPATIENT)
Dept: OUTPATIENT SERVICES | Facility: HOSPITAL | Age: 87
LOS: 1 days | End: 2022-11-03
Payer: MEDICARE

## 2022-11-03 VITALS
SYSTOLIC BLOOD PRESSURE: 189 MMHG | TEMPERATURE: 98 F | RESPIRATION RATE: 16 BRPM | DIASTOLIC BLOOD PRESSURE: 62 MMHG | OXYGEN SATURATION: 98 % | WEIGHT: 154.98 LBS | HEART RATE: 74 BPM | HEIGHT: 68 IN

## 2022-11-03 VITALS
OXYGEN SATURATION: 94 % | RESPIRATION RATE: 12 BRPM | SYSTOLIC BLOOD PRESSURE: 154 MMHG | DIASTOLIC BLOOD PRESSURE: 72 MMHG

## 2022-11-03 DIAGNOSIS — Z87.2 PERSONAL HISTORY OF DISEASES OF THE SKIN AND SUBCUTANEOUS TISSUE: Chronic | ICD-10-CM

## 2022-11-03 DIAGNOSIS — Z98.890 OTHER SPECIFIED POSTPROCEDURAL STATES: Chronic | ICD-10-CM

## 2022-11-03 DIAGNOSIS — R94.39 ABNORMAL RESULT OF OTHER CARDIOVASCULAR FUNCTION STUDY: ICD-10-CM

## 2022-11-03 LAB
ANION GAP SERPL CALC-SCNC: 8 MMOL/L — SIGNIFICANT CHANGE UP (ref 5–17)
BUN SERPL-MCNC: 20 MG/DL — SIGNIFICANT CHANGE UP (ref 7–23)
CALCIUM SERPL-MCNC: 9.1 MG/DL — SIGNIFICANT CHANGE UP (ref 8.4–10.5)
CHLORIDE SERPL-SCNC: 106 MMOL/L — SIGNIFICANT CHANGE UP (ref 96–108)
CO2 SERPL-SCNC: 27 MMOL/L — SIGNIFICANT CHANGE UP (ref 22–31)
CREAT SERPL-MCNC: 1.16 MG/DL — SIGNIFICANT CHANGE UP (ref 0.5–1.3)
EGFR: 61 ML/MIN/1.73M2 — SIGNIFICANT CHANGE UP
GLUCOSE SERPL-MCNC: 94 MG/DL — SIGNIFICANT CHANGE UP (ref 70–99)
HCT VFR BLD CALC: 42.1 % — SIGNIFICANT CHANGE UP (ref 39–50)
HGB BLD-MCNC: 13.1 G/DL — SIGNIFICANT CHANGE UP (ref 13–17)
MCHC RBC-ENTMCNC: 30.8 PG — SIGNIFICANT CHANGE UP (ref 27–34)
MCHC RBC-ENTMCNC: 31.1 GM/DL — LOW (ref 32–36)
MCV RBC AUTO: 98.8 FL — SIGNIFICANT CHANGE UP (ref 80–100)
NRBC # BLD: 0 /100 WBCS — SIGNIFICANT CHANGE UP (ref 0–0)
PLATELET # BLD AUTO: 334 K/UL — SIGNIFICANT CHANGE UP (ref 150–400)
POTASSIUM SERPL-MCNC: 4.7 MMOL/L — SIGNIFICANT CHANGE UP (ref 3.5–5.3)
POTASSIUM SERPL-SCNC: 4.7 MMOL/L — SIGNIFICANT CHANGE UP (ref 3.5–5.3)
RBC # BLD: 4.26 M/UL — SIGNIFICANT CHANGE UP (ref 4.2–5.8)
RBC # FLD: 14.3 % — SIGNIFICANT CHANGE UP (ref 10.3–14.5)
SODIUM SERPL-SCNC: 141 MMOL/L — SIGNIFICANT CHANGE UP (ref 135–145)
WBC # BLD: 9.68 K/UL — SIGNIFICANT CHANGE UP (ref 3.8–10.5)
WBC # FLD AUTO: 9.68 K/UL — SIGNIFICANT CHANGE UP (ref 3.8–10.5)

## 2022-11-03 PROCEDURE — 99152 MOD SED SAME PHYS/QHP 5/>YRS: CPT

## 2022-11-03 PROCEDURE — C1894: CPT

## 2022-11-03 PROCEDURE — 93454 CORONARY ARTERY ANGIO S&I: CPT | Mod: 26

## 2022-11-03 PROCEDURE — C1769: CPT

## 2022-11-03 PROCEDURE — C1887: CPT

## 2022-11-03 PROCEDURE — 93005 ELECTROCARDIOGRAM TRACING: CPT

## 2022-11-03 PROCEDURE — 93454 CORONARY ARTERY ANGIO S&I: CPT

## 2022-11-03 PROCEDURE — 80048 BASIC METABOLIC PNL TOTAL CA: CPT

## 2022-11-03 PROCEDURE — 36415 COLL VENOUS BLD VENIPUNCTURE: CPT

## 2022-11-03 PROCEDURE — 85027 COMPLETE CBC AUTOMATED: CPT

## 2022-11-03 PROCEDURE — 93010 ELECTROCARDIOGRAM REPORT: CPT

## 2022-11-03 RX ORDER — ATORVASTATIN CALCIUM 80 MG/1
1 TABLET, FILM COATED ORAL
Qty: 30 | Refills: 0
Start: 2022-11-03 | End: 2022-12-02

## 2022-11-03 RX ORDER — SODIUM CHLORIDE 9 MG/ML
3 INJECTION INTRAMUSCULAR; INTRAVENOUS; SUBCUTANEOUS EVERY 8 HOURS
Refills: 0 | Status: DISCONTINUED | OUTPATIENT
Start: 2022-11-03 | End: 2022-11-17

## 2022-11-03 RX ORDER — CLOPIDOGREL BISULFATE 75 MG/1
1 TABLET, FILM COATED ORAL
Qty: 30 | Refills: 0
Start: 2022-11-03 | End: 2022-12-02

## 2022-11-03 RX ORDER — CLOPIDOGREL BISULFATE 75 MG/1
600 TABLET, FILM COATED ORAL ONCE
Refills: 0 | Status: DISCONTINUED | OUTPATIENT
Start: 2022-11-03 | End: 2022-11-17

## 2022-11-03 RX ORDER — ASPIRIN/CALCIUM CARB/MAGNESIUM 324 MG
1 TABLET ORAL
Qty: 30 | Refills: 0
Start: 2022-11-03 | End: 2022-12-02

## 2022-11-03 RX ORDER — SODIUM CHLORIDE 9 MG/ML
1000 INJECTION INTRAMUSCULAR; INTRAVENOUS; SUBCUTANEOUS
Refills: 0 | Status: DISCONTINUED | OUTPATIENT
Start: 2022-11-03 | End: 2022-11-03

## 2022-11-03 RX ORDER — SODIUM CHLORIDE 9 MG/ML
1000 INJECTION INTRAMUSCULAR; INTRAVENOUS; SUBCUTANEOUS
Refills: 0 | Status: DISCONTINUED | OUTPATIENT
Start: 2022-11-03 | End: 2022-11-17

## 2022-11-03 RX ORDER — ATORVASTATIN CALCIUM 80 MG/1
1 TABLET, FILM COATED ORAL
Qty: 0 | Refills: 0 | DISCHARGE

## 2022-11-03 NOTE — H&P CARDIOLOGY - NSICDXPASTMEDICALHX_GEN_ALL_CORE_FT
PAST MEDICAL HISTORY:  Benign prostatic hyperplasia, presence of lower urinary tract symptoms unspecified, unspecified morphology     Cerebrovascular accident (CVA), unspecified mechanism 2015    Complex tear of lateral meniscus of right knee as current injury, initial encounter     Complex tear of medial meniscus of right knee as current injury, initial encounter     Essential hypertension     Hyperlipidemia, unspecified hyperlipidemia type     Skin cancer s/p Mohs     PAST MEDICAL HISTORY:  A-fib     Benign prostatic hyperplasia, presence of lower urinary tract symptoms unspecified, unspecified morphology     Cerebrovascular accident (CVA), unspecified mechanism 2015    Complex tear of lateral meniscus of right knee as current injury, initial encounter     Complex tear of medial meniscus of right knee as current injury, initial encounter     Essential hypertension     Hyperlipidemia, unspecified hyperlipidemia type     Pneumonia     Skin cancer s/p Mohs

## 2022-11-03 NOTE — H&P CARDIOLOGY - HISTORY OF PRESENT ILLNESS
87 y/o M with PMH HTN, HLD, hx of CVA (no residual deficits), BPH, hx of skin CA, pAF (on Eliquis) who presents today as a new patient for hospital followup. Patient was hospitalized at [place] in late August for sepsis 2/2 multifocal PNA with progression to acute hypoxic respiratory failure with course complicated by CINDI and new onset paroxysmal afib. His PNA was evaluated by ID; he had initial AFB that was positive however after bronchoscopy and BAL specimens were tested his PNA was found to be due to MAC. He was treated with appropriate antibiotics. He was found to be in new onset afib and started on metoprolol and Eliquis pt had amlodipine leg which resolved after d/c of drug.   pt feels well today but has DONG. pt had positve NST. 87M with HTN, HLD, hx of CVA (no residual deficits), BPH, hx of skin CA who presents to [place] ER on 8/11 with cough and fevers.  Symptoms started about 5-6 days PTA.  Started with left sided upper back pain.  Then started to have a cough + green phlegm and possible blood.  Associated with SOB and DONG.  No weight changes or night sweats.  Had a fever as high as 102F.  Some nausea but no vomiting with loss of appetite.  No chest pain.  No diarrhea.  No abdominal pain.  No recent travel or sick contacts.  Went to see his PMD 3 days prior to [place] admission and was given a z-kellie and tesslon perles.  Reported having negative COVID home tests.   In the [place] ED, patient's triage vitals were /78    RR  21, 93% on RA (thought dropped down to 88%) and T 98.5F.  Labs showed WBC 18 with a left shift, CINDI with BUN/Cr 40/1.88, elevated LFTs, normal lactate, negative COVID (last booster in 12/2021 with Pfizer vaccine), neg influenza, neg RSV.  CT chest showed "patchy ground glass and more dense opacities in both lungs, suspicious for multifocal PNA + several small lucencies with areas of lung opacity ->>may represent early cavitation versus areas of focal bronchiectasis."  Patient started on azithromycin and ceftriaxone empirically for multifocal PNA.  Also of note, patient was found to have new afib on EKG.  Patient denies any afib history but said when he was younger he did feel some irregular heartbeats.  Admitted for sepsis due to multifocal pneumonia with progression to acute hypoxic respiratory failure now on HFNC and course further c/b new onset afib. Patient was seen by ID, pulm and cardiology. Transferred to [facility] (8/29) for bronchoscopy.     Underwent bronchoscopy for bronchoalveolar lavage. Started on eliquis for new onset afib after bronchoscopy per cardiology, and bronchial, sputum cultures were followed. ID +Pulm consulted for TB work up. Bronchial cultures were negative on AFB stain along with sputum cultures. 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 Isolation protocol was discontinued. Bronchial fungal cultures showed normal alexander. Pt found to be in mild CINDI, was given a bolus of fluid. Pt encouraged to drink plenty of fluids.     Patient was evaluated by PM&R and therapy for functional deficits and gait/ADL impairments and recommend acute rehabilitation.  Patient was medically optimized for discharge to [facility] on 9/13/22.   89 y/o M with PMH HTN, HLD, Hx of CVA (no residual deficits), BPH, skin CA, pAF (on Eliquis last dose 11/2 Am?) who presents for cardiac cath. Pt reports he was hospitalized at [facility] in 9/2022 for sepsis 2/2 multifocal PNA, found to have paroxysmal afib. Pt had f/u with Dr. Gutierrez and referred for cath after having abnormal stress test (no results available). Pt states he get SOB on exertion, denies chest pain, dizziness or palpitation.     Card: Dr. Christopher Adam    of note: pt had  AFB growth in broth from 8/12 sputum samples, s/p bronchoscopy, Rare Geotrichum species, Few Yeast -suggest colonization so no Rx. 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 The systolic function is low normal and varies beat to beat. Estimated EF is 50-55%.  Right Ventricle: The right ventricle is normal in size and function.  Left Atrium: The left atrium is dilated. Right Atrium: The right atrium is dilated. Mitral Valve: Mitral annular calcification. There is mild mitral valve prolapse of the anterior leaflet. Mild mitral regurgitation.  Aortic Valve: The aortic valve is structurally normal. No aortic regurgitation.  Tricuspid Valve: The tricuspid valve is structurally normal. Mild tricuspid regurgitation. Estimated pulmonary artery systolic pressure is 50 mmHg.  Pulmonic Valve: The pulmonic valve is not well visualized. Trace pulmonic regurgitation.  Diastolic Function: Normal diastolic function.  Pericardium/Pleura: No pericardial effusion visualized.  Aorta: The aortic root is normal in size.    IMPRESSION:  1. Low normal left ventricular systolic function.  2. Bi-atrial enlargement.  3. Mild pulmonary hypertension. < end of copied text >

## 2022-11-03 NOTE — ASU PATIENT PROFILE, ADULT - FALL HARM RISK - UNIVERSAL INTERVENTIONS
Bed in lowest position, wheels locked, appropriate side rails in place/Call bell, personal items and telephone in reach/Instruct patient to call for assistance before getting out of bed or chair/Non-slip footwear when patient is out of bed/[place] to call system/Physically safe environment - no spills, clutter or unnecessary equipment/Purposeful Proactive Rounding/Room/bathroom lighting operational, light cord in reach

## 2022-11-03 NOTE — H&P CARDIOLOGY - NSICDXPASTSURGICALHX_GEN_ALL_CORE_FT
PAST SURGICAL HISTORY:  H/O pilonidal cyst      PAST SURGICAL HISTORY:  H/O knee surgery     H/O pilonidal cyst

## 2022-11-03 NOTE — ASU DISCHARGE PLAN (ADULT/PEDIATRIC) - ASU DC SPECIAL INSTRUCTIONSFT
Wound Care:   the day AFTER your procedure remove bandage GENTLY, and clean using  mild soap and gentle warm, water stream, pat dry. leave OPEN to air. YOU MAY SHOWER   DO NOT apply lotions, creams, ointments, powder, perfumes to your incision site  DO NOT SOAK your site for 1 week ( no baths, no pools, no tubs, etc...)  Check  your groin and /or wrist daily. A small amount of bruising, and soreness are normal    ACTIVITY: for 24 hours   - DO NOT DRIVE  - DO NOT make any important decisions or sign legal documents   - DO NOT operate heavy machineries   - you may resume sexual activity in 48 hours, unless otherwise instructed by your cardiologist     Your procedure was done through the WRIST: for the NEXT 3DAYS:  - avoid pushing, pulling, with that affected wrist   - avoid repeated movement of that hand and wrist ( eg: typing, hammering)  - DO NOT LIFT anything more than 5 lbs     MEDICATION:   take your medications as explained ( see discharge paperwork)   If you received a STENT, you will be taking antiplatelet medications to KEEP YOUR STENT OPEN ( eg: Aspirin, Plavix, Brilinta, Effient, etc).  Take as prescribed DO NOT STOP taking them without consulting with your cardiologist first.     Follow heart healthy diet recommended by your doctor, if you smoke STOP SMOKING ( may call [phone] for center of tobacco control if you need assistance)     CALL your doctor to make appointment in 2 WEEKS     ***CALL YOUR DOCTOR***  if you experience: fever, chills, body aches, or severe pain, swelling, redness, heat or yellow discharge at incision site  If you experience bleeding or excruciating pain at the procedural site, swelling ( golf ball size) at your procedural site  If you experience CHEST PAIN  If you experience extremity numbness, tingling, temperature change ( of your procedural site)   If you are unable to reach your doctor, you may contact:   -Cardiology Office at [facility] at [phone] or   - [facility] [phone]  - [facility] [phone]

## 2022-11-03 NOTE — ASU DISCHARGE PLAN (ADULT/PEDIATRIC) - NS MD DC FALL RISK RISK
For information on Fall & Injury Prevention, visit: [email or web address] OR  [email or web address] OR  https://www.cdc.gov/steadi/patient.html

## 2022-11-03 NOTE — ASU DISCHARGE PLAN (ADULT/PEDIATRIC) - CARE PROVIDER_API CALL
Christopher Adam (DO)  Cardiology; Internal Medicine; Nuclear Cardiology  [address], [address]  [place] [address]  Phone: [phone]  Fax: [phone]  Follow Up Time: 2 weeks

## 2022-11-04 PROBLEM — I48.91 UNSPECIFIED ATRIAL FIBRILLATION: Chronic | Status: ACTIVE | Noted: 2022-11-03

## 2022-11-04 PROBLEM — J18.9 PNEUMONIA, UNSPECIFIED ORGANISM: Chronic | Status: ACTIVE | Noted: 2022-11-03

## 2022-11-05 ENCOUNTER — OUTPATIENT (OUTPATIENT)
Dept: OUTPATIENT SERVICES | Facility: HOSPITAL | Age: 87
LOS: 1 days | End: 2022-11-05

## 2022-11-05 DIAGNOSIS — Z87.2 PERSONAL HISTORY OF DISEASES OF THE SKIN AND SUBCUTANEOUS TISSUE: Chronic | ICD-10-CM

## 2022-11-05 DIAGNOSIS — Z11.52 ENCOUNTER FOR SCREENING FOR COVID-19: ICD-10-CM

## 2022-11-05 DIAGNOSIS — Z98.890 OTHER SPECIFIED POSTPROCEDURAL STATES: Chronic | ICD-10-CM

## 2022-11-05 LAB — SARS-COV-2 RNA SPEC QL NAA+PROBE: SIGNIFICANT CHANGE UP

## 2022-11-07 ENCOUNTER — APPOINTMENT (OUTPATIENT)
Dept: CARDIOLOGY | Facility: CLINIC | Age: 87
End: 2022-11-07

## 2022-11-07 ENCOUNTER — INPATIENT (INPATIENT)
Facility: HOSPITAL | Age: 87
LOS: 0 days | Discharge: HOME | DRG: 247 | End: 2022-11-08
Attending: INTERNAL MEDICINE | Admitting: INTERNAL MEDICINE
Payer: COMMERCIAL

## 2022-11-07 ENCOUNTER — TRANSCRIPTION ENCOUNTER (OUTPATIENT)
Age: 87
End: 2022-11-07

## 2022-11-07 VITALS
RESPIRATION RATE: 18 BRPM | SYSTOLIC BLOOD PRESSURE: 190 MMHG | HEIGHT: 68 IN | TEMPERATURE: 98 F | WEIGHT: 149.91 LBS | DIASTOLIC BLOOD PRESSURE: 80 MMHG | HEART RATE: 63 BPM | OXYGEN SATURATION: 94 %

## 2022-11-07 DIAGNOSIS — I25.10 ATHEROSCLEROTIC HEART DISEASE OF NATIVE CORONARY ARTERY WITHOUT ANGINA PECTORIS: ICD-10-CM

## 2022-11-07 DIAGNOSIS — Z98.890 OTHER SPECIFIED POSTPROCEDURAL STATES: Chronic | ICD-10-CM

## 2022-11-07 DIAGNOSIS — Z87.2 PERSONAL HISTORY OF DISEASES OF THE SKIN AND SUBCUTANEOUS TISSUE: Chronic | ICD-10-CM

## 2022-11-07 LAB
ALBUMIN SERPL ELPH-MCNC: 3.9 G/DL — SIGNIFICANT CHANGE UP (ref 3.3–5)
ALP SERPL-CCNC: 158 U/L — HIGH (ref 40–120)
ALT FLD-CCNC: 22 U/L — SIGNIFICANT CHANGE UP (ref 10–45)
ANION GAP SERPL CALC-SCNC: 9 MMOL/L — SIGNIFICANT CHANGE UP (ref 5–17)
AST SERPL-CCNC: 26 U/L — SIGNIFICANT CHANGE UP (ref 10–40)
BILIRUB SERPL-MCNC: 0.9 MG/DL — SIGNIFICANT CHANGE UP (ref 0.2–1.2)
BUN SERPL-MCNC: 29 MG/DL — HIGH (ref 7–23)
CALCIUM SERPL-MCNC: 9 MG/DL — SIGNIFICANT CHANGE UP (ref 8.4–10.5)
CHLORIDE SERPL-SCNC: 105 MMOL/L — SIGNIFICANT CHANGE UP (ref 96–108)
CO2 SERPL-SCNC: 25 MMOL/L — SIGNIFICANT CHANGE UP (ref 22–31)
CREAT SERPL-MCNC: 1.13 MG/DL — SIGNIFICANT CHANGE UP (ref 0.5–1.3)
EGFR: 63 ML/MIN/1.73M2 — SIGNIFICANT CHANGE UP
GLUCOSE SERPL-MCNC: 89 MG/DL — SIGNIFICANT CHANGE UP (ref 70–99)
HCT VFR BLD CALC: 40.7 % — SIGNIFICANT CHANGE UP (ref 39–50)
HGB BLD-MCNC: 12.8 G/DL — LOW (ref 13–17)
MCHC RBC-ENTMCNC: 30.5 PG — SIGNIFICANT CHANGE UP (ref 27–34)
MCHC RBC-ENTMCNC: 31.4 GM/DL — LOW (ref 32–36)
MCV RBC AUTO: 97.1 FL — SIGNIFICANT CHANGE UP (ref 80–100)
NRBC # BLD: 0 /100 WBCS — SIGNIFICANT CHANGE UP (ref 0–0)
PLATELET # BLD AUTO: 332 K/UL — SIGNIFICANT CHANGE UP (ref 150–400)
POTASSIUM SERPL-MCNC: 4.6 MMOL/L — SIGNIFICANT CHANGE UP (ref 3.5–5.3)
POTASSIUM SERPL-SCNC: 4.6 MMOL/L — SIGNIFICANT CHANGE UP (ref 3.5–5.3)
PROT SERPL-MCNC: 8 G/DL — SIGNIFICANT CHANGE UP (ref 6–8.3)
RBC # BLD: 4.19 M/UL — LOW (ref 4.2–5.8)
RBC # FLD: 14.1 % — SIGNIFICANT CHANGE UP (ref 10.3–14.5)
SODIUM SERPL-SCNC: 139 MMOL/L — SIGNIFICANT CHANGE UP (ref 135–145)
WBC # BLD: 9.27 K/UL — SIGNIFICANT CHANGE UP (ref 3.8–10.5)
WBC # FLD AUTO: 9.27 K/UL — SIGNIFICANT CHANGE UP (ref 3.8–10.5)

## 2022-11-07 PROCEDURE — 99152 MOD SED SAME PHYS/QHP 5/>YRS: CPT

## 2022-11-07 PROCEDURE — 93010 ELECTROCARDIOGRAM REPORT: CPT

## 2022-11-07 PROCEDURE — 92928 PRQ TCAT PLMT NTRAC ST 1 LES: CPT | Mod: RC

## 2022-11-07 RX ORDER — APIXABAN 2.5 MG/1
5 TABLET, FILM COATED ORAL
Refills: 0 | Status: DISCONTINUED | OUTPATIENT
Start: 2022-11-08 | End: 2022-11-08

## 2022-11-07 RX ORDER — INFLUENZA VIRUS VACCINE 15; 15; 15; 15 UG/.5ML; UG/.5ML; UG/.5ML; UG/.5ML
0.7 SUSPENSION INTRAMUSCULAR ONCE
Refills: 0 | Status: DISCONTINUED | OUTPATIENT
Start: 2022-11-07 | End: 2022-11-08

## 2022-11-07 RX ORDER — METOPROLOL TARTRATE 50 MG
50 TABLET ORAL
Refills: 0 | Status: DISCONTINUED | OUTPATIENT
Start: 2022-11-07 | End: 2022-11-08

## 2022-11-07 RX ORDER — CLOPIDOGREL BISULFATE 75 MG/1
75 TABLET, FILM COATED ORAL DAILY
Refills: 0 | Status: DISCONTINUED | OUTPATIENT
Start: 2022-11-08 | End: 2022-11-08

## 2022-11-07 RX ORDER — SODIUM CHLORIDE 9 MG/ML
1000 INJECTION INTRAMUSCULAR; INTRAVENOUS; SUBCUTANEOUS
Refills: 0 | Status: DISCONTINUED | OUTPATIENT
Start: 2022-11-07 | End: 2022-11-08

## 2022-11-07 RX ORDER — ATORVASTATIN CALCIUM 80 MG/1
80 TABLET, FILM COATED ORAL AT BEDTIME
Refills: 0 | Status: DISCONTINUED | OUTPATIENT
Start: 2022-11-07 | End: 2022-11-08

## 2022-11-07 RX ORDER — FLUTICASONE PROPIONATE 50 MCG
1 SPRAY, SUSPENSION NASAL
Qty: 0 | Refills: 0 | DISCHARGE

## 2022-11-07 RX ORDER — HYDRALAZINE HCL 50 MG
10 TABLET ORAL ONCE
Refills: 0 | Status: DISCONTINUED | OUTPATIENT
Start: 2022-11-07 | End: 2022-11-07

## 2022-11-07 RX ORDER — TAMSULOSIN HYDROCHLORIDE 0.4 MG/1
0.4 CAPSULE ORAL AT BEDTIME
Refills: 0 | Status: DISCONTINUED | OUTPATIENT
Start: 2022-11-07 | End: 2022-11-08

## 2022-11-07 RX ORDER — FINASTERIDE 5 MG/1
5 TABLET, FILM COATED ORAL DAILY
Refills: 0 | Status: DISCONTINUED | OUTPATIENT
Start: 2022-11-07 | End: 2022-11-08

## 2022-11-07 RX ORDER — SODIUM CHLORIDE 9 MG/ML
250 INJECTION INTRAMUSCULAR; INTRAVENOUS; SUBCUTANEOUS ONCE
Refills: 0 | Status: COMPLETED | OUTPATIENT
Start: 2022-11-07 | End: 2022-11-07

## 2022-11-07 RX ADMIN — Medication 50 MILLIGRAM(S): at 17:07

## 2022-11-07 RX ADMIN — TAMSULOSIN HYDROCHLORIDE 0.4 MILLIGRAM(S): 0.4 CAPSULE ORAL at 20:04

## 2022-11-07 RX ADMIN — ATORVASTATIN CALCIUM 80 MILLIGRAM(S): 80 TABLET, FILM COATED ORAL at 20:04

## 2022-11-07 RX ADMIN — SODIUM CHLORIDE 135 MILLILITER(S): 9 INJECTION INTRAMUSCULAR; INTRAVENOUS; SUBCUTANEOUS at 11:23

## 2022-11-07 RX ADMIN — SODIUM CHLORIDE 750 MILLILITER(S): 9 INJECTION INTRAMUSCULAR; INTRAVENOUS; SUBCUTANEOUS at 08:56

## 2022-11-07 NOTE — H&P CARDIOLOGY - NSICDXPASTMEDICALHX_GEN_ALL_CORE_FT
PAST MEDICAL HISTORY:  A-fib     Benign prostatic hyperplasia, presence of lower urinary tract symptoms unspecified, unspecified morphology     Cerebrovascular accident (CVA), unspecified mechanism 2015    Complex tear of lateral meniscus of right knee as current injury, initial encounter     Complex tear of medial meniscus of right knee as current injury, initial encounter     Essential hypertension     Hyperlipidemia, unspecified hyperlipidemia type     Pneumonia     Skin cancer s/p Mohs

## 2022-11-07 NOTE — H&P CARDIOLOGY - LIVES WITH, PROFILE
PRN    Historical Provider, MD   triamcinolone (KENALOG) 0.1 % ointment Apply topically 2 times daily Using PRN  Patient not taking: Reported on 8/2/2022    Historical Provider, MD   mirabegron (MYRBETRIQ) 50 MG TB24 Take 50 mg by mouth daily Pt gets samples 3/10/22   Stark Mcardle, MD   albuterol (PROVENTIL) (2.5 MG/3ML) 0.083% nebulizer solution Take 3 mLs by nebulization every 6 hours as needed for Wheezing 2/25/22   Stark Mcardle, MD   albuterol sulfate  (90 Base) MCG/ACT inhaler INHALE 2 PUFFS BY MOUTH INTO LUNGS TWICE DAILY AS NEEDED FOR WHEEZING (NO  MORE  THAN  4  TIMES  DAILY) 2/8/22   Stark Mcardle, MD   QUEtiapine (SEROQUEL) 200 MG tablet Take 200 mg by mouth daily Last filled 11/8/2021 90 day supply,   Take 3 tablets once daily--600 mg dose    Historical Provider, MD   FLUoxetine (PROZAC) 40 MG capsule Take 40 mg by mouth daily Last filled 1/24/2022    Historical Provider, MD   ipratropium (ATROVENT) 0.03 % nasal spray 2 sprays by Nasal route 3 times daily as needed for Rhinitis     Historical Provider, MD   lithium 300 MG capsule Take 300 mg by mouth 2 times daily (with meals).     Historical Provider, MD       Future Appointments   Date Time Provider Ekta Tapia   11/4/2022  2:30 PM Zechariah Marlow MD [id number]   11/9/2022  9:00 AM JASS HOROWITZ RM 2 [facility] PAT Julieanne Brunner
alone

## 2022-11-07 NOTE — PATIENT PROFILE ADULT - NSPROPASSIVESMOKEEXPOSURE_GEN_A_NUR
Post-Anesthesia Evaluation and Assessment    Patient: Shante Mcgee MRN: [id number]  SSN: [id number]    YOB: 1944  Age: 76 y.o. Sex: female       Cardiovascular Function/Vital Signs  Visit Vitals    /74    Pulse 70    Temp 36.7 °C (98.1 °F)    Resp 16    Ht 5' 5\" (1.651 m)    Wt 64.4 kg (142 lb)    SpO2 98%    Breastfeeding No    BMI 23.63 kg/m2       Patient is status post general anesthesia for Procedure(s):  Loop Recorder Removal.    Nausea/Vomiting: None    Postoperative hydration reviewed and adequate. Pain:  Pain Scale 1: Numeric (0 - 10) (08/24/18 0934)  Pain Intensity 1: 0 (08/24/18 0934)   Managed    Neurological Status: At baseline    Mental Status and Level of Consciousness: Arousable    Pulmonary Status:   O2 Device: Nasal cannula (08/24/18 1109)   Adequate oxygenation and airway patent    Complications related to anesthesia: None    Post-anesthesia assessment completed.  No concerns    Signed By: Gerhard Carrel, MD     August 24, 2018 No normal (ped)...

## 2022-11-07 NOTE — PATIENT PROFILE ADULT - FALL HARM RISK - HARM RISK INTERVENTIONS

## 2022-11-07 NOTE — PROGRESS NOTE ADULT - SUBJECTIVE AND OBJECTIVE BOX
Removal of Femoral Sheath    Pulses in the (right) lower extremity are (palpable). The patient was placed in the supine position. The insertion site was identified and the sutures were removed per protocol.  The _6___ Finnish femoral sheath (23cm) long was then removed by Cristiana Jenkins NP Direct pressure was applied for  __25minutes ____ minutes.     Monitoring of the (right) groin and both lower extremities including neuro-vascular checks and vital signs every 15 minutes x 4, then every 30 minutes x 2, then every 1 hour was ordered.    Complications: Good Hemostasis noted   S/p SUccessful OSMEL x 2 to RCA via RFA 6fr   on Asa /Plavix /Resume ELiquis on 11/8/22 if right groin is stable   Continue to monitor closely .  Grecia Machado NP  Cardiology   [phone]

## 2022-11-07 NOTE — PROVIDER CONTACT NOTE (OTHER) - ASSESSMENT
Review of SCM reveals the following (C&P): "pt had  AFB growth in broth from 8/12 sputum samples, s/p bronchoscopy, Rare Geotrichum species, Few Yeast -suggest colonization so no Rx. 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

## 2022-11-07 NOTE — H&P CARDIOLOGY - HISTORY OF PRESENT ILLNESS
89 y/o M with PMH HTN, HLD, Hx of CVA (no residual deficits), BPH, skin CA, PAF (on Eliquis last dose 11/4 Am?), Recent Diagnostic cath on 11/3/22  has shown severe multivessel disease including subtotal occlusion of the mLAD, severe oLCx and RCA disease with  of a large OM branch presents for Staged PCI of LAD.  Pt reports he was hospitalized at [facility] in 9/2022 for sepsis 2/2 multifocal PNA, found to have paroxysmal afib. Pt had f/u with Dr. Gutierrez and referred for cath after having abnormal stress test (no results available). Pt states he get SOB on exertion, denies chest pain, dizziness or palpitation.     Card: Dr. Christopher Adam    of note: pt had  AFB growth in broth from 8/12 sputum samples, s/p bronchoscopy, Rare Geotrichum species, Few Yeast -suggest colonization so no Rx. 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 The systolic function is low normal and varies beat to beat. Estimated EF is 50-55%.  Right Ventricle: The right ventricle is normal in size and function.  Left Atrium: The left atrium is dilated. Right Atrium: The right atrium is dilated. Mitral Valve: Mitral annular calcification. There is mild mitral valve prolapse of the anterior leaflet. Mild mitral regurgitation.  Aortic Valve: The aortic valve is structurally normal. No aortic regurgitation.  Tricuspid Valve: The tricuspid valve is structurally normal. Mild tricuspid regurgitation. Estimated pulmonary artery systolic pressure is 50 mmHg.  Pulmonic Valve: The pulmonic valve is not well visualized. Trace pulmonic regurgitation.  Diastolic Function: Normal diastolic function.  Pericardium/Pleura: No pericardial effusion visualized.  Aorta: The aortic root is normal in size.    IMPRESSION:  1. Low normal left ventricular systolic function.  2. Bi-atrial enlargement.  3. Mild pulmonary hypertension. < end of copied text >    Cardiac Cath 11/3/22  Diagnostic Conclusions:  Angiography has shown severe multivessel disease including subtotal occlusion of the mLAD, severe oLCx and RCA disease with  of a large OM branch. After discussion with patient, primary cardiologist, and family, will plan for high risk complex PCI next week.         87 y/o M with PMH HTN, HLD, Hx of CVA (no residual deficits), BPH, skin CA, PAF (on Eliquis last dose 11/4 PM), Recent Diagnostic cath on 11/3/22  has shown severe multivessel disease including subtotal occlusion of the mLAD, severe oLCx and RCA disease with  of a large OM branch presents for Staged PCI of LAD.  Pt reports he was hospitalized at [facility] in 9/2022 for sepsis 2/2 multifocal PNA, found to have paroxysmal afib. Pt had f/u with Dr. Gutierrez and referred for cath after having abnormal stress test (no results available). Pt states he get SOB on exertion, denies chest pain, dizziness or palpitation.   Pt has periods of forgetfulness. Pt's daughter helps to set his meds.     Card: Dr. Christopher Adam    of note: pt had  AFB growth in broth from 8/12 sputum samples, s/p bronchoscopy, Rare Geotrichum species, Few Yeast -suggest colonization so no Rx. 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 The systolic function is low normal and varies beat to beat. Estimated EF is 50-55%.  Right Ventricle: The right ventricle is normal in size and function.  Left Atrium: The left atrium is dilated. Right Atrium: The right atrium is dilated. Mitral Valve: Mitral annular calcification. There is mild mitral valve prolapse of the anterior leaflet. Mild mitral regurgitation.  Aortic Valve: The aortic valve is structurally normal. No aortic regurgitation.  Tricuspid Valve: The tricuspid valve is structurally normal. Mild tricuspid regurgitation. Estimated pulmonary artery systolic pressure is 50 mmHg.  Pulmonic Valve: The pulmonic valve is not well visualized. Trace pulmonic regurgitation.  Diastolic Function: Normal diastolic function.  Pericardium/Pleura: No pericardial effusion visualized.  Aorta: The aortic root is normal in size.    IMPRESSION:  1. Low normal left ventricular systolic function.  2. Bi-atrial enlargement.  3. Mild pulmonary hypertension.    Cardiac Cath 11/3/22  Diagnostic Conclusions:  Angiography has shown severe multivessel disease including subtotal occlusion of the mLAD, severe oLCx and RCA disease with  of a large OM branch. After discussion with patient, primary cardiologist, and family, will plan for high risk complex PCI next week.

## 2022-11-08 ENCOUNTER — TRANSCRIPTION ENCOUNTER (OUTPATIENT)
Age: 87
End: 2022-11-08

## 2022-11-08 VITALS
TEMPERATURE: 98 F | DIASTOLIC BLOOD PRESSURE: 75 MMHG | HEART RATE: 58 BPM | RESPIRATION RATE: 19 BRPM | SYSTOLIC BLOOD PRESSURE: 149 MMHG | OXYGEN SATURATION: 95 %

## 2022-11-08 PROCEDURE — C9600: CPT | Mod: RC

## 2022-11-08 PROCEDURE — C1874: CPT

## 2022-11-08 PROCEDURE — 93460 R&L HRT ART/VENTRICLE ANGIO: CPT

## 2022-11-08 PROCEDURE — 93005 ELECTROCARDIOGRAM TRACING: CPT

## 2022-11-08 PROCEDURE — C1769: CPT

## 2022-11-08 PROCEDURE — U0003: CPT

## 2022-11-08 PROCEDURE — C1725: CPT

## 2022-11-08 PROCEDURE — C9803: CPT

## 2022-11-08 PROCEDURE — C9601: CPT

## 2022-11-08 PROCEDURE — C1887: CPT

## 2022-11-08 PROCEDURE — U0005: CPT

## 2022-11-08 PROCEDURE — 80053 COMPREHEN METABOLIC PANEL: CPT

## 2022-11-08 PROCEDURE — C1894: CPT

## 2022-11-08 PROCEDURE — 85027 COMPLETE CBC AUTOMATED: CPT

## 2022-11-08 RX ORDER — CLOPIDOGREL BISULFATE 75 MG/1
1 TABLET, FILM COATED ORAL
Qty: 30 | Refills: 3
Start: 2022-11-08 | End: 2023-03-07

## 2022-11-08 RX ADMIN — CLOPIDOGREL BISULFATE 75 MILLIGRAM(S): 75 TABLET, FILM COATED ORAL at 05:13

## 2022-11-08 RX ADMIN — APIXABAN 5 MILLIGRAM(S): 2.5 TABLET, FILM COATED ORAL at 05:13

## 2022-11-08 RX ADMIN — Medication 50 MILLIGRAM(S): at 05:13

## 2022-11-08 NOTE — DISCHARGE NOTE PROVIDER - HOSPITAL COURSE
HPI:  87 y/o M with PMH HTN, HLD, Hx of CVA (no residual deficits), BPH, skin CA, PAF (on Eliquis last dose 11/4 PM), Recent Diagnostic cath on 11/3/22  has shown severe multivessel disease including subtotal occlusion of the mLAD, severe oLCx and RCA disease with  of a large OM branch presents for Staged PCI of LAD.  Pt reports he was hospitalized at [facility] in 9/2022 for sepsis 2/2 multifocal PNA, found to have paroxysmal afib. Pt had f/u with Dr. Gutierrez and referred for cath after having abnormal stress test (no results available). Pt states he get SOB on exertion, denies chest pain, dizziness or palpitation.   Pt has periods of forgetfulness. Pt's daughter helps to set his meds.     Card: Dr. Christopher Adam    11/7 cardiac cath with 2 stents to the RCA. Right femoral site without swelling, bleeding.         (07 Nov 2022 08:30)

## 2022-11-08 NOTE — DISCHARGE NOTE PROVIDER - NSDCFUADDAPPT_GEN_ALL_CORE_FT
Deo the [facility] Cardiology Office, [phone], to schedule an appointment for further cardiac intervention.

## 2022-11-08 NOTE — DISCHARGE NOTE PROVIDER - NSDCCPCAREPLAN_GEN_ALL_CORE_FT
PRINCIPAL DISCHARGE DIAGNOSIS  Diagnosis: CAD (coronary artery disease)  Assessment and Plan of Treatment: Do not stop your Plavix unless instructed to do so by your cardiologist, it helps keep your stented arteries open. No heavy lifting, strenuous activity, bending, straining, or unnecessary stair climbing for 2 weeks. No driving for 2 days. You may shower 24 hours following the procedure but avoid baths/swimming for 1 week. Check your groin site for bleeding and/or swelling daily following procedure and call your doctor immediately if it occurs or if you experience increased pain at the site. Follow up with your cardiologist in 1-2 weeks. You may call [facility] [place] [facility] if you have any questions/concerns regarding your procedure [phone].      SECONDARY DISCHARGE DIAGNOSES  Diagnosis: HTN (hypertension)  Assessment and Plan of Treatment: Continue with your blood pressure medications; eat a heart healthy diet with low salt diet; exercise regularly (consult with your physician or cardiologist first); maintain a heart healthy weight; if you smoke - quit (A resource to help you stop smoking is the [facility] for [employer] – phone number [phone].); include healthy ways to manage stress. Continue to follow with your primary care physician or cardiologist.    Diagnosis: HLD (hyperlipidemia)  Assessment and Plan of Treatment: Continue with your cholesterol medications. Eat a heart healthy diet that is low in saturated fats and salt, and includes whole grains, fruits, vegetables and lean protein; exercise regularly (consult with your physician or cardiologist first); maintain a heart healthy weight; if you smoke - quit (A resource to help you stop smoking is the [facility] for Tobacco Control – phone number [phone].). Continue to follow with your primary physician or cardiologist.

## 2022-11-08 NOTE — DISCHARGE NOTE NURSING/CASE MANAGEMENT/SOCIAL WORK - PATIENT PORTAL LINK FT
You can access the FollowMyHealth Patient Portal offered by [facility] by registering at the following website: [email or web address]. By joining [employer]’s FollowMyHealth portal, you will also be able to view your health information using other applications (apps) compatible with our system.

## 2022-11-08 NOTE — DISCHARGE NOTE PROVIDER - NSDCFUSCHEDAPPT_GEN_ALL_CORE_FT
Christopher Adam  [facility] Physician [facility]  CARDIOLOGY [address]   Scheduled Appointment: 11/15/2022

## 2022-11-08 NOTE — DISCHARGE NOTE PROVIDER - NSDCPNSUBOBJ_GEN_ALL_CORE
Patient is a 88y old  Male who presents with a chief complaint of chest pain (07 Nov 2022 13:39)          Allergies    No Known Allergies    Intolerances        Medications:  apixaban 5 milliGRAM(s) Oral two times a day  atorvastatin 80 milliGRAM(s) Oral at bedtime  clopidogrel Tablet 75 milliGRAM(s) Oral daily  finasteride 5 milliGRAM(s) Oral daily  influenza  Vaccine (HIGH DOSE) 0.7 milliLiter(s) IntraMuscular once  metoprolol tartrate 50 milliGRAM(s) Oral two times a day  sodium chloride 0.9%. 1000 milliLiter(s) IV Continuous <Continuous>  tamsulosin 0.4 milliGRAM(s) Oral at bedtime      Vitals:  T(C): 36.7 (11-07-22 @ 20:20), Max: 36.7 (11-07-22 @ 08:29)  HR: 55 (11-07-22 @ 20:20) (53 - 82)  BP: 115/90 (11-07-22 @ 20:20) (115/90 - 190/80)  BP(mean): 97 (11-07-22 @ 20:20) (85 - 116)  RR: 19 (11-07-22 @ 20:20) (15 - 19)  SpO2: 95% (11-07-22 @ 20:20) (93% - 98%)  Wt(kg): --  Daily Height in cm: 172.72 (07 Nov 2022 08:29)    Daily   I&O's Summary    07 Nov 2022 07:01  -  08 Nov 2022 02:26  --------------------------------------------------------  IN: 240 mL / OUT: 700 mL / NET: -460 mL          Physical Exam:  Appearance: Normal  Eyes: PERRL, EOMI  HENT: Normal oral muscosa, NC/AT  Cardiovascular: S1S2, RRR, No M/R/G, no JVD, No Lower extremity edema  Procedural Access Site: No hematoma, Non-tender to palpation, 2+ pulse, No bruit, No Ecchymosis  Respiratory: Clear to auscultation bilaterally  Gastrointestinal: Soft, Non tender, Normal Bowel Sounds  Musculoskeletal: No clubbing, No joint deformity   Neurologic: Non-focal  Lymphatic: No lymphadenopathy  Psychiatry: AAOx3, Mood & affect appropriate  Skin: No rashes, No ecchymoses, No cyanosis    11-07    139  |  105  |  29<H>  ----------------------------<  89  4.6   |  25  |  1.13    Ca    9.0      07 Nov 2022 08:51    TPro  8.0  /  Alb  3.9  /  TBili  0.9  /  DBili  x   /  AST  26  /  ALT  22  /  AlkPhos  158<H>  11-07            Lipid panel   Hgb A1c                         12.8   9.27  )-----------( 332      ( 07 Nov 2022 08:51 )             40.7         ECG: SB 57 bpm    CAD: Monitor groin site for swelling, bleeding  Continue Plavix     HTN: Continue antihypertensive medications with hold parameters     HLD: continue statin, confirm lipid panel results     PAF: Serial EKG  Telemetry monitoring  Continue Eliquis    Interpretation of Telemetry:

## 2022-11-08 NOTE — DISCHARGE NOTE PROVIDER - NSDCMRMEDTOKEN_GEN_ALL_CORE_FT
clopidogrel 75 mg oral tablet: 1 tab(s) orally once a day   Eliquis 5 mg oral tablet: 1 tab(s) orally 2 times a day  finasteride 5 mg oral tablet: 1 tab(s) orally once a day  Lipitor 80 mg oral tablet: 1 tab(s) orally once a day   metoprolol tartrate 50 mg oral tablet: 1 tab(s) orally 2 times a day  tamsulosin 0.4 mg oral capsule: 1 cap(s) orally once a day (at bedtime)

## 2022-11-08 NOTE — DISCHARGE NOTE NURSING/CASE MANAGEMENT/SOCIAL WORK - NSDCFUADDAPPT_GEN_ALL_CORE_FT
Deo the [facility] Cardiology Office, [phone], to schedule an appointment for further cardiac intervention.

## 2022-11-08 NOTE — DISCHARGE NOTE PROVIDER - NSDCCPTREATMENT_GEN_ALL_CORE_FT
PRINCIPAL PROCEDURE  Procedure: Placement of coronary artery stent  Findings and Treatment: 2 stents to the RCA

## 2022-11-08 NOTE — DISCHARGE NOTE NURSING/CASE MANAGEMENT/SOCIAL WORK - NSDCVIVACCINE_GEN_ALL_CORE_FT
influenza, injectable, quadrivalent, preservative free; 16-Nov-2015 12:30; Sangeetha Aceves (RN); [employer]; [id number]; IntraMuscular; Deltoid Left.; 0.5 milliLiter(s); VIS (VIS Published: 07-Aug-2015, VIS Presented: 16-Nov-2015);

## 2022-11-08 NOTE — DISCHARGE NOTE PROVIDER - CARE PROVIDER_API CALL
Christopher Adam (DO)  Cardiology; Internal Medicine; Nuclear Cardiology  [address], [address]  [place] [address]  Phone: [phone]  Fax: [phone]  Scheduled Appointment: 11/15/2022

## 2022-11-08 NOTE — DISCHARGE NOTE NURSING/CASE MANAGEMENT/SOCIAL WORK - NSDCPEFALRISK_GEN_ALL_CORE
For information on Fall & Injury Prevention, visit: [email or web address] OR  [email or web address] OR  https://www.cdc.gov/steadi/patient.html

## 2022-11-10 ENCOUNTER — NON-APPOINTMENT (OUTPATIENT)
Age: 87
End: 2022-11-10

## 2022-11-13 ENCOUNTER — OUTPATIENT (OUTPATIENT)
Dept: OUTPATIENT SERVICES | Facility: HOSPITAL | Age: 87
LOS: 1 days | End: 2022-11-13

## 2022-11-13 DIAGNOSIS — Z87.2 PERSONAL HISTORY OF DISEASES OF THE SKIN AND SUBCUTANEOUS TISSUE: Chronic | ICD-10-CM

## 2022-11-13 DIAGNOSIS — Z11.52 ENCOUNTER FOR SCREENING FOR COVID-19: ICD-10-CM

## 2022-11-13 DIAGNOSIS — Z98.890 OTHER SPECIFIED POSTPROCEDURAL STATES: Chronic | ICD-10-CM

## 2022-11-13 LAB — SARS-COV-2 RNA SPEC QL NAA+PROBE: SIGNIFICANT CHANGE UP

## 2022-11-14 ENCOUNTER — NON-APPOINTMENT (OUTPATIENT)
Age: 87
End: 2022-11-14

## 2022-11-15 ENCOUNTER — APPOINTMENT (OUTPATIENT)
Dept: CARDIOLOGY | Facility: CLINIC | Age: 87
End: 2022-11-15

## 2022-11-16 ENCOUNTER — INPATIENT (INPATIENT)
Facility: HOSPITAL | Age: 87
LOS: 0 days | Discharge: HOME | DRG: 246 | End: 2022-11-17
Attending: INTERNAL MEDICINE | Admitting: INTERNAL MEDICINE
Payer: COMMERCIAL

## 2022-11-16 ENCOUNTER — TRANSCRIPTION ENCOUNTER (OUTPATIENT)
Age: 87
End: 2022-11-16

## 2022-11-16 VITALS
WEIGHT: 149.91 LBS | OXYGEN SATURATION: 98 % | RESPIRATION RATE: 18 BRPM | HEIGHT: 68 IN | DIASTOLIC BLOOD PRESSURE: 77 MMHG | TEMPERATURE: 98 F | HEART RATE: 58 BPM | SYSTOLIC BLOOD PRESSURE: 166 MMHG

## 2022-11-16 DIAGNOSIS — Z87.2 PERSONAL HISTORY OF DISEASES OF THE SKIN AND SUBCUTANEOUS TISSUE: Chronic | ICD-10-CM

## 2022-11-16 DIAGNOSIS — I25.10 ATHEROSCLEROTIC HEART DISEASE OF NATIVE CORONARY ARTERY WITHOUT ANGINA PECTORIS: ICD-10-CM

## 2022-11-16 DIAGNOSIS — Z98.890 OTHER SPECIFIED POSTPROCEDURAL STATES: Chronic | ICD-10-CM

## 2022-11-16 LAB
ANION GAP SERPL CALC-SCNC: 10 MMOL/L — SIGNIFICANT CHANGE UP (ref 5–17)
BUN SERPL-MCNC: 27 MG/DL — HIGH (ref 7–23)
CALCIUM SERPL-MCNC: 8.9 MG/DL — SIGNIFICANT CHANGE UP (ref 8.4–10.5)
CHLORIDE SERPL-SCNC: 102 MMOL/L — SIGNIFICANT CHANGE UP (ref 96–108)
CO2 SERPL-SCNC: 26 MMOL/L — SIGNIFICANT CHANGE UP (ref 22–31)
CREAT SERPL-MCNC: 1.2 MG/DL — SIGNIFICANT CHANGE UP (ref 0.5–1.3)
EGFR: 58 ML/MIN/1.73M2 — LOW
GLUCOSE SERPL-MCNC: 83 MG/DL — SIGNIFICANT CHANGE UP (ref 70–99)
HCT VFR BLD CALC: 41.5 % — SIGNIFICANT CHANGE UP (ref 39–50)
HGB BLD-MCNC: 13.1 G/DL — SIGNIFICANT CHANGE UP (ref 13–17)
MCHC RBC-ENTMCNC: 30.8 PG — SIGNIFICANT CHANGE UP (ref 27–34)
MCHC RBC-ENTMCNC: 31.6 GM/DL — LOW (ref 32–36)
MCV RBC AUTO: 97.6 FL — SIGNIFICANT CHANGE UP (ref 80–100)
NRBC # BLD: 0 /100 WBCS — SIGNIFICANT CHANGE UP (ref 0–0)
PLATELET # BLD AUTO: 327 K/UL — SIGNIFICANT CHANGE UP (ref 150–400)
POTASSIUM SERPL-MCNC: 4.3 MMOL/L — SIGNIFICANT CHANGE UP (ref 3.5–5.3)
POTASSIUM SERPL-SCNC: 4.3 MMOL/L — SIGNIFICANT CHANGE UP (ref 3.5–5.3)
RBC # BLD: 4.25 M/UL — SIGNIFICANT CHANGE UP (ref 4.2–5.8)
RBC # FLD: 13.9 % — SIGNIFICANT CHANGE UP (ref 10.3–14.5)
SODIUM SERPL-SCNC: 138 MMOL/L — SIGNIFICANT CHANGE UP (ref 135–145)
WBC # BLD: 9.73 K/UL — SIGNIFICANT CHANGE UP (ref 3.8–10.5)
WBC # FLD AUTO: 9.73 K/UL — SIGNIFICANT CHANGE UP (ref 3.8–10.5)

## 2022-11-16 PROCEDURE — 92978 ENDOLUMINL IVUS OCT C 1ST: CPT | Mod: 26,LM

## 2022-11-16 PROCEDURE — 92979 ENDOLUMINL IVUS OCT C EA: CPT | Mod: 26,LD

## 2022-11-16 PROCEDURE — 92928 PRQ TCAT PLMT NTRAC ST 1 LES: CPT | Mod: LM

## 2022-11-16 PROCEDURE — 99152 MOD SED SAME PHYS/QHP 5/>YRS: CPT

## 2022-11-16 PROCEDURE — 92920 PRQ TRLUML C ANGIOP 1ART&/BR: CPT | Mod: LD

## 2022-11-16 PROCEDURE — 0715T: CPT

## 2022-11-16 PROCEDURE — 93010 ELECTROCARDIOGRAM REPORT: CPT | Mod: 76

## 2022-11-16 RX ORDER — METOPROLOL TARTRATE 50 MG
1 TABLET ORAL
Qty: 0 | Refills: 0 | DISCHARGE

## 2022-11-16 RX ORDER — METOPROLOL TARTRATE 50 MG
25 TABLET ORAL
Refills: 0 | Status: DISCONTINUED | OUTPATIENT
Start: 2022-11-16 | End: 2022-11-17

## 2022-11-16 RX ORDER — SODIUM CHLORIDE 9 MG/ML
250 INJECTION INTRAMUSCULAR; INTRAVENOUS; SUBCUTANEOUS
Refills: 0 | Status: COMPLETED | OUTPATIENT
Start: 2022-11-16 | End: 2022-11-16

## 2022-11-16 RX ORDER — CLOPIDOGREL BISULFATE 75 MG/1
75 TABLET, FILM COATED ORAL DAILY
Refills: 0 | Status: DISCONTINUED | OUTPATIENT
Start: 2022-11-16 | End: 2022-11-17

## 2022-11-16 RX ORDER — INFLUENZA VIRUS VACCINE 15; 15; 15; 15 UG/.5ML; UG/.5ML; UG/.5ML; UG/.5ML
0.7 SUSPENSION INTRAMUSCULAR ONCE
Refills: 0 | Status: COMPLETED | OUTPATIENT
Start: 2022-11-16 | End: 2022-11-16

## 2022-11-16 RX ORDER — APIXABAN 2.5 MG/1
1 TABLET, FILM COATED ORAL
Qty: 0 | Refills: 0 | DISCHARGE

## 2022-11-16 RX ORDER — SODIUM CHLORIDE 9 MG/ML
150 INJECTION INTRAMUSCULAR; INTRAVENOUS; SUBCUTANEOUS
Refills: 0 | Status: DISCONTINUED | OUTPATIENT
Start: 2022-11-16 | End: 2022-11-17

## 2022-11-16 RX ORDER — TAMSULOSIN HYDROCHLORIDE 0.4 MG/1
0.4 CAPSULE ORAL AT BEDTIME
Refills: 0 | Status: DISCONTINUED | OUTPATIENT
Start: 2022-11-16 | End: 2022-11-17

## 2022-11-16 RX ORDER — ATORVASTATIN CALCIUM 80 MG/1
80 TABLET, FILM COATED ORAL AT BEDTIME
Refills: 0 | Status: DISCONTINUED | OUTPATIENT
Start: 2022-11-16 | End: 2022-11-17

## 2022-11-16 RX ORDER — FINASTERIDE 5 MG/1
5 TABLET, FILM COATED ORAL DAILY
Refills: 0 | Status: DISCONTINUED | OUTPATIENT
Start: 2022-11-16 | End: 2022-11-17

## 2022-11-16 RX ORDER — SODIUM CHLORIDE 9 MG/ML
1000 INJECTION INTRAMUSCULAR; INTRAVENOUS; SUBCUTANEOUS
Refills: 0 | Status: DISCONTINUED | OUTPATIENT
Start: 2022-11-16 | End: 2022-11-17

## 2022-11-16 RX ORDER — APIXABAN 2.5 MG/1
5 TABLET, FILM COATED ORAL
Refills: 0 | Status: DISCONTINUED | OUTPATIENT
Start: 2022-11-17 | End: 2022-11-17

## 2022-11-16 RX ADMIN — ATORVASTATIN CALCIUM 80 MILLIGRAM(S): 80 TABLET, FILM COATED ORAL at 21:34

## 2022-11-16 RX ADMIN — TAMSULOSIN HYDROCHLORIDE 0.4 MILLIGRAM(S): 0.4 CAPSULE ORAL at 21:34

## 2022-11-16 RX ADMIN — SODIUM CHLORIDE 75 MILLILITER(S): 9 INJECTION INTRAMUSCULAR; INTRAVENOUS; SUBCUTANEOUS at 11:32

## 2022-11-16 RX ADMIN — Medication 25 MILLIGRAM(S): at 20:33

## 2022-11-16 RX ADMIN — SODIUM CHLORIDE 250 MILLILITER(S): 9 INJECTION INTRAMUSCULAR; INTRAVENOUS; SUBCUTANEOUS at 11:02

## 2022-11-16 RX ADMIN — SODIUM CHLORIDE 136 MILLILITER(S): 9 INJECTION INTRAMUSCULAR; INTRAVENOUS; SUBCUTANEOUS at 14:46

## 2022-11-16 NOTE — CHART NOTE - NSCHARTNOTEFT_GEN_A_CORE
Removal of Arterial Sheath Note    Pt hemodynamically stable. Pulses in the right lower extremity are palpable. The patient was placed in the supine position.     The insertion site was identified, the suture was removed, and the sheath was then removed. Direct pressure was applied for 17 minutes and appropriate hemostasis achieved. Post sheath removal the right groin site was clean, dry, intact, soft, and without hematoma. Distal motor/neuro/circulation is intact and unchanged from prior to sheath removal.    Monitoring of the right groin and both lower extremities including neuro-vascular checks and vital signs every 15 minutes x 4, then every 30 minutes x 2, then every 1 hour x 1 was ordered.    Complications: none

## 2022-11-16 NOTE — DISCHARGE NOTE PROVIDER - NSDCMRMEDTOKEN_GEN_ALL_CORE_FT
clopidogrel 75 mg oral tablet: 1 tab(s) orally once a day   Eliquis 5 mg oral tablet: 1 tab(s) orally 2 times a day - resume 11/17  finasteride 5 mg oral tablet: 1 tab(s) orally once a day  Lipitor 80 mg oral tablet: 1 tab(s) orally once a day   metoprolol tartrate 25 mg oral tablet: 1 tab(s) orally 2 times a day  tamsulosin 0.4 mg oral capsule: 1 cap(s) orally once a day (at bedtime)   clopidogrel 75 mg oral tablet: 1 tab(s) orally once a day   Eliquis 5 mg oral tablet: 1 tab(s) orally 2 times a day - resume 11/17 in the evening   finasteride 5 mg oral tablet: 1 tab(s) orally once a day  Lipitor 80 mg oral tablet: 1 tab(s) orally once a day   metoprolol tartrate 25 mg oral tablet: 1 tab(s) orally 2 times a day  tamsulosin 0.4 mg oral capsule: 1 cap(s) orally once a day (at bedtime)

## 2022-11-16 NOTE — DISCHARGE NOTE PROVIDER - HOSPITAL COURSE
HPI:  HPI:  89 y/o M with PMH HTN, HLD, Hx of CVA (no residual deficits), BPH, skin CA, PAF (on Eliquis last dose 11/13) Recent Diagnostic cath on 11/03/22  has shown severe multivessel disease including subtotal occlusion of the mLAD, severe oLCx and RCA disease with  of a large OM branch 11/7 cardiac cath with 2 stents to the RCA. Right femoral site without swelling, bleeding. Pt now   presents for Staged Complex High Risk PCI  with Dr. Santana.  Pt reports he was hospitalized at [facility] in 9/2022 for sepsis 2/2 multifocal PNA, found to have paroxysmal afib. Pt had f/u with Dr. Gutierrez and referred for cath after having abnormal stress test (no results available). Pt states he gets SOB on exertion, denies chest pain, dizziness or palpitation. Pt has periods of forgetfulness. Pt's daughter helps to set his meds.     Card: Dr. Christopher Adam     from: US Transthoracic Echocardiogram w/Doppler Complete (08.12.22 @ 08:08) >  FINDINGS  Left Ventricle: The left ventricle is normal in size, wall thickness. The systolic function is low normal and varies beat to beat. Estimated EF is 50-55%.  Right Ventricle: The right ventricle is normal in size and function.  Left Atrium: The left atrium is dilated. Right Atrium: The right atrium is dilated. Mitral Valve: Mitral annular calcification. There is mild mitral valve prolapse of the anterior leaflet. Mild mitral regurgitation.  Aortic Valve: The aortic valve is structurally normal. No aortic regurgitation.  Tricuspid Valve: The tricuspid valve is structurally normal. Mild tricuspid regurgitation. Estimated pulmonary artery systolic pressure is 50 mmHg.  Pulmonic Valve: The pulmonic valve is not well visualized. Trace pulmonic regurgitation.  Diastolic Function: Normal diastolic function.  Pericardium/Pleura: No pericardial effusion visualized.  Aorta: The aortic root is normal in size.    IMPRESSION:  1. Low normal left ventricular systolic function.  2. Bi-atrial enlargement.  3. Mild pulmonary hypertension.    Cardiac Cath 11/3/22  Diagnostic Conclusions:  Angiography has shown severe multivessel disease including subtotal occlusion of the mLAD, severe oLCx and RCA disease with  of a large OM branch. After discussion with patient, primary cardiologist, and family, will plan for high risk complex PCI next week.    11/16: s/p POBA to LAD and X1 OSMEL to LM with shockwave via RFA 6F (long).  Plavix and Eliquis only (start Eliquis 11/17 PM).  Groin site stable with no hematoma/bleeding.  Left groin noted to have inguinal hernia.                 87 y/o M with PMH HTN, HLD, Hx of CVA (no residual deficits), BPH, skin CA, PAF (on Eliquis last dose 11/13) Recent Diagnostic cath on 11/03/22  has shown severe multivessel disease including subtotal occlusion of the mLAD, severe oLCx and RCA disease with  of a large OM branch 11/7 cardiac cath with 2 stents to the RCA. Patient presented 11/16  for Staged Complex High Risk PCI  with Dr. Santana.  11/16: s/p POBA to LAD and X1 OSMEL to LM with shockwave via RFA access.  Patient tolerated the procedure well, post PCI EKG without acute changes.  RFA sheath removed without incident.    11/17:  No acute overnight events, patient feels well today.  RFA site without presence of swelling/bleeding/hematoma.  Plan for AC with Plavix/Eliquis only reviewed with patient.  Patient remains hemodynamically stable and had otherwise uneventful hospital course.  Patient is now medically stable for discharge home today.

## 2022-11-16 NOTE — DISCHARGE NOTE PROVIDER - NSDCFUSCHEDAPPT_GEN_ALL_CORE_FT
Christopher Adam  [facility] Physician [facility]  CARDIOLOGY [address]   Scheduled Appointment: 11/29/2022

## 2022-11-16 NOTE — H&P CARDIOLOGY - HISTORY OF PRESENT ILLNESS
HPI:  89 y/o M with PMH HTN, HLD, Hx of CVA (no residual deficits), BPH, skin CA, PAF (on Eliquis last dose 11/4 PM), Recent Diagnostic cath on 11/3/22  has shown severe multivessel disease including subtotal occlusion of the mLAD, severe oLCx and RCA disease with  of a large OM branch presents for Staged Complex High Risk PCI  with Dr. Santana.  Pt reports he was hospitalized at [facility] in 9/2022 for sepsis 2/2 multifocal PNA, found to have paroxysmal afib. Pt had f/u with Dr. Gutierrez and referred for cath after having abnormal stress test (no results available). Pt states he get SOB on exertion, denies chest pain, dizziness or palpitation. 11/7 cardiac cath with 2 stents to the RCA. Right femoral site without swelling, bleeding.  Pt has periods of forgetfulness. Pt's daughter helps to set his meds.     Card: Dr. Christopher Adam     from: US Transthoracic Echocardiogram w/Doppler Complete (08.12.22 @ 08:08) >  FINDINGS  Left Ventricle: The left ventricle is normal in size, wall thickness. The systolic function is low normal and varies beat to beat. Estimated EF is 50-55%.  Right Ventricle: The right ventricle is normal in size and function.  Left Atrium: The left atrium is dilated. Right Atrium: The right atrium is dilated. Mitral Valve: Mitral annular calcification. There is mild mitral valve prolapse of the anterior leaflet. Mild mitral regurgitation.  Aortic Valve: The aortic valve is structurally normal. No aortic regurgitation.  Tricuspid Valve: The tricuspid valve is structurally normal. Mild tricuspid regurgitation. Estimated pulmonary artery systolic pressure is 50 mmHg.  Pulmonic Valve: The pulmonic valve is not well visualized. Trace pulmonic regurgitation.  Diastolic Function: Normal diastolic function.  Pericardium/Pleura: No pericardial effusion visualized.  Aorta: The aortic root is normal in size.    IMPRESSION:  1. Low normal left ventricular systolic function.  2. Bi-atrial enlargement.  3. Mild pulmonary hypertension.    Cardiac Cath 11/3/22  Diagnostic Conclusions:  Angiography has shown severe multivessel disease including subtotal occlusion of the mLAD, severe oLCx and RCA disease with  of a large OM branch. After discussion with patient, primary cardiologist, and family, will plan for high risk complex PCI next week.                   HPI:  87 y/o M with PMH HTN, HLD, Hx of CVA (no residual deficits), BPH, skin CA, PAF (on Eliquis last dose 11/14) Recent Diagnostic cath on 11/03/22  has shown severe multivessel disease including subtotal occlusion of the mLAD, severe oLCx and RCA disease with  of a large OM branch presents for Staged Complex High Risk PCI  with Dr. Santana.  Pt reports he was hospitalized at [facility] in 9/2022 for sepsis 2/2 multifocal PNA, found to have paroxysmal afib. Pt had f/u with Dr. Gutierrez and referred for cath after having abnormal stress test (no results available). Pt states he get SOB on exertion, denies chest pain, dizziness or palpitation. 11/7 cardiac cath with 2 stents to the RCA. Right femoral site without swelling, bleeding.  Pt has periods of forgetfulness. Pt's daughter helps to set his meds.     Card: Dr. Christopher Adam     from: US Transthoracic Echocardiogram w/Doppler Complete (08.12.22 @ 08:08) >  FINDINGS  Left Ventricle: The left ventricle is normal in size, wall thickness. The systolic function is low normal and varies beat to beat. Estimated EF is 50-55%.  Right Ventricle: The right ventricle is normal in size and function.  Left Atrium: The left atrium is dilated. Right Atrium: The right atrium is dilated. Mitral Valve: Mitral annular calcification. There is mild mitral valve prolapse of the anterior leaflet. Mild mitral regurgitation.  Aortic Valve: The aortic valve is structurally normal. No aortic regurgitation.  Tricuspid Valve: The tricuspid valve is structurally normal. Mild tricuspid regurgitation. Estimated pulmonary artery systolic pressure is 50 mmHg.  Pulmonic Valve: The pulmonic valve is not well visualized. Trace pulmonic regurgitation.  Diastolic Function: Normal diastolic function.  Pericardium/Pleura: No pericardial effusion visualized.  Aorta: The aortic root is normal in size.    IMPRESSION:  1. Low normal left ventricular systolic function.  2. Bi-atrial enlargement.  3. Mild pulmonary hypertension.    Cardiac Cath 11/3/22  Diagnostic Conclusions:  Angiography has shown severe multivessel disease including subtotal occlusion of the mLAD, severe oLCx and RCA disease with  of a large OM branch. After discussion with patient, primary cardiologist, and family, will plan for high risk complex PCI next week.                   HPI:  89 y/o M with PMH HTN, HLD, Hx of CVA (no residual deficits), BPH, skin CA, PAF (on Eliquis last dose 11/15) Recent Diagnostic cath on 11/03/22  has shown severe multivessel disease including subtotal occlusion of the mLAD, severe oLCx and RCA disease with  of a large OM branch presents for Staged Complex High Risk PCI  with Dr. Santana.  Pt reports he was hospitalized at [facility] in 9/2022 for sepsis 2/2 multifocal PNA, found to have paroxysmal afib. Pt had f/u with Dr. Gutierrez and referred for cath after having abnormal stress test (no results available). Pt states he get SOB on exertion, denies chest pain, dizziness or palpitation. 11/7 cardiac cath with 2 stents to the RCA. Right femoral site without swelling, bleeding.  Pt has periods of forgetfulness. Pt's daughter helps to set his meds.     Card: Dr. Christopher Adam     from: US Transthoracic Echocardiogram w/Doppler Complete (08.12.22 @ 08:08) >  FINDINGS  Left Ventricle: The left ventricle is normal in size, wall thickness. The systolic function is low normal and varies beat to beat. Estimated EF is 50-55%.  Right Ventricle: The right ventricle is normal in size and function.  Left Atrium: The left atrium is dilated. Right Atrium: The right atrium is dilated. Mitral Valve: Mitral annular calcification. There is mild mitral valve prolapse of the anterior leaflet. Mild mitral regurgitation.  Aortic Valve: The aortic valve is structurally normal. No aortic regurgitation.  Tricuspid Valve: The tricuspid valve is structurally normal. Mild tricuspid regurgitation. Estimated pulmonary artery systolic pressure is 50 mmHg.  Pulmonic Valve: The pulmonic valve is not well visualized. Trace pulmonic regurgitation.  Diastolic Function: Normal diastolic function.  Pericardium/Pleura: No pericardial effusion visualized.  Aorta: The aortic root is normal in size.    IMPRESSION:  1. Low normal left ventricular systolic function.  2. Bi-atrial enlargement.  3. Mild pulmonary hypertension.    Cardiac Cath 11/3/22  Diagnostic Conclusions:  Angiography has shown severe multivessel disease including subtotal occlusion of the mLAD, severe oLCx and RCA disease with  of a large OM branch. After discussion with patient, primary cardiologist, and family, will plan for high risk complex PCI next week.                   HPI:  87 y/o M with PMH HTN, HLD, Hx of CVA (no residual deficits), BPH, skin CA, PAF (on Eliquis last dose 11/13) Recent Diagnostic cath on 11/03/22  has shown severe multivessel disease including subtotal occlusion of the mLAD, severe oLCx and RCA disease with  of a large OM branch 11/7 cardiac cath with 2 stents to the RCA. Right femoral site without swelling, bleeding. Pt now   presents for Staged Complex High Risk PCI  with Dr. Santana.  Pt reports he was hospitalized at [facility] in 9/2022 for sepsis 2/2 multifocal PNA, found to have paroxysmal afib. Pt had f/u with Dr. Gutierrez and referred for cath after having abnormal stress test (no results available). Pt states he gets SOB on exertion, denies chest pain, dizziness or palpitation. Pt has periods of forgetfulness. Pt's daughter helps to set his meds.     Card: Dr. Christopher Adam     from: US Transthoracic Echocardiogram w/Doppler Complete (08.12.22 @ 08:08) >  FINDINGS  Left Ventricle: The left ventricle is normal in size, wall thickness. The systolic function is low normal and varies beat to beat. Estimated EF is 50-55%.  Right Ventricle: The right ventricle is normal in size and function.  Left Atrium: The left atrium is dilated. Right Atrium: The right atrium is dilated. Mitral Valve: Mitral annular calcification. There is mild mitral valve prolapse of the anterior leaflet. Mild mitral regurgitation.  Aortic Valve: The aortic valve is structurally normal. No aortic regurgitation.  Tricuspid Valve: The tricuspid valve is structurally normal. Mild tricuspid regurgitation. Estimated pulmonary artery systolic pressure is 50 mmHg.  Pulmonic Valve: The pulmonic valve is not well visualized. Trace pulmonic regurgitation.  Diastolic Function: Normal diastolic function.  Pericardium/Pleura: No pericardial effusion visualized.  Aorta: The aortic root is normal in size.    IMPRESSION:  1. Low normal left ventricular systolic function.  2. Bi-atrial enlargement.  3. Mild pulmonary hypertension.    Cardiac Cath 11/3/22  Diagnostic Conclusions:  Angiography has shown severe multivessel disease including subtotal occlusion of the mLAD, severe oLCx and RCA disease with  of a large OM branch. After discussion with patient, primary cardiologist, and family, will plan for high risk complex PCI next week.

## 2022-11-16 NOTE — DISCHARGE NOTE PROVIDER - NSDCCPTREATMENT_GEN_ALL_CORE_FT
PRINCIPAL PROCEDURE  Procedure: PCI, with stent insertion  Findings and Treatment: POBA, shockwave, OSMEL x 1 mLAD

## 2022-11-16 NOTE — PATIENT PROFILE ADULT - FALL HARM RISK - HARM RISK INTERVENTIONS

## 2022-11-16 NOTE — DISCHARGE NOTE PROVIDER - NSDCCPCAREPLAN_GEN_ALL_CORE_FT
PRINCIPAL DISCHARGE DIAGNOSIS  Diagnosis: CAD (coronary atherosclerotic disease)  Assessment and Plan of Treatment: Low salt, low fat diet.   Weight management.   Take medications as prescribed.    No smoking.  Follow up appointments with your doctor(s)  as instruced.      SECONDARY DISCHARGE DIAGNOSES  Diagnosis: Afib  Assessment and Plan of Treatment: Continue with your cardiologist and primary care MD. Continue your current medications. Call your physician for palpitations, feelings of rapid heart beat, lightheadedness, or dizziness.

## 2022-11-16 NOTE — DISCHARGE NOTE PROVIDER - CARE PROVIDER_API CALL
Christopher Adam (DO)  Cardiology; Internal Medicine; Nuclear Cardiology  [address] [facility] [address]  [place] [address]  Phone: [phone]  Fax: [phone]  Established Patient  Follow Up Time: 2 weeks

## 2022-11-16 NOTE — DISCHARGE NOTE PROVIDER - NSDCFUADDINST_GEN_ALL_CORE_FT
Please see preprinted discharge instructions Wound Care:   the day AFTER your procedure remove bandage GENTLY, and clean using  mild soap and gentle warm, water stream, pat dry. leave OPEN to air. YOU MAY SHOWER   DO NOT apply lotions, creams, ointments, powder, perfumes to your incision site  DO NOT SOAK your site for 1 week ( no baths, no pools, no tubs, etc...)  Check  your groin and /or wrist daily. A small amount of bruising, and soreness are normal    ACTIVITY: for 24 hours   - DO NOT DRIVE  - DO NOT make any important decisions or sign legal documents   - DO NOT operate heavy machineries   - you may resume sexual activity in 48 hours, unless otherwise instructed by your cardiologist     If your procedure was done through the WRIST: for the NEXT 3DAYS:  - avoid pushing, pulling, with that affected wrist   - avoid repeated movement of that hand and wrist ( eg: typing, hammering)  - DO NOT LIFT anything more than 5 lbs     If your procedure was done through the GROIN: for the NEXT 5 DAYS  - Limit climbing stairs, DO NOT soak in bathtub or pool  - no strenuous activities, pushing, pulling, straining  - Do not lift anything 10lbs or heavier     MEDICATION:   take your medications as explained ( see discharge paperwork)   If you received a STENT, you will be taking antiplatelet medications to KEEP YOUR STENT OPEN ( eg: Aspirin, Plavix, Brilinta, Effient, etc).  Take as prescribed DO NOT STOP taking them without consulting with your cardiologist first.     Follow heart healthy diet recommended by your doctor, , if you smoke STOP SMOKING ( may call [phone] for center of tobacco control if you need assistance)     CALL your doctor to make appointment in 2 WEEKS     ***CALL YOUR DOCTOR***  if you experience: fever, chills, body aches, or severe pain, swelling, redness, heat or yellow discharge at incision site  If you experience Bleeding or excruciating pain at the procedural site, swelling ( golf ball size) at your procedural site  If you experience CHEST PAIN  If you experience extremity numbness, tingling, temperature change ( of your procedural site)   If you are unable to reach your doctor, you may contact:   -Cardiology Office at [facility] at [phone] or   - [facility] [phone]  - [facility] [phone]

## 2022-11-17 ENCOUNTER — TRANSCRIPTION ENCOUNTER (OUTPATIENT)
Age: 87
End: 2022-11-17

## 2022-11-17 VITALS
SYSTOLIC BLOOD PRESSURE: 138 MMHG | OXYGEN SATURATION: 95 % | HEART RATE: 57 BPM | TEMPERATURE: 98 F | RESPIRATION RATE: 17 BRPM | DIASTOLIC BLOOD PRESSURE: 70 MMHG

## 2022-11-17 LAB
ANION GAP SERPL CALC-SCNC: 10 MMOL/L — SIGNIFICANT CHANGE UP (ref 5–17)
BUN SERPL-MCNC: 23 MG/DL — SIGNIFICANT CHANGE UP (ref 7–23)
CALCIUM SERPL-MCNC: 8.4 MG/DL — SIGNIFICANT CHANGE UP (ref 8.4–10.5)
CHLORIDE SERPL-SCNC: 107 MMOL/L — SIGNIFICANT CHANGE UP (ref 96–108)
CO2 SERPL-SCNC: 22 MMOL/L — SIGNIFICANT CHANGE UP (ref 22–31)
CREAT SERPL-MCNC: 1.11 MG/DL — SIGNIFICANT CHANGE UP (ref 0.5–1.3)
EGFR: 64 ML/MIN/1.73M2 — SIGNIFICANT CHANGE UP
GLUCOSE SERPL-MCNC: 129 MG/DL — HIGH (ref 70–99)
HCT VFR BLD CALC: 36.6 % — LOW (ref 39–50)
HGB BLD-MCNC: 11.5 G/DL — LOW (ref 13–17)
MAGNESIUM SERPL-MCNC: 1.8 MG/DL — SIGNIFICANT CHANGE UP (ref 1.6–2.6)
MCHC RBC-ENTMCNC: 30.5 PG — SIGNIFICANT CHANGE UP (ref 27–34)
MCHC RBC-ENTMCNC: 31.4 GM/DL — LOW (ref 32–36)
MCV RBC AUTO: 97.1 FL — SIGNIFICANT CHANGE UP (ref 80–100)
NRBC # BLD: 0 /100 WBCS — SIGNIFICANT CHANGE UP (ref 0–0)
PLATELET # BLD AUTO: 292 K/UL — SIGNIFICANT CHANGE UP (ref 150–400)
POTASSIUM SERPL-MCNC: 4.5 MMOL/L — SIGNIFICANT CHANGE UP (ref 3.5–5.3)
POTASSIUM SERPL-SCNC: 4.5 MMOL/L — SIGNIFICANT CHANGE UP (ref 3.5–5.3)
RBC # BLD: 3.77 M/UL — LOW (ref 4.2–5.8)
RBC # FLD: 13.9 % — SIGNIFICANT CHANGE UP (ref 10.3–14.5)
SODIUM SERPL-SCNC: 139 MMOL/L — SIGNIFICANT CHANGE UP (ref 135–145)
WBC # BLD: 9.47 K/UL — SIGNIFICANT CHANGE UP (ref 3.8–10.5)
WBC # FLD AUTO: 9.47 K/UL — SIGNIFICANT CHANGE UP (ref 3.8–10.5)

## 2022-11-17 PROCEDURE — C1874: CPT

## 2022-11-17 PROCEDURE — U0005: CPT

## 2022-11-17 PROCEDURE — C1894: CPT

## 2022-11-17 PROCEDURE — C1725: CPT

## 2022-11-17 PROCEDURE — C9607: CPT

## 2022-11-17 PROCEDURE — 93458 L HRT ARTERY/VENTRICLE ANGIO: CPT

## 2022-11-17 PROCEDURE — C1753: CPT

## 2022-11-17 PROCEDURE — 85027 COMPLETE CBC AUTOMATED: CPT

## 2022-11-17 PROCEDURE — 92979 ENDOLUMINL IVUS OCT C EA: CPT

## 2022-11-17 PROCEDURE — 80048 BASIC METABOLIC PNL TOTAL CA: CPT

## 2022-11-17 PROCEDURE — 92920 PRQ TRLUML C ANGIOP 1ART&/BR: CPT | Mod: LD

## 2022-11-17 PROCEDURE — 92972 PERQ TRLUML CORONRY LITHOTRP: CPT

## 2022-11-17 PROCEDURE — C1769: CPT

## 2022-11-17 PROCEDURE — U0003: CPT

## 2022-11-17 PROCEDURE — 36415 COLL VENOUS BLD VENIPUNCTURE: CPT

## 2022-11-17 PROCEDURE — C9803: CPT

## 2022-11-17 PROCEDURE — C1761: CPT

## 2022-11-17 PROCEDURE — C9600: CPT | Mod: LM

## 2022-11-17 PROCEDURE — 93005 ELECTROCARDIOGRAM TRACING: CPT

## 2022-11-17 PROCEDURE — 83735 ASSAY OF MAGNESIUM: CPT

## 2022-11-17 PROCEDURE — 92978 ENDOLUMINL IVUS OCT C 1ST: CPT | Mod: LM

## 2022-11-17 PROCEDURE — C1887: CPT

## 2022-11-17 RX ORDER — APIXABAN 2.5 MG/1
1 TABLET, FILM COATED ORAL
Qty: 0 | Refills: 0 | DISCHARGE

## 2022-11-17 RX ORDER — MAGNESIUM OXIDE 400 MG ORAL TABLET 241.3 MG
400 TABLET ORAL ONCE
Refills: 0 | Status: COMPLETED | OUTPATIENT
Start: 2022-11-17 | End: 2022-11-17

## 2022-11-17 RX ADMIN — CLOPIDOGREL BISULFATE 75 MILLIGRAM(S): 75 TABLET, FILM COATED ORAL at 05:41

## 2022-11-17 RX ADMIN — Medication 25 MILLIGRAM(S): at 05:41

## 2022-11-17 RX ADMIN — MAGNESIUM OXIDE 400 MG ORAL TABLET 400 MILLIGRAM(S): 241.3 TABLET ORAL at 05:41

## 2022-11-17 NOTE — DISCHARGE NOTE NURSING/CASE MANAGEMENT/SOCIAL WORK - NSDCVIVACCINE_GEN_ALL_CORE_FT
influenza, injectable, quadrivalent, preservative free; 16-Nov-2015 12:30; Sangeetha Aceves (RN); [employer]; [id number]; IntraMuscular; Deltoid Left.; 0.5 milliLiter(s); VIS (VIS Published: 07-Aug-2015, VIS Presented: 16-Nov-2015);

## 2022-11-17 NOTE — DISCHARGE NOTE NURSING/CASE MANAGEMENT/SOCIAL WORK - NSDCPEFALRISK_GEN_ALL_CORE
For information on Fall & Injury Prevention, visit: [email or web address] OR  [email or web address] OR  https://www.cdc.gov/steadi/patient.html

## 2022-11-17 NOTE — PROGRESS NOTE ADULT - SUBJECTIVE AND OBJECTIVE BOX
[facility] INVASIVE CARDIOLOGY- (Sabrina, Edmund, Gary, Max, Halley, Tony, Octaviano, Ye, Chaparro)   CARDIAC [facility], [facility] TEAM   [phone]      CHIEF COMPLAINT: Patient is a 88y old  Male who presents with a chief complaint of abnormal stress test     HPI:  87 y/o M with PMH HTN, HLD, Hx of CVA (no residual deficits), BPH, skin CA, PAF (on Eliquis last dose 11/13) Recent Diagnostic cath on 11/03/22  has shown severe multivessel disease including subtotal occlusion of the mLAD, severe oLCx and RCA disease with  of a large OM branch 11/7 cardiac cath with 2 stents to the RCA. Right femoral site without swelling, bleeding. Pt now   presents for Staged Complex High Risk PCI  with Dr. Santana.  Pt reports he was hospitalized at [facility] in 9/2022 for sepsis 2/2 multifocal PNA, found to have paroxysmal afib. Pt had f/u with Dr. Gutierrez and referred for cath after having abnormal stress test (no results available). Pt states he gets SOB on exertion, denies chest pain, dizziness or palpitation. Pt has periods of forgetfulness. Pt's daughter helps to set his meds.     Card: Dr. Christopher Adam     from: US Transthoracic Echocardiogram w/Doppler Complete (08.12.22 @ 08:08) >  FINDINGS  Left Ventricle: The left ventricle is normal in size, wall thickness. The systolic function is low normal and varies beat to beat. Estimated EF is 50-55%.  Right Ventricle: The right ventricle is normal in size and function.  Left Atrium: The left atrium is dilated. Right Atrium: The right atrium is dilated. Mitral Valve: Mitral annular calcification. There is mild mitral valve prolapse of the anterior leaflet. Mild mitral regurgitation.  Aortic Valve: The aortic valve is structurally normal. No aortic regurgitation.  Tricuspid Valve: The tricuspid valve is structurally normal. Mild tricuspid regurgitation. Estimated pulmonary artery systolic pressure is 50 mmHg.  Pulmonic Valve: The pulmonic valve is not well visualized. Trace pulmonic regurgitation.  Diastolic Function: Normal diastolic function.  Pericardium/Pleura: No pericardial effusion visualized.  Aorta: The aortic root is normal in size.    IMPRESSION:  1. Low normal left ventricular systolic function.  2. Bi-atrial enlargement.  3. Mild pulmonary hypertension.    Cardiac Cath 11/3/22  Diagnostic Conclusions:  Angiography has shown severe multivessel disease including subtotal occlusion of the mLAD, severe oLCx and RCA disease with  of a large OM branch. After discussion with patient, primary cardiologist, and family, will plan for high risk complex PCI next week.                   (16 Nov 2022 10:24)        Subjective/Observations: Patient seen and examined thoroughly.  Patient denies chest pain, dyspena, palpitations, dizziness, headache, nausea, and vomiting.      Review of Systems all WNL except below indicated:    Constitutional: [ ] Fever [ ] Chills [ ] Fatigue [ ] Weight change   HEENT: [ ] Blurred vision [ ] Eye Pain [ ] Headache [ ] Runny nose [ ] Sore Throat   Respiratory: [ ] Cough [ ] Wheezing [ ] Shortness of breath  Cardiovascular: [ ] Chest Pain [ ] Palpitations [ ] DONG [ ] PND [ ] Orthopnea  Gastrointestinal: [ ] Abdominal Pain [ ] Diarrhea [ ] Constipation [ ] Hemorrhoids [ ] Nausea [ ] Vomiting  Genitourinary: [ ] Nocturia [ ] Dysuria [ ] Incontinence  Extremities: [ ] Swelling [ ] Joint Pain  Neurologic: [ ] Focal deficit [ ] Paresthesias [ ] Syncope  Lymphatic: [ ] Swelling [ ] Lymphadenopathy   Skin: [ ] Rash [ ] Ecchymoses [ ] Wounds [ ] Lesions  Psychiatry: [ ] Depression [ ] Suicidal/Homicidal Ideation [ ] Anxiety [ ] Sleep Disturbances  [x] 10 point review of systems is otherwise negative except as mentioned above            [ ]Unable to obtain    PAST MEDICAL & SURGICAL HISTORY:  Skin cancer  s/p Mohs      Essential hypertension      Cerebrovascular accident (CVA), unspecified mechanism  2015      Hyperlipidemia, unspecified hyperlipidemia type      Complex tear of lateral meniscus of right knee as current injury, initial encounter      Complex tear of medial meniscus of right knee as current injury, initial encounter      Benign prostatic hyperplasia, presence of lower urinary tract symptoms unspecified, unspecified morphology      Pneumonia      A-fib      H/O pilonidal cyst      H/O knee surgery          MEDICATIONS  (STANDING):  apixaban 5 milliGRAM(s) Oral two times a day  atorvastatin 80 milliGRAM(s) Oral at bedtime  clopidogrel Tablet 75 milliGRAM(s) Oral daily  finasteride 5 milliGRAM(s) Oral daily  metoprolol tartrate 25 milliGRAM(s) Oral two times a day  sodium chloride 0.9%. 1000 milliLiter(s) (136 mL/Hr) IV Continuous <Continuous>  sodium chloride 0.9%. 150 milliLiter(s) (75 mL/Hr) IV Continuous <Continuous>  tamsulosin 0.4 milliGRAM(s) Oral at bedtime    MEDICATIONS  (PRN):      Allergies    No Known Allergies    Intolerances          Vital Signs Last 24 Hrs  T(C): 36.7 (16 Nov 2022 14:00), Max: 36.7 (16 Nov 2022 14:00)  T(F): 98 (16 Nov 2022 14:00), Max: 98 (16 Nov 2022 14:00)  HR: 66 (16 Nov 2022 19:50) (58 - 74)  BP: 131/61 (16 Nov 2022 19:50) (126/73 - 168/69)  BP(mean): 82 (16 Nov 2022 19:50) (78 - 109)  RR: 18 (16 Nov 2022 19:50) (16 - 21)  SpO2: 95% (16 Nov 2022 19:50) (93% - 99%)    Parameters below as of 16 Nov 2022 19:50  Patient On (Oxygen Delivery Method): room air        I&O's Summary    16 Nov 2022 07:01  -  17 Nov 2022 03:16  --------------------------------------------------------  IN: 1056 mL / OUT: 300 mL / NET: 756 mL      Weight (kg): 68 (11-16 @ 10:24)    FOCUSED PHYSICAL EXAM:  Pulmonary: Non-labored, breath sounds are clear bilaterally, No wheezing, rales or rhonchi  Cardiovascular: Regular, S1 and S2, No murmurs, rubs, gallops or clicks  cath site: right groin stable w/o bleeding or hematoma, soft, + pulses     LABS: All Labs Reviewed:                        11.5   9.47  )-----------( 292      ( 17 Nov 2022 00:19 )             36.6                         13.1   9.73  )-----------( 327      ( 16 Nov 2022 10:50 )             41.5     17 Nov 2022 00:19    139    |  107    |  23     ----------------------------<  129    4.5     |  22     |  1.11   16 Nov 2022 10:50    138    |  102    |  27     ----------------------------<  83     4.3     |  26     |  1.20     Ca    8.4        17 Nov 2022 00:19  Ca    8.9        16 Nov 2022 10:50  Mg     1.8       17 Nov 2022 00:19                RESULTS:    Cath Lab Report    Interventional Cardiologist:   Migue Santana MD   Fellow:                        Robert Gorman, Fellow   Fellow:                        Teresa Ruiz MD   Referring Physician:           Christopher Adam DO   Admitting Physician:           Migue Santana MD     Procedures Performed   Procedures:               1.    Arterial Access - Right Femoral     2.    Ultrasound Guided Access   3.    PCI: OSMEL   4.    IVUS   5.    Left Heart Cath   6.    PCI: Intravascular Lithotripsy   7.    PCI: POBA   8. PCI:      Indications:               Staged Procedures   Lab Visit Indication:      new onset angina (less than or equal to 2  months)  PCI Status:                elective     Conclusions:   1. Successful Shockwave IVL and IVUS-guided PCI of LM-proximal LAD  lesion with a 3.5 x 22 mm [place] OSMEL.    2. Successful POBA of mid LAD  with a 2.5 mm balloon with  subsequent restoration of SOHAIL 3 flow. Stenting of this lesion  deferred due to small vessel with severe diffuse disease.   3. Aspirin load today. Plavix and eliquis only thereafter.   4. Recommend aggressive risk factor modification and medical therapy  for CAD.

## 2022-11-17 NOTE — DISCHARGE NOTE NURSING/CASE MANAGEMENT/SOCIAL WORK - PATIENT PORTAL LINK FT
You can access the FollowMyHealth Patient Portal offered by [facility] by registering at the following website: [email or web address]. By joining [email or web address] FollowMyHealth portal, you will also be able to view your health information using other applications (apps) compatible with our system.

## 2022-11-17 NOTE — PROGRESS NOTE ADULT - ASSESSMENT
87 y/o M with PMH HTN, HLD, Hx of CVA (no residual deficits), BPH, skin CA, PAF (on Eliquis last dose 11/13) Recent Diagnostic cath on 11/03/22  has shown severe multivessel disease including subtotal occlusion of the mLAD, severe oLCx and RCA disease with  of a large OM branch 11/7 cardiac cath with 2 stents to the RCA. Right femoral site without swelling, bleeding. Pt now   presents for Staged Complex High Risk PCI  with Dr. Santana.  Pt reports he was hospitalized at [facility] in 9/2022 for sepsis 2/2 multifocal PNA, found to have paroxysmal afib. Pt had f/u with Dr. Gutierrez and referred for cath after having abnormal stress test (no results available). Pt states he gets SOB on exertion, denies chest pain, dizziness or palpitation. Pt has periods of forgetfulness. Pt's daughter helps to set his meds.     Card: Dr. Christopher Adam     from: US Transthoracic Echocardiogram w/Doppler Complete (08.12.22 @ 08:08) >  FINDINGS  Left Ventricle: The left ventricle is normal in size, wall thickness. The systolic function is low normal and varies beat to beat. Estimated EF is 50-55%.  Right Ventricle: The right ventricle is normal in size and function.  Left Atrium: The left atrium is dilated. Right Atrium: The right atrium is dilated. Mitral Valve: Mitral annular calcification. There is mild mitral valve prolapse of the anterior leaflet. Mild mitral regurgitation.  Aortic Valve: The aortic valve is structurally normal. No aortic regurgitation.  Tricuspid Valve: The tricuspid valve is structurally normal. Mild tricuspid regurgitation. Estimated pulmonary artery systolic pressure is 50 mmHg.  Pulmonic Valve: The pulmonic valve is not well visualized. Trace pulmonic regurgitation.  Diastolic Function: Normal diastolic function.  Pericardium/Pleura: No pericardial effusion visualized.  Aorta: The aortic root is normal in size.    IMPRESSION:  1. Low normal left ventricular systolic function.  2. Bi-atrial enlargement.  3. Mild pulmonary hypertension.    Cardiac Cath 11/3/22  Diagnostic Conclusions:  Angiography has shown severe multivessel disease including subtotal occlusion of the mLAD, severe oLCx and RCA disease with  of a large OM branch. After discussion with patient, primary cardiologist, and family, will plan for high risk complex PCI next week.      s/p POBA to mLAD () x 1 OSMEL to LM shockwave         IF groin:  right groin w/o bleeding or hematoma.  soft, non tender.  Pulses in the right lower extremity are palpable.   Denies chest pain, denies groin/leg/foot: pain, numbness or tingling       - Reviewed and reinforced with patient:  wound care instructions, activities dos and donts, medication compliance specifically antiplatelet therapy given stent/s.    - Patient aware to take DAPT  as prescribed and DO NOT STOP taking without consulting cardiologist first or STENT/s WILL CLOSE  - Reviewed and reinforced with patient:  site complications ( eg: bleeding, excruciating pain at the procedural site, large swelling (golf ball sized), extremity numbness, tingling, temperature change), or CHEST PAIN; pt aware that if any of those occur he/she must call cardiologist IMMEDIATELY or 911 or go to nearest emergency room   - Reviewed and reinforced a heart healthy diet, Smoking Cessation  - Patient verbalizes understanding of ALL OF THE ABOVE, and gives positive feedback     Plan:  cont DAPT with apixaban 5 mg BID and plavix 75 mg daily  cont antihypertensives with metoprolol 25 mg BID, monitor SBP and HR    cont atorvastatin 80 mg daily   Monitor and replete electrolytes; Keep Mg >2 K >4  monitor on tele  DASH diet   f/u appt in 2 weeks post dc with oupt cardiologist  for all  general cardiology questions please contact patient's primary cards team   all other care as per primary medicine  team       JAYNA Stewart  Interventional Cardiology   [phone]

## 2022-11-21 ENCOUNTER — NON-APPOINTMENT (OUTPATIENT)
Age: 87
End: 2022-11-21

## 2022-11-29 ENCOUNTER — NON-APPOINTMENT (OUTPATIENT)
Age: 87
End: 2022-11-29

## 2022-11-29 ENCOUNTER — APPOINTMENT (OUTPATIENT)
Dept: CARDIOLOGY | Facility: CLINIC | Age: 87
End: 2022-11-29

## 2022-11-29 VITALS
DIASTOLIC BLOOD PRESSURE: 80 MMHG | HEART RATE: 63 BPM | BODY MASS INDEX: 22.73 KG/M2 | OXYGEN SATURATION: 99 % | HEIGHT: 68 IN | SYSTOLIC BLOOD PRESSURE: 132 MMHG | WEIGHT: 150 LBS

## 2022-11-29 PROCEDURE — 99214 OFFICE O/P EST MOD 30 MIN: CPT

## 2022-11-29 PROCEDURE — 93000 ELECTROCARDIOGRAM COMPLETE: CPT

## 2022-11-29 NOTE — HISTORY OF PRESENT ILLNESS
[FreeTextEntry1] : 89 y/o M with PMH HTN, HLD, Hx of CVA (no residual deficits), BPH, skin CA, PAF\par Recent Diagnostic cath on 11/03/22  has shown severe multivessel disease including subtotal occlusion of the mLAD, severe oLCx and RCA disease with  of a large OM branch. 11/7 cardiac cath with 2 stents to the RCA. 11/16  cardiac cath s/p POBA to LAD and X1 OSMEL to LM. \par \par Pt reports since having stents placed, his DONG has significantly improved. \par \par The patient is also here for follow-up of atrial fibrillation and currently they feel well with an occasional episode of palpitations.  The patient states they are reliably taking the anticoagulation medicine and are not having any signs of bleeding they deny any dizziness headaches nosebleeds or black tarry stools\par \par The patient here for evaluation of high blood pressure. Patient is currently tolerating the current antihypertensive regime and they deny headaches, stiff neck, visual changes, or PND. The patient has been trying to stay on a low-sodium diet.\par \par \par  DISPLAY PLAN FREE TEXT

## 2022-12-03 ENCOUNTER — APPOINTMENT (OUTPATIENT)
Dept: CT IMAGING | Facility: CLINIC | Age: 87
End: 2022-12-03

## 2022-12-03 ENCOUNTER — OUTPATIENT (OUTPATIENT)
Dept: OUTPATIENT SERVICES | Facility: HOSPITAL | Age: 87
LOS: 1 days | End: 2022-12-03
Payer: MEDICARE

## 2022-12-03 DIAGNOSIS — A31.0 PULMONARY MYCOBACTERIAL INFECTION: ICD-10-CM

## 2022-12-03 DIAGNOSIS — Z98.890 OTHER SPECIFIED POSTPROCEDURAL STATES: Chronic | ICD-10-CM

## 2022-12-03 DIAGNOSIS — Z87.2 PERSONAL HISTORY OF DISEASES OF THE SKIN AND SUBCUTANEOUS TISSUE: Chronic | ICD-10-CM

## 2022-12-03 PROCEDURE — 71250 CT THORAX DX C-: CPT | Mod: 26,MH

## 2022-12-03 PROCEDURE — 71250 CT THORAX DX C-: CPT

## 2022-12-06 LAB
ALBUMIN SERPL ELPH-MCNC: 3.8 G/DL
ALP BLD-CCNC: 130 U/L
ALT SERPL-CCNC: 26 U/L
ANION GAP SERPL CALC-SCNC: 13 MMOL/L
APO B SERPL-MCNC: 75 MG/DL
AST SERPL-CCNC: 33 U/L
BILIRUB DIRECT SERPL-MCNC: 0.1 MG/DL
BILIRUB INDIRECT SERPL-MCNC: 0.3 MG/DL
BILIRUB SERPL-MCNC: 0.5 MG/DL
BUN SERPL-MCNC: 26 MG/DL
CALCIUM SERPL-MCNC: 9.1 MG/DL
CHLORIDE SERPL-SCNC: 101 MMOL/L
CHOLEST SERPL-MCNC: 141 MG/DL
CK SERPL-CCNC: 73 U/L
CO2 SERPL-SCNC: 23 MMOL/L
CREAT SERPL-MCNC: 1.13 MG/DL
EGFR: 63 ML/MIN/1.73M2
ESTIMATED AVERAGE GLUCOSE: 105 MG/DL
GLUCOSE SERPL-MCNC: 73 MG/DL
HBA1C MFR BLD HPLC: 5.3 %
HDLC SERPL-MCNC: 31 MG/DL
LDLC SERPL CALC-MCNC: 89 MG/DL
LDLC SERPL DIRECT ASSAY-MCNC: 77 MG/DL
NONHDLC SERPL-MCNC: 109 MG/DL
NT-PROBNP SERPL-MCNC: 842 PG/ML
POTASSIUM SERPL-SCNC: 4.8 MMOL/L
PROT SERPL-MCNC: 7.6 G/DL
SODIUM SERPL-SCNC: 137 MMOL/L
TRIGL SERPL-MCNC: 104 MG/DL

## 2022-12-28 ENCOUNTER — APPOINTMENT (OUTPATIENT)
Dept: PULMONOLOGY | Facility: CLINIC | Age: 87
End: 2022-12-28
Payer: MEDICARE

## 2022-12-28 VITALS
RESPIRATION RATE: 16 BRPM | SYSTOLIC BLOOD PRESSURE: 161 MMHG | OXYGEN SATURATION: 90 % | DIASTOLIC BLOOD PRESSURE: 72 MMHG | HEART RATE: 70 BPM

## 2022-12-28 PROCEDURE — 99214 OFFICE O/P EST MOD 30 MIN: CPT

## 2022-12-28 RX ORDER — BUDESONIDE AND FORMOTEROL FUMARATE DIHYDRATE 160; 4.5 UG/1; UG/1
160-4.5 AEROSOL RESPIRATORY (INHALATION)
Refills: 0 | Status: DISCONTINUED | COMMUNITY
Start: 2022-09-28 | End: 2022-12-28

## 2022-12-28 RX ORDER — BUDESONIDE AND FORMOTEROL FUMARATE DIHYDRATE 160; 4.5 UG/1; UG/1
AEROSOL RESPIRATORY (INHALATION)
Refills: 0 | Status: DISCONTINUED | COMMUNITY
End: 2022-12-28

## 2022-12-29 NOTE — PROCEDURE
[FreeTextEntry1] : 6 min walk- mild desaturation at min 5 to 91%\par spirometry shows restriction, volumes are mildly decreased. dlco moderate reduction but corrects for va\par \par xray [facility] 8/22\par ct chest [facility] 8/11\par \par xray today 10/2022- mild improvement in infiltrate HUMERA. unchanged right. \par no effusions\par \par \par ct chest [facility] 12/2022

## 2022-12-29 NOTE — ASSESSMENT
[FreeTextEntry1] : repeat CT chest shows ipmroved in consolidations\par still with bronchiectasis/ SCOTT likely\par start hypresal BID\par start airway clearance\par f/u in 4 weeks\par sputum culture cups given\par \par will check quantiferon again at next visit\par

## 2022-12-29 NOTE — HISTORY OF PRESENT ILLNESS
[TextBox_4] : DENNIS BRADFORD is a 88 year old male who presents for f/u\par sinc elast visit- established care with cardio\par now s/p cardica cath with 3 stents\par on blood thinners\par \par dyspnea significantly improved\par no hemoptysis\par \par got repeat ct chest done\par \par here for result review\par \par \par Smoking Status: never \par eCigarettes: never \par

## 2023-01-05 ENCOUNTER — APPOINTMENT (OUTPATIENT)
Dept: ELECTROPHYSIOLOGY | Facility: CLINIC | Age: 88
End: 2023-01-05
Payer: MEDICARE

## 2023-01-05 ENCOUNTER — NON-APPOINTMENT (OUTPATIENT)
Age: 88
End: 2023-01-05

## 2023-01-05 VITALS
SYSTOLIC BLOOD PRESSURE: 198 MMHG | OXYGEN SATURATION: 98 % | HEART RATE: 55 BPM | WEIGHT: 150 LBS | DIASTOLIC BLOOD PRESSURE: 88 MMHG | HEIGHT: 68 IN | BODY MASS INDEX: 22.73 KG/M2

## 2023-01-05 PROCEDURE — 93000 ELECTROCARDIOGRAM COMPLETE: CPT

## 2023-01-05 PROCEDURE — 99205 OFFICE O/P NEW HI 60 MIN: CPT

## 2023-01-06 NOTE — HISTORY OF PRESENT ILLNESS
MS/RN   OPENING NOTE



RECEIVED PATIENT FROM NIGHT SHIFT NURSE

PATIENT IS AWAKE IN BED A/O X4. DENIES ANY PAIN AT THIS TIME. NO ACUTE DISTRESS NOTED. HIFLO 
OXYGEN 30L FIO2 60% VIA NASAL CANNULA TOLERATING WELL, NO SOB NOTED. SAFETY MEASURES IN 
PLACE BED LOCKED AND IN LOWEST POSITION, CALL LIGHT WITHIN REACH. WILL CONTINUE TO MONITOR 
AND ENSURE SAFETY. [FreeTextEntry1] : Cedrick Layne is an 87y/o man with Hx of HTN, HLD, CVA (no residual deficits), BPH, skin CA, Paroxysmal afib, on Eliquis, CAD s/p PCI x3 and concern for TBS on Holter who presents today for initial evaluation. Admits recent cath on 11/03/22 wh showed severe multivessel disease including subtotal occlusion of the mLAD, severe oLCx and RCA disease with  of a large OM branch. Repeat cath on11/7 with 2 stents to the RCA and further on 11/16 s/p POBA to LAD and X1 OSMEL to LM. Since that time, feels well. Improved dyspnea. Saw Cardiologist and underwent Holter monitor which revealed paroxysmal afib as well as evidence of pauses. Denies any associated symptoms. Denies dizziness or feeling lightheaded. Denies chest pain, palpitations, SOB, syncope or near syncope. Metoprolol was decreased since that time. Remains on Eliquis without s/s of bleeding.

## 2023-01-06 NOTE — REASON FOR VISIT
[Arrhythmia/ECG Abnorrmalities] : arrhythmia/ECG abnormalities [FreeTextEntry3] : Christopher Adam MD

## 2023-01-06 NOTE — DISCUSSION/SUMMARY
[EKG obtained to assist in diagnosis and management of assessed problem(s)] : EKG obtained to assist in diagnosis and management of assessed problem(s) [FreeTextEntry1] : Impression:\par \par 1. TBS: EKG performed today to assess for presence of conduction disease and reveals sinus bradycardia. Recent Holter with noted paroxysmal afib as well as pauses, metoprolol now reduced. ECHO with LVEF 50-55%. Concern for TBS and need for PPM. Recommend 30 day CardioNet to assess for presence of sustained tachy/bradyarrhythmias or conversion pauses and need for PPM placement. \par \par 2. HTN: resume oral antihypertensives as prescribed. Encouraged heart healthy diet, sodium restriction, and weight loss. Continue regular f/u with Cardiologist for further HTN management.\par \par 3. HLD: resume statin therapy as prescribed and regular f/u with Cardiologist for routine lipid monitoring and management.\par \par Plan for 30 day CardioNet and RTO for f/u post monitoring.

## 2023-01-06 NOTE — CARDIOLOGY SUMMARY
[de-identified] : 8/12/2022: LVEF 50-55%.  [de-identified] : 11/16/2022: Diagnostic Findings: \par \par Coronary Angiography \par LM \par Left main artery: Large caliber vessel. 80% severely calcified\par stenosis in distal vessel.\par \par Patient: DENNIS BRADFORD            MRN: [id number]\par Study Date: 11/16/2022   12:56 PM      Page 1 of 4\par \par \par \par \par LAD \par Left anterior descending artery: Large caliber vessel, tapers to\par smaller vessel in mid segment. Severe diffuse calcified disease\par in proximal vessel.  in mid vessel.  \par \par CX \par Circumflex: Moderate caliber vessel. Severe disease.  \par \par Interventional Findings: \par \par Interventional Details \par Mid left anterior descending: This was a 100 % De Asim stenosis. The\par lesion length was 20 mm. This was an ACC/AHA High/C\par lesion for intervention. This lesion is a chronic total occlusion.\par This lesion was not previously dilated. Guidewire crossing was\par successful.  \par \par A successful Balloon angioplasty was performed using a 6FR EBU 3.5\par LAUNCHER, a MINAMO STRAIGHT 190CM, and a 2.00\par X 30 APEX.  \par   The inflation pressure was 16 tank for the duration of 17.0 seconds. \par   The inflation pressure was 20 tank for the duration of 12.0 seconds. \par \par A successful IVUS was performed using a [employer].  \par \par A successful IVUS was performed using a [employer].  \par \par Following intervention there is a 20 % residual stenosis. There was\par SOHAIL Flow 1 before the procedure and SOHAIL Flow 3 following\par the procedure. No significant vessel dissection noted. There was no\par perforation at the intervention site. Following intervention\par there is satisfactory angiographic appearance and no evidence of\par thrombus.\par \par Distal left main: This was an 80 % De Asim stenosis. The lesion length\par was 20 mm. This was an ACC/AHA High/C lesion for\par intervention. The vessel is severely calcified. This is a bifurcation\par lesion involving the Proximal left anterior descending. This\par lesion is not a chronic total occlusion. IVUS was performed using a\par [employer]. Imaging shows a calcified lesion. This\par lesion was not previously dilated. Guidewire crossing was successful.\par \par \par A successful IVUS was performed using a MINAMO STRAIGHT 190CM, a 6FR\par EBU 3.5 LAUNCHER, and a [employer]AG[employer] ST.\par \par A successful Balloon angioplasty was performed using a C2 3.5 X 12MM.\par \par   The inflation pressure was 6 tank for the duration of 26.0 seconds. \par   The inflation pressure was 6 tank for the duration of 26.0 seconds. \par   The inflation pressure was 6 tank for the duration of 24.0 seconds. \par   The inflation pressure was 6 tank for the duration of 16.0 seconds. \par \par A successful IVUS was performed using a [facility].  \par \par A successful Drug Eluting Stent was deployed using a FRONTIER RX\par [id number].\par   The inflation pressure was 12 tank for the duration of 11.0 seconds. \par \par A successful IVUS was performed using a [facility] [employer].  \par \par Following intervention there is a 1 % residual stenosis. There was\par SOHAIL Flow 3 before the procedure and SOHAIL Flow 3 following the\par procedure. No significant vessel dissection noted. There was no\par perforation at the intervention site. Following intervention there is\par an excellent angiographic appearance and no evidence of thrombus.  \par \par

## 2023-01-11 RX ORDER — SODIUM CHLORIDE FOR INHALATION 3.5 %
3.5 VIAL, NEBULIZER (ML) INHALATION
Qty: 1 | Refills: 3 | Status: DISCONTINUED | COMMUNITY
Start: 2022-12-28 | End: 2023-01-11

## 2023-01-12 NOTE — PATIENT PROFILE ADULT - BRAND OF FIRST COVID-19 BOOSTER
Pt to ED due to being sent by Dr. Sharmin Padilla office for high potassium. Pt is alert and oriented x4. Skin is warm, dry, intact.  Resp are regular and equal. [employer]

## 2023-01-16 ENCOUNTER — NON-APPOINTMENT (OUTPATIENT)
Age: 88
End: 2023-01-16

## 2023-01-23 ENCOUNTER — APPOINTMENT (OUTPATIENT)
Dept: PULMONOLOGY | Facility: CLINIC | Age: 88
End: 2023-01-23

## 2023-01-30 ENCOUNTER — RX RENEWAL (OUTPATIENT)
Age: 88
End: 2023-01-30

## 2023-03-09 ENCOUNTER — RX RENEWAL (OUTPATIENT)
Age: 88
End: 2023-03-09

## 2023-03-30 ENCOUNTER — APPOINTMENT (OUTPATIENT)
Dept: CARDIOLOGY | Facility: CLINIC | Age: 88
End: 2023-03-30

## 2023-03-30 NOTE — PATIENT PROFILE ADULT - NSPROPTRIGHTNOTIFY_GEN_A_NUR
Azelaic Acid Pregnancy And Lactation Text: This medication is considered safe during pregnancy and breast feeding. yes

## 2023-04-01 ENCOUNTER — RX RENEWAL (OUTPATIENT)
Age: 88
End: 2023-04-01

## 2023-04-04 ENCOUNTER — RX RENEWAL (OUTPATIENT)
Age: 88
End: 2023-04-04

## 2023-05-15 ENCOUNTER — RX RENEWAL (OUTPATIENT)
Age: 88
End: 2023-05-15

## 2023-05-15 ENCOUNTER — NON-APPOINTMENT (OUTPATIENT)
Age: 88
End: 2023-05-15

## 2023-05-15 ENCOUNTER — APPOINTMENT (OUTPATIENT)
Dept: CARDIOLOGY | Facility: CLINIC | Age: 88
End: 2023-05-15
Payer: MEDICARE

## 2023-05-15 ENCOUNTER — TRANSCRIPTION ENCOUNTER (OUTPATIENT)
Age: 88
End: 2023-05-15

## 2023-05-15 VITALS
WEIGHT: 150 LBS | OXYGEN SATURATION: 97 % | HEIGHT: 68 IN | DIASTOLIC BLOOD PRESSURE: 84 MMHG | SYSTOLIC BLOOD PRESSURE: 166 MMHG | BODY MASS INDEX: 22.73 KG/M2 | HEART RATE: 62 BPM

## 2023-05-15 DIAGNOSIS — I49.5 SICK SINUS SYNDROME: ICD-10-CM

## 2023-05-15 PROCEDURE — 93000 ELECTROCARDIOGRAM COMPLETE: CPT

## 2023-05-15 PROCEDURE — 99214 OFFICE O/P EST MOD 30 MIN: CPT

## 2023-05-17 NOTE — PHYSICAL EXAM
[No Edema] : no edema [No Cyanosis] : no cyanosis [No Clubbing] : no clubbing [No Varicosities] : no varicosities [Well Developed] : well developed [Well Nourished] : well nourished [No Acute Distress] : no acute distress [Normal Conjunctiva] : normal conjunctiva [Normal Venous Pressure] : normal venous pressure [No Carotid Bruit] : no carotid bruit [Normal S1, S2] : normal S1, S2 [No Murmur] : no murmur [No Rub] : no rub [No Gallop] : no gallop [Clear Lung Fields] : clear lung fields [Good Air Entry] : good air entry [No Respiratory Distress] : no respiratory distress  [Soft] : abdomen soft [Non Tender] : non-tender [No Masses/organomegaly] : no masses/organomegaly [Normal Bowel Sounds] : normal bowel sounds [Normal Gait] : normal gait [No Rash] : no rash [No Skin Lesions] : no skin lesions [Moves all extremities] : moves all extremities [No Focal Deficits] : no focal deficits [Normal Speech] : normal speech [Alert and Oriented] : alert and oriented [Normal memory] : normal memory [de-identified] : b/l LE edema - non pitting

## 2023-05-17 NOTE — HISTORY OF PRESENT ILLNESS
[FreeTextEntry1] : DENNIS BRADFORD  is a 88 year old M w/ pmhx of HTN, HLD, CVA ( no residual def), BPH, Skin  Ca, PAF Recent Diagnostic cath on 11/03/22 has shown severe multivessel disease including subtotal occlusion of the mLAD, severe oLCx and RCA disease with  of a large OM branch. 11/7 cardiac cath with 2 stents to the RCA. 11/16 cardiac cath s/p POBA to LAD and X1 OSMEL to LM. Who presents today for follow up visit.\par \par  The patient denies fever, chills, sore throat, loss of taste or smell, muscle aches weight loss, malaise, rash, recent travel, insect bites, alteration bowel habits, headaches, weakness, abdominal  pain, bloating, changes in urination, visual disturbances, shortness of breath, chest pain, dizziness, palpitations.\par \par Of note pt endorses a fall today where he " tripped on a crack" pt denies LOC or head trauma"\par The patient presents for evaluation of an ongoing active management high blood pressure and hyperlipidemia. Patient is currently tolerating the current  antihypertensive regime and they deny headaches, stiff neck, visual changes, pedal Edema or PND. As for follow-up of elevated cholesterol.  We reviewed today ongoing opportunities to maximize medical therapy. patient is currently tolerating medication and and does not complain of new  muscle pain, joint pain, back pain,urinary changes ,nausea, vomiting, abdominal pain or diarrhea. The patient is trying to follow a low cholesterol diet.\par \par The patient is also here for follow-up of atrial fibrillation and currently they feel well with an occasional episode of palpitations.  The patient states they are reliably taking the anticoagulation medicine and are not having any signs of bleeding they deny any dizziness headaches nosebleeds or black tarry stools\par \par

## 2023-05-17 NOTE — PHYSICAL EXAM
[No Edema] : no edema [No Cyanosis] : no cyanosis [No Clubbing] : no clubbing [No Varicosities] : no varicosities [Well Developed] : well developed [Well Nourished] : well nourished [No Acute Distress] : no acute distress [Normal Conjunctiva] : normal conjunctiva [Normal Venous Pressure] : normal venous pressure [No Carotid Bruit] : no carotid bruit [Normal S1, S2] : normal S1, S2 [No Murmur] : no murmur [No Rub] : no rub [No Gallop] : no gallop [Clear Lung Fields] : clear lung fields [Good Air Entry] : good air entry [No Respiratory Distress] : no respiratory distress  [Soft] : abdomen soft [Non Tender] : non-tender [No Masses/organomegaly] : no masses/organomegaly [Normal Bowel Sounds] : normal bowel sounds [Normal Gait] : normal gait [No Rash] : no rash [No Skin Lesions] : no skin lesions [Moves all extremities] : moves all extremities [No Focal Deficits] : no focal deficits [Normal Speech] : normal speech [Alert and Oriented] : alert and oriented [Normal memory] : normal memory [de-identified] : b/l LE edema - non pitting

## 2023-05-19 LAB
ALBUMIN SERPL ELPH-MCNC: 4.3 G/DL
ALP BLD-CCNC: 151 U/L
ALT SERPL-CCNC: 26 U/L
ANION GAP SERPL CALC-SCNC: 13 MMOL/L
AST SERPL-CCNC: 30 U/L
BASOPHILS # BLD AUTO: 0.05 K/UL
BASOPHILS NFR BLD AUTO: 0.5 %
BILIRUB DIRECT SERPL-MCNC: 0.2 MG/DL
BILIRUB INDIRECT SERPL-MCNC: 0.6 MG/DL
BILIRUB SERPL-MCNC: 0.9 MG/DL
BUN SERPL-MCNC: 34 MG/DL
CALCIUM SERPL-MCNC: 9.4 MG/DL
CHLORIDE SERPL-SCNC: 104 MMOL/L
CHOLEST SERPL-MCNC: 132 MG/DL
CK SERPL-CCNC: 113 U/L
CO2 SERPL-SCNC: 23 MMOL/L
CREAT SERPL-MCNC: 1.22 MG/DL
EGFR: 57 ML/MIN/1.73M2
EOSINOPHIL # BLD AUTO: 0.11 K/UL
EOSINOPHIL NFR BLD AUTO: 1.1 %
ESTIMATED AVERAGE GLUCOSE: 114 MG/DL
GLUCOSE SERPL-MCNC: 90 MG/DL
HBA1C MFR BLD HPLC: 5.6 %
HCT VFR BLD CALC: 44 %
HDLC SERPL-MCNC: 37 MG/DL
HGB BLD-MCNC: 13.7 G/DL
IMM GRANULOCYTES NFR BLD AUTO: 0.2 %
LDLC SERPL CALC-MCNC: 79 MG/DL
LDLC SERPL DIRECT ASSAY-MCNC: 75 MG/DL
LYMPHOCYTES # BLD AUTO: 1.51 K/UL
LYMPHOCYTES NFR BLD AUTO: 14.7 %
MAN DIFF?: NORMAL
MCHC RBC-ENTMCNC: 31.1 GM/DL
MCHC RBC-ENTMCNC: 31.2 PG
MCV RBC AUTO: 100.2 FL
MONOCYTES # BLD AUTO: 0.68 K/UL
MONOCYTES NFR BLD AUTO: 6.6 %
NEUTROPHILS # BLD AUTO: 7.89 K/UL
NEUTROPHILS NFR BLD AUTO: 76.9 %
NONHDLC SERPL-MCNC: 95 MG/DL
NT-PROBNP SERPL-MCNC: 1222 PG/ML
PLATELET # BLD AUTO: 241 K/UL
POTASSIUM SERPL-SCNC: 4.4 MMOL/L
PROT SERPL-MCNC: 7.9 G/DL
RBC # BLD: 4.39 M/UL
RBC # FLD: 14.2 %
SODIUM SERPL-SCNC: 141 MMOL/L
TRIGL SERPL-MCNC: 84 MG/DL
WBC # FLD AUTO: 10.26 K/UL

## 2023-05-25 ENCOUNTER — TRANSCRIPTION ENCOUNTER (OUTPATIENT)
Age: 88
End: 2023-05-25

## 2023-05-31 ENCOUNTER — TRANSCRIPTION ENCOUNTER (OUTPATIENT)
Age: 88
End: 2023-05-31

## 2023-06-12 ENCOUNTER — TRANSCRIPTION ENCOUNTER (OUTPATIENT)
Age: 88
End: 2023-06-12

## 2023-06-14 ENCOUNTER — TRANSCRIPTION ENCOUNTER (OUTPATIENT)
Age: 88
End: 2023-06-14

## 2023-07-03 LAB
ALBUMIN SERPL ELPH-MCNC: 4 G/DL
ALP BLD-CCNC: 156 U/L
ALT SERPL-CCNC: 31 U/L
AST SERPL-CCNC: 28 U/L
GGT SERPL-CCNC: 22 U/L
NT-PROBNP SERPL-MCNC: 1929 PG/ML

## 2023-07-07 ENCOUNTER — TRANSCRIPTION ENCOUNTER (OUTPATIENT)
Age: 88
End: 2023-07-07

## 2023-07-13 NOTE — PROGRESS NOTE ADULT - ASSESSMENT
86yo M with PMH of HTN, HLD, hx of CVA (no residual deficits), BPH, hx of skin CA who presents with cough and fevers a/w sepsis due to multifocal pneumonia with progression to acute hypoxic respiratory failure now on HFNC and course further c/b new onset afib.  Transferred from [place] for bronchoscopy.     Bronch done - Aug 30th -   Bronch AFB neg  ID follow up - reviewed -       eval malignancy - TB - PNA - infection  spoke with pt - dtr -   s/p Levaquin course in [facility]  o2 support as needed to keep sat > 88 pct  isolation precs  ID follow up no

## 2023-07-14 ENCOUNTER — APPOINTMENT (OUTPATIENT)
Dept: INTERNAL MEDICINE | Facility: CLINIC | Age: 88
End: 2023-07-14
Payer: MEDICARE

## 2023-07-14 VITALS
WEIGHT: 150 LBS | SYSTOLIC BLOOD PRESSURE: 140 MMHG | OXYGEN SATURATION: 98 % | DIASTOLIC BLOOD PRESSURE: 80 MMHG | HEART RATE: 64 BPM | TEMPERATURE: 98.6 F | BODY MASS INDEX: 22.73 KG/M2 | HEIGHT: 68 IN | RESPIRATION RATE: 16 BRPM

## 2023-07-14 DIAGNOSIS — R04.2 HEMOPTYSIS: ICD-10-CM

## 2023-07-14 PROCEDURE — 99213 OFFICE O/P EST LOW 20 MIN: CPT

## 2023-07-14 NOTE — PHYSICAL EXAM
[Normal Oropharynx] : the oropharynx was normal [Normal TMs] : both tympanic membranes were normal [Coordination Grossly Intact] : coordination grossly intact [No Focal Deficits] : no focal deficits [Normal Gait] : normal gait [Normal] : affect was normal and insight and judgment were intact

## 2023-07-14 NOTE — PLAN
[FreeTextEntry1] : Hemoptysis \par - Two episodes of hemoptysis on Wednesday, has not happened since. \par - Vital signs are stable, he feels well \par - Lungs are clear on exam, will hold off on abx pending result of CXR. \par - Check CBC to ensure stable hemoglobin\par - Pt on Eliquis, discussed with Dr. Adam's PA Javier. Will hold Eliquis pending workup. They have also contacted his pulmonologists office to expedite his appointment from 7/21 to sooner. They spoke to daughter Chichi regarding this plan. I have also left a voicemail indicating plan as well. \par - ER precautions provided. \par

## 2023-07-14 NOTE — HISTORY OF PRESENT ILLNESS
[FreeTextEntry8] : 88 year old male with PMH HTN, HLD, hx of CVA (no residual deficits), BPH, hx of skin CA, pAF (on Eliquis), CAD s/p stent placement who presents today with complaint of hemoptysis.\par Symptoms started on Wednesday. He states he felt like he needed to clear his throat. When he did, he spit up phlegm that was mostly blood. He had one more episode of this happening that night. \par Since then he has not had any other episodes. He denies any fevers, chills, cough, SOB. Has not had any other episode of bleeding from the stool, urine, etc. Feels well otherwise. \par

## 2023-07-15 ENCOUNTER — APPOINTMENT (OUTPATIENT)
Dept: RADIOLOGY | Facility: CLINIC | Age: 88
End: 2023-07-15
Payer: MEDICARE

## 2023-07-15 ENCOUNTER — OUTPATIENT (OUTPATIENT)
Dept: OUTPATIENT SERVICES | Facility: HOSPITAL | Age: 88
LOS: 1 days | End: 2023-07-15
Payer: MEDICARE

## 2023-07-15 DIAGNOSIS — Z87.2 PERSONAL HISTORY OF DISEASES OF THE SKIN AND SUBCUTANEOUS TISSUE: Chronic | ICD-10-CM

## 2023-07-15 DIAGNOSIS — Z98.890 OTHER SPECIFIED POSTPROCEDURAL STATES: Chronic | ICD-10-CM

## 2023-07-15 DIAGNOSIS — R04.2 HEMOPTYSIS: ICD-10-CM

## 2023-07-15 PROCEDURE — 71046 X-RAY EXAM CHEST 2 VIEWS: CPT | Mod: 26

## 2023-07-15 PROCEDURE — 71046 X-RAY EXAM CHEST 2 VIEWS: CPT

## 2023-07-18 ENCOUNTER — APPOINTMENT (OUTPATIENT)
Dept: PULMONOLOGY | Facility: CLINIC | Age: 88
End: 2023-07-18

## 2023-07-18 ENCOUNTER — APPOINTMENT (OUTPATIENT)
Dept: PULMONOLOGY | Facility: CLINIC | Age: 88
End: 2023-07-18
Payer: MEDICARE

## 2023-07-18 VITALS
HEART RATE: 68 BPM | DIASTOLIC BLOOD PRESSURE: 74 MMHG | RESPIRATION RATE: 16 BRPM | OXYGEN SATURATION: 94 % | SYSTOLIC BLOOD PRESSURE: 154 MMHG

## 2023-07-18 PROCEDURE — 99213 OFFICE O/P EST LOW 20 MIN: CPT

## 2023-07-19 ENCOUNTER — RX RENEWAL (OUTPATIENT)
Age: 88
End: 2023-07-19

## 2023-07-19 NOTE — HISTORY OF PRESENT ILLNESS
[TextBox_4] : DENNIS BRADFORD is a 88 year old male, with history of bronchiectasis, who presents to the office for follow up evaluation. Patient reports of having an episode of hemoptysis 2 nights ago. He also states of having hoarseness.

## 2023-07-19 NOTE — ADDENDUM
[FreeTextEntry1] : I, Libby De La Osamuel, acted solely as a scribe for Dr. Grant Gutierrez M.D. on this date 07/18/2023. \par \par All medical record entries made by the Scribe were at my, Dr. Grant Gutierrez M.D., direction and personally dictated by me on 07/18/2023. I have reviewed the chart and agree that the record accurately reflects my personal performance of the history, physical exam, assessment and plan. I have also personally directed, reviewed, and agreed with the chart.

## 2023-07-19 NOTE — REASON FOR VISIT
[Follow-Up] : a follow-up visit [Bronchiectasis] : bronchiectasis [TextBox_44] : Episode of hemoptysis

## 2023-07-19 NOTE — ASSESSMENT
[FreeTextEntry1] : Reviewed recent chest x-ray which shows improvement compared to prior and no new acute findings. Patient has hemoptysis likely secondary to bronchiectasis.  Likely had ruptured blood vessel.  Do recommend course of antibiotics in light of hemoptysis.  Ordered chest CT scan in 1 month for evaluation of progression of bronchiectasis and/or worsening of SCOTT disease.. Ordered 1 week Augmentin course to be taken twice a day with food to prevent infection. Follow up if hemoptysis continues.\par \par Advised patient to pause Eliquis for one more day before restarting it.

## 2023-07-21 ENCOUNTER — APPOINTMENT (OUTPATIENT)
Dept: PULMONOLOGY | Facility: CLINIC | Age: 88
End: 2023-07-21

## 2023-07-28 ENCOUNTER — RX RENEWAL (OUTPATIENT)
Age: 88
End: 2023-07-28

## 2023-08-11 ENCOUNTER — RX RENEWAL (OUTPATIENT)
Age: 88
End: 2023-08-11

## 2023-08-13 RX ORDER — LOSARTAN POTASSIUM 25 MG/1
25 TABLET, FILM COATED ORAL
Qty: 30 | Refills: 3 | Status: ACTIVE | COMMUNITY
Start: 2023-05-15 | End: 1900-01-01

## 2023-08-15 ENCOUNTER — OUTPATIENT (OUTPATIENT)
Dept: OUTPATIENT SERVICES | Facility: HOSPITAL | Age: 88
LOS: 1 days | End: 2023-08-15
Payer: MEDICARE

## 2023-08-15 ENCOUNTER — APPOINTMENT (OUTPATIENT)
Dept: CT IMAGING | Facility: CLINIC | Age: 88
End: 2023-08-15
Payer: MEDICARE

## 2023-08-15 ENCOUNTER — APPOINTMENT (OUTPATIENT)
Dept: INTERNAL MEDICINE | Facility: CLINIC | Age: 88
End: 2023-08-15
Payer: MEDICARE

## 2023-08-15 VITALS
WEIGHT: 150 LBS | BODY MASS INDEX: 22.73 KG/M2 | HEART RATE: 51 BPM | SYSTOLIC BLOOD PRESSURE: 130 MMHG | HEIGHT: 68 IN | DIASTOLIC BLOOD PRESSURE: 74 MMHG | OXYGEN SATURATION: 98 % | TEMPERATURE: 97.5 F | RESPIRATION RATE: 16 BRPM

## 2023-08-15 DIAGNOSIS — J47.9 BRONCHIECTASIS, UNCOMPLICATED: ICD-10-CM

## 2023-08-15 DIAGNOSIS — Z00.00 ENCOUNTER FOR GENERAL ADULT MEDICAL EXAMINATION W/OUT ABNORMAL FINDINGS: ICD-10-CM

## 2023-08-15 DIAGNOSIS — Z87.2 PERSONAL HISTORY OF DISEASES OF THE SKIN AND SUBCUTANEOUS TISSUE: Chronic | ICD-10-CM

## 2023-08-15 DIAGNOSIS — Z98.890 OTHER SPECIFIED POSTPROCEDURAL STATES: Chronic | ICD-10-CM

## 2023-08-15 PROCEDURE — G0439: CPT

## 2023-08-15 PROCEDURE — 71250 CT THORAX DX C-: CPT | Mod: 26,MH

## 2023-08-15 PROCEDURE — 71250 CT THORAX DX C-: CPT

## 2023-08-15 RX ORDER — METHYLPREDNISOLONE 4 MG/1
4 TABLET ORAL
Qty: 1 | Refills: 0 | Status: DISCONTINUED | COMMUNITY
Start: 2022-10-06 | End: 2023-08-15

## 2023-08-15 RX ORDER — AZITHROMYCIN 250 MG/1
250 TABLET, FILM COATED ORAL
Qty: 6 | Refills: 0 | Status: DISCONTINUED | COMMUNITY
Start: 2022-08-08 | End: 2023-08-15

## 2023-08-15 RX ORDER — AMOXICILLIN AND CLAVULANATE POTASSIUM 875; 125 MG/1; MG/1
875-125 TABLET, COATED ORAL TWICE DAILY
Qty: 14 | Refills: 0 | Status: DISCONTINUED | COMMUNITY
Start: 2022-10-06 | End: 2023-08-15

## 2023-08-15 NOTE — HISTORY OF PRESENT ILLNESS
[de-identified] : 88 year old male with PMH BPH, HTN, CVA (no residual deficits), pAF (on Eliquis), CAD s/p stent placement, bronchiectasis who presents today for AWV.  He feels well and has no acute complaints.

## 2023-08-15 NOTE — PHYSICAL EXAM
[Normal] : affect was normal and insight and judgment were intact [de-identified] : trace lower extremity edema, non pitting

## 2023-08-15 NOTE — HEALTH RISK ASSESSMENT
[Good] : ~his/her~  mood as  good [1 or 2 (0 pts)] : 1 or 2 (0 points) [Never (0 pts)] : Never (0 points) [No] : In the past 12 months have you used drugs other than those required for medical reasons? No [One fall no injury in past year] : Patient reported one fall in the past year without injury [0] : 2) Feeling down, depressed, or hopeless: Not at all (0) [PHQ-2 Negative - No further assessment needed] : PHQ-2 Negative - No further assessment needed [Alone] : lives alone [Retired] : retired [] :  [Feels Safe at Home] : Feels safe at home [Fully functional (bathing, dressing, toileting, transferring, walking, feeding)] : Fully functional (bathing, dressing, toileting, transferring, walking, feeding) [Reports normal functional visual acuity (ie: able to read med bottle)] : Reports normal functional visual acuity [Smoke Detector] : smoke detector [Carbon Monoxide Detector] : carbon monoxide detector [Safety elements used in home] : safety elements used in home [Seat Belt] :  uses seat belt [Never] : Never [Audit-CScore] : 0 [[facility]] : 8 [[id number]] : 0 [Reports changes in hearing] : Reports no changes in hearing [Reports changes in vision] : Reports no changes in vision [Reports changes in dental health] : Reports no changes in dental health [de-identified] : daughter and son help with shopping and cooking, transportation  [de-identified] : wears glasses

## 2023-08-15 NOTE — PLAN
[FreeTextEntry1] : HCM:  - Defers colonoscopy  - Reports that his vaccines are up to date, including pneumonia and shingles, I advised him to send copies of vaccine reports  - Check routine labs today   CAD/HTN/pAF  - Follows with cardio, BP well controlled  - Continue cardiac medication regimen as prescribed   BPH - Check PSA  - Continue medication regimen as prescribed   Bronchiectasis  - Follows with pulm

## 2023-08-16 DIAGNOSIS — R74.8 ABNORMAL LEVELS OF OTHER SERUM ENZYMES: ICD-10-CM

## 2023-08-16 LAB
ALBUMIN SERPL ELPH-MCNC: 4.5 G/DL
ALP BLD-CCNC: 200 U/L
ALT SERPL-CCNC: 39 U/L
ANION GAP SERPL CALC-SCNC: 13 MMOL/L
APPEARANCE: CLEAR
AST SERPL-CCNC: 27 U/L
BACTERIA: NEGATIVE /HPF
BILIRUB SERPL-MCNC: 0.9 MG/DL
BILIRUBIN URINE: NEGATIVE
BLOOD URINE: NEGATIVE
BUN SERPL-MCNC: 47 MG/DL
CALCIUM SERPL-MCNC: 9.3 MG/DL
CAST: 0 /LPF
CHLORIDE SERPL-SCNC: 103 MMOL/L
CHOLEST SERPL-MCNC: 137 MG/DL
CO2 SERPL-SCNC: 23 MMOL/L
COLOR: YELLOW
CREAT SERPL-MCNC: 1.57 MG/DL
EGFR: 42 ML/MIN/1.73M2
EPITHELIAL CELLS: 0 /HPF
ESTIMATED AVERAGE GLUCOSE: 123 MG/DL
FOLATE SERPL-MCNC: 14.4 NG/ML
GLUCOSE QUALITATIVE U: NEGATIVE MG/DL
GLUCOSE SERPL-MCNC: 94 MG/DL
HBA1C MFR BLD HPLC: 5.9 %
HDLC SERPL-MCNC: 40 MG/DL
KETONES URINE: NEGATIVE MG/DL
LDLC SERPL CALC-MCNC: 78 MG/DL
LEUKOCYTE ESTERASE URINE: NEGATIVE
MICROSCOPIC-UA: NORMAL
NITRITE URINE: NEGATIVE
NONHDLC SERPL-MCNC: 97 MG/DL
PH URINE: 5.5
POTASSIUM SERPL-SCNC: 5.4 MMOL/L
PROT SERPL-MCNC: 8.2 G/DL
PROTEIN URINE: 30 MG/DL
PSA SERPL-MCNC: 1.27 NG/ML
RED BLOOD CELLS URINE: 1 /HPF
SODIUM SERPL-SCNC: 140 MMOL/L
SPECIFIC GRAVITY URINE: 1.02
TRIGL SERPL-MCNC: 99 MG/DL
TSH SERPL-ACNC: 2.87 UIU/ML
UROBILINOGEN URINE: 0.2 MG/DL
VIT B12 SERPL-MCNC: 622 PG/ML
WHITE BLOOD CELLS URINE: 1 /HPF

## 2023-08-18 LAB
ALBUMIN SERPL ELPH-MCNC: 4.2 G/DL
ALP BLD-CCNC: 179 U/L
ALT SERPL-CCNC: 32 U/L
ANION GAP SERPL CALC-SCNC: 12 MMOL/L
AST SERPL-CCNC: 26 U/L
BILIRUB SERPL-MCNC: 0.9 MG/DL
BUN SERPL-MCNC: 42 MG/DL
CALCIUM SERPL-MCNC: 9 MG/DL
CHLORIDE SERPL-SCNC: 101 MMOL/L
CO2 SERPL-SCNC: 22 MMOL/L
CREAT SERPL-MCNC: 1.55 MG/DL
EGFR: 43 ML/MIN/1.73M2
GGT SERPL-CCNC: 48 U/L
GLUCOSE SERPL-MCNC: 91 MG/DL
POTASSIUM SERPL-SCNC: 5.6 MMOL/L
PROT SERPL-MCNC: 7.4 G/DL
SODIUM SERPL-SCNC: 135 MMOL/L

## 2023-08-23 LAB
ALP BLD-CCNC: 177 IU/L
ALP BONE CFR SERPL: 25 %
ALP INTEST CFR SERPL: 0 %
ALP LIVER CFR SERPL: 75 %

## 2023-08-25 LAB
ANION GAP SERPL CALC-SCNC: 11 MMOL/L
BUN SERPL-MCNC: 31 MG/DL
CALCIUM SERPL-MCNC: 8.9 MG/DL
CHLORIDE SERPL-SCNC: 105 MMOL/L
CO2 SERPL-SCNC: 25 MMOL/L
CREAT SERPL-MCNC: 1.4 MG/DL
EGFR: 48 ML/MIN/1.73M2
GLUCOSE SERPL-MCNC: 83 MG/DL
POTASSIUM SERPL-SCNC: 5.5 MMOL/L
SODIUM SERPL-SCNC: 141 MMOL/L

## 2023-08-27 ENCOUNTER — TRANSCRIPTION ENCOUNTER (OUTPATIENT)
Age: 88
End: 2023-08-27

## 2023-08-28 NOTE — H&P CARDIOLOGY - PULMONARY EMBOLUS
Surgical Oncology Breast Post-Op       [address] [facility] SURGICAL ONCOLOGY [place]  [address] [place] [address]    Kathya Mirza  1955  [id number]  [address] [facility] SURGICAL ONCOLOGY [facility] [id number]    Chief Complaint:   Dimas Parks is seen for a post-operative visit of her recent breast surgery. Oncology History:     Oncology History   Malignant neoplasm of upper-inner quadrant of left breast in female, estrogen receptor positive ([address])   7/10/2023 Biopsy    Left breast ultrasound-guided biopsy  8 o'clock, 7 cm from nipple (RUBI)  Invasive mammary carcinoma of no special type (ductal)  Grade 1  ER 90, VA 90, HER2 0    Concordant. Malignancy appears unifocal; masses cover 7 mm on US. Left axilla US negative. Right breast clear. 7/17/2023 Genetic Testing    A total of 36 genes were evaluated, including: KENY, BRCA1, BRCA2, CDH1, CHEK2, PALB2, PTEN, STK11, TP53  Negative result. No pathogenic sequence variants identified  [facility]     8/15/2023 Surgery    Left breast RUBI  directed lumpectomy with SLN biopsy  Invasive mammary carcinoma of no special type (ductal)  Grade 1  1.1 cm  Margins negative  0/1 Lymph nodes  Anatomic/Prognostic Stage IA         Assessment & Plan:   Assessment/Plan    Patient presents for a postoperative visit. Her wounds are healing well. Her pathology demonstrated that her tumor was completely excised her sentinel lymph node was negative. She is a grade 1 tumor. She has appointment scheduled with radiation as well as medical oncology. See her back in 3 months and then at 6-month intervals. She is agreeable to see our nurse practitioner with the understanding that I would be available if there are any unanswered questions or concerns.   History of Present Illness:   See Oncology History    Interval History:   See Assessment & Plan    Review of Systems:   Review of Systems   All other systems reviewed and are negative.       Past Medical History:     Patient Active Problem List   Diagnosis   • Acquired hypothyroidism   • Essential hypertension   • Mild intermittent asthma with acute exacerbation   • Pure hypercholesterolemia   • Atypical chest pain   • Lung infiltrate   • Severe sepsis (HCC)   • Irritant contact dermatitis   • Depression, recurrent (HCC)   • Uncontrolled type 2 diabetes mellitus with hyperglycemia ([address])   • Spondylolisthesis of lumbar region   • History of orthopnea   • Obesity (BMI 35.0-39.9 without comorbidity)   • Plantar fasciitis of left foot   • Thyroid nodule   • Lower esophageal ring (Schatzki)   • Continuous opioid dependence (HCC)   • Malignant neoplasm of upper-inner quadrant of left breast in female, estrogen receptor positive ([address])   • Refusal of blood transfusions as patient is Gnosticism     Past Medical History:   Diagnosis Date   • Abdominal pain    • Asthma    • Back pain     occas due to "disc problem"   • Balance problems     "right side"   • Cholelithiasis     lap aviva today 3/10/2021   • Colon polyp    • Cracked tooth     3   • Depression    • Diabetes mellitus ([address])    • Difficulty swallowing     "food feels like stuck sometimes'   • Exercise involving walking     steps and cleaning   • Fatty liver    • GERD (gastroesophageal reflux disease)    • Hiatal hernia    • History of pneumonia    • History of stomach ulcers    • Hyperlipidemia    • Hypertension    • Obesity    • Pneumonia    • Refusal of blood transfusions as patient is Gnosticism    • Risk for falls    • Stroke [facility])    • Uses Liberian as primary spoken language    • Wears glasses      Past Surgical History:   Procedure Laterality Date   • BREAST LUMPECTOMY Left 8/15/2023    Procedure: BREAST RUBI  DIRECTED LUMPECTOMY;  Surgeon: Shahriar Plummer MD;  [facility];  Service: Surgical Oncology   • CHOLECYSTECTOMY     • COLONOSCOPY  02/09/2021    4 polyps tubular adenomas by Dr. Kalli Montano   • EGD  04/20/2021    nonobstructing Schatzki's ring dilated with 47 [address] Hernandez, sliding hiatal hernia otherwise normal.  Biopsies stomach  negative for H. pylori biopsy esophagus negative for eosinophilic esophagitis by Dr. Israel Amato   • EGD AND COLONOSCOPY  02/09/2021    3 YEAR RECOMMENDED = COLONO   • LYMPH NODE BIOPSY Left 8/15/2023    Procedure: LYMPHATIC MAPPING WITH BLUE AND RADIOACTIVE DYES, SENTINEL LYMPH NODE BIOPSY (INJECT AT 1200 BY DR TORRES IN THE OR); Surgeon: Bill Loredo MD;  Location: [facility];  Service: Surgical Oncology   • TN LAPAROSCOPY SURG CHOLECYSTECTOMY N/A 03/10/2021    Procedure: CHOLECYSTECTOMY LAPAROSCOPIC W/ ROBOTICS;  Surgeon: Trell Roa MD;  Location: [facility];  Service: General   • ROTATOR CUFF REPAIR Bilateral     screws implanted"   • UPPER GASTROINTESTINAL ENDOSCOPY  02/09/2021    4 cm hiatal hernia without Alejandro lesions. Possibly shortened esophagus.   Biopsy of the stomach negative for H. pylori, biopsy of the EG junction negative for Kaplan's, biopsied duodenum negative for celiac by Dr. Kalli Montano   • [place] Left 7/10/2023   • [address] THYROID BIOPSY  3/7/2023     Family History   Problem Relation Age of Onset   • Diabetes Mother    • Hypertension Mother    • Hypertension Father    • No Known Problems Sister    • No Known Problems Daughter    • No Known Problems Maternal Grandmother    • No Known Problems Maternal Grandfather    • No Known Problems Paternal Grandmother    • No Known Problems Paternal Grandfather    • No Known Problems Son    • No Known Problems Son    • No Known Problems Daughter    • No Known Problems Daughter    • Colon cancer Brother 71     Social History     Socioeconomic History   • Marital status: /Civil Union     Spouse name: Not on file   • Number of children: Not on file   • Years of education: Not on file   • Highest education level: Not on file   Occupational History • Occupation: House wife   Tobacco Use   • Smoking status: Never   • Smokeless tobacco: Never   Vaping Use   • Vaping Use: Never used   Substance and Sexual Activity   • Alcohol use: Never   • Drug use: Never   • Sexual activity: Not Currently   Other Topics Concern   • Not on file   Social History Narrative   • Not on file     Social Determinants of Health     Financial Resource Strain: Not on file   Food Insecurity: Not on file   Transportation Needs: Not on file   Physical Activity: Not on file   Stress: Not on file   Social Connections: Not on file   Intimate Partner Violence: Not on file   Housing Stability: Not on file       Current Outpatient Medications:   •  albuterol (2.5 mg/3 mL) 0.083 % nebulizer solution, Take 1 vial (2.5 mg total) by nebulization every 6 (six) hours as needed for wheezing or shortness of breath, Disp: 60 vial, Rfl: 5  •  albuterol (PROVENTIL HFA,VENTOLIN HFA) 90 mcg/act inhaler, Inhale 2 puffs every 6 (six) hours as needed for wheezing, Disp: 18 g, Rfl: 2  •  aspirin 81 mg chewable tablet, Chew 1 tablet (81 mg total) daily, Disp: 90 tablet, Rfl: 1  •  atorvastatin (LIPITOR) 20 mg tablet, Take 1 tablet (20 mg total) by mouth daily, Disp: 90 tablet, Rfl: 1  •  budesonide-formoterol (SYMBICORT) 160-4.5 mcg/act inhaler, Inhale 2 puffs 2 (two) times a day as needed (1) Rinse mouth after use., Disp: 10.2 g, Rfl: 3  •  clonazePAM (KlonoPIN) 0.125 mg disintegrating tablet, Take 1 tablet daily as needed for anxiety, Disp: 90 tablet, Rfl: 0  •  Empagliflozin-metFORMIN HCl (Synjardy) 12.5-1000 MG TABS, Take by mouth Take 2 tablets by mouth daily, Disp: , Rfl:   •  ezetimibe (ZETIA) 10 mg tablet, Take 1 tablet (10 mg total) by mouth daily, Disp: 90 tablet, Rfl: 1  •  furosemide (LASIX) 20 mg tablet, TAKE 1 TABLET (20 MG TOTAL) BY MOUTH DAILY AS NEEDED FOR EDEMA, Disp: 90 tablet, Rfl: 0  •  Insulin Aspart (NovoLOG) 100 units/mL injection, Inject under the skin Take 6 units for breakfast /7 unit for lunch/10 units at night, Disp: , Rfl:   •  insulin degludec [place] FlexTouch) 100 units/mL injection pen, Inject 20 Units under the skin daily, Disp: 18 mL, Rfl: 1  •  Insulin Pen Needle 31G X 4 MM MISC, Use 4 (four) times a day, Disp: 100 each, Rfl: 3  •  levothyroxine 150 mcg tablet, Take 1 tablet (150 mcg total) by mouth daily (Patient taking differently: Take 120 mcg by mouth daily), Disp: 90 tablet, Rfl: 1  •  metoprolol succinate (TOPROL-XL) 50 mg 24 hr tablet, TAKE 1 TABLET EVERY DAY, Disp: 90 tablet, Rfl: 0  •  montelukast (SINGULAIR) 10 mg tablet, Take 1 tablet (10 mg total) by mouth daily, Disp: 90 tablet, Rfl: 1  •  naloxone (NARCAN) 4 mg/0.1 mL nasal spray, Administer 1 spray into a nostril. If no response after 2-3 minutes, give another dose in the other nostril using a new spray., Disp: 1 each, Rfl: 1  •  omeprazole (PriLOSEC) 20 mg delayed release capsule, TAKE 1 CAPSULE EVERY DAY, Disp: 90 capsule, Rfl: 0  •  oxyCODONE-acetaminophen (Percocet) 5-325 mg per tablet, Take 1 tablet by mouth every 6 (six) hours as needed for moderate pain Max Daily Amount: 4 tablets, Disp: 6 tablet, Rfl: 0  •  VITAMIN D PO, Take by mouth once a week, Disp: , Rfl:   •  dicyclomine (BENTYL) 20 mg tablet, Take 1 tablet (20 mg total) by mouth 2 (two) times a day, Disp: 60 tablet, Rfl: 0  •  escitalopram (Lexapro) 10 mg tablet, Take 1 tablet (10 mg total) by mouth daily, Disp: 30 tablet, Rfl: 2  •  insulin lispro (HumaLOG KwikPen) 100 units/mL injection pen, Inject 7 Units under the skin 3 (three) times a day with meals (Patient not taking: Reported on 1/19/2023), Disp: 15 mL, Rfl: 0  •  losartan (COZAAR) 100 MG tablet, Take 1 tablet (100 mg total) by mouth daily, Disp: 90 tablet, Rfl: 1  •  OneTouch Delica Lancets 51X MISC, Check blood sugars twice daily. Please substitute with appropriate alternative as covered by patient's insurance.  Dx: E11.65 (Patient not taking: Reported on 10/14/2022), Disp: 200 each, Rfl: 3  Allergies   Allergen Reactions   • Etodolac Shortness Of Breath   • Iodinated Contrast Media Shortness Of Breath and Wheezing   • Simvastatin Other (See Comments)     elevated liver function    • Latex Rash   • Morphine Palpitations   • Penicillins Rash       Physical Exams:     Vitals:    08/28/23 1016   BP: 124/76   Pulse: 65   Resp: 16   Temp: 97.6 °F (36.4 °C)   SpO2: 98%     Physical Exam  Chest:          Comments: Examination of both incisions demonstrate no evidence of infection hematoma seroma. Results & Discussion:   I reviewed the pathology with the patient. I provided her a copy of the highlighted report. She knows she already has appointments with both medical as well as radiation oncology. We will see her back in 3 months. She is agreeable to see our nurse practitioner as outlined above. All questions were answered the patient's satisfaction. no

## 2023-08-30 ENCOUNTER — APPOINTMENT (OUTPATIENT)
Dept: CARDIOLOGY | Facility: CLINIC | Age: 88
End: 2023-08-30
Payer: MEDICARE

## 2023-08-30 ENCOUNTER — NON-APPOINTMENT (OUTPATIENT)
Age: 88
End: 2023-08-30

## 2023-08-30 VITALS
TEMPERATURE: 97.6 F | OXYGEN SATURATION: 99 % | HEART RATE: 55 BPM | SYSTOLIC BLOOD PRESSURE: 125 MMHG | DIASTOLIC BLOOD PRESSURE: 80 MMHG | HEIGHT: 68 IN

## 2023-08-30 DIAGNOSIS — R06.09 OTHER FORMS OF DYSPNEA: ICD-10-CM

## 2023-08-30 DIAGNOSIS — Z95.5 PRESENCE OF CORONARY ANGIOPLASTY IMPLANT AND GRAFT: ICD-10-CM

## 2023-08-30 DIAGNOSIS — Z95.828 PRESENCE OF OTHER VASCULAR IMPLANTS AND GRAFTS: ICD-10-CM

## 2023-08-30 PROCEDURE — 93000 ELECTROCARDIOGRAM COMPLETE: CPT

## 2023-08-30 PROCEDURE — 99214 OFFICE O/P EST MOD 30 MIN: CPT

## 2023-08-30 PROCEDURE — 93306 TTE W/DOPPLER COMPLETE: CPT

## 2023-08-30 RX ORDER — SODIUM ZIRCONIUM CYCLOSILICATE 10 G/10G
10 POWDER, FOR SUSPENSION ORAL DAILY
Qty: 1 | Refills: 0 | Status: DISCONTINUED | COMMUNITY
Start: 2023-08-18 | End: 2023-08-30

## 2023-08-30 NOTE — HISTORY OF PRESENT ILLNESS
[FreeTextEntry1] : 89 y/o M with PMH HTN, HLD, Hx of CVA (no residual deficits), BPH, skin CA, PAF here for follow up. Pt had an Echo today with severe TR, dilated aorta, MVP, and pulm HTN  Recent Diagnostic cath on 11/03/22 has shown severe multivessel disease including subtotal occlusion of the mLAD, severe oLCx and RCA disease with  of a large OM branch. 11/7 cardiac cath with 2 stents to the RCA. 11/16 cardiac cath s/p POBA to LAD and X1 OSMEL to LM.  Pt reports since having stents placed, his DONG has significantly improved.  The patient is also here for follow-up of atrial fibrillation and currently they feel well with an occasional episode of palpitations. The patient states they are reliably taking the anticoagulation medicine and are not having any signs of bleeding they deny any dizziness headaches nosebleeds or black tarry stools  The patient here for evaluation of high blood pressure. Patient is currently tolerating the current antihypertensive regime and they deny headaches, stiff neck, visual changes, or PND. The patient has been trying to stay on a low-sodium diet.

## 2023-08-30 NOTE — PHYSICAL EXAM

## 2023-08-31 LAB
ANION GAP SERPL CALC-SCNC: 16 MMOL/L
BUN SERPL-MCNC: 34 MG/DL
CALCIUM SERPL-MCNC: 9.2 MG/DL
CHLORIDE SERPL-SCNC: 102 MMOL/L
CO2 SERPL-SCNC: 21 MMOL/L
CREAT SERPL-MCNC: 1.48 MG/DL
EGFR: 45 ML/MIN/1.73M2
GLUCOSE SERPL-MCNC: 87 MG/DL
MAGNESIUM SERPL-MCNC: 2.1 MG/DL
NT-PROBNP SERPL-MCNC: 1581 PG/ML
POTASSIUM SERPL-SCNC: 5.2 MMOL/L
SODIUM SERPL-SCNC: 140 MMOL/L

## 2023-09-05 ENCOUNTER — OUTPATIENT (OUTPATIENT)
Dept: OUTPATIENT SERVICES | Facility: HOSPITAL | Age: 88
LOS: 1 days | End: 2023-09-05
Payer: MEDICARE

## 2023-09-05 ENCOUNTER — APPOINTMENT (OUTPATIENT)
Dept: ULTRASOUND IMAGING | Facility: CLINIC | Age: 88
End: 2023-09-05
Payer: MEDICARE

## 2023-09-05 DIAGNOSIS — Z87.2 PERSONAL HISTORY OF DISEASES OF THE SKIN AND SUBCUTANEOUS TISSUE: Chronic | ICD-10-CM

## 2023-09-05 DIAGNOSIS — R79.89 OTHER SPECIFIED ABNORMAL FINDINGS OF BLOOD CHEMISTRY: ICD-10-CM

## 2023-09-05 DIAGNOSIS — Z98.890 OTHER SPECIFIED POSTPROCEDURAL STATES: Chronic | ICD-10-CM

## 2023-09-05 PROCEDURE — 76770 US EXAM ABDO BACK WALL COMP: CPT | Mod: 26

## 2023-09-05 PROCEDURE — 76770 US EXAM ABDO BACK WALL COMP: CPT

## 2023-09-18 ENCOUNTER — RX RENEWAL (OUTPATIENT)
Age: 88
End: 2023-09-18

## 2023-09-25 ENCOUNTER — APPOINTMENT (OUTPATIENT)
Dept: PULMONOLOGY | Facility: CLINIC | Age: 88
End: 2023-09-25
Payer: MEDICARE

## 2023-09-25 VITALS — DIASTOLIC BLOOD PRESSURE: 75 MMHG | SYSTOLIC BLOOD PRESSURE: 145 MMHG | OXYGEN SATURATION: 95 % | HEART RATE: 65 BPM

## 2023-09-25 PROCEDURE — 90662 IIV NO PRSV INCREASED AG IM: CPT

## 2023-09-25 PROCEDURE — 94010 BREATHING CAPACITY TEST: CPT

## 2023-09-25 PROCEDURE — G0008: CPT

## 2023-09-25 PROCEDURE — 99214 OFFICE O/P EST MOD 30 MIN: CPT | Mod: 25

## 2023-09-27 ENCOUNTER — NON-APPOINTMENT (OUTPATIENT)
Age: 88
End: 2023-09-27

## 2023-09-28 PROCEDURE — 94762 N-INVAS EAR/PLS OXIMTRY CONT: CPT

## 2023-11-17 ENCOUNTER — RX RENEWAL (OUTPATIENT)
Age: 88
End: 2023-11-17

## 2023-11-22 ENCOUNTER — RX RENEWAL (OUTPATIENT)
Age: 88
End: 2023-11-22

## 2023-11-22 RX ORDER — ALBUTEROL SULFATE 90 UG/1
108 (90 BASE) INHALANT RESPIRATORY (INHALATION)
Qty: 1 | Refills: 2 | Status: ACTIVE | COMMUNITY
Start: 2022-09-28 | End: 1900-01-01

## 2023-11-28 ENCOUNTER — NON-APPOINTMENT (OUTPATIENT)
Age: 88
End: 2023-11-28

## 2023-11-28 ENCOUNTER — APPOINTMENT (OUTPATIENT)
Dept: CARDIOLOGY | Facility: CLINIC | Age: 88
End: 2023-11-28
Payer: MEDICARE

## 2023-11-28 VITALS
SYSTOLIC BLOOD PRESSURE: 140 MMHG | HEIGHT: 68 IN | HEART RATE: 55 BPM | TEMPERATURE: 97.8 F | DIASTOLIC BLOOD PRESSURE: 80 MMHG | OXYGEN SATURATION: 99 %

## 2023-11-28 DIAGNOSIS — D72.829 ELEVATED WHITE BLOOD CELL COUNT, UNSPECIFIED: ICD-10-CM

## 2023-11-28 PROCEDURE — 99214 OFFICE O/P EST MOD 30 MIN: CPT

## 2023-11-28 PROCEDURE — 93000 ELECTROCARDIOGRAM COMPLETE: CPT

## 2023-11-28 RX ORDER — APIXABAN 2.5 MG/1
2.5 TABLET, FILM COATED ORAL
Qty: 180 | Refills: 1 | Status: ACTIVE | COMMUNITY
Start: 2022-09-28 | End: 1900-01-01

## 2023-11-30 PROBLEM — D72.829 ELEVATED WBC COUNT: Status: ACTIVE | Noted: 2023-11-30

## 2023-11-30 LAB
ALBUMIN SERPL ELPH-MCNC: 4 G/DL
ALP BLD-CCNC: 154 U/L
ALT SERPL-CCNC: 34 U/L
ANION GAP SERPL CALC-SCNC: 11 MMOL/L
AST SERPL-CCNC: 30 U/L
BASOPHILS # BLD AUTO: 0.11 K/UL
BASOPHILS NFR BLD AUTO: 1 %
BILIRUB DIRECT SERPL-MCNC: 0.1 MG/DL
BILIRUB INDIRECT SERPL-MCNC: 0.3 MG/DL
BILIRUB SERPL-MCNC: 0.4 MG/DL
BUN SERPL-MCNC: 41 MG/DL
CALCIUM SERPL-MCNC: 8.9 MG/DL
CHLORIDE SERPL-SCNC: 103 MMOL/L
CHOLEST SERPL-MCNC: 134 MG/DL
CK SERPL-CCNC: 90 U/L
CO2 SERPL-SCNC: 24 MMOL/L
CREAT SERPL-MCNC: 1.35 MG/DL
EGFR: 50 ML/MIN/1.73M2
EOSINOPHIL # BLD AUTO: 0.28 K/UL
EOSINOPHIL NFR BLD AUTO: 2.5 %
GLUCOSE SERPL-MCNC: 85 MG/DL
HCT VFR BLD CALC: 39.7 %
HDLC SERPL-MCNC: 34 MG/DL
HGB BLD-MCNC: 12.4 G/DL
IMM GRANULOCYTES NFR BLD AUTO: 0.3 %
LDLC SERPL CALC-MCNC: 72 MG/DL
LDLC SERPL DIRECT ASSAY-MCNC: 68 MG/DL
LYMPHOCYTES # BLD AUTO: 2.05 K/UL
LYMPHOCYTES NFR BLD AUTO: 18.2 %
MAGNESIUM SERPL-MCNC: 1.9 MG/DL
MAN DIFF?: NORMAL
MCHC RBC-ENTMCNC: 31.2 GM/DL
MCHC RBC-ENTMCNC: 31.6 PG
MCV RBC AUTO: 101.3 FL
MONOCYTES # BLD AUTO: 1.03 K/UL
MONOCYTES NFR BLD AUTO: 9.1 %
NEUTROPHILS # BLD AUTO: 7.76 K/UL
NEUTROPHILS NFR BLD AUTO: 68.9 %
NONHDLC SERPL-MCNC: 100 MG/DL
NT-PROBNP SERPL-MCNC: 2013 PG/ML
PHOSPHATE SERPL-MCNC: 3.7 MG/DL
PLATELET # BLD AUTO: 254 K/UL
POTASSIUM SERPL-SCNC: 5.5 MMOL/L
PROT SERPL-MCNC: 8.2 G/DL
RBC # BLD: 3.92 M/UL
RBC # FLD: 13.2 %
SODIUM SERPL-SCNC: 139 MMOL/L
TRIGL SERPL-MCNC: 162 MG/DL
WBC # FLD AUTO: 11.26 K/UL

## 2023-12-05 ENCOUNTER — RX RENEWAL (OUTPATIENT)
Age: 88
End: 2023-12-05

## 2023-12-18 DIAGNOSIS — R71.8 OTHER ABNORMALITY OF RED BLOOD CELLS: ICD-10-CM

## 2023-12-19 LAB
BASOPHILS # BLD AUTO: 0.1 K/UL
BASOPHILS NFR BLD AUTO: 1 %
EOSINOPHIL # BLD AUTO: 0.2 K/UL
EOSINOPHIL NFR BLD AUTO: 2 %
HCT VFR BLD CALC: 42.2 %
HGB BLD-MCNC: 13.1 G/DL
IMM GRANULOCYTES NFR BLD AUTO: 0.3 %
LYMPHOCYTES # BLD AUTO: 1.35 K/UL
LYMPHOCYTES NFR BLD AUTO: 13.4 %
MAN DIFF?: NORMAL
MCHC RBC-ENTMCNC: 31 GM/DL
MCHC RBC-ENTMCNC: 32.2 PG
MCV RBC AUTO: 103.7 FL
MONOCYTES # BLD AUTO: 0.91 K/UL
MONOCYTES NFR BLD AUTO: 9.1 %
NEUTROPHILS # BLD AUTO: 7.46 K/UL
NEUTROPHILS NFR BLD AUTO: 74.2 %
PLATELET # BLD AUTO: 269 K/UL
POTASSIUM SERPL-SCNC: 4 MMOL/L
RBC # BLD: 4.07 M/UL
RBC # FLD: 13.2 %
WBC # FLD AUTO: 10.05 K/UL

## 2023-12-26 NOTE — PROGRESS NOTE ADULT - ASSESSMENT
Patient is an 86 y/o M w/ PMHx HTN, HLD, CVA in 2015 (no residual deficits), BPH, and skin CA who presented to the ED on 08/11/22 with fever, cough, SOB.    Problem:  - Acute hypoxic resp failure  - Multifocal pna  - CINDI  - Transaminitis  - New onset a-fib, now converted    Plan:  - Monitor mental status in setting of hypoxia, currently intact   - Actively titrating HFNC settings to maintain SpO2 > 92%, fio2 55%, 30 L  - Pulmonary following, will need repeat CT in 4-6 weeks and possible bronch  - Noted to have expiratory wheezes, will change duoneb from PRN to scheduled q 6 hr.   - Prior A-fib, now Converted to NSR. Cardiology following, Continue lopressor 100mg BID.   - will need AC, though d/t concerns of hemoptysis will hold at this time, slowling. Planned to start eliquis per cardio when able  - Renal function slowly improving. Trend electrolytes, Maintain K > 4 and Mg > 2.  - LFTs elevate, continue to monitor, can obtain RUQ US if worsening  - Legionella ab positive, but urine negative. Continue levaquin per ID.   Anemia

## 2023-12-27 ENCOUNTER — APPOINTMENT (OUTPATIENT)
Dept: PULMONOLOGY | Facility: CLINIC | Age: 88
End: 2023-12-27
Payer: MEDICARE

## 2023-12-27 VITALS — DIASTOLIC BLOOD PRESSURE: 63 MMHG | OXYGEN SATURATION: 97 % | SYSTOLIC BLOOD PRESSURE: 108 MMHG | HEART RATE: 60 BPM

## 2023-12-27 PROCEDURE — ZZZZZ: CPT

## 2023-12-27 PROCEDURE — 94060 EVALUATION OF WHEEZING: CPT

## 2023-12-27 PROCEDURE — 94727 GAS DIL/WSHOT DETER LNG VOL: CPT

## 2023-12-27 PROCEDURE — 99214 OFFICE O/P EST MOD 30 MIN: CPT | Mod: 25

## 2023-12-27 PROCEDURE — 94729 DIFFUSING CAPACITY: CPT

## 2023-12-27 RX ORDER — SODIUM CHLORIDE FOR INHALATION 0.9 %
0.9 VIAL, NEBULIZER (ML) INHALATION TWICE DAILY
Qty: 60 | Refills: 6 | Status: ACTIVE | COMMUNITY
Start: 2023-01-11 | End: 1900-01-01

## 2023-12-27 NOTE — PROCEDURE
[FreeTextEntry1] : 12/2023- normal spirometry with some bronchodilator response in midflows, normal volumed reduced dlco that corrects   previous data reviewed: august 2023- normal spirometry  august 2023 INTERPRETATION:  Clinical information: Bronchiectasis. Exam is compared to previous study of 12/3/2022.  CT scan of the chest was obtained without administration of intravenous contrast.  Few very small lymph nodes are present in the pretracheal space.  Heart is enlarged in size. Calcification of the coronary arteries is noted. No pericardial effusion is noted.  No endobronchial lesions are noted. Extensive bronchiectasis are noted within both lungs, more so in both upper lobe. Some of the dilated bronchi are filled with mucus/secretions. Overall appearance has not significantly changed when compared to previous exam. Few ill-defined nodular opacities are noted in both lower lobes. They have increased in size when compared to previous exam. For example, the nodular opacity noted in the peripheral portion of the left lower lobe measured 0.6 cm on the previous examination while on the current examination it measures 1.2 cm. No pleural effusions are noted.  Below the diaphragm, visualized portions of the abdomen demonstrate low-attenuation structure in the left perinephric space. It is unchanged when compared to previous exam. Low-attenuation lesions within both kidneys are too small to be adequately characterized on this exam.  Degenerative changes of the spine are noted.  IMPRESSION: Stable bronchiectasis in both lungs when compared to previous exam.  Few ill-defined nodular opacities in both lower lobes have increased in size when compared to previous exam. Follow-up CT scan is recommended to ensure resolution.  --- End of Report ---   6 min walk- mild desaturation at min 5 to 91%  spirometry shows restriction, volumes are mildly decreased. dlco moderate reduction but corrects for va    xray [facility] 8/22  ct chest [facility] 8/11    xray today 10/2022- mild improvement in infiltrate HUMERA. unchanged right. no effusions

## 2023-12-27 NOTE — ASSESSMENT
[FreeTextEntry1] : nebs BID prn DORCAS given the improvement in smaller airways after albuterol o ntoda pfts repeat CT chest jan 2024 after increas in frequency in nebs f/u for result review

## 2023-12-27 NOTE — HISTORY OF PRESENT ILLNESS
[Never] : never [TextBox_4] :  89 y/o M with PMH HTN, HLD, hx of CVA (no residual deficits), BPH, hx of skin CA, bronchiectasis s/p on 9/1- AFB growth in broth from 8/12 sputum samples SCOTT +, AF on eliquis, Echo  with severe TR, dilated aorta, MVP, and pulm HTN   was seen a few weeks ago for hemoptysis s/p abx CT chest done shows stable bronchiectasisi  some dyspnea on exertion- stable sees cardio- on half dose eliquis  on prn SABA_ not sure it helps   no reoccurrence of hemoptysis

## 2023-12-27 NOTE — PHYSICAL EXAM
[No Acute Distress] : no acute distress [Well Nourished] : well nourished [Well Developed] : well developed [No Resp Distress] : no resp distress [No Acc Muscle Use] : no acc muscle use [Clear to Auscultation Bilaterally] : clear to auscultation bilaterally [No Focal Deficits] : no focal deficits [Oriented x3] : oriented x3 [Normal Affect] : normal affect

## 2024-01-01 NOTE — PROGRESS NOTE ADULT - ASSESSMENT
87M with HTN, HLD, hx of CVA (no residual deficits), BPH, hx of skin CA who presents with cough and fevers.    Found to have multifocal pneumonia.  Also found have new onset afib.        Sepsis 2/2 Multifocal PNA  Hemoptysis   AHRF on HFNC  - pt p/w leukocytosis, SOB  - CT w/ small cavitations, b/l GGO and airspace consolidations  - cx NGTD  - strep pneumo negative  - legionella Ab+, urine legionella negative  Plan:   Unclear if this is related to legionella; will c/w levofloxacin to see if improvement  - c/w levofloxacin  - trend temps/WBC  - supportive care/supplemental O2 per primary team    R/o TB  - AFB negative x3  - off isolation  - can send quantiferon, but not helpful in differentiating between active/latent TB; can have false results during illness    Elevated LFTs  - likely 2/2 sepsis  - trend LFTs    CINDI 2/2 prerenal azotemia  - likely 2/2 poor PO intake/sepsis  - trend renal fxn  - avoid nephrotoxic agents  - renally dose medications    Dr. Sheryl Pride will be covering 8/20-8/21     Ebenezer Riavs M.D.  [facility], Division of Infectious Diseases  [phone]  After 5pm on weekdays and all day on weekends - please call [phone]    01-Jan-2024 04:57

## 2024-01-02 ENCOUNTER — RX RENEWAL (OUTPATIENT)
Age: 89
End: 2024-01-02

## 2024-01-04 ENCOUNTER — TRANSCRIPTION ENCOUNTER (OUTPATIENT)
Age: 89
End: 2024-01-04

## 2024-01-07 ENCOUNTER — RX RENEWAL (OUTPATIENT)
Age: 89
End: 2024-01-07

## 2024-01-11 ENCOUNTER — NON-APPOINTMENT (OUTPATIENT)
Age: 89
End: 2024-01-11

## 2024-01-11 ENCOUNTER — APPOINTMENT (OUTPATIENT)
Dept: CARDIOLOGY | Facility: CLINIC | Age: 89
End: 2024-01-11
Payer: MEDICARE

## 2024-01-11 VITALS
TEMPERATURE: 97.6 F | SYSTOLIC BLOOD PRESSURE: 144 MMHG | DIASTOLIC BLOOD PRESSURE: 60 MMHG | OXYGEN SATURATION: 94 % | HEIGHT: 68 IN | BODY MASS INDEX: 22.73 KG/M2 | HEART RATE: 60 BPM | WEIGHT: 150 LBS

## 2024-01-11 PROCEDURE — 93306 TTE W/DOPPLER COMPLETE: CPT

## 2024-01-11 PROCEDURE — 93000 ELECTROCARDIOGRAM COMPLETE: CPT

## 2024-01-11 PROCEDURE — 99214 OFFICE O/P EST MOD 30 MIN: CPT

## 2024-01-12 LAB
ANION GAP SERPL CALC-SCNC: 15 MMOL/L
BUN SERPL-MCNC: 37 MG/DL
CALCIUM SERPL-MCNC: 9.5 MG/DL
CHLORIDE SERPL-SCNC: 101 MMOL/L
CO2 SERPL-SCNC: 23 MMOL/L
CREAT SERPL-MCNC: 1.64 MG/DL
EGFR: 40 ML/MIN/1.73M2
GLUCOSE SERPL-MCNC: 80 MG/DL
NT-PROBNP SERPL-MCNC: 2391 PG/ML
POTASSIUM SERPL-SCNC: 5.6 MMOL/L
SODIUM SERPL-SCNC: 138 MMOL/L

## 2024-01-12 RX ORDER — SODIUM ZIRCONIUM CYCLOSILICATE 10 G/10G
10 POWDER, FOR SUSPENSION ORAL DAILY
Qty: 3 | Refills: 0 | Status: ACTIVE | COMMUNITY
Start: 2024-01-12 | End: 1900-01-01

## 2024-01-12 NOTE — HISTORY OF PRESENT ILLNESS
[FreeTextEntry1] : DENNIS is a 89 year M w/MHx of CVA, CAD s/p PCI, Dilated Aorta, pHTN, pAfib, MVP, who presents for follow up. PCP Dr. Brown Patient was started on Lasix 20 mg daily during last visit.  The patient presents for routine follow up of their coronary artery disease. The patient has been following all secondary prevents measures and antiplatelet therapy. The patient denies shortness of breath, chest pain, dizziness, palpitations, easy bruising/bleeding/hematuria/blood in stool. The patient is also here for follow-up of atrial fibrillation. Currently they feel well with an occasional episode of palpitations. The patient states they are reliably taking anticoagulation medication. Denies signs of bleeding, dizziness, headaches, nosebleeds or black tarry stools. The patient is here for follow-up of elevated cholesterol. Currently tolerating prescribed medications. Denies muscle pain, joint pain, back pain, urinary changes, nausea, vomiting, abdominal pain or diarrhea. The patient is trying to follow a low cholesterol diet. The patient is here for evaluation of high blood pressure. Currently tolerating antihypertensive medications. Denies headaches, stiff neck, visual changes, or PND. The patient has been trying to stay on a low-sodium diet. BP at home 144/80 today, Has had SBP in 140's about 5 times. otherwise  -130;s

## 2024-01-15 ENCOUNTER — RX RENEWAL (OUTPATIENT)
Age: 89
End: 2024-01-15

## 2024-01-16 ENCOUNTER — TRANSCRIPTION ENCOUNTER (OUTPATIENT)
Age: 89
End: 2024-01-16

## 2024-01-17 ENCOUNTER — RX RENEWAL (OUTPATIENT)
Age: 89
End: 2024-01-17

## 2024-01-19 LAB
ANION GAP SERPL CALC-SCNC: 13 MMOL/L
BUN SERPL-MCNC: 43 MG/DL
CALCIUM SERPL-MCNC: 8.6 MG/DL
CHLORIDE SERPL-SCNC: 99 MMOL/L
CO2 SERPL-SCNC: 27 MMOL/L
CREAT SERPL-MCNC: 1.72 MG/DL
EGFR: 38 ML/MIN/1.73M2
FOLATE SERPL-MCNC: 14.5 NG/ML
GLUCOSE SERPL-MCNC: 66 MG/DL
NT-PROBNP SERPL-MCNC: 1432 PG/ML
POTASSIUM SERPL-SCNC: 4.6 MMOL/L
SODIUM SERPL-SCNC: 139 MMOL/L
VIT B12 SERPL-MCNC: 594 PG/ML

## 2024-01-21 ENCOUNTER — RX RENEWAL (OUTPATIENT)
Age: 89
End: 2024-01-21

## 2024-01-22 ENCOUNTER — TRANSCRIPTION ENCOUNTER (OUTPATIENT)
Age: 89
End: 2024-01-22

## 2024-01-22 RX ORDER — HYDROCHLOROTHIAZIDE 25 MG/1
25 TABLET ORAL
Qty: 90 | Refills: 1 | Status: ACTIVE | COMMUNITY
Start: 2023-05-31 | End: 1900-01-01

## 2024-02-05 ENCOUNTER — LABORATORY RESULT (OUTPATIENT)
Age: 89
End: 2024-02-05

## 2024-02-05 ENCOUNTER — APPOINTMENT (OUTPATIENT)
Dept: NEPHROLOGY | Facility: CLINIC | Age: 89
End: 2024-02-05
Payer: MEDICARE

## 2024-02-05 VITALS
WEIGHT: 150 LBS | DIASTOLIC BLOOD PRESSURE: 60 MMHG | HEART RATE: 67 BPM | SYSTOLIC BLOOD PRESSURE: 134 MMHG | BODY MASS INDEX: 22.73 KG/M2 | RESPIRATION RATE: 16 BRPM | OXYGEN SATURATION: 99 % | HEIGHT: 68 IN

## 2024-02-05 DIAGNOSIS — E87.5 HYPERKALEMIA: ICD-10-CM

## 2024-02-05 DIAGNOSIS — Z86.73 PERSONAL HISTORY OF TRANSIENT ISCHEMIC ATTACK (TIA), AND CEREBRAL INFARCTION W/OUT RESIDUAL DEFICITS: ICD-10-CM

## 2024-02-05 DIAGNOSIS — N40.0 BENIGN PROSTATIC HYPERPLASIA WITHOUT LOWER URINARY TRACT SYMPMS: ICD-10-CM

## 2024-02-05 DIAGNOSIS — R79.89 OTHER SPECIFIED ABNORMAL FINDINGS OF BLOOD CHEMISTRY: ICD-10-CM

## 2024-02-05 PROCEDURE — 99203 OFFICE O/P NEW LOW 30 MIN: CPT

## 2024-02-07 LAB
ALBUMIN MFR SERPL ELPH: 49.8 %
ALBUMIN SERPL ELPH-MCNC: 4.2 G/DL
ALBUMIN SERPL-MCNC: 3.9 G/DL
ALBUMIN/GLOB SERPL: 1 RATIO
ALP BLD-CCNC: 156 U/L
ALPHA1 GLOB MFR SERPL ELPH: 3.9 %
ALPHA1 GLOB SERPL ELPH-MCNC: 0.3 G/DL
ALPHA2 GLOB MFR SERPL ELPH: 11.6 %
ALPHA2 GLOB SERPL ELPH-MCNC: 0.9 G/DL
ALT SERPL-CCNC: 31 U/L
ANION GAP SERPL CALC-SCNC: 13 MMOL/L
APPEARANCE: CLEAR
AST SERPL-CCNC: 27 U/L
B-GLOBULIN MFR SERPL ELPH: 12 %
B-GLOBULIN SERPL ELPH-MCNC: 0.9 G/DL
BILIRUB SERPL-MCNC: 0.3 MG/DL
BILIRUBIN URINE: NEGATIVE
BLOOD URINE: NEGATIVE
BUN SERPL-MCNC: 59 MG/DL
CALCIUM SERPL-MCNC: 8.3 MG/DL
CHLORIDE SERPL-SCNC: 102 MMOL/L
CO2 SERPL-SCNC: 27 MMOL/L
COLOR: YELLOW
CREAT SERPL-MCNC: 1.66 MG/DL
CREAT SPEC-SCNC: 73 MG/DL
CREAT/PROT UR: 0.3 RATIO
DEPRECATED KAPPA LC FREE/LAMBDA SER: 1.93 RATIO
EGFR: 39 ML/MIN/1.73M2
GAMMA GLOB FLD ELPH-MCNC: 1.8 G/DL
GAMMA GLOB MFR SERPL ELPH: 22.7 %
GLUCOSE QUALITATIVE U: NEGATIVE MG/DL
GLUCOSE SERPL-MCNC: 76 MG/DL
HCT VFR BLD CALC: 41.1 %
HGB BLD-MCNC: 13.1 G/DL
INTERPRETATION SERPL IEP-IMP: NORMAL
KAPPA LC CSF-MCNC: 6.03 MG/DL
KAPPA LC SERPL-MCNC: 11.64 MG/DL
KETONES URINE: NEGATIVE MG/DL
LEUKOCYTE ESTERASE URINE: NEGATIVE
MCHC RBC-ENTMCNC: 31.3 PG
MCHC RBC-ENTMCNC: 31.9 GM/DL
MCV RBC AUTO: 98.3 FL
NITRITE URINE: NEGATIVE
PH URINE: 5.5
PLATELET # BLD AUTO: 261 K/UL
POTASSIUM SERPL-SCNC: 5.2 MMOL/L
PROT SERPL-MCNC: 7.9 G/DL
PROT UR-MCNC: 20 MG/DL
PROTEIN URINE: NORMAL MG/DL
RBC # BLD: 4.18 M/UL
RBC # FLD: 13.2 %
SODIUM SERPL-SCNC: 142 MMOL/L
SPECIFIC GRAVITY URINE: 1.02
UROBILINOGEN URINE: 0.2 MG/DL
WBC # FLD AUTO: 10.07 K/UL

## 2024-02-07 NOTE — PHYSICAL EXAM
[General Appearance - Alert] : alert [General Appearance - In No Acute Distress] : in no acute distress [Hearing Threshold Finger Rub Not [place]] : hearing was normal [Jugular Venous Distention Increased] : there was no jugular-venous distention [Auscultation Breath Sounds / Voice Sounds] : lungs were clear to auscultation bilaterally [Heart Sounds] : normal S1 and S2 [Systolic grade ___/6] : A grade [unfilled]/6 systolic murmur was heard. [Edema] : there was no peripheral edema [Abdomen Tenderness] : non-tender [No CVA Tenderness] : no ~M costovertebral angle tenderness [No Spinal Tenderness] : no spinal tenderness [Shuffling] : shuffling [] : no rash [No Focal Deficits] : no focal deficits [Oriented To Time, Place, And Person] : oriented to person, place, and time [FreeTextEntry1] : Limited range of motion of the neck

## 2024-02-07 NOTE — ASSESSMENT
[FreeTextEntry1] : 1. Elevated serum creatinine since Marchj of 2023 w/ CKD stage 3b. Proteinuria to be determined.  Unclear reason. By renal US the patient has chronic parenchymal renal disease. Will screen for paraprotein related deposition disease and for urine protein to creatinine ratio to further define nature of renal disease. Discussed avoidance of NSAIDs.  2. Coronary artery disease. Stable. 3. History of pontine CVA. 4 Hyperkalemia.  Seem to be related to renal dysfunction. No evidence of urinary tract obstruction by recent US.  PLAN; I have discussed w/ the patient and his daughter the need to obtain the above work up to further define the nature and prognosis of the patient's renal disease. Will call the daughter w/ the results of the above studies when available and discuss the type of follow up the patient requires.
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
## 2024-02-07 NOTE — HISTORY OF PRESENT ILLNESS
[FreeTextEntry1] : Chart reviewed.  The patient is here accompanied by his daughter. They are both good historian.  The reason for the referral is an increased serum creatinine  w/ eGFR less than 60 since May 2023. The most recent creatinine has been 1.64. The patient has had episodes of hyperkalemia and has required Lokelma in the past.  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  He gets low Na meal on wheels. He feels well. Has a past history of a pontine CVA w/ residual loss of balance.  He also has the diagnosis of bronchiectasis and was hospitalized for pneumonia recently for few weeks.  Had some hemoptysis.  The patient only occasionally take Motrin.   He and his daughter are aware of the reason for the referral.  The patient had a renal US done late last year and it showed increased echogenicity compatible w/ chronic parenchymal renal disease.

## 2024-02-07 NOTE — REASON FOR VISIT
[Family Member] : family member [FreeTextEntry1] : Referred from Dr. Adam for CKD stage 3b and hyperkalemia

## 2024-02-07 NOTE — CONSULT LETTER
[Dear  ___] : Dear  [unfilled], [( Thank you for referring [unfilled] for consultation for _____ )] : Thank you for referring [unfilled] for consultation for [unfilled] [Please see my note below.] : Please see my note below. [Consult Closing:] : Thank you very much for allowing me to participate in the care of this patient.  If you have any questions, please do not hesitate to contact me. [Sincerely,] : Sincerely, [FreeTextEntry2] : Dr. Christopher Adam [FreeTextEntry1] : Work up shows no proteinuria, no evidence of monoclonal gammopathy.  The CKD is related to chronic atherosclerotic changes w/ global focal sclerosis.  It should not progress.   [FreeTextEntry3] : Samy Morin MD

## 2024-02-11 ENCOUNTER — RX RENEWAL (OUTPATIENT)
Age: 89
End: 2024-02-11

## 2024-02-16 ENCOUNTER — APPOINTMENT (OUTPATIENT)
Dept: PULMONOLOGY | Facility: CLINIC | Age: 89
End: 2024-02-16

## 2024-03-02 ENCOUNTER — RX RENEWAL (OUTPATIENT)
Age: 89
End: 2024-03-02

## 2024-03-09 ENCOUNTER — RX RENEWAL (OUTPATIENT)
Age: 89
End: 2024-03-09

## 2024-03-09 RX ORDER — METOPROLOL TARTRATE 25 MG/1
25 TABLET, FILM COATED ORAL TWICE DAILY
Qty: 180 | Refills: 1 | Status: ACTIVE | COMMUNITY
Start: 2022-09-28 | End: 1900-01-01

## 2024-03-09 RX ORDER — FUROSEMIDE 20 MG/1
20 TABLET ORAL DAILY
Qty: 90 | Refills: 1 | Status: ACTIVE | COMMUNITY
Start: 2023-11-28 | End: 1900-01-01

## 2024-04-16 NOTE — PROGRESS NOTE ADULT - SUBJECTIVE AND OBJECTIVE BOX
Patient is a 87y old  Male who presents with a chief complaint of cough -> PNA (13 Aug 2022 11:56)      INTERVAL HPI/OVERNIGHT EVENTS:    overnight events noted  resident still feels shortness of breath, currently on HFNC, seen by pulmonary in AM.      MEDICATIONS  (STANDING):  amLODIPine   Tablet 10 milliGRAM(s) Oral daily  atorvastatin 40 milliGRAM(s) Oral at bedtime  azithromycin  IVPB 500 milliGRAM(s) IV Intermittent every 24 hours  cefTRIAXone   IVPB 1000 milliGRAM(s) IV Intermittent every 24 hours  finasteride 5 milliGRAM(s) Oral daily  heparin  Infusion.  Unit(s)/Hr (13 mL/Hr) IV Continuous <Continuous>  metoprolol tartrate 25 milliGRAM(s) Oral two times a day  pantoprazole    Tablet 40 milliGRAM(s) Oral before breakfast  tamsulosin 0.4 milliGRAM(s) Oral at bedtime    MEDICATIONS  (PRN):  acetaminophen     Tablet .. 650 milliGRAM(s) Oral every 6 hours PRN Temp greater or equal to 38C (100.4F), Mild Pain (1 - 3)  albuterol/ipratropium for Nebulization 3 milliLiter(s) Nebulizer every 6 hours PRN Shortness of Breath and/or Wheezing  aluminum hydroxide/magnesium hydroxide/simethicone Suspension 30 milliLiter(s) Oral every 4 hours PRN Dyspepsia  heparin   Injectable 6000 Unit(s) IV Push every 6 hours PRN For aPTT less than 40  heparin   Injectable 3000 Unit(s) IV Push every 6 hours PRN For aPTT between 40 - 57  melatonin 3 milliGRAM(s) Oral at bedtime PRN Insomnia  ondansetron Injectable 4 milliGRAM(s) IV Push every 8 hours PRN Nausea and/or Vomiting      Allergies    No Known Allergies    Intolerances        REVIEW OF SYSTEMS:  as above    Vital Signs Last 24 Hrs  T(C): 37.4 (13 Aug 2022 07:30), Max: 37.6 (13 Aug 2022 03:05)  T(F): 99.3 (13 Aug 2022 07:30), Max: 99.6 (13 Aug 2022 03:05)  HR: 86 (13 Aug 2022 09:00) (71 - 115)  BP: 105/61 (13 Aug 2022 09:00) (105/61 - 158/71)  BP(mean): 71 (13 Aug 2022 09:00) (71 - 71)  RR: 35 (13 Aug 2022 09:00) (16 - 40)  SpO2: 98% (13 Aug 2022 09:00) (87% - 98%)    Parameters below as of 13 Aug 2022 06:15  Patient On (Oxygen Delivery Method): mask, nonrebreather  O2 Flow (L/min): 15      PHYSICAL EXAM:  GENERAL:     NAD  HEAD:  on HFNC  NECK:     supple  RESPIRATORY:     some dry crackles throughout, no gross wheezing or rales  CARDIOVASCULAR:     irregular irregular   GASTROINTESTINAL:     soft, nontender, nondistended, bowel sounds present  EXTREMITIES:     minimal BLE edema, no clubbing or cyanosis  MUSCULOSKELETAL:     no joint pain or swelling or deformities  NERVOUS SYSTEM:     motor strength intact with 5/5 B/L upper and lower extremities, no gross sensory deficits  SKIN:     no rashes or lesions  PSYCH:     appropriate, alert and orientated x3, good    LABS:                        13.0   24.85 )-----------( 345      ( 13 Aug 2022 06:22 )             39.2     13 Aug 2022 06:22    138    |  102    |  32     ----------------------------<  116    4.2     |  27     |  1.50     Ca    8.0        13 Aug 2022 06:22    TPro  7.1    /  Alb  2.2    /  TBili  0.9    /  DBili  x      /  AST  53     /  ALT  73     /  AlkPhos  250    13 Aug 2022 06:22    PT/INR - ( 12 Aug 2022 01:43 )   PT: 15.1 sec;   INR: 1.28 ratio         PTT - ( 13 Aug 2022 04:00 )  PTT:112.9 sec    CAPILLARY BLOOD GLUCOSE          RADIOLOGY & ADDITIONAL TESTS:     OT Eval and treat

## 2024-04-17 ENCOUNTER — APPOINTMENT (OUTPATIENT)
Dept: CARDIOLOGY | Facility: CLINIC | Age: 89
End: 2024-04-17
Payer: MEDICARE

## 2024-04-17 ENCOUNTER — NON-APPOINTMENT (OUTPATIENT)
Age: 89
End: 2024-04-17

## 2024-04-17 VITALS
DIASTOLIC BLOOD PRESSURE: 74 MMHG | HEART RATE: 62 BPM | OXYGEN SATURATION: 92 % | WEIGHT: 115 LBS | BODY MASS INDEX: 17.43 KG/M2 | TEMPERATURE: 98.1 F | SYSTOLIC BLOOD PRESSURE: 130 MMHG | HEIGHT: 68 IN

## 2024-04-17 VITALS — OXYGEN SATURATION: 99 %

## 2024-04-17 PROCEDURE — 93000 ELECTROCARDIOGRAM COMPLETE: CPT

## 2024-04-17 PROCEDURE — 99214 OFFICE O/P EST MOD 30 MIN: CPT

## 2024-04-17 NOTE — HISTORY OF PRESENT ILLNESS
[FreeTextEntry1] : DENNIS is a 89 year M w/MHx of CVA, CAD s/p PCI, Dilated Aorta, pHTN, pAfib, MVP, who presents for follow up.  The patient presents for routine follow up of their coronary artery disease. The patient has been following all secondary prevents measures and antiplatelet therapy. The patient denies shortness of breath, chest pain, dizziness, palpitations, easy bruising/bleeding/hematuria/blood in stool. The patient is also here for follow-up of atrial fibrillation. Currently they feel well with an occasional episode of palpitations. The patient states they are reliably taking anticoagulation medication. Denies signs of bleeding, dizziness, headaches, nosebleeds or black tarry stools. The patient is here for follow-up of elevated cholesterol. Currently tolerating prescribed medications. Denies muscle pain, joint pain, back pain, urinary changes, nausea, vomiting, abdominal pain or diarrhea. The patient is trying to follow a low cholesterol diet. The patient is here for evaluation of high blood pressure. Currently tolerating antihypertensive medications. Denies headaches, stiff neck, visual changes, or PND. The patient has been trying to stay on a low-sodium diet. He checks with BP twice daily, mostly 126-132/60-70's, rarely 140-158/70's.

## 2024-04-18 LAB
ALBUMIN SERPL ELPH-MCNC: 3.9 G/DL
ALP BLD-CCNC: 144 U/L
ALT SERPL-CCNC: 27 U/L
ANION GAP SERPL CALC-SCNC: 13 MMOL/L
APO B SERPL-MCNC: 75 MG/DL
AST SERPL-CCNC: 29 U/L
BASOPHILS # BLD AUTO: 0.08 K/UL
BASOPHILS NFR BLD AUTO: 0.9 %
BILIRUB DIRECT SERPL-MCNC: 0.2 MG/DL
BILIRUB INDIRECT SERPL-MCNC: 0.4 MG/DL
BILIRUB SERPL-MCNC: 0.6 MG/DL
BUN SERPL-MCNC: 32 MG/DL
CALCIUM SERPL-MCNC: 8.4 MG/DL
CHLORIDE SERPL-SCNC: 101 MMOL/L
CHOLEST SERPL-MCNC: 128 MG/DL
CK SERPL-CCNC: 113 U/L
CO2 SERPL-SCNC: 24 MMOL/L
CREAT SERPL-MCNC: 1.51 MG/DL
CRP SERPL HS-MCNC: 7.9 MG/L
EGFR: 44 ML/MIN/1.73M2
EOSINOPHIL # BLD AUTO: 0.13 K/UL
EOSINOPHIL NFR BLD AUTO: 1.4 %
ESTIMATED AVERAGE GLUCOSE: 120 MG/DL
GLUCOSE SERPL-MCNC: 78 MG/DL
HBA1C MFR BLD HPLC: 5.8 %
HCT VFR BLD CALC: 40.8 %
HDLC SERPL-MCNC: 33 MG/DL
HGB BLD-MCNC: 12.6 G/DL
IMM GRANULOCYTES NFR BLD AUTO: 0.3 %
LDLC SERPL CALC-MCNC: 77 MG/DL
LDLC SERPL DIRECT ASSAY-MCNC: 71 MG/DL
LYMPHOCYTES # BLD AUTO: 1.85 K/UL
LYMPHOCYTES NFR BLD AUTO: 20.4 %
MAN DIFF?: NORMAL
MCHC RBC-ENTMCNC: 30.9 GM/DL
MCHC RBC-ENTMCNC: 30.9 PG
MCV RBC AUTO: 100 FL
MONOCYTES # BLD AUTO: 1.02 K/UL
MONOCYTES NFR BLD AUTO: 11.3 %
NEUTROPHILS # BLD AUTO: 5.95 K/UL
NEUTROPHILS NFR BLD AUTO: 65.7 %
NONHDLC SERPL-MCNC: 96 MG/DL
NT-PROBNP SERPL-MCNC: 1428 PG/ML
PLATELET # BLD AUTO: 244 K/UL
POTASSIUM SERPL-SCNC: 5.1 MMOL/L
PROT SERPL-MCNC: 8.1 G/DL
RBC # BLD: 4.08 M/UL
RBC # FLD: 13.8 %
SODIUM SERPL-SCNC: 138 MMOL/L
TRIGL SERPL-MCNC: 97 MG/DL
WBC # FLD AUTO: 9.06 K/UL

## 2024-04-29 ENCOUNTER — APPOINTMENT (OUTPATIENT)
Dept: PULMONOLOGY | Facility: CLINIC | Age: 89
End: 2024-04-29
Payer: MEDICARE

## 2024-04-29 VITALS — HEART RATE: 63 BPM | DIASTOLIC BLOOD PRESSURE: 63 MMHG | SYSTOLIC BLOOD PRESSURE: 112 MMHG | OXYGEN SATURATION: 96 %

## 2024-04-29 DIAGNOSIS — R93.89 ABNORMAL FINDINGS ON DIAGNOSTIC IMAGING OF OTHER SPECIFIED BODY STRUCTURES: ICD-10-CM

## 2024-04-29 PROCEDURE — 94618 PULMONARY STRESS TESTING: CPT

## 2024-04-29 PROCEDURE — 99214 OFFICE O/P EST MOD 30 MIN: CPT | Mod: 25

## 2024-04-29 PROCEDURE — 94010 BREATHING CAPACITY TEST: CPT

## 2024-04-29 PROCEDURE — ZZZZZ: CPT

## 2024-04-29 RX ORDER — AZELASTINE HYDROCHLORIDE 205.5 UG/1
0.15 SPRAY, METERED NASAL
Qty: 1 | Refills: 0 | Status: ACTIVE | COMMUNITY
Start: 2023-12-27 | End: 1900-01-01

## 2024-04-29 NOTE — PROCEDURE
[FreeTextEntry1] : 4/2024 improved spirometry 6mw , HR up to 115 but no drop in 02 TTE moderat/ severe pulm HTN  previous data reviewed: 12/2023- normal spirometry with some bronchodilator response in midflows, normal volumed reduced dlco that corrects  august 2023- normal spirometry  9/2023 overnight oximetry no significant desat  august 2023 INTERPRETATION:  Clinical information: Bronchiectasis. Exam is compared to previous study of 12/3/2022.  CT scan of the chest was obtained without administration of intravenous contrast.  Few very small lymph nodes are present in the pretracheal space.  Heart is enlarged in size. Calcification of the coronary arteries is noted. No pericardial effusion is noted.  No endobronchial lesions are noted. Extensive bronchiectasis are noted within both lungs, more so in both upper lobe. Some of the dilated bronchi are filled with mucus/secretions. Overall appearance has not significantly changed when compared to previous exam. Few ill-defined nodular opacities are noted in both lower lobes. They have increased in size when compared to previous exam. For example, the nodular opacity noted in the peripheral portion of the left lower lobe measured 0.6 cm on the previous examination while on the current examination it measures 1.2 cm. No pleural effusions are noted.  Below the diaphragm, visualized portions of the abdomen demonstrate low-attenuation structure in the left perinephric space. It is unchanged when compared to previous exam. Low-attenuation lesions within both kidneys are too small to be adequately characterized on this exam.  Degenerative changes of the spine are noted.  IMPRESSION: Stable bronchiectasis in both lungs when compared to previous exam.  Few ill-defined nodular opacities in both lower lobes have increased in size when compared to previous exam. Follow-up CT scan is recommended to ensure resolution.  --- End of Report ---   6 min walk- mild desaturation at min 5 to 91%  spirometry shows restriction, volumes are mildly decreased. dlco moderate reduction but corrects for va    xray [facility] 8/22  ct chest [facility] 8/11    xray today 10/2022- mild improvement in infiltrate HUMERA. unchanged right. no effusions

## 2024-04-29 NOTE — HISTORY OF PRESENT ILLNESS
[Never] : never [TextBox_4] :  89 y/o M with PMH HTN, HLD, hx of CVA (no residual deficits), BPH, hx of skin CA, bronchiectasis s/p on 9/1- AFB growth in broth from 8/12 sputum samples SCOTT + and BRONCH 2022 WITH SCOTT, PAF on eliquis, Echo  with severe TR, dilated aorta, MVP, and pulm HTN, cad s/p pci   CT chest done shows  bronchiectasis, stable on repeat scan on  nebs treatments  family today states some dyspnea on exertion- more noticeable recently sees cardio- on half dose eliquis  nn prn SABA_ not sure it helps   no reoccurrence of hemoptysis   pulm HTN on TTE no RHC done

## 2024-04-29 NOTE — REASON FOR VISIT
[Follow-Up] : a follow-up visit [Abnormal CXR/ Chest CT] : an abnormal CXR/ chest CT [Bronchiectasis] : bronchiectasis [TextBox_44] : hemoptysis

## 2024-05-01 ENCOUNTER — TRANSCRIPTION ENCOUNTER (OUTPATIENT)
Age: 89
End: 2024-05-01

## 2024-05-02 ENCOUNTER — NON-APPOINTMENT (OUTPATIENT)
Age: 89
End: 2024-05-02

## 2024-05-02 ENCOUNTER — TRANSCRIPTION ENCOUNTER (OUTPATIENT)
Age: 89
End: 2024-05-02

## 2024-05-03 ENCOUNTER — RX RENEWAL (OUTPATIENT)
Age: 89
End: 2024-05-03

## 2024-05-03 RX ORDER — ATORVASTATIN CALCIUM 80 MG/1
80 TABLET, FILM COATED ORAL DAILY
Qty: 30 | Refills: 2 | Status: ACTIVE | COMMUNITY
Start: 2022-09-28 | End: 1900-01-01

## 2024-05-05 ENCOUNTER — RX RENEWAL (OUTPATIENT)
Age: 89
End: 2024-05-05

## 2024-05-05 RX ORDER — CLOPIDOGREL BISULFATE 75 MG/1
75 TABLET, FILM COATED ORAL DAILY
Qty: 90 | Refills: 1 | Status: ACTIVE | COMMUNITY
Start: 2023-04-18 | End: 1900-01-01

## 2024-06-03 ENCOUNTER — APPOINTMENT (OUTPATIENT)
Dept: PULMONOLOGY | Facility: CLINIC | Age: 89
End: 2024-06-03
Payer: MEDICARE

## 2024-06-03 VITALS — DIASTOLIC BLOOD PRESSURE: 75 MMHG | HEART RATE: 58 BPM | SYSTOLIC BLOOD PRESSURE: 128 MMHG | OXYGEN SATURATION: 94 %

## 2024-06-03 DIAGNOSIS — J47.9 BRONCHIECTASIS, UNCOMPLICATED: ICD-10-CM

## 2024-06-03 PROCEDURE — 99214 OFFICE O/P EST MOD 30 MIN: CPT

## 2024-06-03 RX ORDER — UMECLIDINIUM BROMIDE AND VILANTEROL TRIFENATATE 62.5; 25 UG/1; UG/1
62.5-25 POWDER RESPIRATORY (INHALATION)
Qty: 1 | Refills: 0 | Status: ACTIVE | COMMUNITY
Start: 2024-04-29 | End: 1900-01-01

## 2024-06-03 NOTE — HISTORY OF PRESENT ILLNESS
[Never] : never [TextBox_4] :  88 y/o M with PMH HTN, HLD, hx of CVA (no residual deficits), BPH, hx of skin CA, bronchiectasis s/p on 9/1- AFB growth in broth from 8/12 sputum samples SCOTT + and BRONCH 2022 WITH SCOTT, PAF on eliquis, Echo  with severe TR, dilated aorta, MVP, and pulm HTN, cad s/p pci   CT chest done shows  bronchiectasis, stable on repeat scan on 2022/2023 nebs treatments ongoing  family today states some dyspnea on exertion-  stable no flares since  last visit  unable to do HST   on half dose eliquis  did not get anoro yet- sent to wrong pharmacy    no reoccurrence of hemoptysis   pulm HTN on TTE no RHC done

## 2024-06-03 NOTE — PROCEDURE
[FreeTextEntry1] : previous data reviewed:.p 4/2024 improved spirometry 6mw , HR up to 115 but no drop in 02 TTE moderat/ severe pulm HTN   12/2023- normal spirometry with some bronchodilator response in midflows, normal volumed reduced dlco that corrects  august 2023- normal spirometry  9/2023 overnight oximetry no significant desat  august 2023 INTERPRETATION:  Clinical information: Bronchiectasis. Exam is compared to previous study of 12/3/2022.  CT scan of the chest was obtained without administration of intravenous contrast.  Few very small lymph nodes are present in the pretracheal space.  Heart is enlarged in size. Calcification of the coronary arteries is noted. No pericardial effusion is noted.  No endobronchial lesions are noted. Extensive bronchiectasis are noted within both lungs, more so in both upper lobe. Some of the dilated bronchi are filled with mucus/secretions. Overall appearance has not significantly changed when compared to previous exam. Few ill-defined nodular opacities are noted in both lower lobes. They have increased in size when compared to previous exam. For example, the nodular opacity noted in the peripheral portion of the left lower lobe measured 0.6 cm on the previous examination while on the current examination it measures 1.2 cm. No pleural effusions are noted.  Below the diaphragm, visualized portions of the abdomen demonstrate low-attenuation structure in the left perinephric space. It is unchanged when compared to previous exam. Low-attenuation lesions within both kidneys are too small to be adequately characterized on this exam.  Degenerative changes of the spine are noted.  IMPRESSION: Stable bronchiectasis in both lungs when compared to previous exam.  Few ill-defined nodular opacities in both lower lobes have increased in size when compared to previous exam. Follow-up CT scan is recommended to ensure resolution.  --- End of Report ---   6 min walk- mild desaturation at min 5 to 91%  spirometry shows restriction, volumes are mildly decreased. dlco moderate reduction but corrects for va    xray [facility] 8/22  ct chest [facility] 8/11    xray today 10/2022- mild improvement in infiltrate HUMERA. unchanged right. no effusions

## 2024-06-11 ENCOUNTER — APPOINTMENT (OUTPATIENT)
Dept: CT IMAGING | Facility: CLINIC | Age: 89
End: 2024-06-11
Payer: MEDICARE

## 2024-06-11 ENCOUNTER — OUTPATIENT (OUTPATIENT)
Dept: OUTPATIENT SERVICES | Facility: HOSPITAL | Age: 89
LOS: 1 days | End: 2024-06-11
Payer: MEDICARE

## 2024-06-11 DIAGNOSIS — Z87.2 PERSONAL HISTORY OF DISEASES OF THE SKIN AND SUBCUTANEOUS TISSUE: Chronic | ICD-10-CM

## 2024-06-11 DIAGNOSIS — R93.89 ABNORMAL FINDINGS ON DIAGNOSTIC IMAGING OF OTHER SPECIFIED BODY STRUCTURES: ICD-10-CM

## 2024-06-11 DIAGNOSIS — Z98.890 OTHER SPECIFIED POSTPROCEDURAL STATES: Chronic | ICD-10-CM

## 2024-06-11 DIAGNOSIS — J47.9 BRONCHIECTASIS, UNCOMPLICATED: ICD-10-CM

## 2024-06-11 PROCEDURE — 71250 CT THORAX DX C-: CPT

## 2024-06-11 PROCEDURE — 71250 CT THORAX DX C-: CPT | Mod: 26,MH

## 2024-06-19 ENCOUNTER — NON-APPOINTMENT (OUTPATIENT)
Age: 89
End: 2024-06-19

## 2024-06-25 ENCOUNTER — NON-APPOINTMENT (OUTPATIENT)
Age: 89
End: 2024-06-25

## 2024-06-25 ENCOUNTER — APPOINTMENT (OUTPATIENT)
Dept: CARDIOLOGY | Facility: CLINIC | Age: 89
End: 2024-06-25
Payer: MEDICARE

## 2024-06-25 VITALS
TEMPERATURE: 98.2 F | SYSTOLIC BLOOD PRESSURE: 150 MMHG | OXYGEN SATURATION: 99 % | BODY MASS INDEX: 22.29 KG/M2 | DIASTOLIC BLOOD PRESSURE: 70 MMHG | WEIGHT: 147.06 LBS | HEART RATE: 55 BPM | RESPIRATION RATE: 17 BRPM | HEIGHT: 68 IN

## 2024-06-25 DIAGNOSIS — I77.810 THORACIC AORTIC ECTASIA: ICD-10-CM

## 2024-06-25 DIAGNOSIS — E78.00 PURE HYPERCHOLESTEROLEMIA, UNSPECIFIED: ICD-10-CM

## 2024-06-25 DIAGNOSIS — I34.1 NONRHEUMATIC MITRAL (VALVE) PROLAPSE: ICD-10-CM

## 2024-06-25 DIAGNOSIS — N18.9 CHRONIC KIDNEY DISEASE, UNSPECIFIED: ICD-10-CM

## 2024-06-25 DIAGNOSIS — I25.10 ATHEROSCLEROTIC HEART DISEASE OF NATIVE CORONARY ARTERY W/OUT ANGINA PECTORIS: ICD-10-CM

## 2024-06-25 DIAGNOSIS — I48.0 PAROXYSMAL ATRIAL FIBRILLATION: ICD-10-CM

## 2024-06-25 DIAGNOSIS — I07.1 RHEUMATIC TRICUSPID INSUFFICIENCY: ICD-10-CM

## 2024-06-25 DIAGNOSIS — I10 ESSENTIAL (PRIMARY) HYPERTENSION: ICD-10-CM

## 2024-06-25 DIAGNOSIS — I27.20 PULMONARY HYPERTENSION, UNSPECIFIED: ICD-10-CM

## 2024-06-25 DIAGNOSIS — R76.8 OTHER SPECIFIED ABNORMAL IMMUNOLOGICAL FINDINGS IN SERUM: ICD-10-CM

## 2024-06-25 DIAGNOSIS — I34.0 NONRHEUMATIC MITRAL (VALVE) INSUFFICIENCY: ICD-10-CM

## 2024-06-25 PROCEDURE — 99213 OFFICE O/P EST LOW 20 MIN: CPT

## 2024-06-25 PROCEDURE — 93000 ELECTROCARDIOGRAM COMPLETE: CPT

## 2024-06-28 LAB
ALBUMIN SERPL ELPH-MCNC: 4.1 G/DL
ALP BLD-CCNC: 166 U/L
ALT SERPL-CCNC: 24 U/L
ANION GAP SERPL CALC-SCNC: 15 MMOL/L
APO B SERPL-MCNC: 69 MG/DL
AST SERPL-CCNC: 32 U/L
BASOPHILS # BLD AUTO: 0.1 K/UL
BASOPHILS NFR BLD AUTO: 0.8 %
BILIRUB DIRECT SERPL-MCNC: 0.2 MG/DL
BILIRUB INDIRECT SERPL-MCNC: 0.4 MG/DL
BILIRUB SERPL-MCNC: 0.6 MG/DL
BUN SERPL-MCNC: 46 MG/DL
CALCIUM SERPL-MCNC: 9 MG/DL
CHLORIDE SERPL-SCNC: 102 MMOL/L
CHOLEST SERPL-MCNC: 119 MG/DL
CK SERPL-CCNC: 114 U/L
CO2 SERPL-SCNC: 24 MMOL/L
CREAT SERPL-MCNC: 1.56 MG/DL
CRP SERPL HS-MCNC: 6.03 MG/L
EGFR: 42 ML/MIN/1.73M2
EOSINOPHIL # BLD AUTO: 0.11 K/UL
EOSINOPHIL NFR BLD AUTO: 0.9 %
ESTIMATED AVERAGE GLUCOSE: 126 MG/DL
GLUCOSE SERPL-MCNC: 91 MG/DL
HBA1C MFR BLD HPLC: 6 %
HCT VFR BLD CALC: 39.6 %
HDLC SERPL-MCNC: 33 MG/DL
HGB BLD-MCNC: 12.1 G/DL
IMM GRANULOCYTES NFR BLD AUTO: 0.3 %
LDLC SERPL CALC-MCNC: 68 MG/DL
LDLC SERPL DIRECT ASSAY-MCNC: 66 MG/DL
LYMPHOCYTES # BLD AUTO: 2.37 K/UL
LYMPHOCYTES NFR BLD AUTO: 19.1 %
MAN DIFF?: NORMAL
MCHC RBC-ENTMCNC: 30.6 GM/DL
MCHC RBC-ENTMCNC: 31 PG
MCV RBC AUTO: 101.5 FL
MONOCYTES # BLD AUTO: 1.09 K/UL
MONOCYTES NFR BLD AUTO: 8.8 %
NEUTROPHILS # BLD AUTO: 8.72 K/UL
NEUTROPHILS NFR BLD AUTO: 70.1 %
NONHDLC SERPL-MCNC: 86 MG/DL
NT-PROBNP SERPL-MCNC: 2582 PG/ML
PLATELET # BLD AUTO: 264 K/UL
POTASSIUM SERPL-SCNC: 5.2 MMOL/L
PROT SERPL-MCNC: 7.9 G/DL
RBC # BLD: 3.9 M/UL
RBC # FLD: 13.9 %
SODIUM SERPL-SCNC: 141 MMOL/L
TRIGL SERPL-MCNC: 96 MG/DL
WBC # FLD AUTO: 12.43 K/UL

## 2024-07-05 ENCOUNTER — NON-APPOINTMENT (OUTPATIENT)
Age: 89
End: 2024-07-05

## 2024-07-08 ENCOUNTER — APPOINTMENT (OUTPATIENT)
Dept: INTERNAL MEDICINE | Facility: CLINIC | Age: 89
End: 2024-07-08
Payer: MEDICARE

## 2024-07-08 VITALS
TEMPERATURE: 98.2 F | HEART RATE: 65 BPM | BODY MASS INDEX: 22.28 KG/M2 | RESPIRATION RATE: 15 BRPM | SYSTOLIC BLOOD PRESSURE: 140 MMHG | DIASTOLIC BLOOD PRESSURE: 70 MMHG | WEIGHT: 147 LBS | OXYGEN SATURATION: 93 % | HEIGHT: 68 IN

## 2024-07-08 DIAGNOSIS — N40.0 BENIGN PROSTATIC HYPERPLASIA WITHOUT LOWER URINARY TRACT SYMPMS: ICD-10-CM

## 2024-07-08 DIAGNOSIS — I10 ESSENTIAL (PRIMARY) HYPERTENSION: ICD-10-CM

## 2024-07-08 DIAGNOSIS — R71.8 OTHER ABNORMALITY OF RED BLOOD CELLS: ICD-10-CM

## 2024-07-08 DIAGNOSIS — I48.0 PAROXYSMAL ATRIAL FIBRILLATION: ICD-10-CM

## 2024-07-08 DIAGNOSIS — D72.829 ELEVATED WHITE BLOOD CELL COUNT, UNSPECIFIED: ICD-10-CM

## 2024-07-08 DIAGNOSIS — I25.10 ATHEROSCLEROTIC HEART DISEASE OF NATIVE CORONARY ARTERY W/OUT ANGINA PECTORIS: ICD-10-CM

## 2024-07-08 PROCEDURE — 99213 OFFICE O/P EST LOW 20 MIN: CPT

## 2024-07-08 PROCEDURE — G2211 COMPLEX E/M VISIT ADD ON: CPT

## 2024-07-09 LAB
BASOPHILS # BLD AUTO: 0.08 K/UL
BASOPHILS NFR BLD AUTO: 0.8 %
EOSINOPHIL # BLD AUTO: 0.16 K/UL
EOSINOPHIL NFR BLD AUTO: 1.6 %
FOLATE SERPL-MCNC: 8.1 NG/ML
HCT VFR BLD CALC: 41 %
HGB BLD-MCNC: 12.9 G/DL
IMM GRANULOCYTES NFR BLD AUTO: 0.3 %
LYMPHOCYTES # BLD AUTO: 1.95 K/UL
LYMPHOCYTES NFR BLD AUTO: 19.9 %
MAN DIFF?: NORMAL
MCHC RBC-ENTMCNC: 31.5 GM/DL
MCHC RBC-ENTMCNC: 31.9 PG
MCV RBC AUTO: 101.2 FL
MONOCYTES # BLD AUTO: 1.04 K/UL
MONOCYTES NFR BLD AUTO: 10.6 %
NEUTROPHILS # BLD AUTO: 6.53 K/UL
NEUTROPHILS NFR BLD AUTO: 66.8 %
PLATELET # BLD AUTO: 231 K/UL
RBC # FLD: 14.6 %
VIT B12 SERPL-MCNC: 638 PG/ML
WBC # FLD AUTO: 9.79 K/UL

## 2024-07-10 RX ORDER — APIXABAN 5 MG/1
5 TABLET, FILM COATED ORAL
Qty: 60 | Refills: 0 | Status: ACTIVE | COMMUNITY
Start: 2023-11-17 | End: 1900-01-01

## 2024-07-12 ENCOUNTER — APPOINTMENT (OUTPATIENT)
Dept: PULMONOLOGY | Facility: CLINIC | Age: 89
End: 2024-07-12
Payer: MEDICARE

## 2024-07-12 VITALS
RESPIRATION RATE: 16 BRPM | SYSTOLIC BLOOD PRESSURE: 130 MMHG | DIASTOLIC BLOOD PRESSURE: 70 MMHG | OXYGEN SATURATION: 98 % | HEART RATE: 63 BPM

## 2024-07-12 DIAGNOSIS — R93.89 ABNORMAL FINDINGS ON DIAGNOSTIC IMAGING OF OTHER SPECIFIED BODY STRUCTURES: ICD-10-CM

## 2024-07-12 PROCEDURE — 99214 OFFICE O/P EST MOD 30 MIN: CPT

## 2024-07-12 RX ORDER — SODIUM CHLORIDE FOR INHALATION 3 %
3 VIAL, NEBULIZER (ML) INHALATION
Qty: 1 | Refills: 3 | Status: ACTIVE | COMMUNITY
Start: 2024-07-12 | End: 1900-01-01

## 2024-07-13 ENCOUNTER — NON-APPOINTMENT (OUTPATIENT)
Age: 89
End: 2024-07-13

## 2024-07-13 ENCOUNTER — RX RENEWAL (OUTPATIENT)
Age: 89
End: 2024-07-13

## 2024-07-22 ENCOUNTER — RX RENEWAL (OUTPATIENT)
Age: 89
End: 2024-07-22

## 2024-07-23 ENCOUNTER — TRANSCRIPTION ENCOUNTER (OUTPATIENT)
Age: 89
End: 2024-07-23

## 2024-07-31 NOTE — PHYSICAL THERAPY INITIAL EVALUATION ADULT - RANGE OF MOTION EXAMINATION, REHAB EVAL
f/up SAD, care discussed w/ tx team, Vitals --slight orthostasis, asymptomatic,  Patient reported that he became convinced no one is after him after the overdose. Also stated that he is not paranoid either. denied manic sx in the past. denied dizziness. Patient out on the unit, encouraged to attend groups. no SE from meds.  bilateral upper extremity ROM was WFL (within functional limits)/bilateral lower extremity ROM was WFL (within functional limits)

## 2024-08-12 ENCOUNTER — RX RENEWAL (OUTPATIENT)
Age: 89
End: 2024-08-12

## 2024-08-20 ENCOUNTER — TRANSCRIPTION ENCOUNTER (OUTPATIENT)
Age: 89
End: 2024-08-20

## 2024-08-20 ENCOUNTER — APPOINTMENT (OUTPATIENT)
Dept: RADIOLOGY | Facility: CLINIC | Age: 89
End: 2024-08-20
Payer: MEDICARE

## 2024-08-20 ENCOUNTER — APPOINTMENT (OUTPATIENT)
Dept: INTERNAL MEDICINE | Facility: CLINIC | Age: 89
End: 2024-08-20
Payer: MEDICARE

## 2024-08-20 ENCOUNTER — OUTPATIENT (OUTPATIENT)
Dept: OUTPATIENT SERVICES | Facility: HOSPITAL | Age: 89
LOS: 1 days | End: 2024-08-20
Payer: MEDICARE

## 2024-08-20 VITALS
BODY MASS INDEX: 21.86 KG/M2 | HEIGHT: 68 IN | SYSTOLIC BLOOD PRESSURE: 158 MMHG | OXYGEN SATURATION: 95 % | DIASTOLIC BLOOD PRESSURE: 82 MMHG | HEART RATE: 72 BPM | RESPIRATION RATE: 14 BRPM | WEIGHT: 144.25 LBS | TEMPERATURE: 97.4 F

## 2024-08-20 DIAGNOSIS — Z87.2 PERSONAL HISTORY OF DISEASES OF THE SKIN AND SUBCUTANEOUS TISSUE: Chronic | ICD-10-CM

## 2024-08-20 DIAGNOSIS — R04.2 HEMOPTYSIS: ICD-10-CM

## 2024-08-20 DIAGNOSIS — J47.9 BRONCHIECTASIS, UNCOMPLICATED: ICD-10-CM

## 2024-08-20 DIAGNOSIS — Z98.890 OTHER SPECIFIED POSTPROCEDURAL STATES: Chronic | ICD-10-CM

## 2024-08-20 PROCEDURE — 71046 X-RAY EXAM CHEST 2 VIEWS: CPT | Mod: 26

## 2024-08-20 PROCEDURE — G2211 COMPLEX E/M VISIT ADD ON: CPT

## 2024-08-20 PROCEDURE — 71046 X-RAY EXAM CHEST 2 VIEWS: CPT

## 2024-08-20 PROCEDURE — 99213 OFFICE O/P EST LOW 20 MIN: CPT

## 2024-08-20 NOTE — HISTORY OF PRESENT ILLNESS
[FreeTextEntry8] : 89 year old male with PMH HTN, HLD, hx of CVA (no residual deficits), BPH, hx of skin CA, pAF (on Eliquis), CAD s/p stent placement who presents today with complaint of hemoptysis.  Symptoms started 2 nights ago. He states he felt like he needed to clear his throat. When he did, he spit up phlegm that was mostly blood. It resolved and did not occur again until this morning. It has since stopped again.   Since then he has not had any other episodes. He denies any fevers, chills, cough, SOB. Has not had any other episode of bleeding from the stool, urine, etc. Feels well otherwise.

## 2024-08-20 NOTE — PLAN
[FreeTextEntry1] : Hemoptysis - Two episodes of hemoptysis  - Vital signs are stable, he feels well - Lungs are clear on exam, will hold off on abx pending result of CXR. - Check CBC to ensure stable hemoglobin - Pt on Eliquis, Will hold Eliquis pending workup. Advised daughter to let cardiology know about the Eliquis being held, as well as to let pulmonologist know to get an appointment asap  - ER precautions provided.

## 2024-08-21 ENCOUNTER — TRANSCRIPTION ENCOUNTER (OUTPATIENT)
Age: 89
End: 2024-08-21

## 2024-08-22 LAB
BASOPHILS # BLD AUTO: 0.09 K/UL
BASOPHILS NFR BLD AUTO: 0.9 %
EOSINOPHIL # BLD AUTO: 0.12 K/UL
EOSINOPHIL NFR BLD AUTO: 1.1 %
HCT VFR BLD CALC: 38.7 %
HGB BLD-MCNC: 12.1 G/DL
IMM GRANULOCYTES NFR BLD AUTO: 0.3 %
LYMPHOCYTES # BLD AUTO: 1.84 K/UL
LYMPHOCYTES NFR BLD AUTO: 17.6 %
MAN DIFF?: NORMAL
MCHC RBC-ENTMCNC: 31.3 GM/DL
MCHC RBC-ENTMCNC: 31.3 PG
MCV RBC AUTO: 100 FL
MONOCYTES # BLD AUTO: 1.07 K/UL
MONOCYTES NFR BLD AUTO: 10.2 %
NEUTROPHILS # BLD AUTO: 7.32 K/UL
NEUTROPHILS NFR BLD AUTO: 69.9 %
PLATELET # BLD AUTO: 238 K/UL
RBC # BLD: 3.87 M/UL
RBC # FLD: 14.4 %
WBC # FLD AUTO: 10.47 K/UL

## 2024-09-20 ENCOUNTER — APPOINTMENT (OUTPATIENT)
Dept: PULMONOLOGY | Facility: CLINIC | Age: 89
End: 2024-09-20
Payer: MEDICARE

## 2024-09-20 VITALS — OXYGEN SATURATION: 95 % | SYSTOLIC BLOOD PRESSURE: 109 MMHG | DIASTOLIC BLOOD PRESSURE: 72 MMHG | HEART RATE: 70 BPM

## 2024-09-20 DIAGNOSIS — R93.89 ABNORMAL FINDINGS ON DIAGNOSTIC IMAGING OF OTHER SPECIFIED BODY STRUCTURES: ICD-10-CM

## 2024-09-20 DIAGNOSIS — A31.0 PULMONARY MYCOBACTERIAL INFECTION: ICD-10-CM

## 2024-09-20 PROCEDURE — G2211 COMPLEX E/M VISIT ADD ON: CPT

## 2024-09-20 PROCEDURE — 99214 OFFICE O/P EST MOD 30 MIN: CPT

## 2024-09-20 NOTE — PROCEDURE
[FreeTextEntry1] : [facility] xray 8/2024   previous data reviewed:  PROCEDURE DATE:  06/11/2024    INTERPRETATION:  Indication::Follow-up bronchiectasis  Technique: Non contrast CT scan of the thorax was performed from lung apices to adrenal glands. Sagittal and coronal reformatted images were generated.  Comparison: CT chest-August 15, 2023   Findings:  Tubes/Lines/Devices : none Mediastinum/hilum: .No adenopathy or mass. Chest Wall/ Breasts: Unremarkable Heart/Great Vessels:No pericardial effusions. Great vessels are normal in caliber. Extensive calcification, coronary arteries Abdomen: Stable angiomyolipoma left kidney. Stable hypoattenuating water density lesion medial to left kidney (possible exophytic cyst.) Bones and soft tissues: Stable degenerative changes thoracic spine Lungs, Pleura, and Airways: Patent central airways. Stable bilateral upper lobe bronchiectasis. No parenchymal mass, consolidation or pleural effusions. No honeycombing. Stable nodular opacities posterior left lower lobe. Scattered less extensive opacities right lower lobe are noted, unchanged.  IMPRESSION:  Since  August 15, 2023.  Stable bilateral upper lobe bronchiectasis. Stable nodular opacities posterior left lower lobe with less extensive stable opacities right lower lobe.  --- End of Report ---    4/2024   improved spirometry 6mw , HR up to 115 but no drop in 02 TTE moderat/ severe pulm HTN   12/2023- normal spirometry with some bronchodilator response in midflows, normal volumed reduced dlco that corrects  august 2023- normal spirometry  9/2023 overnight oximetry no significant desat  august 2023 INTERPRETATION:  Clinical information: Bronchiectasis. Exam is compared to previous study of 12/3/2022.  CT scan of the chest was obtained without administration of intravenous contrast.  Few very small lymph nodes are present in the pretracheal space.  Heart is enlarged in size. Calcification of the coronary arteries is noted. No pericardial effusion is noted.  No endobronchial lesions are noted. Extensive bronchiectasis are noted within both lungs, more so in both upper lobe. Some of the dilated bronchi are filled with mucus/secretions. Overall appearance has not significantly changed when compared to previous exam. Few ill-defined nodular opacities are noted in both lower lobes. They have increased in size when compared to previous exam. For example, the nodular opacity noted in the peripheral portion of the left lower lobe measured 0.6 cm on the previous examination while on the current examination it measures 1.2 cm. No pleural effusions are noted.  Below the diaphragm, visualized portions of the abdomen demonstrate low-attenuation structure in the left perinephric space. It is unchanged when compared to previous exam. Low-attenuation lesions within both kidneys are too small to be adequately characterized on this exam.  Degenerative changes of the spine are noted.  IMPRESSION: Stable bronchiectasis in both lungs when compared to previous exam.  Few ill-defined nodular opacities in both lower lobes have increased in size when compared to previous exam. Follow-up CT scan is recommended to ensure resolution.  --- End of Report ---   6 min walk- mild desaturation at min 5 to 91%  spirometry shows restriction, volumes are mildly decreased. dlco moderate reduction but corrects for va    xray [facility] 8/22  ct chest [facility] 8/11    xray today 10/2022- mild improvement in infiltrate HUMERA. unchanged right. no effusions

## 2024-09-20 NOTE — HISTORY OF PRESENT ILLNESS
[Never] : never [TextBox_4] :  88 y/o M with PMH HTN, HLD, hx of CVA (no residual deficits), BPH, hx of skin CA, bronchiectasis s/p on 9/1- AFB growth in broth from 8/12 sputum samples SCOTT + and BRONCH 2022 WITH SCOTT, PAF on eliquis, Echo  with severe TR, dilated aorta, MVP, and pulm HTN, cad s/p pci   CT chest done shows  bronchiectasis,    on half dose eliquis has neb teratments   pulm HTN on TTE no RHC done  sine last visit= cough/ hemoptysis resolved on its own no fever,no chills + throat clearing  did get xray done by [facility]

## 2024-09-20 NOTE — REASON FOR VISIT
[Follow-Up] : a follow-up visit [Abnormal CXR/ Chest CT] : an abnormal CXR/ chest CT [TextBox_44] : louis

## 2024-10-02 ENCOUNTER — RX RENEWAL (OUTPATIENT)
Age: 89
End: 2024-10-02

## 2024-10-02 ENCOUNTER — NON-APPOINTMENT (OUTPATIENT)
Age: 89
End: 2024-10-02

## 2024-10-02 ENCOUNTER — APPOINTMENT (OUTPATIENT)
Dept: CARDIOLOGY | Facility: CLINIC | Age: 89
End: 2024-10-02
Payer: MEDICARE

## 2024-10-02 VITALS
OXYGEN SATURATION: 96 % | HEIGHT: 68 IN | TEMPERATURE: 98 F | BODY MASS INDEX: 21.82 KG/M2 | SYSTOLIC BLOOD PRESSURE: 134 MMHG | HEART RATE: 62 BPM | DIASTOLIC BLOOD PRESSURE: 64 MMHG | WEIGHT: 144 LBS

## 2024-10-02 DIAGNOSIS — I77.810 THORACIC AORTIC ECTASIA: ICD-10-CM

## 2024-10-02 DIAGNOSIS — I10 ESSENTIAL (PRIMARY) HYPERTENSION: ICD-10-CM

## 2024-10-02 DIAGNOSIS — R76.8 OTHER SPECIFIED ABNORMAL IMMUNOLOGICAL FINDINGS IN SERUM: ICD-10-CM

## 2024-10-02 DIAGNOSIS — N18.9 CHRONIC KIDNEY DISEASE, UNSPECIFIED: ICD-10-CM

## 2024-10-02 DIAGNOSIS — I48.0 PAROXYSMAL ATRIAL FIBRILLATION: ICD-10-CM

## 2024-10-02 DIAGNOSIS — I34.0 NONRHEUMATIC MITRAL (VALVE) INSUFFICIENCY: ICD-10-CM

## 2024-10-02 DIAGNOSIS — I07.1 RHEUMATIC TRICUSPID INSUFFICIENCY: ICD-10-CM

## 2024-10-02 DIAGNOSIS — I25.10 ATHEROSCLEROTIC HEART DISEASE OF NATIVE CORONARY ARTERY W/OUT ANGINA PECTORIS: ICD-10-CM

## 2024-10-02 DIAGNOSIS — E78.00 PURE HYPERCHOLESTEROLEMIA, UNSPECIFIED: ICD-10-CM

## 2024-10-02 DIAGNOSIS — I34.1 NONRHEUMATIC MITRAL (VALVE) PROLAPSE: ICD-10-CM

## 2024-10-02 PROCEDURE — 99214 OFFICE O/P EST MOD 30 MIN: CPT

## 2024-10-02 PROCEDURE — G2211 COMPLEX E/M VISIT ADD ON: CPT

## 2024-10-02 PROCEDURE — 93000 ELECTROCARDIOGRAM COMPLETE: CPT

## 2024-10-02 NOTE — HISTORY OF PRESENT ILLNESS
[FreeTextEntry1] : DENNIS is a 89 year M w/MHx of CVA, CAD s/p PCI, Dilated Aorta, pHTN, pAfib, MVP, who presents for follow up. The patient denies fever, chills, sore throat, loss of taste or smell, muscle aches weight loss, malaise, rash, recent travel, insect bites, alteration bowel habits, headaches, weakness, abdominal pain, bloating, changes in urination, visual disturbances, shortness of breath, chest pain, dizziness, palpitations.  The patient presents for routine follow up of their coronary artery disease. The patient has been following all secondary prevents measures and antiplatelet therapy. The patient denies shortness of breath, chest pain, dizziness, palpitations, easy bruising/bleeding/hematuria/blood in stool.  The patient is also here for follow-up of atrial fibrillation. Currently they feel well with an occasional episode of palpitations. The patient states they are reliably taking anticoagulation medication. Denies signs of bleeding, dizziness, headaches, nosebleeds or black tarry stools.  The patient is here for follow-up of elevated cholesterol. Currently tolerating prescribed medications. Denies muscle pain, joint pain, back pain, urinary changes, nausea, vomiting, abdominal pain or diarrhea. The patient is trying to follow a low cholesterol diet.  The patient is here for evaluation of high blood pressure. Currently tolerating antihypertensive medications. Denies headaches, stiff neck, visual changes, or PND. The patient has been trying to stay on a low-sodium diet. He checks with BP twice daily

## 2024-10-03 ENCOUNTER — RESULT REVIEW (OUTPATIENT)
Age: 89
End: 2024-10-03

## 2024-10-03 DIAGNOSIS — E87.5 HYPERKALEMIA: ICD-10-CM

## 2024-10-03 LAB
ALBUMIN SERPL ELPH-MCNC: 4 G/DL
ALP BLD-CCNC: 163 U/L
ALT SERPL-CCNC: 23 U/L
ANION GAP SERPL CALC-SCNC: 13 MMOL/L
AST SERPL-CCNC: 28 U/L
BILIRUB DIRECT SERPL-MCNC: 0.2 MG/DL
BILIRUB INDIRECT SERPL-MCNC: 0.4 MG/DL
BILIRUB SERPL-MCNC: 0.6 MG/DL
BUN SERPL-MCNC: 42 MG/DL
CALCIUM SERPL-MCNC: 8.8 MG/DL
CHLORIDE SERPL-SCNC: 98 MMOL/L
CHOLEST SERPL-MCNC: 122 MG/DL
CK SERPL-CCNC: 125 U/L
CO2 SERPL-SCNC: 26 MMOL/L
CREAT SERPL-MCNC: 1.82 MG/DL
EGFR: 35 ML/MIN/1.73M2
ESTIMATED AVERAGE GLUCOSE: 114 MG/DL
GLUCOSE SERPL-MCNC: 99 MG/DL
HBA1C MFR BLD HPLC: 5.6 %
HDLC SERPL-MCNC: 35 MG/DL
LDLC SERPL CALC-MCNC: 69 MG/DL
LDLC SERPL DIRECT ASSAY-MCNC: 69 MG/DL
NONHDLC SERPL-MCNC: 87 MG/DL
NT-PROBNP SERPL-MCNC: 1832 PG/ML
POTASSIUM SERPL-SCNC: 5.4 MMOL/L
PROT SERPL-MCNC: 8.1 G/DL
SODIUM SERPL-SCNC: 137 MMOL/L
TRIGL SERPL-MCNC: 91 MG/DL

## 2024-10-09 ENCOUNTER — APPOINTMENT (OUTPATIENT)
Dept: PULMONOLOGY | Facility: CLINIC | Age: 89
End: 2024-10-09
Payer: MEDICARE

## 2024-10-09 VITALS — HEART RATE: 57 BPM | OXYGEN SATURATION: 100 % | DIASTOLIC BLOOD PRESSURE: 66 MMHG | SYSTOLIC BLOOD PRESSURE: 118 MMHG

## 2024-10-09 DIAGNOSIS — J47.9 BRONCHIECTASIS, UNCOMPLICATED: ICD-10-CM

## 2024-10-09 DIAGNOSIS — I27.20 PULMONARY HYPERTENSION, UNSPECIFIED: ICD-10-CM

## 2024-10-09 PROCEDURE — 94618 PULMONARY STRESS TESTING: CPT

## 2024-10-09 PROCEDURE — ZZZZZ: CPT

## 2024-10-09 PROCEDURE — 94010 BREATHING CAPACITY TEST: CPT

## 2024-10-09 PROCEDURE — G2211 COMPLEX E/M VISIT ADD ON: CPT

## 2024-10-09 PROCEDURE — 94727 GAS DIL/WSHOT DETER LNG VOL: CPT

## 2024-10-09 PROCEDURE — 94729 DIFFUSING CAPACITY: CPT

## 2024-10-09 PROCEDURE — 99214 OFFICE O/P EST MOD 30 MIN: CPT | Mod: 25

## 2024-10-16 ENCOUNTER — NON-APPOINTMENT (OUTPATIENT)
Age: 89
End: 2024-10-16

## 2024-10-16 ENCOUNTER — APPOINTMENT (OUTPATIENT)
Dept: CARDIOLOGY | Facility: CLINIC | Age: 89
End: 2024-10-16
Payer: MEDICARE

## 2024-10-16 PROCEDURE — 93306 TTE W/DOPPLER COMPLETE: CPT

## 2024-10-16 PROCEDURE — ZZZZZ: CPT

## 2024-10-17 ENCOUNTER — RX RENEWAL (OUTPATIENT)
Age: 89
End: 2024-10-17

## 2024-10-17 ENCOUNTER — NON-APPOINTMENT (OUTPATIENT)
Age: 89
End: 2024-10-17

## 2024-10-22 NOTE — BRIEF OPERATIVE NOTE - PRE-OP DX
Order placed  
Please call patient, her mammogram and ultrasound do not show any abnormalities.  Can consider a breast MRI if desired per radiology recommendations if still having bloody nipple discharge.  
Meniscus tear  05/25/2017  medial and lateral, right knee  Active  Jamie Pride

## 2024-10-24 ENCOUNTER — APPOINTMENT (OUTPATIENT)
Dept: CARDIOLOGY | Facility: CLINIC | Age: 89
End: 2024-10-24
Payer: MEDICARE

## 2024-10-24 ENCOUNTER — APPOINTMENT (OUTPATIENT)
Dept: ELECTROPHYSIOLOGY | Facility: CLINIC | Age: 89
End: 2024-10-24
Payer: MEDICARE

## 2024-10-24 ENCOUNTER — NON-APPOINTMENT (OUTPATIENT)
Age: 89
End: 2024-10-24

## 2024-10-24 VITALS
HEART RATE: 100 BPM | DIASTOLIC BLOOD PRESSURE: 65 MMHG | HEIGHT: 68 IN | WEIGHT: 147 LBS | SYSTOLIC BLOOD PRESSURE: 119 MMHG | OXYGEN SATURATION: 96 % | BODY MASS INDEX: 22.28 KG/M2

## 2024-10-24 DIAGNOSIS — I10 ESSENTIAL (PRIMARY) HYPERTENSION: ICD-10-CM

## 2024-10-24 DIAGNOSIS — I49.5 SICK SINUS SYNDROME: ICD-10-CM

## 2024-10-24 DIAGNOSIS — E78.00 PURE HYPERCHOLESTEROLEMIA, UNSPECIFIED: ICD-10-CM

## 2024-10-24 PROCEDURE — A9500: CPT

## 2024-10-24 PROCEDURE — 78452 HT MUSCLE IMAGE SPECT MULT: CPT

## 2024-10-24 PROCEDURE — 93000 ELECTROCARDIOGRAM COMPLETE: CPT

## 2024-10-24 PROCEDURE — 99214 OFFICE O/P EST MOD 30 MIN: CPT

## 2024-10-24 PROCEDURE — 93015 CV STRESS TEST SUPVJ I&R: CPT

## 2024-10-25 ENCOUNTER — NON-APPOINTMENT (OUTPATIENT)
Age: 89
End: 2024-10-25

## 2024-10-31 ENCOUNTER — APPOINTMENT (OUTPATIENT)
Dept: CARDIOLOGY | Facility: CLINIC | Age: 89
End: 2024-10-31
Payer: MEDICARE

## 2024-10-31 VITALS
BODY MASS INDEX: 21.82 KG/M2 | HEIGHT: 68 IN | HEART RATE: 59 BPM | DIASTOLIC BLOOD PRESSURE: 66 MMHG | WEIGHT: 144 LBS | TEMPERATURE: 98.6 F | SYSTOLIC BLOOD PRESSURE: 100 MMHG | OXYGEN SATURATION: 99 %

## 2024-10-31 DIAGNOSIS — I48.0 PAROXYSMAL ATRIAL FIBRILLATION: ICD-10-CM

## 2024-10-31 DIAGNOSIS — I25.10 ATHEROSCLEROTIC HEART DISEASE OF NATIVE CORONARY ARTERY W/OUT ANGINA PECTORIS: ICD-10-CM

## 2024-10-31 DIAGNOSIS — R06.09 OTHER FORMS OF DYSPNEA: ICD-10-CM

## 2024-10-31 PROCEDURE — G2211 COMPLEX E/M VISIT ADD ON: CPT

## 2024-10-31 PROCEDURE — 99214 OFFICE O/P EST MOD 30 MIN: CPT

## 2024-11-07 ENCOUNTER — RX RENEWAL (OUTPATIENT)
Age: 89
End: 2024-11-07

## 2024-11-09 ENCOUNTER — RX RENEWAL (OUTPATIENT)
Age: 89
End: 2024-11-09

## 2024-11-11 ENCOUNTER — TRANSCRIPTION ENCOUNTER (OUTPATIENT)
Age: 89
End: 2024-11-11

## 2024-12-03 ENCOUNTER — APPOINTMENT (OUTPATIENT)
Dept: INTERNAL MEDICINE | Facility: CLINIC | Age: 88
End: 2024-12-03
Payer: MEDICARE

## 2024-12-03 VITALS
SYSTOLIC BLOOD PRESSURE: 108 MMHG | TEMPERATURE: 97.3 F | RESPIRATION RATE: 14 BRPM | HEIGHT: 68 IN | WEIGHT: 145 LBS | OXYGEN SATURATION: 98 % | HEART RATE: 67 BPM | BODY MASS INDEX: 21.98 KG/M2 | DIASTOLIC BLOOD PRESSURE: 66 MMHG

## 2024-12-03 DIAGNOSIS — I10 ESSENTIAL (PRIMARY) HYPERTENSION: ICD-10-CM

## 2024-12-03 DIAGNOSIS — I34.0 NONRHEUMATIC MITRAL (VALVE) INSUFFICIENCY: ICD-10-CM

## 2024-12-03 DIAGNOSIS — I48.0 PAROXYSMAL ATRIAL FIBRILLATION: ICD-10-CM

## 2024-12-03 DIAGNOSIS — N40.0 BENIGN PROSTATIC HYPERPLASIA WITHOUT LOWER URINARY TRACT SYMPMS: ICD-10-CM

## 2024-12-03 DIAGNOSIS — I25.10 ATHEROSCLEROTIC HEART DISEASE OF NATIVE CORONARY ARTERY W/OUT ANGINA PECTORIS: ICD-10-CM

## 2024-12-03 PROCEDURE — 99214 OFFICE O/P EST MOD 30 MIN: CPT

## 2024-12-04 ENCOUNTER — RX RENEWAL (OUTPATIENT)
Age: 88
End: 2024-12-04

## 2024-12-04 LAB
ALBUMIN SERPL ELPH-MCNC: 4.2 G/DL
ALP BLD-CCNC: 165 U/L
ALT SERPL-CCNC: 22 U/L
ANION GAP SERPL CALC-SCNC: 12 MMOL/L
AST SERPL-CCNC: 22 U/L
BILIRUB SERPL-MCNC: 0.5 MG/DL
BUN SERPL-MCNC: 67 MG/DL
CALCIUM SERPL-MCNC: 9 MG/DL
CHLORIDE SERPL-SCNC: 101 MMOL/L
CO2 SERPL-SCNC: 28 MMOL/L
CREAT SERPL-MCNC: 2.04 MG/DL
EGFR: 30 ML/MIN/1.73M2
ESTIMATED AVERAGE GLUCOSE: 120 MG/DL
GLUCOSE SERPL-MCNC: 90 MG/DL
HBA1C MFR BLD HPLC: 5.8 %
NT-PROBNP SERPL-MCNC: 1726 PG/ML
POTASSIUM SERPL-SCNC: 4.9 MMOL/L
PROT SERPL-MCNC: 8.5 G/DL
SODIUM SERPL-SCNC: 141 MMOL/L

## 2024-12-27 ENCOUNTER — APPOINTMENT (OUTPATIENT)
Dept: PULMONOLOGY | Facility: CLINIC | Age: 88
End: 2024-12-27
Payer: MEDICARE

## 2024-12-27 VITALS — DIASTOLIC BLOOD PRESSURE: 63 MMHG | HEART RATE: 63 BPM | SYSTOLIC BLOOD PRESSURE: 107 MMHG | OXYGEN SATURATION: 96 %

## 2024-12-27 DIAGNOSIS — R93.89 ABNORMAL FINDINGS ON DIAGNOSTIC IMAGING OF OTHER SPECIFIED BODY STRUCTURES: ICD-10-CM

## 2024-12-27 DIAGNOSIS — J47.9 BRONCHIECTASIS, UNCOMPLICATED: ICD-10-CM

## 2024-12-27 PROCEDURE — G2211 COMPLEX E/M VISIT ADD ON: CPT

## 2024-12-27 PROCEDURE — 99214 OFFICE O/P EST MOD 30 MIN: CPT

## 2025-01-21 ENCOUNTER — APPOINTMENT (OUTPATIENT)
Dept: NEPHROLOGY | Facility: CLINIC | Age: 89
End: 2025-01-21
Payer: MEDICARE

## 2025-01-21 VITALS — SYSTOLIC BLOOD PRESSURE: 130 MMHG | DIASTOLIC BLOOD PRESSURE: 70 MMHG

## 2025-01-21 VITALS — OXYGEN SATURATION: 99 % | TEMPERATURE: 98 F | HEIGHT: 68 IN | HEART RATE: 71 BPM

## 2025-01-21 DIAGNOSIS — K40.90 UNILATERAL INGUINAL HERNIA, W/OUT OBSTRUCTION OR GANGRENE, NOT SPECIFIED AS RECURRENT: ICD-10-CM

## 2025-01-21 PROCEDURE — 99213 OFFICE O/P EST LOW 20 MIN: CPT

## 2025-01-27 ENCOUNTER — TRANSCRIPTION ENCOUNTER (OUTPATIENT)
Age: 89
End: 2025-01-27

## 2025-01-27 ENCOUNTER — OUTPATIENT (OUTPATIENT)
Dept: OUTPATIENT SERVICES | Facility: HOSPITAL | Age: 89
LOS: 1 days | Discharge: HOME | End: 2025-01-27
Payer: MEDICARE

## 2025-01-27 VITALS
SYSTOLIC BLOOD PRESSURE: 129 MMHG | OXYGEN SATURATION: 100 % | HEIGHT: 68 IN | WEIGHT: 149.91 LBS | RESPIRATION RATE: 16 BRPM | HEART RATE: 67 BPM | TEMPERATURE: 98 F | DIASTOLIC BLOOD PRESSURE: 65 MMHG

## 2025-01-27 VITALS
OXYGEN SATURATION: 100 % | TEMPERATURE: 98 F | SYSTOLIC BLOOD PRESSURE: 129 MMHG | DIASTOLIC BLOOD PRESSURE: 59 MMHG | HEART RATE: 65 BPM | RESPIRATION RATE: 17 BRPM

## 2025-01-27 DIAGNOSIS — Z87.2 PERSONAL HISTORY OF DISEASES OF THE SKIN AND SUBCUTANEOUS TISSUE: Chronic | ICD-10-CM

## 2025-01-27 DIAGNOSIS — Z98.890 OTHER SPECIFIED POSTPROCEDURAL STATES: Chronic | ICD-10-CM

## 2025-01-27 DIAGNOSIS — I48.0 PAROXYSMAL ATRIAL FIBRILLATION: ICD-10-CM

## 2025-01-27 PROCEDURE — 33285 INSJ SUBQ CAR RHYTHM MNTR: CPT

## 2025-01-27 NOTE — ASU DISCHARGE PLAN (ADULT/PEDIATRIC) - FINANCIAL ASSISTANCE
[facility] provides services at a reduced cost to those who are determined to be eligible through [facility]’s financial assistance program. Information regarding [facility]’s financial assistance program can be found by going to [email or web address] or by calling [phone].

## 2025-01-27 NOTE — ASU DISCHARGE PLAN (ADULT/PEDIATRIC) - ASU DC SPECIAL INSTRUCTIONSFT
Post procedure ILR teaching done.  Written instructions and contact information provided.   All questions answered.  Patient will receive a follow-up phone call in 2 weeks from an EP nurse.

## 2025-01-27 NOTE — ASU DISCHARGE PLAN (ADULT/PEDIATRIC) - COMMENTS
- RESTART ELIQUIS ON 1/28/25 IN THE MORNING  - PLEASE FOLLOW UP WITH A DERMATOLOGIST REGARDING DRY SKIN ON YOUR LEGS.

## 2025-01-27 NOTE — ASU DISCHARGE PLAN (ADULT/PEDIATRIC) - CARE PROVIDER_API CALL
Syed Garner  Cardiac Electrophysiology  [id number] [place] [address], [address]  [place] [address]  Phone: [phone]  Fax: [phone]  Follow Up Time:

## 2025-01-27 NOTE — H&P CARDIOLOGY - HISTORY OF PRESENT ILLNESS
89 y.o male presents today for elective ILR implant 89 y.o male presents today for elective ILR implant to assess frequency of A. Fib. The patient denies chest pain, SOB, palpitations, dizziness, presyncope, syncope,  headache, visual disturbances, CVA, PE, DVT, IVÁN, abdominal pain, N/V/D/C, hematochezia, melena, dysuria, hematuria, fever, chills.   89 y.o male presents today for elective ILR implant to assess frequency of A. Fib. The patient denies chest pain,  palpitations, dizziness, presyncope, syncope,  headache, visual disturbances, PE, DVT, IVÁN, abdominal pain, N/V/D hematochezia, melena, dysuria, hematuria, fever, chills.

## 2025-01-27 NOTE — H&P CARDIOLOGY - REVIEW OF SYSTEMS
The patient denies chest pain, palpitations, dizziness, presyncope, syncope,  headache, visual disturbances, CVA, PE, DVT, IVÁN, abdominal pain, N/V/D hematochezia, melena, dysuria, hematuria, fever, chills.   see HPI

## 2025-01-27 NOTE — ASU DISCHARGE PLAN (ADULT/PEDIATRIC) - CALL YOUR DOCTOR IF YOU HAVE ANY OF THE FOLLOWING:
[phone]/Bleeding that does not stop/Swelling that gets worse/Wound/Surgical Site with redness, or foul smelling discharge or pus

## 2025-01-28 ENCOUNTER — NON-APPOINTMENT (OUTPATIENT)
Age: 89
End: 2025-01-28

## 2025-01-28 ENCOUNTER — APPOINTMENT (OUTPATIENT)
Dept: CARDIOLOGY | Facility: CLINIC | Age: 89
End: 2025-01-28
Payer: MEDICARE

## 2025-01-28 VITALS
WEIGHT: 175 LBS | OXYGEN SATURATION: 99 % | BODY MASS INDEX: 26.52 KG/M2 | HEART RATE: 60 BPM | DIASTOLIC BLOOD PRESSURE: 70 MMHG | SYSTOLIC BLOOD PRESSURE: 125 MMHG | HEIGHT: 68 IN

## 2025-01-28 DIAGNOSIS — I34.0 NONRHEUMATIC MITRAL (VALVE) INSUFFICIENCY: ICD-10-CM

## 2025-01-28 DIAGNOSIS — I77.810 THORACIC AORTIC ECTASIA: ICD-10-CM

## 2025-01-28 DIAGNOSIS — I25.10 ATHEROSCLEROTIC HEART DISEASE OF NATIVE CORONARY ARTERY W/OUT ANGINA PECTORIS: ICD-10-CM

## 2025-01-28 DIAGNOSIS — Z95.818 PRESENCE OF OTHER CARDIAC IMPLANTS AND GRAFTS: ICD-10-CM

## 2025-01-28 DIAGNOSIS — I48.0 PAROXYSMAL ATRIAL FIBRILLATION: ICD-10-CM

## 2025-01-28 DIAGNOSIS — N18.4 CHRONIC KIDNEY DISEASE, STAGE 4 (SEVERE): ICD-10-CM

## 2025-01-28 DIAGNOSIS — I10 ESSENTIAL (PRIMARY) HYPERTENSION: ICD-10-CM

## 2025-01-28 DIAGNOSIS — N18.9 CHRONIC KIDNEY DISEASE, UNSPECIFIED: ICD-10-CM

## 2025-01-28 PROCEDURE — G2211 COMPLEX E/M VISIT ADD ON: CPT

## 2025-01-28 PROCEDURE — 99214 OFFICE O/P EST MOD 30 MIN: CPT

## 2025-01-28 PROCEDURE — 93000 ELECTROCARDIOGRAM COMPLETE: CPT

## 2025-01-29 LAB
ALBUMIN SERPL ELPH-MCNC: 4.1 G/DL
ALP BLD-CCNC: 177 U/L
ALT SERPL-CCNC: 31 U/L
ANION GAP SERPL CALC-SCNC: 14 MMOL/L
APPEARANCE: CLEAR
AST SERPL-CCNC: 33 U/L
BACTERIA: NEGATIVE /HPF
BASOPHILS # BLD AUTO: 0.09 K/UL
BASOPHILS NFR BLD AUTO: 0.8 %
BILIRUB DIRECT SERPL-MCNC: 0.2 MG/DL
BILIRUB INDIRECT SERPL-MCNC: 0.4 MG/DL
BILIRUB SERPL-MCNC: 0.6 MG/DL
BILIRUBIN URINE: NEGATIVE
BLOOD URINE: NEGATIVE
BUN SERPL-MCNC: 56 MG/DL
CALCIUM SERPL-MCNC: 8.5 MG/DL
CAST: 1 /LPF
CHLORIDE SERPL-SCNC: 103 MMOL/L
CHOLEST SERPL-MCNC: 122 MG/DL
CK SERPL-CCNC: 394 U/L
CO2 SERPL-SCNC: 25 MMOL/L
COLOR: YELLOW
CREAT SERPL-MCNC: 1.96 MG/DL
CRP SERPL HS-MCNC: 10.32 MG/L
EGFR: 32 ML/MIN/1.73M2
EOSINOPHIL # BLD AUTO: 0.13 K/UL
EOSINOPHIL NFR BLD AUTO: 1.2 %
EPITHELIAL CELLS: 1 /HPF
ESTIMATED AVERAGE GLUCOSE: 117 MG/DL
GLUCOSE QUALITATIVE U: NEGATIVE MG/DL
GLUCOSE SERPL-MCNC: 90 MG/DL
HBA1C MFR BLD HPLC: 5.7 %
HCT VFR BLD CALC: 37.1 %
HDLC SERPL-MCNC: 38 MG/DL
HGB BLD-MCNC: 11.9 G/DL
IMM GRANULOCYTES NFR BLD AUTO: 0.3 %
KETONES URINE: NEGATIVE MG/DL
LDLC SERPL CALC-MCNC: 66 MG/DL
LDLC SERPL DIRECT ASSAY-MCNC: 64 MG/DL
LEUKOCYTE ESTERASE URINE: ABNORMAL
LYMPHOCYTES # BLD AUTO: 1.48 K/UL
LYMPHOCYTES NFR BLD AUTO: 13.7 %
MAN DIFF?: NORMAL
MCHC RBC-ENTMCNC: 31.6 PG
MCHC RBC-ENTMCNC: 32.1 G/DL
MCV RBC AUTO: 98.7 FL
MICROSCOPIC-UA: NORMAL
MONOCYTES # BLD AUTO: 1.03 K/UL
MONOCYTES NFR BLD AUTO: 9.5 %
NEUTROPHILS # BLD AUTO: 8.06 K/UL
NEUTROPHILS NFR BLD AUTO: 74.5 %
NITRITE URINE: NEGATIVE
NONHDLC SERPL-MCNC: 84 MG/DL
NT-PROBNP SERPL-MCNC: 2842 PG/ML
PH URINE: 5.5
PLATELET # BLD AUTO: 247 K/UL
POTASSIUM SERPL-SCNC: 4.8 MMOL/L
PROT SERPL-MCNC: 8.1 G/DL
PROTEIN URINE: NEGATIVE MG/DL
RBC # BLD: 3.76 M/UL
RBC # FLD: 14.2 %
RED BLOOD CELLS URINE: 0 /HPF
SODIUM SERPL-SCNC: 142 MMOL/L
SPECIFIC GRAVITY URINE: 1.01
T4 FREE SERPL-MCNC: 1 NG/DL
TRIGL SERPL-MCNC: 93 MG/DL
TSH SERPL-ACNC: 3.67 UIU/ML
UROBILINOGEN URINE: 0.2 MG/DL
WBC # FLD AUTO: 10.82 K/UL
WHITE BLOOD CELLS URINE: 2 /HPF

## 2025-01-29 RX ORDER — FUROSEMIDE 40 MG/1
40 TABLET ORAL
Qty: 10 | Refills: 0 | Status: ACTIVE | COMMUNITY
Start: 2025-01-29 | End: 1900-01-01

## 2025-01-30 ENCOUNTER — NON-APPOINTMENT (OUTPATIENT)
Age: 89
End: 2025-01-30

## 2025-02-07 ENCOUNTER — NON-APPOINTMENT (OUTPATIENT)
Age: 89
End: 2025-02-07

## 2025-02-10 ENCOUNTER — APPOINTMENT (OUTPATIENT)
Dept: CARDIOLOGY | Facility: CLINIC | Age: 89
End: 2025-02-10
Payer: MEDICARE

## 2025-02-10 VITALS
BODY MASS INDEX: 26.52 KG/M2 | OXYGEN SATURATION: 90 % | WEIGHT: 175 LBS | SYSTOLIC BLOOD PRESSURE: 108 MMHG | HEART RATE: 61 BPM | HEIGHT: 68 IN | DIASTOLIC BLOOD PRESSURE: 56 MMHG | TEMPERATURE: 98.2 F

## 2025-02-10 DIAGNOSIS — I25.10 ATHEROSCLEROTIC HEART DISEASE OF NATIVE CORONARY ARTERY W/OUT ANGINA PECTORIS: ICD-10-CM

## 2025-02-10 DIAGNOSIS — R06.09 OTHER FORMS OF DYSPNEA: ICD-10-CM

## 2025-02-10 DIAGNOSIS — N18.9 CHRONIC KIDNEY DISEASE, UNSPECIFIED: ICD-10-CM

## 2025-02-10 DIAGNOSIS — I48.0 PAROXYSMAL ATRIAL FIBRILLATION: ICD-10-CM

## 2025-02-10 PROCEDURE — G2211 COMPLEX E/M VISIT ADD ON: CPT

## 2025-02-10 PROCEDURE — 99214 OFFICE O/P EST MOD 30 MIN: CPT

## 2025-02-13 ENCOUNTER — APPOINTMENT (OUTPATIENT)
Dept: ELECTROPHYSIOLOGY | Facility: CLINIC | Age: 89
End: 2025-02-13
Payer: MEDICARE

## 2025-02-13 DIAGNOSIS — I10 ESSENTIAL (PRIMARY) HYPERTENSION: ICD-10-CM

## 2025-02-13 DIAGNOSIS — Z95.818 PRESENCE OF OTHER CARDIAC IMPLANTS AND GRAFTS: ICD-10-CM

## 2025-02-13 DIAGNOSIS — I48.91 UNSPECIFIED ATRIAL FIBRILLATION: ICD-10-CM

## 2025-02-13 PROCEDURE — 99212 OFFICE O/P EST SF 10 MIN: CPT | Mod: 2W

## 2025-02-14 ENCOUNTER — TRANSCRIPTION ENCOUNTER (OUTPATIENT)
Age: 89
End: 2025-02-14

## 2025-02-14 ENCOUNTER — APPOINTMENT (OUTPATIENT)
Dept: ELECTROPHYSIOLOGY | Facility: CLINIC | Age: 89
End: 2025-02-14

## 2025-02-14 LAB
ANION GAP SERPL CALC-SCNC: 8 MMOL/L
BASOPHILS # BLD AUTO: 0.1 K/UL
BASOPHILS NFR BLD AUTO: 0.9 %
BUN SERPL-MCNC: 65 MG/DL
CALCIUM SERPL-MCNC: 8.9 MG/DL
CHLORIDE SERPL-SCNC: 101 MMOL/L
CK SERPL-CCNC: 137 U/L
CO2 SERPL-SCNC: 28 MMOL/L
CREAT SERPL-MCNC: 1.82 MG/DL
EGFR: 35 ML/MIN/1.73M2
EOSINOPHIL # BLD AUTO: 0.21 K/UL
EOSINOPHIL NFR BLD AUTO: 1.9 %
FOLATE SERPL-MCNC: 11.9 NG/ML
GLUCOSE SERPL-MCNC: 90 MG/DL
HCT VFR BLD CALC: 38.5 %
HGB BLD-MCNC: 12 G/DL
IMM GRANULOCYTES NFR BLD AUTO: 0.3 %
LYMPHOCYTES # BLD AUTO: 1.55 K/UL
LYMPHOCYTES NFR BLD AUTO: 14.3 %
MAN DIFF?: NORMAL
MCHC RBC-ENTMCNC: 31.2 G/DL
MCHC RBC-ENTMCNC: 31.8 PG
MCV RBC AUTO: 102.1 FL
MONOCYTES # BLD AUTO: 1.1 K/UL
MONOCYTES NFR BLD AUTO: 10.2 %
NEUTROPHILS # BLD AUTO: 7.84 K/UL
NEUTROPHILS NFR BLD AUTO: 72.4 %
NT-PROBNP SERPL-MCNC: 2356 PG/ML
PLATELET # BLD AUTO: 263 K/UL
POTASSIUM SERPL-SCNC: 5.2 MMOL/L
RBC # BLD: 3.77 M/UL
RBC # FLD: 14.5 %
SODIUM SERPL-SCNC: 137 MMOL/L
VIT B12 SERPL-MCNC: 576 PG/ML
WBC # FLD AUTO: 10.83 K/UL

## 2025-02-14 NOTE — DISCHARGE NOTE PROVIDER - NSDCACTIVITY_GEN_ALL_CORE
The patient lives part-time in [place] Wants  Referral To : [facility]?   Their fax # [phone]   Showering allowed/Walking - Indoors allowed/No heavy lifting/straining/Walking - Outdoors allowed

## 2025-02-18 ENCOUNTER — NON-APPOINTMENT (OUTPATIENT)
Age: 89
End: 2025-02-18

## 2025-02-18 DIAGNOSIS — U07.1 COVID-19: ICD-10-CM

## 2025-02-19 ENCOUNTER — TRANSCRIPTION ENCOUNTER (OUTPATIENT)
Age: 89
End: 2025-02-19

## 2025-02-19 RX ORDER — MOLNUPIRAVIR 200 MG/1
200 CAPSULE ORAL
Qty: 40 | Refills: 0 | Status: ACTIVE | COMMUNITY
Start: 2025-02-18 | End: 1900-01-01

## 2025-02-20 ENCOUNTER — TRANSCRIPTION ENCOUNTER (OUTPATIENT)
Age: 89
End: 2025-02-20

## 2025-02-20 ENCOUNTER — NON-APPOINTMENT (OUTPATIENT)
Age: 89
End: 2025-02-20

## 2025-02-24 ENCOUNTER — NON-APPOINTMENT (OUTPATIENT)
Age: 89
End: 2025-02-24

## 2025-02-24 ENCOUNTER — EMERGENCY (EMERGENCY)
Facility: HOSPITAL | Age: 89
LOS: 1 days | Discharge: HOME | End: 2025-02-24
Attending: EMERGENCY MEDICINE | Admitting: EMERGENCY MEDICINE
Payer: MEDICARE

## 2025-02-24 VITALS
OXYGEN SATURATION: 97 % | HEIGHT: 68 IN | RESPIRATION RATE: 20 BRPM | HEART RATE: 71 BPM | SYSTOLIC BLOOD PRESSURE: 138 MMHG | TEMPERATURE: 98 F | DIASTOLIC BLOOD PRESSURE: 72 MMHG | WEIGHT: 150.58 LBS

## 2025-02-24 VITALS
SYSTOLIC BLOOD PRESSURE: 142 MMHG | TEMPERATURE: 98 F | OXYGEN SATURATION: 97 % | DIASTOLIC BLOOD PRESSURE: 66 MMHG | RESPIRATION RATE: 20 BRPM | HEART RATE: 60 BPM

## 2025-02-24 DIAGNOSIS — Z87.2 PERSONAL HISTORY OF DISEASES OF THE SKIN AND SUBCUTANEOUS TISSUE: Chronic | ICD-10-CM

## 2025-02-24 DIAGNOSIS — Z98.890 OTHER SPECIFIED POSTPROCEDURAL STATES: Chronic | ICD-10-CM

## 2025-02-24 LAB
ALBUMIN SERPL ELPH-MCNC: 3 G/DL — LOW (ref 3.3–5)
ALP SERPL-CCNC: 176 U/L — HIGH (ref 30–120)
ALT FLD-CCNC: 48 U/L — SIGNIFICANT CHANGE UP (ref 10–60)
ANION GAP SERPL CALC-SCNC: 8 MMOL/L — SIGNIFICANT CHANGE UP (ref 5–17)
APTT BLD: 37.4 SEC — HIGH (ref 24.5–35.6)
AST SERPL-CCNC: 42 U/L — HIGH (ref 10–40)
BASOPHILS # BLD AUTO: 0.12 K/UL — SIGNIFICANT CHANGE UP (ref 0–0.2)
BASOPHILS NFR BLD AUTO: 1.3 % — SIGNIFICANT CHANGE UP (ref 0–2)
BILIRUB SERPL-MCNC: 0.6 MG/DL — SIGNIFICANT CHANGE UP (ref 0.2–1.2)
BUN SERPL-MCNC: 48 MG/DL — HIGH (ref 7–23)
CALCIUM SERPL-MCNC: 8.3 MG/DL — LOW (ref 8.4–10.5)
CHLORIDE SERPL-SCNC: 107 MMOL/L — SIGNIFICANT CHANGE UP (ref 96–108)
CO2 SERPL-SCNC: 25 MMOL/L — SIGNIFICANT CHANGE UP (ref 22–31)
CREAT SERPL-MCNC: 1.95 MG/DL — HIGH (ref 0.5–1.3)
D DIMER BLD IA.RAPID-MCNC: 542 NG/ML DDU — HIGH
EGFR: 32 ML/MIN/1.73M2 — LOW
EOSINOPHIL # BLD AUTO: 0.21 K/UL — SIGNIFICANT CHANGE UP (ref 0–0.5)
EOSINOPHIL NFR BLD AUTO: 2.2 % — SIGNIFICANT CHANGE UP (ref 0–6)
GLUCOSE SERPL-MCNC: 122 MG/DL — HIGH (ref 70–99)
HCT VFR BLD CALC: 35.8 % — LOW (ref 39–50)
HGB BLD-MCNC: 11.2 G/DL — LOW (ref 13–17)
IMM GRANULOCYTES NFR BLD AUTO: 0.3 % — SIGNIFICANT CHANGE UP (ref 0–0.9)
INR BLD: 1.66 RATIO — HIGH (ref 0.85–1.16)
LYMPHOCYTES # BLD AUTO: 1.74 K/UL — SIGNIFICANT CHANGE UP (ref 1–3.3)
LYMPHOCYTES # BLD AUTO: 18.4 % — SIGNIFICANT CHANGE UP (ref 13–44)
MCHC RBC-ENTMCNC: 31.2 PG — SIGNIFICANT CHANGE UP (ref 27–34)
MCHC RBC-ENTMCNC: 31.3 G/DL — LOW (ref 32–36)
MCV RBC AUTO: 99.7 FL — SIGNIFICANT CHANGE UP (ref 80–100)
MONOCYTES # BLD AUTO: 1.05 K/UL — HIGH (ref 0–0.9)
MONOCYTES NFR BLD AUTO: 11.1 % — SIGNIFICANT CHANGE UP (ref 2–14)
NEUTROPHILS # BLD AUTO: 6.31 K/UL — SIGNIFICANT CHANGE UP (ref 1.8–7.4)
NEUTROPHILS NFR BLD AUTO: 66.7 % — SIGNIFICANT CHANGE UP (ref 43–77)
NRBC BLD AUTO-RTO: 0 /100 WBCS — SIGNIFICANT CHANGE UP (ref 0–0)
NT-PROBNP SERPL-SCNC: 2321 PG/ML — HIGH (ref 0–450)
PLATELET # BLD AUTO: 262 K/UL — SIGNIFICANT CHANGE UP (ref 150–400)
POTASSIUM SERPL-MCNC: 5.8 MMOL/L — HIGH (ref 3.5–5.3)
POTASSIUM SERPL-SCNC: 5.8 MMOL/L — HIGH (ref 3.5–5.3)
PROT SERPL-MCNC: 8 G/DL — SIGNIFICANT CHANGE UP (ref 6–8.3)
PROTHROM AB SERPL-ACNC: 19.2 SEC — HIGH (ref 9.9–13.4)
RBC # BLD: 3.59 M/UL — LOW (ref 4.2–5.8)
RBC # FLD: 13.7 % — SIGNIFICANT CHANGE UP (ref 10.3–14.5)
SODIUM SERPL-SCNC: 140 MMOL/L — SIGNIFICANT CHANGE UP (ref 135–145)
TROPONIN I, HIGH SENSITIVITY RESULT: 20.8 NG/L — SIGNIFICANT CHANGE UP
WBC # BLD: 9.46 K/UL — SIGNIFICANT CHANGE UP (ref 3.8–10.5)
WBC # FLD AUTO: 9.46 K/UL — SIGNIFICANT CHANGE UP (ref 3.8–10.5)

## 2025-02-24 PROCEDURE — 83880 ASSAY OF NATRIURETIC PEPTIDE: CPT

## 2025-02-24 PROCEDURE — 85379 FIBRIN DEGRADATION QUANT: CPT

## 2025-02-24 PROCEDURE — 85730 THROMBOPLASTIN TIME PARTIAL: CPT

## 2025-02-24 PROCEDURE — 99285 EMERGENCY DEPT VISIT HI MDM: CPT | Mod: FS

## 2025-02-24 PROCEDURE — 71045 X-RAY EXAM CHEST 1 VIEW: CPT

## 2025-02-24 PROCEDURE — 84484 ASSAY OF TROPONIN QUANT: CPT

## 2025-02-24 PROCEDURE — 85610 PROTHROMBIN TIME: CPT

## 2025-02-24 PROCEDURE — 80053 COMPREHEN METABOLIC PANEL: CPT

## 2025-02-24 PROCEDURE — 85025 COMPLETE CBC W/AUTO DIFF WBC: CPT

## 2025-02-24 PROCEDURE — 93005 ELECTROCARDIOGRAM TRACING: CPT

## 2025-02-24 PROCEDURE — 36415 COLL VENOUS BLD VENIPUNCTURE: CPT

## 2025-02-24 PROCEDURE — 71045 X-RAY EXAM CHEST 1 VIEW: CPT | Mod: 26

## 2025-02-24 PROCEDURE — 93010 ELECTROCARDIOGRAM REPORT: CPT

## 2025-02-24 PROCEDURE — 99285 EMERGENCY DEPT VISIT HI MDM: CPT | Mod: 25

## 2025-02-24 NOTE — ED PROVIDER NOTE - PROGRESS NOTE DETAILS
Reevaluated patient at bedside. Pt resting comfortably and asymptomatic, VSS, resting pulse ox: 97% RA and ambulatory pulse ox: 95-96% RA, pulse normal, lungs cta B, ambulatory without assistance and without any distress in ED. pt has no chest pain. not tachycardic and is on eliquis. Offered pt admission for cardiology eval and possible VQ scan however pt wants to go home. states that he feels well and will f/u with his cardiologist tomorrow. pt states that his potassium is always slightly elevated, takes lasix 20mg BID at home. strict return precautions given.  Discussed the results of all diagnostic testing in ED and copies of all reports given.   An opportunity to ask questions was given.  Discussed the importance of prompt, close medical follow-up.  Patient will return with any changes, concerns or persistent / worsening symptoms.  Understanding of all instructions verbalized.

## 2025-02-24 NOTE — ED PROVIDER NOTE - MUSCULOSKELETAL, MLM
Spine appears normal, range of motion is not limited, no muscle or joint tenderness, no calf tenderness or swelling BLE

## 2025-02-24 NOTE — ED ADULT NURSE NOTE - OBJECTIVE STATEMENT
patient A&OX3 in no acute distress c/o of difficulty breathing when walking no chest pain no fever ni cough at this time , Sat 90% RA at this time, daughter by bed side place on cardiac monitor

## 2025-02-24 NOTE — ED ADULT NURSE NOTE - NSFALLHARMRISKINTERV_ED_ALL_ED
Assistance OOB with selected safe patient handling equipment if applicable/Communicate risk of Fall with Harm to all staff, patient, and family/Provide visual cue: red socks, yellow wristband, yellow gown, etc/Reinforce activity limits and safety measures with patient and family/Bed in lowest position, wheels locked, appropriate side rails in place/Call bell, personal items and telephone in reach/Instruct patient to call for assistance before getting out of bed/chair/stretcher/Non-slip footwear applied when patient is off stretcher/[place] to call system/Physically safe environment - no spills, clutter or unnecessary equipment/Purposeful Proactive Rounding/Room/bathroom lighting operational, light cord in reach

## 2025-02-24 NOTE — ED PROVIDER NOTE - PATIENT PORTAL LINK FT
You can access the FollowMyHealth Patient Portal offered by [facility] by registering at the following website: [email or web address]. By joining [facility]’s FollowMyHealth portal, you will also be able to view your health information using other applications (apps) compatible with our system.

## 2025-02-24 NOTE — ED PROVIDER NOTE - CLINICAL SUMMARY MEDICAL DECISION MAKING FREE TEXT BOX
89-year-old male history of A-fib on Eliquis loop recorder CAD with stents hypertension hyperlipidemia BPH brought in by daughter complaining of a low O2 sat seen on his pulse oximeter at home today.  Patient was diagnosed with COVID approximately 1 and half weeks ago at urgent care did not start any antiviral treatment and symptoms improved.  States he has a history of chronic shortness of breath with exertion however recently over the past week it has been getting slightly worse.  They spoke with his cardiologist Dr. Christopher Talley who advised to check his oxygen saturation at home every day.  Daughter states his pulse ox did show a pulse ox of 66% tonight so they came into the ER for evaluation.  States that if it did not show 66% he would not have come in.    Physical exam: Well-appearing no acute distress clear to auscultation bilaterally abdomen soft nontender nondistended A-fib no leg edema    White blood cell count within normal limits troponin negative noted to have baseline CKD creatinine 1.95.  proBNP 2300.  Chest x-ray similar to previous possibly slightly more vascular congestion.  Dimer slightly elevated to 542.  Offered admission to patient for VQ scan for possible PE.  Patient and daughter declined wants to go home follow-up with his cardiologist.  Patient is on Eliquis.  Patient is pulse oximeter here in the ED consistently 95 to 97% on room air.  Patient is in no respiratory distress.  Advised patient that he can increase his Lasix by 20 mg daily.  Patient is aware that his potassium is slightly elevated to 5.8 and states this is been an ongoing issue for him.

## 2025-02-24 NOTE — ED PROVIDER NOTE - OBJECTIVE STATEMENT
89-year-old male with history of A-fib on Eliquis, loop recorder, CAD with stents, hypertension hyperlipidemia, BPH presents with complaint of low O2 sat at home today.  Patient and daughter at bedside states that patient was diagnosed with COVID 1.5 weeks ago at urgent care, was not given any antiviral treatment and symptoms improved.  States that he initially had generalized weakness, fatigue, cough and some shortness of breath.  States that he has history of chronic shortness of breath with exertion however states that slightly worse over the past week, spoke to his cardiologist, Dr. Christopher Adam who advised to check oxygen saturation at home daily and come to ER for evaluation if any concerns.  Daughter states that patient's oxygen saturations fluctuates at home from mid 90s to high 80s however stated that it was 66% this evening and hence came to ER for evaluation.  Denies chest pain, palpitations, fever, calf pain/swelling, history of DVT/PE, nausea, vomiting, diarrhea, abdominal pain, recent travel/immobilization or other symptoms. 89-year-old male with history of A-fib on Eliquis, loop recorder, CAD with stents, hypertension hyperlipidemia, BPH presents with complaint of low O2 sat at home today.  Patient and daughter at bedside states that patient was diagnosed with COVID 1.5 weeks ago at urgent care, was not given any antiviral treatment and symptoms improved.  States that he initially had generalized weakness, fatigue, cough and some shortness of breath.  States that he has history of chronic shortness of breath with exertion however states that slightly worse over the past week, spoke to his cardiologist, Dr. Christopher Adam who advised to check oxygen saturation at home daily and come to ER for evaluation if any concerns.  Daughter states that patient's oxygen saturations fluctuates at home from mid 90s to high 80s however stated that there was one episode where the pulse ox was 66% this evening and hence came to ER for evaluation.  Denies chest pain, palpitations, fever, calf pain/swelling, history of DVT/PE, nausea, vomiting, diarrhea, abdominal pain, recent travel/immobilization or other symptoms.

## 2025-02-24 NOTE — ED PROVIDER NOTE - NSFOLLOWUPINSTRUCTIONS_ED_ALL_ED_FT
Follow-up with your cardiologist for reevaluation, ongoing care and treatment.  If having worsening of symptoms, chest pain or other related symptoms, return to the ER immediately.    Shortness of Breath, Adult  Shortness of breath means you have trouble breathing. Shortness of breath could be a sign of a medical problem.    Follow these instructions at home:  A sign showing that a person should not smoke.  Pollution    Do not smoke or use any products that contain nicotine or tobacco. If you need help quitting, ask your doctor.  Avoid things that can make it harder to breathe, such as:  Smoke of all kinds. This includes smoke from campfires or forest fires. Do not smoke or allow others to smoke in your home.  Mold.  Dust.  Air pollution.  Chemical smells.  Things that can give you an allergic reaction (allergens) if you have allergies.  Keep your living space clean. Use products that help remove mold and dust.  General instructions    Watch for any changes in your symptoms.  Take over-the-counter and prescription medicines only as told by your doctor. This includes oxygen therapy and inhaled medicines.  Rest as needed.  Return to your normal activities when your doctor says that it is safe.  Keep all follow-up visits.  Contact a doctor if:  Your condition does not get better as soon as expected.  You have a hard time doing your normal activities, even after you rest.  You have new symptoms.  You cannot walk up stairs.  You cannot exercise the way you normally do.  Get help right away if:  Your shortness of breath gets worse.  You have trouble breathing when you are resting.  You feel light-headed or you faint.  You have a cough that is not helped by medicines.  You cough up blood.  You have pain with breathing.  You have pain in your chest, arms, shoulders, or belly (abdomen).  You have a fever.  These symptoms may be an emergency. Get help right away. Call 911.  Do not wait to see if the symptoms will go away.  Do not drive yourself to the hospital.  Summary  Shortness of breath is when you have trouble breathing enough air. It can be a sign of a medical problem.  Avoid things that make it hard for you to breathe, such as smoking, pollution, mold, and dust.  Watch for any changes in your symptoms. Contact your doctor if you do not get better or you get worse.

## 2025-02-24 NOTE — ED PROVIDER NOTE - CARE PROVIDER_API CALL
Christopher Adam  Cardiology  [address] [facility], [address]  [place] [address]  Phone: [phone]  Fax: [phone]  Follow Up Time: 1-3 Days

## 2025-02-24 NOTE — ED ADULT NURSE REASSESSMENT NOTE - NS ED NURSE REASSESS COMMENT FT1
patient A&Ox3 in no acute distress resting comfy at this time , no chest pain no difficulty breathing at this time

## 2025-02-24 NOTE — ED ADULT NURSE REASSESSMENT NOTE - NS ED NURSE REASSESS COMMENT FT1
patient A&Ox3 in no acute distress no chest pain no difficulty breathing at this time , discharge as orders heplock remove left ER with daughter

## 2025-02-25 ENCOUNTER — TRANSCRIPTION ENCOUNTER (OUTPATIENT)
Age: 89
End: 2025-02-25

## 2025-02-25 ENCOUNTER — NON-APPOINTMENT (OUTPATIENT)
Age: 89
End: 2025-02-25

## 2025-02-26 ENCOUNTER — NON-APPOINTMENT (OUTPATIENT)
Age: 89
End: 2025-02-26

## 2025-02-26 ENCOUNTER — TRANSCRIPTION ENCOUNTER (OUTPATIENT)
Age: 89
End: 2025-02-26

## 2025-02-26 DIAGNOSIS — Z79.899 ENCOUNTER FOR THERAPEUTIC DRUG LVL MONITORING: ICD-10-CM

## 2025-02-26 DIAGNOSIS — Z51.81 ENCOUNTER FOR THERAPEUTIC DRUG LVL MONITORING: ICD-10-CM

## 2025-02-26 RX ORDER — DIGOXIN 125 UG/1
125 TABLET ORAL
Qty: 16 | Refills: 0 | Status: ACTIVE | COMMUNITY
Start: 2025-02-26 | End: 1900-01-01

## 2025-02-28 ENCOUNTER — NON-APPOINTMENT (OUTPATIENT)
Age: 89
End: 2025-02-28

## 2025-03-14 ENCOUNTER — APPOINTMENT (OUTPATIENT)
Dept: PULMONOLOGY | Facility: CLINIC | Age: 89
End: 2025-03-14
Payer: MEDICARE

## 2025-03-14 VITALS — DIASTOLIC BLOOD PRESSURE: 60 MMHG | HEART RATE: 64 BPM | SYSTOLIC BLOOD PRESSURE: 101 MMHG | OXYGEN SATURATION: 98 %

## 2025-03-14 DIAGNOSIS — I48.91 UNSPECIFIED ATRIAL FIBRILLATION: ICD-10-CM

## 2025-03-14 DIAGNOSIS — U07.1 COVID-19: ICD-10-CM

## 2025-03-14 DIAGNOSIS — R93.89 ABNORMAL FINDINGS ON DIAGNOSTIC IMAGING OF OTHER SPECIFIED BODY STRUCTURES: ICD-10-CM

## 2025-03-14 PROCEDURE — 99214 OFFICE O/P EST MOD 30 MIN: CPT | Mod: 25

## 2025-03-14 PROCEDURE — 71046 X-RAY EXAM CHEST 2 VIEWS: CPT

## 2025-03-14 PROCEDURE — 94010 BREATHING CAPACITY TEST: CPT

## 2025-03-17 ENCOUNTER — NON-APPOINTMENT (OUTPATIENT)
Age: 89
End: 2025-03-17

## 2025-03-28 ENCOUNTER — RX RENEWAL (OUTPATIENT)
Age: 89
End: 2025-03-28

## 2025-03-31 ENCOUNTER — NON-APPOINTMENT (OUTPATIENT)
Age: 89
End: 2025-03-31

## 2025-03-31 ENCOUNTER — TRANSCRIPTION ENCOUNTER (OUTPATIENT)
Age: 89
End: 2025-03-31

## 2025-04-09 ENCOUNTER — NON-APPOINTMENT (OUTPATIENT)
Age: 89
End: 2025-04-09

## 2025-04-16 ENCOUNTER — NON-APPOINTMENT (OUTPATIENT)
Age: 89
End: 2025-04-16

## 2025-04-16 ENCOUNTER — APPOINTMENT (OUTPATIENT)
Dept: CARDIOLOGY | Facility: CLINIC | Age: 89
End: 2025-04-16
Payer: MEDICARE

## 2025-04-16 VITALS
SYSTOLIC BLOOD PRESSURE: 110 MMHG | DIASTOLIC BLOOD PRESSURE: 70 MMHG | HEIGHT: 68 IN | OXYGEN SATURATION: 95 % | BODY MASS INDEX: 21.73 KG/M2 | WEIGHT: 143.4 LBS | HEART RATE: 63 BPM

## 2025-04-16 DIAGNOSIS — R06.09 OTHER FORMS OF DYSPNEA: ICD-10-CM

## 2025-04-16 DIAGNOSIS — I25.10 ATHEROSCLEROTIC HEART DISEASE OF NATIVE CORONARY ARTERY W/OUT ANGINA PECTORIS: ICD-10-CM

## 2025-04-16 DIAGNOSIS — I48.0 PAROXYSMAL ATRIAL FIBRILLATION: ICD-10-CM

## 2025-04-16 DIAGNOSIS — I10 ESSENTIAL (PRIMARY) HYPERTENSION: ICD-10-CM

## 2025-04-16 DIAGNOSIS — I27.20 PULMONARY HYPERTENSION, UNSPECIFIED: ICD-10-CM

## 2025-04-16 DIAGNOSIS — R60.9 EDEMA, UNSPECIFIED: ICD-10-CM

## 2025-04-16 DIAGNOSIS — J47.9 BRONCHIECTASIS, UNCOMPLICATED: ICD-10-CM

## 2025-04-16 DIAGNOSIS — N18.9 CHRONIC KIDNEY DISEASE, UNSPECIFIED: ICD-10-CM

## 2025-04-16 DIAGNOSIS — I34.1 NONRHEUMATIC MITRAL (VALVE) PROLAPSE: ICD-10-CM

## 2025-04-16 PROCEDURE — 99214 OFFICE O/P EST MOD 30 MIN: CPT

## 2025-04-16 PROCEDURE — 93000 ELECTROCARDIOGRAM COMPLETE: CPT

## 2025-04-16 PROCEDURE — G2211 COMPLEX E/M VISIT ADD ON: CPT

## 2025-04-16 RX ORDER — AMLODIPINE BESYLATE 2.5 MG/1
2.5 TABLET ORAL DAILY
Qty: 90 | Refills: 0 | Status: ACTIVE | COMMUNITY
Start: 2025-04-16 | End: 1900-01-01

## 2025-04-18 ENCOUNTER — RX RENEWAL (OUTPATIENT)
Age: 89
End: 2025-04-18

## 2025-04-18 LAB
ALBUMIN SERPL ELPH-MCNC: 3.9 G/DL
ALP BLD-CCNC: 184 U/L
ALT SERPL-CCNC: 24 U/L
ANION GAP SERPL CALC-SCNC: 13 MMOL/L
AST SERPL-CCNC: 29 U/L
BASOPHILS # BLD AUTO: 0.1 K/UL
BASOPHILS NFR BLD AUTO: 0.9 %
BILIRUB DIRECT SERPL-MCNC: 0.2 MG/DL
BILIRUB INDIRECT SERPL-MCNC: 0.5 MG/DL
BILIRUB SERPL-MCNC: 0.7 MG/DL
BUN SERPL-MCNC: 36 MG/DL
CALCIUM SERPL-MCNC: 9.1 MG/DL
CHLORIDE SERPL-SCNC: 105 MMOL/L
CHOLEST SERPL-MCNC: 125 MG/DL
CK SERPL-CCNC: 93 U/L
CO2 SERPL-SCNC: 22 MMOL/L
CREAT SERPL-MCNC: 1.61 MG/DL
CRP SERPL HS-MCNC: 8.79 MG/L
DIGOXIN SERPL-MCNC: 0.9 NG/ML
EGFRCR SERPLBLD CKD-EPI 2021: 40 ML/MIN/1.73M2
EOSINOPHIL # BLD AUTO: 0.16 K/UL
EOSINOPHIL NFR BLD AUTO: 1.5 %
ESTIMATED AVERAGE GLUCOSE: 131 MG/DL
GLUCOSE SERPL-MCNC: 86 MG/DL
HBA1C MFR BLD HPLC: 6.2 %
HCT VFR BLD CALC: 39.9 %
HDLC SERPL-MCNC: 33 MG/DL
HGB BLD-MCNC: 12.2 G/DL
IMM GRANULOCYTES NFR BLD AUTO: 0.3 %
LDLC SERPL DIRECT ASSAY-MCNC: 68 MG/DL
LDLC SERPL-MCNC: 73 MG/DL
LYMPHOCYTES # BLD AUTO: 1.88 K/UL
LYMPHOCYTES NFR BLD AUTO: 17.3 %
MAN DIFF?: NORMAL
MCHC RBC-ENTMCNC: 30.6 G/DL
MCHC RBC-ENTMCNC: 31.1 PG
MCV RBC AUTO: 101.8 FL
MONOCYTES # BLD AUTO: 1.09 K/UL
MONOCYTES NFR BLD AUTO: 10 %
NEUTROPHILS # BLD AUTO: 7.6 K/UL
NEUTROPHILS NFR BLD AUTO: 70 %
NONHDLC SERPL-MCNC: 92 MG/DL
NT-PROBNP SERPL-MCNC: 2872 PG/ML
PLATELET # BLD AUTO: 280 K/UL
POTASSIUM SERPL-SCNC: 5.8 MMOL/L
PROT SERPL-MCNC: 8.1 G/DL
RBC # BLD: 3.92 M/UL
RBC # FLD: 14.4 %
SODIUM SERPL-SCNC: 140 MMOL/L
TRIGL SERPL-MCNC: 102 MG/DL
WBC # FLD AUTO: 10.86 K/UL

## 2025-04-22 NOTE — H&P ADULT - NSICDXPASTMEDICALHX_GEN_ALL_CORE_FT
PAST MEDICAL HISTORY:  Benign prostatic hyperplasia, presence of lower urinary tract symptoms unspecified, unspecified morphology     Cerebrovascular accident (CVA), unspecified mechanism 2015    Complex tear of lateral meniscus of right knee as current injury, initial encounter     Complex tear of medial meniscus of right knee as current injury, initial encounter     Essential hypertension     Hyperlipidemia, unspecified hyperlipidemia type     Skin cancer s/p Mohs    
How Did The Hair Loss Occur?: gradual in onset
How Severe Is Your Hair Loss?: severe

## 2025-04-23 ENCOUNTER — TRANSCRIPTION ENCOUNTER (OUTPATIENT)
Age: 89
End: 2025-04-23

## 2025-04-25 ENCOUNTER — TRANSCRIPTION ENCOUNTER (OUTPATIENT)
Age: 89
End: 2025-04-25

## 2025-04-26 ENCOUNTER — TRANSCRIPTION ENCOUNTER (OUTPATIENT)
Age: 89
End: 2025-04-26

## 2025-04-28 ENCOUNTER — APPOINTMENT (OUTPATIENT)
Dept: CARDIOLOGY | Facility: CLINIC | Age: 89
End: 2025-04-28
Payer: MEDICARE

## 2025-04-28 PROCEDURE — 93298 REM INTERROG DEV EVAL SCRMS: CPT

## 2025-04-30 ENCOUNTER — APPOINTMENT (OUTPATIENT)
Dept: CARDIOLOGY | Facility: CLINIC | Age: 89
End: 2025-04-30
Payer: MEDICARE

## 2025-04-30 VITALS
HEIGHT: 68 IN | SYSTOLIC BLOOD PRESSURE: 122 MMHG | HEART RATE: 70 BPM | DIASTOLIC BLOOD PRESSURE: 70 MMHG | OXYGEN SATURATION: 94 % | BODY MASS INDEX: 21.82 KG/M2 | WEIGHT: 144 LBS | TEMPERATURE: 98.5 F

## 2025-04-30 DIAGNOSIS — N18.4 CHRONIC KIDNEY DISEASE, STAGE 4 (SEVERE): ICD-10-CM

## 2025-04-30 DIAGNOSIS — I27.20 PULMONARY HYPERTENSION, UNSPECIFIED: ICD-10-CM

## 2025-04-30 DIAGNOSIS — E87.5 HYPERKALEMIA: ICD-10-CM

## 2025-04-30 DIAGNOSIS — I10 ESSENTIAL (PRIMARY) HYPERTENSION: ICD-10-CM

## 2025-04-30 DIAGNOSIS — I48.0 PAROXYSMAL ATRIAL FIBRILLATION: ICD-10-CM

## 2025-04-30 DIAGNOSIS — I25.10 ATHEROSCLEROTIC HEART DISEASE OF NATIVE CORONARY ARTERY W/OUT ANGINA PECTORIS: ICD-10-CM

## 2025-04-30 LAB
ANION GAP SERPL CALC-SCNC: 15 MMOL/L
BUN SERPL-MCNC: 43 MG/DL
CALCIUM SERPL-MCNC: 8.2 MG/DL
CHLORIDE SERPL-SCNC: 100 MMOL/L
CO2 SERPL-SCNC: 23 MMOL/L
CREAT SERPL-MCNC: 1.74 MG/DL
DIGOXIN SERPL-MCNC: 1 NG/ML
EGFRCR SERPLBLD CKD-EPI 2021: 37 ML/MIN/1.73M2
GLUCOSE SERPL-MCNC: 82 MG/DL
POTASSIUM SERPL-SCNC: 4.3 MMOL/L
SODIUM SERPL-SCNC: 138 MMOL/L

## 2025-04-30 PROCEDURE — G2211 COMPLEX E/M VISIT ADD ON: CPT

## 2025-04-30 PROCEDURE — 99214 OFFICE O/P EST MOD 30 MIN: CPT

## 2025-04-30 PROCEDURE — 93000 ELECTROCARDIOGRAM COMPLETE: CPT

## 2025-04-30 RX ORDER — SODIUM ZIRCONIUM CYCLOSILICATE 10 G/10G
10 POWDER, FOR SUSPENSION ORAL DAILY
Qty: 3 | Refills: 0 | Status: DISCONTINUED | COMMUNITY
Start: 2025-04-26 | End: 2025-04-30

## 2025-05-01 ENCOUNTER — NON-APPOINTMENT (OUTPATIENT)
Age: 89
End: 2025-05-01

## 2025-05-01 LAB
ANION GAP SERPL CALC-SCNC: 14 MMOL/L
BUN SERPL-MCNC: 36 MG/DL
CALCIUM SERPL-MCNC: 8.3 MG/DL
CHLORIDE SERPL-SCNC: 104 MMOL/L
CO2 SERPL-SCNC: 23 MMOL/L
CREAT SERPL-MCNC: 1.7 MG/DL
EGFRCR SERPLBLD CKD-EPI 2021: 38 ML/MIN/1.73M2
GLUCOSE SERPL-MCNC: 111 MG/DL
NT-PROBNP SERPL-MCNC: 2158 PG/ML
POTASSIUM SERPL-SCNC: 6.2 MMOL/L
SODIUM SERPL-SCNC: 140 MMOL/L

## 2025-05-02 LAB
ANION GAP SERPL CALC-SCNC: 14 MMOL/L
BUN SERPL-MCNC: 36 MG/DL
CALCIUM SERPL-MCNC: 8.5 MG/DL
CHLORIDE SERPL-SCNC: 104 MMOL/L
CO2 SERPL-SCNC: 23 MMOL/L
CREAT SERPL-MCNC: 1.68 MG/DL
DIGOXIN SERPL-MCNC: 0.9 NG/ML
EGFRCR SERPLBLD CKD-EPI 2021: 38 ML/MIN/1.73M2
GLUCOSE SERPL-MCNC: 92 MG/DL
NT-PROBNP SERPL-MCNC: 3574 PG/ML
POTASSIUM SERPL-SCNC: 5.3 MMOL/L
SODIUM SERPL-SCNC: 140 MMOL/L

## 2025-05-06 ENCOUNTER — APPOINTMENT (OUTPATIENT)
Dept: CARDIOLOGY | Facility: CLINIC | Age: 89
End: 2025-05-06
Payer: MEDICARE

## 2025-05-06 PROCEDURE — 93306 TTE W/DOPPLER COMPLETE: CPT

## 2025-05-09 ENCOUNTER — TRANSCRIPTION ENCOUNTER (OUTPATIENT)
Age: 89
End: 2025-05-09

## 2025-05-27 ENCOUNTER — APPOINTMENT (OUTPATIENT)
Dept: CARDIOLOGY | Facility: CLINIC | Age: 89
End: 2025-05-27
Payer: MEDICARE

## 2025-05-27 PROCEDURE — 93298 REM INTERROG DEV EVAL SCRMS: CPT | Mod: NC

## 2025-06-02 ENCOUNTER — RX RENEWAL (OUTPATIENT)
Age: 89
End: 2025-06-02

## 2025-06-06 ENCOUNTER — RX RENEWAL (OUTPATIENT)
Age: 89
End: 2025-06-06

## 2025-06-08 ENCOUNTER — NON-APPOINTMENT (OUTPATIENT)
Age: 89
End: 2025-06-08

## 2025-06-09 ENCOUNTER — TRANSCRIPTION ENCOUNTER (OUTPATIENT)
Age: 89
End: 2025-06-09

## 2025-06-09 ENCOUNTER — APPOINTMENT (OUTPATIENT)
Dept: CARDIOLOGY | Facility: CLINIC | Age: 89
End: 2025-06-09
Payer: MEDICARE

## 2025-06-09 VITALS
HEIGHT: 68 IN | OXYGEN SATURATION: 97 % | SYSTOLIC BLOOD PRESSURE: 122 MMHG | HEART RATE: 68 BPM | DIASTOLIC BLOOD PRESSURE: 60 MMHG

## 2025-06-09 PROCEDURE — 93000 ELECTROCARDIOGRAM COMPLETE: CPT

## 2025-06-09 PROCEDURE — 99214 OFFICE O/P EST MOD 30 MIN: CPT

## 2025-06-09 PROCEDURE — G2211 COMPLEX E/M VISIT ADD ON: CPT

## 2025-06-12 ENCOUNTER — APPOINTMENT (OUTPATIENT)
Dept: CARDIOLOGY | Facility: CLINIC | Age: 89
End: 2025-06-12
Payer: MEDICARE

## 2025-06-12 PROCEDURE — 93970 EXTREMITY STUDY: CPT

## 2025-06-13 ENCOUNTER — APPOINTMENT (OUTPATIENT)
Dept: PULMONOLOGY | Facility: CLINIC | Age: 89
End: 2025-06-13
Payer: MEDICARE

## 2025-06-13 ENCOUNTER — TRANSCRIPTION ENCOUNTER (OUTPATIENT)
Age: 89
End: 2025-06-13

## 2025-06-13 VITALS — HEART RATE: 60 BPM | SYSTOLIC BLOOD PRESSURE: 127 MMHG | OXYGEN SATURATION: 95 % | DIASTOLIC BLOOD PRESSURE: 68 MMHG

## 2025-06-13 LAB
ALBUMIN SERPL ELPH-MCNC: 3.8 G/DL
ALP BLD-CCNC: 197 U/L
ALT SERPL-CCNC: 36 U/L
ANION GAP SERPL CALC-SCNC: 14 MMOL/L
AST SERPL-CCNC: 33 U/L
BASOPHILS # BLD AUTO: 0.09 K/UL
BASOPHILS NFR BLD AUTO: 0.9 %
BILIRUB SERPL-MCNC: 0.6 MG/DL
BUN SERPL-MCNC: 44 MG/DL
CALCIUM SERPL-MCNC: 8.9 MG/DL
CHLORIDE SERPL-SCNC: 103 MMOL/L
CO2 SERPL-SCNC: 22 MMOL/L
CREAT SERPL-MCNC: 1.78 MG/DL
DIGOXIN SERPL-MCNC: 1.4 NG/ML
EGFRCR SERPLBLD CKD-EPI 2021: 36 ML/MIN/1.73M2
EOSINOPHIL # BLD AUTO: 0.2 K/UL
EOSINOPHIL NFR BLD AUTO: 2 %
GLUCOSE SERPL-MCNC: 107 MG/DL
HCT VFR BLD CALC: 35.5 %
HGB BLD-MCNC: 11.1 G/DL
IMM GRANULOCYTES NFR BLD AUTO: 0.3 %
LYMPHOCYTES # BLD AUTO: 1.46 K/UL
LYMPHOCYTES NFR BLD AUTO: 14.6 %
MAN DIFF?: NORMAL
MCHC RBC-ENTMCNC: 31.3 G/DL
MCHC RBC-ENTMCNC: 31.6 PG
MCV RBC AUTO: 101.1 FL
MONOCYTES # BLD AUTO: 1.1 K/UL
MONOCYTES NFR BLD AUTO: 11 %
NEUTROPHILS # BLD AUTO: 7.1 K/UL
NEUTROPHILS NFR BLD AUTO: 71.2 %
NT-PROBNP SERPL-MCNC: 2883 PG/ML
PLATELET # BLD AUTO: 269 K/UL
POTASSIUM SERPL-SCNC: 5.7 MMOL/L
PROT SERPL-MCNC: 7.8 G/DL
RBC # BLD: 3.51 M/UL
RBC # FLD: 14 %
SODIUM SERPL-SCNC: 139 MMOL/L
WBC # FLD AUTO: 9.98 K/UL

## 2025-06-13 PROCEDURE — 71046 X-RAY EXAM CHEST 2 VIEWS: CPT

## 2025-06-13 PROCEDURE — 99214 OFFICE O/P EST MOD 30 MIN: CPT | Mod: 25

## 2025-06-13 PROCEDURE — 94618 PULMONARY STRESS TESTING: CPT

## 2025-06-18 ENCOUNTER — APPOINTMENT (OUTPATIENT)
Dept: CARDIOLOGY | Facility: CLINIC | Age: 89
End: 2025-06-18
Payer: MEDICARE

## 2025-06-18 VITALS — DIASTOLIC BLOOD PRESSURE: 63 MMHG | SYSTOLIC BLOOD PRESSURE: 114 MMHG

## 2025-06-18 PROCEDURE — 99213 OFFICE O/P EST LOW 20 MIN: CPT | Mod: 2W

## 2025-06-18 PROCEDURE — G2211 COMPLEX E/M VISIT ADD ON: CPT | Mod: 2W

## 2025-06-18 RX ORDER — UMECLIDINIUM BROMIDE AND VILANTEROL TRIFENATATE 62.5; 25 UG/1; UG/1
62.5-25 POWDER RESPIRATORY (INHALATION)
Qty: 60 | Refills: 0 | Status: ACTIVE | COMMUNITY
Start: 2025-06-18 | End: 1900-01-01

## 2025-06-19 ENCOUNTER — LABORATORY RESULT (OUTPATIENT)
Age: 89
End: 2025-06-19

## 2025-06-23 ENCOUNTER — TRANSCRIPTION ENCOUNTER (OUTPATIENT)
Age: 89
End: 2025-06-23

## 2025-06-25 ENCOUNTER — NON-APPOINTMENT (OUTPATIENT)
Age: 89
End: 2025-06-25

## 2025-06-26 ENCOUNTER — APPOINTMENT (OUTPATIENT)
Dept: CARDIOLOGY | Facility: CLINIC | Age: 89
End: 2025-06-26
Payer: MEDICARE

## 2025-06-26 PROCEDURE — 93298 REM INTERROG DEV EVAL SCRMS: CPT

## 2025-06-27 ENCOUNTER — RX RENEWAL (OUTPATIENT)
Age: 89
End: 2025-06-27

## 2025-07-03 ENCOUNTER — APPOINTMENT (OUTPATIENT)
Dept: INTERNAL MEDICINE | Facility: CLINIC | Age: 89
End: 2025-07-03
Payer: MEDICARE

## 2025-07-03 VITALS
DIASTOLIC BLOOD PRESSURE: 60 MMHG | TEMPERATURE: 97.9 F | WEIGHT: 140 LBS | HEIGHT: 68 IN | OXYGEN SATURATION: 91 % | SYSTOLIC BLOOD PRESSURE: 100 MMHG | HEART RATE: 71 BPM | BODY MASS INDEX: 21.22 KG/M2 | RESPIRATION RATE: 12 BRPM

## 2025-07-03 PROBLEM — R53.83 FATIGUE: Status: ACTIVE | Noted: 2025-07-03
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
## 2025-07-03 PROCEDURE — 99213 OFFICE O/P EST LOW 20 MIN: CPT | Mod: 25

## 2025-07-03 PROCEDURE — G0439: CPT

## 2025-07-14 ENCOUNTER — RX RENEWAL (OUTPATIENT)
Age: 89
End: 2025-07-14

## 2025-07-16 ENCOUNTER — APPOINTMENT (OUTPATIENT)
Dept: CARDIOLOGY | Facility: CLINIC | Age: 89
End: 2025-07-16
Payer: MEDICARE

## 2025-07-16 VITALS — DIASTOLIC BLOOD PRESSURE: 60 MMHG | HEIGHT: 68 IN | SYSTOLIC BLOOD PRESSURE: 119 MMHG

## 2025-07-16 PROBLEM — R60.0 BILATERAL LEG EDEMA: Status: ACTIVE | Noted: 2025-06-09

## 2025-07-16 PROCEDURE — 99213 OFFICE O/P EST LOW 20 MIN: CPT

## 2025-07-18 LAB
ALBUMIN SERPL ELPH-MCNC: 3.9 G/DL
ALP BLD-CCNC: 183 U/L
ALT SERPL-CCNC: 33 U/L
ANION GAP SERPL CALC-SCNC: 15 MMOL/L
APO B SERPL-MCNC: 75 MG/DL
AST SERPL-CCNC: 33 U/L
BASOPHILS # BLD AUTO: 0.11 K/UL
BASOPHILS NFR BLD AUTO: 1 %
BILIRUB DIRECT SERPL-MCNC: 0.24 MG/DL
BILIRUB INDIRECT SERPL-MCNC: 0.4 MG/DL
BILIRUB SERPL-MCNC: 0.7 MG/DL
BUN SERPL-MCNC: 39 MG/DL
CALCIUM SERPL-MCNC: 9 MG/DL
CHLORIDE SERPL-SCNC: 101 MMOL/L
CHOLEST SERPL-MCNC: 120 MG/DL
CK SERPL-CCNC: 80 U/L
CO2 SERPL-SCNC: 23 MMOL/L
CREAT SERPL-MCNC: 1.83 MG/DL
CRP SERPL HS-MCNC: 14.25 MG/L
DIGOXIN SERPL-MCNC: 1.1 NG/ML
EGFRCR SERPLBLD CKD-EPI 2021: 35 ML/MIN/1.73M2
EOSINOPHIL # BLD AUTO: 0.17 K/UL
EOSINOPHIL NFR BLD AUTO: 1.6 %
ESTIMATED AVERAGE GLUCOSE: 137 MG/DL
GLUCOSE SERPL-MCNC: 84 MG/DL
HBA1C MFR BLD HPLC: 6.4 %
HCT VFR BLD CALC: 35.8 %
HDLC SERPL-MCNC: 32 MG/DL
HGB BLD-MCNC: 11.1 G/DL
IMM GRANULOCYTES NFR BLD AUTO: 0.5 %
LDLC SERPL DIRECT ASSAY-MCNC: 67 MG/DL
LDLC SERPL-MCNC: 70 MG/DL
LYMPHOCYTES # BLD AUTO: 1.86 K/UL
LYMPHOCYTES NFR BLD AUTO: 17.1 %
MAN DIFF?: NORMAL
MCHC RBC-ENTMCNC: 31 G/DL
MCHC RBC-ENTMCNC: 31.2 PG
MCV RBC AUTO: 100.6 FL
MONOCYTES # BLD AUTO: 1.12 K/UL
MONOCYTES NFR BLD AUTO: 10.3 %
NEUTROPHILS # BLD AUTO: 7.54 K/UL
NEUTROPHILS NFR BLD AUTO: 69.5 %
NONHDLC SERPL-MCNC: 88 MG/DL
NT-PROBNP SERPL-MCNC: 2491 PG/ML
PLATELET # BLD AUTO: 262 K/UL
POTASSIUM SERPL-SCNC: 5.3 MMOL/L
PROT SERPL-MCNC: 8.2 G/DL
RBC # BLD: 3.56 M/UL
RBC # FLD: 14.5 %
SODIUM SERPL-SCNC: 140 MMOL/L
TRIGL SERPL-MCNC: 91 MG/DL
WBC # FLD AUTO: 10.85 K/UL

## 2025-07-21 ENCOUNTER — APPOINTMENT (OUTPATIENT)
Dept: RADIOLOGY | Facility: CLINIC | Age: 89
End: 2025-07-21
Payer: MEDICARE

## 2025-07-21 ENCOUNTER — APPOINTMENT (OUTPATIENT)
Dept: INTERNAL MEDICINE | Facility: CLINIC | Age: 89
End: 2025-07-21
Payer: MEDICARE

## 2025-07-21 ENCOUNTER — OUTPATIENT (OUTPATIENT)
Dept: OUTPATIENT SERVICES | Facility: HOSPITAL | Age: 89
LOS: 1 days | End: 2025-07-21
Payer: MEDICARE

## 2025-07-21 VITALS
OXYGEN SATURATION: 94 % | SYSTOLIC BLOOD PRESSURE: 112 MMHG | BODY MASS INDEX: 21.67 KG/M2 | HEART RATE: 72 BPM | WEIGHT: 143 LBS | HEIGHT: 68 IN | RESPIRATION RATE: 16 BRPM | TEMPERATURE: 97.9 F | DIASTOLIC BLOOD PRESSURE: 64 MMHG

## 2025-07-21 DIAGNOSIS — Z87.2 PERSONAL HISTORY OF DISEASES OF THE SKIN AND SUBCUTANEOUS TISSUE: Chronic | ICD-10-CM

## 2025-07-21 DIAGNOSIS — M54.50 LOW BACK PAIN, UNSPECIFIED: ICD-10-CM

## 2025-07-21 PROCEDURE — 72100 X-RAY EXAM L-S SPINE 2/3 VWS: CPT

## 2025-07-21 PROCEDURE — 99213 OFFICE O/P EST LOW 20 MIN: CPT

## 2025-07-21 PROCEDURE — 72100 X-RAY EXAM L-S SPINE 2/3 VWS: CPT | Mod: 26

## 2025-07-28 ENCOUNTER — APPOINTMENT (OUTPATIENT)
Dept: CARDIOLOGY | Facility: CLINIC | Age: 89
End: 2025-07-28
Payer: MEDICARE

## 2025-07-28 PROCEDURE — 93298 REM INTERROG DEV EVAL SCRMS: CPT

## 2025-07-30 ENCOUNTER — TRANSCRIPTION ENCOUNTER (OUTPATIENT)
Age: 89
End: 2025-07-30

## 2025-07-31 ENCOUNTER — APPOINTMENT (OUTPATIENT)
Dept: PAIN MANAGEMENT | Facility: CLINIC | Age: 89
End: 2025-07-31
Payer: MEDICARE

## 2025-07-31 VITALS — BODY MASS INDEX: 21.67 KG/M2 | HEIGHT: 68 IN | WEIGHT: 143 LBS

## 2025-07-31 DIAGNOSIS — M54.17 RADICULOPATHY, LUMBOSACRAL REGION: ICD-10-CM

## 2025-07-31 DIAGNOSIS — M54.50 LOW BACK PAIN, UNSPECIFIED: ICD-10-CM

## 2025-07-31 DIAGNOSIS — M79.10 MYALGIA, UNSPECIFIED SITE: ICD-10-CM

## 2025-07-31 PROCEDURE — J3490M: CUSTOM | Mod: NC

## 2025-07-31 PROCEDURE — 99204 OFFICE O/P NEW MOD 45 MIN: CPT | Mod: 25

## 2025-07-31 PROCEDURE — 20552 NJX 1/MLT TRIGGER POINT 1/2: CPT

## 2025-08-06 ENCOUNTER — TRANSCRIPTION ENCOUNTER (OUTPATIENT)
Age: 89
End: 2025-08-06

## 2025-08-06 LAB — DIGOXIN SERPL-MCNC: 0.9 NG/ML

## 2025-08-25 ENCOUNTER — APPOINTMENT (OUTPATIENT)
Dept: CARDIOLOGY | Facility: CLINIC | Age: 89
End: 2025-08-25

## 2025-08-25 ENCOUNTER — NON-APPOINTMENT (OUTPATIENT)
Age: 89
End: 2025-08-25

## 2025-08-25 PROCEDURE — 93298 REM INTERROG DEV EVAL SCRMS: CPT | Mod: NC

## 2025-09-09 ENCOUNTER — NON-APPOINTMENT (OUTPATIENT)
Age: 89
End: 2025-09-09

## 2025-09-11 ENCOUNTER — APPOINTMENT (OUTPATIENT)
Dept: SURGERY | Facility: CLINIC | Age: 89
End: 2025-09-11
Payer: MEDICARE

## 2025-09-11 VITALS
HEIGHT: 68 IN | WEIGHT: 145 LBS | DIASTOLIC BLOOD PRESSURE: 61 MMHG | BODY MASS INDEX: 21.98 KG/M2 | SYSTOLIC BLOOD PRESSURE: 102 MMHG | HEART RATE: 75 BPM | OXYGEN SATURATION: 91 %

## 2025-09-11 DIAGNOSIS — K40.90 UNILATERAL INGUINAL HERNIA, W/OUT OBSTRUCTION OR GANGRENE, NOT SPECIFIED AS RECURRENT: ICD-10-CM

## 2025-09-11 PROCEDURE — 99202 OFFICE O/P NEW SF 15 MIN: CPT

## (undated) DEVICE — PACK IV START WITH CHG

## (undated) DEVICE — BRUSH CYTO DISP

## (undated) DEVICE — TUBING SUCTION CONN 6FT STERILE

## (undated) DEVICE — APPLICATOR Q TIP 6" WOOD STEM

## (undated) DEVICE — TUBE O2 SUPL CRUSH RESIS CONN SOUTHSIDE ONLY

## (undated) DEVICE — VALVE BIOPSY BRONCHOVIDEOSCOPE

## (undated) DEVICE — FORMALIN CUPS 10% BUFFERED

## (undated) DEVICE — TUBING CANNULA SALTER LABS NASAL ADULT 7FT

## (undated) DEVICE — TUBING IV SET GRAVITY 3Y 100" MACRO

## (undated) DEVICE — FORCEP BIOPSY 1.8MM JAW X 100CM DISP

## (undated) DEVICE — DRSG CURITY GAUZE SPONGE 4 X 4" 12-PLY

## (undated) DEVICE — CATH IV SAFE BC 20G X 1.16" (PINK)

## (undated) DEVICE — CANISTER SUCTION 1200CC 10/SL

## (undated) DEVICE — SPECIMEN CONTAINER 4OZ

## (undated) DEVICE — VALVE SUCTION EVIS 160/200/240

## (undated) DEVICE — SUCTION YANKAUER TAPERED BULBOUS NO VENT

## (undated) DEVICE — SENSOR O2 FINGER XL ADULT 24/BX 6BX/CA

## (undated) DEVICE — MASK O2 NON REBREATH 3IN1 ADULT

## (undated) DEVICE — STERIS DEFENDO 3-PIECE KIT (AIR/WATER, SUCTION & BIOPSY VALVES)

## (undated) DEVICE — CATH IV SAFE BC 22G X 1" (BLUE)

## (undated) DEVICE — SOL IRR POUR H2O 500ML

## (undated) DEVICE — SYR LUER SLIP TIP 30CC

## (undated) DEVICE — SOL IRR POUR NS 0.9% 1000ML

## (undated) DEVICE — TUBING IV SET SECONDARY 34"

## (undated) DEVICE — SYR IV POSIFLUSH NS 3ML 30/TY

## (undated) DEVICE — NDL HYPO SAFE 18G X 1.5" (PINK)

## (undated) DEVICE — DRAPE TOWEL BLUE 17" X 24"

## (undated) DEVICE — VALVE BIOPSY

## (undated) DEVICE — TRAP SPECIMEN SPUTUM 40CC

## (undated) DEVICE — SYR SLIP TIP 20CC

## (undated) DEVICE — BRUSH COLONOSCOPY CYTOLOGY

## (undated) DEVICE — SOL IRR NS 0.9% 250ML

## (undated) DEVICE — ATOMIZER SWIV TIP 1OZ GLASS

## (undated) DEVICE — SYR LUER LOK 10CC

## (undated) DEVICE — NDL TRANSBRONCHIAL ASPIRATION 21GA

## (undated) DEVICE — SET IV PUMP BLOOD 1VALVE 180FILTER NON-DEHP

## (undated) DEVICE — FACESHIELD FULL VISOR

## (undated) DEVICE — SUCTION CATH AIRLIFE CATH-N-GLOVE 14FR

## (undated) DEVICE — Device